# Patient Record
Sex: MALE | Race: BLACK OR AFRICAN AMERICAN | NOT HISPANIC OR LATINO | Employment: UNEMPLOYED | URBAN - NONMETROPOLITAN AREA
[De-identification: names, ages, dates, MRNs, and addresses within clinical notes are randomized per-mention and may not be internally consistent; named-entity substitution may affect disease eponyms.]

---

## 2017-09-25 RX ORDER — CARVEDILOL 12.5 MG/1
12.5 TABLET ORAL 2 TIMES DAILY WITH MEALS
COMMUNITY
End: 2018-08-03 | Stop reason: SDUPTHER

## 2017-09-25 RX ORDER — LOVASTATIN 20 MG/1
20 TABLET ORAL NIGHTLY
Status: ON HOLD | COMMUNITY
End: 2019-03-09 | Stop reason: SDUPTHER

## 2017-09-25 RX ORDER — FUROSEMIDE 40 MG/1
40 TABLET ORAL 2 TIMES DAILY
COMMUNITY
End: 2019-01-11 | Stop reason: SDUPTHER

## 2017-09-25 RX ORDER — POTASSIUM CHLORIDE 750 MG/1
10 TABLET, FILM COATED, EXTENDED RELEASE ORAL DAILY
COMMUNITY
End: 2019-01-11 | Stop reason: SDUPTHER

## 2017-09-25 RX ORDER — DIGOXIN 125 MCG
125 TABLET ORAL
COMMUNITY
End: 2019-02-07 | Stop reason: HOSPADM

## 2017-09-26 ENCOUNTER — OFFICE VISIT (OUTPATIENT)
Dept: CARDIOLOGY | Facility: CLINIC | Age: 47
End: 2017-09-26

## 2017-09-26 VITALS
HEART RATE: 65 BPM | SYSTOLIC BLOOD PRESSURE: 130 MMHG | WEIGHT: 315 LBS | BODY MASS INDEX: 39.17 KG/M2 | OXYGEN SATURATION: 97 % | HEIGHT: 75 IN | DIASTOLIC BLOOD PRESSURE: 78 MMHG

## 2017-09-26 DIAGNOSIS — Z86.79 HISTORY OF ABDOMINAL AORTIC ANEURYSM (AAA): ICD-10-CM

## 2017-09-26 DIAGNOSIS — I50.22 CHRONIC SYSTOLIC CONGESTIVE HEART FAILURE (HCC): Primary | ICD-10-CM

## 2017-09-26 DIAGNOSIS — I10 ESSENTIAL HYPERTENSION: Primary | ICD-10-CM

## 2017-09-26 DIAGNOSIS — G47.33 OSA (OBSTRUCTIVE SLEEP APNEA): ICD-10-CM

## 2017-09-26 DIAGNOSIS — E78.2 MIXED HYPERLIPIDEMIA: ICD-10-CM

## 2017-09-26 DIAGNOSIS — I10 ESSENTIAL HYPERTENSION: ICD-10-CM

## 2017-09-26 PROCEDURE — 94620 PR PULMONARY STRESS TESTING,SIMPLE: CPT | Performed by: NURSE PRACTITIONER

## 2017-09-26 PROCEDURE — 99204 OFFICE O/P NEW MOD 45 MIN: CPT | Performed by: NURSE PRACTITIONER

## 2017-09-26 RX ORDER — LOSARTAN POTASSIUM 25 MG/1
12.5 TABLET ORAL DAILY
Qty: 30 TABLET | Refills: 6 | Status: SHIPPED | OUTPATIENT
Start: 2017-09-26 | End: 2017-11-01 | Stop reason: CLARIF

## 2017-09-26 NOTE — PROGRESS NOTES
Matti Bravo  Procedure: 6 Minute Walk Test   9/26/2017  Indication:HFrEF    Pretest: BP:130/78               HR:65               Sa02:97               Dyspnea:0               Fatigue:3    Post Test: BP:140/80               HR:80               Sa02:97               Dyspnea:5               Fatigue:4    First 6MWT:yes     Supplemental oxygen during test:no    Stopped before 6 minutes:no    Pauses:no    Results in distance walked:408.7 meters    Did individual experience any pain or discomfort:no    I was present for the above test and agree with the data.     NYHA Functional Class II          This document has been electronically signed by SHELL Isidro on September 26, 2017 3:47 PM

## 2017-09-26 NOTE — PATIENT INSTRUCTIONS
Losartan tablets  What is this medicine?  LOSARTAN (krys BETHEL tan) is used to treat high blood pressure and to reduce the risk of stroke in certain patients. This drug also slows the progression of kidney disease in patients with diabetes.  This medicine may be used for other purposes; ask your health care provider or pharmacist if you have questions.  COMMON BRAND NAME(S): Cozaar  What should I tell my health care provider before I take this medicine?  They need to know if you have any of these conditions:  -heart failure  -kidney or liver disease  -an unusual or allergic reaction to losartan, other medicines, foods, dyes, or preservatives  -pregnant or trying to get pregnant  -breast-feeding  How should I use this medicine?  Take this medicine by mouth with a glass of water. Follow the directions on the prescription label. This medicine can be taken with or without food. Take your doses at regular intervals. Do not take your medicine more often than directed.  Talk to your pediatrician regarding the use of this medicine in children. Special care may be needed.  Overdosage: If you think you have taken too much of this medicine contact a poison control center or emergency room at once.  NOTE: This medicine is only for you. Do not share this medicine with others.  What if I miss a dose?  If you miss a dose, take it as soon as you can. If it is almost time for your next dose, take only that dose. Do not take double or extra doses.  What may interact with this medicine?  -blood pressure medicines  -diuretics, especially triamterene, spironolactone, or amiloride  -fluconazole  -NSAIDs, medicines for pain and inflammation, like ibuprofen or naproxen  -potassium salts or potassium supplements  -rifampin  This list may not describe all possible interactions. Give your health care provider a list of all the medicines, herbs, non-prescription drugs, or dietary supplements you use. Also tell them if you smoke, drink alcohol, or  use illegal drugs. Some items may interact with your medicine.  What should I watch for while using this medicine?  Visit your doctor or health care professional for regular checks on your progress. Check your blood pressure as directed. Ask your doctor or health care professional what your blood pressure should be and when you should contact him or her. Call your doctor or health care professional if you notice an irregular or fast heart beat.  Women should inform their doctor if they wish to become pregnant or think they might be pregnant. There is a potential for serious side effects to an unborn child, particularly in the second or third trimester. Talk to your health care professional or pharmacist for more information.  You may get drowsy or dizzy. Do not drive, use machinery, or do anything that needs mental alertness until you know how this drug affects you. Do not stand or sit up quickly, especially if you are an older patient. This reduces the risk of dizzy or fainting spells. Alcohol can make you more drowsy and dizzy. Avoid alcoholic drinks.  Avoid salt substitutes unless you are told otherwise by your doctor or health care professional.  Do not treat yourself for coughs, colds, or pain while you are taking this medicine without asking your doctor or health care professional for advice. Some ingredients may increase your blood pressure.  What side effects may I notice from receiving this medicine?  Side effects that you should report to your doctor or health care professional as soon as possible:  -confusion, dizziness, light headedness or fainting spells  -decreased amount of urine passed  -difficulty breathing or swallowing, hoarseness, or tightening of the throat  -fast or irregular heart beat, palpitations, or chest pain  -skin rash, itching  -swelling of your face, lips, tongue, hands, or feet  Side effects that usually do not require medical attention (report to your doctor or health care  professional if they continue or are bothersome):  -cough  -decreased sexual function or desire  -headache  -nasal congestion or stuffiness  -nausea or stomach pain  -sore or cramping muscles  This list may not describe all possible side effects. Call your doctor for medical advice about side effects. You may report side effects to FDA at 1-892-TXD-2068.  Where should I keep my medicine?  Keep out of the reach of children.  Store at room temperature between 15 and 30 degrees C (59 and 86 degrees F). Protect from light. Keep container tightly closed. Throw away any unused medicine after the expiration date.  NOTE: This sheet is a summary. It may not cover all possible information. If you have questions about this medicine, talk to your doctor, pharmacist, or health care provider.     © 2017, [employer]. (2009-02-27 16:42:18)

## 2017-09-26 NOTE — PROGRESS NOTES
[facility] CHF CLINIC OFFICE VISIT    Subjective:     Congestive Heart Failure (chief complaint )      Congestive Heart Failure   Presents for initial visit. The disease course has been stable. The condition has lasted for 3 years. Pertinent negatives include no abdominal pain, chest pain, claudication, edema, fatigue, orthopnea, palpitations, shortness of breath or unexpected weight change. The symptoms have been stable. Past treatments include ACE inhibitors, beta blockers, digoxin, exercise and salt and fluid restriction. The treatment provided significant relief. Compliance with prior treatments has been good.     CHF referral from SHELL Mcclure. Mr. Bravo has a known cardiomyopathy from 2014 with an EF of 10-15%. He has since been in [facility] and established with [facility] for refills of CHF medications. He was released from [facility] on Aug 1, 2017. Unclear why he is not on an ACE/ARB. Unclear why he does not have ICD implanted. Left heart cath in 2009 was without obstruction. Echo in 2009 was 20%. Echo in 2014 was 10-15%. Had followed with Dr. Nunn.   He has been on Coreg 12.5mg BID, Lasix 40mg BID, Digoxin 125mcg, and Kdur.   He does well with his symptoms, appearing to be NYHA Class II. No complaint of orthopnea- chronic 2 pillows, edema, weight gain, abdominal swelling, or dyspnea.     PCP: SHELL Mcclure  Cardiologist: will est with Dr. Shook  Nephrologist: none  Pulmonologist: none    Hospitalizations:  none    Past Medical History:   Diagnosis Date   • Hyperlipidemia    • Hypertension    • Peripheral vascular disease      Past Surgical History:   Procedure Laterality Date   • CARDIAC CATHETERIZATION  2009     Social History     Social History   • Marital status:      Spouse name: N/A   • Number of children: N/A   • Years of education: N/A     Social History Main Topics   • Smoking status: Never Smoker   • Smokeless tobacco: Never Used   • Alcohol use No   • Drug use: No   • Sexual activity:  "Not Asked     Other Topics Concern   • None     Social History Narrative     No Known Allergies    Review of Systems   Constitution: Negative for chills, decreased appetite, fatigue, fever, weakness and unexpected weight change.   HENT: Negative.    Eyes: Negative.    Cardiovascular: Negative for chest pain, claudication, dyspnea on exertion, irregular heartbeat, leg swelling and palpitations.   Respiratory: Negative for cough, shortness of breath and wheezing.    Endocrine: Negative.    Skin: Negative for dry skin, flushing and rash.   Musculoskeletal: Negative for falls and myalgias.   Gastrointestinal: Negative for abdominal pain, change in bowel habit and melena.   Genitourinary: Negative for frequency and hematuria.   Neurological: Negative for dizziness, light-headedness and loss of balance.   Psychiatric/Behavioral: Negative for altered mental status and memory loss. The patient is not nervous/anxious.        Current Outpatient Prescriptions   Medication Sig Dispense Refill   • carvedilol (COREG) 12.5 MG tablet Take 12.5 mg by mouth 2 (Two) Times a Day With Meals.     • digoxin (LANOXIN) 125 MCG tablet Take 125 mcg by mouth Daily.     • furosemide (LASIX) 40 MG tablet Take 40 mg by mouth 2 (Two) Times a Day.     • lovastatin (MEVACOR) 20 MG tablet Take 20 mg by mouth Every Night.     • potassium chloride (K-DUR) 10 MEQ CR tablet Take 10 mEq by mouth Daily.       No current facility-administered medications for this visit.         Objective:     Vitals:    09/26/17 1455   BP: 130/78   BP Location: Left arm   Patient Position: Sitting   Cuff Size: Adult   Pulse: 65   SpO2: 97%   Weight: (!) 357 lb 6 oz (162 kg)   Height: 75\" (190.5 cm)     Wt Readings from Last 3 Encounters:   09/26/17 (!) 357 lb 6 oz (162 kg)        Physical Exam   Constitutional: He is oriented to person, place, and time. He appears well-developed and well-nourished. No distress.   HENT:   Head: Normocephalic.   Neck: No JVD present. "   Cardiovascular: Normal rate, regular rhythm, S1 normal, S2 normal, normal heart sounds and intact distal pulses.    No murmur heard.  Pulmonary/Chest: Effort normal and breath sounds normal. No respiratory distress. He has no wheezes. He has no rales.   Abdominal: Soft. Bowel sounds are normal.   Musculoskeletal: Normal range of motion. He exhibits no edema.   Neurological: He is alert and oriented to person, place, and time.   Skin: Skin is warm and dry. No erythema.   Psychiatric: He has a normal mood and affect. His behavior is normal. Judgment and thought content normal.       Cardiographics  Echocardiogram:   DESCRIPTION:  Left ventricle is moderately dilated.  There is severe  global hypokinesis with an EF of 15% to 20%.      Right ventricular size is normal.  Right ventricular systolic function  is mildly impaired.      Left atrium is moderately dilated.  Right atrium is mildly dilated.      Aortic valve is trileaflet and opens very well.  Mitral valve is  normal. There is mild MR.  Tricuspid valve is normal.  There is mild  TR.  Pulmonic valve is normal.  There is trace PI. There is no  pericardial effusion.      A:  1. Severe left ventricular dysfunction with an ejection fraction of 15%        to 20%.  2. Biatrial enlargement  3. Left ventricle is moderately dilated.  4. Ejection fraction is 15% to 20%    YURIDIA CARLISLE MD    [id number]  Normal sinus rhythm with sinus arrhythmia  Possible Left atrial enlargement  ST   Abnormal ECG  When compared with ECG of 2014 09:51,  fusion complexes are no longer present  premature ventricular complexes are no longer present  Questionable change in QRS axis  Nonspecific T wave abnormality now evident in Inferior leads  T wave inversion more evident in Lateral leads  Confirmed by NAOMIE AGUILAR     [facility]: 10/27/2009  DESCRIPTION: Patient was brought to the cath lab in the fasting state, explained the risks and benefits. He was prepped and draped in  the usual sterile manner. Right groin was anesthetized using 2% Lidocaine. Subsequently a 6-Chilean short sheath was inserted using modified Seldinger technique.   Left coronary angiogram was obtained using a 6-Chilean left Lonnie catheter, and right coronary angiogram was obtained using a 6-Chilean right Lonnie catheter.   LV-gram was obtained using a pigtail catheter. There were no complications.   HEMODYNAMICS Aortic pressure is 136/96. Left ventricular pressure is 135. Left ventricular end diastolic pressure is 22.   LV-GRAM: Showed severe global hypokinesis with an EF of 25%.   LEFT MAIN: Large caliber vessel, which gave rise to an LAD, which also gave rise to multiple diagonals and septals, which was free of significant disease.   LEFT CIRCUMFLEX: Gave rise to 2 OM's, which is free of significant disease.   RCA: Is a co-dominant system, gives rise to a PDA and PL branch, which is free of significant disease. A: 1. Normal coronaries 2. Severe left ventricular dysfunction.   REC: 1. Control hypertension   2. Medical therapy     YURIDIA CARLISLE M.D.     Imaging  Chest x-ray:    CT:    Lab Review   Faxed From Karin Ng's office:  WBC: 5.5  Hb  Hct: 39  Plt: 152,000    Na: 139   K: 3.9  Creat: 1.05  BUN: 15  Album: 4.3  Gluc: 155 (fasting)  AST: 17  ALT: 22    Total Chol:151  HDL:36  LDL:96  Tri     The following portions of the patient's history were reviewed and updated as appropriate: allergies, current medications, past family history, past medical history, past social history, past surgical history and problem list.     Assessment/Plan:      Diagnosis Plan   1. Chronic systolic congestive heart failure  Most likely NICM: EF:15%. NYHA Class II, Stage C. Patient appears euvolemic and in a well perfused physiologic state. Hemodynamics are acceptable\    BETA-BLOCKER: Coreg 12.5mg BID        ACE/ARB: Start Losartan 12.5mg daily  ENTRESTO: no  DIURETIC: LAsix 40mg BID  ALDOSTERONT ANTAGONIST: Not  currently indicated. NYHA Class II  IMDUR/HYDRALAZINE: N/A  DIGOXIN: 125mcg  Fluid restriction: 2 L  Sodium restriction: 2 grams  6MWT: yes  9/26/2017  Cardiac Rehab: n/a  ICD: indicated. Will repeat echo.   ADHF: no  CardioMEMS:no  LVAD: unsure    Presented an overview of heart failure, expected course, considerations, risk factors and exacerbation prevention.  Recommended daily weight monitoring. Information provided.  Recommended restricted dietary sodium intake of 2g/day  Fluid restrictions of 2L/day.  Discussed patient action plan for heart failure;  Recommended avoiding NSAIDs use.  Discussed warning signs requiring additional medical attention for heart failure.  Do not hesitate to contact this office for further symptoms or concerns. Contact information provided to the patient and understanding verbalized.     EKG  CTA Coronary for ischemic evaluation due to non-specific T wave changes.   Repeat ECHO       2. History of abdominal aortic aneurysm (AAA)  Abdominal Aortic Aneurysm Screening evaluation       3. Essential hypertension  HTN, essential.   BP noted to be well controlled today in office.    LVH: absent.  LVEF:Abnormal:.   Diastolic function:Not Available.  End-organ damage: HF    DASH; medication compliance  Echo       4. Mixed hyperlipidemia  Lovastatin 20mg nightly       5. KHANH (obstructive sleep apnea)  Had sleep study. Failed it. Never got CPAP.   Referral to Dr. Forbes        Follow up in 1 month    45 minutes out of 60 minutes face to face spent counseling patient extensively on dietary Na+ intake, importance of activity, weight monitoring, compliance with medications and follow up appointments.

## 2017-10-02 ENCOUNTER — TELEPHONE (OUTPATIENT)
Dept: CARDIOLOGY | Facility: CLINIC | Age: 47
End: 2017-10-02

## 2017-10-05 ENCOUNTER — HOSPITAL ENCOUNTER (OUTPATIENT)
Dept: CT IMAGING | Facility: HOSPITAL | Age: 47
Discharge: HOME | End: 2017-10-05
Admitting: NURSE PRACTITIONER

## 2017-10-05 DIAGNOSIS — I50.22 CHRONIC SYSTOLIC CONGESTIVE HEART FAILURE (HCC): ICD-10-CM

## 2017-10-05 PROCEDURE — 75574 CT ANGIO HRT W/3D IMAGE: CPT

## 2017-10-05 PROCEDURE — 0 IOPAMIDOL PER 1 ML: Performed by: INTERNAL MEDICINE

## 2017-10-05 RX ADMIN — IOPAMIDOL 91 ML: 755 INJECTION, SOLUTION INTRAVENOUS at 15:00

## 2017-11-01 ENCOUNTER — OFFICE VISIT (OUTPATIENT)
Dept: CARDIOLOGY | Facility: CLINIC | Age: 47
End: 2017-11-01

## 2017-11-01 VITALS
HEART RATE: 80 BPM | SYSTOLIC BLOOD PRESSURE: 152 MMHG | HEIGHT: 75 IN | DIASTOLIC BLOOD PRESSURE: 90 MMHG | WEIGHT: 315 LBS | BODY MASS INDEX: 39.17 KG/M2 | OXYGEN SATURATION: 94 %

## 2017-11-01 DIAGNOSIS — I50.22 CHRONIC SYSTOLIC CONGESTIVE HEART FAILURE (HCC): Primary | ICD-10-CM

## 2017-11-01 DIAGNOSIS — R91.8 MULTIPLE LUNG NODULES ON CT: ICD-10-CM

## 2017-11-01 DIAGNOSIS — I10 ESSENTIAL HYPERTENSION: ICD-10-CM

## 2017-11-01 PROBLEM — Z86.79 HISTORY OF ABDOMINAL AORTIC ANEURYSM (AAA): Status: RESOLVED | Noted: 2017-09-26 | Resolved: 2017-11-01

## 2017-11-01 PROCEDURE — 93005 ELECTROCARDIOGRAM TRACING: CPT | Performed by: NURSE PRACTITIONER

## 2017-11-01 PROCEDURE — 93010 ELECTROCARDIOGRAM REPORT: CPT | Performed by: INTERNAL MEDICINE

## 2017-11-01 PROCEDURE — 99214 OFFICE O/P EST MOD 30 MIN: CPT | Performed by: NURSE PRACTITIONER

## 2017-11-01 RX ORDER — ALBUTEROL SULFATE 90 UG/1
2 AEROSOL, METERED RESPIRATORY (INHALATION) EVERY 4 HOURS PRN
Qty: 1 INHALER | Refills: 3 | Status: ON HOLD | OUTPATIENT
Start: 2017-11-01 | End: 2019-03-09 | Stop reason: SDUPTHER

## 2017-11-01 NOTE — PROGRESS NOTES
[facility] CHF CLINIC OFFICE VISIT    Subjective:     Congestive Heart Failure      Congestive Heart Failure   Presents for initial visit. The disease course has been stable. The condition has lasted for 3 years. Pertinent negatives include no abdominal pain, chest pain, claudication, edema, fatigue, orthopnea, palpitations, shortness of breath or unexpected weight change. The symptoms have been stable. Past treatments include ACE inhibitors, beta blockers, digoxin, exercise and salt and fluid restriction. The treatment provided significant relief. Compliance with prior treatments has been good.     CHF referral from [facility]. Mr. Bravo has a known cardiomyopathy from 2014 with an EF of 10-15%. He has since been in group home and established with [facility] for refills of CHF medications. He was released from group home on Aug 1, 2017. Unclear why he is not on an ACE/ARB. Unclear why he does not have ICD implanted. Left heart cath in 2009 was without obstruction. Echo in 2009 was 20%. Echo in 2014 was 10-15%. Had followed with Dr. Nunn. Repeat of CTA coronary was without obstruction still. SV was 67ml. 3D EF was 30%.    He has been on Coreg 12.5mg BID, Lasix 40mg BID, Digoxin 125mcg, and Kdur.   He does well with his symptoms, appearing to be NYHA Class II. No complaint of orthopnea- chronic 2 pillows, edema, weight gain, abdominal swelling, or dyspnea. He has vague symptoms over the past month including wheezes and fatigue.     PCP: [facility]  Cardiologist: praveen est with Dr. Cabrera post ICD placement  Nephrologist: none  Pulmonologist: Ernie referral    Hospitalizations:  none    Past Medical History:   Diagnosis Date   • Hyperlipidemia    • Hypertension    • Peripheral vascular disease      Past Surgical History:   Procedure Laterality Date   • CARDIAC CATHETERIZATION  2009     Social History     Social History   • Marital status:      Spouse name: N/A   • Number of children: N/A   • Years of  "education: N/A     Social History Main Topics   • Smoking status: Never Smoker   • Smokeless tobacco: Never Used   • Alcohol use No   • Drug use: No   • Sexual activity: Not Asked     Other Topics Concern   • None     Social History Narrative     No Known Allergies    Review of Systems   Constitution: Negative for chills, decreased appetite, fatigue, fever, weakness and unexpected weight change.   HENT: Negative.    Eyes: Negative.    Cardiovascular: Negative for chest pain, claudication, dyspnea on exertion, irregular heartbeat, leg swelling and palpitations.   Respiratory: Negative for cough, shortness of breath and wheezing.    Endocrine: Negative.    Skin: Negative for dry skin, flushing and rash.   Musculoskeletal: Negative for falls and myalgias.   Gastrointestinal: Negative for abdominal pain, change in bowel habit and melena.   Genitourinary: Negative for frequency and hematuria.   Neurological: Negative for dizziness, light-headedness and loss of balance.   Psychiatric/Behavioral: Negative for altered mental status and memory loss. The patient is not nervous/anxious.        Current Outpatient Prescriptions   Medication Sig Dispense Refill   • carvedilol (COREG) 12.5 MG tablet Take 12.5 mg by mouth 2 (Two) Times a Day With Meals.     • digoxin (LANOXIN) 125 MCG tablet Take 125 mcg by mouth Daily.     • furosemide (LASIX) 40 MG tablet Take 40 mg by mouth 2 (Two) Times a Day.     • losartan (COZAAR) 25 MG tablet Take 0.5 tablets by mouth Daily. 30 tablet 6   • lovastatin (MEVACOR) 20 MG tablet Take 20 mg by mouth Every Night.     • potassium chloride (K-DUR) 10 MEQ CR tablet Take 10 mEq by mouth Daily.       No current facility-administered medications for this visit.         Objective:     Vitals:    11/01/17 1445   BP: 152/90   BP Location: Left arm   Patient Position: Sitting   Cuff Size: Large Adult   Pulse: 80   SpO2: 94%   Weight: (!) 359 lb 9.6 oz (163 kg)   Height: 75\" (190.5 cm)     Wt Readings from " Last 3 Encounters:   11/01/17 (!) 359 lb 9.6 oz (163 kg)   09/26/17 (!) 357 lb 6 oz (162 kg)        Physical Exam   Constitutional: He is oriented to person, place, and time. He appears well-developed and well-nourished. No distress.   HENT:   Head: Normocephalic.   Neck: No JVD present.   Cardiovascular: Normal rate, regular rhythm, S1 normal, S2 normal, normal heart sounds and intact distal pulses.    No murmur heard.  Pulmonary/Chest: Effort normal and breath sounds normal. No respiratory distress. He has no wheezes. He has no rales.   Abdominal: Soft. Bowel sounds are normal.   Musculoskeletal: Normal range of motion. He exhibits edema (1+ BLE).   Neurological: He is alert and oriented to person, place, and time.   Skin: Skin is warm and dry. No erythema.   Psychiatric: He has a normal mood and affect. His behavior is normal. Judgment and thought content normal.       Cardiographics  AAA screening 10/9/17  Interpretation Summary   · Technically limited exam due to obesity.  · Appears negative for abdominal aortic aneurysm.     ECHO 10/9/2017  Interpretation Summary   · Left Ventricle: Left ventricular systolic function is severely decreased. 3D LV EF = 30%  · There is left ventricular global hypokinesis noted.  · The left ventricle cavity is severely dilated. Increased cardiac mass. Findings are consistent with dilated cardiomyopathy.  · Left ventricular diastolic dysfunction is noted (grade I a w/high LAP) consistent with impaired relaxation. Elevated left atrial pressure  · Right Ventricle: Normal right ventricular wall thickness, systolic function and septal motion noted. Right ventricular cavity is borderline dilated  · Mild mitral valve regurgitation is present  · No evidence of pulmonary hypertension is present  · There is no evidence of pericardial effusion         Echocardiogram: 2014  DESCRIPTION:  Left ventricle is moderately dilated.  There is severe  global hypokinesis with an EF of 15% to  20%.      Right ventricular size is normal.  Right ventricular systolic function  is mildly impaired.      Left atrium is moderately dilated.  Right atrium is mildly dilated.      Aortic valve is trileaflet and opens very well.  Mitral valve is  normal. There is mild MR.  Tricuspid valve is normal.  There is mild  TR.  Pulmonic valve is normal.  There is trace PI. There is no  pericardial effusion.      A:  1. Severe left ventricular dysfunction with an ejection fraction of 15%        to 20%.  2. Biatrial enlargement  3. Left ventricle is moderately dilated.  4. Ejection fraction is 15% to 20%    YURIDIA CARLILSE MD    [id number]  Normal sinus rhythm with sinus arrhythmia  Possible Left atrial enlargement  ST   Abnormal ECG  When compared with ECG of 2014 09:51,  fusion complexes are no longer present  premature ventricular complexes are no longer present  Questionable change in QRS axis  Nonspecific T wave abnormality now evident in Inferior leads  T wave inversion more evident in Lateral leads  Confirmed by NAOMIE AGUILAR     [facility]: 10/27/2009  DESCRIPTION: Patient was brought to the cath lab in the fasting state, explained the risks and benefits. He was prepped and draped in the usual sterile manner. Right groin was anesthetized using 2% Lidocaine. Subsequently a 6-Grenadian short sheath was inserted using modified Seldinger technique.   Left coronary angiogram was obtained using a 6-Grenadian left Lonnie catheter, and right coronary angiogram was obtained using a 6-Grenadian right Lonnie catheter.   LV-gram was obtained using a pigtail catheter. There were no complications.   HEMODYNAMICS Aortic pressure is 136/96. Left ventricular pressure is 135. Left ventricular end diastolic pressure is 22.   LV-GRAM: Showed severe global hypokinesis with an EF of 25%.   LEFT MAIN: Large caliber vessel, which gave rise to an LAD, which also gave rise to multiple diagonals and septals, which was free of significant  disease.   LEFT CIRCUMFLEX: Gave rise to 2 OM's, which is free of significant disease.   RCA: Is a co-dominant system, gives rise to a PDA and PL branch, which is free of significant disease. A: 1. Normal coronaries 2. Severe left ventricular dysfunction.   REC: 1. Control hypertension   2. Medical therapy     YURIDIA CARLISLE M.D.     Imaging  Chest x-ray:    CTA coronary 10/5/2017  CALCIUM PLAQUE BURDEN:     REGION                                         CALCIUM SCORE  ([place])  Left Main                                                      0   Right Coronary Artery                                 30   Left Anterior Descending                            0   Circumflex                                                    0   Posterior Descending Artery                       0           Your Calcium Score is 30       This places the patent in the mild or minimal coronary narrowings  likely risk for coronary artery disease.     CTA OF THE CORONARY ARTERIES: There is adequate visualization of  the left main, LAD, diagonals, circumflex, and RCA. The patient  is right dominant.     Left Main: No calcified or soft tissue density plaque and no  stenosis.  LAD: No calcified or soft tissue density plaque and no stenosis.  Circumflex: No calcified or soft tissue density plaque and no  stenosis.  RCA: Right coronary artery very small calcified atherosclerotic  plaque foci are seen which are not hemodynamically significant.     The aortic valve is tricuspid. There is no myocardial bridging.  On short axis views, the myocardium is homogeneous in thickness.     EXTRACARDIAC SOFT TISSUES:  Mediastinum: On the imaging, the ascending and descending aorta  are normal in caliber. Right perihilar single enlarged lymph node  measuring 2.1 x 2.1 cm. The pericardium is normal.  Lungs: Right lower lobe superior segment 8.4 mm noncalcified  nodule. Right upper lobe posterior segment 5.5 mm noncalcified  nodule. Right middle lobe small  "linear opacity.  Abdomen: No evidence of hiatal hernia. The imaged liver and  spleen are unremarkable. There is no lymphadenopathy in the upper  abdomen.  Bones: There are no lytic or blastic lesions within the osseous  structures.     CT FUNCTIONAL ANALYSIS:     Ejection Fraction     22 %  Diastolic Volume     309 ml  Systolic Volume      242 ml  Stroke Volume        67 ml  Cardiac Output       3.3 L/minute     IMPRESSION:  CONCLUSION:  1.  Abnormal low cardiac ejection fraction being 22%.  2.  Calcium score 30.  3.  Right lower lobe superior segment 8.4 mm noncalcified nodule.  Right upper lobe posterior segment 5.5 mm noncalcified nodule.  These may represent benign inflammatory nodules versus pulmonary  nodules. Recommend follow-up CT in 3 months to further evaluate.  4.  Right perihilar single enlarged lymph node may represent  inflammatory lymphadenopathy versus neoplastic involvement.  Recommend clinical correlation and consideration of follow-up CT  in 3 months.  5.  Otherwise normal CT angiogram coronary study.     Electronically signed by:  Pj Cummins MD  10/5/2017 4:39 PM CDT  Workstation: [id number]    Lab Review   Faxed From [facility] Ng's office:  WBC: 5.5  Hb  Hct: 39  Plt: 152,000    Na: 139   K: 3.9  Creat: 1.05  BUN: 15  Album: 4.3  Gluc: 155 (fasting)  AST: 17  ALT: 22    Total Chol:151  HDL:36  LDL:96  Tri     The following portions of the patient's history were reviewed and updated as appropriate: allergies, current medications, past family history, past medical history, past social history, past surgical history and problem list.     Assessment/Plan:      Diagnosis Plan   1. Chronic systolic congestive heart failure  Most likely NICM: EF:15%. NYHA Class II (moving towards III), Stage C. Patient appears euvolemic and in a well perfused physiologic state. Hemodynamics are acceptable. He complains of feeling \"poor\" the past month but not overly dyspneic or anything specific. Will change to " Entresto.    BETA-BLOCKER: Coreg 12.5mg BID        ACE/ARB: Stop losartan  ENTRESTO: Begin 49/51mg BID  DIURETIC: Lasix 40mg BID  ALDOSTERONT ANTAGONIST: n/a at this time. Will try entresto first.  IMDUR/HYDRALAZINE: N/A  DIGOXIN: 125mcg  Fluid restriction: 2 L  Sodium restriction: 2 grams  6MWT: yes  9/26/2017. 408.7 meters  Cardiac Rehab: n/a  ICD: indicated. Akram referral   ADHF: no  CardioMEMS:no  LVAD: unsure    Reiterated all educational points this visit.  Continue daily weight monitoring.  Continue restricted dietary sodium intake.  Discussed patient action plan for heart failure. Contact information for this office verbalized.  Recommended avoiding NSAIDs use.  Discussed warning signs requiring additional medical attention for heart failure.   Education points repeated back by patient.     EKG today       2. Dyspnea with audible wheeze Ventolin inhaler PRN  No auscultated rales or wheezes. Audible wheeze.   May have fluid in chest. Will attempt diuresis with Entresto.        3. Essential hypertension  HTN, essential.   BP noted to be well controlled today in office.    LVH: absent.  LVEF:Abnormal:.   Diastolic function:Not Available.  End-organ damage: HF    DASH; medication compliance       4. Mixed hyperlipidemia  Lovastatin 20mg nightly       5. KHANH (obstructive sleep apnea)  Had sleep study. Failed it. Never got CPAP.   Referral to Dr. Forbes        Follow up in 1 month, CHF clinic. Post Entresto. Will check CMP.    Pulmonology referral related to lung nodules.  Findings of the CT scan were discussed with patient.  Reassured.

## 2017-11-14 ENCOUNTER — OFFICE VISIT (OUTPATIENT)
Dept: CARDIOLOGY | Facility: CLINIC | Age: 47
End: 2017-11-14

## 2017-11-14 VITALS — HEART RATE: 60 BPM | HEIGHT: 75 IN | SYSTOLIC BLOOD PRESSURE: 132 MMHG | DIASTOLIC BLOOD PRESSURE: 88 MMHG

## 2017-11-14 DIAGNOSIS — G47.33 OSA (OBSTRUCTIVE SLEEP APNEA): ICD-10-CM

## 2017-11-14 DIAGNOSIS — I50.22 CHRONIC SYSTOLIC CONGESTIVE HEART FAILURE (HCC): Primary | ICD-10-CM

## 2017-11-14 DIAGNOSIS — I10 ESSENTIAL HYPERTENSION: ICD-10-CM

## 2017-11-14 DIAGNOSIS — E78.2 MIXED HYPERLIPIDEMIA: ICD-10-CM

## 2017-11-14 PROCEDURE — 93000 ELECTROCARDIOGRAM COMPLETE: CPT | Performed by: INTERNAL MEDICINE

## 2017-11-14 PROCEDURE — 99204 OFFICE O/P NEW MOD 45 MIN: CPT | Performed by: INTERNAL MEDICINE

## 2017-11-14 NOTE — PROGRESS NOTES
Matti Bravo  47 y.o. male    11/14/2017  1. Chronic systolic congestive heart failure    2. Essential hypertension    3. Mixed hyperlipidemia    4. KHANH (obstructive sleep apnea)        History of Present Illness:  To evaluate for ICD implantation with ejection fraction recent echocardiographic study proximal 30% previously noted and ejection fraction 10-15% with significant improvement    47 years old patient with history of hypertension, hyperlipidemia, history of nonischemic cardiomyopathy with a severe LV dysfunction started on appropriate medical management with a significant improvement in ejection fraction from 10-15% improved to 30% on recent echocardiographic study.  Patient had history of chronic  heart failure well compensated.  Patient is on the right tract.  Patient referred for ICD evaluation.  EKG done sinus rhythm with non specific intraventricular conduction delay and QRS duration 122 ms.     Pertinent negatives include no abdominal pain, chest pain, claudication, edema, fatigue, orthopnea, palpitations, shortness of breath or unexpected weight change. The symptoms have been stable. Past treatments include ACE inhibitors, beta blockers, digoxin, exercise and salt and fluid restriction. The treatment provided significant relief. Compliance with prior treatments has been good.      CHF referral from [facility]. Mr. Bravo has a known cardiomyopathy from 2014 with an EF of 10-15%. He has since been in [facility] and established with [facility] for refills of CHF medications. He was released from [facility] on Aug 1, 2017. Unclear why he is not on an ACE/ARB. Unclear why he does not have ICD implanted. Left heart cath in 2009 was without obstruction. Echo in 2009 was 20%. Echo in 2014 was 10-15%. Had followed with Dr. Nunn. Repeat of CTA coronary was without obstruction still. SV was 67ml. 3D EF was 30%.     He has been on Coreg 12.5mg BID, Lasix 40mg BID, Digoxin 125mcg, and Kdur.   He  does well with his symptoms, appearing to be NYHA Class II    · Left Ventricle: Left ventricular systolic function is severely decreased. 3D LV EF = 30%  · There is left ventricular global hypokinesis noted.  · The left ventricle cavity is severely dilated. Increased cardiac mass. Findings are consistent with dilated cardiomyopathy.  · Left ventricular diastolic dysfunction is noted (grade I a w/high LAP) consistent with impaired relaxation. Elevated left atrial pressure  · Right Ventricle: Normal right ventricular wall thickness, systolic function and septal motion noted. Right ventricular cavity is borderline dilated  · Mild mitral valve regurgitation is present    SUBJECTIVE    No Known Allergies      Past Medical History:   Diagnosis Date   • Hyperlipidemia    • Hypertension    • Peripheral vascular disease          Past Surgical History:   Procedure Laterality Date   • CARDIAC CATHETERIZATION  2009         Family History   Problem Relation Age of Onset   • Heart disease Other    • Cancer Other    • Hypertension Mother    • Diabetes Mother          Social History     Social History   • Marital status:      Spouse name: N/A   • Number of children: N/A   • Years of education: N/A     Occupational History   • Not on file.     Social History Main Topics   • Smoking status: Never Smoker   • Smokeless tobacco: Never Used   • Alcohol use No   • Drug use: No   • Sexual activity: Not on file     Other Topics Concern   • Not on file     Social History Narrative         Current Outpatient Prescriptions   Medication Sig Dispense Refill   • albuterol (PROVENTIL HFA;VENTOLIN HFA) 108 (90 Base) MCG/ACT inhaler Inhale 2 puffs Every 4 (Four) Hours As Needed for Wheezing. 1 inhaler 3   • carvedilol (COREG) 12.5 MG tablet Take 12.5 mg by mouth 2 (Two) Times a Day With Meals.     • digoxin (LANOXIN) 125 MCG tablet Take 125 mcg by mouth Daily.     • furosemide (LASIX) 40 MG tablet Take 40 mg by mouth 2 (Two) Times a Day.    "  • lovastatin (MEVACOR) 20 MG tablet Take 20 mg by mouth Every Night.     • potassium chloride (K-DUR) 10 MEQ CR tablet Take 10 mEq by mouth Daily.     • sacubitril-valsartan (ENTRESTO) 49-51 MG tablet Take 1 tablet by mouth 2 (Two) Times a Day. 60 tablet 6     No current facility-administered medications for this visit.          OBJECTIVE    /88  Pulse 60  Ht 75\" (190.5 cm)        Review of Systems     Constitutional:  Denies recent weight loss, weight gain, fever or chills, no change in exercise tolerance     HENT:  Denies any hearing loss, epistaxis, hoarseness, or difficulty speaking.     Eyes:No blurring    Respiratory:  Denies dyspnea with exertion,no cough, wheezing, or hemoptysis.     Cardiovascular: See H&P Gastrointestinal:  Denies change in bowel habits, dyspepsia, ulcer disease, hematochezia, or melena.     Endocrine: Negative for cold intolerance, heat intolerance, polydipsia, polyphagia and polyuria. Denies any history of weight change, heat/cold intolerance, polydipsia, polyuria     Genitourinary: Negative.      Musculoskeletal: Denies any history of arthritic symptoms or back problems     Skin:  Denies any change in hair or nails, rashes, or skin lesions.     Allergic/Immunologic: Negative.  Negative for environmental allergies, food allergies and immunocompromised state.     Neurological:  Denies any history of recurrent headaches, strokes, TIA, or seizure disorder.     Hematological: Denies any food allergies, seasonal allergies, bleeding disorders, or lymphadenopathy.     Psychiatric/Behavioral: Denies any history of depression, substance abuse, or change in cognitive function.         Physical Exam     Constitutional: Cooperative, alert and oriented, well-developed, well-nourished, in no acute distress.     HENT:   Head: Normocephalic, normal hair patterns, no masses or tenderness.  Ears, Nose, and Throat: No gross abnormalities. No pallor or cyanosis. Dentition good.   Eyes: EOMS " intact, PERRL, conjunctivae and lids unremarkable. Fundoscopic exam and visual fields not performed.   Neck: No palpable masses or adenopathy, no thyromegaly, no JVD, carotid pulses are full and equal bilaterally and without  Bruits.     Cardiovascular: Regular rhythm, S1 and S2 normal, no S3 or S4. Apical impulse not displaced. No murmurs, gallops, or rubs detected.     Pulmonary/Chest: Chest: normal symmetry, no tenderness to palpation, normal respiratory excursion, no intercostal retraction, no use of accessory muscles.            Pulmonary: Normal breath sounds. No rales or ronchi.    Abdominal: Abdomen soft, bowel sounds normoactive, no masses, no hepatosplenomegaly, non-tender, no bruits.     Musculoskeletal: No deformities, clubbing, cyanosis, erythema, or edema observed. There are no spinal abnormalities noted. Normal muscle strength and tone. Pulses full and equal in all extremities, no bruits auscultated.     Neurological: No gross motor or sensory deficits noted, affect appropriate, oriented to time, person, place.     Skin: Warm and dry to the touch, no apparent skin lesions or masses noted.     Psychiatric: He has a normal mood and affect. His behavior is normal. Judgment and thought content normal.           ECG 12 Lead  Date/Time: 11/14/2017 2:13 PM  Performed by: SARAH MORROW  Authorized by: SARAH MORROW   Comparison: not compared with previous ECG   Rhythm: sinus rhythm  Comments: Sinus rhythm with nonspecific ST-T wave changes and non specific interventricular conduction delay              Lab Results   Component Value Date    WBC 6.7 11/10/2014    HGB 12.8 (L) 11/10/2014    HCT 38.6 (L) 11/10/2014    MCV 89.1 11/10/2014     (L) 11/10/2014     Lab Results   Component Value Date    GLUCOSE 90 11/10/2014    BUN 17 11/10/2014    CREATININE 1.2 11/10/2014    CO2 30 11/10/2014    CALCIUM 9.0 11/10/2014    ALBUMIN 3.3 (L) 11/10/2014    AST 22 11/10/2014    ALT 17 (L) 11/10/2014     No  results found for: CHOL  Lab Results   Component Value Date    TRIG 73 11/09/2014     Lab Results   Component Value Date    HDL 25 (L) 11/09/2014     Lab Results   Component Value Date    LDLCALC 93 11/09/2014     No results found for: LDL  No results found for: HDLLDLRATIO  No components found for: CHOLHDL  No results found for: HGBA1C  Lab Results   Component Value Date    TSH 0.61 04/27/2014           ASSESSMENT AND PLAN  1 non-ischemic cardiomyopathy #2 LV dysfunction with ejection fraction recent echocardiographic study 30% #3 history of chronic congestive heart failure onset    Clinically, no evidence of congestive heart failure based on the clinical history physical finding. No Evidence of ongoing ischemia at the time of evaluations.  Patient referred for evaluation of ICD implantation given the ejection fraction 30%.  Pros and cons of ICD implantation discussed with the patient.  He decided not to have ICD at present as he had significant recovery left ventricle systolic function.  He like to reassess his left ventricle systolic function and to decide base on left systolic function after 3-4 month.  We'll see him in 4 month and get limited echocardiographic study at that time.  Risk factor and lifestyle modification discussed with the patient.-Diet explained.  Advised the patient that he need to cut total amount of an oral intake.  At present given the asymptomatic status no change in the medical management and recommended to continue carvedilol, digoxin, and Entresto with history of hypertension and LV dysfunction.  Lasix for congestive heart failure compensated.    Matti was seen today for congestive heart failure.    Diagnoses and all orders for this visit:    Chronic systolic congestive heart failure    Essential hypertension    Mixed hyperlipidemia    KHANH (obstructive sleep apnea)        Mirna Cabrera MD  11/14/2017  2:04 PM

## 2017-12-14 ENCOUNTER — OFFICE VISIT (OUTPATIENT)
Dept: PULMONOLOGY | Facility: CLINIC | Age: 47
End: 2017-12-14

## 2017-12-14 VITALS
DIASTOLIC BLOOD PRESSURE: 80 MMHG | SYSTOLIC BLOOD PRESSURE: 134 MMHG | BODY MASS INDEX: 39.17 KG/M2 | HEART RATE: 60 BPM | WEIGHT: 315 LBS | HEIGHT: 75 IN | OXYGEN SATURATION: 98 %

## 2017-12-14 DIAGNOSIS — E66.9 RESTRICTIVE LUNG DISEASE SECONDARY TO OBESITY: ICD-10-CM

## 2017-12-14 DIAGNOSIS — E66.01 MORBID OBESITY (HCC): ICD-10-CM

## 2017-12-14 DIAGNOSIS — G47.33 OSA (OBSTRUCTIVE SLEEP APNEA): ICD-10-CM

## 2017-12-14 DIAGNOSIS — I50.22 CHRONIC SYSTOLIC CONGESTIVE HEART FAILURE (HCC): ICD-10-CM

## 2017-12-14 DIAGNOSIS — R93.89 ABNORMAL CT SCAN, CHEST: Primary | ICD-10-CM

## 2017-12-14 DIAGNOSIS — J98.4 RESTRICTIVE LUNG DISEASE SECONDARY TO OBESITY: ICD-10-CM

## 2017-12-14 PROCEDURE — 94060 EVALUATION OF WHEEZING: CPT | Performed by: INTERNAL MEDICINE

## 2017-12-14 PROCEDURE — 94729 DIFFUSING CAPACITY: CPT | Performed by: INTERNAL MEDICINE

## 2017-12-14 PROCEDURE — 94727 GAS DIL/WSHOT DETER LNG VOL: CPT | Performed by: INTERNAL MEDICINE

## 2017-12-14 PROCEDURE — 99204 OFFICE O/P NEW MOD 45 MIN: CPT | Performed by: INTERNAL MEDICINE

## 2017-12-14 RX ORDER — LOSARTAN POTASSIUM 25 MG/1
TABLET ORAL
COMMUNITY
Start: 2017-12-06 | End: 2018-01-09 | Stop reason: SDUPTHER

## 2017-12-14 NOTE — PROGRESS NOTES
"Pulmonary Consultation    No ref. provider found,    Thank you for asking me to see Matti Bravo for   Chief Complaint   Patient presents with   • Lung Nodule     ref by Em Gilman   .    Subjective     History of Present Illness  Matti Bravo is a 47 y.o. male with a PMH significant for Morbid obesity, KHANH, and chronic systolic heart failure who presents for evaluation of an abnormal CT. Pt states he was referred by Em Gilman for PFTs. He denies prior lung disease, but he does admit to wheezing at night. Pt denies GIBBS unless he has too much fluid. He does have KHANH but has not gotten a CPAP. Pt had an appt to establish with Dr. Forbes but it was canceled and never rescheduled. He admits to snoring, apneas, EDS and frequent napping. Pt denies orthopnea, PND or edema. He smoked briefly when he is a teenager then later in life smoked intermittent \"left hand cigarettes\", but he has not smoked in almost 10yrs. He did spend time in penitentiary, but he has worked as a , in construction, and in a plastic factory. There is no lung disease or lung cancer in the family.     Review of Systems: History obtained from chart review and the patient.  Review of Systems   Constitutional: Negative for fever and unexpected weight change.   Respiratory: Negative for cough, shortness of breath and wheezing.    Cardiovascular: Positive for leg swelling. Negative for chest pain.   Gastrointestinal: Negative for abdominal pain.   Psychiatric/Behavioral: The patient is nervous/anxious.      As described in the HPI. Otherwise, remainder of ROS (14 systems) were negative.    Patient Active Problem List   Diagnosis   • Chronic systolic congestive heart failure   • Essential hypertension   • Mixed hyperlipidemia   • KHANH (obstructive sleep apnea)   • Morbid obesity   • Restrictive lung disease secondary to obesity         Current Outpatient Prescriptions:   •  albuterol (PROVENTIL HFA;VENTOLIN HFA) 108 (90 Base) MCG/ACT " "inhaler, Inhale 2 puffs Every 4 (Four) Hours As Needed for Wheezing., Disp: 1 inhaler, Rfl: 3  •  carvedilol (COREG) 12.5 MG tablet, Take 12.5 mg by mouth 2 (Two) Times a Day With Meals., Disp: , Rfl:   •  digoxin (LANOXIN) 125 MCG tablet, Take 125 mcg by mouth Daily., Disp: , Rfl:   •  furosemide (LASIX) 40 MG tablet, Take 40 mg by mouth 2 (Two) Times a Day., Disp: , Rfl:   •  losartan (COZAAR) 25 MG tablet, , Disp: , Rfl:   •  lovastatin (MEVACOR) 20 MG tablet, Take 20 mg by mouth Every Night., Disp: , Rfl:   •  potassium chloride (K-DUR) 10 MEQ CR tablet, Take 10 mEq by mouth Daily., Disp: , Rfl:   •  sacubitril-valsartan (ENTRESTO) 49-51 MG tablet, Take 1 tablet by mouth 2 (Two) Times a Day., Disp: 60 tablet, Rfl: 6    Past Medical History:   Diagnosis Date   • Hyperlipidemia    • Hypertension    • Peripheral vascular disease      Past Surgical History:   Procedure Laterality Date   • CARDIAC CATHETERIZATION  2009     Social History     Social History   • Marital status:      Spouse name: N/A   • Number of children: N/A   • Years of education: N/A     Social History Main Topics   • Smoking status: Never Smoker   • Smokeless tobacco: Never Used   • Alcohol use No   • Drug use: No   • Sexual activity: Not Asked     Other Topics Concern   • None     Social History Narrative     Family History   Problem Relation Age of Onset   • Heart disease Other    • Cancer Other    • Hypertension Mother    • Diabetes Mother           Objective     Blood pressure 134/80, pulse 60, height 190.5 cm (75\"), weight (!) 163 kg (359 lb), SpO2 98 %.  Physical Exam   Constitutional: He is oriented to person, place, and time. Vital signs are normal. He appears well-developed and well-nourished.   Morbidly obese   HENT:   Head: Normocephalic and atraumatic.   Nose: Nose normal.   Mouth/Throat: Uvula is midline, oropharynx is clear and moist and mucous membranes are normal.   Mallampati 4   Eyes: Conjunctivae, EOM and lids are normal. " Pupils are equal, round, and reactive to light.   Neck: Trachea normal and normal range of motion. No tracheal tenderness present. No thyroid mass present.   Cardiovascular: Normal rate, regular rhythm and normal heart sounds.  Exam reveals no gallop.    No murmur heard.  Pulmonary/Chest: Effort normal and breath sounds normal. No respiratory distress. He has no decreased breath sounds. He has no wheezes. He has no rhonchi. Chest wall is not dull to percussion. He exhibits no tenderness.   Abdominal: Soft. Normal appearance and bowel sounds are normal. There is no tenderness.       Vascular Status -  His exam exhibits right foot edema. His exam exhibits left foot edema.  Lymphadenopathy:        Head (right side): No submandibular adenopathy present.        Head (left side): No submandibular adenopathy present.     He has no cervical adenopathy.        Right: No supraclavicular adenopathy present.        Left: No supraclavicular adenopathy present.   Neurological: He is alert and oriented to person, place, and time.   Skin: Skin is warm and dry. No cyanosis. Nails show no clubbing.   Psychiatric: He has a normal mood and affect. His speech is normal and behavior is normal. Judgment normal.   Nursing note and vitals reviewed.      PFTs: 12/14/17  Ratio 71  FVC 3.48/69%  FEV1 2.47/60%  TLC 3.81/47%  DLCO 30.42/77%  Poor effort.  Moderate restriction.  Significant bronchodilator response for FVC.  Elevated RV/TLC consistent with air trapping.  Mildly reduced diffusing capacity.  No comparative data available.     Radiology (independently reviewed and interpreted by me): CTA coronary 10/5/17 showed 2.1 x 2.1 cm right hilar lymph node, 8.4 mm noncalcified right lower lobes.  Segment nodule, 5.5 mm noncalcified posterior segment right upper lobe nodule       Assessment/Plan     Matti was seen today for lung nodule.    Diagnoses and all orders for this visit:    Abnormal CT scan, chest  -     Full Pulmonary Function Test  With Bronchodilator  -     CT Chest Without Contrast; Future    Restrictive lung disease secondary to obesity    Morbid obesity    KHANH (obstructive sleep apnea)  -     Ambulatory Referral to Sleep Medicine    Chronic systolic congestive heart failure         Discussion/ Recommendations:   PFTs are consistent with restriction which is likely treatment will to patient's underlying obesity.  He is relatively asymptomatic and has very minimal tobacco exposure.  I did review the findings on the CT with him and my recommendation for a repeat CT to evaluate for change.  I would consider him in the low risk category, but it is still possible for him to have a primary lung malignancy.  If there is significant change on his follow-up CT, we will discuss further options including biopsy versus PET.  Based on his initial CT, the right hilar node is not accessible by bronchoscopy due to its location adjacent to a blood vessel.    -Repeat CT chest without contrast in early January (3 months from prior).  -If there is no interval change on that scan, we will plan on another surveillance scan 6 months from that time.  -If there is a change, we will discuss options for biopsy versus PET imaging.  -Encouraged efforts at weight loss.  -Refer back to sleep center as patient has known history of KHANH and significant symptoms requiring initiation of CPAP.  -Follow-up with heart failure clinic as recommended.         Return in about 4 weeks (around 1/9/2018) for F/u repeat CT chest.      Thank you for allowing me to participate in the care of Matti Bravo. Please do not hesitate to contact me with any questions.         This document has been electronically signed by Rachelle Klein MD on December 14, 2017 2:00 PM      Dictated using Dragon

## 2018-01-04 ENCOUNTER — HOSPITAL ENCOUNTER (OUTPATIENT)
Dept: CT IMAGING | Facility: HOSPITAL | Age: 48
Discharge: HOME | End: 2018-01-04
Attending: INTERNAL MEDICINE | Admitting: INTERNAL MEDICINE

## 2018-01-04 DIAGNOSIS — R93.89 ABNORMAL CT SCAN, CHEST: ICD-10-CM

## 2018-01-04 PROCEDURE — 71250 CT THORAX DX C-: CPT

## 2018-01-09 ENCOUNTER — OFFICE VISIT (OUTPATIENT)
Dept: CARDIOLOGY | Facility: CLINIC | Age: 48
End: 2018-01-09

## 2018-01-09 VITALS
HEIGHT: 75 IN | BODY MASS INDEX: 39.17 KG/M2 | OXYGEN SATURATION: 96 % | DIASTOLIC BLOOD PRESSURE: 74 MMHG | SYSTOLIC BLOOD PRESSURE: 110 MMHG | WEIGHT: 315 LBS | HEART RATE: 66 BPM

## 2018-01-09 DIAGNOSIS — I50.22 CHRONIC SYSTOLIC CONGESTIVE HEART FAILURE (HCC): Primary | ICD-10-CM

## 2018-01-09 DIAGNOSIS — I10 ESSENTIAL HYPERTENSION: ICD-10-CM

## 2018-01-09 PROCEDURE — 94618 PULMONARY STRESS TESTING: CPT | Performed by: NURSE PRACTITIONER

## 2018-01-09 PROCEDURE — 99214 OFFICE O/P EST MOD 30 MIN: CPT | Performed by: NURSE PRACTITIONER

## 2018-01-09 NOTE — PROGRESS NOTES
[facility] CHF CLINIC OFFICE VISIT    Subjective:     Congestive Heart Failure (chief complaint )      Congestive Heart Failure   Presents for initial visit. The disease course has been stable. The condition has lasted for 3 years. Pertinent negatives include no abdominal pain, chest pain, claudication, edema, fatigue, orthopnea, palpitations, shortness of breath or unexpected weight change. The symptoms have been stable. Past treatments include ACE inhibitors, beta blockers, digoxin, exercise and salt and fluid restriction. The treatment provided significant relief. Compliance with prior treatments has been good.     CHF referral from SHELL Mcclure. Mr. Bravo has a known cardiomyopathy from 2014 with an EF of 10-15%. He has since been in long term and established with [facility] for refills of CHF medications. He was released from long term on Aug 1, 2017. Unclear why he is not on an ACE/ARB. Unclear why he did not have ICD implanted. Left heart cath in 2009 was without obstruction. Echo in 2009 was 20%. Echo in 2014 was 10-15%. Had followed with Dr. Nunn. Repeat of CTA coronary was without obstruction still. SV was 67ml. 3D EF was 30%. ICD not indicated at this current time due to symptom improvement.    He has been on Coreg 12.5mg BID, Lasix 40mg BID, Digoxin 125mcg, Entresto 49/51mg BID, and Kdur.   He does well with his symptoms, appearing to be NYHA Class II. No complaint of orthopnea- chronic 2 pillows, edema, weight gain, abdominal swelling, or dyspnea.      PCP: SHELL Mcclure  Cardiologist: Dr. Cabrera  Nephrologist: none  Pulmonologist: Dr. Klein    Hospitalizations:  none    Past Medical History:   Diagnosis Date   • Hyperlipidemia    • Hypertension    • Peripheral vascular disease      Past Surgical History:   Procedure Laterality Date   • CARDIAC CATHETERIZATION  2009     Social History     Social History   • Marital status:      Spouse name: N/A   • Number of children: N/A   • Years of  education: N/A     Social History Main Topics   • Smoking status: Never Smoker   • Smokeless tobacco: Never Used   • Alcohol use No   • Drug use: No   • Sexual activity: Not Asked     Other Topics Concern   • None     Social History Narrative     No Known Allergies    Review of Systems   Constitution: Negative for chills, decreased appetite, fatigue, fever, weakness and unexpected weight change.   HENT: Negative.    Eyes: Negative.    Cardiovascular: Negative for chest pain, claudication, dyspnea on exertion, irregular heartbeat, leg swelling and palpitations.   Respiratory: Negative for cough, shortness of breath and wheezing.    Endocrine: Negative.    Skin: Negative for dry skin, flushing and rash.   Musculoskeletal: Negative for falls and myalgias.   Gastrointestinal: Negative for abdominal pain, change in bowel habit and melena.   Genitourinary: Negative for frequency and hematuria.   Neurological: Negative for dizziness, light-headedness and loss of balance.   Psychiatric/Behavioral: Negative for altered mental status and memory loss. The patient is not nervous/anxious.        Current Outpatient Prescriptions   Medication Sig Dispense Refill   • albuterol (PROVENTIL HFA;VENTOLIN HFA) 108 (90 Base) MCG/ACT inhaler Inhale 2 puffs Every 4 (Four) Hours As Needed for Wheezing. 1 inhaler 3   • carvedilol (COREG) 12.5 MG tablet Take 12.5 mg by mouth 2 (Two) Times a Day With Meals.     • digoxin (LANOXIN) 125 MCG tablet Take 125 mcg by mouth Daily.     • furosemide (LASIX) 40 MG tablet Take 40 mg by mouth 2 (Two) Times a Day.     • lovastatin (MEVACOR) 20 MG tablet Take 20 mg by mouth Every Night.     • metFORMIN (GLUCOPHAGE) 500 MG tablet Take 500 mg by mouth Daily With Breakfast.     • potassium chloride (K-DUR) 10 MEQ CR tablet Take 10 mEq by mouth Daily.     • sacubitril-valsartan (ENTRESTO) 49-51 MG tablet Take 1 tablet by mouth 2 (Two) Times a Day. 60 tablet 6     No current facility-administered medications for  "this visit.         Objective:     Vitals:    01/09/18 1417   BP: 110/74   BP Location: Left arm   Patient Position: Sitting   Cuff Size: Adult   Pulse: 66   SpO2: 96%   Weight: (!) 162 kg (356 lb 4 oz)   Height: 190.5 cm (75\")     Wt Readings from Last 3 Encounters:   01/09/18 (!) 162 kg (356 lb 4 oz)   12/14/17 (!) 163 kg (359 lb)   11/01/17 (!) 163 kg (359 lb 9.6 oz)        Physical Exam   Constitutional: He is oriented to person, place, and time. He appears well-developed and well-nourished. No distress.   HENT:   Head: Normocephalic.   Neck: No JVD present.   Cardiovascular: Normal rate, regular rhythm, S1 normal, S2 normal, normal heart sounds and intact distal pulses.    No murmur heard.  Pulmonary/Chest: Effort normal and breath sounds normal. No respiratory distress. He has no wheezes. He has no rales.   Abdominal: Soft. Bowel sounds are normal.   Musculoskeletal: Normal range of motion. He exhibits edema (1+ BLE).   Neurological: He is alert and oriented to person, place, and time.   Skin: Skin is warm and dry. No erythema.   Psychiatric: He has a normal mood and affect. His behavior is normal. Judgment and thought content normal.       Cardiographics  AAA screening 10/9/17  Interpretation Summary   · Technically limited exam due to obesity.  · Appears negative for abdominal aortic aneurysm.     ECHO 10/9/2017  Interpretation Summary   · Left Ventricle: Left ventricular systolic function is severely decreased. 3D LV EF = 30%  · There is left ventricular global hypokinesis noted.  · The left ventricle cavity is severely dilated. Increased cardiac mass. Findings are consistent with dilated cardiomyopathy.  · Left ventricular diastolic dysfunction is noted (grade I a w/high LAP) consistent with impaired relaxation. Elevated left atrial pressure  · Right Ventricle: Normal right ventricular wall thickness, systolic function and septal motion noted. Right ventricular cavity is borderline dilated  · Mild mitral " valve regurgitation is present  · No evidence of pulmonary hypertension is present  · There is no evidence of pericardial effusion         Echocardiogram:   DESCRIPTION:  Left ventricle is moderately dilated.  There is severe  global hypokinesis with an EF of 15% to 20%.      Right ventricular size is normal.  Right ventricular systolic function  is mildly impaired.      Left atrium is moderately dilated.  Right atrium is mildly dilated.      Aortic valve is trileaflet and opens very well.  Mitral valve is  normal. There is mild MR.  Tricuspid valve is normal.  There is mild  TR.  Pulmonic valve is normal.  There is trace PI. There is no  pericardial effusion.      A:  1. Severe left ventricular dysfunction with an ejection fraction of 15%        to 20%.  2. Biatrial enlargement  3. Left ventricle is moderately dilated.  4. Ejection fraction is 15% to 20%    YURIDIA CARLISLE MD    [id number]  Normal sinus rhythm with sinus arrhythmia  Possible Left atrial enlargement  ST   Abnormal ECG  When compared with ECG of 2014 09:51,  fusion complexes are no longer present  premature ventricular complexes are no longer present  Questionable change in QRS axis  Nonspecific T wave abnormality now evident in Inferior leads  T wave inversion more evident in Lateral leads  Confirmed by NAOMIE AGUILAR     [facility]: 10/27/2009  DESCRIPTION: Patient was brought to the cath lab in the fasting state, explained the risks and benefits. He was prepped and draped in the usual sterile manner. Right groin was anesthetized using 2% Lidocaine. Subsequently a 6-German short sheath was inserted using modified Seldinger technique.   Left coronary angiogram was obtained using a 6-German left Lonine catheter, and right coronary angiogram was obtained using a 6-German right Lonnie catheter.   LV-gram was obtained using a pigtail catheter. There were no complications.   HEMODYNAMICS Aortic pressure is 136/96. Left ventricular  pressure is 135. Left ventricular end diastolic pressure is 22.   LV-GRAM: Showed severe global hypokinesis with an EF of 25%.   LEFT MAIN: Large caliber vessel, which gave rise to an LAD, which also gave rise to multiple diagonals and septals, which was free of significant disease.   LEFT CIRCUMFLEX: Gave rise to 2 OM's, which is free of significant disease.   RCA: Is a co-dominant system, gives rise to a PDA and PL branch, which is free of significant disease. A: 1. Normal coronaries 2. Severe left ventricular dysfunction.   REC: 1. Control hypertension   2. Medical therapy     YURIDIA CARLISLE M.D.     Imaging  Chest x-ray:    CTA coronary 10/5/2017  CALCIUM PLAQUE BURDEN:     REGION                                         CALCIUM SCORE  ([place])  Left Main                                                      0   Right Coronary Artery                                 30   Left Anterior Descending                            0   Circumflex                                                    0   Posterior Descending Artery                       0           Your Calcium Score is 30       This places the patent in the mild or minimal coronary narrowings  likely risk for coronary artery disease.     CTA OF THE CORONARY ARTERIES: There is adequate visualization of  the left main, LAD, diagonals, circumflex, and RCA. The patient  is right dominant.     Left Main: No calcified or soft tissue density plaque and no  stenosis.  LAD: No calcified or soft tissue density plaque and no stenosis.  Circumflex: No calcified or soft tissue density plaque and no  stenosis.  RCA: Right coronary artery very small calcified atherosclerotic  plaque foci are seen which are not hemodynamically significant.     The aortic valve is tricuspid. There is no myocardial bridging.  On short axis views, the myocardium is homogeneous in thickness.     EXTRACARDIAC SOFT TISSUES:  Mediastinum: On the imaging, the ascending and descending  aorta  are normal in caliber. Right perihilar single enlarged lymph node  measuring 2.1 x 2.1 cm. The pericardium is normal.  Lungs: Right lower lobe superior segment 8.4 mm noncalcified  nodule. Right upper lobe posterior segment 5.5 mm noncalcified  nodule. Right middle lobe small linear opacity.  Abdomen: No evidence of hiatal hernia. The imaged liver and  spleen are unremarkable. There is no lymphadenopathy in the upper  abdomen.  Bones: There are no lytic or blastic lesions within the osseous  structures.     CT FUNCTIONAL ANALYSIS:     Ejection Fraction     22 %  Diastolic Volume     309 ml  Systolic Volume      242 ml  Stroke Volume        67 ml  Cardiac Output       3.3 L/minute     IMPRESSION:  CONCLUSION:  1.  Abnormal low cardiac ejection fraction being 22%.  2.  Calcium score 30.  3.  Right lower lobe superior segment 8.4 mm noncalcified nodule.  Right upper lobe posterior segment 5.5 mm noncalcified nodule.  These may represent benign inflammatory nodules versus pulmonary  nodules. Recommend follow-up CT in 3 months to further evaluate.  4.  Right perihilar single enlarged lymph node may represent  inflammatory lymphadenopathy versus neoplastic involvement.  Recommend clinical correlation and consideration of follow-up CT  in 3 months.  5.  Otherwise normal CT angiogram coronary study.     Electronically signed by:  Pj Cummins MD  10/5/2017 4:39 PM CDT  Workstation: [id number]    Lab Review   Faxed From Karin Ng's office:  WBC: 5.5  Hb  Hct: 39  Plt: 152,000    Na: 139   K: 3.9  Creat: 1.05  BUN: 15  Album: 4.3  Gluc: 155 (fasting)  AST: 17  ALT: 22    Total Chol:151  HDL:36  LDL:96  Tri     The following portions of the patient's history were reviewed and updated as appropriate: allergies, current medications, past family history, past medical history, past social history, past surgical history and problem list.     Assessment/Plan:   Began entresto last visit (2017) He has had  "improvement in BP & NYHA class  Unfortunelty he missed his last scheduled appointment with me so he did not get the BMP I wanted post initiation of Entresto. I asked if he would have completed today and he refuses to have lab come.  He is supposed to see Karin Ng this month with lab work. He only wants to get \"stuck once\". I counseled him on the importance of these labs in relation to kidney failure and hyperkalemia.   I urged him to have them done SOON and to please let me know the results.     Diagnosis Plan   1. Chronic systolic congestive heart failure  NICM: EF:30%. NYHA Class I-II, Stage C. Patient appears euvolemic and in a well perfused physiologic state. Hemodynamics are improved.    BETA-BLOCKER: Coreg 12.5mg BID        ENTRESTO:  49/51mg BID  DIURETIC: Lasix 40mg BID  ALDOSTERONT ANTAGONIST: n/a- NYHA Class I  IMDUR/HYDRALAZINE: N/A  DIGOXIN: 125mcg  Fluid restriction: 2 L  Sodium restriction: 2 grams  6MWT: yes  9/26/2017. 408.7 meters  1/9/2018- 456.28 meters  Cardiac Rehab: n/a  ICD: Consulted with Dr. Cabrera. No ICD at this time due to improved EF (even though less than 35%) and Class I-II symptoms  ADHF: no  CardioMEMS:no  LVAD: unsure    Reiterated all educational points this visit.  Continue daily weight monitoring.  Continue restricted dietary sodium intake.  Discussed patient action plan for heart failure. Contact information for this office verbalized.  Recommended avoiding NSAIDs use.  Discussed warning signs requiring additional medical attention for heart failure.   Education points repeated back by patient.     CMP requested. He refused due to having \"labs done for another doc this week\"     2. Essential hypertension  HTN, essential.   BP noted to be well controlled today in office.    LVH: absent.  LVEF:Abnormal:. 30%  Diastolic function: Grade I  End-organ damage: HF    DASH; medication compliance  Entresto 49/51mg  Coreg 12.5mg BID       3. Mixed hyperlipidemia  Lovastatin 20mg " nightly       4. KHANH (obstructive sleep apnea)  Had sleep study. Failed it. Never got CPAP.   Referral to Dr. Forbes. He showed up late for his appointment and they would not see him. He does not wish to go back.         Follow up in 3 months

## 2018-01-09 NOTE — PROGRESS NOTES
Matti Bravo  Procedure: 6 Minute Walk Test   1/9/2018  Indication: HFrEF    Pretest: BP:110/74               HR:66               Sa02:96               Dyspnea:0               Fatigue:5    Post Test: BP:110/80               HR:83               Sa02:96               Dyspnea:0               Fatigue:0    First 6MWT:no    Supplemental oxygen during test:no    Stopped before 6 minutes:no    Pauses:0    Results in distance walked:456.28 meters    Did individual experience any pain or discomfort:no    I was present for the above test and agree with the data.      NYHA Functional Class I          This document has been electronically signed by SHELL Isidro on January 9, 2018 2:57 PM

## 2018-01-11 ENCOUNTER — TELEPHONE (OUTPATIENT)
Dept: PULMONOLOGY | Facility: CLINIC | Age: 48
End: 2018-01-11

## 2018-01-11 ENCOUNTER — OFFICE VISIT (OUTPATIENT)
Dept: PULMONOLOGY | Facility: CLINIC | Age: 48
End: 2018-01-11

## 2018-01-11 VITALS
WEIGHT: 315 LBS | HEART RATE: 67 BPM | BODY MASS INDEX: 39.17 KG/M2 | OXYGEN SATURATION: 98 % | SYSTOLIC BLOOD PRESSURE: 120 MMHG | DIASTOLIC BLOOD PRESSURE: 88 MMHG | HEIGHT: 75 IN

## 2018-01-11 DIAGNOSIS — R91.8 MULTIPLE LUNG NODULES ON CT: Primary | ICD-10-CM

## 2018-01-11 DIAGNOSIS — I50.22 CHRONIC SYSTOLIC CONGESTIVE HEART FAILURE (HCC): ICD-10-CM

## 2018-01-11 DIAGNOSIS — E66.01 MORBID OBESITY (HCC): ICD-10-CM

## 2018-01-11 DIAGNOSIS — G47.33 OSA (OBSTRUCTIVE SLEEP APNEA): ICD-10-CM

## 2018-01-11 PROCEDURE — 99214 OFFICE O/P EST MOD 30 MIN: CPT | Performed by: INTERNAL MEDICINE

## 2018-01-11 NOTE — PROGRESS NOTES
Pulmonary Office Follow-up    Subjective     Matti Bravo is seen today at the office for   Chief Complaint   Patient presents with   • Follow-up     4 week   • Results     CT         HPI  Matti Bravo is a 47 y.o. male with a PMH significant for Morbid obesity, KHANH, and chronic systolic heart failure who presents for follow-up of recent CT chest. He is initially seen by me on 12/14/17, at which time I recommended a repeat CT chest 3 months from prior and referred him back to the sleep Center for initiation of CPAP. He has no complaints and has had no recent illnesses.  Patient has not yet been set up to reestablish with the sleep Center to get CPAP, but states he really does not want to have another sleep study if possible.  He denies dyspnea, chest pain, or cough.  His lower extremity edema is stable.      Patient Active Problem List   Diagnosis   • Chronic systolic congestive heart failure   • Essential hypertension   • Mixed hyperlipidemia   • KHANH (obstructive sleep apnea)   • Morbid obesity   • Restrictive lung disease secondary to obesity       Review of Systems  Review of Systems   Constitutional: Negative for unexpected weight change.   Respiratory: Negative for cough, shortness of breath and wheezing.    Cardiovascular: Positive for leg swelling. Negative for chest pain.   Psychiatric/Behavioral: The patient is nervous/anxious.      As described in the HPI. Otherwise, remainder of ROS (14 systems) were negative.    Medications, Allergies, Social, and Family Histories reviewed as per EMR.    Objective     Vitals:    01/11/18 1451   BP: 120/88   Pulse: 67   SpO2: 98%     Physical Exam   Constitutional: He is oriented to person, place, and time. Vital signs are normal. He appears well-developed and well-nourished.   Morbidly obese   HENT:   Head: Normocephalic and atraumatic.   Nose: Nose normal.   Mouth/Throat: Mucous membranes are normal.   Mallampati 4   Eyes: Conjunctivae, EOM and  lids are normal.   Neck: Trachea normal and normal range of motion. No tracheal tenderness present. No thyroid mass present.   Cardiovascular: Normal rate, regular rhythm and normal heart sounds.  Exam reveals no gallop.    No murmur heard.  Pulmonary/Chest: Effort normal and breath sounds normal. No respiratory distress. He has no decreased breath sounds. He has no wheezes. He has no rhonchi. Chest wall is not dull to percussion. He exhibits no tenderness.   Abdominal: Soft. Normal appearance and bowel sounds are normal. There is no tenderness.       Vascular Status -  His exam exhibits right foot edema. His exam exhibits left foot edema.  Lymphadenopathy:        Head (right side): No submandibular adenopathy present.        Head (left side): No submandibular adenopathy present.     He has no cervical adenopathy.        Right: No supraclavicular adenopathy present.        Left: No supraclavicular adenopathy present.   Neurological: He is alert and oriented to person, place, and time.   Skin: Skin is warm and dry. No cyanosis. Nails show no clubbing.   Psychiatric: He has a normal mood and affect. His speech is normal and behavior is normal. Judgment normal.   Nursing note and vitals reviewed.      IMAGING: CT chest without contrast 1/4/18 (independently reviewed and interpreted by me) showed stable 7 mm right upper lobe nodule, 8 mm right lower lobe superior segment, 2 mm left upper lobe fissural node      Assessment/Plan     Matti was seen today for follow-up and results.    Diagnoses and all orders for this visit:    Multiple lung nodules on CT  -     CT Chest Without Contrast; Future    Morbid obesity    KHANH (obstructive sleep apnea)    Chronic systolic congestive heart failure         Discussion/ Recommendations:   His lung nodules are stable over the past 3 months so we will continue with surveillance.  I still believe that he needs to reestablish with the sleep Center in order to get started on CPAP for his  known KHANH especially in light of his heart failure.    -Repeat CT chest without contrast in 6 months.  -Referral placed at last visit to establish with Dr. Forbes.  Encourage patient to follow-up with the sleep Center to get a new CPAP.  -Continue follow-up with heart failure clinic as scheduled.  -Encouraged efforts at weight loss through diet and nectar size.    Return in about 6 months (around 7/11/2018) for Recheck; f/u CT chest.          This document has been electronically signed by Rachelle Klein MD on January 11, 2018 3:07 PM      Dictated using Dragon

## 2018-01-11 NOTE — TELEPHONE ENCOUNTER
----- Message from Mary Bragg sent at 1/11/2018  3:09 PM CST -----  He is to get a CT last of June PER Dr. Klein. He has a f/u with her first of July. Can you get that set up and call him please and thank you

## 2018-03-12 ENCOUNTER — OFFICE VISIT (OUTPATIENT)
Dept: SLEEP MEDICINE | Facility: HOSPITAL | Age: 48
End: 2018-03-12

## 2018-03-12 VITALS
WEIGHT: 315 LBS | HEART RATE: 92 BPM | BODY MASS INDEX: 39.17 KG/M2 | OXYGEN SATURATION: 96 % | SYSTOLIC BLOOD PRESSURE: 120 MMHG | HEIGHT: 75 IN | DIASTOLIC BLOOD PRESSURE: 80 MMHG

## 2018-03-12 DIAGNOSIS — G25.81 RESTLESS LEGS SYNDROME (RLS): ICD-10-CM

## 2018-03-12 DIAGNOSIS — G47.33 OBSTRUCTIVE SLEEP APNEA, ADULT: Primary | ICD-10-CM

## 2018-03-12 PROCEDURE — 99203 OFFICE O/P NEW LOW 30 MIN: CPT | Performed by: INTERNAL MEDICINE

## 2018-03-12 NOTE — PROGRESS NOTES
New Patient Sleep Medicine Consultation    Encounter Date: 3/12/2018         Patient's PCP: SHELL Carlin  Referring provider: Rachelle Klein MD  Reason for consultation: known KHANH    Matti Bravo is a 47 y.o. male who presents for follow up. He was seen in 2014 and had PSG at that time. Unfortunately, he was never started on rx. He presents today with the same sx (snoring, HTN, gasping and chokin g during sleep, night sweats, RLS at night, decreased libido, sleep talking, daytime fatigue, and non-restorative sleep. His bedtime is ~ 0000. He  falls asleep after 5 minutes, and is up every hour. He wakes up ~ 0930. He drinks 0 cups of coffee, 0 teas, and 2 sodas per day. He drinks none alcoholic beverages per week. He does not smoke. He denies abnormal dreams, cataplexy, sleep paralysis, or hypnagogic hallucinations. He does not take sedatives or hypnotics. He has rare sleepiness with driving. He naps when unstimulated.   [place] - 17    Past Medical History:   Diagnosis Date   • Hyperlipidemia    • Hypertension    • Peripheral vascular disease      Social History     Social History   • Marital status:      Spouse name: N/A   • Number of children: N/A   • Years of education: N/A     Occupational History   • Not on file.     Social History Main Topics   • Smoking status: Never Smoker   • Smokeless tobacco: Never Used   • Alcohol use No   • Drug use: No   • Sexual activity: Not on file     Other Topics Concern   • Not on file     Social History Narrative   • No narrative on file     Family History   Problem Relation Age of Onset   • Heart disease Other    • Cancer Other    • Hypertension Mother    • Diabetes Mother    , 2 kids, not working now (was [employer], [employer] work)  Smoking history: smoked 1 pack per week for ~ 10 years until age 31.  FH of sleep disorders: mom    Review of Systems:  Pertinent items are noted in HPI. Patient advised to discuss any positive ROS with  "PCP.      Vitals:    03/12/18 1114   BP: 120/80   Pulse: 92   SpO2: 96%     Body mass index is 44.5 kg/m². Patient's BMI is above normal parameters. Follow-up plan includes:  referral to primary care.  Neck circumference: 18\"            General: Alert. Cooperative. Well developed. No acute distress.             Head:  Normocephalic. Symmetrical. Atraumatic.              Eyes: Sclera clear. No icterus. PERRLA. Normal EOM.             Ears: No deformities. Normal hearing.             Nose: No septal deviation. No drainage.          Throat: No oral lesions. No thrush. Moist mucous membranes.    Tongue is enlarged    Dentition is poor       Pharynx: Posterior pharyngeal pillars are narrow    Mallampati score of IV (only hard palate visible)    Pharynx is normal with unrermarkable tonsils   Chest Wall:  Normal shape. Symmetric expansion with respiration. No tenderness.             Neck:  Trachea midline.           Lungs:  Clear to auscultation bilaterally. No wheezes. No rhonchi. No rales. Respirations regular, even and unlabored.            Heart:  Regular rhythm and normal rate. Normal S1 and S2. No murmur.     Abdomen:  Soft, non-tender and non-distended. Normal bowel sounds. No masses, obese  Extremities:  Moves all extremities well. No edema.           Pulses: Pulses palpable and equal bilaterally.               Skin: Dry. Intact. No bleeding. No rash.           Neuro: Moves all 4 extremities and cranial nerves grossly intact.  Psychiatric: Normal mood and affect.      Current Outpatient Prescriptions:   •  albuterol (PROVENTIL HFA;VENTOLIN HFA) 108 (90 Base) MCG/ACT inhaler, Inhale 2 puffs Every 4 (Four) Hours As Needed for Wheezing., Disp: 1 inhaler, Rfl: 3  •  carvedilol (COREG) 12.5 MG tablet, Take 12.5 mg by mouth 2 (Two) Times a Day With Meals., Disp: , Rfl:   •  digoxin (LANOXIN) 125 MCG tablet, Take 125 mcg by mouth Daily., Disp: , Rfl:   •  furosemide (LASIX) 40 MG tablet, Take 40 mg by mouth 2 (Two) Times " a Day., Disp: , Rfl:   •  lovastatin (MEVACOR) 20 MG tablet, Take 20 mg by mouth Every Night., Disp: , Rfl:   •  metFORMIN (GLUCOPHAGE) 500 MG tablet, Take 500 mg by mouth Daily With Breakfast., Disp: , Rfl:   •  potassium chloride (K-DUR) 10 MEQ CR tablet, Take 10 mEq by mouth Daily., Disp: , Rfl:   •  sacubitril-valsartan (ENTRESTO) 49-51 MG tablet, Take 1 tablet by mouth 2 (Two) Times a Day., Disp: 60 tablet, Rfl: 6    ASSESSMENT:  1. Obstructive sleep apnea   1. PSG on 12/12/2014 - 49.8, kade of 79%, recommended 10 cm H2O  2. Script for new autocpap 10-20 - about the same weight  3. RTC in 31-90 days with complaince check   2. Nasal allergies  1. Start on flonase and claritin daily - over the counter  3. CHF - systolic dysfunction         This document has been electronically signed by Will Forbes MD on March 12, 2018         CC: SHELL Carlin Stacey M, MD

## 2018-03-16 ENCOUNTER — OFFICE VISIT (OUTPATIENT)
Dept: CARDIOLOGY | Facility: CLINIC | Age: 48
End: 2018-03-16

## 2018-03-16 VITALS
BODY MASS INDEX: 39.17 KG/M2 | HEART RATE: 65 BPM | SYSTOLIC BLOOD PRESSURE: 136 MMHG | DIASTOLIC BLOOD PRESSURE: 80 MMHG | WEIGHT: 315 LBS | HEIGHT: 75 IN

## 2018-03-16 DIAGNOSIS — E66.01 MORBID OBESITY (HCC): ICD-10-CM

## 2018-03-16 DIAGNOSIS — I10 ESSENTIAL HYPERTENSION: ICD-10-CM

## 2018-03-16 DIAGNOSIS — I50.22 CHRONIC SYSTOLIC CONGESTIVE HEART FAILURE (HCC): Primary | ICD-10-CM

## 2018-03-16 DIAGNOSIS — E78.2 MIXED HYPERLIPIDEMIA: ICD-10-CM

## 2018-03-16 DIAGNOSIS — E66.9 RESTRICTIVE LUNG DISEASE SECONDARY TO OBESITY: ICD-10-CM

## 2018-03-16 DIAGNOSIS — G47.33 OSA (OBSTRUCTIVE SLEEP APNEA): ICD-10-CM

## 2018-03-16 DIAGNOSIS — J98.4 RESTRICTIVE LUNG DISEASE SECONDARY TO OBESITY: ICD-10-CM

## 2018-03-16 PROCEDURE — 99213 OFFICE O/P EST LOW 20 MIN: CPT | Performed by: INTERNAL MEDICINE

## 2018-03-16 NOTE — PROGRESS NOTES
Matti Bravo  47 y.o. male    03/16/2018  1. Chronic systolic congestive heart failure    2. Essential hypertension    3. Mixed hyperlipidemia    4. KHANH (obstructive sleep apnea)    5. Morbid obesity    6. Restrictive lung disease secondary to obesity        History of Present Illness    47 years old patient with history of hypertension, hyperlipidemia, history of nonischemic cardiomyopathy with a severe LV dysfunction started on appropriate medical management with a significant improvement in ejection fraction from 10-15% improved to 30% on repeat echocardiographic study.  Patient had history of chronic  heart failure well compensated.  Patient is on the right tract.  Patient referred for ICD evaluation.  EKG done sinus rhythm with non specific intraventricular conduction delay and QRS duration 122 ms.      Pertinent negatives include no abdominal pain, chest pain, claudication, edema, fatigue, orthopnea, palpitations, shortness of breath or unexpected weight change. The symptoms have been stable. Past treatments include ACE inhibitors, beta blockers, digoxin, exercise and salt and fluid restriction. The treatment provided significant relief. Compliance with prior treatments has been good.    Patient denies orthopnea, PND, nauseous, vomiting.  He is a functional class II at the time of evaluations.  He got his a CPAP machine for management of sleep apnea       He has been on Coreg 12.5mg BID, Lasix 40mg BID, Digoxin 125mcg, and Kdur.   He does well with his symptoms, appearing to be NYHA Class II     · Left Ventricle: Left ventricular systolic function is severely decreased. 3D LV EF = 30%  · There is left ventricular global hypokinesis noted.  · The left ventricle cavity is severely dilated. Increased cardiac mass. Findings are consistent with dilated cardiomyopathy.  · Left ventricular diastolic dysfunction is noted (grade I a w/high LAP) consistent with impaired relaxation. Elevated left atrial  pressure  · Right Ventricle: Normal right ventricular wall thickness, systolic function and septal motion noted. Right ventricular cavity is borderline dilated  · Mild mitral valve regurgitation is present        SUBJECTIVE    No Known Allergies      Past Medical History:   Diagnosis Date   • Hyperlipidemia    • Hypertension    • Peripheral vascular disease          Past Surgical History:   Procedure Laterality Date   • CARDIAC CATHETERIZATION  2009         Family History   Problem Relation Age of Onset   • Heart disease Other    • Cancer Other    • Hypertension Mother    • Diabetes Mother          Social History     Social History   • Marital status:      Spouse name: N/A   • Number of children: N/A   • Years of education: N/A     Occupational History   • Not on file.     Social History Main Topics   • Smoking status: Never Smoker   • Smokeless tobacco: Never Used   • Alcohol use No   • Drug use: No   • Sexual activity: Not on file     Other Topics Concern   • Not on file     Social History Narrative   • No narrative on file         Current Outpatient Prescriptions   Medication Sig Dispense Refill   • albuterol (PROVENTIL HFA;VENTOLIN HFA) 108 (90 Base) MCG/ACT inhaler Inhale 2 puffs Every 4 (Four) Hours As Needed for Wheezing. 1 inhaler 3   • carvedilol (COREG) 12.5 MG tablet Take 12.5 mg by mouth 2 (Two) Times a Day With Meals.     • digoxin (LANOXIN) 125 MCG tablet Take 125 mcg by mouth Daily.     • furosemide (LASIX) 40 MG tablet Take 40 mg by mouth 2 (Two) Times a Day.     • lovastatin (MEVACOR) 20 MG tablet Take 20 mg by mouth Every Night.     • metFORMIN (GLUCOPHAGE) 500 MG tablet Take 500 mg by mouth Daily With Breakfast.     • potassium chloride (K-DUR) 10 MEQ CR tablet Take 10 mEq by mouth Daily.     • sacubitril-valsartan (ENTRESTO) 49-51 MG tablet Take 1 tablet by mouth 2 (Two) Times a Day. 60 tablet 6     No current facility-administered medications for this visit.          OBJECTIVE    BP  "136/80   Pulse 65   Ht 190.5 cm (75\")   Wt (!) 163 kg (359 lb)   BMI 44.87 kg/m²         Review of Systems     Constitutional:  Denies recent weight loss, weight gain, fever or chills, no change in exercise tolerance     HENT:  Denies any hearing loss, epistaxis, hoarseness, or difficulty speaking.     Eyes: No blurring     Respiratory: Sleep apnea    Cardiovascular: See H&P    Gastrointestinal:  Denies change in bowel habits, dyspepsia, ulcer disease, hematochezia, or melena.     Endocrine: Negative for cold intolerance, heat intolerance, polydipsia, polyphagia and polyuria. Denies any history of weight change, heat/cold intolerance, polydipsia, polyuria     Genitourinary: Negative.      Musculoskeletal: Denies any history of arthritic symptoms or back problems     Skin:  Denies any change in hair or nails, rashes, or skin lesions.     Allergic/Immunologic: Negative.  Negative for environmental allergies, food allergies and immunocompromised state.     Neurological:  Denies any history of recurrent headaches, strokes, TIA, or seizure disorder.     Hematological: Denies any food allergies, seasonal allergies, bleeding disorders, or lymphadenopathy.     Psychiatric/Behavioral: Denies any history of depression, substance abuse, or change in cognitive function.         Physical Exam     Constitutional: Cooperative, alert and oriented, well-developed, well-nourished, in no acute distress.     HENT:   Head: Normocephalic, normal hair patterns, no masses or tenderness.  Ears, Nose, and Throat: No gross abnormalities. No pallor or cyanosis. Dentition good.   Eyes: EOMS intact, PERRL, conjunctivae and lids unremarkable. Fundoscopic exam and visual fields not performed.   Neck: No palpable masses or adenopathy, no thyromegaly, no JVD, carotid pulses are full and equal bilaterally and without  Bruits.     Cardiovascular: Regular rhythm, S1 and S2 normal, no S3 or S4. Apical impulse not displaced. No murmurs, gallops, or " rubs detected.     Pulmonary/Chest: Chest: normal symmetry, no tenderness to palpation, normal respiratory excursion, no intercostal retraction, no use of accessory muscles.            Pulmonary: Normal breath sounds. No rales or ronchi.    Abdominal: Abdomen soft, bowel sounds normoactive, no masses, no hepatosplenomegaly, non-tender, no bruits.     Musculoskeletal: No deformities, clubbing, cyanosis, erythema, or edema observed. There are no spinal abnormalities noted. Normal muscle strength and tone. Pulses full and equal in all extremities, no bruits auscultated.     Neurological: No gross motor or sensory deficits noted, affect appropriate, oriented to time, person, place.     Skin: Warm and dry to the touch, no apparent skin lesions or masses noted.     Psychiatric: He has a normal mood and affect. His behavior is normal. Judgment and thought content normal.         Procedures      Lab Results   Component Value Date    WBC 6.7 11/10/2014    HGB 12.8 (L) 11/10/2014    HCT 38.6 (L) 11/10/2014    MCV 89.1 11/10/2014     (L) 11/10/2014     Lab Results   Component Value Date    GLUCOSE 90 11/10/2014    BUN 17 11/10/2014    CREATININE 1.2 11/10/2014    CO2 30 11/10/2014    CALCIUM 9.0 11/10/2014    ALBUMIN 3.3 (L) 11/10/2014    AST 22 11/10/2014    ALT 17 (L) 11/10/2014     No results found for: CHOL  Lab Results   Component Value Date    TRIG 73 11/09/2014     Lab Results   Component Value Date    HDL 25 (L) 11/09/2014     No components found for: LDLCALC  Lab Results   Component Value Date    LDL 93 11/09/2014     No results found for: HDLLDLRATIO  No components found for: CHOLHDL  No results found for: HGBA1C  Lab Results   Component Value Date    TSH 0.61 04/27/2014           ASSESSMENT AND PLAN  1 non-ischemic cardiomyopathy #2 LV dysfunction with ejection fraction repeat echocardiographic study 30% #3 history of chronic congestive heart failure o     Clinically, no evidence of congestive heart failure  based on the clinical history physical finding. No Evidence of ongoing ischemia at the time of evaluations.  Patient referred for evaluation of ICD implantation given the ejection fraction 30%.  Pros and cons of ICD implantation discussed with the patient.  He decided not to have ICD at present as he had significant recovery left ventricle systolic function.  Patient is to have echo  prior to the office visit to reassess the left ventricle systolic function to see if he qualifies for ICD implantation or not.  I will arrange an echocardiographic study and based upon that we'll see the patient.  If EF is more than 35% we'll see him in 6 month.  Risk factor and lifestyle modification discussed with the patient.-Diet explained.  Advised the patient that he need to cut total amount of an oral intake.  At present given the asymptomatic status no change in the medical management and recommended to continue carvedilol, digoxin, and Entresto with history of hypertension and LV dysfunction.  Lasix for congestive heart failure compensated    Matti was seen today for follow-up.    Diagnoses and all orders for this visit:    Chronic systolic congestive heart failure    Essential hypertension    Mixed hyperlipidemia    KHANH (obstructive sleep apnea)    Morbid obesity    Restrictive lung disease secondary to obesity        Mirna Cabrera MD  3/16/2018  8:59 AM

## 2018-04-09 ENCOUNTER — OFFICE VISIT (OUTPATIENT)
Dept: CARDIOLOGY | Facility: CLINIC | Age: 48
End: 2018-04-09

## 2018-04-09 VITALS
HEART RATE: 60 BPM | HEIGHT: 75 IN | DIASTOLIC BLOOD PRESSURE: 84 MMHG | SYSTOLIC BLOOD PRESSURE: 150 MMHG | WEIGHT: 315 LBS | OXYGEN SATURATION: 97 % | BODY MASS INDEX: 39.17 KG/M2

## 2018-04-09 DIAGNOSIS — I10 ESSENTIAL HYPERTENSION: ICD-10-CM

## 2018-04-09 DIAGNOSIS — G47.33 OSA (OBSTRUCTIVE SLEEP APNEA): ICD-10-CM

## 2018-04-09 DIAGNOSIS — E78.2 MIXED HYPERLIPIDEMIA: ICD-10-CM

## 2018-04-09 DIAGNOSIS — I50.22 CHRONIC SYSTOLIC CONGESTIVE HEART FAILURE (HCC): Primary | ICD-10-CM

## 2018-04-09 PROCEDURE — 99214 OFFICE O/P EST MOD 30 MIN: CPT | Performed by: NURSE PRACTITIONER

## 2018-04-09 NOTE — PROGRESS NOTES
[facility] CHF CLINIC OFFICE VISIT    Subjective:     Congestive Heart Failure (Follow up)      Congestive Heart Failure   Presents for initial visit. The disease course has been stable. The condition has lasted for 3 years. Pertinent negatives include no abdominal pain, chest pain, claudication, edema, fatigue, orthopnea, palpitations, shortness of breath or unexpected weight change. The symptoms have been stable. Past treatments include ACE inhibitors, beta blockers, digoxin, exercise and salt and fluid restriction. The treatment provided significant relief. Compliance with prior treatments has been good.     CHF referral from [facility]. Mr. Bravo has a known cardiomyopathy from 2014 with an EF of 10-15%. He has since been in [facility] and established with [facility] for refills of CHF medications. He was released from [facility] on Aug 1, 2017. Unclear why he is not on an ACE/ARB. Unclear why he did not have ICD implanted. Left heart cath in 2009 was without obstruction. Echo in 2009 was 20%. Echo in 2014 was 10-15%. Had followed with Dr. Nunn. Repeat of CTA coronary was without obstruction still. SV was 67ml. 3D EF was 30%. Patient of Dr. Cabrera.     He has been on Coreg 12.5mg BID, Lasix 40mg BID, Digoxin 125mcg, Entresto 49/51mg BID, and Kdur.   He does well with his symptoms, appearing to be NYHA Class I. No complaint of orthopnea- now using no pillows, edema, weight gain, abdominal swelling, or dyspnea.    Evaluation by Dr. Cabrera is to defer implantation of ICD. I would agree.       PCP: [facility]  Cardiologist: Dr. Cabrera  Nephrologist: none  Pulmonologist: Dr. Klein    Hospitalizations:  none    Past Medical History:   Diagnosis Date   • CHF (congestive heart failure)    • Hyperlipidemia    • Hypertension    • Peripheral vascular disease      Past Surgical History:   Procedure Laterality Date   • CARDIAC CATHETERIZATION  2009     Social History     Social History   • Marital status:       Social History Main Topics   • Smoking status: Never Smoker   • Smokeless tobacco: Never Used   • Alcohol use No   • Drug use: No   • Sexual activity: Defer     Other Topics Concern   • Not on file     No Known Allergies    Review of Systems   Constitution: Negative for chills, decreased appetite, fatigue, fever, weakness and unexpected weight change.   HENT: Negative.    Eyes: Negative.    Cardiovascular: Negative for chest pain, claudication, dyspnea on exertion, irregular heartbeat, leg swelling and palpitations.   Respiratory: Negative for cough, shortness of breath and wheezing.    Endocrine: Negative.    Skin: Negative for dry skin, flushing and rash.   Musculoskeletal: Negative for falls and myalgias.   Gastrointestinal: Negative for abdominal pain, change in bowel habit and melena.   Genitourinary: Negative for frequency and hematuria.   Neurological: Negative for dizziness, light-headedness and loss of balance.   Psychiatric/Behavioral: Negative for altered mental status and memory loss. The patient is not nervous/anxious.        Current Outpatient Prescriptions   Medication Sig Dispense Refill   • albuterol (PROVENTIL HFA;VENTOLIN HFA) 108 (90 Base) MCG/ACT inhaler Inhale 2 puffs Every 4 (Four) Hours As Needed for Wheezing. 1 inhaler 3   • carvedilol (COREG) 12.5 MG tablet Take 12.5 mg by mouth 2 (Two) Times a Day With Meals.     • digoxin (LANOXIN) 125 MCG tablet Take 125 mcg by mouth Daily.     • furosemide (LASIX) 40 MG tablet Take 40 mg by mouth 2 (Two) Times a Day.     • lovastatin (MEVACOR) 20 MG tablet Take 20 mg by mouth Every Night.     • metFORMIN (GLUCOPHAGE) 500 MG tablet Take 500 mg by mouth Daily With Breakfast.     • potassium chloride (K-DUR) 10 MEQ CR tablet Take 10 mEq by mouth Daily.     • sacubitril-valsartan (ENTRESTO) 49-51 MG tablet Take 1 tablet by mouth 2 (Two) Times a Day. 60 tablet 6     No current facility-administered medications for this visit.         Objective:  "    Vitals:    04/09/18 0957   BP: 150/84   BP Location: Left arm   Patient Position: Sitting   Pulse: 60   SpO2: 97%   Weight: (!) 163 kg (358 lb 6.4 oz)   Height: 190.5 cm (75\")     Wt Readings from Last 3 Encounters:   04/09/18 (!) 163 kg (358 lb 6.4 oz)   03/16/18 (!) 163 kg (359 lb)   03/12/18 (!) 161 kg (356 lb)        Physical Exam   Constitutional: He is oriented to person, place, and time. He appears well-developed and well-nourished. No distress.   HENT:   Head: Normocephalic.   Neck: No JVD present.   Cardiovascular: Normal rate, regular rhythm, S1 normal, S2 normal, normal heart sounds and intact distal pulses.    No murmur heard.  Pulmonary/Chest: Effort normal and breath sounds normal. No respiratory distress. He has no wheezes. He has no rales.   Abdominal: Soft. Bowel sounds are normal.   Musculoskeletal: Normal range of motion. He exhibits no edema.   Neurological: He is alert and oriented to person, place, and time.   Skin: Skin is warm and dry. No erythema.   Psychiatric: He has a normal mood and affect. His behavior is normal. Judgment and thought content normal.       Cardiographics  AAA screening 10/9/17  Interpretation Summary   · Technically limited exam due to obesity.  · Appears negative for abdominal aortic aneurysm.     ECHO 10/9/2017  Interpretation Summary   · Left Ventricle: Left ventricular systolic function is severely decreased. 3D LV EF = 30%  · There is left ventricular global hypokinesis noted.  · The left ventricle cavity is severely dilated. Increased cardiac mass. Findings are consistent with dilated cardiomyopathy.  · Left ventricular diastolic dysfunction is noted (grade I a w/high LAP) consistent with impaired relaxation. Elevated left atrial pressure  · Right Ventricle: Normal right ventricular wall thickness, systolic function and septal motion noted. Right ventricular cavity is borderline dilated  · Mild mitral valve regurgitation is present  · No evidence of pulmonary " hypertension is present  · There is no evidence of pericardial effusion         Echocardiogram:   DESCRIPTION:  Left ventricle is moderately dilated.  There is severe  global hypokinesis with an EF of 15% to 20%.      Right ventricular size is normal.  Right ventricular systolic function  is mildly impaired.      Left atrium is moderately dilated.  Right atrium is mildly dilated.      Aortic valve is trileaflet and opens very well.  Mitral valve is  normal. There is mild MR.  Tricuspid valve is normal.  There is mild  TR.  Pulmonic valve is normal.  There is trace PI. There is no  pericardial effusion.      A:  1. Severe left ventricular dysfunction with an ejection fraction of 15%        to 20%.  2. Biatrial enlargement  3. Left ventricle is moderately dilated.  4. Ejection fraction is 15% to 20%    YURIDIA CARLISLE MD    [id number]  Normal sinus rhythm with sinus arrhythmia  Possible Left atrial enlargement  ST   Abnormal ECG  When compared with ECG of 2014 09:51,  fusion complexes are no longer present  premature ventricular complexes are no longer present  Questionable change in QRS axis  Nonspecific T wave abnormality now evident in Inferior leads  T wave inversion more evident in Lateral leads  Confirmed by NAOMIE AGUILAR     [facility]: 10/27/2009  DESCRIPTION: Patient was brought to the cath lab in the fasting state, explained the risks and benefits. He was prepped and draped in the usual sterile manner. Right groin was anesthetized using 2% Lidocaine. Subsequently a 6-Guamanian short sheath was inserted using modified Seldinger technique.   Left coronary angiogram was obtained using a 6-Guamanian left Lonnie catheter, and right coronary angiogram was obtained using a 6-Guamanian right Lonnie catheter.   LV-gram was obtained using a pigtail catheter. There were no complications.   HEMODYNAMICS Aortic pressure is 136/96. Left ventricular pressure is 135. Left ventricular end diastolic pressure is 22.    LV-GRAM: Showed severe global hypokinesis with an EF of 25%.   LEFT MAIN: Large caliber vessel, which gave rise to an LAD, which also gave rise to multiple diagonals and septals, which was free of significant disease.   LEFT CIRCUMFLEX: Gave rise to 2 OM's, which is free of significant disease.   RCA: Is a co-dominant system, gives rise to a PDA and PL branch, which is free of significant disease. A: 1. Normal coronaries 2. Severe left ventricular dysfunction.   REC: 1. Control hypertension   2. Medical therapy     YURIDIA CARLISLE M.D.     Imaging  Chest x-ray:    CTA coronary 10/5/2017  CALCIUM PLAQUE BURDEN:     REGION                                         CALCIUM SCORE  ([place])  Left Main                                                      0   Right Coronary Artery                                 30   Left Anterior Descending                            0   Circumflex                                                    0   Posterior Descending Artery                       0           Your Calcium Score is 30       This places the patent in the mild or minimal coronary narrowings  likely risk for coronary artery disease.     CTA OF THE CORONARY ARTERIES: There is adequate visualization of  the left main, LAD, diagonals, circumflex, and RCA. The patient  is right dominant.     Left Main: No calcified or soft tissue density plaque and no  stenosis.  LAD: No calcified or soft tissue density plaque and no stenosis.  Circumflex: No calcified or soft tissue density plaque and no  stenosis.  RCA: Right coronary artery very small calcified atherosclerotic  plaque foci are seen which are not hemodynamically significant.     The aortic valve is tricuspid. There is no myocardial bridging.  On short axis views, the myocardium is homogeneous in thickness.     EXTRACARDIAC SOFT TISSUES:  Mediastinum: On the imaging, the ascending and descending aorta  are normal in caliber. Right perihilar single enlarged lymph  node  measuring 2.1 x 2.1 cm. The pericardium is normal.  Lungs: Right lower lobe superior segment 8.4 mm noncalcified  nodule. Right upper lobe posterior segment 5.5 mm noncalcified  nodule. Right middle lobe small linear opacity.  Abdomen: No evidence of hiatal hernia. The imaged liver and  spleen are unremarkable. There is no lymphadenopathy in the upper  abdomen.  Bones: There are no lytic or blastic lesions within the osseous  structures.     CT FUNCTIONAL ANALYSIS:     Ejection Fraction     22 %  Diastolic Volume     309 ml  Systolic Volume      242 ml  Stroke Volume        67 ml  Cardiac Output       3.3 L/minute     IMPRESSION:  CONCLUSION:  1.  Abnormal low cardiac ejection fraction being 22%.  2.  Calcium score 30.  3.  Right lower lobe superior segment 8.4 mm noncalcified nodule.  Right upper lobe posterior segment 5.5 mm noncalcified nodule.  These may represent benign inflammatory nodules versus pulmonary  nodules. Recommend follow-up CT in 3 months to further evaluate.  4.  Right perihilar single enlarged lymph node may represent  inflammatory lymphadenopathy versus neoplastic involvement.  Recommend clinical correlation and consideration of follow-up CT  in 3 months.  5.  Otherwise normal CT angiogram coronary study.     Electronically signed by:  Pj Cummins MD  10/5/2017 4:39 PM CDT  Workstation: [id number]    Lab Review   Faxed From Karin Ng's office:  WBC: 5.5  Hb  Hct: 39  Plt: 152,000    Na: 139   K: 3.9  Creat: 1.05  BUN: 15  Album: 4.3  Gluc: 155 (fasting)  AST: 17  ALT: 22    Total Chol:151  HDL:36  LDL:96  Tri     The following portions of the patient's history were reviewed and updated as appropriate: allergies, current medications, past family history, past medical history, past social history, past surgical history and problem list.     Assessment/Plan:   Began entresto (2017) He has had improvement in BP & NYHA class. He is now wearing CPAP and feeling better. Job hunting.       Diagnosis Plan   1. Chronic systolic congestive heart failure  NICM: EF:30%. NYHA Class I-II, Stage C. Patient appears euvolemic and in a well perfused physiologic state. Hemodynamics are improved.    BETA-BLOCKER: Coreg 12.5mg BID        ENTRESTO:  49/51mg BID  DIURETIC: Lasix 40mg BID  ALDOSTERONT ANTAGONIST: n/a- NYHA Class I  IMDUR/HYDRALAZINE: N/A  DIGOXIN: 125mcg  Fluid restriction: 2 L  Sodium restriction: 2 grams  6MWT: yes  9/26/2017. 408.7 meters  1/9/2018- 456.28 meters  Cardiac Rehab: n/a  ICD: Consulted with Dr. Cabrera. No ICD at this time due and Class I-II symptoms. Echo upcoming.  ADHF: no  CardioMEMS:no  LVAD: n/a at this time.    Reiterated all educational points this visit.  Continue daily weight monitoring.  Continue restricted dietary sodium intake.  Discussed patient action plan for heart failure. Contact information for this office verbalized.  Recommended avoiding NSAIDs use.  Discussed warning signs requiring additional medical attention for heart failure.   Education points repeated back by patient.     Labs reviewed from Karin Ng's office       2. Essential hypertension  HTN, essential.   BP noted to be well controlled today in office.    LVH: absent.  LVEF:Abnormal:. 30%  Diastolic function: Grade I  End-organ damage: HF    DASH; medication compliance  Entresto 49/51mg  Coreg 12.5mg BID       3. Mixed hyperlipidemia  Lovastatin 20mg nightly  LDL controlled at 96.       4. KHANH (obstructive sleep apnea)  Has CPAP now and wearing for the past 2 weeks. Doing well. He has noticed a huge improvement.           Follow up in 3 months

## 2018-04-12 ENCOUNTER — TRANSCRIBE ORDERS (OUTPATIENT)
Dept: PODIATRY | Facility: CLINIC | Age: 48
End: 2018-04-12

## 2018-04-12 DIAGNOSIS — L84 CALLUS: ICD-10-CM

## 2018-04-12 DIAGNOSIS — L60.2 THICKENED NAILS: ICD-10-CM

## 2018-04-12 DIAGNOSIS — E11.9 ENCOUNTER FOR DIABETIC FOOT EXAM (HCC): Primary | ICD-10-CM

## 2018-04-30 ENCOUNTER — OFFICE VISIT (OUTPATIENT)
Dept: PODIATRY | Facility: CLINIC | Age: 48
End: 2018-04-30

## 2018-04-30 VITALS — HEIGHT: 75 IN | WEIGHT: 315 LBS | BODY MASS INDEX: 39.17 KG/M2

## 2018-04-30 DIAGNOSIS — E11.42 DIABETIC POLYNEUROPATHY ASSOCIATED WITH TYPE 2 DIABETES MELLITUS (HCC): Primary | ICD-10-CM

## 2018-04-30 DIAGNOSIS — B35.1 ONYCHOMYCOSIS: ICD-10-CM

## 2018-04-30 DIAGNOSIS — I87.2 VENOUS INSUFFICIENCY: ICD-10-CM

## 2018-04-30 DIAGNOSIS — M79.675 PAIN IN TOES OF BOTH FEET: ICD-10-CM

## 2018-04-30 DIAGNOSIS — M20.30 HALLUX MALLEUS, UNSPECIFIED LATERALITY: ICD-10-CM

## 2018-04-30 DIAGNOSIS — M79.674 PAIN IN TOES OF BOTH FEET: ICD-10-CM

## 2018-04-30 DIAGNOSIS — L98.9 SKIN LESION OF FOOT: ICD-10-CM

## 2018-04-30 PROCEDURE — 11721 DEBRIDE NAIL 6 OR MORE: CPT | Performed by: PODIATRIST

## 2018-04-30 PROCEDURE — 99204 OFFICE O/P NEW MOD 45 MIN: CPT | Performed by: PODIATRIST

## 2018-04-30 NOTE — PROGRESS NOTES
Matti Bravo  1970  47 y.o. male   PCP: Shonna Ng, [facility] 4/6/2018  A1C: 6.7 on 3/30/18 per labs in chart on referral    04/30/2018  Chief Complaint   Patient presents with   • Left Foot - Diabetic foot exam   • Right Foot - Diabetic foot exam           History of Present Illness    Matti Bravo is a 47 y.o. male who presents for diabetic foot exam and nail care.  He states his only been diabetic for a few months.  His most recent hemoglobin A1c was 6.8.  He takes metformin for his diabetes.  He does also have a history of venous insufficiency and typically wears compression stockings for this reason.  He denies any current wounds.  He denies any history of ingrown toenails causing infection.  He states he does get intermittent numbness to his feet but denies significant tingling or burning sensations.  He does note that his nails are thickened and dystrophic which occasionally cause achy pain in close toed shoe gear.  He has difficulty caring for these on his own.  He does also note a lesion to the tip of his right big toenail which she states is been present for a year or 2.  He denies any significant changes to it or pain related with recently.            Past Medical History:   Diagnosis Date   • CHF (congestive heart failure)    • Diabetes mellitus    • Hyperlipidemia    • Hypertension    • Peripheral vascular disease          Past Surgical History:   Procedure Laterality Date   • CARDIAC CATHETERIZATION  2009         Family History   Problem Relation Age of Onset   • Heart disease Other    • Cancer Other    • Hypertension Mother    • Diabetes Mother          Social History     Social History   • Marital status:      Spouse name: N/A   • Number of children: N/A   • Years of education: N/A     Occupational History   • Not on file.     Social History Main Topics   • Smoking status: Never Smoker   • Smokeless tobacco: Never Used   • Alcohol use No   • Drug use: No   • Sexual  "activity: Defer     Other Topics Concern   • Not on file     Social History Narrative   • No narrative on file         Current Outpatient Prescriptions   Medication Sig Dispense Refill   • albuterol (PROVENTIL HFA;VENTOLIN HFA) 108 (90 Base) MCG/ACT inhaler Inhale 2 puffs Every 4 (Four) Hours As Needed for Wheezing. 1 inhaler 3   • carvedilol (COREG) 12.5 MG tablet Take 12.5 mg by mouth 2 (Two) Times a Day With Meals.     • digoxin (LANOXIN) 125 MCG tablet Take 125 mcg by mouth Daily.     • furosemide (LASIX) 40 MG tablet Take 40 mg by mouth 2 (Two) Times a Day.     • lovastatin (MEVACOR) 20 MG tablet Take 20 mg by mouth Every Night.     • metFORMIN (GLUCOPHAGE) 500 MG tablet Take 500 mg by mouth Daily With Breakfast.     • potassium chloride (K-DUR) 10 MEQ CR tablet Take 10 mEq by mouth Daily.     • sacubitril-valsartan (ENTRESTO) 49-51 MG tablet Take 1 tablet by mouth 2 (Two) Times a Day. 60 tablet 6     No current facility-administered medications for this visit.          OBJECTIVE    Ht 190.5 cm (75\")   Wt (!) 163 kg (358 lb 6.4 oz)   BMI 44.80 kg/m²       Review of Systems   Constitutional: Negative.    HENT: Negative.    Eyes: Negative.    Respiratory: Positive for shortness of breath.    Cardiovascular: Negative.    Gastrointestinal: Positive for diarrhea.   Endocrine: Negative.    Genitourinary: Positive for frequency.   Musculoskeletal: Positive for back pain.   Skin: Negative.    Allergic/Immunologic: Negative.    Neurological: Negative.    Hematological: Negative.    Psychiatric/Behavioral: Negative.          Physical Exam   Eyes: EOM are normal. Pupils are equal, round, and reactive to light.   Neck: Neck supple.   Abdominal: Soft. He exhibits no distension.    Matti had a diabetic foot exam performed today.   During the foot exam he had a monofilament test performed.  Lymphadenopathy:     He has no cervical adenopathy.          Constitutional: he appears well-developed and well-nourished.   HEENT: " Normocephalic. Atraumatic  CV: No tenderness. RRR  Resp: Non-labored respiration. No wheezes.   Psychiatric: he has a normal mood and affect. his   behavior is normal.      Lower Extremity Exam:  Vascular: DP/PT pulses palpable 1+.   Negative hair growth.   2+ perimalleolar edema  Neuro: Protective sensation diminished to lesser toes, b/l.  DTRs intact  Integument: No open wounds  Atrophic skin noted b/l with chronic venous stasis dermatitis changes  Approximate 6 x 4 mm oval, non-pigmented lesion to distal medial right hallux. No TTP  No masses  Webspaces c/d/i  Musculoskeletal: LE muscle strength 5/5.   Gait Normal  Rigid hammertoe deformity toes 1 through 4 bilateral.  Nails 1-5 b/l thickened, elongated with subungual debris. +pain on palpation  Ankle ROM decreased without pain or crepitus, b/l              ASSESSMENT AND PLAN    Matti was seen today for diabetic foot exam and diabetic foot exam.    Diagnoses and all orders for this visit:    Diabetic polyneuropathy associated with type 2 diabetes mellitus    Venous insufficiency    Onychomycosis    Pain in toes of both feet    Skin lesion of foot    Hallux malleus, unspecified laterality      -Comprehensive DM foot exam performed. Patient educated on importance of tight glucose control and daily foot checks.   -Patient educated on padding techniques for hammertoes.  Proper extra depth diabetic shoe gear.  Limit barefoot walking.  -Nails 1-5 b/l debrided in length and thickness with nail nipper to decrease pain, fungal load and risk of infection  -Discussed findings of nonpigmented skin lesion to distal right hallux.  Overall it appears verrucous  in nature, but cannot rule out neoplastic etiology.  I did suggest a local excisional biopsy to the patient however he did decline today.  I then did educate him to continue monitoring for local changes of size, shape or pigmentation.  -Follow up 3 months PRN            This document has been electronically signed by  Jamison Cm DPM on April 30, 2018 12:57 PM     EMR Dragon/Transcription disclaimer:   Much of this encounter note is an electronic transcription/translation of spoken language to printed text. The electronic translation of spoken language may permit erroneous, or at times, nonsensical words or phrases to be inadvertently transcribed; Although I have reviewed the note for such errors, some may still exist.    Jamison Cm DPM  4/30/2018  12:57 PM

## 2018-06-28 ENCOUNTER — TELEPHONE (OUTPATIENT)
Dept: GENERAL RADIOLOGY | Facility: HOSPITAL | Age: 48
End: 2018-06-28

## 2018-07-30 PROBLEM — R91.8 MULTIPLE LUNG NODULES ON CT: Status: ACTIVE | Noted: 2018-07-30

## 2018-08-02 NOTE — PROGRESS NOTES
[facility] CHF CLINIC OFFICE VISIT    Subjective:     Congestive Heart Failure (chief complaint)      Congestive Heart Failure   Presents for initial visit. The disease course has been stable. The condition has lasted for 3 years. Pertinent negatives include no abdominal pain, chest pain, claudication, edema, fatigue, orthopnea, palpitations, shortness of breath or unexpected weight change. The symptoms have been stable. Past treatments include ACE inhibitors, beta blockers, digoxin, exercise and salt and fluid restriction. The treatment provided significant relief. Compliance with prior treatments has been good.     CHF referral from SHELL Mcclure. Mr. Bravo has a known cardiomyopathy from 2014 with an EF of 10-15%. He has since been in custodial and established with [facility] for refills of CHF medications. He was released from custodial on Aug 1, 2017. Unclear why he is not on an ACE/ARB. Unclear why he did not have ICD implanted. Left heart cath in 2009 was without obstruction. Echo in 2009 was 20%. Echo in 2014 was 10-15%. Had followed with Dr. Nunn. Repeat of CTA coronary was without obstruction still. SV was 67ml. 3D EF was 30%. Patient of Dr. Cabrera.     He has been on Coreg 12.5mg BID, Lasix 40mg BID, Digoxin 125mcg, Entresto 49/51mg BID, and Kdur.   He does well with his symptoms, appearing to be NYHA Class I. No complaint of orthopnea- now using no pillows, edema, weight gain, abdominal swelling, or dyspnea.    Evaluation by Dr. Cabrera is to defer implantation of ICD. I would agree.    8/3/18: Improved. Has a job now. Lost weight. Exercising. Eating well. Will reduce medication dosages due to low BP.       PCP: SHELL Mcclure  Cardiologist: Dr. Cabrera  Nephrologist: none  Pulmonologist: Dr. Klein    Hospitalizations:  none    Past Medical History:   Diagnosis Date   • CHF (congestive heart failure) (CMS/[facility])    • Diabetes mellitus (CMS/[facility])    • Hyperlipidemia    • Hypertension    • Peripheral vascular  disease (CMS/H[facility])      Past Surgical History:   Procedure Laterality Date   • CARDIAC CATHETERIZATION  2009     Social History     Social History   • Marital status:      Social History Main Topics   • Smoking status: Never Smoker   • Smokeless tobacco: Never Used   • Alcohol use No   • Drug use: No   • Sexual activity: Defer     Other Topics Concern   • Not on file     No Known Allergies    Review of Systems   Constitution: Negative for chills, decreased appetite, fatigue, fever, weakness and unexpected weight change.   HENT: Negative.    Eyes: Negative.    Cardiovascular: Negative for chest pain, claudication, dyspnea on exertion, irregular heartbeat, leg swelling and palpitations.   Respiratory: Negative for cough, shortness of breath and wheezing.    Endocrine: Negative.    Skin: Negative for dry skin, flushing and rash.   Musculoskeletal: Negative for falls and myalgias.   Gastrointestinal: Negative for abdominal pain, change in bowel habit and melena.   Genitourinary: Negative for frequency and hematuria.   Neurological: Negative for dizziness, light-headedness and loss of balance.   Psychiatric/Behavioral: Negative for altered mental status and memory loss. The patient is not nervous/anxious.        Current Outpatient Prescriptions   Medication Sig Dispense Refill   • albuterol (PROVENTIL HFA;VENTOLIN HFA) 108 (90 Base) MCG/ACT inhaler Inhale 2 puffs Every 4 (Four) Hours As Needed for Wheezing. 1 inhaler 3   • carvedilol (COREG) 6.25 MG tablet Take 1 tablet by mouth 2 (Two) Times a Day With Meals. 60 tablet 11   • digoxin (LANOXIN) 125 MCG tablet Take 125 mcg by mouth Daily.     • furosemide (LASIX) 40 MG tablet Take 40 mg by mouth 2 (Two) Times a Day.     • lovastatin (MEVACOR) 20 MG tablet Take 20 mg by mouth Every Night.     • metFORMIN (GLUCOPHAGE) 500 MG tablet Take 500 mg by mouth Daily With Breakfast.     • potassium chloride (K-DUR) 10 MEQ CR tablet Take 10 mEq by mouth Daily.     •  "sacubitril-valsartan (ENTRESTO) 24-26 MG tablet Take 1 tablet by mouth 2 (Two) Times a Day. 60 tablet 6     No current facility-administered medications for this visit.         Objective:     Vitals:    08/03/18 0938   BP: 100/64   BP Location: Left arm   Patient Position: Sitting   Cuff Size: Adult   Pulse: 88   SpO2: 97%   Weight: (!) 155 kg (341 lb 9.6 oz)   Height: 193 cm (76\")     Wt Readings from Last 3 Encounters:   08/03/18 (!) 155 kg (341 lb 9.6 oz)   04/30/18 (!) 163 kg (358 lb 6.4 oz)   04/09/18 (!) 163 kg (358 lb 6.4 oz)        Physical Exam   Constitutional: He is oriented to person, place, and time. He appears well-developed and well-nourished. No distress.   HENT:   Head: Normocephalic.   Neck: No JVD present.   Cardiovascular: Normal rate, regular rhythm, S1 normal, S2 normal, normal heart sounds and intact distal pulses.    No murmur heard.  Pulmonary/Chest: Effort normal and breath sounds normal. No respiratory distress. He has no wheezes. He has no rales.   Abdominal: Soft. Bowel sounds are normal.   Musculoskeletal: Normal range of motion. He exhibits no edema.   Neurological: He is alert and oriented to person, place, and time.   Skin: Skin is warm and dry. No erythema.   Psychiatric: He has a normal mood and affect. His behavior is normal. Judgment and thought content normal.       Cardiographics  AAA screening 10/9/17  Interpretation Summary   · Technically limited exam due to obesity.  · Appears negative for abdominal aortic aneurysm.     ECHO 6/19/18  Interpretation Summary     · The left ventricular cavity is severely dilated.  · Estimated EF = 32%.  · Left ventricular diastolic dysfunction (grade I) consistent with impaired relaxation.  · Left atrial cavity size is moderately dilated.  · Mild mitral valve regurgitation is present       ECHO 10/9/2017  Interpretation Summary   · Left Ventricle: Left ventricular systolic function is severely decreased. 3D LV EF = 30%  · There is left " ventricular global hypokinesis noted.  · The left ventricle cavity is severely dilated. Increased cardiac mass. Findings are consistent with dilated cardiomyopathy.  · Left ventricular diastolic dysfunction is noted (grade I a w/high LAP) consistent with impaired relaxation. Elevated left atrial pressure  · Right Ventricle: Normal right ventricular wall thickness, systolic function and septal motion noted. Right ventricular cavity is borderline dilated  · Mild mitral valve regurgitation is present  · No evidence of pulmonary hypertension is present  · There is no evidence of pericardial effusion         Echocardiogram:   DESCRIPTION:  Left ventricle is moderately dilated.  There is severe  global hypokinesis with an EF of 15% to 20%.      Right ventricular size is normal.  Right ventricular systolic function  is mildly impaired.      Left atrium is moderately dilated.  Right atrium is mildly dilated.      Aortic valve is trileaflet and opens very well.  Mitral valve is  normal. There is mild MR.  Tricuspid valve is normal.  There is mild  TR.  Pulmonic valve is normal.  There is trace PI. There is no  pericardial effusion.      A:  1. Severe left ventricular dysfunction with an ejection fraction of 15%        to 20%.  2. Biatrial enlargement  3. Left ventricle is moderately dilated.  4. Ejection fraction is 15% to 20%    YURIDIA CARLISLE MD    [id number]  Normal sinus rhythm with sinus arrhythmia  Possible Left atrial enlargement  ST   Abnormal ECG  When compared with ECG of 2014 09:51,  fusion complexes are no longer present  premature ventricular complexes are no longer present  Questionable change in QRS axis  Nonspecific T wave abnormality now evident in Inferior leads  T wave inversion more evident in Lateral leads  Confirmed by NAOMIE AGUILAR     [facility]: 10/27/2009  DESCRIPTION: Patient was brought to the cath lab in the fasting state, explained the risks and benefits. He was prepped and  draped in the usual sterile manner. Right groin was anesthetized using 2% Lidocaine. Subsequently a 6-Finnish short sheath was inserted using modified Seldinger technique.   Left coronary angiogram was obtained using a 6-Finnish left Lonnie catheter, and right coronary angiogram was obtained using a 6-Finnish right Lonnie catheter.   LV-gram was obtained using a pigtail catheter. There were no complications.   HEMODYNAMICS Aortic pressure is 136/96. Left ventricular pressure is 135. Left ventricular end diastolic pressure is 22.   LV-GRAM: Showed severe global hypokinesis with an EF of 25%.   LEFT MAIN: Large caliber vessel, which gave rise to an LAD, which also gave rise to multiple diagonals and septals, which was free of significant disease.   LEFT CIRCUMFLEX: Gave rise to 2 OM's, which is free of significant disease.   RCA: Is a co-dominant system, gives rise to a PDA and PL branch, which is free of significant disease. A: 1. Normal coronaries 2. Severe left ventricular dysfunction.   REC: 1. Control hypertension   2. Medical therapy     YURIDIA CARLISLE M.D.     Imaging  Chest x-ray:    CTA coronary 10/5/2017  CALCIUM PLAQUE BURDEN:     REGION                                         CALCIUM SCORE  ([place])  Left Main                                                      0   Right Coronary Artery                                 30   Left Anterior Descending                            0   Circumflex                                                    0   Posterior Descending Artery                       0           Your Calcium Score is 30       This places the patent in the mild or minimal coronary narrowings  likely risk for coronary artery disease.     CTA OF THE CORONARY ARTERIES: There is adequate visualization of  the left main, LAD, diagonals, circumflex, and RCA. The patient  is right dominant.     Left Main: No calcified or soft tissue density plaque and no  stenosis.  LAD: No calcified or soft  tissue density plaque and no stenosis.  Circumflex: No calcified or soft tissue density plaque and no  stenosis.  RCA: Right coronary artery very small calcified atherosclerotic  plaque foci are seen which are not hemodynamically significant.     The aortic valve is tricuspid. There is no myocardial bridging.  On short axis views, the myocardium is homogeneous in thickness.     EXTRACARDIAC SOFT TISSUES:  Mediastinum: On the imaging, the ascending and descending aorta  are normal in caliber. Right perihilar single enlarged lymph node  measuring 2.1 x 2.1 cm. The pericardium is normal.  Lungs: Right lower lobe superior segment 8.4 mm noncalcified  nodule. Right upper lobe posterior segment 5.5 mm noncalcified  nodule. Right middle lobe small linear opacity.  Abdomen: No evidence of hiatal hernia. The imaged liver and  spleen are unremarkable. There is no lymphadenopathy in the upper  abdomen.  Bones: There are no lytic or blastic lesions within the osseous  structures.     CT FUNCTIONAL ANALYSIS:     Ejection Fraction     22 %  Diastolic Volume     309 ml  Systolic Volume      242 ml  Stroke Volume        67 ml  Cardiac Output       3.3 L/minute     IMPRESSION:  CONCLUSION:  1.  Abnormal low cardiac ejection fraction being 22%.  2.  Calcium score 30.  3.  Right lower lobe superior segment 8.4 mm noncalcified nodule.  Right upper lobe posterior segment 5.5 mm noncalcified nodule.  These may represent benign inflammatory nodules versus pulmonary  nodules. Recommend follow-up CT in 3 months to further evaluate.  4.  Right perihilar single enlarged lymph node may represent  inflammatory lymphadenopathy versus neoplastic involvement.  Recommend clinical correlation and consideration of follow-up CT  in 3 months.  5.  Otherwise normal CT angiogram coronary study.     Electronically signed by:  Pj Cummins MD  10/5/2017 4:39 PM CDT  Workstation: [id number]    Lab Review   Faxed From Karin Ng's office:  WBC: 5.5  Hbg:  13  Hct: 39  Plt: 152,000    Na: 139   K: 3.9  Creat: 1.05  BUN: 15  Album: 4.3  Gluc: 155 (fasting)  AST: 17  ALT: 22    Total Chol:151  HDL:36  LDL:96  Tri     The following portions of the patient's history were reviewed and updated as appropriate: allergies, current medications, past family history, past medical history, past social history, past surgical history and problem list.     Assessment/Plan:   Began entresto (2017) He has had improvement in BP & NYHA class. Has a job now, that is why he missed many appointments. Has been out of Entresto for 1 week, [facility] had to order it. Will provide sample today of reduced dose. Was in [facility] ER for varicose vein rupture 2 weeks ago- still has a stitch. Encouraged him to f/u with that for removal.      Diagnosis Plan   1. Chronic systolic congestive heart failure  NICM: EF:30%. NYHA Class I, Stage C. Patient appears euvolemic and in a well perfused physiologic state. Hemodynamics are improved. Will reduce diuretic and BP medications.     BETA-BLOCKER: Coreg 6.25mg BID        ENTRESTO:  24/26mg BID  DIURETIC: Lasix 40mg BID  ALDOSTERONT ANTAGONIST: n/a- NYHA Class I  IMDUR/HYDRALAZINE: N/A  DIGOXIN: 125mcg  Fluid restriction: 2 L  Sodium restriction: 2 grams  6MWT: yes  2017. 408.7 meters  2018- 456.28 meters  18: did not want to walk  Cardiac Rehab: n/a  ICD: Consulted with Dr. Cabrera. No ICD at this time due and Class I symptoms. Ef remains 32%.  ADHF: no  CardioMEMS:no  LVAD: n/a at this time.    Reiterated all educational points this visit.  Continue daily weight monitoring.  Continue restricted dietary sodium intake.  Discussed patient action plan for heart failure. Contact information for this office verbalized.  Recommended avoiding NSAIDs use.  Discussed warning signs requiring additional medical attention for heart failure.   Education points repeated back by patient.          2. Essential hypertension  HTN, essential.   BP noted to be  well controlled today in office, actually low. Will reduce medication dosages.    LVH: absent.  LVEF:Abnormal:. 30%  Diastolic function: Grade I  End-organ damage: HF    DASH; medication compliance  Entresto 24/26mg BID  Coreg 6.25mg BID       3. Mixed hyperlipidemia  Lovastatin 20mg nightly  LDL controlled at 96.       4. KHANH (obstructive sleep apnea)  Got his CPAP taken away.   Needs to reschedule with Dr. Forbes.         Follow up in 3 months

## 2018-08-03 ENCOUNTER — OFFICE VISIT (OUTPATIENT)
Dept: CARDIOLOGY | Facility: CLINIC | Age: 48
End: 2018-08-03

## 2018-08-03 VITALS
HEIGHT: 76 IN | BODY MASS INDEX: 38.36 KG/M2 | OXYGEN SATURATION: 97 % | SYSTOLIC BLOOD PRESSURE: 100 MMHG | DIASTOLIC BLOOD PRESSURE: 64 MMHG | HEART RATE: 88 BPM | WEIGHT: 315 LBS

## 2018-08-03 DIAGNOSIS — I50.22 CHRONIC SYSTOLIC CONGESTIVE HEART FAILURE (HCC): Primary | ICD-10-CM

## 2018-08-03 DIAGNOSIS — E78.2 MIXED HYPERLIPIDEMIA: ICD-10-CM

## 2018-08-03 DIAGNOSIS — I10 ESSENTIAL HYPERTENSION: ICD-10-CM

## 2018-08-03 PROCEDURE — 99214 OFFICE O/P EST MOD 30 MIN: CPT | Performed by: NURSE PRACTITIONER

## 2018-08-03 RX ORDER — CARVEDILOL 6.25 MG/1
6.25 TABLET ORAL 2 TIMES DAILY WITH MEALS
Qty: 60 TABLET | Refills: 11 | Status: SHIPPED | OUTPATIENT
Start: 2018-08-03 | End: 2019-01-11 | Stop reason: DRUGHIGH

## 2018-08-03 NOTE — PATIENT INSTRUCTIONS
Reduce BP medications by HALF to due lower blood pressure.    New prescription of Entresto 24/26mg twice daily. Sample provided- take 1/2 tablet.    Cut Coreg in 1/2 to make 6.25mg. New prescription at [facility] for 6.25mg twice daily.    Try taking Lasix once a day. Cut back on it to reduce the dizziness and the dry mouth etc.

## 2019-01-11 ENCOUNTER — OFFICE VISIT (OUTPATIENT)
Dept: CARDIOLOGY | Facility: CLINIC | Age: 49
End: 2019-01-11

## 2019-01-11 VITALS
HEIGHT: 76 IN | HEART RATE: 75 BPM | SYSTOLIC BLOOD PRESSURE: 102 MMHG | BODY MASS INDEX: 38.36 KG/M2 | WEIGHT: 315 LBS | OXYGEN SATURATION: 98 % | DIASTOLIC BLOOD PRESSURE: 76 MMHG

## 2019-01-11 DIAGNOSIS — I83.12 VARICOSE VEINS OF BOTH LOWER EXTREMITIES WITH INFLAMMATION: ICD-10-CM

## 2019-01-11 DIAGNOSIS — I83.11 VARICOSE VEINS OF BOTH LOWER EXTREMITIES WITH INFLAMMATION: ICD-10-CM

## 2019-01-11 DIAGNOSIS — I50.22 CHRONIC SYSTOLIC CONGESTIVE HEART FAILURE (HCC): Primary | ICD-10-CM

## 2019-01-11 PROCEDURE — 99214 OFFICE O/P EST MOD 30 MIN: CPT | Performed by: NURSE PRACTITIONER

## 2019-01-11 RX ORDER — POTASSIUM CHLORIDE 750 MG/1
10 TABLET, FILM COATED, EXTENDED RELEASE ORAL DAILY
Qty: 90 TABLET | Refills: 3 | Status: ON HOLD | OUTPATIENT
Start: 2019-01-11 | End: 2019-03-09 | Stop reason: SDUPTHER

## 2019-01-11 RX ORDER — CARVEDILOL 12.5 MG/1
12.5 TABLET ORAL 2 TIMES DAILY WITH MEALS
COMMUNITY
End: 2019-02-07 | Stop reason: HOSPADM

## 2019-01-11 RX ORDER — FUROSEMIDE 40 MG/1
40 TABLET ORAL 2 TIMES DAILY
Qty: 60 TABLET | Refills: 6 | Status: ON HOLD | OUTPATIENT
Start: 2019-01-11 | End: 2019-03-09 | Stop reason: SDUPTHER

## 2019-01-11 NOTE — PROGRESS NOTES
[facility] CHF CLINIC OFFICE VISIT    Subjective:     Congestive Heart Failure (chief complaint)      Congestive Heart Failure   Presents for initial visit. The disease course has been stable. The condition has lasted for 3 years. Pertinent negatives include no abdominal pain, chest pain, claudication, edema, fatigue, orthopnea, palpitations, shortness of breath or unexpected weight change. The symptoms have been stable. Past treatments include ACE inhibitors, beta blockers, digoxin, exercise and salt and fluid restriction. The treatment provided significant relief. Compliance with prior treatments has been good.     CHF referral from [facility]. Mr. Bravo has a known cardiomyopathy from 2014 with an EF of 10-15%. He has since been in assisted and established with [facility] for refills of CHF medications. He was released from assisted on Aug 1, 2017. Unclear why he is not on an ACE/ARB. Unclear why he did not have ICD implanted. Left heart cath in 2009 was without obstruction. Echo in 2009 was 20%. Echo in 2014 was 10-15%. Had followed with Dr. Nunn. Repeat of CTA coronary was without obstruction still. SV was 67ml. 3D EF was 30%. Patient of Dr. Cabrera.     He has been on Coreg 12.5mg BID, Lasix 40mg BID, Digoxin 125mcg, Entresto 49/51mg BID, and Kdur.   He does well with his symptoms, appearing to be NYHA Class I. No complaint of orthopnea- now using no pillows, edema, weight gain, abdominal swelling, or dyspnea.    Evaluation by Dr. Cabrera is to defer implantation of ICD. I would agree.    8/3/18: Improved. Has a job now. Lost weight. Exercising. Eating well. Will reduce medication dosages due to low BP.     1/11/19: Post hospital follow up from [facility]. He was admitted with orthopnea, leg edema, and dyspnea. He was diuresed overnight and discharged home with follow up. Complaints of worsening varicose veins with buldging and discomfort. They have bled recently. He has been wearing his compression  stockings daily.       PCP: SHE[facility] Mcclure  Cardiologist: Dr. Cabrera   Nephrologist: none  Pulmonologist: Dr. Klein    Hospitalizations:  1/7/19- 1/8/19:  [facility]- CHF    Past Medical History:   Diagnosis Date   • CHF (congestive heart failure) (CMS/HCC)    • Diabetes mellitus (CMS/HCC)    • Hyperlipidemia    • Hypertension    • Peripheral vascular disease (CMS/[facility])      Past Surgical History:   Procedure Laterality Date   • CARDIAC CATHETERIZATION  2009     Social History     Socioeconomic History   • Marital status:      Spouse name: Not on file   • Number of children: Not on file   • Years of education: Not on file   • Highest education level: Not on file   Tobacco Use   • Smoking status: Never Smoker   • Smokeless tobacco: Never Used   Substance and Sexual Activity   • Alcohol use: No   • Drug use: No   • Sexual activity: Defer     No Known Allergies    Review of Systems   Constitution: Negative for chills, decreased appetite, fatigue, fever, weakness and unexpected weight change.   HENT: Negative.    Eyes: Negative.    Cardiovascular: Negative for chest pain, claudication, dyspnea on exertion, irregular heartbeat, leg swelling and palpitations.   Respiratory: Negative for cough, shortness of breath and wheezing.    Endocrine: Negative.    Skin: Negative for dry skin, flushing and rash.   Musculoskeletal: Negative for falls and myalgias.   Gastrointestinal: Negative for abdominal pain, change in bowel habit and melena.   Genitourinary: Negative for frequency and hematuria.   Neurological: Negative for dizziness, light-headedness and loss of balance.   Psychiatric/Behavioral: Negative for altered mental status and memory loss. The patient is not nervous/anxious.        Current Outpatient Medications   Medication Sig Dispense Refill   • albuterol (PROVENTIL HFA;VENTOLIN HFA) 108 (90 Base) MCG/ACT inhaler Inhale 2 puffs Every 4 (Four) Hours As Needed for Wheezing. 1 inhaler 3   • carvedilol (COREG) 12.5 MG  "tablet Take 12.5 mg by mouth 2 (Two) Times a Day With Meals.     • digoxin (LANOXIN) 125 MCG tablet Take 125 mcg by mouth Daily.     • furosemide (LASIX) 40 MG tablet Take 40 mg by mouth 2 (Two) Times a Day.     • lovastatin (MEVACOR) 20 MG tablet Take 20 mg by mouth Every Night.     • metFORMIN (GLUCOPHAGE) 500 MG tablet Take 500 mg by mouth Daily With Breakfast.     • potassium chloride (K-DUR) 10 MEQ CR tablet Take 10 mEq by mouth Daily.     • sacubitril-valsartan (ENTRESTO) 24-26 MG tablet Take 1 tablet by mouth 2 (Two) Times a Day. 60 tablet 6     No current facility-administered medications for this visit.         Objective:     Vitals:    01/11/19 0935   BP: 102/76   BP Location: Left arm   Patient Position: Sitting   Cuff Size: Adult   Pulse: 75   SpO2: 98%   Weight: (!) 147 kg (325 lb)   Height: 193 cm (76\")     Wt Readings from Last 3 Encounters:   01/11/19 (!) 147 kg (325 lb)   08/03/18 (!) 155 kg (341 lb 9.6 oz)   04/30/18 (!) 163 kg (358 lb 6.4 oz)        Physical Exam   Constitutional: He is oriented to person, place, and time. He appears well-developed and well-nourished. No distress.   HENT:   Head: Normocephalic.   Neck: No JVD present.   Cardiovascular: Normal rate, regular rhythm, S1 normal, S2 normal, normal heart sounds and intact distal pulses.    No murmur heard.  Pulmonary/Chest: Effort normal and breath sounds normal. No respiratory distress. He has no wheezes. He has no rales.   Abdominal: Soft. Bowel sounds are normal.   Musculoskeletal: Normal range of motion. He exhibits no edema.   Neurological: He is alert and oriented to person, place, and time.   Skin: Skin is warm and dry. No erythema.   Psychiatric: He has a normal mood and affect. His behavior is normal. Judgment and thought content normal.       Cardiographics  AAA screening 10/9/17  Interpretation Summary   · Technically limited exam due to obesity.  · Appears negative for abdominal aortic aneurysm.     ECHO " 18  Interpretation Summary     · The left ventricular cavity is severely dilated.  · Estimated EF = 32%.  · Left ventricular diastolic dysfunction (grade I) consistent with impaired relaxation.  · Left atrial cavity size is moderately dilated.  · Mild mitral valve regurgitation is present       ECHO 10/9/2017  Interpretation Summary   · Left Ventricle: Left ventricular systolic function is severely decreased. 3D LV EF = 30%  · There is left ventricular global hypokinesis noted.  · The left ventricle cavity is severely dilated. Increased cardiac mass. Findings are consistent with dilated cardiomyopathy.  · Left ventricular diastolic dysfunction is noted (grade I a w/high LAP) consistent with impaired relaxation. Elevated left atrial pressure  · Right Ventricle: Normal right ventricular wall thickness, systolic function and septal motion noted. Right ventricular cavity is borderline dilated  · Mild mitral valve regurgitation is present  · No evidence of pulmonary hypertension is present  · There is no evidence of pericardial effusion         Echocardiogram:   DESCRIPTION:  Left ventricle is moderately dilated.  There is severe  global hypokinesis with an EF of 15% to 20%.      Right ventricular size is normal.  Right ventricular systolic function  is mildly impaired.      Left atrium is moderately dilated.  Right atrium is mildly dilated.      Aortic valve is trileaflet and opens very well.  Mitral valve is  normal. There is mild MR.  Tricuspid valve is normal.  There is mild  TR.  Pulmonic valve is normal.  There is trace PI. There is no  pericardial effusion.      A:  1. Severe left ventricular dysfunction with an ejection fraction of 15%        to 20%.  2. Biatrial enlargement  3. Left ventricle is moderately dilated.  4. Ejection fraction is 15% to 20%    YURIDIA CARLISLE MD    EC2014  Normal sinus rhythm with sinus arrhythmia  Possible Left atrial enlargement  ST   Abnormal ECG  When compared  with ECG of 08-NOV-2014 09:51,  fusion complexes are no longer present  premature ventricular complexes are no longer present  Questionable change in QRS axis  Nonspecific T wave abnormality now evident in Inferior leads  T wave inversion more evident in Lateral leads  Confirmed by NAOMIE AGUILAR     [facility]: 10/27/2009  DESCRIPTION: Patient was brought to the cath lab in the fasting state, explained the risks and benefits. He was prepped and draped in the usual sterile manner. Right groin was anesthetized using 2% Lidocaine. Subsequently a 6-Pashto short sheath was inserted using modified Seldinger technique.   Left coronary angiogram was obtained using a 6-Pashto left Lonnie catheter, and right coronary angiogram was obtained using a 6-Pashto right Lonnie catheter.   LV-gram was obtained using a pigtail catheter. There were no complications.   HEMODYNAMICS Aortic pressure is 136/96. Left ventricular pressure is 135. Left ventricular end diastolic pressure is 22.   LV-GRAM: Showed severe global hypokinesis with an EF of 25%.   LEFT MAIN: Large caliber vessel, which gave rise to an LAD, which also gave rise to multiple diagonals and septals, which was free of significant disease.   LEFT CIRCUMFLEX: Gave rise to 2 OM's, which is free of significant disease.   RCA: Is a co-dominant system, gives rise to a PDA and PL branch, which is free of significant disease. A: 1. Normal coronaries 2. Severe left ventricular dysfunction.   REC: 1. Control hypertension   2. Medical therapy     YURIDIA CARLISLE M.D.     Imaging  Chest x-ray:    CTA coronary 10/5/2017  CALCIUM PLAQUE BURDEN:     REGION                                         CALCIUM SCORE  ([place])  Left Main                                                      0   Right Coronary Artery                                 30   Left Anterior Descending                            0   Circumflex                                                    0   Posterior  Descending Artery                       0           Your Calcium Score is 30       This places the patent in the mild or minimal coronary narrowings  likely risk for coronary artery disease.     CTA OF THE CORONARY ARTERIES: There is adequate visualization of  the left main, LAD, diagonals, circumflex, and RCA. The patient  is right dominant.     Left Main: No calcified or soft tissue density plaque and no  stenosis.  LAD: No calcified or soft tissue density plaque and no stenosis.  Circumflex: No calcified or soft tissue density plaque and no  stenosis.  RCA: Right coronary artery very small calcified atherosclerotic  plaque foci are seen which are not hemodynamically significant.     The aortic valve is tricuspid. There is no myocardial bridging.  On short axis views, the myocardium is homogeneous in thickness.     EXTRACARDIAC SOFT TISSUES:  Mediastinum: On the imaging, the ascending and descending aorta  are normal in caliber. Right perihilar single enlarged lymph node  measuring 2.1 x 2.1 cm. The pericardium is normal.  Lungs: Right lower lobe superior segment 8.4 mm noncalcified  nodule. Right upper lobe posterior segment 5.5 mm noncalcified  nodule. Right middle lobe small linear opacity.  Abdomen: No evidence of hiatal hernia. The imaged liver and  spleen are unremarkable. There is no lymphadenopathy in the upper  abdomen.  Bones: There are no lytic or blastic lesions within the osseous  structures.     CT FUNCTIONAL ANALYSIS:     Ejection Fraction     22 %  Diastolic Volume     309 ml  Systolic Volume      242 ml  Stroke Volume        67 ml  Cardiac Output       3.3 L/minute     IMPRESSION:  CONCLUSION:  1.  Abnormal low cardiac ejection fraction being 22%.  2.  Calcium score 30.  3.  Right lower lobe superior segment 8.4 mm noncalcified nodule.  Right upper lobe posterior segment 5.5 mm noncalcified nodule.  These may represent benign inflammatory nodules versus pulmonary  nodules. Recommend follow-up CT  in 3 months to further evaluate.  4.  Right perihilar single enlarged lymph node may represent  inflammatory lymphadenopathy versus neoplastic involvement.  Recommend clinical correlation and consideration of follow-up CT  in 3 months.  5.  Otherwise normal CT angiogram coronary study.     Electronically signed by:  Pj Cummins MD  10/5/2017 4:39 PM CDT  Workstation: [id number]    Lab Review   19:  WBC: 5.2  Hb.1  Hct: 42.8  Plt: 134,000    Na: 139   K: 3.3  Creat: 1.2  BUN: 13  Album: 3.2 L  Gluc: 92(fasting)  AST: 16  ALT: 18    Total Chol:151  HDL:36  LDL:96  Tri    BNP 19: 878 --> 545 ()    Trop: 0.26 --> 0.03 (ULN 0.05)     The following portions of the patient's history were reviewed and updated as appropriate: allergies, current medications, past family history, past medical history, past social history, past surgical history and problem list.     Assessment/Plan:   11kg weight loss with BID 40mg lasix at [place].      Diagnosis Plan   1. Chronic systolic congestive heart failure  NICM: EF:30%. NYHA Class I, Stage C. Patient appears euvolemic and in a well perfused physiologic state. Hemodynamics are improved.     BETA-BLOCKER: Coreg 12.5mg BID        ENTRESTO:  24/26mg BID  DIURETIC: Lasix 40mg BID  ALDOSTERONT ANTAGONIST: n/a- NYHA Class I  IMDUR/HYDRALAZINE: N/A  DIGOXIN: 125mcg  Fluid restriction: 2 L  Sodium restriction: 2 grams  6MWT: yes  2017. 408.7 meters  2018- 456.28 meters  18: did not want to walk  Cardiac Rehab: n/a  ICD: Consulted with Dr. Cabrera. No ICD at this time due and Class I symptoms. Ef remains 32%.  ADHF: no  CardioMEMS:no  LVAD: n/a at this time.    Reiterated all educational points this visit.  Continue daily weight monitoring.  Continue restricted dietary sodium intake.  Discussed patient action plan for heart failure. Contact information for this office verbalized.  Recommended avoiding NSAIDs use.  Discussed warning signs requiring additional  medical attention for heart failure.   Education points repeated back by patient.          2. Essential hypertension  HTN, essential.   BP noted to be well controlled today in office    LVH: absent.  LVEF:Abnormal:. 30%  Diastolic function: Grade I  End-organ damage: HF    DASH; medication compliance  Entresto 24/26mg BID  Coreg 6.25mg BID       3. Mixed hyperlipidemia  Lovastatin 20mg nightly  LDL controlled at 96.       4. KHANH (obstructive sleep apnea)  Got his CPAP back. Wearing nightly.        5. Bilateral Varicose veins with pain and bleeding Has wore compression stockings off and on for 5 years. Continuous for past 6-8 months. This prevents some pain, but not bleeding. The veins will break open at his ankles. He was seen ER August 2018 for rupture and had a stitch placed.     Venous duplex bilaterally. Follow up with Dr. Ordonez afterwards for treatment.           Follow up in 3 months

## 2019-01-29 LAB
BH CV LOWER VASCULAR LEFT COMMON FEMORAL AUGMENT: NORMAL
BH CV LOWER VASCULAR LEFT COMMON FEMORAL COMPETENT: NORMAL
BH CV LOWER VASCULAR LEFT COMMON FEMORAL COMPRESS: NORMAL
BH CV LOWER VASCULAR LEFT DISTAL FEMORAL AUGMENT: NORMAL
BH CV LOWER VASCULAR LEFT DISTAL FEMORAL COMPRESS: NORMAL
BH CV LOWER VASCULAR LEFT GREATER SAPH AK AUGMENT: NORMAL
BH CV LOWER VASCULAR LEFT GREATER SAPH AK COMPETENT: NORMAL
BH CV LOWER VASCULAR LEFT GREATER SAPH AK COMPRESS: NORMAL
BH CV LOWER VASCULAR LEFT LESSER SAPH AUGMENT: NORMAL
BH CV LOWER VASCULAR LEFT LESSER SAPH COMPETENT: NORMAL
BH CV LOWER VASCULAR LEFT LESSER SAPH COMPRESS: NORMAL
BH CV LOWER VASCULAR LEFT MID FEMORAL AUGMENT: NORMAL
BH CV LOWER VASCULAR LEFT MID FEMORAL COMPETENT: NORMAL
BH CV LOWER VASCULAR LEFT MID FEMORAL COMPRESS: NORMAL
BH CV LOWER VASCULAR LEFT PERONEAL AUGMENT: NORMAL
BH CV LOWER VASCULAR LEFT POPLITEAL AUGMENT: NORMAL
BH CV LOWER VASCULAR LEFT POPLITEAL COMPETENT: NORMAL
BH CV LOWER VASCULAR LEFT POPLITEAL COMPRESS: NORMAL
BH CV LOWER VASCULAR LEFT POSTERIOR TIBIAL AUGMENT: NORMAL
BH CV LOWER VASCULAR LEFT PROFUNDA FEMORAL AUGMENT: NORMAL
BH CV LOWER VASCULAR LEFT PROFUNDA FEMORAL COMPETENT: NORMAL
BH CV LOWER VASCULAR LEFT PROXIMAL FEMORAL AUGMENT: NORMAL
BH CV LOWER VASCULAR LEFT PROXIMAL FEMORAL COMPETENT: NORMAL
BH CV LOWER VASCULAR LEFT PROXIMAL FEMORAL COMPRESS: NORMAL
BH CV LOWER VASCULAR RIGHT COMMON FEMORAL AUGMENT: NORMAL
BH CV LOWER VASCULAR RIGHT COMMON FEMORAL COMPETENT: NORMAL
BH CV LOWER VASCULAR RIGHT COMMON FEMORAL COMPRESS: NORMAL
BH CV LOWER VASCULAR RIGHT DISTAL FEMORAL AUGMENT: NORMAL
BH CV LOWER VASCULAR RIGHT DISTAL FEMORAL COMPETENT: NORMAL
BH CV LOWER VASCULAR RIGHT DISTAL FEMORAL COMPRESS: NORMAL
BH CV LOWER VASCULAR RIGHT GREATER SAPH AK AUGMENT: NORMAL
BH CV LOWER VASCULAR RIGHT GREATER SAPH AK COMPETENT: NORMAL
BH CV LOWER VASCULAR RIGHT GREATER SAPH AK COMPRESS: NORMAL
BH CV LOWER VASCULAR RIGHT GREATER SAPH BK AUGMENT: NORMAL
BH CV LOWER VASCULAR RIGHT GREATER SAPH BK COMPETENT: NORMAL
BH CV LOWER VASCULAR RIGHT GREATER SAPH BK COMPRESS: NORMAL
BH CV LOWER VASCULAR RIGHT MID FEMORAL AUGMENT: NORMAL
BH CV LOWER VASCULAR RIGHT MID FEMORAL COMPETENT: NORMAL
BH CV LOWER VASCULAR RIGHT MID FEMORAL COMPRESS: NORMAL
BH CV LOWER VASCULAR RIGHT POPLITEAL AUGMENT: NORMAL
BH CV LOWER VASCULAR RIGHT POPLITEAL COMPETENT: NORMAL
BH CV LOWER VASCULAR RIGHT POPLITEAL COMPRESS: NORMAL
BH CV LOWER VASCULAR RIGHT POSTERIOR TIBIAL AUGMENT: NORMAL
BH CV LOWER VASCULAR RIGHT PROFUNDA FEMORAL AUGMENT: NORMAL
BH CV LOWER VASCULAR RIGHT PROXIMAL FEMORAL AUGMENT: NORMAL
BH CV LOWER VASCULAR RIGHT PROXIMAL FEMORAL COMPETENT: NORMAL
BH CV LOWER VASCULAR RIGHT PROXIMAL FEMORAL COMPRESS: NORMAL
BH CV LOWER VASCULAR RIGHT SAPHENOFEMORAL JUNCTION AUGMENT: NORMAL
BH CV LOWER VASCULAR RIGHT SAPHENOFEMORAL JUNCTION COMPETENT: NORMAL
BH CV LOWER VASCULAR RIGHT SAPHENOFEMORAL JUNCTION COMPRESS: NORMAL

## 2019-02-04 ENCOUNTER — HOSPITAL ENCOUNTER (OUTPATIENT)
Facility: HOSPITAL | Age: 49
Setting detail: OBSERVATION
Discharge: HOME | End: 2019-02-07
Attending: EMERGENCY MEDICINE | Admitting: STUDENT IN AN ORGANIZED HEALTH CARE EDUCATION/TRAINING PROGRAM

## 2019-02-04 ENCOUNTER — APPOINTMENT (OUTPATIENT)
Dept: GENERAL RADIOLOGY | Facility: HOSPITAL | Age: 49
End: 2019-02-04

## 2019-02-04 DIAGNOSIS — R06.00 DYSPNEA, UNSPECIFIED TYPE: ICD-10-CM

## 2019-02-04 DIAGNOSIS — R77.8 ELEVATED TROPONIN I LEVEL: Primary | ICD-10-CM

## 2019-02-04 DIAGNOSIS — R60.9 PERIPHERAL EDEMA: ICD-10-CM

## 2019-02-04 DIAGNOSIS — R07.9 CHEST PAIN, UNSPECIFIED TYPE: ICD-10-CM

## 2019-02-04 PROBLEM — I50.23 ACUTE ON CHRONIC SYSTOLIC CONGESTIVE HEART FAILURE: Status: ACTIVE | Noted: 2017-09-26

## 2019-02-04 LAB
ALBUMIN SERPL-MCNC: 3.9 G/DL (ref 3.4–4.8)
ALBUMIN/GLOB SERPL: 1.4 G/DL (ref 1.1–1.8)
ALP SERPL-CCNC: 58 U/L (ref 38–126)
ALT SERPL W P-5'-P-CCNC: 22 U/L (ref 21–72)
ANION GAP SERPL CALCULATED.3IONS-SCNC: 8 MMOL/L (ref 5–15)
AST SERPL-CCNC: 24 U/L (ref 17–59)
BASOPHILS # BLD AUTO: 0.01 10*3/MM3 (ref 0–0.2)
BASOPHILS NFR BLD AUTO: 0.2 % (ref 0–2)
BILIRUB SERPL-MCNC: 1.3 MG/DL (ref 0.2–1.3)
BILIRUB UR QL STRIP: NEGATIVE
BUN BLD-MCNC: 14 MG/DL (ref 7–21)
BUN/CREAT SERPL: 11.3 (ref 7–25)
CALCIUM SPEC-SCNC: 8.9 MG/DL (ref 8.4–10.2)
CHLORIDE SERPL-SCNC: 101 MMOL/L (ref 95–110)
CLARITY UR: CLEAR
CO2 SERPL-SCNC: 28 MMOL/L (ref 22–31)
COLOR UR: YELLOW
CREAT BLD-MCNC: 1.24 MG/DL (ref 0.7–1.3)
DEPRECATED RDW RBC AUTO: 44.7 FL (ref 35.1–43.9)
DIGOXIN SERPL-MCNC: 0.9 NG/ML (ref 0.8–2)
EOSINOPHIL # BLD AUTO: 0.15 10*3/MM3 (ref 0–0.7)
EOSINOPHIL NFR BLD AUTO: 2.8 % (ref 0–7)
ERYTHROCYTE [DISTWIDTH] IN BLOOD BY AUTOMATED COUNT: 13.2 % (ref 11.5–14.5)
GFR SERPL CREATININE-BSD FRML MDRD: 76 ML/MIN/1.73 (ref 63–147)
GLOBULIN UR ELPH-MCNC: 2.8 GM/DL (ref 2.3–3.5)
GLUCOSE BLD-MCNC: 142 MG/DL (ref 60–100)
GLUCOSE UR STRIP-MCNC: NEGATIVE MG/DL
HCT VFR BLD AUTO: 36.6 % (ref 39–49)
HGB BLD-MCNC: 12.4 G/DL (ref 13.7–17.3)
HGB UR QL STRIP.AUTO: NEGATIVE
HOLD SPECIMEN: NORMAL
HOLD SPECIMEN: NORMAL
IMM GRANULOCYTES # BLD AUTO: 0.01 10*3/MM3 (ref 0–0.02)
IMM GRANULOCYTES NFR BLD AUTO: 0.2 % (ref 0–0.5)
KETONES UR QL STRIP: NEGATIVE
LEUKOCYTE ESTERASE UR QL STRIP.AUTO: NEGATIVE
LIPASE SERPL-CCNC: 39 U/L (ref 23–300)
LYMPHOCYTES # BLD AUTO: 1.38 10*3/MM3 (ref 0.6–4.2)
LYMPHOCYTES NFR BLD AUTO: 25.3 % (ref 10–50)
MAGNESIUM SERPL-MCNC: 1.9 MG/DL (ref 1.6–2.3)
MCH RBC QN AUTO: 31.4 PG (ref 26.5–34)
MCHC RBC AUTO-ENTMCNC: 33.9 G/DL (ref 31.5–36.3)
MCV RBC AUTO: 92.7 FL (ref 80–98)
MONOCYTES # BLD AUTO: 0.72 10*3/MM3 (ref 0–0.9)
MONOCYTES NFR BLD AUTO: 13.2 % (ref 0–12)
NEUTROPHILS # BLD AUTO: 3.18 10*3/MM3 (ref 2–8.6)
NEUTROPHILS NFR BLD AUTO: 58.3 % (ref 37–80)
NITRITE UR QL STRIP: NEGATIVE
NT-PROBNP SERPL-MCNC: 2000 PG/ML (ref 0–450)
PH UR STRIP.AUTO: 7 [PH] (ref 5–9)
PLATELET # BLD AUTO: 127 10*3/MM3 (ref 150–450)
PMV BLD AUTO: 12.3 FL (ref 8–12)
POTASSIUM BLD-SCNC: 3.8 MMOL/L (ref 3.5–5.1)
PROT SERPL-MCNC: 6.7 G/DL (ref 6.3–8.6)
PROT UR QL STRIP: NEGATIVE
RBC # BLD AUTO: 3.95 10*6/MM3 (ref 4.37–5.74)
SODIUM BLD-SCNC: 137 MMOL/L (ref 137–145)
SP GR UR STRIP: 1.01 (ref 1–1.03)
TROPONIN I SERPL-MCNC: 0.23 NG/ML
TROPONIN I SERPL-MCNC: 0.23 NG/ML
TROPONIN I SERPL-MCNC: 0.26 NG/ML
UROBILINOGEN UR QL STRIP: ABNORMAL
WBC NRBC COR # BLD: 5.45 10*3/MM3 (ref 3.2–9.8)
WHOLE BLOOD HOLD SPECIMEN: NORMAL
WHOLE BLOOD HOLD SPECIMEN: NORMAL

## 2019-02-04 PROCEDURE — 80053 COMPREHEN METABOLIC PANEL: CPT | Performed by: EMERGENCY MEDICINE

## 2019-02-04 PROCEDURE — 93010 ELECTROCARDIOGRAM REPORT: CPT | Performed by: INTERNAL MEDICINE

## 2019-02-04 PROCEDURE — 93005 ELECTROCARDIOGRAM TRACING: CPT

## 2019-02-04 PROCEDURE — 81003 URINALYSIS AUTO W/O SCOPE: CPT | Performed by: EMERGENCY MEDICINE

## 2019-02-04 PROCEDURE — G0378 HOSPITAL OBSERVATION PER HR: HCPCS

## 2019-02-04 PROCEDURE — 85025 COMPLETE CBC W/AUTO DIFF WBC: CPT | Performed by: EMERGENCY MEDICINE

## 2019-02-04 PROCEDURE — 93005 ELECTROCARDIOGRAM TRACING: CPT | Performed by: EMERGENCY MEDICINE

## 2019-02-04 PROCEDURE — 25010000002 FUROSEMIDE PER 20 MG: Performed by: EMERGENCY MEDICINE

## 2019-02-04 PROCEDURE — 83880 ASSAY OF NATRIURETIC PEPTIDE: CPT | Performed by: EMERGENCY MEDICINE

## 2019-02-04 PROCEDURE — 80162 ASSAY OF DIGOXIN TOTAL: CPT | Performed by: EMERGENCY MEDICINE

## 2019-02-04 PROCEDURE — 96376 TX/PRO/DX INJ SAME DRUG ADON: CPT

## 2019-02-04 PROCEDURE — 96372 THER/PROPH/DIAG INJ SC/IM: CPT

## 2019-02-04 PROCEDURE — 84484 ASSAY OF TROPONIN QUANT: CPT | Performed by: STUDENT IN AN ORGANIZED HEALTH CARE EDUCATION/TRAINING PROGRAM

## 2019-02-04 PROCEDURE — 84484 ASSAY OF TROPONIN QUANT: CPT | Performed by: EMERGENCY MEDICINE

## 2019-02-04 PROCEDURE — 71046 X-RAY EXAM CHEST 2 VIEWS: CPT

## 2019-02-04 PROCEDURE — 83735 ASSAY OF MAGNESIUM: CPT | Performed by: STUDENT IN AN ORGANIZED HEALTH CARE EDUCATION/TRAINING PROGRAM

## 2019-02-04 PROCEDURE — 83690 ASSAY OF LIPASE: CPT | Performed by: EMERGENCY MEDICINE

## 2019-02-04 PROCEDURE — 99284 EMERGENCY DEPT VISIT MOD MDM: CPT

## 2019-02-04 PROCEDURE — 93005 ELECTROCARDIOGRAM TRACING: CPT | Performed by: STUDENT IN AN ORGANIZED HEALTH CARE EDUCATION/TRAINING PROGRAM

## 2019-02-04 PROCEDURE — 25010000002 FUROSEMIDE PER 20 MG: Performed by: STUDENT IN AN ORGANIZED HEALTH CARE EDUCATION/TRAINING PROGRAM

## 2019-02-04 PROCEDURE — 25010000002 HEPARIN (PORCINE) PER 1000 UNITS: Performed by: STUDENT IN AN ORGANIZED HEALTH CARE EDUCATION/TRAINING PROGRAM

## 2019-02-04 RX ORDER — CALCIUM CARBONATE 200(500)MG
2 TABLET,CHEWABLE ORAL 2 TIMES DAILY PRN
Status: DISCONTINUED | OUTPATIENT
Start: 2019-02-04 | End: 2019-02-07 | Stop reason: HOSPADM

## 2019-02-04 RX ORDER — FUROSEMIDE 10 MG/ML
80 INJECTION INTRAMUSCULAR; INTRAVENOUS EVERY 12 HOURS
Status: DISCONTINUED | OUTPATIENT
Start: 2019-02-04 | End: 2019-02-06

## 2019-02-04 RX ORDER — ONDANSETRON 2 MG/ML
4 INJECTION INTRAMUSCULAR; INTRAVENOUS EVERY 6 HOURS PRN
Status: DISCONTINUED | OUTPATIENT
Start: 2019-02-04 | End: 2019-02-07 | Stop reason: HOSPADM

## 2019-02-04 RX ORDER — ACETAMINOPHEN 325 MG/1
650 TABLET ORAL EVERY 4 HOURS PRN
Status: DISCONTINUED | OUTPATIENT
Start: 2019-02-04 | End: 2019-02-07 | Stop reason: HOSPADM

## 2019-02-04 RX ORDER — FUROSEMIDE 10 MG/ML
40 INJECTION INTRAMUSCULAR; INTRAVENOUS ONCE
Status: COMPLETED | OUTPATIENT
Start: 2019-02-04 | End: 2019-02-04

## 2019-02-04 RX ORDER — SODIUM CHLORIDE 0.9 % (FLUSH) 0.9 %
3-10 SYRINGE (ML) INJECTION AS NEEDED
Status: DISCONTINUED | OUTPATIENT
Start: 2019-02-04 | End: 2019-02-07 | Stop reason: HOSPADM

## 2019-02-04 RX ORDER — SODIUM CHLORIDE 0.9 % (FLUSH) 0.9 %
10 SYRINGE (ML) INJECTION AS NEEDED
Status: DISCONTINUED | OUTPATIENT
Start: 2019-02-04 | End: 2019-02-07 | Stop reason: HOSPADM

## 2019-02-04 RX ORDER — ATORVASTATIN CALCIUM 10 MG/1
10 TABLET, FILM COATED ORAL NIGHTLY
Status: DISCONTINUED | OUTPATIENT
Start: 2019-02-04 | End: 2019-02-07 | Stop reason: HOSPADM

## 2019-02-04 RX ORDER — NITROGLYCERIN 0.4 MG/1
0.4 TABLET SUBLINGUAL
Status: DISCONTINUED | OUTPATIENT
Start: 2019-02-04 | End: 2019-02-06

## 2019-02-04 RX ORDER — ASPIRIN 81 MG/1
324 TABLET, CHEWABLE ORAL ONCE
Status: COMPLETED | OUTPATIENT
Start: 2019-02-04 | End: 2019-02-04

## 2019-02-04 RX ORDER — DIGOXIN 125 MCG
125 TABLET ORAL
Status: DISCONTINUED | OUTPATIENT
Start: 2019-02-05 | End: 2019-02-05

## 2019-02-04 RX ORDER — CARVEDILOL 12.5 MG/1
12.5 TABLET ORAL 2 TIMES DAILY WITH MEALS
Status: DISCONTINUED | OUTPATIENT
Start: 2019-02-04 | End: 2019-02-05

## 2019-02-04 RX ORDER — HEPARIN SODIUM 5000 [USP'U]/ML
5000 INJECTION, SOLUTION INTRAVENOUS; SUBCUTANEOUS EVERY 8 HOURS SCHEDULED
Status: DISCONTINUED | OUTPATIENT
Start: 2019-02-04 | End: 2019-02-07 | Stop reason: HOSPADM

## 2019-02-04 RX ORDER — SODIUM CHLORIDE 0.9 % (FLUSH) 0.9 %
3 SYRINGE (ML) INJECTION EVERY 12 HOURS SCHEDULED
Status: DISCONTINUED | OUTPATIENT
Start: 2019-02-04 | End: 2019-02-07 | Stop reason: HOSPADM

## 2019-02-04 RX ADMIN — CARVEDILOL 12.5 MG: 12.5 TABLET, FILM COATED ORAL at 18:23

## 2019-02-04 RX ADMIN — MAGNESIUM OXIDE TAB 400 MG (241.3 MG ELEMENTAL MG) 400 MG: 400 (241.3 MG) TAB at 17:20

## 2019-02-04 RX ADMIN — FUROSEMIDE 40 MG: 10 INJECTION, SOLUTION INTRAMUSCULAR; INTRAVENOUS at 15:47

## 2019-02-04 RX ADMIN — ATORVASTATIN CALCIUM 10 MG: 10 TABLET, FILM COATED ORAL at 20:46

## 2019-02-04 RX ADMIN — ASPIRIN 81 MG CHEWABLE TABLET 324 MG: 81 TABLET CHEWABLE at 14:41

## 2019-02-04 RX ADMIN — SODIUM CHLORIDE, PRESERVATIVE FREE 3 ML: 5 INJECTION INTRAVENOUS at 20:46

## 2019-02-04 RX ADMIN — HEPARIN SODIUM 5000 UNITS: 5000 INJECTION INTRAVENOUS; SUBCUTANEOUS at 20:58

## 2019-02-04 RX ADMIN — SACUBITRIL AND VALSARTAN 1 TABLET: 24; 26 TABLET, FILM COATED ORAL at 20:46

## 2019-02-04 RX ADMIN — FUROSEMIDE 80 MG: 10 INJECTION, SOLUTION INTRAMUSCULAR; INTRAVENOUS at 18:30

## 2019-02-04 NOTE — ED NOTES
Pt presents to Ed with C/O chest pain and SOA that began 2 nights ago.     Kaci Burnette, RN  02/04/19 [id number]

## 2019-02-04 NOTE — ED PROVIDER NOTES
Subjective   Patient presents emergency Department with complaint of chest discomfort over several days worsening shortness of breath dyspnea on exertion and lower extremity peripheral edema.  Patient notes that his legs seem about 3 times is normal.  Patient has had a lot of pain in the bilateral lower extremities.  Patient had an outpatient ultrasound for the same secondary to the swelling.  Patient denies any fevers or chills.  Has has a chest pain as noted, this is mostly with deep breathing and heaviness in the chest.  Patient is had no known atherosclerotic disease, had a catheterization 2009 that he does not remember the results of those were no stents were placed at that time.  Patient does have peripheral vascular disease with a stent placed in the left femoral region.  Patient states he has been taking his Lasix medication though it does not seem to be doing anything for him.  Patient notes that last week he has had at least a 15 pound weight gain from 330 to 345.            Review of Systems   Constitutional: Negative.  Negative for appetite change, chills and fever.   HENT: Negative.  Negative for congestion.    Eyes: Negative.  Negative for photophobia and visual disturbance.   Respiratory: Positive for shortness of breath. Negative for cough and chest tightness.    Cardiovascular: Positive for chest pain and leg swelling. Negative for palpitations.   Gastrointestinal: Negative.  Negative for abdominal pain, constipation, diarrhea, nausea and vomiting.   Endocrine: Negative.    Genitourinary: Negative.  Negative for decreased urine volume, dysuria, flank pain and hematuria.   Musculoskeletal: Negative.  Negative for arthralgias, back pain, myalgias, neck pain and neck stiffness.   Skin: Negative.  Negative for pallor.   Neurological: Negative.  Negative for dizziness, syncope, weakness, light-headedness, numbness and headaches.   Psychiatric/Behavioral: Negative.  Negative for confusion and suicidal  ideas. The patient is not nervous/anxious.    All other systems reviewed and are negative.      Past Medical History:   Diagnosis Date   • CHF (congestive heart failure) (CMS/HCC)    • Diabetes mellitus (CMS/HCC)    • Hyperlipidemia    • Hypertension    • Peripheral vascular disease (CMS/HCC)        No Known Allergies    Past Surgical History:   Procedure Laterality Date   • CARDIAC CATHETERIZATION  2009       Family History   Problem Relation Age of Onset   • Heart disease Other    • Cancer Other    • Hypertension Mother    • Diabetes Mother        Social History     Socioeconomic History   • Marital status:      Spouse name: Not on file   • Number of children: Not on file   • Years of education: Not on file   • Highest education level: Not on file   Tobacco Use   • Smoking status: Never Smoker   • Smokeless tobacco: Never Used   Substance and Sexual Activity   • Alcohol use: No   • Drug use: No   • Sexual activity: Defer           Objective   Physical Exam   Constitutional: He is oriented to person, place, and time. He appears well-developed and well-nourished. He appears distressed.   HENT:   Head: Normocephalic and atraumatic.   Eyes: Conjunctivae and EOM are normal.   Neck: Normal range of motion. Neck supple. No JVD present.   Cardiovascular: Normal rate, regular rhythm, normal heart sounds and intact distal pulses. Exam reveals no gallop and no friction rub.   No murmur heard.  Pulmonary/Chest: Tachypnea noted. He is in respiratory distress. He has no wheezes. He has rhonchi. He has no rales. He exhibits no tenderness.   Abdominal: Soft. Bowel sounds are normal. He exhibits no distension and no mass. There is no tenderness. There is no rebound and no guarding.   Musculoskeletal: Normal range of motion.        Right lower leg: He exhibits edema.        Left lower leg: He exhibits edema.   4+ edema bilateral lower extremities.   Neurological: He is alert and oriented to person, place, and time.   Skin:  Skin is warm and dry. Capillary refill takes less than 2 seconds.   Psychiatric: He has a normal mood and affect. His behavior is normal. Judgment and thought content normal.   Nursing note and vitals reviewed.      ECG 12 Lead    Date/Time: 2/4/2019 1:25 PM  Performed by: Manny Taylor MD  Authorized by: Manny Taylor MD   Interpreted by physician  Comparison: not compared with previous ECG   Rhythm: sinus rhythm  Ectopy: frequent PVCs  Other findings: LAE  Clinical impression: abnormal ECG                 ED Course        Labs Reviewed   TROPONIN (IN-HOUSE) - Abnormal; Notable for the following components:       Result Value    Troponin I 0.256 (*)     All other components within normal limits   COMPREHENSIVE METABOLIC PANEL - Abnormal; Notable for the following components:    Glucose 142 (*)     All other components within normal limits   BNP (IN-HOUSE) - Abnormal; Notable for the following components:    proBNP 2,000.0 (*)     All other components within normal limits   URINALYSIS W/ MICROSCOPIC IF INDICATED (NO CULTURE) - Abnormal; Notable for the following components:    Urobilinogen, UA 2.0 E.U./dL (*)     All other components within normal limits    Narrative:     Urine microscopic not indicated.   CBC WITH AUTO DIFFERENTIAL - Abnormal; Notable for the following components:    RBC 3.95 (*)     Hemoglobin 12.4 (*)     Hematocrit 36.6 (*)     RDW-SD 44.7 (*)     MPV 12.3 (*)     Platelets 127 (*)     Monocyte % 13.2 (*)     All other components within normal limits   LIPASE - Normal   DIGOXIN LEVEL - Normal   RAINBOW DRAW    Narrative:     The following orders were created for panel order [place] Draw.  Procedure                               Abnormality         Status                     ---------                               -----------         ------                     Light Blue Top[[id number]]                                   In process                 Green Top (Gel)[[id number]]                                   In process                 Lavender Top[[id number]]                                     In process                 Gold Top - SST[[id number]]                                   In process                   Please view results for these tests on the individual orders.   TROPONIN (IN-HOUSE)   MAGNESIUM   CBC AND DIFFERENTIAL    Narrative:     The following orders were created for panel order CBC & Differential.  Procedure                               Abnormality         Status                     ---------                               -----------         ------                     CBC Auto Differential[[id number]]        Abnormal            Final result                 Please view results for these tests on the individual orders.   LIGHT BLUE TOP   GREEN TOP   LAVENDER TOP   GOLD TOP - SST       XR Chest 2 View   Final Result   1.  Cardiomegaly without decompensation.   2.  Otherwise unremarkable chest.      Electronically signed by:  Pj Cummnis MD  2/4/2019 3:04 PM CST   Workstation: [id number]        Patient with cardiomegaly without obvious fluid in the lungs this time a chest x-ray.  Patient has had dyspnea here as well as at home.  Significant peripheral edema and elevated troponin I at 0.25.  This is in the setting of chest tightness and chest pain over several days.  Patient be brought in for diuresis and telemetry with serial cardiac markers.          MDM      Final diagnoses:   Elevated troponin I level   Chest pain, unspecified type   Dyspnea, unspecified type   Peripheral edema            Manny Taylor MD  02/04/19 [id number]

## 2019-02-05 PROBLEM — E11.9 CONTROLLED TYPE 2 DIABETES MELLITUS WITHOUT COMPLICATION, WITHOUT LONG-TERM CURRENT USE OF INSULIN: Status: ACTIVE | Noted: 2019-02-05

## 2019-02-05 LAB
ALBUMIN SERPL-MCNC: 3.9 G/DL (ref 3.4–4.8)
ALBUMIN/GLOB SERPL: 1.4 G/DL (ref 1.1–1.8)
ALP SERPL-CCNC: 58 U/L (ref 38–126)
ALT SERPL W P-5'-P-CCNC: 27 U/L (ref 21–72)
ANION GAP SERPL CALCULATED.3IONS-SCNC: 6 MMOL/L (ref 5–15)
AST SERPL-CCNC: 24 U/L (ref 17–59)
BASOPHILS # BLD AUTO: 0.02 10*3/MM3 (ref 0–0.2)
BASOPHILS NFR BLD AUTO: 0.3 % (ref 0–2)
BILIRUB SERPL-MCNC: 1.2 MG/DL (ref 0.2–1.3)
BUN BLD-MCNC: 16 MG/DL (ref 7–21)
BUN/CREAT SERPL: 11.9 (ref 7–25)
CALCIUM SPEC-SCNC: 9.1 MG/DL (ref 8.4–10.2)
CHLORIDE SERPL-SCNC: 100 MMOL/L (ref 95–110)
CO2 SERPL-SCNC: 33 MMOL/L (ref 22–31)
CREAT BLD-MCNC: 1.35 MG/DL (ref 0.7–1.3)
DEPRECATED RDW RBC AUTO: 44.9 FL (ref 35.1–43.9)
EOSINOPHIL # BLD AUTO: 0.23 10*3/MM3 (ref 0–0.7)
EOSINOPHIL NFR BLD AUTO: 3.8 % (ref 0–7)
ERYTHROCYTE [DISTWIDTH] IN BLOOD BY AUTOMATED COUNT: 13.3 % (ref 11.5–14.5)
GFR SERPL CREATININE-BSD FRML MDRD: 69 ML/MIN/1.73 (ref 63–147)
GLOBULIN UR ELPH-MCNC: 2.8 GM/DL (ref 2.3–3.5)
GLUCOSE BLD-MCNC: 119 MG/DL (ref 60–100)
HBA1C MFR BLD: 5.7 % (ref 4–5.6)
HCT VFR BLD AUTO: 37.5 % (ref 39–49)
HGB BLD-MCNC: 12.6 G/DL (ref 13.7–17.3)
IMM GRANULOCYTES # BLD AUTO: 0.01 10*3/MM3 (ref 0–0.02)
IMM GRANULOCYTES NFR BLD AUTO: 0.2 % (ref 0–0.5)
LYMPHOCYTES # BLD AUTO: 2.16 10*3/MM3 (ref 0.6–4.2)
LYMPHOCYTES NFR BLD AUTO: 35.9 % (ref 10–50)
MCH RBC QN AUTO: 31.3 PG (ref 26.5–34)
MCHC RBC AUTO-ENTMCNC: 33.6 G/DL (ref 31.5–36.3)
MCV RBC AUTO: 93.1 FL (ref 80–98)
MONOCYTES # BLD AUTO: 0.81 10*3/MM3 (ref 0–0.9)
MONOCYTES NFR BLD AUTO: 13.5 % (ref 0–12)
NEUTROPHILS # BLD AUTO: 2.79 10*3/MM3 (ref 2–8.6)
NEUTROPHILS NFR BLD AUTO: 46.3 % (ref 37–80)
PLATELET # BLD AUTO: 141 10*3/MM3 (ref 150–450)
PMV BLD AUTO: 12.8 FL (ref 8–12)
POTASSIUM BLD-SCNC: 3.3 MMOL/L (ref 3.5–5.1)
POTASSIUM BLD-SCNC: 3.8 MMOL/L (ref 3.5–5.1)
PROT SERPL-MCNC: 6.7 G/DL (ref 6.3–8.6)
RBC # BLD AUTO: 4.03 10*6/MM3 (ref 4.37–5.74)
SODIUM BLD-SCNC: 139 MMOL/L (ref 137–145)
TROPONIN I SERPL-MCNC: 0.23 NG/ML
WBC NRBC COR # BLD: 6.02 10*3/MM3 (ref 3.2–9.8)

## 2019-02-05 PROCEDURE — 85025 COMPLETE CBC W/AUTO DIFF WBC: CPT | Performed by: STUDENT IN AN ORGANIZED HEALTH CARE EDUCATION/TRAINING PROGRAM

## 2019-02-05 PROCEDURE — 25010000002 HEPARIN (PORCINE) PER 1000 UNITS: Performed by: STUDENT IN AN ORGANIZED HEALTH CARE EDUCATION/TRAINING PROGRAM

## 2019-02-05 PROCEDURE — 83036 HEMOGLOBIN GLYCOSYLATED A1C: CPT | Performed by: STUDENT IN AN ORGANIZED HEALTH CARE EDUCATION/TRAINING PROGRAM

## 2019-02-05 PROCEDURE — 25010000002 FUROSEMIDE PER 20 MG: Performed by: STUDENT IN AN ORGANIZED HEALTH CARE EDUCATION/TRAINING PROGRAM

## 2019-02-05 PROCEDURE — 99213 OFFICE O/P EST LOW 20 MIN: CPT | Performed by: INTERNAL MEDICINE

## 2019-02-05 PROCEDURE — 80053 COMPREHEN METABOLIC PANEL: CPT | Performed by: STUDENT IN AN ORGANIZED HEALTH CARE EDUCATION/TRAINING PROGRAM

## 2019-02-05 PROCEDURE — 96376 TX/PRO/DX INJ SAME DRUG ADON: CPT

## 2019-02-05 PROCEDURE — G0378 HOSPITAL OBSERVATION PER HR: HCPCS

## 2019-02-05 PROCEDURE — 96372 THER/PROPH/DIAG INJ SC/IM: CPT

## 2019-02-05 PROCEDURE — 99219 PR INITIAL OBSERVATION CARE/DAY 50 MINUTES: CPT | Performed by: STUDENT IN AN ORGANIZED HEALTH CARE EDUCATION/TRAINING PROGRAM

## 2019-02-05 PROCEDURE — 84484 ASSAY OF TROPONIN QUANT: CPT | Performed by: STUDENT IN AN ORGANIZED HEALTH CARE EDUCATION/TRAINING PROGRAM

## 2019-02-05 PROCEDURE — 84132 ASSAY OF SERUM POTASSIUM: CPT | Performed by: FAMILY MEDICINE

## 2019-02-05 RX ORDER — CARVEDILOL 25 MG/1
25 TABLET ORAL EVERY 12 HOURS SCHEDULED
Status: DISCONTINUED | OUTPATIENT
Start: 2019-02-05 | End: 2019-02-07 | Stop reason: HOSPADM

## 2019-02-05 RX ORDER — POTASSIUM CHLORIDE 750 MG/1
40 CAPSULE, EXTENDED RELEASE ORAL ONCE
Status: COMPLETED | OUTPATIENT
Start: 2019-02-06 | End: 2019-02-06

## 2019-02-05 RX ORDER — POTASSIUM CHLORIDE 750 MG/1
40 CAPSULE, EXTENDED RELEASE ORAL ONCE
Status: COMPLETED | OUTPATIENT
Start: 2019-02-05 | End: 2019-02-05

## 2019-02-05 RX ORDER — SPIRONOLACTONE 25 MG/1
25 TABLET ORAL DAILY
Status: DISCONTINUED | OUTPATIENT
Start: 2019-02-05 | End: 2019-02-07 | Stop reason: HOSPADM

## 2019-02-05 RX ADMIN — CARVEDILOL 25 MG: 25 TABLET, FILM COATED ORAL at 13:43

## 2019-02-05 RX ADMIN — HEPARIN SODIUM 5000 UNITS: 5000 INJECTION INTRAVENOUS; SUBCUTANEOUS at 21:00

## 2019-02-05 RX ADMIN — ATORVASTATIN CALCIUM 10 MG: 10 TABLET, FILM COATED ORAL at 20:59

## 2019-02-05 RX ADMIN — FUROSEMIDE 80 MG: 10 INJECTION, SOLUTION INTRAMUSCULAR; INTRAVENOUS at 18:35

## 2019-02-05 RX ADMIN — POTASSIUM CHLORIDE 40 MEQ: 750 CAPSULE, EXTENDED RELEASE ORAL at 06:10

## 2019-02-05 RX ADMIN — SODIUM CHLORIDE, PRESERVATIVE FREE 3 ML: 5 INJECTION INTRAVENOUS at 08:25

## 2019-02-05 RX ADMIN — CARVEDILOL 25 MG: 25 TABLET, FILM COATED ORAL at 20:59

## 2019-02-05 RX ADMIN — SACUBITRIL AND VALSARTAN 1 TABLET: 24; 26 TABLET, FILM COATED ORAL at 08:24

## 2019-02-05 RX ADMIN — HEPARIN SODIUM 5000 UNITS: 5000 INJECTION INTRAVENOUS; SUBCUTANEOUS at 13:43

## 2019-02-05 RX ADMIN — SACUBITRIL AND VALSARTAN 1 TABLET: 24; 26 TABLET, FILM COATED ORAL at 20:59

## 2019-02-05 RX ADMIN — SPIRONOLACTONE 25 MG: 25 TABLET ORAL at 13:43

## 2019-02-05 RX ADMIN — CARVEDILOL 12.5 MG: 12.5 TABLET, FILM COATED ORAL at 08:24

## 2019-02-05 RX ADMIN — HEPARIN SODIUM 5000 UNITS: 5000 INJECTION INTRAVENOUS; SUBCUTANEOUS at 06:09

## 2019-02-05 RX ADMIN — SODIUM CHLORIDE, PRESERVATIVE FREE 3 ML: 5 INJECTION INTRAVENOUS at 22:47

## 2019-02-05 RX ADMIN — FUROSEMIDE 80 MG: 10 INJECTION, SOLUTION INTRAMUSCULAR; INTRAVENOUS at 06:10

## 2019-02-05 NOTE — H&P
"HISTORY AND PHYSICAL  NAME: Matti Bravo  : 1970  MRN: [id number]    DATE OF ADMISSION: 19    DATE & TIME SEEN: 19 6:40 PM    PCP: Shonna Ng APRN    CODE STATUS:   Code Status and Medical Interventions:   Ordered at: 19 7969     Level Of Support Discussed With:    Patient     Code Status:    CPR     Medical Interventions (Level of Support Prior to Arrest):    Full       CHIEF COMPLAINT shortness of air, chest pain    HPI:  Matti Bravo is a 48 y.o. male with CMHx of HFrEF, DM2, HTN, HLD, KHANH, and restrictive lung disease 2/2 obesity, presented to the hospital for complaint of shortness of air and chest pain.  This began approximately 2 or 3 days ago.  He has been gaining water weight over some time, somewhere between 15 and 30 pounds.  He normally takes Lasix 40 mg by mouth twice daily however this is dominant enough to get the water off.  He describes his chest pain as a squeezing substernal pain.  He says it is \"like someone standing on his chest. \" He has had large amount of edema bilat lower extremeties.     He had admission in January to [facility] for exacerbation of his heart failure.  Echocardiogram in 2018 showing ejection fraction of 32%.  Report in chart from echocardiogram from [facility] dated 2019 stating ejection fraction of 10%, with severe hypokinesis.  He is being seen by Dr. Cabrera, but patient had not wanted biventricular pacemaker.  He also follows with the heart failure clinic.    CONCURRENT MEDICAL HISTORY:  Past Medical History:   Diagnosis Date   • CHF (congestive heart failure) (CMS/HCC)    • Diabetes mellitus (CMS/HCC)    • Hyperlipidemia    • Hypertension    • Peripheral vascular disease (CMS/HCC)        PAST SURGICAL HISTORY:  Past Surgical History:   Procedure Laterality Date   • CARDIAC CATHETERIZATION         FAMILY HISTORY:  Family History   Problem Relation Age of Onset   • Heart disease Other  "   • Cancer Other    • Hypertension Mother    • Diabetes Mother         SOCIAL HISTORY:  Social History     Socioeconomic History   • Marital status:      Spouse name: Not on file   • Number of children: Not on file   • Years of education: Not on file   • Highest education level: Not on file   Social Needs   • Financial resource strain: Not on file   • Food insecurity - worry: Not on file   • Food insecurity - inability: Not on file   • Transportation needs - medical: Not on file   • Transportation needs - non-medical: Not on file   Occupational History   • Not on file   Tobacco Use   • Smoking status: Never Smoker   • Smokeless tobacco: Never Used   Substance and Sexual Activity   • Alcohol use: No   • Drug use: No   • Sexual activity: Defer   Other Topics Concern   • Not on file   Social History Narrative   • Not on file       HOME MEDICATIONS:  Prior to Admission medications    Medication Sig Start Date End Date Taking? Authorizing Provider   carvedilol (COREG) 12.5 MG tablet Take 12.5 mg by mouth 2 (Two) Times a Day With Meals.   Yes ProviderAroldo MD   digoxin (LANOXIN) 125 MCG tablet Take 125 mcg by mouth Daily.   Yes ProviderAroldo MD   furosemide (LASIX) 40 MG tablet Take 1 tablet by mouth 2 (Two) Times a Day. 1/11/19  Yes Em Gilman APRN   lovastatin (MEVACOR) 20 MG tablet Take 20 mg by mouth Every Night.   Yes ProviderAroldo MD   metFORMIN (GLUCOPHAGE) 500 MG tablet Take 500 mg by mouth Daily With Breakfast.   Yes ProviderAroldo MD   potassium chloride (K-DUR) 10 MEQ CR tablet Take 1 tablet by mouth Daily. 1/11/19  Yes Em Gilman APRN   sacubitril-valsartan (ENTRESTO) 24-26 MG tablet Take 1 tablet by mouth 2 (Two) Times a Day. 8/3/18  Yes Em Gilman APRN   albuterol (PROVENTIL HFA;VENTOLIN HFA) 108 (90 Base) MCG/ACT inhaler Inhale 2 puffs Every 4 (Four) Hours As Needed for Wheezing. 11/1/17   Em Gilman APRN       ALLERGIES:  Patient has no  known allergies.    REVIEW OF SYSTEMS  Review of Systems   Constitutional: Positive for fatigue. Negative for activity change, appetite change, chills and fever.   HENT: Negative for congestion, ear pain and sore throat.    Eyes: Negative for redness and visual disturbance.   Respiratory: Positive for chest tightness and shortness of breath. Negative for cough and wheezing.    Cardiovascular: Positive for chest pain, palpitations and leg swelling.   Gastrointestinal: Negative for abdominal pain, diarrhea, nausea and vomiting.   Genitourinary: Negative for difficulty urinating and dysuria.   Musculoskeletal: Negative for arthralgias and gait problem.   Skin: Negative for color change and rash.   Neurological: Negative for dizziness, weakness and headaches.   Psychiatric/Behavioral: Negative for dysphoric mood and sleep disturbance. The patient is not nervous/anxious.        PHYSICAL EXAM:  Temp:  [97.5 °F (36.4 °C)-98 °F (36.7 °C)] 98 °F (36.7 °C)  Heart Rate:  [70-92] 92  Resp:  [18-24] 18  BP: (123-177)/() 177/98  Body mass index is 41.26 kg/m².  Physical Exam   Constitutional: He is oriented to person, place, and time. He appears well-developed and well-nourished.   obese   HENT:   Head: Normocephalic and atraumatic.   Right Ear: External ear normal.   Left Ear: External ear normal.   Nose: Nose normal.   Eyes: Conjunctivae and EOM are normal. Pupils are equal, round, and reactive to light.   Neck: Normal range of motion. Neck supple.   Cardiovascular: Intact distal pulses.   No murmur heard.  Sinus with frequent PVCs   Pulmonary/Chest: Effort normal. He has no wheezes.   Crackles bilat bases   Abdominal: Soft. Bowel sounds are normal. He exhibits no distension. There is no tenderness.   Musculoskeletal: Normal range of motion. He exhibits edema (3+ pitting to knees bilat). He exhibits no deformity.   Neurological: He is alert and oriented to person, place, and time. No cranial nerve deficit.   Skin: Skin is  warm.   Psychiatric: He has a normal mood and affect. His behavior is normal. Thought content normal.   Nursing note and vitals reviewed.      DIAGNOSTIC DATA:   Lab Results (last 24 hours)     Procedure Component Value Units Date/Time    [place] Draw [[id number]] Collected:  02/04/19 1448    Specimen:  Blood Updated:  02/04/19 1601    Narrative:       The following orders were created for panel order [place] Draw.  Procedure                               Abnormality         Status                     ---------                               -----------         ------                     Light Blue Top[[id number]]                                   Final result               Green Top (Gel)[[id number]]                                  Final result               Lavender Top[[id number]]                                     Final result               Gold Top - SST[[id number]]                                   Final result                 Please view results for these tests on the individual orders.    Green Top (Gel) [[id number]] Collected:  02/04/19 1448    Specimen:  Blood Updated:  02/04/19 1601     Extra Tube Hold for add-ons.     Comment: Auto resulted.       Lavender Top [[id number]] Collected:  02/04/19 1448    Specimen:  Blood Updated:  02/04/19 1601     Extra Tube hold for add-on     Comment: Auto resulted       Gold Top - SST [[id number]] Collected:  02/04/19 1448    Specimen:  Blood Updated:  02/04/19 1601     Extra Tube Hold for add-ons.     Comment: Auto resulted.       Light Blue Top [[id number]] Collected:  02/04/19 1448    Specimen:  Blood Updated:  02/04/19 1601     Extra Tube hold for add-on     Comment: Auto resulted       Magnesium [[id number]]  (Normal) Collected:  02/04/19 1448    Specimen:  Blood Updated:  02/04/19 1558     Magnesium 1.9 mg/dL     Urinalysis With Microscopic If Indicated (No Culture) - Urine, Clean Catch [[id number]]  (Abnormal) Collected:  02/04/19 1526    Specimen:  Urine, Clean Catch  Updated:  02/04/19 1538     Color, UA Yellow     Appearance, UA Clear     pH, UA 7.0     Specific Gravity, UA 1.011     Glucose, UA Negative     Ketones, UA Negative     Bilirubin, UA Negative     Blood, UA Negative     Protein, UA Negative     Leuk Esterase, UA Negative     Nitrite, UA Negative     Urobilinogen, UA 2.0 E.U./dL    Narrative:       Urine microscopic not indicated.    Troponin [[id number]]  (Abnormal) Collected:  02/04/19 1448    Specimen:  Blood Updated:  02/04/19 1519     Troponin I 0.256 ng/mL     BNP [[id number]]  (Abnormal) Collected:  02/04/19 1448    Specimen:  Blood Updated:  02/04/19 1517     proBNP 2,000.0 pg/mL     Digoxin Level [[id number]]  (Normal) Collected:  02/04/19 1448    Specimen:  Blood Updated:  02/04/19 1508     Digoxin 0.90 ng/mL     Comprehensive Metabolic Panel [[id number]]  (Abnormal) Collected:  02/04/19 1448    Specimen:  Blood Updated:  02/04/19 1506     Glucose 142 mg/dL      BUN 14 mg/dL      Creatinine 1.24 mg/dL      Sodium 137 mmol/L      Potassium 3.8 mmol/L      Chloride 101 mmol/L      CO2 28.0 mmol/L      Calcium 8.9 mg/dL      Total Protein 6.7 g/dL      Albumin 3.90 g/dL      ALT (SGPT) 22 U/L      AST (SGOT) 24 U/L      Alkaline Phosphatase 58 U/L      Total Bilirubin 1.3 mg/dL      eGFR  African Amer 76 mL/min/1.73      Globulin 2.8 gm/dL      A/G Ratio 1.4 g/dL      BUN/Creatinine Ratio 11.3     Anion Gap 8.0 mmol/L     Lipase [[id number]]  (Normal) Collected:  02/04/19 1448    Specimen:  Blood Updated:  02/04/19 1506     Lipase 39 U/L     CBC & Differential [[id number]] Collected:  02/04/19 1448    Specimen:  Blood Updated:  02/04/19 1456    Narrative:       The following orders were created for panel order CBC & Differential.  Procedure                               Abnormality         Status                     ---------                               -----------         ------                     CBC Auto Differential[[id number]]        Abnormal            Final  result                 Please view results for these tests on the individual orders.    CBC Auto Differential [[id number]]  (Abnormal) Collected:  02/04/19 1448    Specimen:  Blood Updated:  02/04/19 1456     WBC 5.45 10*3/mm3      RBC 3.95 10*6/mm3      Hemoglobin 12.4 g/dL      Hematocrit 36.6 %      MCV 92.7 fL      MCH 31.4 pg      MCHC 33.9 g/dL      RDW 13.2 %      RDW-SD 44.7 fl      MPV 12.3 fL      Platelets 127 10*3/mm3      Neutrophil % 58.3 %      Lymphocyte % 25.3 %      Monocyte % 13.2 %      Eosinophil % 2.8 %      Basophil % 0.2 %      Immature Grans % 0.2 %      Neutrophils, Absolute 3.18 10*3/mm3      Lymphocytes, Absolute 1.38 10*3/mm3      Monocytes, Absolute 0.72 10*3/mm3      Eosinophils, Absolute 0.15 10*3/mm3      Basophils, Absolute 0.01 10*3/mm3      Immature Grans, Absolute 0.01 10*3/mm3            Imaging Results (last 24 hours)     Procedure Component Value Units Date/Time    XR Chest 2 View [[id number]] Collected:  02/04/19 1442     Updated:  02/04/19 1505    Narrative:           PROCEDURE: Chest PA and lateral    REASON FOR EXAM: Chest pain protocol    FINDINGS: Comparison study dated November 9, 2014. Cardiomegaly.  Pulmonary vasculature appears within normal limits. Lungs are  clear. Pleural spaces are normal . No acute osseous abnormality.      Impression:       1.  Cardiomegaly without decompensation.  2.  Otherwise unremarkable chest.    Electronically signed by:  Pj Cummins MD  2/4/2019 3:04 PM CST  Workstation: [id number]          I reviewed the patient's new clinical results.    ASSESSMENT AND PLAN: This is a 48 y.o. male with:    Active Hospital Problems    Diagnosis Date Noted   • **Acute on chronic HFrEF (heart failure with reduced ejection fraction) (CMS/[facility]) [I50.23] 09/26/2017     Ejection fraction 01/09/2019 10%, severe hypokinesis  Hemodynamically stable  Discussed with Dr. Shook, and he will see the patient in the morning, recommends diuresing with 80 mg Lasix IV  BID  Will continue coreg and entresto  Continue digoxin     • Elevated troponin I level [R74.8] 02/04/2019     Troponin 0.256, no acute ST changes on EKG  Trend toponins and ekg's  CODIE therapy  Statin  Continue entresto and coreg     • Morbid obesity (CMS/HCC) [E66.01] 12/14/2017     Body mass index is 41.26 kg/m².   Nutrition consultation     • Essential hypertension [I10] 09/26/2017     Continue coreg and entresto, lasix, monitor pressures     • Mixed hyperlipidemia [E78.2] 09/26/2017     Continue lovastatin 20mg nightly     • KHANH (obstructive sleep apnea) [G47.33] 09/26/2017     bipap at night         DVT prophylaxis: heparin  Code status is   Code Status and Medical Interventions:   Ordered at: 02/04/19 1654     Level Of Support Discussed With:    Patient     Code Status:    CPR     Medical Interventions (Level of Support Prior to Arrest):    Full      MCKINLEY # [id number], reviewed and consistent with patient reported medications.    I discussed the patients findings and my recommendations with patient.     Dr Javed is the attending on record at time of admission, he is aware of the patient's status and agrees with the above history and physical.          This document has been electronically signed by Jamison Cuevas MD on February 4, 2019 7:21 PM

## 2019-02-05 NOTE — CONSULTS
Adult Nutrition  Assessment    Patient Name:  Matti Bravo  YOB: 1970  MRN: [id number]  Admit Date:  2/4/2019    Assessment Date:  2/5/2019    Comments:  Patient admitted to hospital d/t chest pain and fluid overload. Patient educated on heart-healthy diet with low-sodium and fluid restrictions. The patient reported that he does eat out 4-5x/week, but that he does cook at home. Educated the patient on the importance of eating foods that are lower in sodium, and foods that are not fried. The patient agreed to decreasing the amount times that he eats out to 2-3x/week. The patient was educated on different way to season foods when he cooks. The patient was also educated on 2,000 mL fluid restriction, and what items count as a fluid. The patient verbalized understanding. I will follow up with the patient to see if he has questions regarding education.     Reason for Assessment     Row Name 02/05/19 1627          Reason for Assessment    Reason For Assessment  physician consult  (Pended)      Identified At Risk by Screening Criteria  need for education  (Pended)          Nutrition/Diet History     Row Name 02/05/19 1628          Nutrition/Diet History    Typical Food/Fluid Intake  Patient eats out 4-5x/week.   (Pended)            Labs/Tests/Procedures/Meds     Row Name 02/05/19 1628          Labs/Procedures/Meds    Lab Results Reviewed  reviewed, pertinent  (Pended)      Lab Results Comments  Glucose-119, Cr-1.35  (Pended)         Diagnostic Tests/Procedures    Diagnostic Test/Procedure Reviewed  reviewed  (Pended)         Medications    Pertinent Medications Reviewed  reviewed, pertinent  (Pended)          Physical Findings     Row Name 02/05/19 1628          Physical Findings    Overall Physical Appearance  obese  (Pended)          Estimated/Assessed Needs     Row Name 02/05/19 1630 02/05/19 1628       Calculation Measurements    Weight Used For Calculations  150 kg (330 lb 11 oz)  (Pended)    104 kg (230 lb)  (Pended)        Estimated/Assessed Needs    Additional Documentation  --  Ritchie-St. Jeor Equation (Group);Calorie Requirements (Group);Fluid Requirements (Group);KCAL/KG (Group);Protein Requirements (Group)  (Pended)        Calorie Requirements    Weight Used For Calorie Calculations  --  104 kg (230 lb)  (Pended)     Estimated Calorie Requirement (kcal/day)  --  2200  (Pended)     Estimated Calorie Need Method  --  kcal/kg  (Pended)        KCAL/KG    14 Kcal/Kg (kcal)  2100  (Pended)   1460.58  (Pended)     15 Kcal/Kg (kcal)  2250  (Pended)   1564.91  (Pended)     18 Kcal/Kg (kcal)  2700  (Pended)   1877.89  (Pended)     20 Kcal/Kg (kcal)  3000  (Pended)   2086.54  (Pended)     25 Kcal/Kg (kcal)  3750  (Pended)   2608.18  (Pended)     30 Kcal/Kg (kcal)  4500  (Pended)   3129.81  (Pended)     35 Kcal/Kg (kcal)  5250  (Pended)   3651.45  (Pended)     40 Kcal/Kg (kcal)  6000  (Pended)   4173.08  (Pended)     45 Kcal/Kg (kcal)  6750  (Pended)   4694.72  (Pended)     50 Kcal/Kg (kcal)  7500  (Pended)   5216.35  (Pended)        Ritchie-St. Jeor Equation    RMR (Ritchie-St. Jeor Equation)  2476.5  (Pended)   2019.77  (Pended)        Protein Requirements    Weight Used For Protein Calculations  --  104 kg (230 lb)  (Pended)     Est Protein Requirement Amount (gms/kg)  --  0.8 gm protein  (Pended)     Estimated Protein Requirements (gms/day)  --  83.46  (Pended)        Fluid Requirements    Estimated Fluid Requirements (mL/day)  --  2000  (Pended)     RDA Method (mL)  --  2000  (Pended)     Cory-Jinny Method (over 20 kg)  4500  (Pended)   3586.54  (Pended)         Nutrition Prescription Ordered     Row Name 02/05/19 1629          Nutrition Prescription PO    Current PO Diet  Regular  (Pended)      Fluid Consistency  Thin  (Pended)      Common Modifiers  Cardiac;Fluid Restriction;Low Sodium  (Pended)      Fluid Restriction mL per Tray  250 mL  (Pended)      Fluid Restriction mL per Day  Other (comment)   (Pended)  2000 mL     Low Sodium Details  1,500 mg Sodium  (Pended)          Evaluation of Received Nutrient/Fluid Intake     Row Name 02/05/19 1630 02/05/19 1628       Calculation Measurements    Weight Used For Calculations  150 kg (330 lb 11 oz)  (Pended)   104 kg (230 lb)  (Pended)        PO Evaluation    Number of Days PO Intake Evaluated  1 day  (Pended)   --    Number of Meals  3  (Pended)   --    % PO Intake  100%  (Pended)   --        Evaluation of Prescribed Nutrient/Fluid Intake     Row Name 02/05/19 1630 02/05/19 1628       Calculation Measurements    Weight Used For Calculations  150 kg (330 lb 11 oz)  (Pended)   104 kg (230 lb)  (Pended)             Electronically signed by:  Chelsie Terrell  02/05/19 4:30 PM

## 2019-02-05 NOTE — PLAN OF CARE
Problem: Patient Care Overview  Goal: Plan of Care Review  Outcome: Ongoing (interventions implemented as appropriate)    Goal: Individualization and Mutuality  Outcome: Ongoing (interventions implemented as appropriate)    Goal: Discharge Needs Assessment  Outcome: Ongoing (interventions implemented as appropriate)      Problem: Cardiac: Heart Failure (Adult)  Goal: Signs and Symptoms of Listed Potential Problems Will be Absent, Minimized or Managed (Cardiac: Heart Failure)  Outcome: Ongoing (interventions implemented as appropriate)

## 2019-02-05 NOTE — PROGRESS NOTES
FAMILY MEDICINE DAILY PROGRESS NOTE  NAME: Matti Bravo  : 1970  MRN: [id number]     LOS: 0 days     PROVIDER OF SERVICE: Leeroy Long III, MD    Chief Complaint: Acute on chronic HFrEF (heart failure with reduced ejection fraction) (CMS/[facility])    Subjective:     Interval History:  History taken from: patient chart  No acute overnight events, patient diuresed well with IV loop diuretic.  Patient reports feeling at baseline and is awaiting  next steps in evaluation of options for cardiac function.  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    Review of Systems:   Review of Systems   Constitutional: Negative for activity change, appetite change, chills, diaphoresis and fever.   HENT: Negative for congestion, rhinorrhea, sinus pressure, sinus pain and sore throat.    Eyes: Negative for visual disturbance.   Respiratory: Negative for apnea, cough, choking, chest tightness, shortness of breath and wheezing.    Cardiovascular: Negative for chest pain, palpitations and leg swelling.   Gastrointestinal: Negative for abdominal distention, abdominal pain, blood in stool, constipation, diarrhea, nausea and vomiting.   Genitourinary: Negative for difficulty urinating, dysuria, frequency, hematuria and urgency.   Musculoskeletal: Negative for arthralgias, back pain, joint swelling, myalgias and neck pain.   Skin: Negative for color change, pallor, rash and wound.   Neurological: Negative for dizziness, weakness, numbness and headaches.   Psychiatric/Behavioral: Negative for agitation and behavioral problems.       Objective:     Vital Signs  Temp:  [97.4 °F (36.3 °C)-98.4 °F (36.9 °C)] 98.4 °F (36.9 °C)  Heart Rate:  [70-92] 92  Resp:  [18-24] 18  BP: (103-177)/() 113/82    Physical Exam  Physical Exam   Constitutional: He is oriented to person, place, and time. He appears well-developed and well-nourished. No distress.    HENT:   Head: Normocephalic and atraumatic.   Right Ear: External ear normal.   Left Ear: External ear normal.   Nose: Nose normal.   Eyes: Conjunctivae and EOM are normal. Pupils are equal, round, and reactive to light. Right eye exhibits no discharge. Left eye exhibits no discharge. No scleral icterus.   Neck: Normal range of motion. Neck supple. No thyromegaly present.   Cardiovascular: Normal rate, regular rhythm, normal heart sounds and intact distal pulses. Exam reveals no gallop and no friction rub.   No murmur heard.  Pulmonary/Chest: Effort normal and breath sounds normal. No respiratory distress. He has no wheezes. He has no rales. He exhibits no tenderness.   Abdominal: Soft. Bowel sounds are normal. He exhibits no distension and no mass. There is no tenderness. There is no guarding.   Musculoskeletal: Normal range of motion. He exhibits edema (trace to 1 + to just superior of ankle). He exhibits no tenderness or deformity.   Lymphadenopathy:     He has no cervical adenopathy.   Neurological: He is alert and oriented to person, place, and time. No cranial nerve deficit.   Skin: Skin is warm and dry. Capillary refill takes 2 to 3 seconds. He is not diaphoretic.   Psychiatric: He has a normal mood and affect. His behavior is normal. Judgment and thought content normal.       Medication Review    Current Facility-Administered Medications:   •  acetaminophen (TYLENOL) tablet 650 mg, 650 mg, Oral, Q4H PRN, Jamison Cuevas MD  •  atorvastatin (LIPITOR) tablet 10 mg, 10 mg, Oral, Nightly, Jamison Cuevas MD, 10 mg at 02/04/19 2046  •  calcium carbonate (TUMS) chewable tablet 500 mg (200 mg elemental), 2 tablet, Oral, BID PRN, Jamison Cuevas MD  •  carvedilol (COREG) tablet 12.5 mg, 12.5 mg, Oral, BID With Meals, Jamison Cuevas MD, 12.5 mg at 02/04/19 1823  •  digoxin (LANOXIN) tablet 125 mcg, 125 mcg, Oral, Daily, Jamison Cuevas MD  •  furosemide (LASIX) injection 80  mg, 80 mg, Intravenous, Q12H, Jamison Cuevas MD, 80 mg at 02/05/19 0610  •  heparin (porcine) 5000 UNIT/ML injection 5,000 Units, 5,000 Units, Subcutaneous, Q8H, Jamison Cuevas MD, 5,000 Units at 02/05/19 0609  •  nitroglycerin (NITROSTAT) SL tablet 0.4 mg, 0.4 mg, Sublingual, Q5 Min PRN, Jamison Cuevas MD  •  ondansetron (ZOFRAN) injection 4 mg, 4 mg, Intravenous, Q6H PRN, Jamison Cuevas MD  •  sacubitril-valsartan (ENTRESTO) 24-26 MG tablet 1 tablet, 1 tablet, Oral, BID, Jamison Cuevas MD, 1 tablet at 02/04/19 2046  •  sodium chloride 0.9 % flush 10 mL, 10 mL, Intravenous, PRN, Manny Taylor MD  •  sodium chloride 0.9 % flush 3 mL, 3 mL, Intravenous, Q12H, Jamison Cuevas MD, 3 mL at 02/04/19 2046  •  sodium chloride 0.9 % flush 3-10 mL, 3-10 mL, Intravenous, PRN, Jamison Cuevas MD     Diagnostic Data    Lab Results (last 24 hours)     Procedure Component Value Units Date/Time    Troponin [[id number]]  (Abnormal) Collected:  02/05/19 0158    Specimen:  Blood Updated:  02/05/19 0231     Troponin I 0.230 ng/mL     CBC Auto Differential [[id number]]  (Abnormal) Collected:  02/05/19 0158    Specimen:  Blood Updated:  02/05/19 0228     WBC 6.02 10*3/mm3      RBC 4.03 10*6/mm3      Hemoglobin 12.6 g/dL      Hematocrit 37.5 %      MCV 93.1 fL      MCH 31.3 pg      MCHC 33.6 g/dL      RDW 13.3 %      RDW-SD 44.9 fl      MPV 12.8 fL      Platelets 141 10*3/mm3      Neutrophil % 46.3 %      Lymphocyte % 35.9 %      Monocyte % 13.5 %      Eosinophil % 3.8 %      Basophil % 0.3 %      Immature Grans % 0.2 %      Neutrophils, Absolute 2.79 10*3/mm3      Lymphocytes, Absolute 2.16 10*3/mm3      Monocytes, Absolute 0.81 10*3/mm3      Eosinophils, Absolute 0.23 10*3/mm3      Basophils, Absolute 0.02 10*3/mm3      Immature Grans, Absolute 0.01 10*3/mm3     Comprehensive Metabolic Panel [[id number]]  (Abnormal) Collected:  02/05/19 0158    Specimen:  Blood Updated:   02/05/19 0215     Glucose 119 mg/dL      BUN 16 mg/dL      Creatinine 1.35 mg/dL      Sodium 139 mmol/L      Potassium 3.3 mmol/L      Chloride 100 mmol/L      CO2 33.0 mmol/L      Calcium 9.1 mg/dL      Total Protein 6.7 g/dL      Albumin 3.90 g/dL      ALT (SGPT) 27 U/L      AST (SGOT) 24 U/L      Alkaline Phosphatase 58 U/L      Total Bilirubin 1.2 mg/dL      eGFR  African Amer 69 mL/min/1.73      Globulin 2.8 gm/dL      A/G Ratio 1.4 g/dL      BUN/Creatinine Ratio 11.9     Anion Gap 6.0 mmol/L     Troponin [[id number]]  (Abnormal) Collected:  02/04/19 2303    Specimen:  Blood Updated:  02/04/19 2335     Troponin I 0.232 ng/mL     Troponin [[id number]]  (Abnormal) Collected:  02/04/19 2114    Specimen:  Blood Updated:  02/04/19 2215     Troponin I 0.234 ng/mL     [place] [facility] [[id number]] Collected:  02/04/19 1448    Specimen:  Blood Updated:  02/04/19 1601    Narrative:       The following orders were created for panel order [place] [facility].  Procedure                               Abnormality         Status                     ---------                               -----------         ------                     Light Blue Top[[id number]]                                   Final result               Green Top (Gel)[[id number]]                                  Final result               Lavender Top[[id number]]                                     Final result               Gold Top - SST[[id number]]                                   Final result                 Please view results for these tests on the individual orders.    Green Top (Gel) [[id number]] Collected:  02/04/19 1448    Specimen:  Blood Updated:  02/04/19 1601     Extra Tube Hold for add-ons.     Comment: Auto resulted.       Lavender Top [[id number]] Collected:  02/04/19 1448    Specimen:  Blood Updated:  02/04/19 1601     Extra Tube hold for add-on     Comment: Auto resulted       Gold Top - SST [[id number]] Collected:  02/04/19 1448    Specimen:  Blood Updated:   02/04/19 1601     Extra Tube Hold for add-ons.     Comment: Auto resulted.       Light Blue Top [[id number]] Collected:  02/04/19 1448    Specimen:  Blood Updated:  02/04/19 1601     Extra Tube hold for add-on     Comment: Auto resulted       Magnesium [[id number]]  (Normal) Collected:  02/04/19 1448    Specimen:  Blood Updated:  02/04/19 1558     Magnesium 1.9 mg/dL     Urinalysis With Microscopic If Indicated (No Culture) - Urine, Clean Catch [[id number]]  (Abnormal) Collected:  02/04/19 1526    Specimen:  Urine, Clean Catch Updated:  02/04/19 1538     Color, UA Yellow     Appearance, UA Clear     pH, UA 7.0     Specific Gravity, UA 1.011     Glucose, UA Negative     Ketones, UA Negative     Bilirubin, UA Negative     Blood, UA Negative     Protein, UA Negative     Leuk Esterase, UA Negative     Nitrite, UA Negative     Urobilinogen, UA 2.0 E.U./dL    Narrative:       Urine microscopic not indicated.    Troponin [[id number]]  (Abnormal) Collected:  02/04/19 1448    Specimen:  Blood Updated:  02/04/19 1519     Troponin I 0.256 ng/mL     BNP [[id number]]  (Abnormal) Collected:  02/04/19 1448    Specimen:  Blood Updated:  02/04/19 1517     proBNP 2,000.0 pg/mL     Digoxin Level [[id number]]  (Normal) Collected:  02/04/19 1448    Specimen:  Blood Updated:  02/04/19 1508     Digoxin 0.90 ng/mL     Comprehensive Metabolic Panel [[id number]]  (Abnormal) Collected:  02/04/19 1448    Specimen:  Blood Updated:  02/04/19 1506     Glucose 142 mg/dL      BUN 14 mg/dL      Creatinine 1.24 mg/dL      Sodium 137 mmol/L      Potassium 3.8 mmol/L      Chloride 101 mmol/L      CO2 28.0 mmol/L      Calcium 8.9 mg/dL      Total Protein 6.7 g/dL      Albumin 3.90 g/dL      ALT (SGPT) 22 U/L      AST (SGOT) 24 U/L      Alkaline Phosphatase 58 U/L      Total Bilirubin 1.3 mg/dL      eGFR  African Amer 76 mL/min/1.73      Globulin 2.8 gm/dL      A/G Ratio 1.4 g/dL      BUN/Creatinine Ratio 11.3     Anion Gap 8.0 mmol/L     Lipase [[id number]]   (Normal) Collected:  02/04/19 1448    Specimen:  Blood Updated:  02/04/19 1506     Lipase 39 U/L     CBC & Differential [[id number]] Collected:  02/04/19 1448    Specimen:  Blood Updated:  02/04/19 1456    Narrative:       The following orders were created for panel order CBC & Differential.  Procedure                               Abnormality         Status                     ---------                               -----------         ------                     CBC Auto Differential[[id number]]        Abnormal            Final result                 Please view results for these tests on the individual orders.    CBC Auto Differential [[id number]]  (Abnormal) Collected:  02/04/19 1448    Specimen:  Blood Updated:  02/04/19 1456     WBC 5.45 10*3/mm3      RBC 3.95 10*6/mm3      Hemoglobin 12.4 g/dL      Hematocrit 36.6 %      MCV 92.7 fL      MCH 31.4 pg      MCHC 33.9 g/dL      RDW 13.2 %      RDW-SD 44.7 fl      MPV 12.3 fL      Platelets 127 10*3/mm3      Neutrophil % 58.3 %      Lymphocyte % 25.3 %      Monocyte % 13.2 %      Eosinophil % 2.8 %      Basophil % 0.2 %      Immature Grans % 0.2 %      Neutrophils, Absolute 3.18 10*3/mm3      Lymphocytes, Absolute 1.38 10*3/mm3      Monocytes, Absolute 0.72 10*3/mm3      Eosinophils, Absolute 0.15 10*3/mm3      Basophils, Absolute 0.01 10*3/mm3      Immature Grans, Absolute 0.01 10*3/mm3             I reviewed the patient's new clinical results.    Assessment/Plan:     Active Hospital Problems    Diagnosis Date Noted   • **Acute on chronic HFrEF (heart failure with reduced ejection fraction) (CMS/[facility]) [I50.23] 09/26/2017     Priority: High     Ejection fraction 01/09/2019 10%, severe hypokinesis, followed by heart failure navigator. Will continue home meds of carvedilol and sacutbitril-valsartan.  Hemodynamically stable  Discussed with Dr. Shook, and he will see the patient in the morning, recommends diuresing with 80 mg Lasix IV BID  - Appreciate  recommendations  -Carvedilol 12.5 mg p.o. twice daily  -Sacubitrill-valsartan 24-26 mg p.o. twice daily  - digoxin 125 mcg p.o. daily     • Controlled type 2 diabetes mellitus without complication, without long-term current use of insulin (CMS/[facility]) [E11.9] 02/05/2019     Priority: Medium     Patient with unknown hemoglobin A1c.  Will obtain hemoglobin was seen during admission.  Only been on metformin in the outpatient manner.  We will do NovoLog insulin sliding scale to cover during admission.  -Low to moderate intensity insulin Novolin sliding scale     • Elevated troponin I level [R74.8] 02/04/2019     Troponin 0.256, no acute ST changes on EKG, trending down  Atorvastatin 20 mg p.o. nightly  - sacubitril-valsartan 24-26 mg p.o. twice daily  -Carvedilol 12.5 mg p.o. twice daily       • Morbid obesity (CMS/[facility]) [E66.01] 12/14/2017     Body mass index is 41.26 kg/m².   Nutrition consultation     • Essential hypertension [I10] 09/26/2017     Resume home medications, patient currently hemodynamically stable.  -Carvedilol 12.5 mg p.o. twice daily  -Sacubitrill-valsartan 24-26 mg p.o. twice daily  - digoxin 125 mcg p.o. daily     • Mixed hyperlipidemia [E78.2] 09/26/2017     Continue home medications, will elevate to moderate intensity statin  - Atorvastatin 20mg nightly     • KHANH (obstructive sleep apnea) [G47.33] 09/26/2017     bipap at night           DVT prophylaxis: Heparin 5000 units subcutaneous every 8 hours  Code Status and Medical Interventions:   Ordered at: 02/04/19 3356     Level Of Support Discussed With:    Patient     Code Status:    CPR     Medical Interventions (Level of Support Prior to Arrest):    Full       Plan for disposition:Patient diuresed well, anticipate 24-48-hour turnaround to discharge for evaluation of cardiac function with recommendations from cardiology service.        This document has been electronically signed by Leeroy Long III, MD on February 5, 2019 7:35 AM

## 2019-02-05 NOTE — PLAN OF CARE
Problem: Patient Care Overview  Goal: Plan of Care Review  Outcome: Ongoing (interventions implemented as appropriate)   02/05/19 1730   Coping/Psychosocial   Plan of Care Reviewed With patient;mother   Plan of Care Review   Progress improving   OTHER   Outcome Summary Patient admitted to hospital d/t chest pain and fluid overload. Patient educated on heart-healthy, low-sodium diet with fluid restrictions. Patient stated that he is willing to make changes upon d/c. I will follow up to see if patient has questions regarding education.

## 2019-02-05 NOTE — CONSULTS
"CARDIOVASCULAR CONSULTATION   Rafael Shook M.D., Ph.D., [facility]                Referring Provider: Chase Manrique MD    Reason for Consultation: Acute decompensated heart failure  Chief complaint \"my legs are swelling and chest was hurting.\"    Subjective .     History of present illness:  Matti Bravo is a 48 y.o. male with a long-standing history of a DCM/NICM (last LVEF documented January 9, 2019 at [facility] at 10% with an LVIDd of 8.5cm) who presented to our emergency department with complaints of chest pain, worsening dyspnea and worsening lower extremity edema.  Of note, the patient was admitted in January of this year to an outside facility for apparently acute decompensated heart failure.  His LVEF, as aforementioned, was less than 10%.  He tells me that he was only Inpatient from 1 day because he continued to ask to go home.  Overall, his weight has not been stable.  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  In fact, when he was last assessed for ICD candidacy he was determined to be NYHA class I; therefore, ICD implant was not recommended.  Since that appointment, as noted, he was admitted in January of this year with acute decompensated heart failure.  He does admit to dietary indiscretion.  He also admits to that this admission.  He states that he usually drinks much more than 2 L a day.  He is absolutely not limiting his sodium intake.  He is, however, medication compliant.  In the emergency department he was noted to be hemodynamically stable.  EKG was not acute.  He was noted to be hypervolemic.  He was admitted to the family practice service where his home dose of furosemide was changed over to 80 mg IV twice daily after a phone discussion with myself last night.  He was initiated on fluid restriction and low-sodium diet.  Otherwise, he is continued on his home medications.  He denies any resting, exertional or " nocturnal angina.  He does endorse recent symptoms that have worsened to class II approaching class III.  He does endorse worsening lower extremity edema.  He did tell the [facility] practice service that he gained about 15-30 pounds.  He also endorsed some chest discomfort to the primary team yesterday.  When I asked him about this today he actually denied any chest pain symptoms.  He stated that he was trying to describe sensation of dyspnea.  He does endorse paroxysmal nocturnal dyspnea.    Review of Systems   Cardiovascular: Positive for dyspnea on exertion and leg swelling. Negative for chest pain, claudication, cyanosis, near-syncope, orthopnea, palpitations, paroxysmal nocturnal dyspnea and syncope.   Respiratory: Positive for cough, shortness of breath and snoring. Negative for hemoptysis, sleep disturbances due to breathing, sputum production and wheezing.        History  Past Medical History:   Diagnosis Date   • CHF (congestive heart failure) (CMS/[facility])    • Diabetes mellitus (CMS/[facility])    • Hyperlipidemia    • Hypertension    • Peripheral vascular disease (CMS/[facility])    ,   Past Surgical History:   Procedure Laterality Date   • CARDIAC CATHETERIZATION  2009   ,   Family History   Problem Relation Age of Onset   • Heart disease Other    • Cancer Other    • Hypertension Mother    • Diabetes Mother    ,   Social History     Tobacco Use   • Smoking status: Never Smoker   • Smokeless tobacco: Never Used   Substance Use Topics   • Alcohol use: No   • Drug use: No   ,   Medications Prior to Admission   Medication Sig Dispense Refill Last Dose   • carvedilol (COREG) 12.5 MG tablet Take 12.5 mg by mouth 2 (Two) Times a Day With Meals.   2/4/2019 at 0900   • digoxin (LANOXIN) 125 MCG tablet Take 125 mcg by mouth Daily.   2/4/2019 at 0900   • furosemide (LASIX) 40 MG tablet Take 1 tablet by mouth 2 (Two) Times a Day. 60 tablet 6 2/4/2019 at 0900   • lovastatin (MEVACOR) 20 MG tablet Take 20 mg by mouth Every Night.    2/3/2019 at Unknown time   • metFORMIN (GLUCOPHAGE) 500 MG tablet Take 500 mg by mouth Daily With Breakfast.   2/4/2019 at 0900   • potassium chloride (K-DUR) 10 MEQ CR tablet Take 1 tablet by mouth Daily. 90 tablet 3 2/4/2019 at Unknown time   • sacubitril-valsartan (ENTRESTO) 24-26 MG tablet Take 1 tablet by mouth 2 (Two) Times a Day. 60 tablet 6 2/4/2019 at 0900   • albuterol (PROVENTIL HFA;VENTOLIN HFA) 108 (90 Base) MCG/ACT inhaler Inhale 2 puffs Every 4 (Four) Hours As Needed for Wheezing. 1 inhaler 3 Unknown at Unknown time    and Allergies:  Patient has no known allergies.    Objective   Body mass index is 40.22 kg/m².         Anticoagulants (From admission, onward)    Start     Dose/Rate Route Frequency Ordered Stop    02/04/19 2200  heparin (porcine) 5000 UNIT/ML injection 5,000 Units      5,000 Units Subcutaneous Every 8 Hours Scheduled 02/04/19 1732                Vital Sign Min/Max for last 24 hours  Temp  Min: 97.4 °F (36.3 °C)  Max: 98.4 °F (36.9 °C)   BP  Min: 103/59  Max: 177/98   Pulse  Min: 70  Max: 92   Resp  Min: 18  Max: 24   SpO2  Min: 92 %  Max: 97 %   No Data Recorded   Weight  Min: 150 kg (330 lb)  Max: 154 kg (339 lb)       Physical Exam:    Vitals:    02/05/19 0824   BP: 122/90   Pulse: 82   Resp: 18   Temp: 98 °F (36.7 °C)   SpO2: 92%     Body mass index is 40.22 kg/m².   Pulse Pressure: within normal limits  Pulse Ox: Normal  on room air  General: alert, appears stated age and cooperative     Body Habitus: obese    HEENT: Head: Normocephalic, no lesions, without obvious abnormality. No arcus senilis, xanthelasma or xanthomas.    Neuro: alert, oriented x3  speech normal in context and clarity  memory intact grossly  JVP: Volume/Pulsation: elevated jugular venous pressure to 11 cm vertical height above mid-right atrium.  Normal waveforms.   Appropriate inspiratory decrease.  No Kussmaul's. No Simona's.   Carotid Exam: no bruit normal pulsation bilaterally   Carotid Volume: normal.      Subclavian Bruit: absent  Vertebral Bruit: absent  Respirations: no increased work of breathing   Chest:  Normal    Pulmonary:Normal     Precordium: Normal impulses. P2 is not palpable.  RV Heave: absent  LV Heave: present  [place]:  displaced laterally and displaced inferiorly  Palpable S4: present.  Heart rate: normal    Heart Rhythm: regular     Heart Sounds: S1: normal intensity  S2: normal intensity, increased P2  S3: present   S4: present  Opening Snap: absent    A2-OS:  no  Pericardial Rub:  Absent: No      Ejection click: None      Murmurs:  present    Murmur 1 Type: systolic  Grade: 1/6    Timing: holosystolic  Location:   apex   Description:ejection  Radiation:  nonradiating  Abdominal Aorta: no bruits  Renal Arteries: no bruits  Extremity: moves all extremities equally.   LE Skin: Chronic changes of venous insufficiency.  2+ lower extremity pitting edema..   Pulses:   L brachial 2+ R brachial 2+   L radial 2+ R radial 2+   L inguinal 2+ R inguinal 2+   L popliteal 2+ R popliteal 2+   L posterior tibial 2+ R posterior tibial 2+   L dorsalis pedis 2+ R dorsalis pedis 2+         DATA REVIEWED:     EKG. I personally reviewed and interpreted the EKG.      CXR/Imaging:     Imaging Results (most recent)     Procedure Component Value Units Date/Time    XR Chest 2 View [[id number]] Collected:  02/04/19 1442     Updated:  02/04/19 1505    Narrative:           PROCEDURE: Chest PA and lateral    REASON FOR EXAM: Chest pain protocol    FINDINGS: Comparison study dated November 9, 2014. Cardiomegaly.  Pulmonary vasculature appears within normal limits. Lungs are  clear. Pleural spaces are normal . No acute osseous abnormality.      Impression:       1.  Cardiomegaly without decompensation.  2.  Otherwise unremarkable chest.    Electronically signed by:  Pj Cummins MD  2/4/2019 3:04 PM CST  Workstation: [id number]           --------------------------------------------------------------------------------------------------  LABS:   Lab Results   Component Value Date    GLUCOSE 119 (H) 02/05/2019    BUN 16 02/05/2019    CREATININE 1.35 (H) 02/05/2019    EGFRIFAFRI 69 02/05/2019    BCR 11.9 02/05/2019    K 3.3 (L) 02/05/2019    CO2 33.0 (H) 02/05/2019    CALCIUM 9.1 02/05/2019    ALBUMIN 3.90 02/05/2019    AST 24 02/05/2019    ALT 27 02/05/2019       Lab Results   Component Value Date    GLUCOSE 119 (H) 02/05/2019    CALCIUM 9.1 02/05/2019     02/05/2019    K 3.3 (L) 02/05/2019    CO2 33.0 (H) 02/05/2019     02/05/2019    BUN 16 02/05/2019    CREATININE 1.35 (H) 02/05/2019    EGFRIFAFRI 69 02/05/2019    BCR 11.9 02/05/2019    ANIONGAP 6.0 02/05/2019       Lab Results   Component Value Date    WBC 6.02 02/05/2019    HGB 12.6 (L) 02/05/2019    HCT 37.5 (L) 02/05/2019    MCV 93.1 02/05/2019     (L) 02/05/2019       Lab Results   Component Value Date    CHLPL 133 11/09/2014    TRIG 73 11/09/2014    HDL 25 (L) 11/09/2014    LDL 93 11/09/2014       Lab Results   Component Value Date    TSH 0.61 04/27/2014       Lab Results   Component Value Date    CKTOTAL 316 (H) 11/08/2014    CKMB 3.5 11/08/2014    TROPONINI 0.230 (C) 02/05/2019       Lab Results   Component Value Date    HGBA1C 5.7 (H) 02/05/2019     Lab Results   Component Value Date    DDIMER 1,331 (H) 04/26/2014     Lab Results   Component Value Date    ALT 27 02/05/2019     Lab Results   Component Value Date    HGBA1C 5.7 (H) 02/05/2019     Lab Results   Component Value Date    CREATININE 1.35 (H) 02/05/2019     No results found for: IRON, TIBC, FERRITIN  No results found for: INR, PROTIME      CHF Orders Current by primary team:  BB:carvedilol   ACE/ARB: Entresto  Diuretic: furosemide 80 mg IV BID  Fluid restriction:1500 mL Fluid   Sodium: 1,500 mg Na    Diet: Heart healthy       Assessment/Plan      1. ADHF. This is Acute on chronic. NYHA stage: C; FC-II/III.  LVEF is <10%. RVEF is unknown. Last TTE: 1/2019. This is secondary to dietary indiscretion.  Patient is currently volume overloaded.. Perfusion status: well. Hemodynamics are not acceptable.  Heart rate not at goal.  INTERMACS NYHA class is above assigning classification today. MCS is not currently indicated.   -Low sodium diet (<2 grams); 2,000 mL fluid restriction with strict I/Os;  Start Core measures CHF.  -TTE limited with the addition of PAH-protocol images.  -Diuresis:furosemide (LASIX) 80 mg IV twice daily  -BB: Carvedilol, increase to 25 mg p.o. twice daily.  If his hemodynamics do not tolerate this can consider high-dose Toprol-XL.  -ACE/ARB/ENTRESTO: Entresto  -Aldosterone antagonist: Start Aldactone 25mg  -CardioMEMS: Indicated, will discuss as outpatient..  -ICD/CRT: Will address as out-pt pending functional class    Thank you so much for allowing me to participate and consult on Matti Bravo.      Disposition: Will follow

## 2019-02-06 ENCOUNTER — APPOINTMENT (OUTPATIENT)
Dept: CARDIOLOGY | Facility: HOSPITAL | Age: 49
End: 2019-02-06

## 2019-02-06 LAB
ALBUMIN SERPL-MCNC: 4 G/DL (ref 3.4–4.8)
ALBUMIN/GLOB SERPL: 1.2 G/DL (ref 1.1–1.8)
ALP SERPL-CCNC: 58 U/L (ref 38–126)
ALT SERPL W P-5'-P-CCNC: 30 U/L (ref 21–72)
ANION GAP SERPL CALCULATED.3IONS-SCNC: 10 MMOL/L (ref 5–15)
ANION GAP SERPL CALCULATED.3IONS-SCNC: 12 MMOL/L (ref 5–15)
AST SERPL-CCNC: 21 U/L (ref 17–59)
BASOPHILS # BLD AUTO: 0.02 10*3/MM3 (ref 0–0.2)
BASOPHILS NFR BLD AUTO: 0.3 % (ref 0–2)
BH CV ECHO MEAS - BSA(HAYCOCK): 2.9 M^2
BH CV ECHO MEAS - BSA: 2.7 M^2
BH CV ECHO MEAS - BZI_BMI: 39.6 KILOGRAMS/M^2
BH CV ECHO MEAS - BZI_METRIC_HEIGHT: 193 CM
BH CV ECHO MEAS - BZI_METRIC_WEIGHT: 147.4 KG
BH CV ECHO MEAS - EDV(CUBED): 669.9 ML
BH CV ECHO MEAS - EDV(TEICH): 420.6 ML
BH CV ECHO MEAS - EF(CUBED): 34.7 %
BH CV ECHO MEAS - EF(TEICH): 27.2 %
BH CV ECHO MEAS - ESV(CUBED): 437.2 ML
BH CV ECHO MEAS - ESV(TEICH): 306.4 ML
BH CV ECHO MEAS - FS: 13.3 %
BH CV ECHO MEAS - IVS/LVPW: 1
BH CV ECHO MEAS - IVSD: 1.2 CM
BH CV ECHO MEAS - LA DIMENSION: 5.6 CM
BH CV ECHO MEAS - LV MASS(C)D: 559.7 GRAMS
BH CV ECHO MEAS - LV MASS(C)DI: 205.5 GRAMS/M^2
BH CV ECHO MEAS - LVIDD: 8.8 CM
BH CV ECHO MEAS - LVIDS: 7.6 CM
BH CV ECHO MEAS - LVPWD: 1.1 CM
BH CV ECHO MEAS - MR MAX PG: 60.2 MMHG
BH CV ECHO MEAS - MR MAX VEL: 388 CM/SEC
BH CV ECHO MEAS - MV A MAX VEL: 34.9 CM/SEC
BH CV ECHO MEAS - MV E MAX VEL: 102 CM/SEC
BH CV ECHO MEAS - MV E/A: 2.9
BH CV ECHO MEAS - PA MAX PG: 12 MMHG
BH CV ECHO MEAS - PA V2 MAX: 173 CM/SEC
BH CV ECHO MEAS - RAP SYSTOLE: 10 MMHG
BH CV ECHO MEAS - RVDD: 3.2 CM
BH CV ECHO MEAS - RVSP: 70 MMHG
BH CV ECHO MEAS - SI(CUBED): 85.4 ML/M^2
BH CV ECHO MEAS - SI(TEICH): 41.9 ML/M^2
BH CV ECHO MEAS - SV(CUBED): 232.7 ML
BH CV ECHO MEAS - SV(TEICH): 114.2 ML
BH CV ECHO MEAS - TR MAX VEL: 289 CM/SEC
BILIRUB SERPL-MCNC: 1.3 MG/DL (ref 0.2–1.3)
BUN BLD-MCNC: 18 MG/DL (ref 7–21)
BUN BLD-MCNC: 19 MG/DL (ref 7–21)
BUN/CREAT SERPL: 11.5 (ref 7–25)
BUN/CREAT SERPL: 13.5 (ref 7–25)
CALCIUM SPEC-SCNC: 9.2 MG/DL (ref 8.4–10.2)
CALCIUM SPEC-SCNC: 9.4 MG/DL (ref 8.4–10.2)
CHLORIDE SERPL-SCNC: 98 MMOL/L (ref 95–110)
CHLORIDE SERPL-SCNC: 99 MMOL/L (ref 95–110)
CO2 SERPL-SCNC: 31 MMOL/L (ref 22–31)
CO2 SERPL-SCNC: 32 MMOL/L (ref 22–31)
CREAT BLD-MCNC: 1.41 MG/DL (ref 0.7–1.3)
CREAT BLD-MCNC: 1.56 MG/DL (ref 0.7–1.3)
DEPRECATED RDW RBC AUTO: 45.4 FL (ref 35.1–43.9)
EOSINOPHIL # BLD AUTO: 0.23 10*3/MM3 (ref 0–0.7)
EOSINOPHIL NFR BLD AUTO: 4 % (ref 0–7)
ERYTHROCYTE [DISTWIDTH] IN BLOOD BY AUTOMATED COUNT: 13.3 % (ref 11.5–14.5)
GFR SERPL CREATININE-BSD FRML MDRD: 58 ML/MIN/1.73 (ref 63–147)
GFR SERPL CREATININE-BSD FRML MDRD: 65 ML/MIN/1.73 (ref 63–147)
GLOBULIN UR ELPH-MCNC: 3.4 GM/DL (ref 2.3–3.5)
GLUCOSE BLD-MCNC: 113 MG/DL (ref 60–100)
GLUCOSE BLD-MCNC: 178 MG/DL (ref 60–100)
HCT VFR BLD AUTO: 40.2 % (ref 39–49)
HCT VFR BLD AUTO: 41.3 % (ref 39–49)
HGB BLD-MCNC: 13.7 G/DL (ref 13.7–17.3)
HGB BLD-MCNC: 13.7 G/DL (ref 13.7–17.3)
IMM GRANULOCYTES # BLD AUTO: 0.02 10*3/MM3 (ref 0–0.02)
IMM GRANULOCYTES NFR BLD AUTO: 0.3 % (ref 0–0.5)
LV EF 2D ECHO EST: 10 %
LYMPHOCYTES # BLD AUTO: 2.39 10*3/MM3 (ref 0.6–4.2)
LYMPHOCYTES NFR BLD AUTO: 41.1 % (ref 10–50)
MAXIMAL PREDICTED HEART RATE: 173 BPM
MCH RBC QN AUTO: 30.8 PG (ref 26.5–34)
MCHC RBC AUTO-ENTMCNC: 33.2 G/DL (ref 31.5–36.3)
MCV RBC AUTO: 92.8 FL (ref 80–98)
MONOCYTES # BLD AUTO: 0.79 10*3/MM3 (ref 0–0.9)
MONOCYTES NFR BLD AUTO: 13.6 % (ref 0–12)
NEUTROPHILS # BLD AUTO: 2.37 10*3/MM3 (ref 2–8.6)
NEUTROPHILS NFR BLD AUTO: 40.7 % (ref 37–80)
PLATELET # BLD AUTO: 154 10*3/MM3 (ref 150–450)
PMV BLD AUTO: 12.6 FL (ref 8–12)
POTASSIUM BLD-SCNC: 3.6 MMOL/L (ref 3.5–5.1)
POTASSIUM BLD-SCNC: 3.6 MMOL/L (ref 3.5–5.1)
PROT SERPL-MCNC: 7.4 G/DL (ref 6.3–8.6)
RBC # BLD AUTO: 4.45 10*6/MM3 (ref 4.37–5.74)
SODIUM BLD-SCNC: 140 MMOL/L (ref 137–145)
SODIUM BLD-SCNC: 142 MMOL/L (ref 137–145)
STRESS TARGET HR: 147 BPM
WBC NRBC COR # BLD: 5.82 10*3/MM3 (ref 3.2–9.8)

## 2019-02-06 PROCEDURE — 93005 ELECTROCARDIOGRAM TRACING: CPT | Performed by: INTERNAL MEDICINE

## 2019-02-06 PROCEDURE — 93308 TTE F-UP OR LMTD: CPT | Performed by: INTERNAL MEDICINE

## 2019-02-06 PROCEDURE — 80053 COMPREHEN METABOLIC PANEL: CPT | Performed by: STUDENT IN AN ORGANIZED HEALTH CARE EDUCATION/TRAINING PROGRAM

## 2019-02-06 PROCEDURE — 93325 DOPPLER ECHO COLOR FLOW MAPG: CPT | Performed by: INTERNAL MEDICINE

## 2019-02-06 PROCEDURE — 25010000002 ACETAZOLAMIDE PER 500 MG: Performed by: INTERNAL MEDICINE

## 2019-02-06 PROCEDURE — 85018 HEMOGLOBIN: CPT | Performed by: INTERNAL MEDICINE

## 2019-02-06 PROCEDURE — 25010000002 HEPARIN (PORCINE) PER 1000 UNITS: Performed by: STUDENT IN AN ORGANIZED HEALTH CARE EDUCATION/TRAINING PROGRAM

## 2019-02-06 PROCEDURE — 25010000002 FUROSEMIDE PER 20 MG: Performed by: STUDENT IN AN ORGANIZED HEALTH CARE EDUCATION/TRAINING PROGRAM

## 2019-02-06 PROCEDURE — 93325 DOPPLER ECHO COLOR FLOW MAPG: CPT

## 2019-02-06 PROCEDURE — 85014 HEMATOCRIT: CPT | Performed by: INTERNAL MEDICINE

## 2019-02-06 PROCEDURE — 93010 ELECTROCARDIOGRAM REPORT: CPT | Performed by: INTERNAL MEDICINE

## 2019-02-06 PROCEDURE — G0378 HOSPITAL OBSERVATION PER HR: HCPCS

## 2019-02-06 PROCEDURE — 99213 OFFICE O/P EST LOW 20 MIN: CPT | Performed by: INTERNAL MEDICINE

## 2019-02-06 PROCEDURE — 93308 TTE F-UP OR LMTD: CPT

## 2019-02-06 PROCEDURE — 93321 DOPPLER ECHO F-UP/LMTD STD: CPT

## 2019-02-06 PROCEDURE — 96376 TX/PRO/DX INJ SAME DRUG ADON: CPT

## 2019-02-06 PROCEDURE — 93321 DOPPLER ECHO F-UP/LMTD STD: CPT | Performed by: INTERNAL MEDICINE

## 2019-02-06 PROCEDURE — 85025 COMPLETE CBC W/AUTO DIFF WBC: CPT | Performed by: STUDENT IN AN ORGANIZED HEALTH CARE EDUCATION/TRAINING PROGRAM

## 2019-02-06 PROCEDURE — 76376 3D RENDER W/INTRP POSTPROCES: CPT | Performed by: INTERNAL MEDICINE

## 2019-02-06 PROCEDURE — 96372 THER/PROPH/DIAG INJ SC/IM: CPT

## 2019-02-06 PROCEDURE — 99225 PR SBSQ OBSERVATION CARE/DAY 25 MINUTES: CPT | Performed by: STUDENT IN AN ORGANIZED HEALTH CARE EDUCATION/TRAINING PROGRAM

## 2019-02-06 RX ORDER — FUROSEMIDE 10 MG/ML
60 INJECTION INTRAMUSCULAR; INTRAVENOUS DAILY
Status: DISCONTINUED | OUTPATIENT
Start: 2019-02-07 | End: 2019-02-06 | Stop reason: SDUPTHER

## 2019-02-06 RX ORDER — FUROSEMIDE 40 MG/1
40 TABLET ORAL
Status: DISCONTINUED | OUTPATIENT
Start: 2019-02-07 | End: 2019-02-06

## 2019-02-06 RX ADMIN — SODIUM CHLORIDE, PRESERVATIVE FREE 3 ML: 5 INJECTION INTRAVENOUS at 08:11

## 2019-02-06 RX ADMIN — HEPARIN SODIUM 5000 UNITS: 5000 INJECTION INTRAVENOUS; SUBCUTANEOUS at 06:30

## 2019-02-06 RX ADMIN — CARVEDILOL 25 MG: 25 TABLET, FILM COATED ORAL at 08:11

## 2019-02-06 RX ADMIN — ATORVASTATIN CALCIUM 10 MG: 10 TABLET, FILM COATED ORAL at 20:28

## 2019-02-06 RX ADMIN — SACUBITRIL AND VALSARTAN 1 TABLET: 24; 26 TABLET, FILM COATED ORAL at 08:11

## 2019-02-06 RX ADMIN — FUROSEMIDE 80 MG: 10 INJECTION, SOLUTION INTRAMUSCULAR; INTRAVENOUS at 06:30

## 2019-02-06 RX ADMIN — HEPARIN SODIUM 5000 UNITS: 5000 INJECTION INTRAVENOUS; SUBCUTANEOUS at 20:53

## 2019-02-06 RX ADMIN — SPIRONOLACTONE 25 MG: 25 TABLET ORAL at 08:11

## 2019-02-06 RX ADMIN — ACETAZOLAMIDE 375 MG: 500 INJECTION, POWDER, LYOPHILIZED, FOR SOLUTION INTRAVENOUS at 10:25

## 2019-02-06 RX ADMIN — SODIUM CHLORIDE, PRESERVATIVE FREE 3 ML: 5 INJECTION INTRAVENOUS at 20:28

## 2019-02-06 RX ADMIN — SACUBITRIL AND VALSARTAN 1 TABLET: 24; 26 TABLET, FILM COATED ORAL at 20:28

## 2019-02-06 RX ADMIN — CARVEDILOL 25 MG: 25 TABLET, FILM COATED ORAL at 20:28

## 2019-02-06 RX ADMIN — HEPARIN SODIUM 5000 UNITS: 5000 INJECTION INTRAVENOUS; SUBCUTANEOUS at 13:09

## 2019-02-06 RX ADMIN — POTASSIUM CHLORIDE 40 MEQ: 750 CAPSULE, EXTENDED RELEASE ORAL at 08:11

## 2019-02-06 NOTE — PROGRESS NOTES
"  Cardiovascular Daily Inpatient Progress Note  Rafael Shook M.D., Ph.D., [facility]      Subjective     Interval History:   He tells me that his breathing has improved.  Improve lower extremity edema.  He tells me that he is having adequate diuresis.  He stated that he ambulated yesterday when his girlfriend visited.  No further episodes of PND or orthopnea.  He is asking about potential discharge date.  Nursing did tell me that he is \"in a bad mood this morning because he wants to go home. \"    Review of Systems   Cardiovascular: Positive for dyspnea on exertion and leg swelling. Negative for chest pain, claudication, cyanosis, irregular heartbeat, near-syncope, orthopnea, palpitations, paroxysmal nocturnal dyspnea and syncope.   Respiratory: Positive for shortness of breath and snoring. Negative for cough, hemoptysis, sleep disturbances due to breathing, sputum production and wheezing.        Objective     Vital Sign Min/Max for last 24 hours  Temp  Min: 96.2 °F (35.7 °C)  Max: 98 °F (36.7 °C)   BP  Min: 91/54  Max: 130/73   Pulse  Min: 59  Max: 120   Resp  Min: 16  Max: 18   SpO2  Min: 92 %  Max: 95 %   No Data Recorded   Weight  Min: 148 kg (325 lb 12.8 oz)  Max: 148 kg (325 lb 12.8 oz)     Flowsheet Rows      First Filed Value   Admission Height  193 cm (76\") Documented at 02/04/2019 1319   Admission Weight  150 kg (330 lb)  (Abnormal)  Documented at 02/04/2019 1319            02/04/19  1739 02/05/19  0557 02/06/19  0457   Weight: (!) 154 kg (339 lb) (!) 150 kg (330 lb 6.4 oz) (!) 148 kg (325 lb 12.8 oz)     Body mass index is 39.66 kg/m².  Pulse Pressure: within normal limits  Pulse Ox: Normal        on room air  General: alert, appears stated age and cooperative     Body Habitus: obese    JVP: Volume/Pulsation:   elevated jugular venous pressure to 8 cm vertical height above mid-right atrium.  Normal waveforms.   Appropriate inspiratory decrease.  No Kussmaul's. No Simona's.   Respirations: no increased " work of breathing            Chest:  Normal              Pulmonary:Normal                               Precordium: Normal impulses. P2 is not palpable.  RV Heave: absent  LV Heave: present  [place]:  displaced laterally and displaced inferiorly  Palpable S4: present.  Heart rate: normal    Heart Rhythm: regular                                     Heart Sounds: S1: normal intensity  S2: normal intensity, increased P2  S3: present                              S4: present  Opening Snap: absent            A2-OS:  no  Pericardial Rub:  Absent: No             Ejection click: None                                        Murmurs:  present    Murmur 1 Type: systolic  Grade: 1/6        Timing: holosystolic  Location:   apex   Description:ejection  Radiation:  nonradiating  Abdominal Aorta: no bruits  Renal Arteries: no bruits  Extremity: moves all extremities equally.   LE Skin: Chronic changes of venous insufficiency.  1+ lower extremity pitting edema..            DATA REVIEWED:         --------------------------------------------------------------------------------------------------  LABS:   Lab Results   Component Value Date    GLUCOSE 113 (H) 02/06/2019    BUN 19 02/06/2019    CREATININE 1.41 (H) 02/06/2019    EGFRIFAFRI 65 02/06/2019    BCR 13.5 02/06/2019    K 3.6 02/06/2019    CO2 32.0 (H) 02/06/2019    CALCIUM 9.2 02/06/2019    ALBUMIN 4.00 02/06/2019    AST 21 02/06/2019    ALT 30 02/06/2019       Lab Results   Component Value Date    WBC 5.82 02/06/2019    HGB 13.7 02/06/2019    HCT 41.3 02/06/2019    MCV 92.8 02/06/2019     02/06/2019       Lab Results   Component Value Date    CHLPL 133 11/09/2014    TRIG 73 11/09/2014    HDL 25 (L) 11/09/2014    LDL 93 11/09/2014       Lab Results   Component Value Date    TSH 0.61 04/27/2014       Lab Results   Component Value Date    CKTOTAL 316 (H) 11/08/2014    CKMB 3.5 11/08/2014    TROPONINI 0.230 (C) 02/05/2019       Lab Results   Component Value Date    HGBA1C 5.7 (H)  02/05/2019     Lab Results   Component Value Date    DDIMER 1,331 (H) 04/26/2014     Lab Results   Component Value Date    ALT 30 02/06/2019     Lab Results   Component Value Date    HGBA1C 5.7 (H) 02/05/2019     Lab Results   Component Value Date    CREATININE 1.41 (H) 02/06/2019     No results found for: IRON, TIBC, FERRITIN  No results found for: INR, PROTIME  Lab Results (last 24 hours)     Procedure Component Value Units Date/Time    Comprehensive Metabolic Panel [[id number]]  (Abnormal) Collected:  02/06/19 0654    Specimen:  Blood Updated:  02/06/19 0722     Glucose 113 mg/dL      BUN 19 mg/dL      Creatinine 1.41 mg/dL      Sodium 142 mmol/L      Potassium 3.6 mmol/L      Chloride 98 mmol/L      CO2 32.0 mmol/L      Calcium 9.2 mg/dL      Total Protein 7.4 g/dL      Albumin 4.00 g/dL      ALT (SGPT) 30 U/L      AST (SGOT) 21 U/L      Alkaline Phosphatase 58 U/L      Total Bilirubin 1.3 mg/dL      eGFR  [place] Amer 65 mL/min/1.73      Globulin 3.4 gm/dL      A/G Ratio 1.2 g/dL      BUN/Creatinine Ratio 13.5     Anion Gap 12.0 mmol/L     CBC Auto Differential [[id number]]  (Abnormal) Collected:  02/06/19 0654    Specimen:  Blood Updated:  02/06/19 0718     WBC 5.82 10*3/mm3      RBC 4.45 10*6/mm3      Hemoglobin 13.7 g/dL      Hematocrit 41.3 %      MCV 92.8 fL      MCH 30.8 pg      MCHC 33.2 g/dL      RDW 13.3 %      RDW-SD 45.4 fl      MPV 12.6 fL      Platelets 154 10*3/mm3      Neutrophil % 40.7 %      Lymphocyte % 41.1 %      Monocyte % 13.6 %      Eosinophil % 4.0 %      Basophil % 0.3 %      Immature Grans % 0.3 %      Neutrophils, Absolute 2.37 10*3/mm3      Lymphocytes, Absolute 2.39 10*3/mm3      Monocytes, Absolute 0.79 10*3/mm3      Eosinophils, Absolute 0.23 10*3/mm3      Basophils, Absolute 0.02 10*3/mm3      Immature Grans, Absolute 0.02 10*3/mm3     Potassium [[id number]]  (Normal) Collected:  02/05/19 1858    Specimen:  Blood Updated:  02/05/19 1932     Potassium 3.8 mmol/L         Imaging  Results (last 24 hours)     ** No results found for the last 24 hours. **            Assessment/Plan      1. ADHF, improving This is Acute on chronic, markedly improving.. NYHA stage: C; FC-II/III. LVEF is <10%. RVEF is unknown. Last TTE: 1/2019. This is secondary to dietary indiscretion.    Patient is currently mildly hypervolemic, vastly improved from yesterday with adequate hemodynamics.  Favorable evidence of hemoconcentration present today.  GFR is adequate.  No evidence of azotemia.  I discontinued his digoxin until we actually optimize his CHF-specific medications.  If, at that point, he is any class other than NYHA class I  we can consider the addition of digoxin for CO augmentation and reduction of PCWP.  -Low sodium diet (<2 grams); 2,000 mL fluid restriction with strict I/Os  -TTE limited with the addition of PAH-protocol images to be performed this morning.  -Diuresis: He is status post 80 mg IV furosemide this morning.  Will discontinue IV furosemide.  Give 1 dose IV Diamox.  Check hemoglobin/hematocrit and Chem-7 this afternoon.   -AM BNP.   -BB: Carvedilol, 25 mg p.o. twice daily   -ACE/ARB/ENTRESTO: Entresto  -Aldosterone antagonist: ldactone 25mg  -CardioMEMS: Indicated, will discuss as outpatient..  -ICD/CRT: Will address as out-pt pending functional class    2. CODE: FULL    3. DVT Prop: Heparin SQ    4. Dispo: Given his physical exam findings today, ongoing diuresis and evidence of hemoconcentration,  I suspect he will be able to discharge home tomorrow February 7, 2019.            Thank you for allowing me to participate in the care of your patient.  If I can be of any further assistance, please do not hesitate to contact me.

## 2019-02-06 NOTE — PROGRESS NOTES
FAMILY MEDICINE DAILY PROGRESS NOTE  NAME: Matti Bravo  : 1970  MRN: [id number]     LOS: 0 days     PROVIDER OF SERVICE: Jamison Cuevas MD    Chief Complaint: Acute on chronic HFrEF (heart failure with reduced ejection fraction) (CMS/[facility])    Subjective:     Interval History:  History taken from: patient chart  No acute overnight events.  Patient seen by cardiology, Dr. Shook yesterday.  He is going for echocardiogram today.  He continues to diurese very well.  He states he feels a whole lot better.  No more pain/pressure in his chest.  Legs are still swollen.    Review of Systems:   Review of Systems   Constitutional: Negative for activity change, appetite change, chills, diaphoresis and fever.   HENT: Negative for congestion, rhinorrhea, sinus pressure, sinus pain and sore throat.    Eyes: Negative for visual disturbance.   Respiratory: Negative for apnea, cough, choking, chest tightness, shortness of breath and wheezing.    Cardiovascular: Positive for leg swelling. Negative for chest pain and palpitations.   Gastrointestinal: Negative for abdominal distention, abdominal pain, blood in stool, constipation, diarrhea, nausea and vomiting.   Genitourinary: Negative for difficulty urinating, dysuria, frequency, hematuria and urgency.   Musculoskeletal: Negative for arthralgias, back pain, joint swelling, myalgias and neck pain.   Skin: Negative for color change, pallor, rash and wound.   Neurological: Negative for dizziness, weakness, numbness and headaches.   Psychiatric/Behavioral: Negative for agitation and behavioral problems.       Objective:     Vital Signs  Temp:  [96.7 °F (35.9 °C)-98 °F (36.7 °C)] 97 °F (36.1 °C)  Heart Rate:  [] 70  Resp:  [16-18] 16  BP: ()/(54-90) 111/66    Physical Exam  Physical Exam   Constitutional: He is oriented to person, place, and time. He appears well-developed and well-nourished. No distress.   HENT:   Head: Normocephalic and  atraumatic.   Right Ear: External ear normal.   Left Ear: External ear normal.   Nose: Nose normal.   Eyes: Conjunctivae and EOM are normal. Pupils are equal, round, and reactive to light. Right eye exhibits no discharge. Left eye exhibits no discharge. No scleral icterus.   Neck: Normal range of motion. Neck supple. No thyromegaly present.   Cardiovascular: Normal rate, regular rhythm, normal heart sounds and intact distal pulses. Exam reveals no gallop and no friction rub.   No murmur heard.  Pulmonary/Chest: Effort normal and breath sounds normal. No respiratory distress. He has no wheezes. He has no rales. He exhibits no tenderness.   Abdominal: Soft. Bowel sounds are normal. He exhibits no distension and no mass. There is no tenderness. There is no guarding.   Musculoskeletal: Normal range of motion. He exhibits edema (1+ pitting to mid shin). He exhibits no tenderness or deformity.   Lymphadenopathy:     He has no cervical adenopathy.   Neurological: He is alert and oriented to person, place, and time. No cranial nerve deficit.   Skin: Skin is warm and dry. He is not diaphoretic.   Psychiatric: He has a normal mood and affect. His behavior is normal. Judgment and thought content normal.       Medication Review    Current Facility-Administered Medications:   •  acetaminophen (TYLENOL) tablet 650 mg, 650 mg, Oral, Q4H PRN, Jamison Cuevas MD  •  acetaZOLAMIDE (DIAMOX) 375 mg in sodium chloride 0.9 % 50 mL IVPB, 375 mg, Intravenous, Once, Rafael Shook MD PhD  •  atorvastatin (LIPITOR) tablet 10 mg, 10 mg, Oral, Nightly, Jamison Cuevas MD, 10 mg at 02/05/19 2059  •  calcium carbonate (TUMS) chewable tablet 500 mg (200 mg elemental), 2 tablet, Oral, BID PRN, Jamison Cuevas MD  •  carvedilol (COREG) tablet 25 mg, 25 mg, Oral, Q12H, Rafael Shook MD PhD, 25 mg at 02/05/19 2059  •  [START ON 2/7/2019] furosemide (LASIX) injection 60 mg, 60 mg, Intravenous, Daily,  Rafael Shook MD PhD  •  heparin (porcine) 5000 UNIT/ML injection 5,000 Units, 5,000 Units, Subcutaneous, Q8H, Jamison Cuevas MD, 5,000 Units at 02/06/19 0630  •  ondansetron (ZOFRAN) injection 4 mg, 4 mg, Intravenous, Q6H PRN, Jamison Cuevas MD  •  potassium chloride (MICRO-K) CR capsule 40 mEq, 40 mEq, Oral, Once, Em Gilman APRN  •  sacubitril-valsartan (ENTRESTO) 24-26 MG tablet 1 tablet, 1 tablet, Oral, BID, Jamison Cuevas MD, 1 tablet at 02/05/19 2059  •  sodium chloride 0.9 % flush 10 mL, 10 mL, Intravenous, PRN, Manny Taylor MD  •  sodium chloride 0.9 % flush 3 mL, 3 mL, Intravenous, Q12H, Jamison Cuevas MD, 3 mL at 02/05/19 2247  •  sodium chloride 0.9 % flush 3-10 mL, 3-10 mL, Intravenous, PRN, Jamison Cuevas MD  •  spironolactone (ALDACTONE) tablet 25 mg, 25 mg, Oral, Daily, Rafael Shoko MD PhD, 25 mg at 02/05/19 1343     Diagnostic Data    Lab Results (last 24 hours)     Procedure Component Value Units Date/Time    Comprehensive Metabolic Panel [[id number]]  (Abnormal) Collected:  02/06/19 0654    Specimen:  Blood Updated:  02/06/19 0722     Glucose 113 mg/dL      BUN 19 mg/dL      Creatinine 1.41 mg/dL      Sodium 142 mmol/L      Potassium 3.6 mmol/L      Chloride 98 mmol/L      CO2 32.0 mmol/L      Calcium 9.2 mg/dL      Total Protein 7.4 g/dL      Albumin 4.00 g/dL      ALT (SGPT) 30 U/L      AST (SGOT) 21 U/L      Alkaline Phosphatase 58 U/L      Total Bilirubin 1.3 mg/dL      eGFR  African Amer 65 mL/min/1.73      Globulin 3.4 gm/dL      A/G Ratio 1.2 g/dL      BUN/Creatinine Ratio 13.5     Anion Gap 12.0 mmol/L     CBC Auto Differential [[id number]]  (Abnormal) Collected:  02/06/19 0654    Specimen:  Blood Updated:  02/06/19 0718     WBC 5.82 10*3/mm3      RBC 4.45 10*6/mm3      Hemoglobin 13.7 g/dL      Hematocrit 41.3 %      MCV 92.8 fL      MCH 30.8 pg      MCHC 33.2 g/dL      RDW 13.3 %      RDW-SD 45.4 fl      MPV  12.6 fL      Platelets 154 10*3/mm3      Neutrophil % 40.7 %      Lymphocyte % 41.1 %      Monocyte % 13.6 %      Eosinophil % 4.0 %      Basophil % 0.3 %      Immature Grans % 0.3 %      Neutrophils, Absolute 2.37 10*3/mm3      Lymphocytes, Absolute 2.39 10*3/mm3      Monocytes, Absolute 0.79 10*3/mm3      Eosinophils, Absolute 0.23 10*3/mm3      Basophils, Absolute 0.02 10*3/mm3      Immature Grans, Absolute 0.02 10*3/mm3     Potassium [[id number]]  (Normal) Collected:  02/05/19 1858    Specimen:  Blood Updated:  02/05/19 1932     Potassium 3.8 mmol/L             I reviewed the patient's new clinical results.    Assessment/Plan:     Active Hospital Problems    Diagnosis Date Noted   • **Acute on chronic HFrEF (heart failure with reduced ejection fraction) (CMS/[facility]) [I50.23] 09/26/2017     Ejection fraction 01/09/2019 10%, severe hypokinesis, followed by heart failure navigator. Will continue home meds of carvedilol and sacutbitril-valsartan.  Hemodynamically stable  Dr. Shook following  -diuresing with 80 mg Lasix IV BID  -Carvedilol increased to 25 mg bid  -Sacubitrill-valsartan 24-26 mg p.o. twice daily  - digoxin discontinued, started on spironolactone  -getting echo     • Controlled type 2 diabetes mellitus without complication, without long-term current use of insulin (CMS/HCC) [E11.9] 02/05/2019     -Low to moderate intensity insulin Novolin sliding scale  A1c 5.7     • Elevated troponin I level [R74.8] 02/04/2019     Troponin 0.256, no acute ST changes on EKG, trending down  Atorvastatin 20 mg p.o. nightly  - sacubitril-valsartan 24-26 mg p.o. twice daily  -Carvedilol 25 mg p.o. twice daily       • Morbid obesity (CMS/[facility]) [E66.01] 12/14/2017     Body mass index is 41.26 kg/m².   Nutrition consultation     • Essential hypertension [I10] 09/26/2017     patient currently hemodynamically stable.  -Carvedilol 25 mg p.o. twice daily  -Sacubitrill-valsartan 24-26 mg p.o. twice daily  -spironolactone     •  Mixed hyperlipidemia [E78.2] 09/26/2017     Continue home medications, will elevate to moderate intensity statin  - Atorvastatin 20mg nightly     • KHANH (obstructive sleep apnea) [G47.33] 09/26/2017     bipap at night           DVT prophylaxis: Heparin 5000 units subcutaneous every 8 hours  Code Status and Medical Interventions:   Ordered at: 02/04/19 2799     Level Of Support Discussed With:    Patient     Code Status:    CPR     Medical Interventions (Level of Support Prior to Arrest):    Full       Plan for disposition:home likely 1-2 days          This document has been electronically signed by Jamison Cuevas MD on February 6, 2019 7:59 AM

## 2019-02-07 VITALS
HEART RATE: 89 BPM | TEMPERATURE: 97 F | OXYGEN SATURATION: 95 % | DIASTOLIC BLOOD PRESSURE: 71 MMHG | RESPIRATION RATE: 18 BRPM | WEIGHT: 315 LBS | HEIGHT: 76 IN | SYSTOLIC BLOOD PRESSURE: 108 MMHG | BODY MASS INDEX: 38.36 KG/M2

## 2019-02-07 DIAGNOSIS — Z79.899 DRUG THERAPY: Primary | ICD-10-CM

## 2019-02-07 PROBLEM — R77.8 ELEVATED TROPONIN I LEVEL: Status: RESOLVED | Noted: 2019-02-04 | Resolved: 2019-02-07

## 2019-02-07 LAB
ALBUMIN SERPL-MCNC: 3.6 G/DL (ref 3.4–4.8)
ALBUMIN/GLOB SERPL: 1.1 G/DL (ref 1.1–1.8)
ALP SERPL-CCNC: 56 U/L (ref 38–126)
ALT SERPL W P-5'-P-CCNC: 23 U/L (ref 21–72)
ANION GAP SERPL CALCULATED.3IONS-SCNC: 11 MMOL/L (ref 5–15)
AST SERPL-CCNC: 22 U/L (ref 17–59)
BASOPHILS # BLD AUTO: 0.01 10*3/MM3 (ref 0–0.2)
BASOPHILS NFR BLD AUTO: 0.2 % (ref 0–2)
BILIRUB SERPL-MCNC: 1 MG/DL (ref 0.2–1.3)
BUN BLD-MCNC: 20 MG/DL (ref 7–21)
BUN/CREAT SERPL: 13.2 (ref 7–25)
CALCIUM SPEC-SCNC: 9.3 MG/DL (ref 8.4–10.2)
CHLORIDE SERPL-SCNC: 100 MMOL/L (ref 95–110)
CO2 SERPL-SCNC: 31 MMOL/L (ref 22–31)
CREAT BLD-MCNC: 1.52 MG/DL (ref 0.7–1.3)
DEPRECATED RDW RBC AUTO: 43.3 FL (ref 35.1–43.9)
EOSINOPHIL # BLD AUTO: 0.26 10*3/MM3 (ref 0–0.7)
EOSINOPHIL NFR BLD AUTO: 4.2 % (ref 0–7)
ERYTHROCYTE [DISTWIDTH] IN BLOOD BY AUTOMATED COUNT: 13.1 % (ref 11.5–14.5)
GFR SERPL CREATININE-BSD FRML MDRD: 60 ML/MIN/1.73 (ref 63–147)
GLOBULIN UR ELPH-MCNC: 3.2 GM/DL (ref 2.3–3.5)
GLUCOSE BLD-MCNC: 105 MG/DL (ref 60–100)
HCT VFR BLD AUTO: 39.7 % (ref 39–49)
HGB BLD-MCNC: 13.5 G/DL (ref 13.7–17.3)
IMM GRANULOCYTES # BLD AUTO: 0.01 10*3/MM3 (ref 0–0.02)
IMM GRANULOCYTES NFR BLD AUTO: 0.2 % (ref 0–0.5)
LYMPHOCYTES # BLD AUTO: 2.72 10*3/MM3 (ref 0.6–4.2)
LYMPHOCYTES NFR BLD AUTO: 44.3 % (ref 10–50)
MCH RBC QN AUTO: 30.9 PG (ref 26.5–34)
MCHC RBC AUTO-ENTMCNC: 34 G/DL (ref 31.5–36.3)
MCV RBC AUTO: 90.8 FL (ref 80–98)
MONOCYTES # BLD AUTO: 0.84 10*3/MM3 (ref 0–0.9)
MONOCYTES NFR BLD AUTO: 13.7 % (ref 0–12)
NEUTROPHILS # BLD AUTO: 2.3 10*3/MM3 (ref 2–8.6)
NEUTROPHILS NFR BLD AUTO: 37.4 % (ref 37–80)
NT-PROBNP SERPL-MCNC: 687 PG/ML (ref 0–450)
PLATELET # BLD AUTO: 155 10*3/MM3 (ref 150–450)
PMV BLD AUTO: 12.7 FL (ref 8–12)
POTASSIUM BLD-SCNC: 3.7 MMOL/L (ref 3.5–5.1)
PROT SERPL-MCNC: 6.8 G/DL (ref 6.3–8.6)
RBC # BLD AUTO: 4.37 10*6/MM3 (ref 4.37–5.74)
SODIUM BLD-SCNC: 142 MMOL/L (ref 137–145)
WBC NRBC COR # BLD: 6.14 10*3/MM3 (ref 3.2–9.8)

## 2019-02-07 PROCEDURE — 25010000002 HEPARIN (PORCINE) PER 1000 UNITS: Performed by: STUDENT IN AN ORGANIZED HEALTH CARE EDUCATION/TRAINING PROGRAM

## 2019-02-07 PROCEDURE — 80053 COMPREHEN METABOLIC PANEL: CPT | Performed by: STUDENT IN AN ORGANIZED HEALTH CARE EDUCATION/TRAINING PROGRAM

## 2019-02-07 PROCEDURE — G0378 HOSPITAL OBSERVATION PER HR: HCPCS

## 2019-02-07 PROCEDURE — 99225 PR SBSQ OBSERVATION CARE/DAY 25 MINUTES: CPT | Performed by: STUDENT IN AN ORGANIZED HEALTH CARE EDUCATION/TRAINING PROGRAM

## 2019-02-07 PROCEDURE — 99213 OFFICE O/P EST LOW 20 MIN: CPT | Performed by: INTERNAL MEDICINE

## 2019-02-07 PROCEDURE — 96372 THER/PROPH/DIAG INJ SC/IM: CPT

## 2019-02-07 PROCEDURE — 85025 COMPLETE CBC W/AUTO DIFF WBC: CPT | Performed by: STUDENT IN AN ORGANIZED HEALTH CARE EDUCATION/TRAINING PROGRAM

## 2019-02-07 PROCEDURE — 83880 ASSAY OF NATRIURETIC PEPTIDE: CPT | Performed by: INTERNAL MEDICINE

## 2019-02-07 RX ORDER — SPIRONOLACTONE 25 MG/1
25 TABLET ORAL DAILY
Qty: 30 TABLET | Refills: 0 | Status: ON HOLD | OUTPATIENT
Start: 2019-02-08 | End: 2019-03-09 | Stop reason: SDUPTHER

## 2019-02-07 RX ORDER — CARVEDILOL 25 MG/1
25 TABLET ORAL 2 TIMES DAILY WITH MEALS
Qty: 60 TABLET | Refills: 0 | Status: ON HOLD | OUTPATIENT
Start: 2019-02-07 | End: 2019-03-09 | Stop reason: SDUPTHER

## 2019-02-07 RX ADMIN — CARVEDILOL 25 MG: 25 TABLET, FILM COATED ORAL at 08:39

## 2019-02-07 RX ADMIN — HEPARIN SODIUM 5000 UNITS: 5000 INJECTION INTRAVENOUS; SUBCUTANEOUS at 06:12

## 2019-02-07 RX ADMIN — SODIUM CHLORIDE, PRESERVATIVE FREE 3 ML: 5 INJECTION INTRAVENOUS at 08:39

## 2019-02-07 RX ADMIN — SACUBITRIL AND VALSARTAN 1 TABLET: 24; 26 TABLET, FILM COATED ORAL at 08:39

## 2019-02-07 NOTE — DISCHARGE SUMMARY
DISCHARGE SUMMARY    PATIENT NAME: Matti Bravo       PHYSICIAN: Jamison Cuevas MD  : 1970  MRN: [id number]    ADMITTED: 2019     DISCHARGED: 2019    ADMISSION DIAGNOSES:  Active Hospital Problems    Diagnosis Date Noted   • **Chronic systolic heart failure (CMS/[facility]) [I50.22] 2017   • Controlled type 2 diabetes mellitus without complication, without long-term current use of insulin (CMS/[facility]) [E11.9] 2019   • Morbid obesity (CMS/[facility]) [E66.01] 2017   • Essential hypertension [I10] 2017   • Mixed hyperlipidemia [E78.2] 2017   • KHANH (obstructive sleep apnea) [G47.33] 2017      Resolved Hospital Problems    Diagnosis Date Noted Date Resolved   • Elevated troponin I level [R74.8] 2019     DISCHARGE DIAGNOSES:   Active Hospital Problems    Diagnosis Date Noted   • **Acute on Chronic systolic heart failure (CMS/[facility]) [I50.22] 2017   • Controlled type 2 diabetes mellitus without complication, without long-term current use of insulin (CMS/[facility]) [E11.9] 2019   • Morbid obesity (CMS/[facility]) [E66.01] 2017   • Essential hypertension [I10] 2017   • Mixed hyperlipidemia [E78.2] 2017   • KHANH (obstructive sleep apnea) [G47.33] 2017      Resolved Hospital Problems    Diagnosis Date Noted Date Resolved   • Elevated troponin I level [R74.8] 2019       SERVICE: Family Medicine.  Attending: Dr. Baltazar  Resident: Jamison Cuevas MD    CONSULTS:   Consult Orders (all) (From admission, onward)    Start     Ordered    19  Inpatient Nutrition Consult  Once     Provider:  (Not yet assigned)    19 181  Inpatient Cardiology Consult  Once     Specialty:  Cardiology  Provider:  Rafael Shook MD PhD    19 1813    02/04/19 1800  Inpatient Heart Failure Navigator Consult  Once     Provider:  (Not yet assigned)    19 1759    19 1733  Inpatient  "Cardiology Consult  Once,   Status:  Canceled     Specialty:  Cardiology  Provider:  Karlo Gaffney MD    02/04/19 5459          PROCEDURES:   Echocardiogram 2/6/2019  · 3D acquisition for left ventricular ejection fraction. Live 3D and full volume to assess left ventricular ejection fraction was utilized.  · Left ventricle systolic function is severely decreased. Estimated LVEF is 10%. Left ventricle cavity severely dilated with restrictive diastolic dysfunction. Findings are consistent with dilated cardiomyopathy.  · Right ventricle is mildly dilated with mildly reduced right ventricular systolic function.  · Moderate mitral valve regurgitation is present.  · Moderate tricuspid valve regurgitation is present. Pulmonary hypertension is present with a PA systolic pressure of 70 mmHg.  · There is mild pulmonic valve regurgitation present.  · There is a trivial pericardial effusion adjacent to the left ventricle    HISTORY OF PRESENT ILLNESS:   Taken from Dr Cuevas's H&P    Matti David Bravo is a 48 y.o. male with CMHx of HFrEF, DM2, HTN, HLD, KHANH, and restrictive lung disease 2/2 obesity, presented to the hospital for complaint of shortness of air and chest pain.  This began approximately 2 or 3 days ago.  He has been gaining water weight over some time, somewhere between 15 and 30 pounds.  He normally takes Lasix 40 mg by mouth twice daily however this is dominant enough to get the water off.  He describes his chest pain as a squeezing substernal pain.  He says it is \"like someone standing on his chest. \" He has had large amount of edema bilat lower extremeties.      He had admission in January to [facility] for exacerbation of his heart failure.  Echocardiogram in June 2018 showing ejection fraction of 32%.  Report in chart from echocardiogram from [facility] dated 01/09/2019 stating ejection fraction of 10%, with severe hypokinesis.  He is being seen by Dr. Cabrera, but patient had not " wanted biventricular pacemaker.  He also follows with the heart failure clinic.      DIAGNOSTIC DATA:   Lab Results (last 24 hours)     Procedure Component Value Units Date/Time    BNP [[id number]]  (Abnormal) Collected:  02/07/19 0639    Specimen:  Blood Updated:  02/07/19 0728     proBNP 687.0 pg/mL     Comprehensive Metabolic Panel [[id number]]  (Abnormal) Collected:  02/07/19 0639    Specimen:  Blood Updated:  02/07/19 0726     Glucose 105 mg/dL      BUN 20 mg/dL      Creatinine 1.52 mg/dL      Sodium 142 mmol/L      Potassium 3.7 mmol/L      Chloride 100 mmol/L      CO2 31.0 mmol/L      Calcium 9.3 mg/dL      Total Protein 6.8 g/dL      Albumin 3.60 g/dL      ALT (SGPT) 23 U/L      AST (SGOT) 22 U/L      Alkaline Phosphatase 56 U/L      Total Bilirubin 1.0 mg/dL      eGFR  African Amer 60 mL/min/1.73      Globulin 3.2 gm/dL      A/G Ratio 1.1 g/dL      BUN/Creatinine Ratio 13.2     Anion Gap 11.0 mmol/L     CBC Auto Differential [[id number]]  (Abnormal) Collected:  02/07/19 0639    Specimen:  Blood Updated:  02/07/19 0700     WBC 6.14 10*3/mm3      RBC 4.37 10*6/mm3      Hemoglobin 13.5 g/dL      Hematocrit 39.7 %      MCV 90.8 fL      MCH 30.9 pg      MCHC 34.0 g/dL      RDW 13.1 %      RDW-SD 43.3 fl      MPV 12.7 fL      Platelets 155 10*3/mm3      Neutrophil % 37.4 %      Lymphocyte % 44.3 %      Monocyte % 13.7 %      Eosinophil % 4.2 %      Basophil % 0.2 %      Immature Grans % 0.2 %      Neutrophils, Absolute 2.30 10*3/mm3      Lymphocytes, Absolute 2.72 10*3/mm3      Monocytes, Absolute 0.84 10*3/mm3      Eosinophils, Absolute 0.26 10*3/mm3      Basophils, Absolute 0.01 10*3/mm3      Immature Grans, Absolute 0.01 10*3/mm3     Basic Metabolic Panel [[id number]]  (Abnormal) Collected:  02/06/19 1300    Specimen:  Blood Updated:  02/06/19 1330     Glucose 178 mg/dL      BUN 18 mg/dL      Creatinine 1.56 mg/dL      Sodium 140 mmol/L      Potassium 3.6 mmol/L      Chloride 99 mmol/L      CO2 31.0 mmol/L       Calcium 9.4 mg/dL      eGFR  African Amer 58 mL/min/1.73      BUN/Creatinine Ratio 11.5     Anion Gap 10.0 mmol/L          Imaging Results (last 72 hours)     Procedure Component Value Units Date/Time    XR Chest 2 View [[id number]] Collected:  02/04/19 1442     Updated:  02/04/19 1505    Narrative:           PROCEDURE: Chest PA and lateral    REASON FOR EXAM: Chest pain protocol    FINDINGS: Comparison study dated November 9, 2014. Cardiomegaly.  Pulmonary vasculature appears within normal limits. Lungs are  clear. Pleural spaces are normal . No acute osseous abnormality.      Impression:       1.  Cardiomegaly without decompensation.  2.  Otherwise unremarkable chest.    Electronically signed by:  Pj Cummins MD  2/4/2019 3:04 PM CST  Workstation: [id number]            HOSPITAL COURSE:  Patient was admitted to the stepdown unit for his acute exacerbation of heart failure with reduced ejection fraction.  He was diuresed with 80 mg of IV Lasix twice daily.  He diuresed well.  Consult was placed to Dr. Shook.  He saw and evaluated patient.  His Coreg was increased from 12.5 to 25 mg twice daily, he was also started on spironolactone 25 mg.  Entresto was continued.  He had echocardiogram with results as above. Patient diuresed well, his breathing greatly improved, chest pressure resolved, lower extremity edema resolved.  Patient was counseled on importance of adhering to low-salt diet and restricting fluids. He will follow-up with Em Gilman at heart failure clinic, Dr. Shook, Dr. Forbes for sleep apnea, and his PCP.    DISCHARGE CONDITION:   stable    DISPOSITION:  Home or Self Care    DISCHARGE MEDICATIONS     Discharge Medications      New Medications      Instructions Start Date   spironolactone 25 MG tablet  Commonly known as:  ALDACTONE   25 mg, Oral, Daily         Changes to Medications      Instructions Start Date   carvedilol 25 MG tablet  Commonly known as:  COREG  What changed:    · medication  strength  · how much to take   25 mg, Oral, 2 Times Daily With Meals         Continue These Medications      Instructions Start Date   albuterol sulfate  (90 Base) MCG/ACT inhaler  Commonly known as:  PROVENTIL HFA;VENTOLIN HFA;PROAIR HFA   2 puffs, Inhalation, Every 4 Hours PRN      furosemide 40 MG tablet  Commonly known as:  LASIX   40 mg, Oral, 2 Times Daily      lovastatin 20 MG tablet  Commonly known as:  MEVACOR   20 mg, Oral, Nightly      metFORMIN 500 MG tablet  Commonly known as:  GLUCOPHAGE   500 mg, Oral, Daily With Breakfast      potassium chloride 10 MEQ CR tablet  Commonly known as:  K-DUR   10 mEq, Oral, Daily      sacubitril-valsartan 24-26 MG tablet  Commonly known as:  ENTRESTO   1 tablet, Oral, 2 Times Daily         Stop These Medications    digoxin 125 MCG tablet  Commonly known as:  LANOXIN            INSTRUCTIONS:  Activity:   Activity Instructions     Activity as Tolerated          Diet:   Diet Instructions     Diet: Consistent Carbohydrate, Cardiac      Discharge Diet:   Consistent Carbohydrate  Cardiac       Fluid Restriction per day:  Other (See comments)    2000 mL fluid restriction, 2000 mg sodium restriction        Special instructions: Patient instructed to call MD or return to ED with worsening shortness of breath, chest pain, fever greater than 100.4 degrees F or any other medical concerns..    FOLLOW UP:   Additional Instructions for the Follow-ups that You Need to Schedule     Call MD With Problems / Concerns   As directed      Instructions: call your doctor or go to the ER if you have chest pain, shortness of breath, leg swelling, or fever of 100.4.    Order Comments:  Instructions: call your doctor or go to the ER if you have chest pain, shortness of breath, leg swelling, or fever of 100.4.            Follow-up Information     Em Gilman, [facility]. Go on 2/14/2019.    Specialties:  Cardiology, Gerontology  Why:  2/14/18 at 1015. Double booked with Dr. Lucia  appointment.   Contact information:  [address] [place] [address]   [address]  [place] [id number] [phone]             Rafael Shook MD PhD. Schedule an appointment as soon as possible for a visit in 4 week(s).    Specialties:  Cardiology, Interventional Radiology  Why:  Hospital follow up, CHF.   Contact information:  [address] [place] [address]   [place] [address] [place], [place] [id number] [phone]             Will Forbes MD. Schedule an appointment as soon as possible for a visit in 1 month(s).    Specialties:  Sleep Medicine, Neurology  Why:  Follow-up, KHANH  Contact information:  [address] [place], [place] [id number] [phone]             Shonna Ng APRN Follow up in 3 day(s).    Specialty:  Family Medicine  Contact information:  [address] [place], [place] [id number] [phone]                   PENDING TEST RESULTS AT DISCHARGE      Dr. Baltazar is the attending at time of discharge, He is aware of the patient's status and agrees with the above discharge summary.          This document has been electronically signed by Jamison Cuevas MD on February 7, 2019 1:35 PM

## 2019-02-07 NOTE — DISCHARGE INSTRUCTIONS
Fluid Restriction  Some health conditions may require you to restrict your fluid intake. This means that you need to limit the amount of fluid you drink each day. When you have a fluid restriction, you must carefully measure and keep track of the amount of fluid you drink. Your health care provider will identify the specific amount of fluid you are allowed each day. This amount may depend on several things, such as:  · The amount of urine you produce in a day.  · How much fluid you are keeping (retaining) in your body.  · Your blood pressure.    What is my plan?  Your health care provider recommends that you limit your fluid intake to 2 liters a day.      What counts toward my fluid intake?  Your fluid intake includes all liquids that you drink, as well as any foods that become liquid at room temperature.  The following are examples of some fluids that you will have to restrict:  · Tea, coffee, soda, lemonade, milk, water, juice, sport drinks, and nutritional supplement beverages.  · Alcoholic beverages.  · Cream.  · Gravy.  · Ice cubes.  · Soup and broth.    The following are examples of foods that become liquid at room temperature. These foods will also count toward your fluid intake.  · Ice cream and ice milk.  · Frozen yogurt and sherbet.  · Frozen ice pops.  · Flavored gelatin.    How do I keep track of my fluid intake?  Each morning, fill a jug with the amount of water that equals the amount of fluid you are allowed for the day. You can use this water as a guideline for fluid allowance. Each time you take in any form of fluid, including ice cubes and foods that become liquid at room temperature, pour an equal amount of water out of the container. This helps you to see how much fluid you are taking in. It also helps you to see how much of your fluid intake is left for the rest of the day.  The following conversions may also be helpful in measuring your fluid intake:  · 1 cup equals 8 oz (240 mL).  · ¾ cup  equals 6 oz (180 mL).  · ? cup equals 5? oz (160 mL).  · ½ cup equals 4 oz (120 mL).  · ? cup equals 2? oz (80 mL).  · ¼ cup equals 2 oz (60 mL).  · 2 Tbsp equals 1 oz (30 mL).    What home care instructions should I follow while restricting fluids?  · Make sure that you stay within the recommended limit each day. Always measure and keep track of your fluids, as well as any foods that turn liquid at room temperature.  · Use small cups and glasses and learn to sip fluids slowly.  · Add a slice of fresh lemon or lemon juice to water or ice. This helps to satisfy your thirst.  · Freeze fruit juice or water in an ice cube tray. Use this as part of your fluid allowance. These cubes are useful for quenching your thirst. Measure the amount of liquid in each ice cube prior to freezing so you can subtract this amount from your day’s allowance when you consume each frozen cube.  · Try frozen fruits between meals, such as grapes or strawberries.  · Swallow your pills along with meals or soft foods, such as applesauce or mashed potatoes. This helps you to save your fluid allowance for something that you enjoy.  · Weigh yourself every day. Keeping track of your daily weight can help you and your health care provider to notice as soon as possible if you are retaining too much fluid in your body.  ? Weigh yourself every morning after you urinate but before you eat breakfast.  ? Wear the same amount of clothing each time you weigh yourself.  ? Write down your daily weight. Give this weight record to your health care provider. If your weight is going up, you may be retaining too much fluid. Every 2 cups (480 mL) of fluid retained in the body becomes an extra 1 lb (0.45 kg) of body weight.  · Avoid salty foods. These foods make you thirsty and make fluid control more difficult.  · Brush your teeth often or rinse your mouth with mouthwash to help your dry mouth. Lemon wedges, hard sour candies, chewing gum, or breath spray may also  help to moisten your mouth.  · Keep the temperature in your home at a cooler level. Dry air increases thirst, so keep the air in your home as humid as possible.  · Avoid being out in the hot sun, which can cause you to sweat and become thirsty.  What are some signs that I may be taking in too much fluid?  You may be taking in too much fluid if:  · Your weight increases. Contact your health care provider if your weight increases 3 lb or more in a day or if it increases 5 lb or more in a week.  · Your face, hands, legs, feet, and belly (abdomen) start to swell.  · You have trouble breathing.    This information is not intended to replace advice given to you by your health care provider. Make sure you discuss any questions you have with your health care provider.  Document Released: 10/14/2008 Document Revised: 05/25/2017 Document Reviewed: 05/19/2015  [employer] Interactive Patient Education © 2018 [employer]  Low-Sodium Eating Plan  Sodium, which is an element that makes up salt, helps you maintain a healthy balance of fluids in your body. Too much sodium can increase your blood pressure and cause fluid and waste to be held in your body.  Your health care provider or dietitian may recommend following this plan if you have high blood pressure (hypertension), kidney disease, liver disease, or heart failure. Eating less sodium can help lower your blood pressure, reduce swelling, and protect your heart, liver, and kidneys.  What are tips for following this plan?  General guidelines  · Most people on this plan should limit their sodium intake to 1,500-2,000 mg (milligrams) of sodium each day.  Reading food labels  · The Nutrition Facts label lists the amount of sodium in one serving of the food. If you eat more than one serving, you must multiply the listed amount of sodium by the number of servings.  · Choose foods with less than 140 mg of sodium per serving.  · Avoid foods with 300 mg of sodium or more per  "serving.  Shopping  · Look for lower-sodium products, often labeled as \"low-sodium\" or \"no salt added.\"  · Always check the sodium content even if foods are labeled as \"unsalted\" or \"no salt added\".  · Buy fresh foods.  ? Avoid canned foods and premade or frozen meals.  ? Avoid canned, cured, or processed meats  · Buy breads that have less than 80 mg of sodium per slice.  Cooking  · Eat more home-cooked food and less restaurant, buffet, and fast food.  · Avoid adding salt when cooking. Use salt-free seasonings or herbs instead of table salt or sea salt. Check with your health care provider or pharmacist before using salt substitutes.  · Cook with plant-based oils, such as canola, sunflower, or olive oil.  Meal planning  · When eating at a restaurant, ask that your food be prepared with less salt or no salt, if possible.  · Avoid foods that contain MSG (monosodium glutamate). MSG is sometimes added to Chinese food, bouillon, and some canned foods.  What foods are recommended?  The items listed may not be a complete list. Talk with your dietitian about what dietary choices are best for you.  Grains  Low-sodium cereals, including oats, puffed wheat and rice, and shredded wheat. Low-sodium crackers. Unsalted rice. Unsalted pasta. Low-sodium bread. Whole-grain breads and whole-grain pasta.  Vegetables  Fresh or frozen vegetables. \"No salt added\" canned vegetables. \"No salt added\" tomato sauce and paste. Low-sodium or reduced-sodium tomato and vegetable juice.  Fruits  Fresh, frozen, or canned fruit. Fruit juice.  Meats and other protein foods  Fresh or frozen (no salt added) meat, poultry, seafood, and fish. Low-sodium canned tuna and salmon. Unsalted nuts. Dried peas, beans, and lentils without added salt. Unsalted canned beans. Eggs. Unsalted nut butters.  Dairy  Milk. Soy milk. Cheese that is naturally low in sodium, such as ricotta cheese, fresh mozzarella, or Swiss cheese Low-sodium or reduced-sodium cheese. Cream " cheese. Yogurt.  Fats and oils  Unsalted butter. Unsalted margarine with no trans fat. Vegetable oils such as canola or olive oils.  Seasonings and other foods  Fresh and dried herbs and spices. Salt-free seasonings. Low-sodium mustard and ketchup. Sodium-free salad dressing. Sodium-free light mayonnaise. Fresh or refrigerated horseradish. Lemon juice. Vinegar. Homemade, reduced-sodium, or low-sodium soups. Unsalted popcorn and pretzels. Low-salt or salt-free chips.  What foods are not recommended?  The items listed may not be a complete list. Talk with your dietitian about what dietary choices are best for you.  Grains  Instant hot cereals. Bread stuffing, pancake, and biscuit mixes. Croutons. Seasoned rice or pasta mixes. Noodle soup cups. Boxed or frozen macaroni and cheese. Regular salted crackers. Self-rising flour.  Vegetables  Sauerkraut, pickled vegetables, and relishes. Olives. French fries. Onion rings. Regular canned vegetables (not low-sodium or reduced-sodium). Regular canned tomato sauce and paste (not low-sodium or reduced-sodium). Regular tomato and vegetable juice (not low-sodium or reduced-sodium). Frozen vegetables in sauces.  Meats and other protein foods  Meat or fish that is salted, canned, smoked, spiced, or pickled. Lema, ham, sausage, hotdogs, corned beef, chipped beef, packaged lunch meats, salt pork, jerky, pickled herring, anchovies, regular canned tuna, sardines, salted nuts.  Dairy  Processed cheese and cheese spreads. Cheese curds. Blue cheese. Feta cheese. String cheese. Regular cottage cheese. Buttermilk. Canned milk.  Fats and oils  Salted butter. Regular margarine. Ghee. Lema fat.  Seasonings and other foods  Onion salt, garlic salt, seasoned salt, table salt, and sea salt. Canned and packaged gravies. Worcestershire sauce. Tartar sauce. Barbecue sauce. Teriyaki sauce. Soy sauce, including reduced-sodium. Steak sauce. Fish sauce. Oyster sauce. Cocktail sauce. Horseradish that you  find on the shelf. Regular ketchup and mustard. Meat flavorings and tenderizers. Bouillon cubes. Hot sauce and Tabasco sauce. Premade or packaged marinades. Premade or packaged taco seasonings. Relishes. Regular salad dressings. Salsa. Potato and tortilla chips. Corn chips and puffs. Salted popcorn and pretzels. Canned or dried soups. Pizza. Frozen entrees and pot pies.  Summary  · Eating less sodium can help lower your blood pressure, reduce swelling, and protect your heart, liver, and kidneys.  · Most people on this plan should limit their sodium intake to 1,500-2,000 mg (milligrams) of sodium each day.  · Canned, boxed, and frozen foods are high in sodium. Restaurant foods, fast foods, and pizza are also very high in sodium. You also get sodium by adding salt to food.  · Try to cook at home, eat more fresh fruits and vegetables, and eat less fast food, canned, processed, or prepared foods.  This information is not intended to replace advice given to you by your health care provider. Make sure you discuss any questions you have with your health care provider.  Document Released: 06/09/2003 Document Revised: 12/11/2017 Document Reviewed: 12/11/2017  [employer] Interactive Patient Education © 2018 [employer]

## 2019-02-07 NOTE — PROGRESS NOTES
FAMILY MEDICINE DAILY PROGRESS NOTE  NAME: Matti Bravo  : 1970  MRN: [id number]     LOS: 0 days     PROVIDER OF SERVICE: Jamison Cuevas MD    Chief Complaint: Chronic systolic heart failure (CMS/HCC)    Subjective:     Interval History:  History taken from: patient chart  No acute overnight events.  Medications adjusted.  Coreg was increased from 12.5-25 mg twice a day.  He is also added on spironolactone 25 mg.  He was given a dose of acetazolamide yesterday.  Patient breathing very well.  He has diuresed very well.  No new complaints today.    Review of Systems:   Review of Systems   Constitutional: Negative for activity change, appetite change, chills, diaphoresis and fever.   HENT: Negative for congestion, rhinorrhea, sinus pressure, sinus pain and sore throat.    Eyes: Negative for visual disturbance.   Respiratory: Negative for apnea, cough, choking, chest tightness, shortness of breath and wheezing.    Cardiovascular: Negative for chest pain, palpitations and leg swelling.   Gastrointestinal: Negative for abdominal distention, abdominal pain, blood in stool, constipation, diarrhea, nausea and vomiting.   Genitourinary: Negative for difficulty urinating, dysuria, frequency, hematuria and urgency.   Musculoskeletal: Negative for arthralgias, back pain, joint swelling, myalgias and neck pain.   Skin: Negative for color change, pallor, rash and wound.   Neurological: Negative for dizziness, weakness, numbness and headaches.   Psychiatric/Behavioral: Negative for agitation and behavioral problems.       Objective:     Vital Signs  Temp:  [96.5 °F (35.8 °C)-97.5 °F (36.4 °C)] 96.5 °F (35.8 °C)  Heart Rate:  [41-87] 87  Resp:  [16-18] 18  BP: ()/(57-83) 109/68    Physical Exam  Physical Exam   Constitutional: He is oriented to person, place, and time. He appears well-developed and well-nourished. No distress.   HENT:   Head: Normocephalic and atraumatic.   Right Ear: External ear  normal.   Left Ear: External ear normal.   Nose: Nose normal.   Eyes: Conjunctivae and EOM are normal. Pupils are equal, round, and reactive to light. Right eye exhibits no discharge. Left eye exhibits no discharge. No scleral icterus.   Neck: Normal range of motion. Neck supple. No thyromegaly present.   Cardiovascular: Normal rate, regular rhythm, normal heart sounds and intact distal pulses. Exam reveals no gallop and no friction rub.   No murmur heard.  Pulmonary/Chest: Effort normal and breath sounds normal. No respiratory distress. He has no wheezes. He has no rales. He exhibits no tenderness.   Abdominal: Soft. Bowel sounds are normal. He exhibits no distension and no mass. There is no tenderness. There is no guarding.   Musculoskeletal: Normal range of motion. He exhibits edema (trace to midshin). He exhibits no tenderness or deformity.   Lymphadenopathy:     He has no cervical adenopathy.   Neurological: He is alert and oriented to person, place, and time. No cranial nerve deficit.   Skin: Skin is warm and dry. He is not diaphoretic.   Psychiatric: He has a normal mood and affect. His behavior is normal. Judgment and thought content normal.       Medication Review    Current Facility-Administered Medications:   •  acetaminophen (TYLENOL) tablet 650 mg, 650 mg, Oral, Q4H PRN, Jamison Cuevas MD  •  atorvastatin (LIPITOR) tablet 10 mg, 10 mg, Oral, Nightly, Jamison Cuevas MD, 10 mg at 02/06/19 2028  •  calcium carbonate (TUMS) chewable tablet 500 mg (200 mg elemental), 2 tablet, Oral, BID PRN, Jamison Cuevas MD  •  carvedilol (COREG) tablet 25 mg, 25 mg, Oral, Q12H, Rafael Shook MD PhD, 25 mg at 02/07/19 0839  •  heparin (porcine) 5000 UNIT/ML injection 5,000 Units, 5,000 Units, Subcutaneous, Q8H, Jamison Cuevas MD, 5,000 Units at 02/07/19 0612  •  ondansetron (ZOFRAN) injection 4 mg, 4 mg, Intravenous, Q6H PRN, Jamison Cuevas MD  •  sacubitril-valsartan  (ENTRESTO) 24-26 MG tablet 1 tablet, 1 tablet, Oral, BID, Jamison Cuevas MD, 1 tablet at 02/07/19 0839  •  sodium chloride 0.9 % flush 10 mL, 10 mL, Intravenous, PRN, Manny Taylor MD  •  sodium chloride 0.9 % flush 3 mL, 3 mL, Intravenous, Q12H, Jamison Cuevas MD, 3 mL at 02/07/19 0839  •  sodium chloride 0.9 % flush 3-10 mL, 3-10 mL, Intravenous, PRN, Jamison Cuevas MD  •  spironolactone (ALDACTONE) tablet 25 mg, 25 mg, Oral, Daily, Rafael Shook MD PhD, 25 mg at 02/06/19 0811     Diagnostic Data    Lab Results (last 24 hours)     Procedure Component Value Units Date/Time    BNP [[id number]]  (Abnormal) Collected:  02/07/19 0639    Specimen:  Blood Updated:  02/07/19 0728     proBNP 687.0 pg/mL     Comprehensive Metabolic Panel [[id number]]  (Abnormal) Collected:  02/07/19 0639    Specimen:  Blood Updated:  02/07/19 0726     Glucose 105 mg/dL      BUN 20 mg/dL      Creatinine 1.52 mg/dL      Sodium 142 mmol/L      Potassium 3.7 mmol/L      Chloride 100 mmol/L      CO2 31.0 mmol/L      Calcium 9.3 mg/dL      Total Protein 6.8 g/dL      Albumin 3.60 g/dL      ALT (SGPT) 23 U/L      AST (SGOT) 22 U/L      Alkaline Phosphatase 56 U/L      Total Bilirubin 1.0 mg/dL      eGFR  African Amer 60 mL/min/1.73      Globulin 3.2 gm/dL      A/G Ratio 1.1 g/dL      BUN/Creatinine Ratio 13.2     Anion Gap 11.0 mmol/L     CBC Auto Differential [[id number]]  (Abnormal) Collected:  02/07/19 0639    Specimen:  Blood Updated:  02/07/19 0700     WBC 6.14 10*3/mm3      RBC 4.37 10*6/mm3      Hemoglobin 13.5 g/dL      Hematocrit 39.7 %      MCV 90.8 fL      MCH 30.9 pg      MCHC 34.0 g/dL      RDW 13.1 %      RDW-SD 43.3 fl      MPV 12.7 fL      Platelets 155 10*3/mm3      Neutrophil % 37.4 %      Lymphocyte % 44.3 %      Monocyte % 13.7 %      Eosinophil % 4.2 %      Basophil % 0.2 %      Immature Grans % 0.2 %      Neutrophils, Absolute 2.30 10*3/mm3      Lymphocytes, Absolute 2.72 10*3/mm3       Monocytes, Absolute 0.84 10*3/mm3      Eosinophils, Absolute 0.26 10*3/mm3      Basophils, Absolute 0.01 10*3/mm3      Immature Grans, Absolute 0.01 10*3/mm3     Basic Metabolic Panel [[id number]]  (Abnormal) Collected:  02/06/19 1300    Specimen:  Blood Updated:  02/06/19 1330     Glucose 178 mg/dL      BUN 18 mg/dL      Creatinine 1.56 mg/dL      Sodium 140 mmol/L      Potassium 3.6 mmol/L      Chloride 99 mmol/L      CO2 31.0 mmol/L      Calcium 9.4 mg/dL      eGFR  African Amer 58 mL/min/1.73      BUN/Creatinine Ratio 11.5     Anion Gap 10.0 mmol/L     Hemoglobin & Hematocrit, Blood [[id number]]  (Normal) Collected:  02/06/19 1300    Specimen:  Blood Updated:  02/06/19 1308     Hemoglobin 13.7 g/dL      Hematocrit 40.2 %             I reviewed the patient's new clinical results.    Assessment/Plan:     Active Hospital Problems    Diagnosis Date Noted   • **Chronic systolic heart failure (CMS/[facility]) [I50.22] 09/26/2017     Ejection fraction 01/09/2019 10%, severe hypokinesis, followed by heart failure navigator. Will continue home meds of carvedilol and sacutbitril-valsartan.  Hemodynamically stable  Dr. Shook following  -diuresing with 80 mg Lasix IV BID  -Carvedilol increased to 25 mg bid  -Sacubitrill-valsartan 24-26 mg p.o. twice daily  - digoxin discontinued, started on spironolactone 25mg  -echo yesterday with EF 10% and severe hypokinesis, pulmonary hypertension     • Controlled type 2 diabetes mellitus without complication, without long-term current use of insulin (CMS/[facility]) [E11.9] 02/05/2019     -Low to moderate intensity insulin Novolin sliding scale  A1c 5.7     • Morbid obesity (CMS/[facility]) [E66.01] 12/14/2017     Body mass index is 41.26 kg/m².   Nutrition consultation     • Essential hypertension [I10] 09/26/2017     patient currently hemodynamically stable.  -Carvedilol 25 mg p.o. twice daily  -Sacubitrill-valsartan 24-26 mg p.o. twice daily  -spironolactone     • Mixed hyperlipidemia [E78.2]  09/26/2017     Continue home medications, will elevate to moderate intensity statin  - Atorvastatin 20mg nightly     • KHANH (obstructive sleep apnea) [G47.33] 09/26/2017     bipap at night       DVT prophylaxis: Heparin 5000 units subcutaneous every 8 hours  Code Status and Medical Interventions:   Ordered at: 02/04/19 4332     Level Of Support Discussed With:    Patient     Code Status:    CPR     Medical Interventions (Level of Support Prior to Arrest):    Full       Plan for disposition:home today          This document has been electronically signed by Jamison Cuevas MD on February 7, 2019 12:08 PM

## 2019-02-07 NOTE — PROGRESS NOTES
"  Cardiovascular Daily Inpatient Progress Note  Rafael Shook M.D., Ph.D., [facility]      Subjective     Interval History:   Desires to go home today.  He tells me that his breathing has markedly improved.  He continues to ambulate in the fu without symptoms.  He tells me that he continues to have adequate urine output.  He denies resting, exertional or nocturnal angina.  No dizziness, lightheadedness or syncope.  He does state that he did not follow-up with Dr. Forbes, but he is continuing to use a CPAP at home.  His CPAP pressures have not been checked in quite some time.  Asymptomatic bradycardia overnight.    Review of Systems   Cardiovascular: Positive for dyspnea on exertion and leg swelling. Negative for chest pain, claudication, cyanosis, irregular heartbeat, near-syncope, orthopnea, palpitations, paroxysmal nocturnal dyspnea and syncope.   Respiratory: Positive for shortness of breath and snoring. Negative for cough, hemoptysis, sleep disturbances due to breathing, sputum production and wheezing.        Objective     Vital Sign Min/Max for last 24 hours  Temp  Min: 96.2 °F (35.7 °C)  Max: 97.5 °F (36.4 °C)   BP  Min: 98/60  Max: 128/83   Pulse  Min: 41  Max: 75   Resp  Min: 16  Max: 16   SpO2  Min: 92 %  Max: 94 %   No Data Recorded   Weight  Min: 146 kg (321 lb 3.2 oz)  Max: 146 kg (321 lb 3.2 oz)     Flowsheet Rows      First Filed Value   Admission Height  193 cm (76\") Documented at 02/04/2019 1319   Admission Weight  150 kg (330 lb)  (Abnormal)  Documented at 02/04/2019 1319            02/05/19  0557 02/06/19  0457 02/07/19  0410   Weight: (!) 150 kg (330 lb 6.4 oz) (!) 148 kg (325 lb 12.8 oz) (!) 146 kg (321 lb 3.2 oz)     Body mass index is 39.1 kg/m².  Pulse Pressure: within normal limits  Pulse Ox: Normal        on room air  General: alert, appears stated age and cooperative     Body Habitus: obese    JVP: Volume/Pulsation:   jugular venous pressure to 6 cm vertical height above mid-right atrium. "  Normal waveforms, overall, with slightly accentuated V waves  Appropriate inspiratory decrease.  No Kussmaul's. No Simona's.   Respirations: no increased work of breathing            Chest:  Normal              Pulmonary:Normal                               Precordium: Normal impulses. P2 is not palpable.  RV Heave: absent  LV Heave: present  [place]:  displaced laterally and displaced inferiorly  Palpable S4: present.  Heart rate: normal    Heart Rhythm: regular                                     Heart Sounds: S1: normal intensity  S2: normal intensity, increased P2  S3: present                              S4: present  Opening Snap: absent            A2-OS:  no  Pericardial Rub:  Absent: No             Ejection click: None                                        Murmurs:  present    Murmur 1 Type: systolic  Grade: 1/6        Timing: holosystolic  Location:   apex   Description:ejection  Radiation:  nonradiating  LE Skin: Chronic changes of venous insufficiency.  Trace lower extremity edema to the level of the ankles only.  Neurovascularly intact.  Capillary refill adequate.           DATA REVIEWED:     Results for orders placed during the hospital encounter of 02/04/19   Adult Transthoracic Echo Limited W/ Cont if Necessary Per Protocol    Narrative · 3D acquisition for left ventricular ejection fraction. Live 3D and full   volume to assess left ventricular ejection fraction was utilized.  · Left ventricle systolic function is severely decreased. Estimated LVEF   is 10%. Left ventricle cavity severely dilated with restrictive diastolic   dysfunction. Findings are consistent with dilated cardiomyopathy.  · Right ventricle is mildly dilated with mildly reduced right ventricular   systolic function.  · Moderate mitral valve regurgitation is present.  · Moderate tricuspid valve regurgitation is present. Pulmonary   hypertension is present with a PA systolic pressure of 70 mmHg.  · There is mild pulmonic valve  regurgitation present.  · There is a trivial pericardial effusion adjacent to the left ventricle            --------------------------------------------------------------------------------------------------  LABS:   Lab Results   Component Value Date    GLUCOSE 105 (H) 02/07/2019    BUN 20 02/07/2019    CREATININE 1.52 (H) 02/07/2019    EGFRIFAFRI 60 (L) 02/07/2019    BCR 13.2 02/07/2019    K 3.7 02/07/2019    CO2 31.0 02/07/2019    CALCIUM 9.3 02/07/2019    ALBUMIN 3.60 02/07/2019    AST 22 02/07/2019    ALT 23 02/07/2019       Lab Results   Component Value Date    WBC 6.14 02/07/2019    HGB 13.5 (L) 02/07/2019    HCT 39.7 02/07/2019    MCV 90.8 02/07/2019     02/07/2019       Lab Results   Component Value Date    CHLPL 133 11/09/2014    TRIG 73 11/09/2014    HDL 25 (L) 11/09/2014    LDL 93 11/09/2014       Lab Results   Component Value Date    TSH 0.61 04/27/2014       Lab Results   Component Value Date    CKTOTAL 316 (H) 11/08/2014    CKMB 3.5 11/08/2014    TROPONINI 0.230 (C) 02/05/2019       Lab Results   Component Value Date    HGBA1C 5.7 (H) 02/05/2019     Lab Results   Component Value Date    DDIMER 1,331 (H) 04/26/2014     Lab Results   Component Value Date    ALT 23 02/07/2019     Lab Results   Component Value Date    HGBA1C 5.7 (H) 02/05/2019     Lab Results   Component Value Date    CREATININE 1.52 (H) 02/07/2019     No results found for: IRON, TIBC, FERRITIN  No results found for: INR, PROTIME  Lab Results (last 24 hours)     Procedure Component Value Units Date/Time    Comprehensive Metabolic Panel [[id number]]  (Abnormal) Collected:  02/07/19 0639    Specimen:  Blood Updated:  02/07/19 0726     Glucose 105 mg/dL      BUN 20 mg/dL      Creatinine 1.52 mg/dL      Sodium 142 mmol/L      Potassium 3.7 mmol/L      Chloride 100 mmol/L      CO2 31.0 mmol/L      Calcium 9.3 mg/dL      Total Protein 6.8 g/dL      Albumin 3.60 g/dL      ALT (SGPT) 23 U/L      AST (SGOT) 22 U/L      Alkaline Phosphatase 56  U/L      Total Bilirubin 1.0 mg/dL      eGFR  African Amer 60 mL/min/1.73      Globulin 3.2 gm/dL      A/G Ratio 1.1 g/dL      BUN/Creatinine Ratio 13.2     Anion Gap 11.0 mmol/L     CBC Auto Differential [[id number]]  (Abnormal) Collected:  02/07/19 0639    Specimen:  Blood Updated:  02/07/19 0700     WBC 6.14 10*3/mm3      RBC 4.37 10*6/mm3      Hemoglobin 13.5 g/dL      Hematocrit 39.7 %      MCV 90.8 fL      MCH 30.9 pg      MCHC 34.0 g/dL      RDW 13.1 %      RDW-SD 43.3 fl      MPV 12.7 fL      Platelets 155 10*3/mm3      Neutrophil % 37.4 %      Lymphocyte % 44.3 %      Monocyte % 13.7 %      Eosinophil % 4.2 %      Basophil % 0.2 %      Immature Grans % 0.2 %      Neutrophils, Absolute 2.30 10*3/mm3      Lymphocytes, Absolute 2.72 10*3/mm3      Monocytes, Absolute 0.84 10*3/mm3      Eosinophils, Absolute 0.26 10*3/mm3      Basophils, Absolute 0.01 10*3/mm3      Immature Grans, Absolute 0.01 10*3/mm3     BNP [[id number]] Collected:  02/07/19 0639    Specimen:  Blood Updated:  02/07/19 0655    Basic Metabolic Panel [[id number]]  (Abnormal) Collected:  02/06/19 1300    Specimen:  Blood Updated:  02/06/19 1330     Glucose 178 mg/dL      BUN 18 mg/dL      Creatinine 1.56 mg/dL      Sodium 140 mmol/L      Potassium 3.6 mmol/L      Chloride 99 mmol/L      CO2 31.0 mmol/L      Calcium 9.4 mg/dL      eGFR  African Amer 58 mL/min/1.73      BUN/Creatinine Ratio 11.5     Anion Gap 10.0 mmol/L     Hemoglobin & Hematocrit, Blood [[id number]]  (Normal) Collected:  02/06/19 1300    Specimen:  Blood Updated:  02/06/19 1308     Hemoglobin 13.7 g/dL      Hematocrit 40.2 %         Imaging Results (last 24 hours)     ** No results found for the last 24 hours. **            Assessment/Plan             1. ADHF, euvolemic with evidence of pre-renal azotemia. NYHA stage: C; FC-currently improved to II.   LVEF is <10%.  Last TTE: 1/2019. This is secondary to dietary indiscretion.    Patient is currently euvolemic with adequate perfusion.   Favorable evidence of hemoconcentration present today.  GFR is adequate.  I spoke with him today about holding his diuretic therapy this morning given his current prerenal azotemia.  I do think he can discharge home today.   -Low sodium diet (<2 grams); 2,000 mL fluid restriction with strict I/Os.  I did urge him to continue a low-sodium diet and be extremely particular with his fluid restriction as this likely is the etiology of his most recent 2 admissions.  -Diuresis: With his current prerenal azotemia I have held his morning furosemide.    I would like for him to resume furosemide 40 MG PO BID this afternoon.    -BB: Carvedilol, 25 mg p.o. twice daily   -ACE/ARB/ENTRESTO: Entresto  -Aldosterone antagonist: Aldactone 25mg ON HOLD. He may RESUME on February 8, 2019.  -CardioMEMS: Indicated, will discuss as outpatient..  -ICD/CRT: Will address as out-pt pending functional class  -Dispo:  He is suitable for discharge today from a cardiovascular standpoint with medication changes, as noted above.  -Follow-up with CHF clinic and in my office have been ordered.  He was provided with my card and asked to contact me if he has any questions or concerns.    2. KHANH, CPAP compliance, but appointment noncompliance with Dr. Forbes.  I informed him that some of his nocturnal bradycardia may be related to inadequate pressures from his CPAP that has not been checked in some time.  Obviously, he is also on a beta-blocker.  With that said, it was asymptomatic.  He has no evidence of chronotropic incompetence.  His heart rate is adequate during the day.  So, will I will make no changes to his beta-blocker and arrange close outpatient follow-up with Dr. Forbes.  -Outpatient appointment with Dr. Forbes    3. CODE: FULL    4. DVT Prop: Heparin SQ    5. Dispo: He is suitable for discharge today from a cardiovascular standpoint.  I will sign off.      Thank you for allowing me to participate in the care of your patient.  If I can be of any  further assistance, please do not hesitate to contact me.            This document has been electronically signed by Rafael Shook MD PhD on February 7, 2019 7:44 AM

## 2019-02-07 NOTE — CONSULTS
"Adult Nutrition  Assessment    Patient Name:  Matti Bravo  YOB: 1970  MRN: [id number]  Admit Date:  2/4/2019    Assessment Date:  2/7/2019    Comments:  The patient was previously educated on \"Low-Sodium Diet\", \"Heart-Healthy Diet\", and fluid restrictions. Followed up to see if the patient had any questions regarding education. The patient did not have any questions regarding education and did say that he was planning on many changes when he arrived home after d/c. Encouraged him to actively make changes and reach out to outpatient dietitian if he had questions regarding education. Gave him outpatient dietitian contact info.     Reason for Assessment     Row Name 02/07/19 1443          Reason for Assessment    Reason For Assessment  follow-up protocol  (Pended)          Nutrition/Diet History     Row Name 02/07/19 1443          Nutrition/Diet History    Typical Food/Fluid Intake  Patient is feeling much better today and does not have any questions regarding education.   (Pended)            Labs/Tests/Procedures/Meds     Row Name 02/07/19 1444          Labs/Procedures/Meds    Lab Results Reviewed  reviewed, pertinent  (Pended)      Lab Results Comments  Glucose-105, Cr-1.52  (Pended)         Diagnostic Tests/Procedures    Diagnostic Test/Procedure Reviewed  reviewed  (Pended)         Medications    Pertinent Medications Reviewed  reviewed  (Pended)          Physical Findings     Row Name 02/07/19 1444          Physical Findings    Overall Physical Appearance  obese  (Pended)          Estimated/Assessed Needs     Row Name 02/07/19 1444          Calculation Measurements    Weight Used For Calculations  146 kg (321 lb 14 oz)  (Pended)         KCAL/KG    14 Kcal/Kg (kcal)  2044  (Pended)      15 Kcal/Kg (kcal)  2190  (Pended)      18 Kcal/Kg (kcal)  2628  (Pended)      20 Kcal/Kg (kcal)  2920  (Pended)      25 Kcal/Kg (kcal)  3650  (Pended)      30 Kcal/Kg (kcal)  4380  (Pended)      35 Kcal/Kg " (kcal)  5110  (Pended)      40 Kcal/Kg (kcal)  5840  (Pended)      45 Kcal/Kg (kcal)  6570  (Pended)      50 Kcal/Kg (kcal)  7300  (Pended)         [place]-St. Jeor Equation    RMR ([place]-St. Jeor Equation)  2436.5  (Pended)         Fluid Requirements    [place]-Segar Method (over 20 kg)  4420  (Pended)          Nutrition Prescription Ordered     Row Name 02/07/19 1444          Nutrition Prescription PO    Current PO Diet  Regular  (Pended)      Fluid Consistency  Thin  (Pended)      Common Modifiers  Cardiac;Fluid Restriction;Low Sodium  (Pended)      Fluid Restriction mL per Tray  250 mL  (Pended)      Fluid Restriction mL per Day  Other (comment)  (Pended)  2000 mL     Low Sodium Details  1,500 mg Sodium  (Pended)          Evaluation of Received Nutrient/Fluid Intake     Row Name 02/07/19 1444          Calculation Measurements    Weight Used For Calculations  146 kg (321 lb 14 oz)  (Pended)         PO Evaluation    Number of Days PO Intake Evaluated  1 day  (Pended)      Number of Meals  3  (Pended)      % PO Intake  100%  (Pended)          Evaluation of Prescribed Nutrient/Fluid Intake     Row Name 02/07/19 1444          Calculation Measurements    Weight Used For Calculations  146 kg (321 lb 14 oz)  (Pended)              Electronically signed by:  Chelsie Terrell  02/07/19 2:45 PM

## 2019-02-08 ENCOUNTER — READMISSION MANAGEMENT (OUTPATIENT)
Dept: CALL CENTER | Facility: HOSPITAL | Age: 49
End: 2019-02-08

## 2019-02-08 NOTE — OUTREACH NOTE
Prep Survey      Responses   Facility patient discharged from?  [facility]   Is patient eligible?  Yes   Discharge diagnosis  A/C CHF,  EF 10%   Does the patient have one of the following disease processes/diagnoses(primary or secondary)?  CHF   Does the patient have Home health ordered?  No   Is there a DME ordered?  No   Comments regarding appointments  2000 mL fluid restriction, 2000mg sodium restriction   Prep survey completed?  Yes          Bertha Perez RN

## 2019-02-11 ENCOUNTER — READMISSION MANAGEMENT (OUTPATIENT)
Dept: CALL CENTER | Facility: HOSPITAL | Age: 49
End: 2019-02-11

## 2019-02-11 NOTE — OUTREACH NOTE
CHF Week 1 Survey      Responses   Facility patient discharged from?  [facility]   Does the patient have one of the following disease processes/diagnoses(primary or secondary)?  CHF   Is there a successful TCM telephone encounter documented?  No   CHF Week 1 attempt successful?  Yes   Call start time  1407   Call end time  1410   Discharge diagnosis  A/C CHF,  EF 10%   Is patient permission given to speak with other caregiver?  Yes   List who call center can speak with  mother   Meds reviewed with patient/caregiver?  Yes   Is the patient having any side effects they believe may be caused by any medication additions or changes?  No   Does the patient have all medications ordered at discharge?  Yes   Is the patient taking all medications as directed (includes completed medication regime)?  Yes   Comments regarding appointments  2000 mL fluid restriction, 2000mg sodium restriction   Does the patient have a primary care provider?   Yes   Does the patient have an appointment with their PCP within 7 days of discharge?  Yes   Has the patient kept scheduled appointments due by today?  N/A   Psychosocial issues?  No   Comments  BP 96/70 monitoring at home, normally runs 101/80.    Did the patient receive a copy of their discharge instructions?  Yes   Nursing interventions  Reviewed instructions with patient   What is the patient's perception of their health status since discharge?  Improving   Nursing interventions  Nurse provided patient education   Is the patient weighing daily?  Yes   Does the patient have scales?  Yes   Daily weight interventions  Education provided on importance of daily weight   Is the patient able to teach back Heart Failure diet management?  Yes   Is the patient able to teach back signs and symptoms of worsening condition? (i.e. weight gain, shortness of air, etc.)  Yes   Is the patient/caregiver able to teach back the hierarchy of who to call/visit for symptoms/problems? PCP, Specialist, Home  health nurse, Urgent Care, ED, 911  Yes    CHF Week 1 call completed?  Yes          Jennifer Nunez RN

## 2019-02-19 ENCOUNTER — READMISSION MANAGEMENT (OUTPATIENT)
Dept: CALL CENTER | Facility: HOSPITAL | Age: 49
End: 2019-02-19

## 2019-02-19 NOTE — OUTREACH NOTE
CHF Week 2 Survey      Responses   Facility patient discharged from?  [facility]   Does the patient have one of the following disease processes/diagnoses(primary or secondary)?  CHF   Week 2 attempt successful?  No   Unsuccessful attempts  Attempt 1          Keisha Ngo RN

## 2019-02-20 ENCOUNTER — OFFICE VISIT (OUTPATIENT)
Dept: CARDIOLOGY | Facility: CLINIC | Age: 49
End: 2019-02-20

## 2019-02-20 ENCOUNTER — READMISSION MANAGEMENT (OUTPATIENT)
Dept: CALL CENTER | Facility: HOSPITAL | Age: 49
End: 2019-02-20

## 2019-02-20 VITALS
BODY MASS INDEX: 38.36 KG/M2 | DIASTOLIC BLOOD PRESSURE: 74 MMHG | SYSTOLIC BLOOD PRESSURE: 100 MMHG | WEIGHT: 315 LBS | OXYGEN SATURATION: 98 % | HEART RATE: 75 BPM | HEIGHT: 76 IN

## 2019-02-20 DIAGNOSIS — E66.9 RESTRICTIVE LUNG DISEASE SECONDARY TO OBESITY: ICD-10-CM

## 2019-02-20 DIAGNOSIS — I10 ESSENTIAL HYPERTENSION: ICD-10-CM

## 2019-02-20 DIAGNOSIS — I50.22 CHRONIC SYSTOLIC HEART FAILURE (HCC): Primary | ICD-10-CM

## 2019-02-20 DIAGNOSIS — G47.33 OSA (OBSTRUCTIVE SLEEP APNEA): ICD-10-CM

## 2019-02-20 DIAGNOSIS — E66.01 MORBID OBESITY (HCC): ICD-10-CM

## 2019-02-20 DIAGNOSIS — J98.4 RESTRICTIVE LUNG DISEASE SECONDARY TO OBESITY: ICD-10-CM

## 2019-02-20 PROCEDURE — 99214 OFFICE O/P EST MOD 30 MIN: CPT | Performed by: NURSE PRACTITIONER

## 2019-02-20 PROCEDURE — 94618 PULMONARY STRESS TESTING: CPT | Performed by: NURSE PRACTITIONER

## 2019-02-20 NOTE — PROGRESS NOTES
Subjective:     Congestive Heart Failure (chief complaint)    Patient Care Team:  Shonna Ng APRN as PCP - General (Family Medicine)    Congestive Heart Failure   Presents for follow-up visit. Pertinent negatives include no abdominal pain, chest pain, chest pressure, claudication, fatigue, muscle weakness, near-syncope, nocturia, orthopnea, palpitations, paroxysmal nocturnal dyspnea or shortness of breath. The symptoms have been stable. Compliance with total regimen is 51-75%. Compliance problems include adherence to diet and adherence to exercise.  Compliance with diet is 51-75%. Compliance with exercise is 51-75%. Compliance with medications is %.     The patient is a 48-year-old male with a long-standing history of dilated cardiomyopathy/nonischemic cardiomyopathy who presented to [facility] emergency department with indication of chest pain, worsening dyspnea as well as increasing edema of bilateral lower extremity.    The patient was originally referred to the Heart Failure Clinic per SHELL Mcclure with known history of nonischemic cardiomyopathy since 2014 at which time EF was 10-15%  The patient has undergone repeat of coronary CTA for which there was negative evidence of obstructive coronary artery disease.    The patient was admitted in January 2019 to an outside facility for an apparently acute decompensated heart failure.    With LVEF currently assessed at 10%, it is documented that ICD implant was not recommended secondary to the patient being assessed at NYHA class I.    The patient received hospital evaluation with Dr. Heladio Shook on 2/7/2019 at which time he indicated stability of patient for discharge home with recommendations of resuming Lasix 40 mg twice daily from the afternoon of 2/7/2019, carvedilol 25 mg twice daily, Entresto, spironolactone to be held with resumption on 2/8/2019.  It is documented that cardiac device therap would be addressed in the outpatient  setting pending assessment of functional class.    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 Dunbar on 3/11/2019.  Appointment with Dr. Heladio Shook on 3/28/2019  Appointment with SHELL Chand 04/11/2019    Hospitalizations:  1/7/2019-1/8/2019: [facility] regarding decompensated heart failure  2/4/2019-2/7/2019: [facility] regarding decompensated heart failure    02/20/2019  The patient reports that he had labs done under the direction of PCP on 02/19/2019.    Review of Systems   Constitution: Negative for chills, decreased appetite, fatigue, fever and weakness.   HENT: Negative.    Eyes: Negative.    Cardiovascular: Negative for chest pain, claudication, dyspnea on exertion, irregular heartbeat, leg swelling, near-syncope and palpitations.   Respiratory: Negative for cough, shortness of breath and wheezing.    Endocrine: Negative.    Skin: Negative for dry skin, flushing and rash.   Musculoskeletal: Negative for falls, muscle weakness and myalgias.   Gastrointestinal: Negative for abdominal pain, change in bowel habit and melena.   Genitourinary: Negative for frequency, hematuria and nocturia.   Neurological: Negative for dizziness, light-headedness and loss of balance.   Psychiatric/Behavioral: Negative for altered mental status and memory loss. The patient is not nervous/anxious.      Past Medical History:   Diagnosis Date   • CHF (congestive heart failure) (CMS/HCC)    • Diabetes mellitus (CMS/HCC)    • Hyperlipidemia    • Hypertension    • Peripheral vascular disease (CMS/HCC)    ,   Past Surgical History:   Procedure Laterality Date   • CARDIAC CATHETERIZATION  2009   ,   Family History   Problem Relation Age of Onset   • Heart disease Other    • Cancer Other    • Hypertension Mother    • Diabetes Mother    ,   Social History     Tobacco Use   • Smoking status: Never Smoker   • Smokeless  "tobacco: Never Used   Substance Use Topics   • Alcohol use: No   • Drug use: No     Patient has no known allergies.    Current Outpatient Medications   Medication Sig Dispense Refill   • albuterol (PROVENTIL HFA;VENTOLIN HFA) 108 (90 Base) MCG/ACT inhaler Inhale 2 puffs Every 4 (Four) Hours As Needed for Wheezing. 1 inhaler 3   • carvedilol (COREG) 25 MG tablet Take 1 tablet by mouth 2 (Two) Times a Day With Meals. 60 tablet 0   • furosemide (LASIX) 40 MG tablet Take 1 tablet by mouth 2 (Two) Times a Day. 60 tablet 6   • lovastatin (MEVACOR) 20 MG tablet Take 20 mg by mouth Every Night.     • metFORMIN (GLUCOPHAGE) 500 MG tablet Take 500 mg by mouth Daily With Breakfast.     • potassium chloride (K-DUR) 10 MEQ CR tablet Take 1 tablet by mouth Daily. 90 tablet 3   • sacubitril-valsartan (ENTRESTO) 24-26 MG tablet Take 1 tablet by mouth 2 (Two) Times a Day. 60 tablet 6   • spironolactone (ALDACTONE) 25 MG tablet Take 1 tablet by mouth Daily. 30 tablet 0     No current facility-administered medications for this visit.      Objective:     Vitals:    02/20/19 1421   BP: 100/74   BP Location: Left arm   Patient Position: Sitting   Cuff Size: Adult   Pulse: 75   SpO2: 98%   Weight: (!) 146 kg (322 lb)   Height: 193 cm (76\")       Wt Readings from Last 3 Encounters:   02/20/19 (!) 146 kg (322 lb)   02/07/19 (!) 146 kg (321 lb 3.2 oz)   01/11/19 (!) 147 kg (325 lb)       Physical Exam   Constitutional: He is oriented to person, place, and time. He appears well-developed and well-nourished. No distress.   HENT:   Head: Normocephalic and atraumatic.   Neck: No JVD present. No tracheal deviation present.   Cardiovascular: Normal rate, regular rhythm, S1 normal, S2 normal, normal heart sounds and intact distal pulses.   No murmur heard.  Pulses:       Radial pulses are 2+ on the right side, and 2+ on the left side.   Pulmonary/Chest: Effort normal and breath sounds normal. No respiratory distress. He has no wheezes. He has no " rales.   Abdominal: Soft. Bowel sounds are normal. He exhibits no distension.   Musculoskeletal: Normal range of motion. He exhibits no edema (Negative for pitting edema; exogenous obesity).   Neurological: He is alert and oriented to person, place, and time.   Skin: Skin is warm and dry. He is not diaphoretic. No erythema.   Psychiatric: He has a normal mood and affect. His behavior is normal. Judgment and thought content normal.   Vitals reviewed.    Data Reviewed:  Electrocardiogram: 02/06/2019    @  Results for orders placed during the hospital encounter of 02/04/19   Adult Transthoracic Echo Limited W/ Cont if Necessary Per Protocol    Narrative · 3D acquisition for left ventricular ejection fraction. Live 3D and full   volume to assess left ventricular ejection fraction was utilized.  · Left ventricle systolic function is severely decreased. Estimated LVEF   is 10%. Left ventricle cavity severely dilated with restrictive diastolic   dysfunction. Findings are consistent with dilated cardiomyopathy.  · Right ventricle is mildly dilated with mildly reduced right ventricular   systolic function.  · Moderate mitral valve regurgitation is present.  · Moderate tricuspid valve regurgitation is present. Pulmonary   hypertension is present with a PA systolic pressure of 70 mmHg.  · There is mild pulmonic valve regurgitation present.  · There is a trivial pericardial effusion adjacent to the left ventricle        Coronary CTA: 10/5/2017  CT FUNCTIONAL ANALYSIS:  Ejection Fraction     22 %  Diastolic Volume     309 ml  Systolic Volume      242 ml  Stroke Volume        67 ml  Cardiac Output       3.3 L/minute  CONCLUSION:  1.  Abnormal low cardiac ejection fraction being 22%  2.  Calcium score 30    Abdominal aortic aneurysm screening: 10/9/2017  Interpretation Summary   · Technically limited exam due to obesity.  · Appears negative for abdominal aortic aneurysm.        Xr Chest 2 View    Result Date: 2/4/2019  1.   Cardiomegaly without decompensation. 2.  Otherwise unremarkable chest. Electronically signed by:  Pj Cummins MD  2/4/2019 3:04 PM CST Workstation: [id number]    Labs:   Glucose   Date Value Ref Range Status   02/07/2019 105 (H) 60 - 100 mg/dL Final     BUN   Date Value Ref Range Status   02/07/2019 20 7 - 21 mg/dL Final     Creatinine   Date Value Ref Range Status   02/07/2019 1.52 (H) 0.70 - 1.30 mg/dL Final     Sodium   Date Value Ref Range Status   02/07/2019 142 137 - 145 mmol/L Final     Potassium   Date Value Ref Range Status   02/07/2019 3.7 3.5 - 5.1 mmol/L Final     Chloride   Date Value Ref Range Status   02/07/2019 100 95 - 110 mmol/L Final     CO2   Date Value Ref Range Status   02/07/2019 31.0 22.0 - 31.0 mmol/L Final     Calcium   Date Value Ref Range Status   02/07/2019 9.3 8.4 - 10.2 mg/dL Final     Total Protein   Date Value Ref Range Status   02/07/2019 6.8 6.3 - 8.6 g/dL Final     Albumin   Date Value Ref Range Status   02/07/2019 3.60 3.40 - 4.80 g/dL Final     ALT (SGPT)   Date Value Ref Range Status   02/07/2019 23 21 - 72 U/L Final     AST (SGOT)   Date Value Ref Range Status   02/07/2019 22 17 - 59 U/L Final     Alkaline Phosphatase   Date Value Ref Range Status   02/07/2019 56 38 - 126 U/L Final     Total Bilirubin   Date Value Ref Range Status   02/07/2019 1.0 0.2 - 1.3 mg/dL Final     BUN/Creatinine Ratio   Date Value Ref Range Status   02/07/2019 13.2 7.0 - 25.0 Final     Anion Gap   Date Value Ref Range Status   02/07/2019 11.0 5.0 - 15.0 mmol/L Final     WBC   Date Value Ref Range Status   02/07/2019 6.14 3.20 - 9.80 10*3/mm3 Final     RBC   Date Value Ref Range Status   02/07/2019 4.37 4.37 - 5.74 10*6/mm3 Final     Hemoglobin   Date Value Ref Range Status   02/07/2019 13.5 (L) 13.7 - 17.3 g/dL Final     Hematocrit   Date Value Ref Range Status   02/07/2019 39.7 39.0 - 49.0 % Final     MCV   Date Value Ref Range Status   02/07/2019 90.8 80.0 - 98.0 fL Final     MCH   Date Value Ref Range  Status   02/07/2019 30.9 26.5 - 34.0 pg Final     MCHC   Date Value Ref Range Status   02/07/2019 34.0 31.5 - 36.3 g/dL Final     RDW   Date Value Ref Range Status   02/07/2019 13.1 11.5 - 14.5 % Final     RDW-SD   Date Value Ref Range Status   02/07/2019 43.3 35.1 - 43.9 fl Final     MPV   Date Value Ref Range Status   02/07/2019 12.7 (H) 8.0 - 12.0 fL Final     Platelets   Date Value Ref Range Status   02/07/2019 155 150 - 450 10*3/mm3 Final     Neutrophil %   Date Value Ref Range Status   02/07/2019 37.4 37.0 - 80.0 % Final     Lymphocyte %   Date Value Ref Range Status   02/07/2019 44.3 10.0 - 50.0 % Final     Monocyte %   Date Value Ref Range Status   02/07/2019 13.7 (H) 0.0 - 12.0 % Final     Eosinophil %   Date Value Ref Range Status   02/07/2019 4.2 0.0 - 7.0 % Final     Basophil %   Date Value Ref Range Status   02/07/2019 0.2 0.0 - 2.0 % Final     Immature Grans %   Date Value Ref Range Status   02/07/2019 0.2 0.0 - 0.5 % Final     Neutrophils, Absolute   Date Value Ref Range Status   02/07/2019 2.30 2.00 - 8.60 10*3/mm3 Final     Lymphocytes, Absolute   Date Value Ref Range Status   02/07/2019 2.72 0.60 - 4.20 10*3/mm3 Final     Monocytes, Absolute   Date Value Ref Range Status   02/07/2019 0.84 0.00 - 0.90 10*3/mm3 Final     Eosinophils, Absolute   Date Value Ref Range Status   02/07/2019 0.26 0.00 - 0.70 10*3/mm3 Final     Basophils, Absolute   Date Value Ref Range Status   02/07/2019 0.01 0.00 - 0.20 10*3/mm3 Final     Immature Grans, Absolute   Date Value Ref Range Status   02/07/2019 0.01 0.00 - 0.02 10*3/mm3 Final     Estimated Creatinine Clearance: 93.5 mL/min (A) (by C-G formula based on SCr of 1.52 mg/dL (H)).    Lab Results   Component Value Date    TRIG 73 11/09/2014     Lab Results   Component Value Date    HDL 25 (L) 11/09/2014     Lab Results   Component Value Date    TSH 0.61 04/27/2014     Lab Results   Component Value Date    PROBNP 687.0 (H) 02/07/2019     The following portions of the  patient's history were reviewed and updated as appropriate: allergies, current medications, problem list,  past medical history, surgical history, family history and social history.    Recent images independently reviewed.    Available laboratory values reviewed.    Assessment:      Diagnosis Plan   1. Chronic systolic heart failure (CMS/HCC)     2. Essential hypertension     3. Restrictive lung disease secondary to obesity     4. KHANH (obstructive sleep apnea)     5. Morbid obesity (CMS/HCC)          Plan:   #1-#2  AHA Stage C ; NYHA Class II  BETA-BLOCKER: Carvedilol 25 mg twice daily  ACE/ARB: Not applicable  ENTRESTO: 24/26 mg twice daily  DIURETIC: Lasix 40 mg twice daily  ALDOSTERONTE ANTAGONIST: Spironolactone 25 mg daily  IMDUR/HYDRALAZINE: Not applicable  DIGOXIN: Not applicable  Fluid restriction: 2 L daily  Sodium restriction: 2000 mg daily   6MWT: 2/20/2019    Recommended daily weight monitoring.  Discussed patient action plan for heart failure.  Recommended avoiding NSAIDs use.  Discussed warning signs requiring additional medical attention for heart failure.    #3  Dr Klein: has been repeated lack of compliance with follow up re: apptointments and recommended CT chest    #4  Dr Forbes: has been noncompliant with equipment and follow up     #5  Activity: Approximately 150 minutes of moderate activity (such as walking program)  per week (20-30 minutes most days of the week)  Diet: Heart healthy foods - more fresh fruits and vegetables and whole grains; less red meat; more fish and poultry that is baked or grilled - not fried; less salt not to exceed 2000 mg daily - less processed food; No trans or saturated fat  Non Smoker  Blood pressure management  Weight management: Patient's Body mass index is 39.2 kg/m². BMI is above normal parameters. Recommendations include: exercise counseling and nutrition counseling.  Stress management    The patient is strongly encouraged to keep his follow up appointments as  outlined in the history of present illness.     30 minutes out of 40 minutes face to face spent in counseling/coordination of care as relates to presentation of HFrEF with dietary and lifestyle modifications/procedures as outlined above.          This document has been electronically signed by SHELL Irwin on February 20, 2019 2:28 PM

## 2019-02-20 NOTE — PROGRESS NOTES
Matti Bravo  Procedure: 6 Minute Walk Test   02/20/2019  Indication:HFrEF    Pretest: BP:100/74               HR:75               Sa02:76               Dyspnea:2               Fatigue:3    Post Test: BP:98/68               HR:52               Sa02:83               Dyspnea:4               Fatigue:4    First 6MWT:no    Supplemental oxygen during test:no    Stopped before 6 minutes:no    Pauses:0    Results in distance walked:409.92 meters    Did individual experience any pain or discomfort:no    I was present for the above test and agree with the data.     NYHA Functional Class II          This document has been electronically signed by Donna Mccloud MA on February 20, 2019 2:35 PM

## 2019-02-20 NOTE — OUTREACH NOTE
CHF Week 2 Survey      Responses   Facility patient discharged from?  [facility]   Does the patient have one of the following disease processes/diagnoses(primary or secondary)?  CHF   Week 2 attempt successful?  No   Unsuccessful attempts  Attempt 2          Jennifer Nunez RN

## 2019-03-07 ENCOUNTER — HOSPITAL ENCOUNTER (INPATIENT)
Facility: HOSPITAL | Age: 49
LOS: 2 days | Discharge: HOME | End: 2019-03-09
Attending: INTERNAL MEDICINE | Admitting: INTERNAL MEDICINE

## 2019-03-07 PROCEDURE — 82962 GLUCOSE BLOOD TEST: CPT

## 2019-03-08 ENCOUNTER — APPOINTMENT (OUTPATIENT)
Dept: GENERAL RADIOLOGY | Facility: HOSPITAL | Age: 49
End: 2019-03-08

## 2019-03-08 PROBLEM — I50.9 ACUTE EXACERBATION OF CHF (CONGESTIVE HEART FAILURE) (HCC): Status: ACTIVE | Noted: 2019-03-08

## 2019-03-08 LAB
ABO GROUP BLD: NORMAL
ABO GROUP BLD: NORMAL
ALBUMIN SERPL-MCNC: 4.1 G/DL (ref 3.4–4.8)
ALBUMIN/GLOB SERPL: 1.2 G/DL (ref 1.1–1.8)
ALP SERPL-CCNC: 53 U/L (ref 38–126)
ALT SERPL W P-5'-P-CCNC: 22 U/L (ref 21–72)
ANION GAP SERPL CALCULATED.3IONS-SCNC: 9 MMOL/L (ref 5–15)
APTT PPP: 33.7 SECONDS (ref 20–40.3)
ARTICHOKE IGE QN: 81 MG/DL (ref 1–129)
AST SERPL-CCNC: 20 U/L (ref 17–59)
BASOPHILS # BLD AUTO: 0.04 10*3/MM3 (ref 0–0.2)
BASOPHILS NFR BLD AUTO: 0.6 % (ref 0–1.5)
BILIRUB SERPL-MCNC: 1.2 MG/DL (ref 0.2–1.3)
BLD GP AB SCN SERPL QL: NEGATIVE
BUN BLD-MCNC: 19 MG/DL (ref 7–21)
BUN/CREAT SERPL: 12.4 (ref 7–25)
CALCIUM SPEC-SCNC: 9 MG/DL (ref 8.4–10.2)
CHLORIDE SERPL-SCNC: 98 MMOL/L (ref 95–110)
CHOLEST SERPL-MCNC: 127 MG/DL (ref 0–199)
CO2 SERPL-SCNC: 35 MMOL/L (ref 22–31)
CREAT BLD-MCNC: 1.53 MG/DL (ref 0.7–1.3)
D-LACTATE SERPL-SCNC: 1.7 MMOL/L (ref 0.5–2)
DEPRECATED RDW RBC AUTO: 44.1 FL (ref 37–54)
EOSINOPHIL # BLD AUTO: 0.24 10*3/MM3 (ref 0–0.4)
EOSINOPHIL NFR BLD AUTO: 3.8 % (ref 0.3–6.2)
ERYTHROCYTE [DISTWIDTH] IN BLOOD BY AUTOMATED COUNT: 12.9 % (ref 12.3–15.4)
GFR SERPL CREATININE-BSD FRML MDRD: 59 ML/MIN/1.73 (ref 63–147)
GLOBULIN UR ELPH-MCNC: 3.5 GM/DL (ref 2.3–3.5)
GLUCOSE BLD-MCNC: 126 MG/DL (ref 60–100)
GLUCOSE BLDC GLUCOMTR-MCNC: 100 MG/DL (ref 70–130)
GLUCOSE BLDC GLUCOMTR-MCNC: 114 MG/DL (ref 70–130)
GLUCOSE BLDC GLUCOMTR-MCNC: 127 MG/DL (ref 70–130)
GLUCOSE BLDC GLUCOMTR-MCNC: 133 MG/DL (ref 70–130)
GLUCOSE BLDC GLUCOMTR-MCNC: 163 MG/DL (ref 70–130)
HBA1C MFR BLD: 5.8 % (ref 4–5.6)
HCT VFR BLD AUTO: 43.6 % (ref 37.5–51)
HDLC SERPL-MCNC: 33 MG/DL (ref 60–200)
HGB BLD-MCNC: 14 G/DL (ref 13–17.7)
IMM GRANULOCYTES # BLD AUTO: 0.02 10*3/MM3 (ref 0–0.05)
IMM GRANULOCYTES NFR BLD AUTO: 0.3 % (ref 0–0.5)
INR PPP: 1.3 (ref 0.8–1.2)
LDLC/HDLC SERPL: 2.25 {RATIO} (ref 0–3.55)
LYMPHOCYTES # BLD AUTO: 2.08 10*3/MM3 (ref 0.7–3.1)
LYMPHOCYTES NFR BLD AUTO: 32.9 % (ref 19.6–45.3)
Lab: NORMAL
MAGNESIUM SERPL-MCNC: 2 MG/DL (ref 1.6–2.3)
MCH RBC QN AUTO: 30.1 PG (ref 26.6–33)
MCHC RBC AUTO-ENTMCNC: 32.1 G/DL (ref 31.5–35.7)
MCV RBC AUTO: 93.8 FL (ref 79–97)
MONOCYTES # BLD AUTO: 0.67 10*3/MM3 (ref 0.1–0.9)
MONOCYTES NFR BLD AUTO: 10.6 % (ref 5–12)
NEUTROPHILS # BLD AUTO: 3.28 10*3/MM3 (ref 1.4–7)
NEUTROPHILS NFR BLD AUTO: 51.8 % (ref 42.7–76)
NRBC BLD AUTO-RTO: 0 /100 WBC (ref 0–0)
NT-PROBNP SERPL-MCNC: 2200 PG/ML (ref 0–450)
PLATELET # BLD AUTO: 136 10*3/MM3 (ref 140–450)
PMV BLD AUTO: 12.8 FL (ref 6–12)
POTASSIUM BLD-SCNC: 3.9 MMOL/L (ref 3.5–5.1)
PROT SERPL-MCNC: 7.6 G/DL (ref 6.3–8.6)
PROTHROMBIN TIME: 15.8 SECONDS (ref 11.1–15.3)
RBC # BLD AUTO: 4.65 10*6/MM3 (ref 4.14–5.8)
RH BLD: POSITIVE
RH BLD: POSITIVE
SODIUM BLD-SCNC: 142 MMOL/L (ref 137–145)
T&S EXPIRATION DATE: NORMAL
TRIGL SERPL-MCNC: 98 MG/DL (ref 20–199)
TROPONIN I SERPL-MCNC: 0.07 NG/ML
TSH SERPL DL<=0.05 MIU/L-ACNC: 0.86 MIU/ML (ref 0.46–4.68)
WBC NRBC COR # BLD: 6.33 10*3/MM3 (ref 3.4–10.8)
WHOLE BLOOD HOLD SPECIMEN: NORMAL

## 2019-03-08 PROCEDURE — 25010000002 FUROSEMIDE PER 20 MG: Performed by: INTERNAL MEDICINE

## 2019-03-08 PROCEDURE — 86900 BLOOD TYPING SEROLOGIC ABO: CPT

## 2019-03-08 PROCEDURE — 83036 HEMOGLOBIN GLYCOSYLATED A1C: CPT | Performed by: INTERNAL MEDICINE

## 2019-03-08 PROCEDURE — 83880 ASSAY OF NATRIURETIC PEPTIDE: CPT | Performed by: INTERNAL MEDICINE

## 2019-03-08 PROCEDURE — 82962 GLUCOSE BLOOD TEST: CPT

## 2019-03-08 PROCEDURE — 25010000002 HEPARIN (PORCINE) PER 1000 UNITS: Performed by: INTERNAL MEDICINE

## 2019-03-08 PROCEDURE — 86901 BLOOD TYPING SEROLOGIC RH(D): CPT

## 2019-03-08 PROCEDURE — 93005 ELECTROCARDIOGRAM TRACING: CPT | Performed by: INTERNAL MEDICINE

## 2019-03-08 PROCEDURE — 80053 COMPREHEN METABOLIC PANEL: CPT | Performed by: INTERNAL MEDICINE

## 2019-03-08 PROCEDURE — 80061 LIPID PANEL: CPT | Performed by: INTERNAL MEDICINE

## 2019-03-08 PROCEDURE — 86850 RBC ANTIBODY SCREEN: CPT | Performed by: INTERNAL MEDICINE

## 2019-03-08 PROCEDURE — 71045 X-RAY EXAM CHEST 1 VIEW: CPT

## 2019-03-08 PROCEDURE — 63710000001 INSULIN ASPART PER 5 UNITS: Performed by: INTERNAL MEDICINE

## 2019-03-08 PROCEDURE — 93010 ELECTROCARDIOGRAM REPORT: CPT | Performed by: INTERNAL MEDICINE

## 2019-03-08 PROCEDURE — 86900 BLOOD TYPING SEROLOGIC ABO: CPT | Performed by: INTERNAL MEDICINE

## 2019-03-08 PROCEDURE — 83735 ASSAY OF MAGNESIUM: CPT | Performed by: INTERNAL MEDICINE

## 2019-03-08 PROCEDURE — 83605 ASSAY OF LACTIC ACID: CPT | Performed by: INTERNAL MEDICINE

## 2019-03-08 PROCEDURE — 85610 PROTHROMBIN TIME: CPT | Performed by: INTERNAL MEDICINE

## 2019-03-08 PROCEDURE — 85025 COMPLETE CBC W/AUTO DIFF WBC: CPT | Performed by: INTERNAL MEDICINE

## 2019-03-08 PROCEDURE — 86901 BLOOD TYPING SEROLOGIC RH(D): CPT | Performed by: INTERNAL MEDICINE

## 2019-03-08 PROCEDURE — 84443 ASSAY THYROID STIM HORMONE: CPT | Performed by: INTERNAL MEDICINE

## 2019-03-08 PROCEDURE — 84484 ASSAY OF TROPONIN QUANT: CPT | Performed by: INTERNAL MEDICINE

## 2019-03-08 PROCEDURE — 85730 THROMBOPLASTIN TIME PARTIAL: CPT | Performed by: INTERNAL MEDICINE

## 2019-03-08 RX ORDER — MAGNESIUM SULFATE HEPTAHYDRATE 40 MG/ML
2 INJECTION, SOLUTION INTRAVENOUS AS NEEDED
Status: DISCONTINUED | OUTPATIENT
Start: 2019-03-08 | End: 2019-03-09 | Stop reason: HOSPADM

## 2019-03-08 RX ORDER — POTASSIUM CHLORIDE 750 MG/1
40 CAPSULE, EXTENDED RELEASE ORAL AS NEEDED
Status: DISCONTINUED | OUTPATIENT
Start: 2019-03-08 | End: 2019-03-09 | Stop reason: HOSPADM

## 2019-03-08 RX ORDER — POTASSIUM CHLORIDE 7.45 MG/ML
10 INJECTION INTRAVENOUS
Status: DISCONTINUED | OUTPATIENT
Start: 2019-03-08 | End: 2019-03-09 | Stop reason: HOSPADM

## 2019-03-08 RX ORDER — DEXTROSE MONOHYDRATE 25 G/50ML
25 INJECTION, SOLUTION INTRAVENOUS
Status: DISCONTINUED | OUTPATIENT
Start: 2019-03-08 | End: 2019-03-09 | Stop reason: HOSPADM

## 2019-03-08 RX ORDER — MORPHINE SULFATE 2 MG/ML
1 INJECTION, SOLUTION INTRAMUSCULAR; INTRAVENOUS EVERY 4 HOURS PRN
Status: DISCONTINUED | OUTPATIENT
Start: 2019-03-08 | End: 2019-03-09 | Stop reason: HOSPADM

## 2019-03-08 RX ORDER — CARVEDILOL 25 MG/1
TABLET ORAL
Qty: 60 TABLET | Refills: 0 | Status: CANCELLED | OUTPATIENT
Start: 2019-03-08

## 2019-03-08 RX ORDER — ATORVASTATIN CALCIUM 20 MG/1
20 TABLET, FILM COATED ORAL DAILY
Status: DISCONTINUED | OUTPATIENT
Start: 2019-03-08 | End: 2019-03-09 | Stop reason: HOSPADM

## 2019-03-08 RX ORDER — FUROSEMIDE 40 MG/1
40 TABLET ORAL 2 TIMES DAILY
Status: DISCONTINUED | OUTPATIENT
Start: 2019-03-09 | End: 2019-03-09 | Stop reason: HOSPADM

## 2019-03-08 RX ORDER — HEPARIN SODIUM 5000 [USP'U]/ML
5000 INJECTION, SOLUTION INTRAVENOUS; SUBCUTANEOUS EVERY 8 HOURS SCHEDULED
Status: DISCONTINUED | OUTPATIENT
Start: 2019-03-08 | End: 2019-03-09 | Stop reason: HOSPADM

## 2019-03-08 RX ORDER — SODIUM CHLORIDE 0.9 % (FLUSH) 0.9 %
3 SYRINGE (ML) INJECTION EVERY 12 HOURS SCHEDULED
Status: DISCONTINUED | OUTPATIENT
Start: 2019-03-08 | End: 2019-03-09 | Stop reason: HOSPADM

## 2019-03-08 RX ORDER — POTASSIUM CHLORIDE 1.5 G/1.77G
40 POWDER, FOR SOLUTION ORAL AS NEEDED
Status: DISCONTINUED | OUTPATIENT
Start: 2019-03-08 | End: 2019-03-09 | Stop reason: HOSPADM

## 2019-03-08 RX ORDER — SODIUM CHLORIDE 0.9 % (FLUSH) 0.9 %
3-10 SYRINGE (ML) INJECTION AS NEEDED
Status: DISCONTINUED | OUTPATIENT
Start: 2019-03-08 | End: 2019-03-09 | Stop reason: HOSPADM

## 2019-03-08 RX ORDER — CARVEDILOL 25 MG/1
25 TABLET ORAL 2 TIMES DAILY WITH MEALS
Status: DISCONTINUED | OUTPATIENT
Start: 2019-03-08 | End: 2019-03-09 | Stop reason: HOSPADM

## 2019-03-08 RX ORDER — DOCUSATE SODIUM 100 MG/1
100 CAPSULE, LIQUID FILLED ORAL DAILY
Status: DISCONTINUED | OUTPATIENT
Start: 2019-03-08 | End: 2019-03-09 | Stop reason: HOSPADM

## 2019-03-08 RX ORDER — NICOTINE POLACRILEX 4 MG
15 LOZENGE BUCCAL
Status: DISCONTINUED | OUTPATIENT
Start: 2019-03-08 | End: 2019-03-09 | Stop reason: HOSPADM

## 2019-03-08 RX ORDER — ONDANSETRON 2 MG/ML
4 INJECTION INTRAMUSCULAR; INTRAVENOUS EVERY 6 HOURS PRN
Status: DISCONTINUED | OUTPATIENT
Start: 2019-03-08 | End: 2019-03-09 | Stop reason: HOSPADM

## 2019-03-08 RX ORDER — ALBUTEROL SULFATE 2.5 MG/3ML
2.5 SOLUTION RESPIRATORY (INHALATION) EVERY 6 HOURS PRN
Status: DISCONTINUED | OUTPATIENT
Start: 2019-03-08 | End: 2019-03-09 | Stop reason: HOSPADM

## 2019-03-08 RX ORDER — NALOXONE HCL 0.4 MG/ML
0.4 VIAL (ML) INJECTION
Status: DISCONTINUED | OUTPATIENT
Start: 2019-03-08 | End: 2019-03-09 | Stop reason: HOSPADM

## 2019-03-08 RX ORDER — MAGNESIUM SULFATE HEPTAHYDRATE 40 MG/ML
4 INJECTION, SOLUTION INTRAVENOUS AS NEEDED
Status: DISCONTINUED | OUTPATIENT
Start: 2019-03-08 | End: 2019-03-09 | Stop reason: HOSPADM

## 2019-03-08 RX ORDER — FUROSEMIDE 10 MG/ML
80 INJECTION INTRAMUSCULAR; INTRAVENOUS EVERY 12 HOURS
Status: COMPLETED | OUTPATIENT
Start: 2019-03-08 | End: 2019-03-08

## 2019-03-08 RX ORDER — SPIRONOLACTONE 25 MG/1
25 TABLET ORAL DAILY
Status: DISCONTINUED | OUTPATIENT
Start: 2019-03-08 | End: 2019-03-09 | Stop reason: HOSPADM

## 2019-03-08 RX ORDER — FAMOTIDINE 40 MG/1
40 TABLET, FILM COATED ORAL DAILY
Status: DISCONTINUED | OUTPATIENT
Start: 2019-03-08 | End: 2019-03-09 | Stop reason: HOSPADM

## 2019-03-08 RX ORDER — ASPIRIN 81 MG/1
81 TABLET, CHEWABLE ORAL DAILY
Status: DISCONTINUED | OUTPATIENT
Start: 2019-03-08 | End: 2019-03-09 | Stop reason: HOSPADM

## 2019-03-08 RX ADMIN — DOCUSATE SODIUM 100 MG: 100 CAPSULE, LIQUID FILLED ORAL at 10:04

## 2019-03-08 RX ADMIN — FAMOTIDINE 40 MG: 40 TABLET ORAL at 10:05

## 2019-03-08 RX ADMIN — INSULIN ASPART 2 UNITS: 100 INJECTION, SOLUTION INTRAVENOUS; SUBCUTANEOUS at 17:28

## 2019-03-08 RX ADMIN — SODIUM CHLORIDE, PRESERVATIVE FREE 3 ML: 5 INJECTION INTRAVENOUS at 00:53

## 2019-03-08 RX ADMIN — SODIUM CHLORIDE, PRESERVATIVE FREE 3 ML: 5 INJECTION INTRAVENOUS at 21:35

## 2019-03-08 RX ADMIN — ATORVASTATIN CALCIUM 20 MG: 20 TABLET, FILM COATED ORAL at 10:05

## 2019-03-08 RX ADMIN — SODIUM CHLORIDE, PRESERVATIVE FREE 3 ML: 5 INJECTION INTRAVENOUS at 10:06

## 2019-03-08 RX ADMIN — FUROSEMIDE 80 MG: 10 INJECTION, SOLUTION INTRAMUSCULAR; INTRAVENOUS at 00:53

## 2019-03-08 RX ADMIN — FUROSEMIDE 80 MG: 10 INJECTION, SOLUTION INTRAMUSCULAR; INTRAVENOUS at 15:21

## 2019-03-08 RX ADMIN — NITROGLYCERIN 1 INCH: 20 OINTMENT TOPICAL at 07:15

## 2019-03-08 RX ADMIN — HEPARIN SODIUM 5000 UNITS: 5000 INJECTION INTRAVENOUS; SUBCUTANEOUS at 21:35

## 2019-03-08 RX ADMIN — CARVEDILOL 25 MG: 25 TABLET, FILM COATED ORAL at 17:29

## 2019-03-08 RX ADMIN — ASPIRIN 81 MG 81 MG: 81 TABLET ORAL at 10:05

## 2019-03-08 RX ADMIN — HEPARIN SODIUM 5000 UNITS: 5000 INJECTION INTRAVENOUS; SUBCUTANEOUS at 07:13

## 2019-03-08 RX ADMIN — CARVEDILOL 25 MG: 25 TABLET, FILM COATED ORAL at 10:05

## 2019-03-08 RX ADMIN — SPIRONOLACTONE 25 MG: 25 TABLET ORAL at 10:05

## 2019-03-08 RX ADMIN — HEPARIN SODIUM 5000 UNITS: 5000 INJECTION INTRAVENOUS; SUBCUTANEOUS at 15:20

## 2019-03-08 NOTE — PLAN OF CARE
Problem: Patient Care Overview  Goal: Plan of Care Review  Outcome: Ongoing (interventions implemented as appropriate)   03/08/19 0127   Coping/Psychosocial   Plan of Care Reviewed With patient   Plan of Care Review   Progress no change   OTHER   Outcome Summary new admit      Goal: Individualization and Mutuality  Outcome: Ongoing (interventions implemented as appropriate)    Goal: Discharge Needs Assessment  Outcome: Ongoing (interventions implemented as appropriate)      Problem: Cardiac: Heart Failure (Adult)  Goal: Signs and Symptoms of Listed Potential Problems Will be Absent, Minimized or Managed (Cardiac: Heart Failure)  Outcome: Ongoing (interventions implemented as appropriate)

## 2019-03-08 NOTE — PAYOR COMM NOTE
"Ethel Davis (48 y.o. Male)     Date of Birth Social Security Number Address Home Phone MRN    1970  [address] [id number] [phone] [id number]    Jainism Marital Status          Zoroastrianism Legally        Admission Date Admission Type Admitting Provider Attending Provider Department, Room/Bed    3/7/19 Urgent Moiz Lopez MD Zahir, Syed S, MD [facility] STEPDOWN UNIT, 320/1    Discharge Date Discharge Disposition Discharge Destination                       Attending Provider:  Moiz Lopez MD    Allergies:  No Known Allergies    Isolation:  None   Infection:  None   Code Status:  CPR    Ht:  193 cm (76\")   Wt:  144 kg (317 lb 6.4 oz)    Admission Cmt:  None   Principal Problem:  Acute exacerbation of CHF (congestive heart failure) (CMS/[facility]) [I50.9]                 Active Insurance as of 3/7/2019     Primary Coverage     Payor Plan Insurance Group Employer/Plan Group    [place] [place] [employer] [place]     Payor Plan Address Payor Plan Phone Number Payor Plan Fax Number Effective Dates    PO  [phone]  9/8/2017 - None Entered    [facility] [id number]       Subscriber Name Subscriber Birth Date Member ID       ETHEL DAVIS 1970 [id number]                 Emergency Contacts      (Rel.) Home Phone Work Phone Mobile Phone    Courtney Davis (Mother) [phone] -- --        [facility]  P: [phone]  F: [phone]       History & Physical      Moiz Lopez MD at 3/8/2019 12:51 AM                [facility] Hospital Medicine Admission      Date of Admission: 3/7/2019      Primary Care Physician: Shonna Ng APRN      Chief Complaint: dyspnea     HPI:  This a 47 y/o male who presents as a transfer from an outside facility w/ complaints who progressively worsening dyspnea. Pt also has orthopnea, PND. Pt states he was recently released from " retirement & while there he was only given Lasix once per day instead of twice daily. Per transferring physician, pt also has a mild trop bump which is likely 2/2 supply demand mismatch. Denies chest pain, palpitations, nausea, vomiting, diaphoresis, fatigue, jaw pain, lightheadedness, heartburn, arm discomfort. Denies fever, chills, night sweats, cough, abdominal pain, urgency, frequency, dysuria, numbness, weakness, new focal deficits.     Concurrent Medical History:  has a past medical history of CHF (congestive heart failure) (CMS/[facility]), Diabetes mellitus (CMS/[facility]), Hyperlipidemia, Hypertension, and Peripheral vascular disease (CMS/[facility]).    Past Surgical History:   Past Surgical History:   Procedure Laterality Date   • CARDIAC CATHETERIZATION  2009       Family History:   Family History   Problem Relation Age of Onset   • Heart disease Other    • Cancer Other    • Hypertension Mother    • Diabetes Mother        Social History:   Social History     Socioeconomic History   • Marital status: Legally      Spouse name: Not on file   • Number of children: Not on file   • Years of education: Not on file   • Highest education level: Not on file   Social Needs   • Financial resource strain: Not on file   • Food insecurity - worry: Not on file   • Food insecurity - inability: Not on file   • Transportation needs - medical: Not on file   • Transportation needs - non-medical: Not on file   Occupational History   • Not on file   Tobacco Use   • Smoking status: Never Smoker   • Smokeless tobacco: Never Used   Substance and Sexual Activity   • Alcohol use: No   • Drug use: No   • Sexual activity: Defer   Other Topics Concern   • Not on file   Social History Narrative   • Not on file       Allergies: No Known Allergies    Medications:   Prior to Admission medications    Medication Sig Start Date End Date Taking? Authorizing Provider   albuterol (PROVENTIL HFA;VENTOLIN HFA) 108 (90 Base) MCG/ACT inhaler Inhale 2 puffs Every 4  (Four) Hours As Needed for Wheezing. 11/1/17  Yes Em Gilman APRN   carvedilol (COREG) 25 MG tablet Take 1 tablet by mouth 2 (Two) Times a Day With Meals. 2/7/19  Yes Jamison Cuevas MD   furosemide (LASIX) 40 MG tablet Take 1 tablet by mouth 2 (Two) Times a Day. 1/11/19  Yes Em Gilman APRN   lovastatin (MEVACOR) 20 MG tablet Take 20 mg by mouth Every Night.   Yes ProviderAroldo MD   metFORMIN (GLUCOPHAGE) 500 MG tablet Take 500 mg by mouth Daily With Breakfast.   Yes ProviderAroldo MD   potassium chloride (K-DUR) 10 MEQ CR tablet Take 1 tablet by mouth Daily. 1/11/19  Yes Em Gilman APRN   sacubitril-valsartan (ENTRESTO) 24-26 MG tablet Take 1 tablet by mouth 2 (Two) Times a Day. 8/3/18  Yes Em Gilman APRN   spironolactone (ALDACTONE) 25 MG tablet Take 1 tablet by mouth Daily. 2/8/19  Yes Jamison Cuevas MD       Review of Systems:  Review of Systems   Constitutional: Negative for chills, diaphoresis and fever.   HENT: Negative for congestion and sore throat.    Eyes: Negative for photophobia and visual disturbance.   Respiratory: Positive for shortness of breath. Negative for cough and wheezing.    Cardiovascular: Positive for leg swelling. Negative for chest pain and palpitations.   Gastrointestinal: Negative for abdominal distention and abdominal pain.   Endocrine: Negative for polyphagia and polyuria.   Genitourinary: Negative for dysuria and hematuria.   Musculoskeletal: Negative for neck pain and neck stiffness.   Skin: Negative for color change.   Neurological: Negative for facial asymmetry and speech difficulty.   Hematological: Negative for adenopathy.   Psychiatric/Behavioral: Negative for agitation and confusion.      Otherwise ROS is negative except as mentioned above.    Physical Exam:   Temp:  [96.4 °F (35.8 °C)] 96.4 °F (35.8 °C)  Heart Rate:  [84] 84  Resp:  [20] 20  BP: (126)/(82) 126/82  Physical Exam   Constitutional: He is oriented  to person, place, and time. No distress.   HENT:   Head: Normocephalic and atraumatic.   Mouth/Throat: Oropharynx is clear and moist.   Eyes: Conjunctivae are normal. Pupils are equal, round, and reactive to light.   Neck: Neck supple.   Cardiovascular: Exam reveals no friction rub.   +S3 gallop, + JVD   Pulmonary/Chest: No respiratory distress. He has no wheezes.   Diffusely diminished air entry b/l  +crackles b/l     Abdominal: Soft. Bowel sounds are normal.   Musculoskeletal: Normal range of motion. He exhibits edema.   Neurological: He is alert and oriented to person, place, and time.   Skin: Skin is warm. He is not diaphoretic.   Psychiatric: He has a normal mood and affect. His behavior is normal.   Vitals reviewed.        Results Reviewed:  I have personally reviewed current lab, radiology, and data.  Lab Results (last 24 hours)     Procedure Component Value Units Date/Time    POC Glucose Once [[id number]]  (Normal) Collected:  03/07/19 2352    Specimen:  Blood Updated:  03/08/19 0010     Glucose 114 mg/dL      Comment: RN NotifiedOperator: [id number] [facility] APRILMeter ID: [id number]           Imaging Results (last 24 hours)     ** No results found for the last 24 hours. **            Assessment:      Acute exacerbation of CHF (congestive heart failure) (CMS/[facility])    Essential hypertension      #Acute on chronic exacerbation of combined systolic & diastolic heart failure w/ severely reduced ejection fraction; EF 10%---at extremely high risk for sudden cardiac death. No AICD in place. Will consult cardiology. Echo reviewed   #NSTEMI likely type II 2/2 supply demand mismatch 2/2 acute heart failure exacerbation    #FRANCK (per transferring doctor, labs pending at our facility)  #Dilated Cardiomyopathy  #DM  #HTN      Plan:  -iv lasix  -niv prn  -asa, statin, bb,  aldactone  -hold acei/arb 2/2 franck   -serial trops w/ ekg, bnp  -strict I/o, daily wts  -monitor lytes; recheck/replace prn   -supplemental o2 to maintain  o2 sat>92%  -tele  -optimize pressures  -cardiology consult  -nebs  -monitor cbg, iss  -serial h/h; transfuse to maintain hgb>7  -gi prophylaxis: Pepcid  -dvt prophylaxis: heparin sc     PTA Medications reconciled. Labs reviewed. Records reviewed. Case & plan of care was discussed w/ the patient. All concerns were addressed & questions were answered.     Discharge Planning: depends on hospital course    Prognosis: guarded    Moiz Lopez MD  03/08/19  12:51 AM                    Electronically signed by Moiz Lopez MD at 3/8/2019  1:01 AM       Emergency Department Notes     No notes of this type exist for this encounter.        Hospital Medications (all)       Dose Frequency Start End    albuterol (PROVENTIL) nebulizer solution 0.083% 2.5 mg/3mL 2.5 mg Every 6 Hours PRN 3/8/2019     Sig - Route: Take 2.5 mg by nebulization Every 6 (Six) Hours As Needed for Wheezing or Shortness of Air. - Nebulization    aspirin chewable tablet 81 mg 81 mg Daily 3/8/2019     Sig - Route: Chew 1 tablet Daily. - Oral    atorvastatin (LIPITOR) tablet 20 mg 20 mg Daily 3/8/2019     Sig - Route: Take 1 tablet by mouth Daily. - Oral    carvedilol (COREG) tablet 25 mg 25 mg 2 Times Daily With Meals 3/8/2019     Sig - Route: Take 1 tablet by mouth 2 (Two) Times a Day With Meals. - Oral    dextrose (D50W) 25 g/ 50mL Intravenous Solution 25 g 25 g Every 15 Minutes PRN 3/8/2019     Sig - Route: Infuse 50 mL into a venous catheter Every 15 (Fifteen) Minutes As Needed for Low Blood Sugar (Blood Sugar Less Than 70). - Intravenous    dextrose (GLUTOSE) oral gel 15 g 15 g Every 15 Minutes PRN 3/8/2019     Sig - Route: Take 15 g by mouth Every 15 (Fifteen) Minutes As Needed for Low Blood Sugar (Blood sugar less than 70). - Oral    docusate sodium (COLACE) capsule 100 mg 100 mg Daily 3/8/2019     Sig - Route: Take 1 capsule by mouth Daily. - Oral    famotidine (PEPCID) tablet 40 mg 40 mg Daily 3/8/2019     Sig - Route: Take 1 tablet by mouth  "Daily. - Oral    furosemide (LASIX) injection 80 mg 80 mg Every 12 Hours 3/8/2019 3/9/2019    Sig - Route: Infuse 8 mL into a venous catheter Every 12 (Twelve) Hours. - Intravenous    glucagon (human recombinant) (GLUCAGEN DIAGNOSTIC) injection 1 mg 1 mg As Needed 3/8/2019     Sig - Route: Inject 1 mg under the skin into the appropriate area as directed As Needed (Blood Glucose Less Than 70). - Subcutaneous    heparin (porcine) 5000 UNIT/ML injection 5,000 Units 5,000 Units Every 8 Hours Scheduled 3/8/2019     Sig - Route: Inject 1 mL under the skin into the appropriate area as directed Every 8 (Eight) Hours. - Subcutaneous    insulin aspart (novoLOG) injection 0-9 Units 0-9 Units 4 Times Daily Before Meals & Nightly 3/8/2019     Sig - Route: Inject 0-9 Units under the skin into the appropriate area as directed 4 (Four) Times a Day Before Meals & at Bedtime. - Subcutaneous    Magnesium Sulfate 2 gram / 50mL Infusion (GIVE X 3 BAGS TO EQUAL 6GM TOTAL DOSE) - Mg 1.1 - 1.5 mg/dl 2 g As Needed 3/8/2019     Sig - Route: Infuse 50 mL into a venous catheter As Needed (See Administration Instructions). - Intravenous    Linked Group 1:  \"Or\" Linked Group Details        Magnesium Sulfate 2 gram Bolus, followed by 8 gram infusion (total Mg dose 10 grams)- Mg less than or equal to 1mg/dL 2 g As Needed 3/8/2019     Sig - Route: Infuse 50 mL into a venous catheter As Needed (See Administration Instructions). - Intravenous    Linked Group 1:  \"Or\" Linked Group Details        Magnesium Sulfate 4 gram infusion- Mg 1.6-1.9 mg/dL 4 g As Needed 3/8/2019     Sig - Route: Infuse 100 mL into a venous catheter As Needed (See Administration Instructions). - Intravenous    Linked Group 1:  \"Or\" Linked Group Details        morphine injection 1 mg 1 mg Every 4 Hours PRN 3/8/2019     Sig - Route: Infuse 0.5 mL into a venous catheter Every 4 (Four) Hours As Needed for Moderate Pain . - Intravenous    Linked Group 2:  \"And\" Linked Group Details   " "     naloxone (NARCAN) injection 0.4 mg 0.4 mg Every 5 Minutes PRN 3/8/2019     Sig - Route: Infuse 1 mL into a venous catheter Every 5 (Five) Minutes As Needed for Respiratory Depression. - Intravenous    Linked Group 2:  \"And\" Linked Group Details        nitroglycerin (NITROSTAT) ointment 1 inch 1 inch Every 6 Hours Scheduled 3/8/2019     Sig - Route: Apply 1 inch topically to the appropriate area as directed Every 6 (Six) Hours. - Topical    ondansetron (ZOFRAN) injection 4 mg 4 mg Every 6 Hours PRN 3/8/2019     Sig - Route: Infuse 2 mL into a venous catheter Every 6 (Six) Hours As Needed for Nausea or Vomiting. - Intravenous    potassium chloride (KLOR-CON) packet 40 mEq 40 mEq As Needed 3/8/2019     Sig - Route: Take 40 mEq by mouth As Needed (potassium replacement, see admin instructions). - Oral    Linked Group 3:  \"Or\" Linked Group Details        potassium chloride (MICRO-K) CR capsule 40 mEq 40 mEq As Needed 3/8/2019     Sig - Route: Take 4 capsules by mouth As Needed (potassium replacement.  see admin instructions). - Oral    Linked Group 3:  \"Or\" Linked Group Details        potassium chloride 10 mEq in 100 mL IVPB 10 mEq Every 1 Hour PRN 3/8/2019     Sig - Route: Infuse 100 mL into a venous catheter Every 1 (One) Hour As Needed (potassium protocol PERIPHERAL - see admin instructions). - Intravenous    Linked Group 3:  \"Or\" Linked Group Details        sodium chloride 0.9 % flush 3 mL 3 mL Every 12 Hours Scheduled 3/8/2019     Sig - Route: Infuse 3 mL into a venous catheter Every 12 (Twelve) Hours. - Intravenous    sodium chloride 0.9 % flush 3-10 mL 3-10 mL As Needed 3/8/2019     Sig - Route: Infuse 3-10 mL into a venous catheter As Needed for Line Care. - Intravenous    spironolactone (ALDACTONE) tablet 25 mg 25 mg Daily 3/8/2019     Sig - Route: Take 1 tablet by mouth Daily. - Oral            Lab Results (last 24 hours)     Procedure Component Value Units Date/Time    Extra Tubes [[id number]] Collected:  " 03/08/19 0846    Specimen:  Blood, Venous Line Updated:  03/08/19 1000    Narrative:       The following orders were created for panel order Extra Tubes.  Procedure                               Abnormality         Status                     ---------                               -----------         ------                     Cheyanne Top[[id number]]                                     Final result                 Please view results for these tests on the individual orders.    Lavender Top [[id number]] Collected:  03/08/19 0846    Specimen:  Blood Updated:  03/08/19 1000     Extra Tube hold for add-on     Comment: Auto resulted       Troponin [[id number]]  (Abnormal) Collected:  03/08/19 0845    Specimen:  Blood Updated:  03/08/19 0926     Troponin I 0.071 ng/mL     POC Glucose Once [[id number]]  (Abnormal) Collected:  03/08/19 0622    Specimen:  Blood Updated:  03/08/19 0645     Glucose 133 mg/dL      Comment: RN NotifiedOperator: [id number] [facility]: [id number]       Hemoglobin A1c [[id number]]  (Abnormal) Collected:  03/08/19 0245    Specimen:  Blood Updated:  03/08/19 0642     Hemoglobin A1C 5.8 %     TSH [[id number]]  (Normal) Collected:  03/08/19 0245    Specimen:  Blood Updated:  03/08/19 0418     TSH 0.860 mIU/mL     Troponin [[id number]]  (Abnormal) Collected:  03/08/19 0245    Specimen:  Blood Updated:  03/08/19 0413     Troponin I 0.065 ng/mL     aPTT [[id number]]  (Normal) Collected:  03/08/19 0245    Specimen:  Blood Updated:  03/08/19 0413     PTT 33.7 seconds     Narrative:       The recommended Heparin therapeutic range is 68-97 seconds.    Protime-INR [[id number]]  (Abnormal) Collected:  03/08/19 0245    Specimen:  Blood Updated:  03/08/19 0413     Protime 15.8 Seconds      INR 1.30    Narrative:       Therapeutic range for most indications is 2.0-3.0 INR,  or 2.5-3.5 for mechanical heart valves.    BNP [[id number]]  (Abnormal) Collected:  03/08/19 0245    Specimen:  Blood Updated:  03/08/19  0410     proBNP 2,200.0 pg/mL     Lipid Panel [[id number]]  (Abnormal) Collected:  03/08/19 0245    Specimen:  Blood Updated:  03/08/19 0358     Total Cholesterol 127 mg/dL      Triglycerides 98 mg/dL      HDL Cholesterol 33 mg/dL      LDL Cholesterol  81 mg/dL      LDL/HDL Ratio 2.25    Lactic Acid, Plasma [[id number]]  (Normal) Collected:  03/08/19 0245    Specimen:  Blood Updated:  03/08/19 0348     Lactate 1.7 mmol/L     Magnesium [[id number]]  (Normal) Collected:  03/08/19 0245    Specimen:  Blood Updated:  03/08/19 0347     Magnesium 2.0 mg/dL     Comprehensive Metabolic Panel [[id number]]  (Abnormal) Collected:  03/08/19 0245    Specimen:  Blood Updated:  03/08/19 0347     Glucose 126 mg/dL      BUN 19 mg/dL      Creatinine 1.53 mg/dL      Sodium 142 mmol/L      Potassium 3.9 mmol/L      Chloride 98 mmol/L      CO2 35.0 mmol/L      Calcium 9.0 mg/dL      Total Protein 7.6 g/dL      Albumin 4.10 g/dL      ALT (SGPT) 22 U/L      AST (SGOT) 20 U/L      Alkaline Phosphatase 53 U/L      Total Bilirubin 1.2 mg/dL      eGFR  African Amer 59 mL/min/1.73      Globulin 3.5 gm/dL      A/G Ratio 1.2 g/dL      BUN/Creatinine Ratio 12.4     Anion Gap 9.0 mmol/L     CBC Auto Differential [[id number]]  (Abnormal) Collected:  03/08/19 0245    Specimen:  Blood Updated:  03/08/19 0308     WBC 6.33 10*3/mm3      RBC 4.65 10*6/mm3      Hemoglobin 14.0 g/dL      Hematocrit 43.6 %      MCV 93.8 fL      MCH 30.1 pg      MCHC 32.1 g/dL      RDW 12.9 %      RDW-SD 44.1 fl      MPV 12.8 fL      Platelets 136 10*3/mm3      Neutrophil % 51.8 %      Lymphocyte % 32.9 %      Monocyte % 10.6 %      Eosinophil % 3.8 %      Basophil % 0.6 %      Immature Grans % 0.3 %      Neutrophils, Absolute 3.28 10*3/mm3      Lymphocytes, Absolute 2.08 10*3/mm3      Monocytes, Absolute 0.67 10*3/mm3      Eosinophils, Absolute 0.24 10*3/mm3      Basophils, Absolute 0.04 10*3/mm3      Immature Grans, Absolute 0.02 10*3/mm3      nRBC 0.0 /100 WBC     POC Glucose  Once [[id number]]  (Normal) Collected:  03/07/19 2352    Specimen:  Blood Updated:  03/08/19 0010     Glucose 114 mg/dL      Comment: RN NotifiedOperator: [id number] TAYLOR Millerer ID: [id number]           Imaging Results (last 24 hours)     Procedure Component Value Units Date/Time    XR Chest 1 View [[id number]] Collected:  03/08/19 0234     Updated:  03/08/19 0253    Narrative:       Exam: AP portable chest    INDICATION: Chest pain    COMPARISON: 2/4/2019 hours: AP portable chest. Bony structures  are intact. The heart is enlarged. There is a hiatal hernia.  Pulmonary vascular is prominent. No large pneumothorax of pleural  effusions      Impression:       Cardiomegaly with venous congestion    Electronically signed by:  Delbert Cm MD  3/8/2019 2:52 AM CST  Workstation: [id number]        ECG/EMG Results (last 24 hours)     Procedure Component Value Units Date/Time    ECG 12 Lead [[id number]] Collected:  03/08/19 0611     Updated:  03/08/19 1032    Narrative:       Test Reason : hfref  Blood Pressure : **/** mmHG  Vent. Rate : 095 BPM     Atrial Rate : 095 BPM     P-R Int : 198 ms          QRS Dur : 126 ms      QT Int : 382 ms       P-R-T Axes : 051 -18 080 degrees     QTc Int : 480 ms    Sinus rhythm with occasional premature ventricular complexes and fusion  complexes  Left atrial enlargement  Nonspecific intraventricular block  Cannot rule out Septal infarct , age undetermined  Abnormal ECG    Confirmed by YURIDIA CARLISLE MD ([id number]) on 3/8/2019 10:32:14 AM    Referred By:             Confirmed By:YURIDIA CARLISLE MD    ECG 12 Lead [[id number]] Collected:  03/08/19 0638     Updated:  03/08/19 1032    Narrative:       Test Reason : chest pain  Blood Pressure : **/** mmHG  Vent. Rate : 102 BPM     Atrial Rate : 102 BPM     P-R Int : 192 ms          QRS Dur : 128 ms      QT Int : 370 ms       P-R-T Axes : 046 -21 073 degrees     QTc Int : 482 ms    Sinus tachycardia with occasional premature  ventricular complexes  Left atrial enlargement  Nonspecific intraventricular block  Cannot rule out Septal infarct , age undetermined  Abnormal ECG    Confirmed by YURIDIA CARLISLE MD ([id number]) on 3/8/2019 10:32:42 AM    Referred By:             Confirmed By:YURIDIA CARLISLE MD          Physician Progress Notes (last 24 hours) (Notes from 3/7/2019 11:18 AM through 3/8/2019 11:18 AM)     No notes of this type exist for this encounter.           Consult Notes (last 24 hours) (Notes from 3/7/2019 11:18 AM through 3/8/2019 11:18 AM)      Em Gilman APRN at 3/8/2019  9:44 AM      Consult Orders    1. Inpatient Heart Failure Navigator Consult [[id number]] ordered by Moiz Lopez MD at 03/08/19 0025                                                     CHF NAVIGATOR TREATMENT PLAN    Indication: HFrEF    Attached Cardiologist: Dr. Shook     HPI:  Mr. Bravo is a 48 year old patient known to myself in the CHF clinic. He was recently arrested and unable to keep outpatient appointments. He is currently awaiting evaluation for ICD placement and varicose vein treatments. He says that [facility] not [facility] his LAsix twice daily, but only once. Weight does not appear to be up much from 2/7/19 321lbs and admission weight of 323lbs. This AM his weight is down 6lbs at 317lbs with IV lasix. Elevated troponin is not indicative of ACS, secondary to CHF. Creatinine of 1.5 is chronic.     He received 80mg IV lasix at midnight.     Coreg 25mg   Entresto 24/26mg BID  Aldactone 25mg  Lasix TBD as outpatient. He was on 40mg BID    Will follow Mr. Bravo in CHF clinic 3-5 days post DC. He appears better today and can likely be discharged.      I would like to thank Dr. Lopez for this HF consultation.           This document has been electronically signed by SHELL Isidro on March 8, 2019 9:44 AM          Electronically signed by Em Gilman APRN at 3/8/2019  9:52 AM

## 2019-03-08 NOTE — CONSULTS
"Adult Nutrition  Assessment    Patient Name:  Matti Bravo  YOB: 1970  MRN: [id number]  Admit Date:  3/7/2019    Assessment Date:  3/8/2019    Comments:  RD visit to offer Low Na diet ed r/t CHF dx. Pt says \"i've got all that\" re: handout materials and declines ed. Denies any ?s or nutritional needs at this time. RD will monitor prn.     Reason for Assessment     Row Name 03/08/19 5037          Reason for Assessment    Reason For Assessment  per organizational policy     Diagnosis  cardiac disease     Identified At Risk by Screening Criteria  need for education         Nutrition/Diet History     Row Name 03/08/19 1337          Nutrition/Diet History    Typical Food/Fluid Intake  Pt tells RD \"i've got all that\" re: low Na diet information. Denies any ?s at this time.                  Nutrition Prescription Ordered     Row Name 03/08/19 1337          Nutrition Prescription PO    Current PO Diet  Regular     Fluid Consistency  Thin     Common Modifiers  Cardiac;Fluid Restriction     Fluid Restriction mL per Day  1500 mL                 Electronically signed by:  Kalpana Guido RD  03/08/19 1:38 PM  "

## 2019-03-08 NOTE — PROGRESS NOTES
"Discharge Planning Assessment  [facility]     Patient Name: Matti Bravo  MRN: [id number]  Today's Date: 3/8/2019    Admit Date: 3/7/2019    Discharge Needs Assessment     Row Name 03/08/19 1256       Living Environment    Lives With  parent(s) Patient resides with his mother.     Current Living Arrangements  home/apartment/condo Information on face sheet confirmed with patient.     Primary Care Provided by  self    Provides Primary Care For  no one    Family Caregiver if Needed  parent(s)    Quality of Family Relationships  involved;helpful    Able to Return to Prior Arrangements  yes       Resource/Environmental Concerns    Transportation Concerns  car, none       Transition Planning    Patient/Family Anticipates Transition to  home with family    Patient/Family Anticipated Services at Transition  other (see comments) Note left for MD requesting f/u with the HF clinic 3-5 days post hospital d/c as recommended by APRN.     Transportation Anticipated  family or friend will provide       Discharge Needs Assessment    Readmission Within the Last 30 Days  other (see comments) OBV status from 2-4-19 to 2-7-2019    Concerns to be Addressed  adjustment to diagnosis/illness;compliance issue    Concerns Comments  Noted in chart that patient reports noncompliance with outpatient follow up was secondary to recent incarceration. Patient informed [facility] that he was advised by a [employer] representative that secondary to his noncompliance \"not wearing it enough\" he would have to return his CPAP machine. Patient voiced that he had spoke with the company and has discussed with vendor the option of purchasing the CPAP machine. Patient informed SWRK that he has the money available and intends on purchasing the CPAP machine from DME vendor.  Patient has a dx of DM listed. Patient reports that he does not have a glucometer at home.     Equipment Currently Used at Home  bipap/cpap;other (see comments) Scale    " Equipment Needed After Discharge  glucometer;other (see comments) ASHA left a note for MD requesting an order for a glucometer, test strips, needles and lancets be sent to his pharmacy at d/c.     Outpatient/Agency/Support Group Needs  other (see comments) Anticipate outpatient f/u with the HF clinic.     Current Discharge Risk  chronically ill;non-[facility] HF and DM        Discharge Plan     Row Name 03/08/19 1305       Plan    Plan  home with outpatient f/u with the HF clinic    Plan Comments  LACE complete. Note left for MD requesting DME order for a : glucometer/diabetic supplies (test strips, needles and lancets) and an outpatient f/u with the HF clinic in 3-5 days post hospital d/c. No additional needs presented and/or voiced. Continue with medical management....Vania Pet[facility]         Destination      No service coordination in this encounter.      Durable Medical Equipment      No service coordination in this encounter.      Dialysis/Infusion      No service coordination in this encounter.      Home Medical Care      No service coordination in this encounter.      Community Resources      No service coordination in this encounter.          Demographic Summary     Row Name 03/08/19 1252       General Information    Admission Type  inpatient    Arrived From  other (see comments) Transfer    Referral Source  high risk screening    Preferred Language  English     Used During This Interaction  no        Functional Status     Row Name 03/08/19 1255       Functional Status    Functional Status Comments  Patient voiced independence with ADL's.        Functional Status, IADL    Medications  independent Pharmacy, [facility], and coverage confirmed with patient.     Meal Preparation  independent    Housekeeping  independent    Laundry  independent    Shopping  independent       Mental Status Summary    Recent Changes in Mental Status/Cognitive Functioning  no changes       Employment/    Employment  Status  unemployed        Psychosocial    No documentation.       Abuse/Neglect    No documentation.       Legal    No documentation.       Substance Abuse    No documentation.       Patient Forms    No documentation.           AMOL Rashid     8

## 2019-03-08 NOTE — H&P
[facility] Medicine Admission      Date of Admission: 3/7/2019      Primary Care Physician: Shonna Ng APRN      Chief Complaint: dyspnea     HPI:  This a 47 y/o male who presents as a transfer from an outside facility w/ complaints who progressively worsening dyspnea. Pt also has orthopnea, PND. Pt states he was recently released from nursing home & while there he was only given Lasix once per day instead of twice daily. Per transferring physician, pt also has a mild trop bump which is likely 2/2 supply demand mismatch. Denies chest pain, palpitations, nausea, vomiting, diaphoresis, fatigue, jaw pain, lightheadedness, heartburn, arm discomfort. Denies fever, chills, night sweats, cough, abdominal pain, urgency, frequency, dysuria, numbness, weakness, new focal deficits.     Concurrent Medical History:  has a past medical history of CHF (congestive heart failure) (CMS/[facility]), Diabetes mellitus (CMS/[facility]), Hyperlipidemia, Hypertension, and Peripheral vascular disease (CMS/[facility]).    Past Surgical History:   Past Surgical History:   Procedure Laterality Date   • CARDIAC CATHETERIZATION  2009       Family History:   Family History   Problem Relation Age of Onset   • Heart disease Other    • Cancer Other    • Hypertension Mother    • Diabetes Mother        Social History:   Social History     Socioeconomic History   • Marital status: Legally      Spouse name: Not on file   • Number of children: Not on file   • Years of education: Not on file   • Highest education level: Not on file   Social Needs   • Financial resource strain: Not on file   • Food insecurity - worry: Not on file   • Food insecurity - inability: Not on file   • Transportation needs - medical: Not on file   • Transportation needs - non-medical: Not on file   Occupational History   • Not on file   Tobacco Use   • Smoking status: Never Smoker   • Smokeless tobacco: Never Used   Substance and Sexual Activity   • Alcohol  use: No   • Drug use: No   • Sexual activity: Defer   Other Topics Concern   • Not on file   Social History Narrative   • Not on file       Allergies: No Known Allergies    Medications:   Prior to Admission medications    Medication Sig Start Date End Date Taking? Authorizing Provider   albuterol (PROVENTIL HFA;VENTOLIN HFA) 108 (90 Base) MCG/ACT inhaler Inhale 2 puffs Every 4 (Four) Hours As Needed for Wheezing. 11/1/17  Yes Em Gilman APRN   carvedilol (COREG) 25 MG tablet Take 1 tablet by mouth 2 (Two) Times a Day With Meals. 2/7/19  Yes Jamison Cuevas MD   furosemide (LASIX) 40 MG tablet Take 1 tablet by mouth 2 (Two) Times a Day. 1/11/19  Yes Em Gilman APRN   lovastatin (MEVACOR) 20 MG tablet Take 20 mg by mouth Every Night.   Yes ProviderAroldo MD   metFORMIN (GLUCOPHAGE) 500 MG tablet Take 500 mg by mouth Daily With Breakfast.   Yes ProviderAroldo MD   potassium chloride (K-DUR) 10 MEQ CR tablet Take 1 tablet by mouth Daily. 1/11/19  Yes Em Gilman APRN   sacubitril-valsartan (ENTRESTO) 24-26 MG tablet Take 1 tablet by mouth 2 (Two) Times a Day. 8/3/18  Yes Em Gilman APRN   spironolactone (ALDACTONE) 25 MG tablet Take 1 tablet by mouth Daily. 2/8/19  Yes Jamison Cuevas MD       Review of Systems:  Review of Systems   Constitutional: Negative for chills, diaphoresis and fever.   HENT: Negative for congestion and sore throat.    Eyes: Negative for photophobia and visual disturbance.   Respiratory: Positive for shortness of breath. Negative for cough and wheezing.    Cardiovascular: Positive for leg swelling. Negative for chest pain and palpitations.   Gastrointestinal: Negative for abdominal distention and abdominal pain.   Endocrine: Negative for polyphagia and polyuria.   Genitourinary: Negative for dysuria and hematuria.   Musculoskeletal: Negative for neck pain and neck stiffness.   Skin: Negative for color change.   Neurological: Negative  for facial asymmetry and speech difficulty.   Hematological: Negative for adenopathy.   Psychiatric/Behavioral: Negative for agitation and confusion.      Otherwise ROS is negative except as mentioned above.    Physical Exam:   Temp:  [96.4 °F (35.8 °C)] 96.4 °F (35.8 °C)  Heart Rate:  [84] 84  Resp:  [20] 20  BP: (126)/(82) 126/82  Physical Exam   Constitutional: He is oriented to person, place, and time. No distress.   HENT:   Head: Normocephalic and atraumatic.   Mouth/Throat: Oropharynx is clear and moist.   Eyes: Conjunctivae are normal. Pupils are equal, round, and reactive to light.   Neck: Neck supple.   Cardiovascular: Exam reveals no friction rub.   +S3 gallop, + JVD   Pulmonary/Chest: No respiratory distress. He has no wheezes.   Diffusely diminished air entry b/l  +crackles b/l     Abdominal: Soft. Bowel sounds are normal.   Musculoskeletal: Normal range of motion. He exhibits edema.   Neurological: He is alert and oriented to person, place, and time.   Skin: Skin is warm. He is not diaphoretic.   Psychiatric: He has a normal mood and affect. His behavior is normal.   Vitals reviewed.        Results Reviewed:  I have personally reviewed current lab, radiology, and data.  Lab Results (last 24 hours)     Procedure Component Value Units Date/Time    POC Glucose Once [[id number]]  (Normal) Collected:  03/07/19 2352    Specimen:  Blood Updated:  03/08/19 0010     Glucose 114 mg/dL      Comment: RN NotifiedOperator: [id number] [facility] ID: [id number]           Imaging Results (last 24 hours)     ** No results found for the last 24 hours. **            Assessment:      Acute exacerbation of CHF (congestive heart failure) (CMS/[facility])    Essential hypertension      #Acute on chronic exacerbation of combined systolic & diastolic heart failure w/ severely reduced ejection fraction; EF 10%---at extremely high risk for sudden cardiac death. No AICD in place. Will consult cardiology. Echo reviewed   #NSTEMI  likely type II 2/2 supply demand mismatch 2/2 acute heart failure exacerbation    #FRANCK (per transferring doctor, labs pending at our facility)  #Dilated Cardiomyopathy  #DM  #HTN      Plan:  -iv lasix  -niv prn  -asa, statin, bb,  aldactone  -hold acei/arb 2/2 franck   -serial trops w/ ekg, bnp  -strict I/o, daily wts  -monitor lytes; recheck/replace prn   -supplemental o2 to maintain o2 sat>92%  -tele  -optimize pressures  -cardiology consult  -nebs  -monitor cbg, iss  -serial h/h; transfuse to maintain hgb>7  -gi prophylaxis: Pepcid  -dvt prophylaxis: heparin sc     PTA Medications reconciled. Labs reviewed. Records reviewed. Case & plan of care was discussed w/ the patient. All concerns were addressed & questions were answered.     Discharge Planning: depends on hospital course    Prognosis: guarded    Moiz Lopez MD  03/08/19  12:51 AM

## 2019-03-08 NOTE — CONSULTS
CHF NAVIGATOR TREATMENT PLAN    Indication: HFrEF    Attached Cardiologist: Dr. Shook     HPI:  Mr. Bravo is a 48 year old patient known to myself in the CHF clinic. He was recently arrested and unable to keep outpatient appointments. He is currently awaiting evaluation for ICD placement and varicose vein treatments. He says that the skilled nursing did not give him his LAsix twice daily, but only once. Weight does not appear to be up much from 2/7/19 321lbs and admission weight of 323lbs. This AM his weight is down 6lbs at 317lbs with IV lasix. Elevated troponin is not indicative of ACS, secondary to CHF. Creatinine of 1.5 is chronic.     He received 80mg IV lasix at midnight.     Coreg 25mg   Entresto 24/26mg BID  Aldactone 25mg  Lasix TBD as outpatient. He was on 40mg BID    Will follow Mr. Bravo in CHF clinic 3-5 days post DC. He appears better today and can likely be discharged.      I would like to thank Dr. Lopez for this HF consultation.           This document has been electronically signed by SHELL Isidro on March 8, 2019 9:44 AM

## 2019-03-08 NOTE — PROGRESS NOTES
MEDICINE DAILY PROGRESS NOTE  NAME: Matti Bravo  : 1970  MRN: [id number]     LOS: 1 day     PROVIDER OF SERVICE: Everardo Canada MD    Chief Complaint: Acute exacerbation of CHF (congestive heart failure) (CMS/[facility])    Subjective:   HPI: Dave Bravo is a 47yo male with a PMHx of CHF with an ejection fraction of 10%, DM, Hyperlipidemia, HTN, and PVD who presents with shortness of breath. Symptoms started 2 nights ago. Patient reports having substernal chest pressure as well during this time. Patient reports that symptoms are intermittent. Symptoms aggravated by lying down and relieved when sitting up. Patient went to his local hospital yesterday for evaluation and was then transferred here due to elevated troponin levels. Patient reports that he sees SHELL Sumner for his cardiology care. He reports being in snf 2 weeks ago and was released 1 week ago. Patient says that while in snf he was only allowed to have his Lasix one time per day instead of his usual two times per day.     Interval History:  History taken from: Patient and chart  3/8/19 Patient reports that shortness of breath and chest pressure symptoms have improved since yesterday and patient is feeling better. He has received and tolerated IV lasix, aspirin, atorvastatin, carvedilol, nitroglycerin, and spironolactone. Denies any adverse effects to therapy.     Review of Systems:   Review of Systems   Constitutional: Positive for fatigue. Negative for diaphoresis and fever.   HENT: Negative for congestion and sore throat.    Eyes: Negative for pain and visual disturbance.   Respiratory: Positive for shortness of breath. Negative for cough and wheezing.    Cardiovascular: Positive for leg swelling. Negative for chest pain and palpitations.   Gastrointestinal: Negative for abdominal pain, nausea and vomiting.   Endocrine: Negative for polydipsia and polyuria.   Genitourinary: Negative for flank pain and urgency.   Musculoskeletal:  Negative for arthralgias and myalgias.   Skin: Negative for color change and rash.   Neurological: Negative for dizziness and light-headedness.   Psychiatric/Behavioral: Negative for agitation and behavioral problems.       Objective:     Vital Signs  Vitals:    03/08/19 0500 03/08/19 0626 03/08/19 0815 03/08/19 1145   BP: 130/89 (!) 137/101 134/89 103/67   BP Location: Right arm Right arm Right arm Right arm   Patient Position: Sitting Lying Lying Lying   Pulse: 98 85 76 77   Resp: 20 20 18 18   Temp: 97.7 °F (36.5 °C)  97.5 °F (36.4 °C) 97.4 °F (36.3 °C)   TempSrc: Oral  Oral Oral   SpO2: 94% 96% 96% 97%   Weight: (!) 144 kg (317 lb 6.4 oz)      Height:           Physical Exam  Physical Exam   Constitutional: He is oriented to person, place, and time. He appears well-developed and well-nourished.   HENT:   Head: Normocephalic and atraumatic.   Eyes: EOM are normal. Pupils are equal, round, and reactive to light.   Neck: Normal range of motion. Neck supple. JVD present.   Cardiovascular: Normal rate, regular rhythm, normal heart sounds and intact distal pulses.   Pulmonary/Chest: Effort normal. He has no wheezes. He has no rales.   Abdominal: Soft. Bowel sounds are normal.   Musculoskeletal: Normal range of motion. He exhibits edema.   Neurological: He is alert and oriented to person, place, and time.   Skin: Skin is warm and dry.   Venous stasis dermatitis observed above medial malleolus bilaterally.    Psychiatric: He has a normal mood and affect. His behavior is normal. Thought content normal.       Medication Review    Current Facility-Administered Medications:   •  albuterol (PROVENTIL) nebulizer solution 0.083% 2.5 mg/3mL, 2.5 mg, Nebulization, Q6H PRN, Moiz Lopez MD  •  aspirin chewable tablet 81 mg, 81 mg, Oral, Daily, Moiz Lopez MD, 81 mg at 03/08/19 1005  •  atorvastatin (LIPITOR) tablet 20 mg, 20 mg, Oral, Daily, Moiz Lopez MD, 20 mg at 03/08/19 1005  •  carvedilol (COREG) tablet 25 mg, 25  mg, Oral, BID With Meals, Moiz Lopez MD, 25 mg at 03/08/19 1005  •  dextrose (D50W) 25 g/ 50mL Intravenous Solution 25 g, 25 g, Intravenous, Q15 Min PRN, Moiz Lopez MD  •  dextrose (GLUTOSE) oral gel 15 g, 15 g, Oral, Q15 Min PRN, Moiz Lopez MD  •  docusate sodium (COLACE) capsule 100 mg, 100 mg, Oral, Daily, Moiz Lopez MD, 100 mg at 03/08/19 1004  •  famotidine (PEPCID) tablet 40 mg, 40 mg, Oral, Daily, Moiz Lopez MD, 40 mg at 03/08/19 1005  •  furosemide (LASIX) injection 80 mg, 80 mg, Intravenous, Q12H, Moiz Lopez MD, 80 mg at 03/08/19 0053  •  glucagon (human recombinant) (GLUCAGEN DIAGNOSTIC) injection 1 mg, 1 mg, Subcutaneous, PRN, Moiz Lopez MD  •  heparin (porcine) 5000 UNIT/ML injection 5,000 Units, 5,000 Units, Subcutaneous, Q8H, Moiz Lopez MD, 5,000 Units at 03/08/19 0713  •  insulin aspart (novoLOG) injection 0-9 Units, 0-9 Units, Subcutaneous, 4x Daily AC & at Bedtime, Moiz Lopez MD  •  Magnesium Sulfate 2 gram Bolus, followed by 8 gram infusion (total Mg dose 10 grams)- Mg less than or equal to 1mg/dL, 2 g, Intravenous, PRN **OR** Magnesium Sulfate 2 gram / 50mL Infusion (GIVE X 3 BAGS TO EQUAL 6GM TOTAL DOSE) - Mg 1.1 - 1.5 mg/dl, 2 g, Intravenous, PRN **OR** Magnesium Sulfate 4 gram infusion- Mg 1.6-1.9 mg/dL, 4 g, Intravenous, PRN, Moiz Lopez MD  •  morphine injection 1 mg, 1 mg, Intravenous, Q4H PRN **AND** naloxone (NARCAN) injection 0.4 mg, 0.4 mg, Intravenous, Q5 Min PRN, Moiz Lopez MD  •  nitroglycerin (NITROSTAT) ointment 1 inch, 1 inch, Topical, Q6H, Moiz Lopez MD, 1 inch at 03/08/19 0715  •  ondansetron (ZOFRAN) injection 4 mg, 4 mg, Intravenous, Q6H PRN, Moiz Lopez MD  •  potassium chloride (MICRO-K) CR capsule 40 mEq, 40 mEq, Oral, PRN **OR** potassium chloride (KLOR-CON) packet 40 mEq, 40 mEq, Oral, PRN **OR** potassium chloride 10 mEq in 100 mL IVPB, 10 mEq, Intravenous, Q1H PRN, Moiz Lopez MD  •  sodium chloride 0.9 % flush 3 mL, 3  mL, Intravenous, Q12H, Moiz Lopez MD, 3 mL at 03/08/19 1006  •  sodium chloride 0.9 % flush 3-10 mL, 3-10 mL, Intravenous, PRN, Moiz Lopez MD  •  spironolactone (ALDACTONE) tablet 25 mg, 25 mg, Oral, Daily, Moiz Lopez MD, 25 mg at 03/08/19 1005     Diagnostic Data    Lab Results (last 24 hours)     Procedure Component Value Units Date/Time    POC Glucose Once [[id number]]  (Normal) Collected:  03/08/19 1147    Specimen:  Blood Updated:  03/08/19 1201     Glucose 100 mg/dL      Comment: : [id number] SHERLY CHRISMeter ID: [id number]       Extra Tubes [[id number]] Collected:  03/08/19 0846    Specimen:  Blood, Venous Line Updated:  03/08/19 1000    Narrative:       The following orders were created for panel order Extra Tubes.  Procedure                               Abnormality         Status                     ---------                               -----------         ------                     Lavender Top[[id number]]                                     Final result                 Please view results for these tests on the individual orders.    Lavender Top [[id number]] Collected:  03/08/19 0846    Specimen:  Blood Updated:  03/08/19 1000     Extra Tube hold for add-on     Comment: Auto resulted       Troponin [[id number]]  (Abnormal) Collected:  03/08/19 0845    Specimen:  Blood Updated:  03/08/19 0926     Troponin I 0.071 ng/mL     POC Glucose Once [[id number]]  (Abnormal) Collected:  03/08/19 0622    Specimen:  Blood Updated:  03/08/19 0645     Glucose 133 mg/dL      Comment: RN NotifiedOperator: [id number] [facility] APRILMeter ID: [id number]       Hemoglobin A1c [[id number]]  (Abnormal) Collected:  03/08/19 0245    Specimen:  Blood Updated:  03/08/19 0642     Hemoglobin A1C 5.8 %     TSH [[id number]]  (Normal) Collected:  03/08/19 0245    Specimen:  Blood Updated:  03/08/19 0418     TSH 0.860 mIU/mL     Troponin [[id number]]  (Abnormal) Collected:  03/08/19 0245    Specimen:  Blood Updated:   03/08/19 0413     Troponin I 0.065 ng/mL     aPTT [[id number]]  (Normal) Collected:  03/08/19 0245    Specimen:  Blood Updated:  03/08/19 0413     PTT 33.7 seconds     Narrative:       The recommended Heparin therapeutic range is 68-97 seconds.    Protime-INR [[id number]]  (Abnormal) Collected:  03/08/19 0245    Specimen:  Blood Updated:  03/08/19 0413     Protime 15.8 Seconds      INR 1.30    Narrative:       Therapeutic range for most indications is 2.0-3.0 INR,  or 2.5-3.5 for mechanical heart valves.    BNP [[id number]]  (Abnormal) Collected:  03/08/19 0245    Specimen:  Blood Updated:  03/08/19 0410     proBNP 2,200.0 pg/mL     Lipid Panel [[id number]]  (Abnormal) Collected:  03/08/19 0245    Specimen:  Blood Updated:  03/08/19 0358     Total Cholesterol 127 mg/dL      Triglycerides 98 mg/dL      HDL Cholesterol 33 mg/dL      LDL Cholesterol  81 mg/dL      LDL/HDL Ratio 2.25    Lactic Acid, Plasma [[id number]]  (Normal) Collected:  03/08/19 0245    Specimen:  Blood Updated:  03/08/19 0348     Lactate 1.7 mmol/L     Magnesium [[id number]]  (Normal) Collected:  03/08/19 0245    Specimen:  Blood Updated:  03/08/19 0347     Magnesium 2.0 mg/dL     Comprehensive Metabolic Panel [[id number]]  (Abnormal) Collected:  03/08/19 0245    Specimen:  Blood Updated:  03/08/19 0347     Glucose 126 mg/dL      BUN 19 mg/dL      Creatinine 1.53 mg/dL      Sodium 142 mmol/L      Potassium 3.9 mmol/L      Chloride 98 mmol/L      CO2 35.0 mmol/L      Calcium 9.0 mg/dL      Total Protein 7.6 g/dL      Albumin 4.10 g/dL      ALT (SGPT) 22 U/L      AST (SGOT) 20 U/L      Alkaline Phosphatase 53 U/L      Total Bilirubin 1.2 mg/dL      eGFR  African Amer 59 mL/min/1.73      Globulin 3.5 gm/dL      A/G Ratio 1.2 g/dL      BUN/Creatinine Ratio 12.4     Anion Gap 9.0 mmol/L     CBC Auto Differential [[id number]]  (Abnormal) Collected:  03/08/19 0245    Specimen:  Blood Updated:  03/08/19 0308     WBC 6.33 10*3/mm3      RBC 4.65 10*6/mm3       Hemoglobin 14.0 g/dL      Hematocrit 43.6 %      MCV 93.8 fL      MCH 30.1 pg      MCHC 32.1 g/dL      RDW 12.9 %      RDW-SD 44.1 fl      MPV 12.8 fL      Platelets 136 10*3/mm3      Neutrophil % 51.8 %      Lymphocyte % 32.9 %      Monocyte % 10.6 %      Eosinophil % 3.8 %      Basophil % 0.6 %      Immature Grans % 0.3 %      Neutrophils, Absolute 3.28 10*3/mm3      Lymphocytes, Absolute 2.08 10*3/mm3      Monocytes, Absolute 0.67 10*3/mm3      Eosinophils, Absolute 0.24 10*3/mm3      Basophils, Absolute 0.04 10*3/mm3      Immature Grans, Absolute 0.02 10*3/mm3      nRBC 0.0 /100 WBC     POC Glucose Once [[id number]]  (Normal) Collected:  03/07/19 2352    Specimen:  Blood Updated:  03/08/19 0010     Glucose 114 mg/dL      Comment: RN NotifiedOperator: [id number] [facility] [id number]             I reviewed the patient's new clinical results.    Assessment/Plan:     Active Hospital Problems    Diagnosis Date Noted   • **Acute exacerbation of CHF (congestive heart failure) (CMS/[facility]) [I50.9] 03/08/2019   • Essential hypertension [I10] 09/26/2017     patient currently hemodynamically stable.  -Carvedilol 25 mg p.o. twice daily  -Sacubitrill-valsartan 24-26 mg p.o. twice daily  -spironolactone       Acute exacerbation of CHF  - symptoms are well controlled on current therapies. Will continue to provide medications and monitor for adverse effects.   - order serial CBC, CMP, and Troponins   - Cardiology consulted for evaluation for ICD placement and varicose vein treatments. Will follow up with cardiology once evaluation is complete.     DM  - well controlled on current medications. Will continue to monitor with periodic glucose checks.     HTN  - controlled with current medications.     Hyperlipidemia  - controlled with current medications.      Will monitor patient's hospital course and adjust treatment as hospital course dictates.    DVT prophylaxis: Heparin  Code status is   Code Status and Medical  Interventions:   Ordered at: 03/08/19 0039     Code Status:    CPR     Medical Interventions (Level of Support Prior to Arrest):    Full       Plan for disposition:Where: home and When:  tomorrow      Time:           This document has been electronically signed by Everardo Canada MD on March 8, 2019 7:14 PM

## 2019-03-09 VITALS
OXYGEN SATURATION: 95 % | RESPIRATION RATE: 18 BRPM | TEMPERATURE: 98.5 F | SYSTOLIC BLOOD PRESSURE: 128 MMHG | DIASTOLIC BLOOD PRESSURE: 66 MMHG | BODY MASS INDEX: 38.36 KG/M2 | HEIGHT: 76 IN | WEIGHT: 315 LBS | HEART RATE: 86 BPM

## 2019-03-09 LAB
ANION GAP SERPL CALCULATED.3IONS-SCNC: 12 MMOL/L (ref 5–15)
BUN BLD-MCNC: 23 MG/DL (ref 7–21)
BUN/CREAT SERPL: 15.9 (ref 7–25)
CALCIUM SPEC-SCNC: 9.2 MG/DL (ref 8.4–10.2)
CHLORIDE SERPL-SCNC: 97 MMOL/L (ref 95–110)
CO2 SERPL-SCNC: 28 MMOL/L (ref 22–31)
CREAT BLD-MCNC: 1.45 MG/DL (ref 0.7–1.3)
DEPRECATED RDW RBC AUTO: 43 FL (ref 37–54)
ERYTHROCYTE [DISTWIDTH] IN BLOOD BY AUTOMATED COUNT: 12.9 % (ref 12.3–15.4)
GFR SERPL CREATININE-BSD FRML MDRD: 63 ML/MIN/1.73 (ref 63–147)
GLUCOSE BLD-MCNC: 91 MG/DL (ref 60–100)
GLUCOSE BLDC GLUCOMTR-MCNC: 323 MG/DL (ref 70–130)
GLUCOSE BLDC GLUCOMTR-MCNC: 91 MG/DL (ref 70–130)
HCT VFR BLD AUTO: 41.5 % (ref 37.5–51)
HGB BLD-MCNC: 13.6 G/DL (ref 13–17.7)
MCH RBC QN AUTO: 30.1 PG (ref 26.6–33)
MCHC RBC AUTO-ENTMCNC: 32.8 G/DL (ref 31.5–35.7)
MCV RBC AUTO: 91.8 FL (ref 79–97)
PLATELET # BLD AUTO: 133 10*3/MM3 (ref 140–450)
PMV BLD AUTO: 14 FL (ref 6–12)
POTASSIUM BLD-SCNC: 3.8 MMOL/L (ref 3.5–5.1)
RBC # BLD AUTO: 4.52 10*6/MM3 (ref 4.14–5.8)
SODIUM BLD-SCNC: 137 MMOL/L (ref 137–145)
WBC NRBC COR # BLD: 7.32 10*3/MM3 (ref 3.4–10.8)

## 2019-03-09 PROCEDURE — 85027 COMPLETE CBC AUTOMATED: CPT | Performed by: FAMILY MEDICINE

## 2019-03-09 PROCEDURE — 63710000001 INSULIN ASPART PER 5 UNITS: Performed by: INTERNAL MEDICINE

## 2019-03-09 PROCEDURE — 25010000002 HEPARIN (PORCINE) PER 1000 UNITS: Performed by: INTERNAL MEDICINE

## 2019-03-09 PROCEDURE — 80048 BASIC METABOLIC PNL TOTAL CA: CPT | Performed by: FAMILY MEDICINE

## 2019-03-09 PROCEDURE — 82962 GLUCOSE BLOOD TEST: CPT

## 2019-03-09 RX ORDER — CARVEDILOL 25 MG/1
25 TABLET ORAL 2 TIMES DAILY WITH MEALS
Qty: 60 TABLET | Refills: 1 | Status: SHIPPED | OUTPATIENT
Start: 2019-03-09 | End: 2022-09-11 | Stop reason: HOSPADM

## 2019-03-09 RX ORDER — FUROSEMIDE 40 MG/1
40 TABLET ORAL 2 TIMES DAILY
Qty: 60 TABLET | Refills: 6 | Status: SHIPPED | OUTPATIENT
Start: 2019-03-09 | End: 2022-09-11 | Stop reason: HOSPADM

## 2019-03-09 RX ORDER — POTASSIUM CHLORIDE 750 MG/1
10 TABLET, FILM COATED, EXTENDED RELEASE ORAL DAILY
Qty: 30 TABLET | Refills: 1 | Status: ON HOLD | OUTPATIENT
Start: 2019-03-09 | End: 2022-09-27 | Stop reason: SDUPTHER

## 2019-03-09 RX ORDER — ASPIRIN 81 MG/1
81 TABLET, CHEWABLE ORAL DAILY
Qty: 30 TABLET | Refills: 1 | Status: SHIPPED | OUTPATIENT
Start: 2019-03-10 | End: 2021-12-08 | Stop reason: HOSPADM

## 2019-03-09 RX ORDER — SPIRONOLACTONE 25 MG/1
25 TABLET ORAL DAILY
Qty: 30 TABLET | Refills: 1 | Status: SHIPPED | OUTPATIENT
Start: 2019-03-09 | End: 2021-11-15

## 2019-03-09 RX ORDER — LOVASTATIN 20 MG/1
20 TABLET ORAL NIGHTLY
Qty: 30 TABLET | Refills: 1 | Status: SHIPPED | OUTPATIENT
Start: 2019-03-09 | End: 2022-09-11 | Stop reason: HOSPADM

## 2019-03-09 RX ORDER — ALBUTEROL SULFATE 90 UG/1
2 AEROSOL, METERED RESPIRATORY (INHALATION) EVERY 4 HOURS PRN
Qty: 1 INHALER | Refills: 3 | Status: SHIPPED | OUTPATIENT
Start: 2019-03-09

## 2019-03-09 RX ADMIN — HEPARIN SODIUM 5000 UNITS: 5000 INJECTION INTRAVENOUS; SUBCUTANEOUS at 05:17

## 2019-03-09 RX ADMIN — SPIRONOLACTONE 25 MG: 25 TABLET ORAL at 08:42

## 2019-03-09 RX ADMIN — INSULIN ASPART 7 UNITS: 100 INJECTION, SOLUTION INTRAVENOUS; SUBCUTANEOUS at 12:04

## 2019-03-09 RX ADMIN — CARVEDILOL 25 MG: 25 TABLET, FILM COATED ORAL at 08:42

## 2019-03-09 RX ADMIN — FAMOTIDINE 40 MG: 40 TABLET ORAL at 08:42

## 2019-03-09 RX ADMIN — FUROSEMIDE 40 MG: 40 TABLET ORAL at 08:43

## 2019-03-09 RX ADMIN — ATORVASTATIN CALCIUM 20 MG: 20 TABLET, FILM COATED ORAL at 08:42

## 2019-03-09 RX ADMIN — ASPIRIN 81 MG 81 MG: 81 TABLET ORAL at 08:42

## 2019-03-09 NOTE — PLAN OF CARE
Problem: Patient Care Overview  Goal: Plan of Care Review  Outcome: Ongoing (interventions implemented as appropriate)   03/09/19 0151   Coping/Psychosocial   Plan of Care Reviewed With patient   Plan of Care Review   Progress improving     Goal: Individualization and Mutuality  Outcome: Ongoing (interventions implemented as appropriate)    Goal: Discharge Needs Assessment  Outcome: Ongoing (interventions implemented as appropriate)    Goal: Interprofessional Rounds/Family Conf  Outcome: Ongoing (interventions implemented as appropriate)      Problem: Cardiac: Heart Failure (Adult)  Goal: Signs and Symptoms of Listed Potential Problems Will be Absent, Minimized or Managed (Cardiac: Heart Failure)  Outcome: Ongoing (interventions implemented as appropriate)

## 2019-03-09 NOTE — DISCHARGE SUMMARY
[facility] Medicine Services  DISCHARGE SUMMARY       Date of Admission: 3/7/2019  Date of Discharge:  3/9/2019  Primary Care Physician: Shonna Ng APRN    Presenting Problem/History of Present Illness:  Acute exacerbation of CHF (congestive heart failure) (CMS/[facility]) [I50.9]       Final Discharge Diagnoses:  Active Hospital Problems    Diagnosis   • **Acute exacerbation of CHF (congestive heart failure) (CMS/[facility])   • Essential hypertension     patient currently hemodynamically stable.  -Carvedilol 25 mg p.o. twice daily  -Sacubitrill-valsartan 24-26 mg p.o. twice daily  -spironolactone         Consults:   Consults     Date and Time Order Name Status Description    3/8/2019 0027 Inpatient Cardiology Consult                Chief Complaint on Day of Discharge: None    Hospital Course:  The patient is a 48 y.o. male who presented to [facility] with as a transfer from an outside facility w/ complaints who progressively worsening dyspnea. Pt also has orthopnea, PND. Pt states he was recently released from alf & while there he was only given Lasix once per day instead of twice daily. Per transferring physician, pt also has a mild trop bump which is likely 2/2 supply demand mismatch. Denies chest pain, palpitations, nausea, vomiting, diaphoresis, fatigue, jaw pain, lightheadedness, heartburn, arm discomfort.  Patient was placed on IV Lasix and he has lost about 6 or 7 pounds while he was in the hospital.  He reports that he was urinating fine.  He denies any shortness of where any chest pains and he was feeling fine.  He was also evaluated by heart failure clinic nurse practitioner and was set up an appointment with them.  Patient will also need ICD placement evaluation by cardiology and he has been set up with Dr. Cabrera as outpatient  Patient was asked to follow-up with heart failure clinic, cardiology, PCP.        Condition on Discharge: Stable    Physical  "Exam on Discharge:  /100 (BP Location: Right arm, Patient Position: Lying)   Pulse 87   Temp 98 °F (36.7 °C)   Resp 18   Ht 193 cm (76\")   Wt (!) 144 kg (317 lb 7.4 oz)   SpO2 94%   BMI 38.64 kg/m²   Physical Exam   Constitutional: He appears well-developed and well-nourished. No distress.   HENT:   Head: Normocephalic and atraumatic.   Cardiovascular: Normal rate.   Pulmonary/Chest: Effort normal. No respiratory distress. He has no wheezes.   Abdominal: Soft. He exhibits no distension.   Musculoskeletal: Normal range of motion.   Trace edema   Neurological: He is alert. No cranial nerve deficit.   Skin: Skin is warm and dry. He is not diaphoretic.   Psychiatric: He has a normal mood and affect. His behavior is normal. Judgment and thought content normal.   Vitals reviewed.        Discharge Disposition:  Home or Self Care    Discharge Medications:     Discharge Medications      New Medications      Instructions Start Date   aspirin 81 MG chewable tablet   81 mg, Oral, Daily         Continue These Medications      Instructions Start Date   albuterol sulfate  (90 Base) MCG/ACT inhaler  Commonly known as:  PROVENTIL HFA;VENTOLIN HFA;PROAIR HFA   2 puffs, Inhalation, Every 4 Hours PRN      carvedilol 25 MG tablet  Commonly known as:  COREG   25 mg, Oral, 2 Times Daily With Meals      furosemide 40 MG tablet  Commonly known as:  LASIX   40 mg, Oral, 2 Times Daily      lovastatin 20 MG tablet  Commonly known as:  MEVACOR   20 mg, Oral, Nightly      potassium chloride 10 MEQ CR tablet  Commonly known as:  K-DUR   10 mEq, Oral, Daily      sacubitril-valsartan 24-26 MG tablet  Commonly known as:  ENTRESTO   1 tablet, Oral, 2 Times Daily      spironolactone 25 MG tablet  Commonly known as:  ALDACTONE   25 mg, Oral, Daily         Stop These Medications    metFORMIN 500 MG tablet  Commonly known as:  GLUCOPHAGE            Discharge Diet:   Diet Instructions     Diet: Cardiac, Consistent Carbohydrate, Renal   "    Discharge Diet:   Cardiac  Consistent Carbohydrate  Renal             Activity at Discharge:   Activity Instructions     Activity as Tolerated            Discharge Care Plan/Instructions: Follow-up with cardiology for ICD placement evaluation, follow-up with heart failure clinic, follow-up with PCP, continue with medications as prescribed    Follow-up Appointments:   Future Appointments   Date Time Provider Department Center   3/11/2019  2:00 PM Mateo Dunbar MD [facility] None   3/28/2019  2:15 PM Rafael Shook MD PhD [facility] None   4/11/2019  2:00 PM Em Gilman APRN [facility] None         Keagan Choi MD  03/09/19  11:06 AM

## 2019-03-10 ENCOUNTER — READMISSION MANAGEMENT (OUTPATIENT)
Dept: CALL CENTER | Facility: HOSPITAL | Age: 49
End: 2019-03-10

## 2019-03-10 NOTE — OUTREACH NOTE
Prep Survey      Responses   Facility patient discharged from?  [place]   Is patient eligible?  Yes   Discharge diagnosis  Acute exac. of CHF, essential HTN   Does the patient have one of the following disease processes/diagnoses(primary or secondary)?  CHF   Does the patient have Home health ordered?  No   Is there a DME ordered?  Yes   What DME was ordered?  Glucometer and supplies   Comments regarding appointments  Office to call.   Prep survey completed?  Yes          Melinda Anderson RN

## 2019-03-11 ENCOUNTER — READMISSION MANAGEMENT (OUTPATIENT)
Dept: CALL CENTER | Facility: HOSPITAL | Age: 49
End: 2019-03-11

## 2019-03-11 ENCOUNTER — OFFICE VISIT (OUTPATIENT)
Dept: CARDIAC SURGERY | Facility: CLINIC | Age: 49
End: 2019-03-11

## 2019-03-11 VITALS
WEIGHT: 315 LBS | BODY MASS INDEX: 38.36 KG/M2 | DIASTOLIC BLOOD PRESSURE: 70 MMHG | SYSTOLIC BLOOD PRESSURE: 115 MMHG | HEART RATE: 68 BPM | HEIGHT: 76 IN | TEMPERATURE: 98.3 F | OXYGEN SATURATION: 98 %

## 2019-03-11 DIAGNOSIS — I87.2 VENOUS INSUFFICIENCY (CHRONIC) (PERIPHERAL): Primary | ICD-10-CM

## 2019-03-11 DIAGNOSIS — I83.813 VARICOSE VEINS OF BOTH LOWER EXTREMITIES WITH PAIN: ICD-10-CM

## 2019-03-11 DIAGNOSIS — J98.4 RESTRICTIVE LUNG DISEASE SECONDARY TO OBESITY: ICD-10-CM

## 2019-03-11 DIAGNOSIS — E78.2 MIXED HYPERLIPIDEMIA: ICD-10-CM

## 2019-03-11 DIAGNOSIS — E66.01 CLASS 3 SEVERE OBESITY DUE TO EXCESS CALORIES WITH SERIOUS COMORBIDITY AND BODY MASS INDEX (BMI) OF 40.0 TO 44.9 IN ADULT (HCC): ICD-10-CM

## 2019-03-11 DIAGNOSIS — G47.33 OSA (OBSTRUCTIVE SLEEP APNEA): ICD-10-CM

## 2019-03-11 DIAGNOSIS — E66.9 RESTRICTIVE LUNG DISEASE SECONDARY TO OBESITY: ICD-10-CM

## 2019-03-11 DIAGNOSIS — I10 ESSENTIAL HYPERTENSION: ICD-10-CM

## 2019-03-11 PROCEDURE — 99204 OFFICE O/P NEW MOD 45 MIN: CPT | Performed by: THORACIC SURGERY (CARDIOTHORACIC VASCULAR SURGERY)

## 2019-03-11 RX ORDER — SODIUM CHLORIDE 9 MG/ML
100 INJECTION, SOLUTION INTRAVENOUS CONTINUOUS
Status: CANCELLED | OUTPATIENT
Start: 2019-04-05

## 2019-03-11 RX ORDER — BUPIVACAINE HCL/0.9 % NACL/PF 0.1 %
2 PLASTIC BAG, INJECTION (ML) EPIDURAL ONCE
Status: CANCELLED | OUTPATIENT
Start: 2019-04-05 | End: 2019-03-11

## 2019-03-11 NOTE — PAYOR COMM NOTE
"Ethel Davis (48 y.o. Male)     Date of Birth Social Security Number Address Home Phone MRN    1970  [address] [id number] [phone] [id number]    Confucianist Marital Status          Mosque Legally        Admission Date Admission Type Admitting Provider Attending Provider Department, Room/Bed    3/7/19 Urgent Moiz Lopez MD  [address], 428/1    Discharge Date Discharge Disposition Discharge Destination        3/9/2019 Home or Self Care              Attending Provider:  (none)   Allergies:  No Known Allergies    Isolation:  None   Infection:  None   Code Status:  Prior    Ht:  193 cm (76\")   Wt:  144 kg (317 lb 7.4 oz)    Admission Cmt:  None   Principal Problem:  Acute exacerbation of CHF (congestive heart failure) (CMS/HCC) [I50.9]                 Active Insurance as of 3/7/2019     Primary Coverage     Payor Plan Insurance Group Employer/Plan Group    [employer] MEDICAID [id number]     Payor Plan Address Payor Plan Phone Number Payor Plan Fax Number Effective Dates    PO  [phone]  9/8/2017 - None Entered    [facility] [id number]       Subscriber Name Subscriber Birth Date Member ID       ETHEL DAVIS 1970 [id number]                 Emergency Contacts      (Rel.) Home Phone Work Phone Mobile Phone    Courtney Davis (Mother) [phone] -- --               Discharge Summary      Keagan Choi MD at 3/9/2019 11:06 AM              [facility] Medicine Services  DISCHARGE SUMMARY       Date of Admission: 3/7/2019  Date of Discharge:  3/9/2019  Primary Care Physician: Shonna Ng APRN    Presenting Problem/History of Present Illness:  Acute exacerbation of CHF (congestive heart failure) (CMS/HCC) [I50.9]       Final Discharge Diagnoses:  Active Hospital Problems    Diagnosis   • **Acute exacerbation of CHF (congestive heart failure) (CMS/HCC) " "  • Essential hypertension     patient currently hemodynamically stable.  -Carvedilol 25 mg p.o. twice daily  -Sacubitrill-valsartan 24-26 mg p.o. twice daily  -spironolactone         Consults:   Consults     Date and Time Order Name Status Description    3/8/2019 0027 Inpatient Cardiology Consult                Chief Complaint on Day of Discharge: None    Hospital Course:  The patient is a 48 y.o. male who presented to [facility] with as a transfer from an outside facility w/ complaints who progressively worsening dyspnea. Pt also has orthopnea, PND. Pt states he was recently released from longterm & while there he was only given Lasix once per day instead of twice daily. Per transferring physician, pt also has a mild trop bump which is likely 2/2 supply demand mismatch. Denies chest pain, palpitations, nausea, vomiting, diaphoresis, fatigue, jaw pain, lightheadedness, heartburn, arm discomfort.  Patient was placed on IV Lasix and he has lost about 6 or 7 pounds while he was in the hospital.  He reports that he was urinating fine.  He denies any shortness of where any chest pains and he was feeling fine.  He was also evaluated by heart failure clinic nurse practitioner and was set up an appointment with them.  Patient will also need ICD placement evaluation by cardiology and he has been set up with Dr. Cabrera as outpatient  Patient was asked to follow-up with heart failure clinic, cardiology, PCP.        Condition on Discharge: Stable    Physical Exam on Discharge:  /100 (BP Location: Right arm, Patient Position: Lying)   Pulse 87   Temp 98 °F (36.7 °C)   Resp 18   Ht 193 cm (76\")   Wt (!) 144 kg (317 lb 7.4 oz)   SpO2 94%   BMI 38.64 kg/m²    Physical Exam   Constitutional: He appears well-developed and well-nourished. No distress.   HENT:   Head: Normocephalic and atraumatic.   Cardiovascular: Normal rate.   Pulmonary/Chest: Effort normal. No respiratory distress. He has no wheezes. "   Abdominal: Soft. He exhibits no distension.   Musculoskeletal: Normal range of motion.   Trace edema   Neurological: He is alert. No cranial nerve deficit.   Skin: Skin is warm and dry. He is not diaphoretic.   Psychiatric: He has a normal mood and affect. His behavior is normal. Judgment and thought content normal.   Vitals reviewed.        Discharge Disposition:  Home or Self Care    Discharge Medications:     Discharge Medications      New Medications      Instructions Start Date   aspirin 81 MG chewable tablet   81 mg, Oral, Daily         Continue These Medications      Instructions Start Date   albuterol sulfate  (90 Base) MCG/ACT inhaler  Commonly known as:  PROVENTIL HFA;VENTOLIN HFA;PROAIR HFA   2 puffs, Inhalation, Every 4 Hours PRN      carvedilol 25 MG tablet  Commonly known as:  COREG   25 mg, Oral, 2 Times Daily With Meals      furosemide 40 MG tablet  Commonly known as:  LASIX   40 mg, Oral, 2 Times Daily      lovastatin 20 MG tablet  Commonly known as:  MEVACOR   20 mg, Oral, Nightly      potassium chloride 10 MEQ CR tablet  Commonly known as:  K-DUR   10 mEq, Oral, Daily      sacubitril-valsartan 24-26 MG tablet  Commonly known as:  ENTRESTO   1 tablet, Oral, 2 Times Daily      spironolactone 25 MG tablet  Commonly known as:  ALDACTONE   25 mg, Oral, Daily         Stop These Medications    metFORMIN 500 MG tablet  Commonly known as:  GLUCOPHAGE            Discharge Diet:   Diet Instructions     Diet: Cardiac, Consistent Carbohydrate, Renal      Discharge Diet:   Cardiac  Consistent Carbohydrate  Renal             Activity at Discharge:   Activity Instructions     Activity as Tolerated            Discharge Care Plan/Instructions: Follow-up with cardiology for ICD placement evaluation, follow-up with heart failure clinic, follow-up with PCP, continue with medications as prescribed    Follow-up Appointments:   Future Appointments   Date Time Provider Department Center   3/11/2019  2:00 PM  Mateo Dunbar MD [facility] None   3/28/2019  2:15 PM Rafael Shook MD PhD [facility] None   4/11/2019  2:00 PM Em Gilman APRN [facility] None         Keagan Choi MD  03/09/19  11:06 AM              Electronically signed by Keagan Choi MD at 3/9/2019 11:09 AM

## 2019-03-11 NOTE — OUTREACH NOTE
CHF Week 1 Survey      Responses   Facility patient discharged from?  [facility]   Does the patient have one of the following disease processes/diagnoses(primary or secondary)?  CHF   Is there a successful TCM telephone encounter documented?  No   CHF Week 1 attempt successful?  No   Unsuccessful attempts  Attempt 1          Jennifer Nunez, RN

## 2019-03-12 ENCOUNTER — READMISSION MANAGEMENT (OUTPATIENT)
Dept: CALL CENTER | Facility: HOSPITAL | Age: 49
End: 2019-03-12

## 2019-03-12 NOTE — OUTREACH NOTE
CHF Week 1 Survey      Responses   Facility patient discharged from?  [facility]   Does the patient have one of the following disease processes/diagnoses(primary or secondary)?  CHF   Is there a successful TCM telephone encounter documented?  No   CHF Week 1 attempt successful?  No   Unsuccessful attempts  Attempt 2          Keisha Ngo RN

## 2019-03-18 ENCOUNTER — READMISSION MANAGEMENT (OUTPATIENT)
Dept: CALL CENTER | Facility: HOSPITAL | Age: 49
End: 2019-03-18

## 2019-03-25 ENCOUNTER — APPOINTMENT (OUTPATIENT)
Dept: PREADMISSION TESTING | Facility: HOSPITAL | Age: 49
End: 2019-03-25

## 2019-03-25 ENCOUNTER — READMISSION MANAGEMENT (OUTPATIENT)
Dept: CALL CENTER | Facility: HOSPITAL | Age: 49
End: 2019-03-25

## 2019-03-25 VITALS
DIASTOLIC BLOOD PRESSURE: 61 MMHG | SYSTOLIC BLOOD PRESSURE: 101 MMHG | BODY MASS INDEX: 38.36 KG/M2 | RESPIRATION RATE: 20 BRPM | OXYGEN SATURATION: 97 % | HEART RATE: 68 BPM | WEIGHT: 315 LBS | HEIGHT: 76 IN

## 2019-03-25 NOTE — OUTREACH NOTE
CHF Week 3 Survey      Responses   Facility patient discharged from?  [facility]   Does the patient have one of the following disease processes/diagnoses(primary or secondary)?  CHF   Week 3 attempt successful?  Yes   Call start time  1508   Call end time  1510   Meds reviewed with patient/caregiver?  Yes   Is the patient taking all medications as directed (includes completed medication regime)?  Yes   Comments regarding appointments  Has been to Appt. as made   Has the patient kept scheduled appointments due by today?  Yes   What is the patient's perception of their health status since discharge?  Improving   Is the patient weighing daily?  Yes   Is the patient able to teach back Heart Failure Zones?  Yes   CHF Week 3 call completed?  Yes   Revoked  No further contact(revokes)-requires comment   Wrap up additional comments  He is doing fine, no need for anymore calls.           Pushpa Hay RN

## 2019-03-28 ENCOUNTER — OFFICE VISIT (OUTPATIENT)
Dept: CARDIOLOGY | Facility: CLINIC | Age: 49
End: 2019-03-28

## 2019-03-28 VITALS
DIASTOLIC BLOOD PRESSURE: 70 MMHG | WEIGHT: 315 LBS | BODY MASS INDEX: 38.36 KG/M2 | HEART RATE: 84 BPM | OXYGEN SATURATION: 98 % | HEIGHT: 76 IN | SYSTOLIC BLOOD PRESSURE: 118 MMHG

## 2019-03-28 DIAGNOSIS — I34.0 NON-RHEUMATIC MITRAL REGURGITATION: ICD-10-CM

## 2019-03-28 DIAGNOSIS — I50.22 CHRONIC SYSTOLIC HEART FAILURE (HCC): Primary | ICD-10-CM

## 2019-03-28 DIAGNOSIS — G47.33 OSA (OBSTRUCTIVE SLEEP APNEA): ICD-10-CM

## 2019-03-28 DIAGNOSIS — I36.1 NON-RHEUMATIC TRICUSPID VALVE INSUFFICIENCY: ICD-10-CM

## 2019-03-28 DIAGNOSIS — J98.4 PULMONARY INSUFFICIENCY: ICD-10-CM

## 2019-03-28 DIAGNOSIS — E66.01 CLASS 3 SEVERE OBESITY DUE TO EXCESS CALORIES WITH SERIOUS COMORBIDITY AND BODY MASS INDEX (BMI) OF 40.0 TO 44.9 IN ADULT (HCC): ICD-10-CM

## 2019-03-28 PROBLEM — I50.9 ACUTE EXACERBATION OF CHF (CONGESTIVE HEART FAILURE) (HCC): Status: RESOLVED | Noted: 2019-03-08 | Resolved: 2019-03-28

## 2019-03-28 PROCEDURE — 99214 OFFICE O/P EST MOD 30 MIN: CPT | Performed by: INTERNAL MEDICINE

## 2019-03-28 NOTE — PATIENT INSTRUCTIONS
Cardioverter Defibrillator Implantation  An implantable cardioverter defibrillator (ICD) is a small device that is placed under the skin in the chest or abdomen. An ICD consists of a battery, a small computer (pulse generator), and wires (leads) that go into the heart.  An ICD is used to detect and correct two types of dangerous irregular heartbeats (arrhythmias):  · A rapid heart rhythm (tachycardia).  · An arrhythmia in which the lower chambers of the heart (ventricles) contract in an uncoordinated way (fibrillation).    When an ICD detects tachycardia, it sends a low-energy shock to the heart to restore the heartbeat to normal (cardioversion). This signal is usually painless. If cardioversion does not work or if the ICD detects fibrillation, it delivers a high-energy shock to the heart (defibrillation) to restart the heart. This shock may feel like a strong jolt in the chest.  Your health care provider may prescribe an ICD if:  · You have had an arrhythmia that originated in the ventricles.  · Your heart has been damaged by a disease or heart condition.    Sometimes, ICDs are programmed to act as a device called a pacemaker. Pacemakers can be used to treat a slow heartbeat (bradycardia) or tachycardia by taking over the heart rate with electrical impulses.  Tell a health care provider about:  · Any allergies you have.  · All medicines you are taking, including vitamins, herbs, eye drops, creams, and over-the-counter medicines.  · Any problems you or family members have had with anesthetic medicines.  · Any blood disorders you have.  · Any surgeries you have had.  · Any medical conditions you have.  · Whether you are pregnant or may be pregnant.  What are the risks?  Generally, this is a safe procedure. However, problems may occur, including:  · Swelling, bleeding, or bruising.  · Infection.  · Blood clots.  · Damage to other structures or organs, such as nerves, blood vessels, or the heart.  · Allergic reactions  to medicines used during the procedure.    What happens before the procedure?  Staying hydrated  Follow instructions from your health care provider about hydration, which may include:  · Up to 2 hours before the procedure - you may continue to drink clear liquids, such as water, clear fruit juice, black coffee, and plain tea.    Eating and drinking restrictions  Follow instructions from your health care provider about eating and drinking, which may include:  · 8 hours before the procedure - stop eating heavy meals or foods such as meat, fried foods, or fatty foods.  · 6 hours before the procedure - stop eating light meals or foods, such as toast or cereal.  · 6 hours before the procedure - stop drinking milk or drinks that contain milk.  · 2 hours before the procedure - stop drinking clear liquids.    Medicine  Ask your health care provider about:  · Changing or stopping your normal medicines. This is important if you take diabetes medicines or blood thinners.  · Taking medicines such as aspirin and ibuprofen. These medicines can thin your blood. Do not take these medicines before your procedure if your doctor tells you not to.    Tests  · You may have blood tests.  · You may have a test to check the electrical signals in your heart (electrocardiogram, ECG).  · You may have imaging tests, such as a chest X-ray.  General instructions  · For 24 hours before the procedure, stop using products that contain nicotine or tobacco, such as cigarettes and e-cigarettes. If you need help quitting, ask your health care provider.  · Plan to have someone take you home from the hospital or clinic.  · You may be asked to shower with a germ-killing soap.  What happens during the procedure?  · To reduce your risk of infection:  ? Your health care team will wash or sanitize their hands.  ? Your skin will be washed with soap.  ? Hair may be removed from the surgical area.  · Small monitors will be put on your body. They will be used to  check your heart, blood pressure, and oxygen level.  · An IV tube will be inserted into one of your veins.  · You will be given one or more of the following:  ? A medicine to help you relax (sedative).  ? A medicine to numb the area (local anesthetic).  ? A medicine to make you fall asleep (general anesthetic).  · Leads will be guided through a blood vessel into your heart and attached to your heart muscles. Depending on the ICD, the leads may go into one ventricle or they may go into both ventricles and into an upper chamber of the heart. An X-ray machine (fluoroscope) will be usedto help guide the leads.  · A small incision will be made to create a deep pocket under your skin.  · The pulse generator will be placed into the pocket.  · The ICD will be tested.  · The incision will be closed with stitches (sutures), skin glue, or staples.  · A bandage (dressing) will be placed over the incision.  This procedure may vary among health care providers and hospitals.  What happens after the procedure?  · Your blood pressure, heart rate, breathing rate, and blood oxygen level will be monitored often until the medicines you were given have worn off.  · A chest X-ray will be taken to check that the ICD is in the right place.  · You will need to stay in the hospital for 1-2 days so your health care provider can make sure your ICD is working.  · Do not drive for 24 hours if you received a sedative. Ask your health care provider when it is safe for you to drive.  · You may be given an identification card explaining that you have an ICD.  Summary  · An implantable cardioverter defibrillator (ICD) is a small device that is placed under the skin in the chest or abdomen. It is used to detect and correct dangerous irregular heartbeats (arrhythmias).  · An ICD consists of a battery, a small computer (pulse generator), and wires (leads) that go into the heart.  · When an ICD detects rapid heart rhythm (tachycardia), it sends a  low-energy shock to the heart to restore the heartbeat to normal (cardioversion). If cardioversion does not work or if the ICD detects uncoordinated heart contractions (fibrillation), it delivers a high-energy shock to the heart (defibrillation) to restart the heart.  · You will need to stay in the hospital for 1-2 days to make sure your ICD is working.  This information is not intended to replace advice given to you by your health care provider. Make sure you discuss any questions you have with your health care provider.  Document Released: 09/09/2003 Document Revised: 12/27/2017 Document Reviewed: 12/27/2017  [employer] Interactive Patient Education © 2019 [employer]

## 2019-03-28 NOTE — PROGRESS NOTES
Cardiovascular Medicine   Rafael Shook M.D., Ph.D., [facility]      The patient returns to cardiovascular clinic for follow-up of the following:    PROBLEM LIST:  1. NICM  2. MR  3. TR  4. PI  5. KHANH      Matti Bravo is a 48 y.o. male who returns to clinic today in follow-up. He has a longstanding history of a NICM/DCM. His most recent LV EF 10%. He is now on Coreg, Lasix, Entresto and Aldactone. He tells me he is medication compliant. He has NYHA class II symptoms. No LE edema. He has not been to the ED or had recent admissions for ADHF since I last saw him when he was in-patient. He has considered ICD placement and is interested. He does have MR, TR and PI which remain under clinical surveillance. He states he has improved somewhat since his discharge. He has lost about 6 pounds. He has been on low sodium diet and fluid restriction. He has also been increasing his physical activity with his girlfriend. He has KHANH but has not seen Dr. Forbes. He also was seeing Dr. Klein for lung nodules. He isn't certain is he has follow-up with her.     Review of Systems   Cardiovascular: Positive for dyspnea on exertion. Negative for chest pain, claudication, cyanosis, irregular heartbeat, leg swelling, near-syncope, orthopnea, palpitations, paroxysmal nocturnal dyspnea and syncope.   Respiratory: Positive for shortness of breath and snoring. Negative for cough, hemoptysis, sleep disturbances due to breathing, sputum production and wheezing.      family history includes Cancer in his other; Diabetes in his mother; Heart disease in his other; Hypertension in his mother.   reports that he has quit smoking. He has never used smokeless tobacco. He reports that he does not drink alcohol or use drugs.  No Known Allergies    Current Outpatient Medications:   •  albuterol sulfate  (90 Base) MCG/ACT inhaler, Inhale 2 puffs Every 4 (Four) Hours As Needed for Wheezing., Disp: 1 inhaler, Rfl: 3  •  aspirin 81 MG  "chewable tablet, Chew 1 tablet Daily., Disp: 30 tablet, Rfl: 1  •  carvedilol (COREG) 25 MG tablet, Take 1 tablet by mouth 2 (Two) Times a Day With Meals., Disp: 60 tablet, Rfl: 1  •  furosemide (LASIX) 40 MG tablet, Take 1 tablet by mouth 2 (Two) Times a Day., Disp: 60 tablet, Rfl: 6  •  lovastatin (MEVACOR) 20 MG tablet, Take 1 tablet by mouth Every Night., Disp: 30 tablet, Rfl: 1  •  potassium chloride (K-DUR) 10 MEQ CR tablet, Take 1 tablet by mouth Daily., Disp: 30 tablet, Rfl: 1  •  sacubitril-valsartan (ENTRESTO) 24-26 MG tablet, Take 1 tablet by mouth 2 (Two) Times a Day., Disp: 60 tablet, Rfl: 6  •  spironolactone (ALDACTONE) 25 MG tablet, Take 1 tablet by mouth Daily., Disp: 30 tablet, Rfl: 1    Physical Exam:  Vitals:    03/28/19 1410   BP: 118/70   BP Location: Left arm   Patient Position: Sitting   Cuff Size: Large Adult   Pulse: 84   SpO2: 98%   Weight: (!) 145 kg (319 lb 14.4 oz)   Height: 193 cm (76\")   PainSc: 0-No pain     Current Pain Level: none  Pulse Ox: Normal  on room air  General: alert, appears stated age and cooperative     Body Habitus: morbidly obese    HEENT: Head: Normocephalic, no lesions, without obvious abnormality. No arcus senilis, xanthelasma or xanthomas.    Pulses: 2+ and symmetric  JVP: Volume/Pulsation: Normal.  Significant V waves.   Appropriate inspiratory decrease.  No Kussmaul's. No Simona's.   Chest:  Normal    Pulmonary:Normal     Precordium: Normal impulses. P2 is not palpable.  RV Heave: absent  LV Heave: absent  [place]:  displaced laterally and displaced inferiorly  Palpable S4: absent.  Heart rate: normal    Heart Rhythm: regular     Heart Sounds: S1: normal intensity  S2: normal intensity  S3: present   S4: absent  Opening Snap: absent    A2-OS:  no  Pericardial Rub:  Absent   Ejection click: None      Murmurs:  present    Murmur 1 Type: systolic  Grade: 2/6    Timing: holosystolic  Location:   apex   Description:ejection  Radiation:  nonradiating  Extremity: " moves all extremities equally.       DATA REVIEWED:     ------------------  TTE/JESSENIA:  Results for orders placed during the hospital encounter of 02/04/19   Adult Transthoracic Echo Limited W/ Cont if Necessary Per Protocol    Narrative · 3D acquisition for left ventricular ejection fraction. Live 3D and full   volume to assess left ventricular ejection fraction was utilized.  · Left ventricle systolic function is severely decreased. Estimated LVEF   is 10%. Left ventricle cavity severely dilated with restrictive diastolic   dysfunction. Findings are consistent with dilated cardiomyopathy.  · Right ventricle is mildly dilated with mildly reduced right ventricular   systolic function.  · Moderate mitral valve regurgitation is present.  · Moderate tricuspid valve regurgitation is present. Pulmonary   hypertension is present with a PA systolic pressure of 70 mmHg.  · There is mild pulmonic valve regurgitation present.  · There is a trivial pericardial effusion adjacent to the left ventricle            --------------------------------------------------------------------------------------------------  LABS:     The CVD Risk score (MALIA'Agoino, et al., 2008) failed to calculate for the following reasons:    The patient has a prior MI, stroke, CHF, or peripheral vascular disease diagnosis  Lab Results   Component Value Date    GLUCOSE 91 03/09/2019    BUN 23 (H) 03/09/2019    CREATININE 1.45 (H) 03/09/2019    EGFRIFAFRI 63 03/09/2019    BCR 15.9 03/09/2019    K 3.8 03/09/2019    CO2 28.0 03/09/2019    CALCIUM 9.2 03/09/2019    ALBUMIN 4.10 03/08/2019    AST 20 03/08/2019    ALT 22 03/08/2019     Lab Results   Component Value Date    WBC 7.32 03/09/2019    HGB 13.6 03/09/2019    HCT 41.5 03/09/2019    MCV 91.8 03/09/2019     (L) 03/09/2019     Lab Results   Component Value Date    CHOL 127 03/08/2019    CHLPL 133 11/09/2014    TRIG 98 03/08/2019    HDL 33 (L) 03/08/2019    LDL 81 03/08/2019     Lab Results   Component  Value Date    TSH 0.860 03/08/2019     Lab Results   Component Value Date    CKTOTAL 316 (H) 11/08/2014    CKMB 3.5 11/08/2014    TROPONINI 0.075 (H) 03/08/2019     Lab Results   Component Value Date    HGBA1C 5.8 (H) 03/08/2019     Lab Results   Component Value Date    DDIMER 1,331 (H) 04/26/2014     Lab Results   Component Value Date    ALT 22 03/08/2019     Lab Results   Component Value Date    HGBA1C 5.8 (H) 03/08/2019    HGBA1C 5.7 (H) 02/05/2019     Lab Results   Component Value Date    CREATININE 1.45 (H) 03/09/2019     No results found for: IRON, TIBC, FERRITIN  Lab Results   Component Value Date    INR 1.30 (H) 03/08/2019    PROTIME 15.8 (H) 03/08/2019       Assessment/Plan     1. Chronic systolic heart failure (CMS/HCC). not acutely decompensated chronic severe systolic heart failure. Etiology: NICM/ICM. LVEF <15%.  NYHA stage C. FC-II. Patient is currently euvolemic and with  Excellent perfusion. The patient's hemodynamics are currently acceptable.   -BB:  Carvedilol  -ACE/ARB/ENTRESTO: ENTRESTO  -DIURETIC: furosemide (LASIX) 40 mg two times daily  -MRA: The patient is FC-NYHA Class II and MRA is indicated.   -Spironolactone 25mg  -Fluid restriction: Other 2000 ml  -Sodium restriction: 1,500 mg Na  -ICD: Indicated    2. Non-rheumatic mitral regurgitation. ACC stage B. There are no surgical indications at this time. The patient has not had valve surgery..   · The patient has been advised to remain in clinical surveillance every 12 months.  · Signs and symptoms of worsening valve disease discussed.  I've asked the patient contact me for an earlier appointment if these develop.  · I've recommended a repeat 2D TTE every 1 year.   · TTE due: 2020.  No indication based on 2017 ACC/AHA guidelines for IE prophylaxis for dental procedures: Optimal oral health is recommended through regular professional dental care and the use of appropriate dental products, such as manual, powered, and ultrasonic toothbrushes;  dental floss; and other plaque-removal devices   3. Non-rheumatic tricuspid valve insufficiency.   -As above    4. Pulmonary insufficiency  -As above   5. KHANH (obstructive sleep apnea).   -Encouraged to f/u sleep medicine    6. Cardiac Risk Assessment/Dyslipidemia/BP Goals/Glucose Status/Diet/Current Weight/Tobacco status/Screening:     Blood Pressure:  The 2017 guideline is an update of the “Seventh Report of the Joint National Committee on Prevention, Detection, Evaluation and Treatment of High Blood Pressure” (JNC 7), published in 2003. . Normal BP is defined as <120/<80 mm Hg; elevated -129/<80 mm Hg; hypertension stage 1 is 130-139 or 80-89 mm Hg, and hypertension stage 2 is ?140 or ?90 mm Hg.  I have informed the patient that I do not treat essential hypertension as the overwhelming majority of patients can be managed successfully by primary care provider.  I do not prescribed antihypertensive agents.  With that said, hypertension is a significant risk factor for stroke, heart disease and vascular disease.  - I've recommended the patient continue current medications as prescribed by the primary care provider for essential hypertension. I recommended they have close follow-up for ongoing mgmt of this and the medical comorbidities associated with HTN.     General patient education regarding weight:   The patient's BMI is Body mass index is 38.94 kg/m²..  This places the patient in weight class:  Obese Class II: 35-39.9kg/m2.   -Weight loss hand-out  -Exercise intervention:   Hand out, increase aerobic activity  -Close PCP follow-up for Body mass index is 38.94 kg/m²..     Former tobacco exposure:   -Congratulated on cessation  -Avoid tobacco  Screening:  -AAA screening recommended at 65.         Return in about 3 months (around 6/28/2019).

## 2019-04-05 ENCOUNTER — ANESTHESIA EVENT (OUTPATIENT)
Dept: PERIOP | Facility: HOSPITAL | Age: 49
End: 2019-04-05

## 2019-04-05 ENCOUNTER — ANESTHESIA (OUTPATIENT)
Dept: PERIOP | Facility: HOSPITAL | Age: 49
End: 2019-04-05

## 2019-04-05 ENCOUNTER — HOSPITAL ENCOUNTER (OUTPATIENT)
Facility: HOSPITAL | Age: 49
Setting detail: HOSPITAL OUTPATIENT SURGERY
Discharge: HOME | End: 2019-04-05
Attending: THORACIC SURGERY (CARDIOTHORACIC VASCULAR SURGERY) | Admitting: THORACIC SURGERY (CARDIOTHORACIC VASCULAR SURGERY)

## 2019-04-05 VITALS
OXYGEN SATURATION: 95 % | TEMPERATURE: 97 F | WEIGHT: 315 LBS | BODY MASS INDEX: 38.36 KG/M2 | HEART RATE: 58 BPM | SYSTOLIC BLOOD PRESSURE: 107 MMHG | HEIGHT: 76 IN | DIASTOLIC BLOOD PRESSURE: 68 MMHG | RESPIRATION RATE: 18 BRPM

## 2019-04-05 DIAGNOSIS — I83.813 VARICOSE VEINS OF BOTH LOWER EXTREMITIES WITH PAIN: ICD-10-CM

## 2019-04-05 DIAGNOSIS — I87.2 VENOUS INSUFFICIENCY (CHRONIC) (PERIPHERAL): ICD-10-CM

## 2019-04-05 LAB
GLUCOSE BLDC GLUCOMTR-MCNC: 137 MG/DL (ref 70–130)
GLUCOSE BLDC GLUCOMTR-MCNC: 97 MG/DL (ref 70–130)

## 2019-04-05 PROCEDURE — 37766 PHLEB VEINS - EXTREM 20+: CPT | Performed by: THORACIC SURGERY (CARDIOTHORACIC VASCULAR SURGERY)

## 2019-04-05 PROCEDURE — 25010000002 FENTANYL CITRATE (PF) 100 MCG/2ML SOLUTION: Performed by: NURSE ANESTHETIST, CERTIFIED REGISTERED

## 2019-04-05 PROCEDURE — 25010000002 ONDANSETRON PER 1 MG: Performed by: NURSE ANESTHETIST, CERTIFIED REGISTERED

## 2019-04-05 PROCEDURE — 25010000002 MIDAZOLAM PER 1 MG: Performed by: NURSE ANESTHETIST, CERTIFIED REGISTERED

## 2019-04-05 PROCEDURE — 88304 TISSUE EXAM BY PATHOLOGIST: CPT | Performed by: THORACIC SURGERY (CARDIOTHORACIC VASCULAR SURGERY)

## 2019-04-05 PROCEDURE — 25010000002 PROPOFOL 10 MG/ML EMULSION: Performed by: NURSE ANESTHETIST, CERTIFIED REGISTERED

## 2019-04-05 PROCEDURE — 37722 LIG DIV&STRPG LONG SAPH VEIN: CPT | Performed by: THORACIC SURGERY (CARDIOTHORACIC VASCULAR SURGERY)

## 2019-04-05 PROCEDURE — 25010000002 PHENYLEPHRINE PER 1 ML: Performed by: NURSE ANESTHETIST, CERTIFIED REGISTERED

## 2019-04-05 PROCEDURE — 88304 TISSUE EXAM BY PATHOLOGIST: CPT | Performed by: PATHOLOGY

## 2019-04-05 PROCEDURE — 82962 GLUCOSE BLOOD TEST: CPT

## 2019-04-05 PROCEDURE — 25010000002 SUCCINYLCHOLINE PER 20 MG: Performed by: NURSE ANESTHETIST, CERTIFIED REGISTERED

## 2019-04-05 DEVICE — CLIP LIGAT VASC HORIZON TI MD BLU 6CT: Type: IMPLANTABLE DEVICE | Status: FUNCTIONAL

## 2019-04-05 DEVICE — CLIP LIGAT VASC HORIZON TI SM/WD RED 24CT: Type: IMPLANTABLE DEVICE | Status: FUNCTIONAL

## 2019-04-05 DEVICE — CLIP LIGAT VASC HORIZON TI SM/WD RD 6CT: Type: IMPLANTABLE DEVICE | Status: FUNCTIONAL

## 2019-04-05 RX ORDER — DIPHENHYDRAMINE HYDROCHLORIDE 50 MG/ML
12.5 INJECTION INTRAMUSCULAR; INTRAVENOUS
Status: DISCONTINUED | OUTPATIENT
Start: 2019-04-05 | End: 2019-04-05 | Stop reason: HOSPADM

## 2019-04-05 RX ORDER — ONDANSETRON 4 MG/1
4 TABLET, FILM COATED ORAL ONCE AS NEEDED
Status: DISCONTINUED | OUTPATIENT
Start: 2019-04-05 | End: 2019-04-05 | Stop reason: HOSPADM

## 2019-04-05 RX ORDER — ACETAMINOPHEN 325 MG/1
650 TABLET ORAL ONCE AS NEEDED
Status: DISCONTINUED | OUTPATIENT
Start: 2019-04-05 | End: 2019-04-05 | Stop reason: HOSPADM

## 2019-04-05 RX ORDER — SODIUM CHLORIDE 9 MG/ML
100 INJECTION, SOLUTION INTRAVENOUS CONTINUOUS
Status: DISCONTINUED | OUTPATIENT
Start: 2019-04-05 | End: 2019-04-05 | Stop reason: HOSPADM

## 2019-04-05 RX ORDER — FLUMAZENIL 0.1 MG/ML
0.2 INJECTION INTRAVENOUS AS NEEDED
Status: DISCONTINUED | OUTPATIENT
Start: 2019-04-05 | End: 2019-04-05 | Stop reason: HOSPADM

## 2019-04-05 RX ORDER — ACETAMINOPHEN 650 MG/1
650 SUPPOSITORY RECTAL ONCE AS NEEDED
Status: DISCONTINUED | OUTPATIENT
Start: 2019-04-05 | End: 2019-04-05 | Stop reason: HOSPADM

## 2019-04-05 RX ORDER — PROPOFOL 10 MG/ML
VIAL (ML) INTRAVENOUS AS NEEDED
Status: DISCONTINUED | OUTPATIENT
Start: 2019-04-05 | End: 2019-04-05 | Stop reason: SURG

## 2019-04-05 RX ORDER — ONDANSETRON 2 MG/ML
INJECTION INTRAMUSCULAR; INTRAVENOUS AS NEEDED
Status: DISCONTINUED | OUTPATIENT
Start: 2019-04-05 | End: 2019-04-05 | Stop reason: SURG

## 2019-04-05 RX ORDER — EPHEDRINE SULFATE 50 MG/ML
5 INJECTION, SOLUTION INTRAVENOUS ONCE AS NEEDED
Status: DISCONTINUED | OUTPATIENT
Start: 2019-04-05 | End: 2019-04-05 | Stop reason: HOSPADM

## 2019-04-05 RX ORDER — PROMETHAZINE HYDROCHLORIDE 25 MG/ML
12.5 INJECTION, SOLUTION INTRAMUSCULAR; INTRAVENOUS ONCE AS NEEDED
Status: DISCONTINUED | OUTPATIENT
Start: 2019-04-05 | End: 2019-04-05 | Stop reason: HOSPADM

## 2019-04-05 RX ORDER — MIDAZOLAM HYDROCHLORIDE 1 MG/ML
INJECTION INTRAMUSCULAR; INTRAVENOUS AS NEEDED
Status: DISCONTINUED | OUTPATIENT
Start: 2019-04-05 | End: 2019-04-05 | Stop reason: SURG

## 2019-04-05 RX ORDER — SUCCINYLCHOLINE CHLORIDE 20 MG/ML
INJECTION INTRAMUSCULAR; INTRAVENOUS AS NEEDED
Status: DISCONTINUED | OUTPATIENT
Start: 2019-04-05 | End: 2019-04-05 | Stop reason: SURG

## 2019-04-05 RX ORDER — HYDROCODONE BITARTRATE AND ACETAMINOPHEN 5; 325 MG/1; MG/1
1-2 TABLET ORAL EVERY 4 HOURS PRN
Qty: 40 TABLET | Refills: 0 | Status: SHIPPED | OUTPATIENT
Start: 2019-04-05 | End: 2019-04-16

## 2019-04-05 RX ORDER — FENTANYL CITRATE 50 UG/ML
INJECTION, SOLUTION INTRAMUSCULAR; INTRAVENOUS AS NEEDED
Status: DISCONTINUED | OUTPATIENT
Start: 2019-04-05 | End: 2019-04-05 | Stop reason: SURG

## 2019-04-05 RX ORDER — ROCURONIUM BROMIDE 10 MG/ML
INJECTION, SOLUTION INTRAVENOUS AS NEEDED
Status: DISCONTINUED | OUTPATIENT
Start: 2019-04-05 | End: 2019-04-05 | Stop reason: SURG

## 2019-04-05 RX ORDER — PROMETHAZINE HYDROCHLORIDE 25 MG/1
25 SUPPOSITORY RECTAL ONCE AS NEEDED
Status: DISCONTINUED | OUTPATIENT
Start: 2019-04-05 | End: 2019-04-05 | Stop reason: HOSPADM

## 2019-04-05 RX ORDER — NALOXONE HCL 0.4 MG/ML
0.4 VIAL (ML) INJECTION AS NEEDED
Status: DISCONTINUED | OUTPATIENT
Start: 2019-04-05 | End: 2019-04-05 | Stop reason: HOSPADM

## 2019-04-05 RX ORDER — DEXAMETHASONE SODIUM PHOSPHATE 4 MG/ML
8 INJECTION, SOLUTION INTRA-ARTICULAR; INTRALESIONAL; INTRAMUSCULAR; INTRAVENOUS; SOFT TISSUE ONCE AS NEEDED
Status: DISCONTINUED | OUTPATIENT
Start: 2019-04-05 | End: 2019-04-05 | Stop reason: HOSPADM

## 2019-04-05 RX ORDER — ONDANSETRON 2 MG/ML
4 INJECTION INTRAMUSCULAR; INTRAVENOUS ONCE AS NEEDED
Status: DISCONTINUED | OUTPATIENT
Start: 2019-04-05 | End: 2019-04-05 | Stop reason: HOSPADM

## 2019-04-05 RX ORDER — HYDROCODONE BITARTRATE AND ACETAMINOPHEN 5; 325 MG/1; MG/1
1 TABLET ORAL ONCE AS NEEDED
Status: DISCONTINUED | OUTPATIENT
Start: 2019-04-05 | End: 2019-04-05 | Stop reason: HOSPADM

## 2019-04-05 RX ORDER — PROMETHAZINE HYDROCHLORIDE 25 MG/1
25 TABLET ORAL ONCE AS NEEDED
Status: DISCONTINUED | OUTPATIENT
Start: 2019-04-05 | End: 2019-04-05 | Stop reason: HOSPADM

## 2019-04-05 RX ORDER — BUPIVACAINE HCL/0.9 % NACL/PF 0.1 %
2 PLASTIC BAG, INJECTION (ML) EPIDURAL ONCE
Status: COMPLETED | OUTPATIENT
Start: 2019-04-05 | End: 2019-04-05

## 2019-04-05 RX ORDER — ACETAMINOPHEN 325 MG/1
650 TABLET ORAL ONCE
Status: DISCONTINUED | OUTPATIENT
Start: 2019-04-05 | End: 2019-04-05 | Stop reason: HOSPADM

## 2019-04-05 RX ORDER — LIDOCAINE HYDROCHLORIDE 20 MG/ML
INJECTION, SOLUTION INFILTRATION; PERINEURAL AS NEEDED
Status: DISCONTINUED | OUTPATIENT
Start: 2019-04-05 | End: 2019-04-05 | Stop reason: SURG

## 2019-04-05 RX ORDER — LABETALOL HYDROCHLORIDE 5 MG/ML
5 INJECTION, SOLUTION INTRAVENOUS
Status: DISCONTINUED | OUTPATIENT
Start: 2019-04-05 | End: 2019-04-05 | Stop reason: HOSPADM

## 2019-04-05 RX ADMIN — ROCURONIUM BROMIDE 5 MG: 10 INJECTION INTRAVENOUS at 07:54

## 2019-04-05 RX ADMIN — PHENYLEPHRINE HYDROCHLORIDE 100 MCG: 10 INJECTION INTRAVENOUS at 08:05

## 2019-04-05 RX ADMIN — PROPOFOL 30 MG: 10 INJECTION, EMULSION INTRAVENOUS at 07:56

## 2019-04-05 RX ADMIN — SUCCINYLCHOLINE CHLORIDE 140 MG: 20 INJECTION, SOLUTION INTRAMUSCULAR; INTRAVENOUS at 07:55

## 2019-04-05 RX ADMIN — PHENYLEPHRINE HYDROCHLORIDE 100 MCG: 10 INJECTION INTRAVENOUS at 08:12

## 2019-04-05 RX ADMIN — FENTANYL CITRATE 25 MCG: 50 INJECTION, SOLUTION INTRAMUSCULAR; INTRAVENOUS at 11:19

## 2019-04-05 RX ADMIN — PHENYLEPHRINE HYDROCHLORIDE 1 MCG/KG/MIN: 10 INJECTION INTRAVENOUS at 08:11

## 2019-04-05 RX ADMIN — SODIUM CHLORIDE 100 ML/HR: 9 INJECTION, SOLUTION INTRAVENOUS at 06:29

## 2019-04-05 RX ADMIN — MIDAZOLAM HYDROCHLORIDE 2 MG: 2 INJECTION, SOLUTION INTRAMUSCULAR; INTRAVENOUS at 07:45

## 2019-04-05 RX ADMIN — PHENYLEPHRINE HYDROCHLORIDE 100 MCG: 10 INJECTION INTRAVENOUS at 08:01

## 2019-04-05 RX ADMIN — FENTANYL CITRATE 50 MCG: 50 INJECTION, SOLUTION INTRAMUSCULAR; INTRAVENOUS at 07:54

## 2019-04-05 RX ADMIN — FENTANYL CITRATE 25 MCG: 50 INJECTION, SOLUTION INTRAMUSCULAR; INTRAVENOUS at 11:12

## 2019-04-05 RX ADMIN — LIDOCAINE HYDROCHLORIDE 80 MG: 20 INJECTION, SOLUTION INFILTRATION; PERINEURAL at 07:54

## 2019-04-05 RX ADMIN — ONDANSETRON 4 MG: 2 INJECTION INTRAMUSCULAR; INTRAVENOUS at 10:40

## 2019-04-05 RX ADMIN — PROPOFOL 120 MG: 10 INJECTION, EMULSION INTRAVENOUS at 07:54

## 2019-04-05 RX ADMIN — Medication 2 G: at 08:00

## 2019-04-05 NOTE — ANESTHESIA PROCEDURE NOTES
Arterial Line      Patient reassessed immediately prior to procedure    Patient location during procedure: OR  Start time: 4/5/2019 7:59 AM  Stop Time:4/5/2019 8:11 AM       Line placed for hemodynamic monitoring and ABGs/Labs/ISTAT.  Performed By   Anesthesiologist: Salvatore Wade MD  Preanesthetic Checklist  Completed: patient identified, site marked, surgical consent, pre-op evaluation, timeout performed, IV checked, risks and benefits discussed and monitors and equipment checked  Arterial Line Prep   Sterile Tech: mask, gloves and cap  Prep: ChloraPrep  Patient monitoring: blood pressure monitoring, continuous pulse oximetry and EKG  Arterial Line Procedure   Laterality:left  Location:  radial artery  Catheter size: 20 G   Guidance: ultrasound guided and palpation technique  Number of attempts: 2  Successful placement: yes          Post Assessment   Dressing Type: occlusive dressing applied and secured with tape.   Complications no  Circ/Move/Sens Assessment: normal and unchanged.   Patient Tolerance: patient tolerated the procedure well with no apparent complications

## 2019-04-05 NOTE — DISCHARGE INSTR - APPOINTMENTS
[facility] TO CALL YOU WITH FOLLOW UP APPOINTMENT WITH NURSE PRACTITIONER FOR 1 WEEK

## 2019-04-05 NOTE — OP NOTE
OPERATIVE NOTE  Matti Bravo  1970 4/5/2019    PREOP DIAGNOSES:  Varicose veins RIGHT lower extremity  Venous insufficiency (chronic) (peripheral) [I87.2]  Varicose veins of both lower extremities with pain [I83.813]    POSTOP DIAGNOSES:    Varicose veins RIGHT lower extremity    PROCEDURE:   Stab phlebectomies RIGHT lower extremity (43 incisions)  High ligation and stripping RIGHT greater saphenous vein    SURGEON: VICK Dunbar MD FACS RPVI    ASSISTANT: Suzy Perez CFA    ANESTHESIA: General ET     ESTIMATED BLOOD LOSS: minimal    COMPLICATIONS: none    DESCRIPTION OF OPERATION:  Patient taken to the operating room placed in supine position, general anesthesia induced. Prepped and draped in sterile fashion. RIGHT greater saphenous vein was identified with vascular ultrasound .  5cm incision made RIGHT groin, 15mm proximal GSV identified, suture ligation proximal GSV with 2-0 prolene suture.  3cm incision proximal leg above cluster varicosities.  Distal GSV 8mm identified, distal sture ligated with 2-0 prolene suture.  Stripper cord passed up GSV from below, proximal GSV divided, stripper end attached distally and GSV pulled out through proximal incision intact. Manual pressure held.  Proximal incision closed with 3-0 pds, 4-0 monocril, dermabond tape.  Leg wrapped with 2 folded green towels and eshmarck wrap.  Multiple stab phlebectomies were performed in the RIGHT lower extremity for excision of painful varicose veins (43 incisions). hemostasis was obtained. incisions closed with Mastisol and Steri-Strips.  leg was wrapped with snug Ace bandage from foot to groin. patient tolerated procedure well transferred to PACU in stable condition.           This document has been electronically signed by Mateo Dunbar MD on April 5, 2019 10:51 AM

## 2019-04-05 NOTE — ANESTHESIA PREPROCEDURE EVALUATION
Anesthesia Evaluation     no history of anesthetic complications:  NPO Solid Status: > 8 hours  NPO Liquid Status: > 8 hours           Airway   Mallampati: II  TM distance: >3 FB  Neck ROM: full  Possible difficult intubation  Dental - normal exam     Pulmonary     breath sounds clear to auscultation  (+) asthma, sleep apnea on CPAP, decreased breath sounds,   (-) not a smoker    ROS comment: Cardiomegaly with venous congestion  Cardiovascular - normal exam  Exercise tolerance: poor (<4 METS)    ECG reviewed  Patient on routine beta blocker and Beta blocker given within 24 hours of surgery  Rhythm: regular  Rate: normal    (+) hypertension well controlled, CHF, PVD, hyperlipidemia,   (-) valvular problems/murmurs, past MI, angina, cardiac stents    ROS comment: Sinus tachycardia with occasional premature ventricular complexes  Left atrial enlargement  Nonspecific intraventricular block  Cannot rule out Septal infarct , age undetermined  Abnormal ECG    · 3D acquisition for left ventricular ejection fraction. Live 3D and full volume to assess left ventricular ejection fraction was utilized.  · Left ventricle systolic function is severely decreased. Estimated LVEF is 10%. Left ventricle cavity severely dilated with restrictive diastolic dysfunction. Findings are consistent with dilated cardiomyopathy.  · Right ventricle is mildly dilated with mildly reduced right ventricular systolic function.  · Moderate mitral valve regurgitation is present.  · Moderate tricuspid valve regurgitation is present. Pulmonary hypertension is present with a PA systolic pressure of 70 mmHg.  · There is mild pulmonic valve regurgitation present.  · There is a trivial pericardial effusion adjacent to the left ventricle    Neuro/Psych  (-) seizures, TIA, CVA, headaches, psychiatric history  GI/Hepatic/Renal/Endo    (+) obesity,   diabetes mellitus (glu 97) type 2 well controlled,   (-) GERD, hepatitis, liver disease, no renal disease,  hypothyroidism    Musculoskeletal     (+) back pain,   Abdominal   (+) obese,    Substance History   (+) drug use (marijuana)  (-) alcohol use     OB/GYN          Other        (-) history of cancer                  Anesthesia Plan    ASA 4     general   (Han, possible arterial line if needed)  intravenous induction   Anesthetic plan, all risks, benefits, and alternatives have been provided, discussed and informed consent has been obtained with: patient.

## 2019-04-05 NOTE — ANESTHESIA PROCEDURE NOTES
Airway  Urgency: elective    Airway not difficult    General Information and Staff    Patient location during procedure: OR  CRNA: Yue Bowman CRNA    Indications and Patient Condition  Indications for airway management: airway protection    Preoxygenated: yes  MILS not maintained throughout  Mask difficulty assessment: 1 - vent by mask (2 hand mask easy to vent)    Final Airway Details  Final airway type: endotracheal airway      Successful airway: ETT  Cuffed: yes   Successful intubation technique: video laryngoscopy  Facilitating devices/methods: intubating stylet  Endotracheal tube insertion site: oral  Blade: Avila  Blade size: 3  ETT size (mm): 8.0  Cormack-Lehane Classification: grade IIa - partial view of glottis  Placement verified by: chest auscultation and capnometry   Cuff volume (mL): 7  Measured from: lips  ETT to lips (cm): 23  Number of attempts at approach: 1

## 2019-04-05 NOTE — ANESTHESIA PROCEDURE NOTES
Peripheral IV    Patient location during procedure: OR  Line placed for Fluids/Medication Admin.  Performed By   Anesthesiologist: Salvatore Wade MD  Preanesthetic Checklist  Completed: patient identified, site marked, surgical consent, pre-op evaluation, timeout performed, IV checked, risks and benefits discussed and monitors and equipment checked  Peripheral IV Prep   Patient position: supine   Prep: ChloraPrep  Patient monitoring: continuous pulse ox and cardiac monitor  Peripheral IV Procedure   Laterality:right  Location:  Wrist  Catheter size: 20 G         Post Assessment   Dressing Type: transparent and tape.    IV Dressing/Site: clean, dry and intact

## 2019-04-05 NOTE — ANESTHESIA POSTPROCEDURE EVALUATION
Patient: Matti Hernandez Bravo    Procedure Summary     Date:  04/05/19 Room / Location:  [facility] OR  / [facility] OR    Anesthesia Start:  0746 Anesthesia Stop:  1125    Procedure:  RIGHT GREATER SAPHENOUS VEIN VEIN STRIPPING stab phlebectomies (Right ) Diagnosis:       Venous insufficiency (chronic) (peripheral)      Varicose veins of both lower extremities with pain      (Venous insufficiency (chronic) (peripheral) [I87.2])      (Varicose veins of both lower extremities with pain [I83.813])    Surgeon:  Mateo Dunbar MD Provider:  Salvatore Wade MD    Anesthesia Type:  general ASA Status:  4          Anesthesia Type: general  Last vitals  BP   118/82 (04/05/19 1120)   Temp   98 °F (36.7 °C) (04/05/19 1120)   Pulse   77 (04/05/19 1120)   Resp   20 (04/05/19 1120)     SpO2   100 % (04/05/19 1120)     Post Anesthesia Care and Evaluation    Patient location during evaluation: PACU  Patient participation: complete - patient participated  Level of consciousness: awake and awake and alert  Pain score: 2  Pain management: satisfactory to patient  Airway patency: patent  Anesthetic complications: No anesthetic complications  PONV Status: none  Cardiovascular status: acceptable and stable  Respiratory status: acceptable, room air and spontaneous ventilation  Hydration status: acceptable

## 2019-04-08 LAB
LAB AP CASE REPORT: NORMAL
PATH REPORT.FINAL DX SPEC: NORMAL
PATH REPORT.GROSS SPEC: NORMAL

## 2019-04-16 ENCOUNTER — OFFICE VISIT (OUTPATIENT)
Dept: CARDIAC SURGERY | Facility: CLINIC | Age: 49
End: 2019-04-16

## 2019-04-16 VITALS
HEIGHT: 76 IN | HEART RATE: 77 BPM | OXYGEN SATURATION: 94 % | TEMPERATURE: 98.1 F | WEIGHT: 315 LBS | BODY MASS INDEX: 38.36 KG/M2 | SYSTOLIC BLOOD PRESSURE: 110 MMHG | DIASTOLIC BLOOD PRESSURE: 75 MMHG

## 2019-04-16 DIAGNOSIS — E66.01 CLASS 2 SEVERE OBESITY DUE TO EXCESS CALORIES WITH SERIOUS COMORBIDITY AND BODY MASS INDEX (BMI) OF 38.0 TO 38.9 IN ADULT (HCC): ICD-10-CM

## 2019-04-16 DIAGNOSIS — I83.813 VARICOSE VEINS OF BOTH LOWER EXTREMITIES WITH PAIN: Primary | ICD-10-CM

## 2019-04-16 DIAGNOSIS — Z48.812 SURGICAL AFTERCARE, CIRCULATORY SYSTEM: ICD-10-CM

## 2019-04-16 PROBLEM — Z48.813 SURGICAL AFTERCARE, RESPIRATORY SYSTEM: Status: RESOLVED | Noted: 2019-04-16 | Resolved: 2019-04-16

## 2019-04-16 PROBLEM — Z48.813 SURGICAL AFTERCARE, RESPIRATORY SYSTEM: Status: ACTIVE | Noted: 2019-04-16

## 2019-04-16 PROCEDURE — 99024 POSTOP FOLLOW-UP VISIT: CPT | Performed by: NURSE PRACTITIONER

## 2019-04-16 RX ORDER — CEPHALEXIN 500 MG/1
500 CAPSULE ORAL 3 TIMES DAILY
Qty: 30 CAPSULE | Refills: 0 | Status: SHIPPED | OUTPATIENT
Start: 2019-04-16 | End: 2019-04-26

## 2019-04-16 RX ORDER — HYDROCODONE BITARTRATE AND ACETAMINOPHEN 5; 325 MG/1; MG/1
1 TABLET ORAL EVERY 4 HOURS PRN
Qty: 18 TABLET | Refills: 0 | Status: SHIPPED | OUTPATIENT
Start: 2019-04-16 | End: 2021-11-15

## 2019-04-16 NOTE — PROGRESS NOTES
CVTS Office Progress Note     Subjective   Patient ID: Matti Bravo is a 48 y.o. male is here today for follow-up Post Op Stab Phlebectomy-Vein Stripping    Chief Complaint:    Chief Complaint   Patient presents with   • Varicose Veins     follow up (4/5/19) RLE stab phlebectomies ligation/ stripping RGSV       PCP:  Shonna Ng APRN  Cardiology:  Dr. Cabrera/ Lacey GOFF, Em GOFF     History of Present Illness  48 y.o. male with HTN(stable, increased risk stroke, rupture), Hyperlipidemia(stable, increased risk cardiovascular events), Obesity(uncontrolled, increased risk cardiovascular events) and Chronic Kidney Disease(stable, increased risk renal failure) , venous insufficiency(new, increase risk venous stasis).  never smoked.  Moderate leg swelling x years R>L.  Severe varicose veins bilateral.  Wears compression stockings daily.  No TIA stroke amaurosis.  No MI claudication. No other associated signs, symptoms or modifying factors.  Underwent stab phlebectomy with ligation and vein stripping of the right greater saphenous vein on April 5.  Reports today in postsurgical follow-up complains of moderate pain right lower extremity edema, warmth, swelling.       1/2019 Lower extremity venous duplex:   No dvt.  RIGHT GSV reflux, GSV 8.1mm thigh, 8.2mm knee, 15mm calf.  Proximal branch 8.9mm, multiple branches calf Deep system reflux.  LEFT GSV reflux, GSV 4.2mm knee.  Posterior course.   4/5/2019 Stab phlebectomies RIGHT lower extremity (43 incisions) High ligation and stripping RIGHT greater saphenous vein    2/2019 ECG:   QTc 482  2/2019 Echocardiogram:  EF 10%, LA 56mm, LV 88mm, RVSP 70mmHg         The following portions of the patient's history were reviewed and updated as appropriate: allergies, current medications, past family history, past medical history, past social history, past surgical history and problem list.  Recent images independently reviewed.  Available  laboratory values reviewed.      Past Medical History:   Diagnosis Date   • CHF (congestive heart failure) (CMS/HCC)    • Diabetes mellitus (CMS/HCC)    • Hyperlipidemia    • Hypertension    • Peripheral vascular disease (CMS/HCC)    • Sleep apnea     using c-pap   • Surgical aftercare, respiratory system 4/16/2019     Past Surgical History:   Procedure Laterality Date   • CARDIAC CATHETERIZATION  2009   • VARICOSE VEIN SURGERY Right 4/5/2019    Procedure: RIGHT GREATER SAPHENOUS VEIN VEIN STRIPPING stab phlebectomies;  Surgeon: Mateo Dunbar MD;  Location: [facility];  Service: Vascular     Family History   Problem Relation Age of Onset   • Heart disease Other    • Cancer Other    • Hypertension Mother    • Diabetes Mother      Social History     Tobacco Use   • Smoking status: Former Smoker   • Smokeless tobacco: Never Used   • Tobacco comment: IN HIGH SCHOOL   Substance Use Topics   • Alcohol use: No   • Drug use: No       ALLERGIES:   Patient has no known allergies.    MEDICATIONS:      Current Outpatient Medications:   •  albuterol sulfate  (90 Base) MCG/ACT inhaler, Inhale 2 puffs Every 4 (Four) Hours As Needed for Wheezing., Disp: 1 inhaler, Rfl: 3  •  aspirin 81 MG chewable tablet, Chew 1 tablet Daily., Disp: 30 tablet, Rfl: 1  •  carvedilol (COREG) 25 MG tablet, Take 1 tablet by mouth 2 (Two) Times a Day With Meals., Disp: 60 tablet, Rfl: 1  •  furosemide (LASIX) 40 MG tablet, Take 1 tablet by mouth 2 (Two) Times a Day., Disp: 60 tablet, Rfl: 6  •  lovastatin (MEVACOR) 20 MG tablet, Take 1 tablet by mouth Every Night., Disp: 30 tablet, Rfl: 1  •  potassium chloride (K-DUR) 10 MEQ CR tablet, Take 1 tablet by mouth Daily., Disp: 30 tablet, Rfl: 1  •  sacubitril-valsartan (ENTRESTO) 24-26 MG tablet, Take 1 tablet by mouth 2 (Two) Times a Day., Disp: 60 tablet, Rfl: 6  •  spironolactone (ALDACTONE) 25 MG tablet, Take 1 tablet by mouth Daily., Disp: 30 tablet, Rfl: 1  •  cephalexin (KEFLEX) 500  MG capsule, Take 1 capsule by mouth 3 (Three) Times a Day for 10 days., Disp: 30 capsule, Rfl: 0  •  HYDROcodone-acetaminophen (NORCO) 5-325 MG per tablet, Take 1 tablet by mouth Every 4 (Four) Hours As Needed for Moderate Pain  (Surgical Pain)., Disp: 18 tablet, Rfl: 0    Review of Systems   Constitution: Positive for malaise/fatigue. Negative for chills, decreased appetite, fever, weakness and weight loss.   HENT: Negative for congestion, nosebleeds and sore throat.    Eyes: Negative for blurred vision, visual disturbance and visual halos.   Cardiovascular: Positive for dyspnea on exertion and leg swelling. Negative for chest pain.   Respiratory: Positive for shortness of breath. Negative for cough, sputum production and wheezing.    Endocrine: Negative for cold intolerance and polyuria.   Hematologic/Lymphatic: Negative for bleeding problem. Bruises/bleeds easily.        Does not report S/S of adverse bleeding event including nose bleeds, hematuria, melena, gingival bleeding, or hematemesis.   Skin: Positive for unusual hair distribution. Negative for flushing and nail changes.   Musculoskeletal: Positive for arthritis, back pain and joint pain.   Gastrointestinal: Negative for bloating, abdominal pain, hematemesis, melena, nausea and vomiting.   Genitourinary: Negative for flank pain and hematuria.   Neurological: Negative for brief paralysis, difficulty with concentration, focal weakness, light-headedness, loss of balance, numbness and paresthesias.        No amaurosis fugax, TIA, or CVA.       Psychiatric/Behavioral: Negative for altered mental status, depression, substance abuse and suicidal ideas.   Allergic/Immunologic: Negative for hives and persistent infections.     Vitals:    04/16/19 0938   BP: 110/75   Pulse: 77   Temp: 98.1 °F (36.7 °C)   SpO2: 94%      Objective   Temp:  [98.1 °F (36.7 °C)] 98.1 °F (36.7 °C)  Heart Rate:  [77] 77  BP: (110)/(75) 110/75  Body mass index is 39.44 kg/m².  Physical Exam    Constitutional: He is oriented to person, place, and time. He appears well-developed and well-nourished.   HENT:   Head: Normocephalic and atraumatic.   Mouth/Throat: Oropharynx is clear and moist.   Eyes: Conjunctivae and EOM are normal. Pupils are equal, round, and reactive to light.   Neck: Normal range of motion. Neck supple. No JVD present.   Cardiovascular: Normal rate, regular rhythm and intact distal pulses.   Murmur heard.  Pulmonary/Chest: Effort normal and breath sounds normal. No stridor. No respiratory distress.   Abdominal: Soft. Bowel sounds are normal. He exhibits no distension. There is no tenderness.   Genitourinary:   Genitourinary Comments: deferred   Musculoskeletal: Normal range of motion. He exhibits edema (RLE +3) and tenderness (RLE).   Lymphadenopathy:     He has no cervical adenopathy.   Neurological: He is alert and oriented to person, place, and time. No cranial nerve deficit. Coordination normal.   Skin: Skin is warm and dry. Capillary refill takes 2 to 3 seconds. There is erythema.   Right lower extremity swelling, warmth, mild redness, multiple stab sites, no drainage   Psychiatric: He has a normal mood and affect. His behavior is normal. Judgment normal.   Nursing note and vitals reviewed.       Right Proximal Calf Measurement  55.8CM    Left Proximal Calf Measurement  45.72CM      Assessment & Plan     Surgical aftercare circulatory system  Encourage compression stocking use 30-40 mmHg for minimum of 2 weeks.  Care after stab phlebectomy procedure includes increasing activity with walking and exercises that work your calf and leg muscles as these help foster venous return.  When inactive, it is important that you elevate the procedure leg, inactivity with your leg in a dependent position will increase swelling and discomfort.  Continue an aspirin regimen unless otherwise directed, one week post procedure you may discontinue the use of narcotic pain medication and substitute with  tylenol or ibuprofen as needed.  Do not submerge in tub baths or swim for two weeks.  Continue to use compression stockings to improve venous return 30-40 mmHg.  Monitor stab sites: reasons to notify heart and vascular center include pus like drainage from sites, red streaked lines on procedure leg, or increased pain.  Start 10 day course keflex.  Follow up in one week.    RLE Swelling  Obtain Venous Duplex RLE, rule out DVT, continue ASA    Class 2 Obesity  We have discussed the benefits of weight loss as it relates to long term morbidity and disease prevention.  Interventions discussed included self-monitoring of caloric intake, increasing physical activity/exercise, goal setting, stimulus control, consultation with dietitian, and non-food rewards.      Detailed discussion regarding risks, benefits, and treatment plan. Images independently reviewed. Patient understands, agrees, and wishes to proceed with plan.

## 2019-04-16 NOTE — PATIENT INSTRUCTIONS
Care after stab phlebectomy procedure includes increasing activity with walking and exercises that work your calf and leg muscles as these help foster venous return.  When inactive, it is important that you elevate the procedure leg, inactivity with your leg in a dependent position will increase swelling and discomfort.  Continue an aspirin regimen unless otherwise directed, one week post procedure you may discontinue the use of narcotic pain medication and substitute with tylenol or ibuprofen as needed.  Do not submerge in tub baths or swim for two weeks.  Continue to use compression stockings to improve venous return.  Monitor stab sites: reasons to notify heart and vascular center include pus like drainage from sites, red streaked lines on procedure leg, or increased pain.

## 2019-04-24 PROBLEM — E66.01 CLASS 2 SEVERE OBESITY DUE TO EXCESS CALORIES WITH SERIOUS COMORBIDITY AND BODY MASS INDEX (BMI) OF 38.0 TO 38.9 IN ADULT (HCC): Status: RESOLVED | Noted: 2017-12-14 | Resolved: 2019-04-24

## 2019-04-30 ENCOUNTER — HOSPITAL ENCOUNTER (EMERGENCY)
Facility: HOSPITAL | Age: 49
Discharge: HOME | End: 2019-05-01
Attending: EMERGENCY MEDICINE | Admitting: EMERGENCY MEDICINE

## 2019-04-30 ENCOUNTER — APPOINTMENT (OUTPATIENT)
Dept: GENERAL RADIOLOGY | Facility: HOSPITAL | Age: 49
End: 2019-04-30

## 2019-04-30 DIAGNOSIS — L03.115 CELLULITIS OF RIGHT LOWER LEG: ICD-10-CM

## 2019-04-30 DIAGNOSIS — I50.9 ACUTE ON CHRONIC HEART FAILURE, UNSPECIFIED HEART FAILURE TYPE (HCC): Primary | ICD-10-CM

## 2019-04-30 PROCEDURE — 93010 ELECTROCARDIOGRAM REPORT: CPT | Performed by: INTERNAL MEDICINE

## 2019-04-30 PROCEDURE — 71046 X-RAY EXAM CHEST 2 VIEWS: CPT

## 2019-04-30 PROCEDURE — 93005 ELECTROCARDIOGRAM TRACING: CPT | Performed by: EMERGENCY MEDICINE

## 2019-04-30 PROCEDURE — 99284 EMERGENCY DEPT VISIT MOD MDM: CPT

## 2019-04-30 RX ORDER — SODIUM CHLORIDE 0.9 % (FLUSH) 0.9 %
10 SYRINGE (ML) INJECTION AS NEEDED
Status: DISCONTINUED | OUTPATIENT
Start: 2019-04-30 | End: 2019-05-01 | Stop reason: HOSPADM

## 2019-05-01 ENCOUNTER — APPOINTMENT (OUTPATIENT)
Dept: ULTRASOUND IMAGING | Facility: HOSPITAL | Age: 49
End: 2019-05-01

## 2019-05-01 VITALS
SYSTOLIC BLOOD PRESSURE: 103 MMHG | HEIGHT: 76 IN | WEIGHT: 315 LBS | RESPIRATION RATE: 18 BRPM | BODY MASS INDEX: 38.36 KG/M2 | OXYGEN SATURATION: 95 % | DIASTOLIC BLOOD PRESSURE: 76 MMHG | TEMPERATURE: 98.4 F | HEART RATE: 104 BPM

## 2019-05-01 LAB
ALBUMIN SERPL-MCNC: 3.4 G/DL (ref 3.5–5.2)
ALBUMIN/GLOB SERPL: 1 G/DL
ALP SERPL-CCNC: 64 U/L (ref 39–117)
ALT SERPL W P-5'-P-CCNC: 13 U/L (ref 1–41)
ANION GAP SERPL CALCULATED.3IONS-SCNC: 13 MMOL/L
AST SERPL-CCNC: 16 U/L (ref 1–40)
BASOPHILS # BLD AUTO: 0.03 10*3/MM3 (ref 0–0.2)
BASOPHILS NFR BLD AUTO: 0.6 % (ref 0–1.5)
BILIRUB SERPL-MCNC: 0.8 MG/DL (ref 0.2–1.2)
BUN BLD-MCNC: 20 MG/DL (ref 6–20)
BUN/CREAT SERPL: 12.7 (ref 7–25)
CALCIUM SPEC-SCNC: 9.1 MG/DL (ref 8.6–10.5)
CHLORIDE SERPL-SCNC: 103 MMOL/L (ref 98–107)
CO2 SERPL-SCNC: 27 MMOL/L (ref 22–29)
CREAT BLD-MCNC: 1.58 MG/DL (ref 0.76–1.27)
D-LACTATE SERPL-SCNC: 1.4 MMOL/L (ref 0.5–2)
DEPRECATED RDW RBC AUTO: 44.5 FL (ref 37–54)
EOSINOPHIL # BLD AUTO: 0.21 10*3/MM3 (ref 0–0.4)
EOSINOPHIL NFR BLD AUTO: 4.5 % (ref 0.3–6.2)
ERYTHROCYTE [DISTWIDTH] IN BLOOD BY AUTOMATED COUNT: 13.1 % (ref 12.3–15.4)
GFR SERPL CREATININE-BSD FRML MDRD: 57 ML/MIN/1.73
GIANT PLATELETS: NORMAL
GLOBULIN UR ELPH-MCNC: 3.5 GM/DL
GLUCOSE BLD-MCNC: 129 MG/DL (ref 65–99)
HCT VFR BLD AUTO: 38.9 % (ref 37.5–51)
HGB BLD-MCNC: 12.5 G/DL (ref 13–17.7)
IMM GRANULOCYTES # BLD AUTO: 0.01 10*3/MM3 (ref 0–0.05)
IMM GRANULOCYTES NFR BLD AUTO: 0.2 % (ref 0–0.5)
LYMPHOCYTES # BLD AUTO: 1.63 10*3/MM3 (ref 0.7–3.1)
LYMPHOCYTES NFR BLD AUTO: 35 % (ref 19.6–45.3)
MCH RBC QN AUTO: 29.8 PG (ref 26.6–33)
MCHC RBC AUTO-ENTMCNC: 32.1 G/DL (ref 31.5–35.7)
MCV RBC AUTO: 92.6 FL (ref 79–97)
MONOCYTES # BLD AUTO: 0.66 10*3/MM3 (ref 0.1–0.9)
MONOCYTES NFR BLD AUTO: 14.2 % (ref 5–12)
NEUTROPHILS # BLD AUTO: 2.12 10*3/MM3 (ref 1.7–7)
NEUTROPHILS NFR BLD AUTO: 45.5 % (ref 42.7–76)
NRBC BLD AUTO-RTO: 0 /100 WBC (ref 0–0.2)
NT-PROBNP SERPL-MCNC: 2597 PG/ML (ref 5–450)
PLATELET # BLD AUTO: 133 10*3/MM3 (ref 140–450)
PMV BLD AUTO: 13.2 FL (ref 6–12)
POTASSIUM BLD-SCNC: 3.9 MMOL/L (ref 3.5–5.2)
PROT SERPL-MCNC: 6.9 G/DL (ref 6–8.5)
RBC # BLD AUTO: 4.2 10*6/MM3 (ref 4.14–5.8)
RBC MORPH BLD: NORMAL
SMALL PLATELETS BLD QL SMEAR: NORMAL
SODIUM BLD-SCNC: 143 MMOL/L (ref 136–145)
WBC MORPH BLD: NORMAL
WBC NRBC COR # BLD: 4.66 10*3/MM3 (ref 3.4–10.8)

## 2019-05-01 PROCEDURE — 96375 TX/PRO/DX INJ NEW DRUG ADDON: CPT

## 2019-05-01 PROCEDURE — 83605 ASSAY OF LACTIC ACID: CPT | Performed by: EMERGENCY MEDICINE

## 2019-05-01 PROCEDURE — 93971 EXTREMITY STUDY: CPT

## 2019-05-01 PROCEDURE — 87040 BLOOD CULTURE FOR BACTERIA: CPT | Performed by: EMERGENCY MEDICINE

## 2019-05-01 PROCEDURE — 83880 ASSAY OF NATRIURETIC PEPTIDE: CPT | Performed by: EMERGENCY MEDICINE

## 2019-05-01 PROCEDURE — 96365 THER/PROPH/DIAG IV INF INIT: CPT

## 2019-05-01 PROCEDURE — 80053 COMPREHEN METABOLIC PANEL: CPT | Performed by: EMERGENCY MEDICINE

## 2019-05-01 PROCEDURE — 25010000003 CEFAZOLIN PER 500 MG: Performed by: EMERGENCY MEDICINE

## 2019-05-01 PROCEDURE — 85007 BL SMEAR W/DIFF WBC COUNT: CPT | Performed by: EMERGENCY MEDICINE

## 2019-05-01 PROCEDURE — 25010000002 FUROSEMIDE PER 20 MG: Performed by: EMERGENCY MEDICINE

## 2019-05-01 PROCEDURE — 85025 COMPLETE CBC W/AUTO DIFF WBC: CPT | Performed by: EMERGENCY MEDICINE

## 2019-05-01 RX ORDER — POTASSIUM CHLORIDE 750 MG/1
20 CAPSULE, EXTENDED RELEASE ORAL ONCE
Status: COMPLETED | OUTPATIENT
Start: 2019-05-01 | End: 2019-05-01

## 2019-05-01 RX ORDER — FUROSEMIDE 10 MG/ML
40 INJECTION INTRAMUSCULAR; INTRAVENOUS ONCE
Status: COMPLETED | OUTPATIENT
Start: 2019-05-01 | End: 2019-05-01

## 2019-05-01 RX ORDER — HYDROCODONE BITARTRATE AND ACETAMINOPHEN 10; 325 MG/1; MG/1
1 TABLET ORAL ONCE
Status: COMPLETED | OUTPATIENT
Start: 2019-05-01 | End: 2019-05-01

## 2019-05-01 RX ORDER — CEPHALEXIN 500 MG/1
500 CAPSULE ORAL 3 TIMES DAILY
Qty: 30 CAPSULE | Refills: 0 | Status: SHIPPED | OUTPATIENT
Start: 2019-05-01 | End: 2019-05-11

## 2019-05-01 RX ADMIN — SODIUM CHLORIDE 1 G: 900 INJECTION INTRAVENOUS at 02:00

## 2019-05-01 RX ADMIN — POTASSIUM CHLORIDE 20 MEQ: 750 CAPSULE, EXTENDED RELEASE ORAL at 02:00

## 2019-05-01 RX ADMIN — FUROSEMIDE 40 MG: 10 INJECTION, SOLUTION INTRAMUSCULAR; INTRAVENOUS at 02:00

## 2019-05-01 RX ADMIN — HYDROCODONE BITARTRATE AND ACETAMINOPHEN 1 TABLET: 10; 325 TABLET ORAL at 01:31

## 2019-05-01 NOTE — ED PROVIDER NOTES
Subjective   48-year-old male with history of congestive heart failure on Lasix as well as peripheral vascular disease who follows with vascular surgery presents the emergency department with painful erythematous and swollen right lower extremity.  This is the extremity that within the last few weeks he has had varicose vein surgery on.  He reports that he also just completed a course of antibiotics.  Symptoms worsened in the last 2 days.  He is also feeling short of breath.  He states he has missed no doses of his Lasix.  He states the right leg is significantly swollen more.  Denies chest pain.  Denies fever or chills.    Family history, surgical history, social history, current medications and allergies are reviewed with the patient and triage documentation and vitals are reviewed.          History provided by:  Patient, parent and medical records   used: No        Review of Systems   Constitutional: Negative for chills and fever.   HENT: Negative.    Eyes: Negative.    Respiratory: Positive for shortness of breath. Negative for cough, chest tightness and wheezing.    Cardiovascular: Positive for leg swelling. Negative for chest pain and palpitations.   Gastrointestinal: Negative for abdominal pain, constipation, diarrhea, nausea and vomiting.   Endocrine: Negative.    Genitourinary: Negative for dysuria, frequency, hematuria and urgency.   Musculoskeletal: Negative for arthralgias, back pain, myalgias and neck pain.   Skin: Positive for color change (erythema RLE). Negative for pallor, rash and wound.   Allergic/Immunologic: Negative.    Neurological: Negative for weakness, light-headedness, numbness and headaches.   Hematological: Negative.    Psychiatric/Behavioral: Negative.        Past Medical History:   Diagnosis Date   • CHF (congestive heart failure) (CMS/[facility])    • Class 2 severe obesity due to excess calories with serious comorbidity and body mass index (BMI) of 38.0 to 38.9 in adult  (CMS/[facility]) 12/14/2017    Body mass index is 41.26 kg/m².  Nutrition consultation   • Diabetes mellitus (CMS/[facility])    • Hyperlipidemia    • Hypertension    • Peripheral vascular disease (CMS/[facility])    • Sleep apnea     using c-pap   • Surgical aftercare, respiratory system 4/16/2019       No Known Allergies    Past Surgical History:   Procedure Laterality Date   • CARDIAC CATHETERIZATION  2009   • VARICOSE VEIN SURGERY Right 4/5/2019    Procedure: RIGHT GREATER SAPHENOUS VEIN VEIN STRIPPING stab phlebectomies;  Surgeon: Mateo Dunbar MD;  Location: [facility];  Service: Vascular       Family History   Problem Relation Age of Onset   • Heart disease Other    • Cancer Other    • Hypertension Mother    • Diabetes Mother        Social History     Socioeconomic History   • Marital status:      Spouse name: Not on file   • Number of children: Not on file   • Years of education: Not on file   • Highest education level: Not on file   Tobacco Use   • Smoking status: Former Smoker   • Smokeless tobacco: Never Used   • Tobacco comment: IN HIGH SCHOOL   Substance and Sexual Activity   • Alcohol use: No   • Drug use: No   • Sexual activity: Defer           Objective   Physical Exam   Constitutional: He is oriented to person, place, and time. He appears well-developed and well-nourished.  Non-toxic appearance. He does not appear ill. No distress.   HENT:   Head: Normocephalic.   Mouth/Throat: Oropharynx is clear and moist.   Eyes: Pupils are equal, round, and reactive to light.   Neck: Normal range of motion. No hepatojugular reflux and no JVD present.   Cardiovascular: Normal rate, regular rhythm and normal heart sounds.   No murmur heard.  Pulmonary/Chest: Effort normal. He has decreased breath sounds in the right lower field and the left lower field. He has no wheezes. He has no rhonchi. He has rales in the right lower field and the left lower field.   Abdominal: Soft. Bowel sounds are normal. He exhibits no  distension. There is no tenderness.   Musculoskeletal:        Right lower leg: He exhibits tenderness and edema.        Left lower leg: Normal.   Neurological: He is alert and oriented to person, place, and time.   Skin: Skin is warm and dry. Capillary refill takes less than 2 seconds. He is not diaphoretic. There is erythema (RLE).   Psychiatric: He has a normal mood and affect.   Nursing note and vitals reviewed.      Procedures  none         ED Course  ED Course as of May 01 0153   Wed May 01, 2019   0150 Patient signed out to me at shift change by Dr. French. Patient is alert and resting comfortably. I reviewed the results of the emergency department evaluation with the patient.  I recommended primary care follow-up.  I advised the patient to return to the emergency department if their symptoms change or worsen.   [DR]      ED Course User Index  [DR] Gideon Stokes MD      Labs Reviewed   COMPREHENSIVE METABOLIC PANEL - Abnormal; Notable for the following components:       Result Value    Glucose 129 (*)     Creatinine 1.58 (*)     Albumin 3.40 (*)     eGFR   Amer 57 (*)     All other components within normal limits    Narrative:     GFR Normal >60  Chronic Kidney Disease <60  Kidney Failure <15   BNP (IN-HOUSE) - Abnormal; Notable for the following components:    proBNP 2,597.0 (*)     All other components within normal limits    Narrative:     Among patients with dyspnea, NT-proBNP is highly sensitive for the detection of acute congestive heart failure. In addition NT-proBNP of <300 pg/ml effectively rules out acute congestive heart failure with 99% negative predictive value.   CBC WITH AUTO DIFFERENTIAL - Abnormal; Notable for the following components:    Hemoglobin 12.5 (*)     MPV 13.2 (*)     Platelets 133 (*)     Monocyte % 14.2 (*)     All other components within normal limits   LACTIC ACID, PLASMA - Normal   BLOOD CULTURE   BLOOD CULTURE   SCAN SLIDE   CBC AND DIFFERENTIAL    Narrative:     The  following orders were created for panel order CBC & Differential.  Procedure                               Abnormality         Status                     ---------                               -----------         ------                     Scan Slide[[id number]]                                       In process                 CBC Auto Differential[[id number]]        Abnormal            Final result                 Please view results for these tests on the individual orders.     Xr Chest 2 View    Result Date: 5/1/2019  Narrative: Exam: PA lateral chest INDICATION: Shortness of breath COMPARISON: 3/8/2019 FINDINGS: PA lateral chest. Bony structures are intact. Heart is enlarged. And living congestion is present. No pneumothorax or pleural effusion     Impression: Cardiomegaly with pulmonary venous congestion. Electronically signed by:  Delbert Cm MD  5/1/2019 12:09 AM [employer] Workstation: [employer] Venous Doppler Lower Extremity Right (duplex)    Result Date: 5/1/2019  Narrative: Exam: Right lower lower extremity venous ultrasound INDICATION: Swelling, pain FINDINGS: The right common femoral component femoral, femoral popliteal posterior tibial veins demonstrate normal flow and compressibility augmentation. Peroneal veins are not imaged. Varicosities are seen containing thrombus. History of the greater saphenous stripping 4/15/2019.     Impression: No evidence of deep venous thrombosis in the common femoral through posterior tibial veins. Electronically signed by:  Delbert Cm MD  5/1/2019 1:08 AM CDT Workstation: [id number]          [facility]    0100 patient with negative ultrasound of the right lower extremity.  X-ray imaging reveals cardiomegaly with pulmonary vascular congestion.  Patient has had issues with laboratory draw and blood hemolyzing and is awaiting results of these laboratory studies.  Patient will be signed out to Dr. Stokes for final disposition and treatment.    Final diagnoses:   Acute  on chronic heart failure, unspecified heart failure type (CMS/HCC)   Cellulitis of right lower leg            Gideon Stokes MD  05/01/19 0153

## 2019-05-06 LAB
BACTERIA SPEC AEROBE CULT: NORMAL
BACTERIA SPEC AEROBE CULT: NORMAL

## 2021-11-29 ENCOUNTER — HOSPITAL ENCOUNTER (OUTPATIENT)
Facility: HOSPITAL | Age: 51
Setting detail: OBSERVATION
Discharge: HOME | End: 2021-12-02
Attending: EMERGENCY MEDICINE | Admitting: INTERNAL MEDICINE

## 2021-11-29 ENCOUNTER — APPOINTMENT (OUTPATIENT)
Dept: GENERAL RADIOLOGY | Facility: HOSPITAL | Age: 51
End: 2021-11-29

## 2021-11-29 DIAGNOSIS — R94.31 ABNORMAL EKG: ICD-10-CM

## 2021-11-29 DIAGNOSIS — I50.9 ACUTE ON CHRONIC CONGESTIVE HEART FAILURE, UNSPECIFIED HEART FAILURE TYPE (HCC): ICD-10-CM

## 2021-11-29 DIAGNOSIS — R07.9 CHEST PAIN, UNSPECIFIED TYPE: Primary | ICD-10-CM

## 2021-11-29 LAB
ALBUMIN SERPL-MCNC: 3.9 G/DL (ref 3.5–5.2)
ALBUMIN/GLOB SERPL: 1.2 G/DL
ALP SERPL-CCNC: 74 U/L (ref 39–117)
ALT SERPL W P-5'-P-CCNC: 17 U/L (ref 1–41)
ANION GAP SERPL CALCULATED.3IONS-SCNC: 7 MMOL/L (ref 5–15)
AST SERPL-CCNC: 15 U/L (ref 1–40)
BASOPHILS # BLD AUTO: 0.03 10*3/MM3 (ref 0–0.2)
BASOPHILS NFR BLD AUTO: 0.5 % (ref 0–1.5)
BILIRUB SERPL-MCNC: 0.6 MG/DL (ref 0–1.2)
BUN SERPL-MCNC: 14 MG/DL (ref 6–20)
BUN/CREAT SERPL: 11.9 (ref 7–25)
CALCIUM SPEC-SCNC: 9 MG/DL (ref 8.6–10.5)
CHLORIDE SERPL-SCNC: 102 MMOL/L (ref 98–107)
CO2 SERPL-SCNC: 32 MMOL/L (ref 22–29)
CREAT SERPL-MCNC: 1.18 MG/DL (ref 0.76–1.27)
D-DIMER, QUANTITATIVE (MAD,POW, STR): 426 NG/ML (FEU) (ref 0–470)
DEPRECATED RDW RBC AUTO: 40.4 FL (ref 37–54)
EOSINOPHIL # BLD AUTO: 0.35 10*3/MM3 (ref 0–0.4)
EOSINOPHIL NFR BLD AUTO: 6.1 % (ref 0.3–6.2)
ERYTHROCYTE [DISTWIDTH] IN BLOOD BY AUTOMATED COUNT: 12.3 % (ref 12.3–15.4)
GFR SERPL CREATININE-BSD FRML MDRD: 79 ML/MIN/1.73
GLOBULIN UR ELPH-MCNC: 3.2 GM/DL
GLUCOSE SERPL-MCNC: 137 MG/DL (ref 65–99)
HCT VFR BLD AUTO: 42.3 % (ref 37.5–51)
HGB BLD-MCNC: 14 G/DL (ref 13–17.7)
HOLD SPECIMEN: NORMAL
IMM GRANULOCYTES # BLD AUTO: 0.01 10*3/MM3 (ref 0–0.05)
IMM GRANULOCYTES NFR BLD AUTO: 0.2 % (ref 0–0.5)
LYMPHOCYTES # BLD AUTO: 1.65 10*3/MM3 (ref 0.7–3.1)
LYMPHOCYTES NFR BLD AUTO: 28.8 % (ref 19.6–45.3)
MAGNESIUM SERPL-MCNC: 1.9 MG/DL (ref 1.6–2.6)
MCH RBC QN AUTO: 30.2 PG (ref 26.6–33)
MCHC RBC AUTO-ENTMCNC: 33.1 G/DL (ref 31.5–35.7)
MCV RBC AUTO: 91.4 FL (ref 79–97)
MONOCYTES # BLD AUTO: 0.8 10*3/MM3 (ref 0.1–0.9)
MONOCYTES NFR BLD AUTO: 14 % (ref 5–12)
NEUTROPHILS NFR BLD AUTO: 2.89 10*3/MM3 (ref 1.7–7)
NEUTROPHILS NFR BLD AUTO: 50.4 % (ref 42.7–76)
NRBC BLD AUTO-RTO: 0 /100 WBC (ref 0–0.2)
NT-PROBNP SERPL-MCNC: 690.3 PG/ML (ref 0–900)
PLATELET # BLD AUTO: 145 10*3/MM3 (ref 140–450)
PMV BLD AUTO: 12.5 FL (ref 6–12)
POTASSIUM SERPL-SCNC: 3.8 MMOL/L (ref 3.5–5.2)
PROT SERPL-MCNC: 7.1 G/DL (ref 6–8.5)
RBC # BLD AUTO: 4.63 10*6/MM3 (ref 4.14–5.8)
SODIUM SERPL-SCNC: 141 MMOL/L (ref 136–145)
TROPONIN T SERPL-MCNC: 0.01 NG/ML (ref 0–0.03)
WBC NRBC COR # BLD: 5.73 10*3/MM3 (ref 3.4–10.8)

## 2021-11-29 PROCEDURE — 99285 EMERGENCY DEPT VISIT HI MDM: CPT

## 2021-11-29 PROCEDURE — 93005 ELECTROCARDIOGRAM TRACING: CPT | Performed by: EMERGENCY MEDICINE

## 2021-11-29 PROCEDURE — 80053 COMPREHEN METABOLIC PANEL: CPT | Performed by: EMERGENCY MEDICINE

## 2021-11-29 PROCEDURE — G0378 HOSPITAL OBSERVATION PER HR: HCPCS

## 2021-11-29 PROCEDURE — 84484 ASSAY OF TROPONIN QUANT: CPT | Performed by: EMERGENCY MEDICINE

## 2021-11-29 PROCEDURE — 87636 SARSCOV2 & INF A&B AMP PRB: CPT | Performed by: EMERGENCY MEDICINE

## 2021-11-29 PROCEDURE — 85379 FIBRIN DEGRADATION QUANT: CPT | Performed by: EMERGENCY MEDICINE

## 2021-11-29 PROCEDURE — 83880 ASSAY OF NATRIURETIC PEPTIDE: CPT | Performed by: EMERGENCY MEDICINE

## 2021-11-29 PROCEDURE — 71045 X-RAY EXAM CHEST 1 VIEW: CPT

## 2021-11-29 PROCEDURE — 93005 ELECTROCARDIOGRAM TRACING: CPT

## 2021-11-29 PROCEDURE — 93010 ELECTROCARDIOGRAM REPORT: CPT | Performed by: INTERNAL MEDICINE

## 2021-11-29 PROCEDURE — C9803 HOPD COVID-19 SPEC COLLECT: HCPCS

## 2021-11-29 PROCEDURE — 83735 ASSAY OF MAGNESIUM: CPT | Performed by: EMERGENCY MEDICINE

## 2021-11-29 PROCEDURE — 85025 COMPLETE CBC W/AUTO DIFF WBC: CPT | Performed by: EMERGENCY MEDICINE

## 2021-11-29 RX ORDER — ASPIRIN 325 MG
325 TABLET ORAL ONCE
Status: COMPLETED | OUTPATIENT
Start: 2021-11-29 | End: 2021-11-29

## 2021-11-29 RX ORDER — SODIUM CHLORIDE 0.9 % (FLUSH) 0.9 %
10 SYRINGE (ML) INJECTION AS NEEDED
Status: DISCONTINUED | OUTPATIENT
Start: 2021-11-29 | End: 2021-12-02 | Stop reason: HOSPADM

## 2021-11-29 RX ADMIN — ASPIRIN 325 MG: 325 TABLET ORAL at 21:32

## 2021-11-30 ENCOUNTER — APPOINTMENT (OUTPATIENT)
Dept: CT IMAGING | Facility: HOSPITAL | Age: 51
End: 2021-11-30

## 2021-11-30 LAB
ALBUMIN SERPL-MCNC: 4.2 G/DL (ref 3.5–5.2)
ALBUMIN/GLOB SERPL: 1.2 G/DL
ALP SERPL-CCNC: 80 U/L (ref 39–117)
ALT SERPL W P-5'-P-CCNC: 17 U/L (ref 1–41)
ANION GAP SERPL CALCULATED.3IONS-SCNC: 7 MMOL/L (ref 5–15)
AST SERPL-CCNC: 14 U/L (ref 1–40)
BASOPHILS # BLD AUTO: 0.03 10*3/MM3 (ref 0–0.2)
BASOPHILS NFR BLD AUTO: 0.5 % (ref 0–1.5)
BILIRUB SERPL-MCNC: 0.9 MG/DL (ref 0–1.2)
BUN SERPL-MCNC: 13 MG/DL (ref 6–20)
BUN/CREAT SERPL: 10.2 (ref 7–25)
CALCIUM SPEC-SCNC: 9.3 MG/DL (ref 8.6–10.5)
CHLORIDE SERPL-SCNC: 101 MMOL/L (ref 98–107)
CHOLEST SERPL-MCNC: 147 MG/DL (ref 0–200)
CO2 SERPL-SCNC: 33 MMOL/L (ref 22–29)
CREAT SERPL-MCNC: 1.27 MG/DL (ref 0.76–1.27)
DEPRECATED RDW RBC AUTO: 40.8 FL (ref 37–54)
EOSINOPHIL # BLD AUTO: 0.34 10*3/MM3 (ref 0–0.4)
EOSINOPHIL NFR BLD AUTO: 5.7 % (ref 0.3–6.2)
ERYTHROCYTE [DISTWIDTH] IN BLOOD BY AUTOMATED COUNT: 12.3 % (ref 12.3–15.4)
FLUAV SUBTYP SPEC NAA+PROBE: NOT DETECTED
FLUBV RNA ISLT QL NAA+PROBE: NOT DETECTED
GFR SERPL CREATININE-BSD FRML MDRD: 73 ML/MIN/1.73
GLOBULIN UR ELPH-MCNC: 3.6 GM/DL
GLUCOSE SERPL-MCNC: 111 MG/DL (ref 65–99)
HBA1C MFR BLD: 5.9 % (ref 4.8–5.6)
HCT VFR BLD AUTO: 43.8 % (ref 37.5–51)
HDLC SERPL-MCNC: 31 MG/DL (ref 40–60)
HGB BLD-MCNC: 14.5 G/DL (ref 13–17.7)
IMM GRANULOCYTES # BLD AUTO: 0.01 10*3/MM3 (ref 0–0.05)
IMM GRANULOCYTES NFR BLD AUTO: 0.2 % (ref 0–0.5)
LDLC SERPL CALC-MCNC: 100 MG/DL (ref 0–100)
LDLC/HDLC SERPL: 3.19 {RATIO}
LYMPHOCYTES # BLD AUTO: 2.16 10*3/MM3 (ref 0.7–3.1)
LYMPHOCYTES NFR BLD AUTO: 36.2 % (ref 19.6–45.3)
MAGNESIUM SERPL-MCNC: 1.9 MG/DL (ref 1.6–2.6)
MCH RBC QN AUTO: 30.2 PG (ref 26.6–33)
MCHC RBC AUTO-ENTMCNC: 33.1 G/DL (ref 31.5–35.7)
MCV RBC AUTO: 91.3 FL (ref 79–97)
MONOCYTES # BLD AUTO: 0.73 10*3/MM3 (ref 0.1–0.9)
MONOCYTES NFR BLD AUTO: 12.2 % (ref 5–12)
NEUTROPHILS NFR BLD AUTO: 2.7 10*3/MM3 (ref 1.7–7)
NEUTROPHILS NFR BLD AUTO: 45.2 % (ref 42.7–76)
NRBC BLD AUTO-RTO: 0 /100 WBC (ref 0–0.2)
PHOSPHATE SERPL-MCNC: 3.5 MG/DL (ref 2.5–4.5)
PLATELET # BLD AUTO: 152 10*3/MM3 (ref 140–450)
PMV BLD AUTO: 12.6 FL (ref 6–12)
POTASSIUM SERPL-SCNC: 3.4 MMOL/L (ref 3.5–5.2)
PROCALCITONIN SERPL-MCNC: 0.04 NG/ML (ref 0–0.25)
PROT SERPL-MCNC: 7.8 G/DL (ref 6–8.5)
QT INTERVAL: 462 MS
QT INTERVAL: 466 MS
QTC INTERVAL: 495 MS
QTC INTERVAL: 502 MS
RBC # BLD AUTO: 4.8 10*6/MM3 (ref 4.14–5.8)
SARS-COV-2 RNA PNL SPEC NAA+PROBE: NOT DETECTED
SODIUM SERPL-SCNC: 141 MMOL/L (ref 136–145)
TRIGL SERPL-MCNC: 85 MG/DL (ref 0–150)
TROPONIN T SERPL-MCNC: 0.02 NG/ML (ref 0–0.03)
TROPONIN T SERPL-MCNC: 0.02 NG/ML (ref 0–0.03)
TSH SERPL DL<=0.05 MIU/L-ACNC: 0.98 UIU/ML (ref 0.27–4.2)
VLDLC SERPL-MCNC: 16 MG/DL (ref 5–40)
WBC NRBC COR # BLD: 5.97 10*3/MM3 (ref 3.4–10.8)

## 2021-11-30 PROCEDURE — 84443 ASSAY THYROID STIM HORMONE: CPT | Performed by: HOSPITALIST

## 2021-11-30 PROCEDURE — 83735 ASSAY OF MAGNESIUM: CPT | Performed by: HOSPITALIST

## 2021-11-30 PROCEDURE — G0378 HOSPITAL OBSERVATION PER HR: HCPCS

## 2021-11-30 PROCEDURE — 36415 COLL VENOUS BLD VENIPUNCTURE: CPT | Performed by: NURSE PRACTITIONER

## 2021-11-30 PROCEDURE — 80053 COMPREHEN METABOLIC PANEL: CPT | Performed by: HOSPITALIST

## 2021-11-30 PROCEDURE — 84100 ASSAY OF PHOSPHORUS: CPT | Performed by: HOSPITALIST

## 2021-11-30 PROCEDURE — 25010000002 FUROSEMIDE PER 20 MG: Performed by: HOSPITALIST

## 2021-11-30 PROCEDURE — 96376 TX/PRO/DX INJ SAME DRUG ADON: CPT

## 2021-11-30 PROCEDURE — 84484 ASSAY OF TROPONIN QUANT: CPT | Performed by: NURSE PRACTITIONER

## 2021-11-30 PROCEDURE — 96374 THER/PROPH/DIAG INJ IV PUSH: CPT

## 2021-11-30 PROCEDURE — 93010 ELECTROCARDIOGRAM REPORT: CPT | Performed by: INTERNAL MEDICINE

## 2021-11-30 PROCEDURE — 93005 ELECTROCARDIOGRAM TRACING: CPT | Performed by: HOSPITALIST

## 2021-11-30 PROCEDURE — 84484 ASSAY OF TROPONIN QUANT: CPT | Performed by: HOSPITALIST

## 2021-11-30 PROCEDURE — 85025 COMPLETE CBC W/AUTO DIFF WBC: CPT | Performed by: HOSPITALIST

## 2021-11-30 PROCEDURE — 83036 HEMOGLOBIN GLYCOSYLATED A1C: CPT | Performed by: HOSPITALIST

## 2021-11-30 PROCEDURE — 99214 OFFICE O/P EST MOD 30 MIN: CPT | Performed by: INTERNAL MEDICINE

## 2021-11-30 PROCEDURE — 96372 THER/PROPH/DIAG INJ SC/IM: CPT

## 2021-11-30 PROCEDURE — 84145 PROCALCITONIN (PCT): CPT | Performed by: HOSPITALIST

## 2021-11-30 PROCEDURE — 25010000002 ENOXAPARIN PER 10 MG: Performed by: HOSPITALIST

## 2021-11-30 PROCEDURE — 80061 LIPID PANEL: CPT | Performed by: HOSPITALIST

## 2021-11-30 RX ORDER — FUROSEMIDE 40 MG/1
40 TABLET ORAL
Status: DISCONTINUED | OUTPATIENT
Start: 2021-12-01 | End: 2021-12-02 | Stop reason: HOSPADM

## 2021-11-30 RX ORDER — SODIUM CHLORIDE 0.9 % (FLUSH) 0.9 %
10 SYRINGE (ML) INJECTION EVERY 12 HOURS SCHEDULED
Status: DISCONTINUED | OUTPATIENT
Start: 2021-11-30 | End: 2021-12-02 | Stop reason: HOSPADM

## 2021-11-30 RX ORDER — ALBUTEROL SULFATE 2.5 MG/3ML
2.5 SOLUTION RESPIRATORY (INHALATION) EVERY 4 HOURS PRN
Status: DISCONTINUED | OUTPATIENT
Start: 2021-11-30 | End: 2021-12-02 | Stop reason: HOSPADM

## 2021-11-30 RX ORDER — CARVEDILOL 25 MG/1
25 TABLET ORAL 2 TIMES DAILY WITH MEALS
Status: DISCONTINUED | OUTPATIENT
Start: 2021-11-30 | End: 2021-12-02 | Stop reason: HOSPADM

## 2021-11-30 RX ORDER — ATORVASTATIN CALCIUM 10 MG/1
10 TABLET, FILM COATED ORAL DAILY
Status: DISCONTINUED | OUTPATIENT
Start: 2021-11-30 | End: 2021-12-02 | Stop reason: HOSPADM

## 2021-11-30 RX ORDER — ONDANSETRON 2 MG/ML
4 INJECTION INTRAMUSCULAR; INTRAVENOUS EVERY 6 HOURS PRN
Status: DISCONTINUED | OUTPATIENT
Start: 2021-11-30 | End: 2021-12-02 | Stop reason: HOSPADM

## 2021-11-30 RX ORDER — FUROSEMIDE 10 MG/ML
40 INJECTION INTRAMUSCULAR; INTRAVENOUS EVERY 12 HOURS
Status: DISCONTINUED | OUTPATIENT
Start: 2021-11-30 | End: 2021-11-30

## 2021-11-30 RX ORDER — ASPIRIN 81 MG/1
81 TABLET, CHEWABLE ORAL DAILY
Status: DISCONTINUED | OUTPATIENT
Start: 2021-11-30 | End: 2021-12-02 | Stop reason: HOSPADM

## 2021-11-30 RX ORDER — SODIUM CHLORIDE 0.9 % (FLUSH) 0.9 %
10 SYRINGE (ML) INJECTION AS NEEDED
Status: DISCONTINUED | OUTPATIENT
Start: 2021-11-30 | End: 2021-12-02 | Stop reason: HOSPADM

## 2021-11-30 RX ORDER — SODIUM CHLORIDE 0.9 % (FLUSH) 0.9 %
3 SYRINGE (ML) INJECTION EVERY 12 HOURS SCHEDULED
Status: DISCONTINUED | OUTPATIENT
Start: 2021-11-30 | End: 2021-12-02 | Stop reason: HOSPADM

## 2021-11-30 RX ORDER — POTASSIUM CHLORIDE 750 MG/1
40 CAPSULE, EXTENDED RELEASE ORAL ONCE
Status: COMPLETED | OUTPATIENT
Start: 2021-11-30 | End: 2021-11-30

## 2021-11-30 RX ORDER — NITROGLYCERIN 0.4 MG/1
0.4 TABLET SUBLINGUAL
Status: COMPLETED | OUTPATIENT
Start: 2021-11-30 | End: 2021-11-30

## 2021-11-30 RX ORDER — LIDOCAINE HYDROCHLORIDE 10 MG/ML
5 INJECTION, SOLUTION EPIDURAL; INFILTRATION; INTRACAUDAL; PERINEURAL AS NEEDED
Status: DISCONTINUED | OUTPATIENT
Start: 2021-11-30 | End: 2021-12-02 | Stop reason: HOSPADM

## 2021-11-30 RX ORDER — METOPROLOL TARTRATE 50 MG/1
100 TABLET, FILM COATED ORAL ONCE AS NEEDED
Status: COMPLETED | OUTPATIENT
Start: 2021-11-30 | End: 2021-12-01

## 2021-11-30 RX ORDER — METOPROLOL TARTRATE 50 MG/1
50 TABLET, FILM COATED ORAL ONCE AS NEEDED
Status: COMPLETED | OUTPATIENT
Start: 2021-11-30 | End: 2021-12-01

## 2021-11-30 RX ADMIN — SODIUM CHLORIDE, PRESERVATIVE FREE 10 ML: 5 INJECTION INTRAVENOUS at 21:47

## 2021-11-30 RX ADMIN — FUROSEMIDE 40 MG: 10 INJECTION, SOLUTION INTRAVENOUS at 15:24

## 2021-11-30 RX ADMIN — SACUBITRIL AND VALSARTAN 1 TABLET: 24; 26 TABLET, FILM COATED ORAL at 08:08

## 2021-11-30 RX ADMIN — POTASSIUM CHLORIDE 40 MEQ: 750 CAPSULE, EXTENDED RELEASE ORAL at 10:15

## 2021-11-30 RX ADMIN — ATORVASTATIN CALCIUM 10 MG: 10 TABLET, FILM COATED ORAL at 08:08

## 2021-11-30 RX ADMIN — SACUBITRIL AND VALSARTAN 1 TABLET: 24; 26 TABLET, FILM COATED ORAL at 21:47

## 2021-11-30 RX ADMIN — SODIUM CHLORIDE 250 ML: 9 INJECTION, SOLUTION INTRAVENOUS at 17:20

## 2021-11-30 RX ADMIN — SODIUM CHLORIDE, PRESERVATIVE FREE 10 ML: 5 INJECTION INTRAVENOUS at 08:09

## 2021-11-30 RX ADMIN — SODIUM CHLORIDE, PRESERVATIVE FREE 10 ML: 5 INJECTION INTRAVENOUS at 04:14

## 2021-11-30 RX ADMIN — ENOXAPARIN SODIUM 40 MG: 40 INJECTION SUBCUTANEOUS at 02:37

## 2021-11-30 RX ADMIN — NITROGLYCERIN 0.4 MG: 0.4 TABLET, ORALLY DISINTEGRATING SUBLINGUAL at 11:40

## 2021-11-30 RX ADMIN — CARVEDILOL 25 MG: 25 TABLET, FILM COATED ORAL at 08:08

## 2021-11-30 RX ADMIN — FUROSEMIDE 40 MG: 10 INJECTION, SOLUTION INTRAVENOUS at 02:37

## 2021-11-30 RX ADMIN — ASPIRIN 81 MG: 81 TABLET, CHEWABLE ORAL at 08:08

## 2021-12-01 ENCOUNTER — APPOINTMENT (OUTPATIENT)
Dept: NUCLEAR MEDICINE | Facility: HOSPITAL | Age: 51
End: 2021-12-01

## 2021-12-01 PROCEDURE — 93018 CV STRESS TEST I&R ONLY: CPT | Performed by: INTERNAL MEDICINE

## 2021-12-01 PROCEDURE — 93016 CV STRESS TEST SUPVJ ONLY: CPT | Performed by: INTERNAL MEDICINE

## 2021-12-01 PROCEDURE — 96372 THER/PROPH/DIAG INJ SC/IM: CPT

## 2021-12-01 PROCEDURE — G0378 HOSPITAL OBSERVATION PER HR: HCPCS

## 2021-12-01 PROCEDURE — 25010000002 ENOXAPARIN PER 10 MG: Performed by: HOSPITALIST

## 2021-12-01 PROCEDURE — 0 TECHNETIUM SESTAMIBI: Performed by: HOSPITALIST

## 2021-12-01 PROCEDURE — 99214 OFFICE O/P EST MOD 30 MIN: CPT | Performed by: INTERNAL MEDICINE

## 2021-12-01 PROCEDURE — 93017 CV STRESS TEST TRACING ONLY: CPT

## 2021-12-01 PROCEDURE — 78452 HT MUSCLE IMAGE SPECT MULT: CPT

## 2021-12-01 PROCEDURE — 78452 HT MUSCLE IMAGE SPECT MULT: CPT | Performed by: INTERNAL MEDICINE

## 2021-12-01 PROCEDURE — A9500 TC99M SESTAMIBI: HCPCS | Performed by: HOSPITALIST

## 2021-12-01 RX ADMIN — SACUBITRIL AND VALSARTAN 1 TABLET: 24; 26 TABLET, FILM COATED ORAL at 21:13

## 2021-12-01 RX ADMIN — CARVEDILOL 25 MG: 25 TABLET, FILM COATED ORAL at 16:56

## 2021-12-01 RX ADMIN — FUROSEMIDE 40 MG: 40 TABLET ORAL at 16:56

## 2021-12-01 RX ADMIN — FUROSEMIDE 40 MG: 40 TABLET ORAL at 10:03

## 2021-12-01 RX ADMIN — ATORVASTATIN CALCIUM 10 MG: 10 TABLET, FILM COATED ORAL at 07:29

## 2021-12-01 RX ADMIN — ASPIRIN 81 MG: 81 TABLET, CHEWABLE ORAL at 07:29

## 2021-12-01 RX ADMIN — SACUBITRIL AND VALSARTAN 1 TABLET: 24; 26 TABLET, FILM COATED ORAL at 07:32

## 2021-12-01 RX ADMIN — METOPROLOL TARTRATE 50 MG: 50 TABLET, FILM COATED ORAL at 07:30

## 2021-12-01 RX ADMIN — ENOXAPARIN SODIUM 40 MG: 40 INJECTION SUBCUTANEOUS at 03:23

## 2021-12-01 RX ADMIN — TECHNETIUM TC 99M SESTAMIBI 1 DOSE: 1 INJECTION INTRAVENOUS at 12:08

## 2021-12-01 RX ADMIN — CARVEDILOL 25 MG: 25 TABLET, FILM COATED ORAL at 07:29

## 2021-12-01 RX ADMIN — SODIUM CHLORIDE, PRESERVATIVE FREE 10 ML: 5 INJECTION INTRAVENOUS at 21:13

## 2021-12-01 NOTE — CONSULTS
Adult Nutrition  Assessment    Patient Name:  Matti Bravo  YOB: 1970  MRN: [id number]  Admit Date:  11/29/2021    Assessment Date:  12/1/2021    Comments:  Pt with dx Heart Failure.  Pt reports wt loss since admit but was unsure of amount.  Wt loss probably fluid.  Lasix prescribed.  Mild Sodium Restricted Diet info used to provide Low Sodium Diet ed and diet copy given.     Reason for Assessment     Row Name 12/01/21 1211          Reason for Assessment    Reason For Assessment per organizational policy  Low Sodium Diet ed     Diagnosis cardiac disease  dx Heart Failure     Identified At Risk by Screening Criteria need for education                                   Electronically signed by:  Millie Wakefield RD  12/01/21 12:12 CST

## 2021-12-01 NOTE — PLAN OF CARE
Goal Outcome Evaluation:  Plan of Care Reviewed With: patient  Progress: no change   Vss. Pt NPO as of MN. Pt educated. Pt expecting to leave after procedure.

## 2021-12-01 NOTE — PROGRESS NOTES
[facility] Medicine Services  INPATIENT PROGRESS NOTE    Length of Stay: 0  Date of Admission: 11/29/2021  Primary Care Physician: Shonna Ng APRN    Subjective   Chief Complaint: Chest pain, dyspnea  HPI:  51 year old male with a history of HFrEF, nonischemic cardiomyopathy, DM, and HTN who presented with chest pain and shortness of breath. Cardiac enzyems are negative.  EKG demonstrated T wave inversion laterally. Cardiology is consutled and recommended CTA coronaries to compare to CTA in 2017. CTA was unable to be performed due to heart rate.  He is recommended 2 day stress test. He refuses AICD or lifevest. He is currently chest pain free.     Review of Systems   Constitutional: Negative for chills and fever.   Respiratory: Negative for shortness of breath.    Cardiovascular: Negative for chest pain.   Gastrointestinal: Negative for abdominal pain, nausea and vomiting.   All other systems reviewed and are negative.       All pertinent negatives and positives are as above. All other systems have been reviewed and are negative unless otherwise stated.     Objective    Temp:  [96.3 °F (35.7 °C)-97.9 °F (36.6 °C)] 97 °F (36.1 °C)  Heart Rate:  [61-82] 80  Resp:  [18] 18  BP: ()/(58-88) 144/88    Physical Exam  Vitals reviewed.   Constitutional:       Appearance: Normal appearance.   HENT:      Head: Normocephalic and atraumatic.   Cardiovascular:      Rate and Rhythm: Normal rate and regular rhythm.   Pulmonary:      Effort: Pulmonary effort is normal. No respiratory distress.      Breath sounds: Normal breath sounds.   Abdominal:      General: There is no distension.      Palpations: Abdomen is soft.      Tenderness: There is no abdominal tenderness.   Musculoskeletal:         General: No swelling or deformity.   Skin:     General: Skin is warm and dry.   Neurological:      General: No focal deficit present.      Mental Status: He is alert and oriented to  person, place, and time.         Results Review:  I have reviewed the labs, radiology results, and diagnostic studies.    Laboratory Data:   Results from last 7 days   Lab Units 11/30/21 0348 11/29/21 2138   SODIUM mmol/L 141 141   POTASSIUM mmol/L 3.4* 3.8   CHLORIDE mmol/L 101 102   CO2 mmol/L 33.0* 32.0*   BUN mg/dL 13 14   CREATININE mg/dL 1.27 1.18   GLUCOSE mg/dL 111* 137*   CALCIUM mg/dL 9.3 9.0   BILIRUBIN mg/dL 0.9 0.6   ALK PHOS U/L 80 74   ALT (SGPT) U/L 17 17   AST (SGOT) U/L 14 15   ANION GAP mmol/L 7.0 7.0     Estimated Creatinine Clearance: 106.3 mL/min (by C-G formula based on SCr of 1.27 mg/dL).  Results from last 7 days   Lab Units 11/30/21 0348 11/29/21 2138   MAGNESIUM mg/dL 1.9 1.9   PHOSPHORUS mg/dL 3.5  --          Results from last 7 days   Lab Units 11/30/21 0348 11/29/21 2138   WBC 10*3/mm3 5.97 5.73   HEMOGLOBIN g/dL 14.5 14.0   HEMATOCRIT % 43.8 42.3   PLATELETS 10*3/mm3 152 145           Culture Data:   No results found for: BLOODCX  No results found for: URINECX  No results found for: RESPCX  No results found for: WOUNDCX  No results found for: STOOLCX  No components found for: BODYFLD    Radiology Data:   Imaging Results (Last 24 Hours)     ** No results found for the last 24 hours. **          I have reviewed the patient's current medications.     Assessment/Plan     Active Hospital Problems    Diagnosis    • **Chest pain    • Controlled type 2 diabetes mellitus without complication, without long-term current use of insulin (HCC)    • Chronic systolic heart failure (HCC)    • Essential hypertension    • Mixed hyperlipidemia    • KHANH (obstructive sleep apnea)        Plan:    Ruled out for MI  Stress test, 2 day protocol  Cardiology consultation appreciated  Aspirin, Statin  Beta-blocker: Coreg  ACEI/ARB: Entresto  Lasix  VTE PPx: Lovenox      I confirmed that the patient's Advance Care Plan is present, code status is documented, or surrogate decision maker is listed in the  patient's medical record.     The patient was evaluated during the global COVID-19 pandemic, and the diagnosis was suspected/considered upon their initial presentation.  Evaluation, treatment, and testing were consistent with current guidelines for patients who present with complaints or symptoms that may be related to COVID-19.        This document has been electronically signed by SHELL Leung on December 1, 2021 11:56 CST

## 2021-12-01 NOTE — PROGRESS NOTES
LOS: 0 days   Patient Care Team:  Shonna Ng APRN as PCP - General (Family Medicine)    Chief Complaint: Admitted for evaluations of chest pain lasting less than 5-minute with baseline EKG sinus rhythm and premature ventricular complexes and documented longstanding history of nonischemic cardiomyopathy with a severe LV dysfunction previously evaluated for ICD but never materialized.  Patient is scheduled to have CT coronary angiogram but canceled due to premature ventricular complex.  He is resting comfortably in the bed no further chest pain reported.  We will arrange Lexiscan Cardiolite 2 days protocol.  Procedure risk pros and cons explained.  Understand willing to proceed forward.  Discussed with the patient again today given the persistent severe left and systolic dysfunction ICD for primary prevention.  Not interested and will let us know if he changes his mind       Review of Systems:     Constitution:  Denies any fatigue, fever or chills positive for activity change    HENT:  Denies any headache, hearing impairment.    Eyes:  Denies any blurring of vision     Cardiovascular:  As per history of present illness.     Respiratory: Baseline shortness of breath       Gastrointestinal:  No nausea, vomiting, or melena.    Extremity: Straight peripheral pulses no cyanosis    Objective     Current Facility-Administered Medications   Medication Dose Route Frequency Provider Last Rate Last Admin   • albuterol (PROVENTIL) nebulizer solution 0.083% 2.5 mg/3mL  2.5 mg Nebulization Q4H PRN Adam-MandaabSaulo zamora I, DO       • aspirin chewable tablet 81 mg  81 mg Oral Daily Mohsenabher Saulo I, DO   81 mg at 12/01/21 0729   • atorvastatin (LIPITOR) tablet 10 mg  10 mg Oral Daily Maxwell Saulo I, DO   10 mg at 12/01/21 0729   • carvedilol (COREG) tablet 25 mg  25 mg Oral BID With Meals AdamTimabher Saulo I, DO   25 mg at 12/01/21 0729   • enoxaparin (LOVENOX) syringe 40 mg  40 mg Subcutaneous Q24H  "Adam-Mandaabher Saulo I, DO   40 mg at 12/01/21 0323   • furosemide (LASIX) tablet 40 mg  40 mg Oral BID Maksim Ardon APRN       • lidocaine PF 1% (XYLOCAINE) injection 5 mL  5 mL Intradermal PRN Maksim Ardon APRN       • metoprolol tartrate (LOPRESSOR) injection 5 mg  5 mg Intravenous Q5 Min PRN Maksim Ardon APRN       • ondansetron (ZOFRAN) injection 4 mg  4 mg Intravenous Q6H PRN Maxwell Saulo I DO       • sacubitril-valsartan (ENTRESTO) 24-26 MG tablet 1 tablet  1 tablet Oral BID Maxwell Saulo I DO   1 tablet at 12/01/21 0732   • sodium chloride 0.9 % flush 10 mL  10 mL Intravenous PRN Gideon Stokes MD       • sodium chloride 0.9 % flush 10 mL  10 mL Intravenous Q12H Adam-Mandaabher Saulo I, DO   10 mL at 11/30/21 2147   • sodium chloride 0.9 % flush 10 mL  10 mL Intravenous PRN Maxwell Saulomariia MCCARTY DO       • sodium chloride 0.9 % flush 10 mL  10 mL Intravenous PRN Maksim Ardon APRN       • sodium chloride 0.9 % flush 3 mL  3 mL Intravenous Q12H Maksim Ardon APRN           Vital Sign Min/Max for last 24 hours  Temp  Min: 96.3 °F (35.7 °C)  Max: 97.9 °F (36.6 °C)   BP  Min: 82/62  Max: 144/88   Pulse  Min: 61  Max: 82   Resp  Min: 18  Max: 18   SpO2  Min: 93 %  Max: 96 %   No data recorded   Weight  Min: 139 kg (306 lb 6.4 oz)  Max: 139 kg (306 lb 12.8 oz)     Flowsheet Rows      First Filed Value   Admission Height 193 cm (76\") Documented at 11/29/2021 1939   Admission Weight 141 kg (310 lb) Documented at 11/29/2021 1939            Intake/Output Summary (Last 24 hours) at 12/1/2021 0842  Last data filed at 12/1/2021 0000  Gross per 24 hour   Intake 840 ml   Output 1200 ml   Net -360 ml       Physical Exam:  Neck: Supple.  No JVD, no thyroid enlargement.  Chest: Air entry equal, normal respiration.  No rhonchi or creps.  Cardiovascular system:  Regular rate and rhythm, no murmurs.  Abdomen: Soft, no tenderness, bowel sounds " present, no hepatosplenomegaly.  CNS: Alert, oriented to place and time.  No motor or sensory deficit.  Cranial nerves intact.    Extremities:  No edema.  Pulses equal on both sides.     Results Review:   I reviewed the patient's new clinical results.  Lab Results   Component Value Date    WBC 5.97 11/30/2021    HGB 14.5 11/30/2021    HCT 43.8 11/30/2021    MCV 91.3 11/30/2021     11/30/2021     Lab Results   Component Value Date    GLUCOSE 111 (H) 11/30/2021    BUN 13 11/30/2021    CREATININE 1.27 11/30/2021    EGFRIFAFRI 73 11/30/2021    BCR 10.2 11/30/2021    CO2 33.0 (H) 11/30/2021    CALCIUM 9.3 11/30/2021    ALBUMIN 4.20 11/30/2021    AST 14 11/30/2021    ALT 17 11/30/2021     Lab Results   Component Value Date    TSH 0.979 11/30/2021     Lab Results   Component Value Date    INR 1.30 (H) 03/08/2019    PROTIME 15.8 (H) 03/08/2019     Lab Results   Component Value Date    PTT 33.7 03/08/2019       EKG:     Telemetry:    Ejection Fraction  No results found for: EF    Echo EF Estimated  Lab Results   Component Value Date    ECHOEFEST 10 02/06/2019         Present on Admission:  • Chest pain  • Chronic systolic heart failure (HCC)  • Essential hypertension  • Mixed hyperlipidemia  • KHANH (obstructive sleep apnea)  • Controlled type 2 diabetes mellitus without complication, without long-term current use of insulin (HCC)    Assessment/Plan        #1 chest pain described as pressure-like feeling  Lasting less than 5-minute     Unable to materialize CT coronary angiogram due to PVCs will arrange Lexiscan Cardiolite given the body habitus 2 days protocol.  The procedure risk pros and cons discussed understand willing to proceed forward.     #2 chronic congestive heart failure with the persistent severe LV dysfunction     Previously discussed the single-chamber ICD again today ICD was discussed as a part of primary prevention given the persistent severe LV dysfunction.  Patient not interested and will let us know  if he changes his mind and he is not interested in LifeVest     Patient was counseled educated regarding risk factor lifestyle modification.     Volume intake up to 1000 cc  Sodium 2 g  Avoid nonsteroidal anti-inflammatory drug recommend CHF clinic consulted        #3 hypertension     Continue Entresto continue Coreg continue diuretics        Mirna Cabrera MD  12/01/21  08:42 CST      Part of this note may be an electronic transcription/translation of spoken language to printed text using the Dragon Dictation system.

## 2021-12-01 NOTE — PLAN OF CARE
Goal Outcome Evaluation:  Plan of Care Reviewed With: patient        Progress: no change  Outcome Summary: Initial visit.  Mild Sodium Restricted Diet info used to provide Low Sodium Diet ed and diet copy given.

## 2021-12-02 ENCOUNTER — PREP FOR SURGERY (OUTPATIENT)
Dept: OTHER | Facility: HOSPITAL | Age: 51
End: 2021-12-02

## 2021-12-02 ENCOUNTER — APPOINTMENT (OUTPATIENT)
Dept: CARDIOLOGY | Facility: HOSPITAL | Age: 51
End: 2021-12-02

## 2021-12-02 ENCOUNTER — APPOINTMENT (OUTPATIENT)
Dept: NUCLEAR MEDICINE | Facility: HOSPITAL | Age: 51
End: 2021-12-02

## 2021-12-02 VITALS
HEART RATE: 74 BPM | RESPIRATION RATE: 18 BRPM | BODY MASS INDEX: 37.41 KG/M2 | HEIGHT: 76 IN | SYSTOLIC BLOOD PRESSURE: 141 MMHG | WEIGHT: 307.2 LBS | DIASTOLIC BLOOD PRESSURE: 70 MMHG | TEMPERATURE: 97.7 F | OXYGEN SATURATION: 94 %

## 2021-12-02 DIAGNOSIS — I50.9 ACUTE ON CHRONIC CONGESTIVE HEART FAILURE, UNSPECIFIED HEART FAILURE TYPE (HCC): Primary | ICD-10-CM

## 2021-12-02 DIAGNOSIS — R94.39 ABNORMAL NUCLEAR STRESS TEST: ICD-10-CM

## 2021-12-02 LAB
ANION GAP SERPL CALCULATED.3IONS-SCNC: 7 MMOL/L (ref 5–15)
BH CV REST NUCLEAR ISOTOPE DOSE: 30.6 MCI
BH CV STRESS COMMENTS STAGE 1: NORMAL
BH CV STRESS DOSE REGADENOSON STAGE 1: 0.4
BH CV STRESS DURATION MIN STAGE 1: 0
BH CV STRESS DURATION SEC STAGE 1: 10
BH CV STRESS NUCLEAR ISOTOPE DOSE: 37.2 MCI
BH CV STRESS PROTOCOL 1: NORMAL
BH CV STRESS RECOVERY BP: NORMAL MMHG
BH CV STRESS RECOVERY HR: 81 BPM
BH CV STRESS RECOVERY O2: 95 %
BH CV STRESS STAGE 1: 1
BUN SERPL-MCNC: 17 MG/DL (ref 6–20)
BUN/CREAT SERPL: 14.3 (ref 7–25)
CALCIUM SPEC-SCNC: 9.1 MG/DL (ref 8.6–10.5)
CHLORIDE SERPL-SCNC: 101 MMOL/L (ref 98–107)
CO2 SERPL-SCNC: 31 MMOL/L (ref 22–29)
CREAT SERPL-MCNC: 1.19 MG/DL (ref 0.76–1.27)
GFR SERPL CREATININE-BSD FRML MDRD: 78 ML/MIN/1.73
GLUCOSE BLDC GLUCOMTR-MCNC: 140 MG/DL (ref 70–130)
GLUCOSE SERPL-MCNC: 129 MG/DL (ref 65–99)
LV EF NUC BP: 19 %
MAXIMAL PREDICTED HEART RATE: 170 BPM
PERCENT MAX PREDICTED HR: 50.59 %
POTASSIUM SERPL-SCNC: 3.8 MMOL/L (ref 3.5–5.2)
SODIUM SERPL-SCNC: 139 MMOL/L (ref 136–145)
STRESS BASELINE BP: NORMAL MMHG
STRESS BASELINE HR: 68 BPM
STRESS O2 SAT REST: 96 %
STRESS PERCENT HR: 60 %
STRESS POST O2 SAT PEAK: 96 %
STRESS POST PEAK BP: NORMAL MMHG
STRESS POST PEAK HR: 86 BPM
STRESS TARGET HR: 145 BPM

## 2021-12-02 PROCEDURE — 25010000002 REGADENOSON 0.4 MG/5ML SOLUTION: Performed by: NURSE PRACTITIONER

## 2021-12-02 PROCEDURE — 25010000002 ENOXAPARIN PER 10 MG: Performed by: HOSPITALIST

## 2021-12-02 PROCEDURE — 99214 OFFICE O/P EST MOD 30 MIN: CPT | Performed by: INTERNAL MEDICINE

## 2021-12-02 PROCEDURE — 82962 GLUCOSE BLOOD TEST: CPT

## 2021-12-02 PROCEDURE — 0 TECHNETIUM SESTAMIBI: Performed by: HOSPITALIST

## 2021-12-02 PROCEDURE — 80048 BASIC METABOLIC PNL TOTAL CA: CPT | Performed by: NURSE PRACTITIONER

## 2021-12-02 PROCEDURE — 96372 THER/PROPH/DIAG INJ SC/IM: CPT

## 2021-12-02 PROCEDURE — A9500 TC99M SESTAMIBI: HCPCS | Performed by: HOSPITALIST

## 2021-12-02 PROCEDURE — G0378 HOSPITAL OBSERVATION PER HR: HCPCS

## 2021-12-02 RX ORDER — SODIUM CHLORIDE 9 MG/ML
10 INJECTION INTRAVENOUS EVERY 12 HOURS SCHEDULED
Status: DISCONTINUED | OUTPATIENT
Start: 2021-12-02 | End: 2021-12-02 | Stop reason: HOSPADM

## 2021-12-02 RX ORDER — ASPIRIN 325 MG
325 TABLET ORAL ONCE
Status: CANCELLED | OUTPATIENT
Start: 2021-12-02 | End: 2021-12-02

## 2021-12-02 RX ORDER — SODIUM CHLORIDE 0.9 % (FLUSH) 0.9 %
10 SYRINGE (ML) INJECTION AS NEEDED
Status: CANCELLED | OUTPATIENT
Start: 2021-12-08

## 2021-12-02 RX ORDER — PANTOPRAZOLE SODIUM 40 MG/1
40 TABLET, DELAYED RELEASE ORAL
Status: DISCONTINUED | OUTPATIENT
Start: 2021-12-03 | End: 2021-12-02 | Stop reason: HOSPADM

## 2021-12-02 RX ORDER — DEXTROSE MONOHYDRATE 25 G/50ML
25 INJECTION, SOLUTION INTRAVENOUS
Status: DISCONTINUED | OUTPATIENT
Start: 2021-12-02 | End: 2021-12-02 | Stop reason: HOSPADM

## 2021-12-02 RX ORDER — SODIUM CHLORIDE 0.9 % (FLUSH) 0.9 %
3 SYRINGE (ML) INJECTION EVERY 12 HOURS SCHEDULED
Status: CANCELLED | OUTPATIENT
Start: 2021-12-08

## 2021-12-02 RX ORDER — NICOTINE POLACRILEX 4 MG
15 LOZENGE BUCCAL
Status: DISCONTINUED | OUTPATIENT
Start: 2021-12-02 | End: 2021-12-02 | Stop reason: HOSPADM

## 2021-12-02 RX ORDER — ASPIRIN 325 MG
325 TABLET, DELAYED RELEASE (ENTERIC COATED) ORAL DAILY
Status: CANCELLED | OUTPATIENT
Start: 2021-12-03

## 2021-12-02 RX ADMIN — TECHNETIUM TC 99M SESTAMIBI 1 DOSE: 1 INJECTION INTRAVENOUS at 08:42

## 2021-12-02 RX ADMIN — FUROSEMIDE 40 MG: 40 TABLET ORAL at 17:25

## 2021-12-02 RX ADMIN — CARVEDILOL 25 MG: 25 TABLET, FILM COATED ORAL at 10:09

## 2021-12-02 RX ADMIN — CARVEDILOL 25 MG: 25 TABLET, FILM COATED ORAL at 17:25

## 2021-12-02 RX ADMIN — SACUBITRIL AND VALSARTAN 1 TABLET: 24; 26 TABLET, FILM COATED ORAL at 10:09

## 2021-12-02 RX ADMIN — REGADENOSON 0.4 MG: 0.08 INJECTION, SOLUTION INTRAVENOUS at 08:42

## 2021-12-02 RX ADMIN — FUROSEMIDE 40 MG: 40 TABLET ORAL at 10:09

## 2021-12-02 RX ADMIN — ENOXAPARIN SODIUM 40 MG: 40 INJECTION SUBCUTANEOUS at 10:51

## 2021-12-02 RX ADMIN — SODIUM CHLORIDE, PRESERVATIVE FREE 10 ML: 5 INJECTION INTRAVENOUS at 10:10

## 2021-12-02 RX ADMIN — SODIUM CHLORIDE 10 ML: 9 INJECTION, SOLUTION INTRAMUSCULAR; INTRAVENOUS; SUBCUTANEOUS at 08:44

## 2021-12-02 RX ADMIN — ASPIRIN 81 MG: 81 TABLET, CHEWABLE ORAL at 10:09

## 2021-12-02 RX ADMIN — SODIUM CHLORIDE, PRESERVATIVE FREE 10 ML: 5 INJECTION INTRAVENOUS at 17:25

## 2021-12-02 RX ADMIN — ATORVASTATIN CALCIUM 10 MG: 10 TABLET, FILM COATED ORAL at 10:09

## 2021-12-02 NOTE — DISCHARGE SUMMARY
[facility] Medicine Services  DISCHARGE SUMMARY       Date of Admission: 11/29/2021  Date of Discharge:  12/2/2021  Primary Care Physician: Shonna Ng APRN    Presenting Problem/History of Present Illness:  Abnormal EKG [R94.31]  Chest pain, unspecified type [R07.9]  Acute on chronic congestive heart failure, unspecified heart failure type (HCC) [I50.9]     Final Discharge Diagnoses:  Active Hospital Problems    Diagnosis    • **Chest pain    • Controlled type 2 diabetes mellitus without complication, without long-term current use of insulin (HCC)    • Chronic systolic heart failure (HCC)    • Essential hypertension    • Mixed hyperlipidemia    • KHAHN (obstructive sleep apnea)        Consults:   Consults     Date and Time Order Name Status Description    11/30/2021  8:38 AM Inpatient Cardiology Consult Completed         Pertinent Test Results:   Lab Results (last 24 hours)     Procedure Component Value Units Date/Time    POC Glucose Once [[id number]]  (Abnormal) Collected: 12/02/21 1613    Specimen: Blood Updated: 12/02/21 1634     Glucose 140 mg/dL      Comment: RN NotifiedOperator: [id number] MILDRED EZRAYMeter ID: [id number]       Basic Metabolic Panel [[id number]]  (Abnormal) Collected: 12/02/21 0450    Specimen: Blood Updated: 12/02/21 0527     Glucose 129 mg/dL      BUN 17 mg/dL      Creatinine 1.19 mg/dL      Sodium 139 mmol/L      Potassium 3.8 mmol/L      Chloride 101 mmol/L      CO2 31.0 mmol/L      Calcium 9.1 mg/dL      eGFR  African Amer 78 mL/min/1.73      BUN/Creatinine Ratio 14.3     Anion Gap 7.0 mmol/L     Narrative:      GFR Normal >60  Chronic Kidney Disease <60  Kidney Failure <15          Imaging Results (Last 24 Hours)     ** No results found for the last 24 hours. **        Chief Complaint on Day of Discharge: No complaints    Hospital Course:  51 year old male with PMH of HFrEF, nonischemic cardiomyopathy, DM, and HTN who presented to [facility] on [facility]/29/2021with chest pain and  "shortness of breath. Cardiac enzyems were negative.  EKG demonstrated T wave inversion laterally. Cardiology was consulted and recommended CTA coronaries to compare to CTA in 2017. CTA was unable to be performed due to heart rate.  He refuses AICD or lifevest.   The patient underwent a stress test with the below results:    No ECG evidence of myocardial ischemia on lexiscan stress.    Left ventricular ejection fraction is severely reduced. (Calculated EF = 19%). Enlarged LV cavity size.     Abnormal LV wall motion consistent with severe hypokinesis.    A large sized myocardial perfusion defect of moderate severity is noted in basal-mid inferior, anterior and lateral walls is noted at rest which improves significantly on stress images with attenuation correction. In the setting of severely reduced LV function, underlying ischemia cannot be ruled out.    Impressions are consistent with an intermediate risk study.    The patient has been scheduled for heart catheterization next week as an outpatient.  Patient is to return for any further chest pain.    Condition on Discharge: Stable    Physical Exam on Discharge:  /80 (BP Location: Left arm, Patient Position: Lying)   Pulse 70   Temp 97.7 °F (36.5 °C) (Temporal)   Resp 18   Ht 193 cm (76\")   Wt (!) 139 kg (307 lb 3.2 oz)   SpO2 93%   BMI 37.39 kg/m²   Physical Exam  Constitutional:       Appearance: He is well-developed.   HENT:      Head: Normocephalic and atraumatic.   Eyes:      Pupils: Pupils are equal, round, and reactive to light.   Cardiovascular:      Rate and Rhythm: Normal rate and regular rhythm.   Pulmonary:      Effort: Pulmonary effort is normal.      Breath sounds: Normal breath sounds.   Abdominal:      General: Bowel sounds are normal.      Palpations: Abdomen is soft.   Musculoskeletal:         General: Normal range of motion.      Cervical back: Normal range of motion and neck supple.   Skin:     General: Skin is warm and dry. "   Neurological:      Mental Status: He is alert and oriented to person, place, and time.   Psychiatric:         Behavior: Behavior normal.       Discharge Disposition:  Home or Self Care    Discharge Medications:     Discharge Medications      Continue These Medications      Instructions Start Date   albuterol sulfate  (90 Base) MCG/ACT inhaler  Commonly known as: PROVENTIL HFA;VENTOLIN HFA;PROAIR HFA   2 puffs, Inhalation, Every 4 Hours PRN      aspirin 81 MG chewable tablet   81 mg, Oral, Daily      carvedilol 25 MG tablet  Commonly known as: COREG   25 mg, Oral, 2 Times Daily With Meals      furosemide 40 MG tablet  Commonly known as: LASIX   40 mg, Oral, 2 Times Daily      lovastatin 20 MG tablet  Commonly known as: MEVACOR   20 mg, Oral, Nightly      potassium chloride 10 MEQ CR tablet   10 mEq, Oral, Daily      sacubitril-valsartan 24-26 MG tablet  Commonly known as: ENTRESTO   1 tablet, Oral, 2 Times Daily             Discharge Diet:   Diet Instructions     Diet: Consistent Carbohydrate, Cardiac, Specialty Diet; Thin Liquids, No Restrictions; Low Sodium; 2,000 mg Na      Discharge Diet:  Consistent Carbohydrate  Cardiac  Specialty Diet       Fluid Consistency: Thin Liquids, No Restrictions    Specialty Diets: Low Sodium    Low Sodium Restriction: 2,000 mg Na    Fluid Restriction per day: Other (See comments)    2000 cc fluid restrictions daily          Activity at Discharge:   Activity Instructions     Activity as Tolerated            Discharge Care Plan/Instructions: As above    Follow-up Appointment:   Follow-up Information     Em Gilman APRN. Schedule an appointment as soon as possible for a visit in 1 week(s).    Specialties: Cardiology, Gerontology  Why: CHF hospital f/u 3-5 days  Contact information:  [address] [place] [address] [address]  [facility] [address] [phone]             Mirna Cabrera MD. Schedule an appointment as soon as possible for a visit in 6 week(s).    Specialty:  Cardiology  Why: Hospital follow up CHF  Contact information:  [address] [place] [address] [address]  [facility] [address]  [facility] [address] [phone]             Shonna Ng APRN .    Specialty: Family Medicine  Contact information:  [address] [place], [place] [id number] [phone]                            This document has been electronically signed by SHELL Benson on December 2, 2021 17:34 CST        Time: Greater than 30 minutes.

## 2021-12-02 NOTE — PROGRESS NOTES
LOS: 0 days   Patient Care Team:  Shonna Ng APRN as PCP - General (Family Medicine)    Chief Complaint:   No new symptoms reported with the patient.  Monitor sinus rhythm with isolated premature ventricular complex.  Patient admitted for evaluation chest pain with a longstanding history of severe LV dysfunction chronic congestive heart failure.  Patient had a stress pending result       Review of Systems:     Constitution:  Denies any fatigue, fever or chills positive for activity change    HENT:  Denies any headache, hearing impairment.    Eyes:  Denies any blurring of vision     Cardiovascular:  As per history of present illness.     Respiratory: Baseline shortness of breath       Gastrointestinal:  No nausea, vomiting, or melena.    Extremity: Straight peripheral pulses no cyanosis    Objective     Current Facility-Administered Medications   Medication Dose Route Frequency Provider Last Rate Last Admin   • albuterol (PROVENTIL) nebulizer solution 0.083% 2.5 mg/3mL  2.5 mg Nebulization Q4H PRN Adam-Egsaranabher Saulo I, DO       • aspirin chewable tablet 81 mg  81 mg Oral Daily Adam-Egziabher Saulo I, DO   81 mg at 12/02/21 1009   • atorvastatin (LIPITOR) tablet 10 mg  10 mg Oral Daily Adam-Egziabher Saulo I, DO   10 mg at 12/02/21 1009   • carvedilol (COREG) tablet 25 mg  25 mg Oral BID With Meals Adam-Egsaranabher Saulo I, DO   25 mg at 12/02/21 1009   • enoxaparin (LOVENOX) syringe 40 mg  40 mg Subcutaneous Q24H Adam-Egziabher Saulo I, DO   40 mg at 12/02/21 1051   • furosemide (LASIX) tablet 40 mg  40 mg Oral BID Maksim Ardon APRN   40 mg at 12/02/21 1009   • lidocaine PF 1% (XYLOCAINE) injection 5 mL  5 mL Intradermal PRN Maksim Ardon APRN       • metoprolol tartrate (LOPRESSOR) injection 5 mg  5 mg Intravenous Q5 Min PRN Maksim Ardon APRN       • ondansetron (ZOFRAN) injection 4 mg  4 mg Intravenous Q6H PRN Mohsenabher Saulo I, DO       •  "sacubitril-valsartan (ENTRESTO) 24-26 MG tablet 1 tablet  1 tablet Oral BID Adam-Egziabher, Saulo I, DO   1 tablet at 12/02/21 1009   • Sodium Chloride (PF) 0.9 % 10 mL  10 mL Intravenous Q12H Vania Andujar, APRN   10 mL at 12/02/21 0844   • sodium chloride 0.9 % flush 10 mL  10 mL Intravenous PRN Gideon Stokes MD       • sodium chloride 0.9 % flush 10 mL  10 mL Intravenous Q12H Adam-Egziabher, Saulo I, DO   10 mL at 12/02/21 1010   • sodium chloride 0.9 % flush 10 mL  10 mL Intravenous PRN Adam-Egziab, Saulo I, DO       • sodium chloride 0.9 % flush 10 mL  10 mL Intravenous PRN Maksim Ardon APRN       • sodium chloride 0.9 % flush 3 mL  3 mL Intravenous Q12H Maksim Ardon APRN           Vital Sign Min/Max for last 24 hours  Temp  Min: 96.3 °F (35.7 °C)  Max: 97.6 °F (36.4 °C)   BP  Min: 110/62  Max: 140/80   Pulse  Min: 58  Max: 76   Resp  Min: 18  Max: 18   SpO2  Min: 95 %  Max: 98 %   No data recorded   Weight  Min: 139 kg (307 lb 3.2 oz)  Max: 139 kg (307 lb 3.2 oz)     Flowsheet Rows      First Filed Value   Admission Height 193 cm (76\") Documented at 11/29/2021 1939   Admission Weight 141 kg (310 lb) Documented at 11/29/2021 1939            Intake/Output Summary (Last 24 hours) at 12/2/2021 1424  Last data filed at 12/2/2021 1015  Gross per 24 hour   Intake 720 ml   Output 975 ml   Net -255 ml       Physical Exam:  Neck: Supple.  No JVD, no thyroid enlargement.  Chest: Air entry equal, normal respiration.  No rhonchi or creps.  Cardiovascular system:  Regular rate and rhythm, no murmurs.  Abdomen: Soft, no tenderness, bowel sounds present, no hepatosplenomegaly.  CNS: Alert, oriented to place and time.  No motor or sensory deficit.  Cranial nerves intact.    Extremities:  No edema.  Pulses equal on both sides.     Results Review:   I reviewed the patient's new clinical results.  Lab Results   Component Value Date    WBC 5.97 11/30/2021    HGB 14.5 11/30/2021    HCT 43.8 " 11/30/2021    MCV 91.3 11/30/2021     11/30/2021     Lab Results   Component Value Date    GLUCOSE 129 (H) 12/02/2021    BUN 17 12/02/2021    CREATININE 1.19 12/02/2021    EGFRIFAFRI 78 12/02/2021    BCR 14.3 12/02/2021    CO2 31.0 (H) 12/02/2021    CALCIUM 9.1 12/02/2021    ALBUMIN 4.20 11/30/2021    AST 14 11/30/2021    ALT 17 11/30/2021     Lab Results   Component Value Date    TSH 0.979 11/30/2021     Lab Results   Component Value Date    INR 1.30 (H) 03/08/2019    PROTIME 15.8 (H) 03/08/2019     Lab Results   Component Value Date    PTT 33.7 03/08/2019       EKG:     Telemetry:    Ejection Fraction  No results found for: EF    Echo EF Estimated  Lab Results   Component Value Date    ECHOEFEST 10 02/06/2019         Present on Admission:  • Chest pain  • Chronic systolic heart failure (HCC)  • Essential hypertension  • Mixed hyperlipidemia  • KHANH (obstructive sleep apnea)  • Controlled type 2 diabetes mellitus without complication, without long-term current use of insulin (HCC)    Assessment/Plan        #1 chest pain no further recurrence  Waiting for the stress test result     #2 chronic congestive heart failure with the persistent severe LV dysfunction     Previously discussed the single-chamber ICD again today ICD was discussed as a part of primary prevention given the persistent severe LV dysfunction.  Patient not interested and will let us know if he changes his mind and he is not interested in LifeVest     Patient was counseled educated regarding risk factor lifestyle modification.     Volume intake up to 1000 cc  Sodium 2 g  Avoid nonsteroidal anti-inflammatory drug recommend CHF clinic consulted        #3 hypertension     Continue Entresto continue Coreg continue diuretics        Mirna Carbera MD  12/02/21  14:24 CST      Part of this note may be an electronic transcription/translation of spoken language to printed text using the Dragon Dictation system.

## 2021-12-02 NOTE — PROGRESS NOTES
[facility] Medicine Services  INPATIENT PROGRESS NOTE    Length of Stay: 0  Date of Admission: 11/29/2021  Primary Care Physician: Shonna Ng APRN    Subjective   Chief Complaint: No complaints    HPI:  51 year old male with PMH of HFrEF, nonischemic cardiomyopathy, DM, and HTN who presented to [facility] on 11/29/2021with chest pain and shortness of breath. Cardiac enzyems were negative.  EKG demonstrated T wave inversion laterally. Cardiology was consulted and recommended CTA coronaries to compare to CTA in 2017. CTA was unable to be performed due to heart rate.  He refuses AICD or lifevest.     12/2/2021:   He reports no chest pain today.      Stress test:  · No ECG evidence of myocardial ischemia on lexiscan stress.  · Left ventricular ejection fraction is severely reduced. (Calculated EF = 19%). Enlarged LV cavity  size. Abnormal LV wall motion consistent with severe hypokinesis.  · A large sized myocardial perfusion defect of moderate severity is noted in basal-mid inferior,  anterior and lateral walls is noted at rest which improves significantly on  stress images with  attenuation correction. In the setting of severely reduced LV function, underlying ischemia  cannot be ruled out.  · Impressions are consistent with an intermediate risk study.      Review of Systems   Constitutional: Negative for activity change and fatigue.   HENT: Negative for ear pain and sore throat.    Eyes: Negative for pain and discharge.   Respiratory: Negative for cough and shortness of breath.    Cardiovascular: Negative for chest pain and palpitations.   Gastrointestinal: Negative for abdominal pain and nausea.   Endocrine: Negative for cold intolerance and heat intolerance.   Genitourinary: Negative for difficulty urinating and dysuria.   Musculoskeletal: Negative for arthralgias and gait problem.   Skin: Negative for color change and rash.   Neurological: Negative for dizziness and weakness.    Psychiatric/Behavioral: Negative for agitation and confusion.        Objective    Temp:  [96.3 °F (35.7 °C)-97.7 °F (36.5 °C)] 97.7 °F (36.5 °C)  Heart Rate:  [58-76] 68  Resp:  [18] 18  BP: (100-140)/(62-88) 100/80    Physical Exam  Constitutional:       Appearance: He is well-developed.   HENT:      Head: Normocephalic and atraumatic.   Eyes:      Pupils: Pupils are equal, round, and reactive to light.   Cardiovascular:      Rate and Rhythm: Normal rate and regular rhythm.   Pulmonary:      Effort: Pulmonary effort is normal.      Breath sounds: Normal breath sounds.   Abdominal:      General: Bowel sounds are normal.      Palpations: Abdomen is soft.   Musculoskeletal:         General: Normal range of motion.      Cervical back: Normal range of motion and neck supple.   Skin:     General: Skin is warm and dry.   Neurological:      Mental Status: He is alert and oriented to person, place, and time.   Psychiatric:         Behavior: Behavior normal.         Results Review:  I have reviewed the labs, radiology results, and diagnostic studies.    Laboratory Data:   Results from last 7 days   Lab Units 12/02/21  0450 11/30/21  0348 11/29/21  2138   SODIUM mmol/L 139 141 141   POTASSIUM mmol/L 3.8 3.4* 3.8   CHLORIDE mmol/L 101 101 102   CO2 mmol/L 31.0* 33.0* 32.0*   BUN mg/dL 17 13 14   CREATININE mg/dL 1.19 1.27 1.18   GLUCOSE mg/dL 129* 111* 137*   CALCIUM mg/dL 9.1 9.3 9.0   BILIRUBIN mg/dL  --  0.9 0.6   ALK PHOS U/L  --  80 74   ALT (SGPT) U/L  --  17 17   AST (SGOT) U/L  --  14 15   ANION GAP mmol/L 7.0 7.0 7.0     Estimated Creatinine Clearance: 113.4 mL/min (by C-G formula based on SCr of 1.19 mg/dL).  Results from last 7 days   Lab Units 11/30/21  0348 11/29/21  2138   MAGNESIUM mg/dL 1.9 1.9   PHOSPHORUS mg/dL 3.5  --          Results from last 7 days   Lab Units 11/30/21  0348 11/29/21  2138   WBC 10*3/mm3 5.97 5.73   HEMOGLOBIN g/dL 14.5 14.0   HEMATOCRIT % 43.8 42.3   PLATELETS 10*3/mm3 152 145            Culture Data:   No results found for: BLOODCX  No results found for: URINECX  No results found for: RESPCX  No results found for: WOUNDCX  No results found for: STOOLCX  No components found for: BODYFLD    Radiology Data:   Imaging Results (Last 24 Hours)     ** No results found for the last 24 hours. **          I have reviewed the patient's current medications.     Assessment/Plan     Active Hospital Problems    Diagnosis  POA   • **Chest pain [R07.9]  Yes   • Controlled type 2 diabetes mellitus without complication, without long-term current use of insulin (HCC) [E11.9]  Yes   • Chronic systolic heart failure (HCC) [I50.22]  Yes   • Essential hypertension [I10]  Yes   • Mixed hyperlipidemia [E78.2]  Yes   • KHANH (obstructive sleep apnea) [G47.33]  Yes       Plan:    1.  Chest pain:  MI ruled out.  Stress test results above.  Cardiology consult appreciated.   2.  Chronic systolic heart failure: Continue aspirin, Coreg, Entresto and Lasix.   Sodium/fluid restrictions, strict I&O and daily weight.  3.  Diabetes mellitus, type II:  Continue SSI.   4.  Hyperlipidemia:  Continue home statin.        DVT ppx:  Lovenox.   GI ppx:  Protonix.       Discharge Planning: I expect patient to be discharged to home in 1-2 days.    I confirmed that the patient's Advance Care Plan is present, code status is documented, or surrogate decision maker is listed in the patient's medical record.      I have utilized all available immediate resources to obtain, update, or review the patient's current medications.         This document has been electronically signed by SHELL Benson on December 2, 2021 15:42 CST

## 2021-12-02 NOTE — H&P (VIEW-ONLY)
"  [facility] [place] Cardiology Progress Note   LOS: 0 days   Patient Care Team:  Shonna Ng APRN as PCP - General (Family Medicine)    Chief Complaint:  Chest pain    Subjective     Interval History:   Patient Denies: shortness of air, chest pain, PND, orthopnea, palpitations, dizziness, syncope and edema  Patient Complaints: no complaints today  History taken from: patient    No further chest pain.     Objective     Vital Sign Min/Max for last 24 hours  Temp  Min: 96.3 °F (35.7 °C)  Max: 97.6 °F (36.4 °C)   BP  Min: 110/62  Max: 140/80   Pulse  Min: 58  Max: 76   Resp  Min: 18  Max: 18   SpO2  Min: 95 %  Max: 98 %   No data recorded   Weight  Min: 139 kg (307 lb 3.2 oz)  Max: 139 kg (307 lb 3.2 oz)     Flowsheet Rows      First Filed Value   Admission Height 193 cm (76\") Documented at 11/29/2021 1939   Admission Weight 141 kg (310 lb) Documented at 11/29/2021 1939            11/30/21  1608 12/01/21  0400 12/02/21  0300   Weight: (!) 139 kg (306 lb 6.4 oz) (!) 139 kg (306 lb 12.8 oz) (!) 139 kg (307 lb 3.2 oz)       Physical Exam:  Physical Exam  Vitals and nursing note reviewed.   Constitutional:       General: He is not in acute distress.     Appearance: He is well-developed. He is not diaphoretic.   HENT:      Head: Normocephalic.   Eyes:      Conjunctiva/sclera: Conjunctivae normal.   Neck:      Vascular: No JVD.   Cardiovascular:      Rate and Rhythm: Normal rate and regular rhythm.      Heart sounds: Normal heart sounds, S1 normal and S2 normal. No murmur heard.  No friction rub. No gallop.    Pulmonary:      Effort: Pulmonary effort is normal. No respiratory distress.      Breath sounds: Normal breath sounds. No wheezing or rales.   Abdominal:      General: Bowel sounds are normal. There is no distension.      Palpations: Abdomen is soft.   Musculoskeletal:         General: Normal range of motion.      Right lower leg: Edema present.      Left lower leg: Edema present.   Skin:     General: Skin is warm and dry.     "  Findings: No erythema.   Neurological:      Mental Status: He is alert and oriented to person, place, and time.   Psychiatric:         Behavior: Behavior normal.         Thought Content: Thought content normal.         Judgment: Judgment normal.          Results Reviewed by myself:     Results from last 7 days   Lab Units 12/02/21  0450 11/30/21 0348 11/29/21 2138   SODIUM mmol/L 139 141 141   POTASSIUM mmol/L 3.8 3.4* 3.8   CHLORIDE mmol/L 101 101 102   CO2 mmol/L 31.0* 33.0* 32.0*   BUN mg/dL 17 13 14   CREATININE mg/dL 1.19 1.27 1.18   CALCIUM mg/dL 9.1 9.3 9.0   BILIRUBIN mg/dL  --  0.9 0.6   ALK PHOS U/L  --  80 74   ALT (SGPT) U/L  --  17 17   AST (SGOT) U/L  --  14 15   GLUCOSE mg/dL 129* 111* 137*       Estimated Creatinine Clearance: 113.4 mL/min (by C-G formula based on SCr of 1.19 mg/dL).    Results from last 7 days   Lab Units 11/30/21  0348 11/29/21  2138   MAGNESIUM mg/dL 1.9 1.9   PHOSPHORUS mg/dL 3.5  --              Results from last 7 days   Lab Units 11/30/21  0348 11/29/21  2138   WBC 10*3/mm3 5.97 5.73   HEMOGLOBIN g/dL 14.5 14.0   PLATELETS 10*3/mm3 152 145           Lab Results   Component Value Date    PROBNP 690.3 11/29/2021       I/O last 3 completed shifts:  In: 720 [P.O.:720]  Out: 2225 [Urine:2225]    Cardiographics:  ECG/EMG Results (last 24 hours)     ** No results found for the last 24 hours. **          Results for orders placed during the hospital encounter of 02/04/19    Adult Transthoracic Echo Limited W/ Cont if Necessary Per Protocol    Interpretation Summary  · 3D acquisition for left ventricular ejection fraction. Live 3D and full volume to assess left ventricular ejection fraction was utilized.  · Left ventricle systolic function is severely decreased. Estimated LVEF is 10%. Left ventricle cavity severely dilated with restrictive diastolic dysfunction. Findings are consistent with dilated cardiomyopathy.  · Right ventricle is mildly dilated with mildly reduced right  ventricular systolic function.  · Moderate mitral valve regurgitation is present.  · Moderate tricuspid valve regurgitation is present. Pulmonary hypertension is present with a PA systolic pressure of 70 mmHg.  · There is mild pulmonic valve regurgitation present.  · There is a trivial pericardial effusion adjacent to the left ventricle      XR Chest 1 View    Result Date: 11/29/2021  Mild bilateral interstitial opacities suggest edema or atelectasis Electronically signed by:  Bud Murrell MD  11/29/2021 10:31 PM CST Workstation: [id number]    Interpretation Summary    · No ECG evidence of myocardial ischemia on lexiscan stress.  · Left ventricular ejection fraction is severely reduced. (Calculated EF = 19%). Enlarged LV cavity size. Abnormal LV wall motion consistent with severe hypokinesis.  · A large sized myocardial perfusion defect of moderate severity is noted in basal-mid inferior, anterior and lateral walls is noted at rest which improves significantly on stress images with attenuation correction. In the setting of severely reduced LV function, underlying ischemia cannot be ruled out.  · Impressions are consistent with an intermediate risk study.      Medication Review:     Current Facility-Administered Medications:   •  albuterol (PROVENTIL) nebulizer solution 0.083% 2.5 mg/3mL, 2.5 mg, Nebulization, Q4H PRN, Adam-Egziabher, Saulo I, DO  •  aspirin chewable tablet 81 mg, 81 mg, Oral, Daily, Adam-Egziabher, Saulo I, DO, 81 mg at 12/02/21 1009  •  atorvastatin (LIPITOR) tablet 10 mg, 10 mg, Oral, Daily, Adam-Egziabher, Saulo I, DO, 10 mg at 12/02/21 1009  •  carvedilol (COREG) tablet 25 mg, 25 mg, Oral, BID With Meals, Adam-Egziabher, Saulo I, DO, 25 mg at 12/02/21 1009  •  enoxaparin (LOVENOX) syringe 40 mg, 40 mg, Subcutaneous, Q24H, Adam-Egziabher, Saulo I, DO, 40 mg at 12/02/21 1051  •  furosemide (LASIX) tablet 40 mg, 40 mg, Oral, BID, Maksim Ardon, APRN, 40 mg at 12/02/21 1009  •  lidocaine  PF 1% (XYLOCAINE) injection 5 mL, 5 mL, Intradermal, PRN, Maksim Ardon APRN  •  metoprolol tartrate (LOPRESSOR) injection 5 mg, 5 mg, Intravenous, Q5 Min PRN, Maksim Ardon APRN  •  ondansetron (ZOFRAN) injection 4 mg, 4 mg, Intravenous, Q6H PRN, Adam-Egziabher, Saulo I, DO  •  sacubitril-valsartan (ENTRESTO) 24-26 MG tablet 1 tablet, 1 tablet, Oral, BID, Adam-Egziabher, Saulo I, DO, 1 tablet at 12/02/21 1009  •  Sodium Chloride (PF) 0.9 % 10 mL, 10 mL, Intravenous, Q12H, Vania Andujar APRN, 10 mL at 12/02/21 0844  •  [COMPLETED] Insert peripheral IV, , , Once **AND** sodium chloride 0.9 % flush 10 mL, 10 mL, Intravenous, PRN, Gideon Stokes MD  •  sodium chloride 0.9 % flush 10 mL, 10 mL, Intravenous, Q12H, Adam-Egziabher, Saulo I, DO, 10 mL at 12/02/21 1010  •  sodium chloride 0.9 % flush 10 mL, 10 mL, Intravenous, PRN, Adam-Egziabher, Saulo I, DO  •  sodium chloride 0.9 % flush 10 mL, 10 mL, Intravenous, PRN, Maksim Ardon APRN  •  sodium chloride 0.9 % flush 3 mL, 3 mL, Intravenous, Q12H, Maksim Ardon APRN    Patient's recent labs and results as included in the subjective data were reviewed by me. Any pertinent outside data will be included.        Assessment/Plan       Chest pain  Historically, NICM from [facility] in 2009. He has had a CCTA in 2017 that was WNL.  He completed a 2 day nuclear this hospital stay.   - this resulted as intermediate with the presence of rest defects. There are no definitive areas of reversible ischemia.    - Mr. Bravo would like to go home. His LHC can be completed as an outpatient. Will get him scheduled at a date he and Dr. Aranda decide upon.         Chronic systolic heart failure (HCC)  NICM  EF:10%. NYHA Class II, Stage C. Patient appears euvolemic. and in a well perfused physiologic state. Hemodynamics are acceptable  BETA-BLOCKER:   Carvedilol 25mg BID  CORLANOR:  n/a  ACE/ARB/ENTRESTO:  Entresto 24/26mg BID  DIURETIC: Lasix  40mg BID  ALDOSTERONE ANTAGONIST: Has been historically NYHA Class I-II  IMDUR/HYDRALAZINE: indicated, will follow BP. Borderline BP.  DIGOXIN: n/a  Fluid restriction: 2 L  Sodium restriction:2 grams  Daily Weights  6MWT: as outpatient  Cardiac Rehab: no  ICD: no CRT: was not indicated  ADHF: Last admission date: 3/7/2019  LVAD/AHF: no        Essential hypertension      Mixed hyperlipidemia      KHANH (obstructive sleep apnea)      Controlled type 2 diabetes mellitus without complication, without long-term current use of insulin (HCC)  - ASA/Statin  Lab Results   Component Value Date    HGBA1C 5.90 (H) 11/30/2021         Plan for disposition: [facility] as outpatient with Dr. Aranda. Will schedule.     Mr. Barvo has been lost to follow up due to incarceration and no shows. He will need to see Dr. Cabrera in office in 6 weeks and CHF clinic in 1 week.             This document has been electronically signed by SHELL Isidro on December 2, 2021 13:52 CST      Electronically signed by SHELL Isidro, 12/02/21, 1:52 PM CST.    I personally saw and examined Matti Bravo after the APRN.  I personally performed a history and physical examination of the patient.  I personally reviewed independent findings and plan of care.  I discussed management with the APRN.  I agree with the APRN's documentation.    Mr. Bravo has a known history of cardiomyopathy and LV systolic dysfunction.  He underwent a nuclear stress test for evaluation of chest pain, it appears intermediate risk and underlying ischemia could not be ruled out.  These findings were discussed with Dr. Cabrera - coronary angiography and left heart catheterization recommended for further evaluation.  I discussed the risks, benefits and alternatives of this procedure with the patient today and he agreed to proceed after understanding these.  This will be scheduled on an outpatient basis next week based on patient preference.      Electronically signed by  Dylon Aranda MD, 12/02/21, 4:46 PM CST.

## 2021-12-02 NOTE — PROGRESS NOTES
"  [facility] [place]   LOS: 0 days   Patient Care Team:  Shonna Ng APRN as PCP - General (Family Medicine)    Chief Complaint:  Chest pain    Subjective     Interval History:   Patient Denies: shortness of air, chest pain, PND, orthopnea, palpitations, dizziness, syncope and edema  Patient Complaints: no complaints today  History taken from: patient    No further chest pain.     Objective     Vital Sign Min/Max for last 24 hours  Temp  Min: 96.3 °F (35.7 °C)  Max: 97.6 °F (36.4 °C)   BP  Min: 110/62  Max: 140/80   Pulse  Min: 58  Max: 76   Resp  Min: 18  Max: 18   SpO2  Min: 95 %  Max: 98 %   No data recorded   Weight  Min: 139 kg (307 lb 3.2 oz)  Max: 139 kg (307 lb 3.2 oz)     Flowsheet Rows      First Filed Value   Admission Height 193 cm (76\") Documented at 11/29/2021 1939   Admission Weight 141 kg (310 lb) Documented at 11/29/2021 1939            11/30/21  1608 12/01/21  0400 12/02/21  0300   Weight: (!) 139 kg (306 lb 6.4 oz) (!) 139 kg (306 lb 12.8 oz) (!) 139 kg (307 lb 3.2 oz)       Physical Exam:  Physical Exam  Vitals and nursing note reviewed.   Constitutional:       General: He is not in acute distress.     Appearance: He is well-developed. He is not diaphoretic.   HENT:      Head: Normocephalic.   Eyes:      Conjunctiva/sclera: Conjunctivae normal.   Neck:      Vascular: No JVD.   Cardiovascular:      Rate and Rhythm: Normal rate and regular rhythm.      Heart sounds: Normal heart sounds, S1 normal and S2 normal. No murmur heard.  No friction rub. No gallop.    Pulmonary:      Effort: Pulmonary effort is normal. No respiratory distress.      Breath sounds: Normal breath sounds. No wheezing or rales.   Abdominal:      General: Bowel sounds are normal. There is no distension.      Palpations: Abdomen is soft.   Musculoskeletal:         General: Normal range of motion.      Right lower leg: Edema present.      Left lower leg: Edema present.   Skin:     General: Skin is warm and dry.     "  Findings: No erythema.   Neurological:      Mental Status: He is alert and oriented to person, place, and time.   Psychiatric:         Behavior: Behavior normal.         Thought Content: Thought content normal.         Judgment: Judgment normal.          Results Reviewed by myself:     Results from last 7 days   Lab Units 12/02/21  0450 11/30/21 0348 11/29/21 2138   SODIUM mmol/L 139 141 141   POTASSIUM mmol/L 3.8 3.4* 3.8   CHLORIDE mmol/L 101 101 102   CO2 mmol/L 31.0* 33.0* 32.0*   BUN mg/dL 17 13 14   CREATININE mg/dL 1.19 1.27 1.18   CALCIUM mg/dL 9.1 9.3 9.0   BILIRUBIN mg/dL  --  0.9 0.6   ALK PHOS U/L  --  80 74   ALT (SGPT) U/L  --  17 17   AST (SGOT) U/L  --  14 15   GLUCOSE mg/dL 129* 111* 137*       Estimated Creatinine Clearance: 113.4 mL/min (by C-G formula based on SCr of 1.19 mg/dL).    Results from last 7 days   Lab Units 11/30/21  0348 11/29/21  2138   MAGNESIUM mg/dL 1.9 1.9   PHOSPHORUS mg/dL 3.5  --              Results from last 7 days   Lab Units 11/30/21  0348 11/29/21  2138   WBC 10*3/mm3 5.97 5.73   HEMOGLOBIN g/dL 14.5 14.0   PLATELETS 10*3/mm3 152 145           Lab Results   Component Value Date    PROBNP 690.3 11/29/2021       I/O last 3 completed shifts:  In: 720 [P.O.:720]  Out: 2225 [Urine:2225]    Cardiographics:  ECG/EMG Results (last 24 hours)     ** No results found for the last 24 hours. **          Results for orders placed during the hospital encounter of 02/04/19    Adult Transthoracic Echo Limited W/ Cont if Necessary Per Protocol    Interpretation Summary  · 3D acquisition for left ventricular ejection fraction. Live 3D and full volume to assess left ventricular ejection fraction was utilized.  · Left ventricle systolic function is severely decreased. Estimated LVEF is 10%. Left ventricle cavity severely dilated with restrictive diastolic dysfunction. Findings are consistent with dilated cardiomyopathy.  · Right ventricle is mildly dilated with mildly reduced right  ventricular systolic function.  · Moderate mitral valve regurgitation is present.  · Moderate tricuspid valve regurgitation is present. Pulmonary hypertension is present with a PA systolic pressure of 70 mmHg.  · There is mild pulmonic valve regurgitation present.  · There is a trivial pericardial effusion adjacent to the left ventricle      XR Chest 1 View    Result Date: 11/29/2021  Mild bilateral interstitial opacities suggest edema or atelectasis Electronically signed by:  Bud Murrell MD  11/29/2021 10:31 PM CST Workstation: [id number]    Interpretation Summary    · No ECG evidence of myocardial ischemia on lexiscan stress.  · Left ventricular ejection fraction is severely reduced. (Calculated EF = 19%). Enlarged LV cavity size. Abnormal LV wall motion consistent with severe hypokinesis.  · A large sized myocardial perfusion defect of moderate severity is noted in basal-mid inferior, anterior and lateral walls is noted at rest which improves significantly on stress images with attenuation correction. In the setting of severely reduced LV function, underlying ischemia cannot be ruled out.  · Impressions are consistent with an intermediate risk study.      Medication Review:     Current Facility-Administered Medications:   •  albuterol (PROVENTIL) nebulizer solution 0.083% 2.5 mg/3mL, 2.5 mg, Nebulization, Q4H PRN, Adam-Egziabher, Saulo I, DO  •  aspirin chewable tablet 81 mg, 81 mg, Oral, Daily, Adam-Egziabher, Saulo I, DO, 81 mg at 12/02/21 1009  •  atorvastatin (LIPITOR) tablet 10 mg, 10 mg, Oral, Daily, Adam-Egziabher, Saulo I, DO, 10 mg at 12/02/21 1009  •  carvedilol (COREG) tablet 25 mg, 25 mg, Oral, BID With Meals, Adam-Egziabher, Saulo I, DO, 25 mg at 12/02/21 1009  •  enoxaparin (LOVENOX) syringe 40 mg, 40 mg, Subcutaneous, Q24H, Adam-Egziabher, Saulo I, DO, 40 mg at 12/02/21 1051  •  furosemide (LASIX) tablet 40 mg, 40 mg, Oral, BID, Maksim Ardon, APRN, 40 mg at 12/02/21 1009  •  lidocaine  PF 1% (XYLOCAINE) injection 5 mL, 5 mL, Intradermal, PRN, Maksim Ardon APRN  •  metoprolol tartrate (LOPRESSOR) injection 5 mg, 5 mg, Intravenous, Q5 Min PRN, Maksim Ardon APRN  •  ondansetron (ZOFRAN) injection 4 mg, 4 mg, Intravenous, Q6H PRN, Adam-Egziabher, Saulo I, DO  •  sacubitril-valsartan (ENTRESTO) 24-26 MG tablet 1 tablet, 1 tablet, Oral, BID, Adam-Egziabher, Saulo I, DO, 1 tablet at 12/02/21 1009  •  Sodium Chloride (PF) 0.9 % 10 mL, 10 mL, Intravenous, Q12H, Vania Andujar APRN, 10 mL at 12/02/21 0844  •  [COMPLETED] Insert peripheral IV, , , Once **AND** sodium chloride 0.9 % flush 10 mL, 10 mL, Intravenous, PRN, Gideon Stokes MD  •  sodium chloride 0.9 % flush 10 mL, 10 mL, Intravenous, Q12H, Adam-Egziabher, Saulo I, DO, 10 mL at 12/02/21 1010  •  sodium chloride 0.9 % flush 10 mL, 10 mL, Intravenous, PRN, Adam-Egziabher, Saulo I, DO  •  sodium chloride 0.9 % flush 10 mL, 10 mL, Intravenous, PRN, Maksim Ardon APRN  •  sodium chloride 0.9 % flush 3 mL, 3 mL, Intravenous, Q12H, Maksim Ardon APRN    Patient's recent labs and results as included in the subjective data were reviewed by me. Any pertinent outside data will be included.        Assessment/Plan       Chest pain  Historically, NICM from [facility] in [facility]. He has had a CCTA in 2017 that was WNL.  He completed a 2 day nuclear this hospital stay.   - this resulted as intermediate with the presence of rest defects. There are no definitive areas of reversible ischemia.    - Mr. Bravo would like to go home. His LHC can be completed as an outpatient. Will get him scheduled at a date he and Dr. Aranda decide upon.         Chronic systolic heart failure (HCC)  NICM  EF:10%. NYHA Class II, Stage C. Patient appears euvolemic. and in a well perfused physiologic state. Hemodynamics are acceptable  BETA-BLOCKER:   Carvedilol 25mg BID  CORLANOR:  n/a  ACE/ARB/ENTRESTO:  Entresto 24/26mg BID  DIURETIC: Lasix  40mg BID  ALDOSTERONE ANTAGONIST: Has been historically NYHA Class I-II  IMDUR/HYDRALAZINE: indicated, will follow BP. Borderline BP.  DIGOXIN: n/a  Fluid restriction: 2 L  Sodium restriction:2 grams  Daily Weights  6MWT: as outpatient  Cardiac Rehab: no  ICD: no CRT: was not indicated  ADHF: Last admission date: 3/7/2019  LVAD/AHF: no        Essential hypertension      Mixed hyperlipidemia      KHANH (obstructive sleep apnea)      Controlled type 2 diabetes mellitus without complication, without long-term current use of insulin (HCC)  - ASA/Statin  Lab Results   Component Value Date    HGBA1C 5.90 (H) 11/30/2021         Plan for disposition: [facility] as outpatient with Dr. Aranda. Will schedule.     Mr. Bravo has been lost to follow up due to incarceration and no shows. He will need to see Dr. Cabrera in office in 6 weeks and CHF clinic in 1 week.             This document has been electronically signed by SHELL Isidro on December 2, 2021 13:52 CST      Electronically signed by SHELL Isidro, 12/02/21, 1:52 PM CST.    I personally saw and examined Matti Bravo after the APRN.  I personally performed a history and physical examination of the patient.  I personally reviewed independent findings and plan of care.  I discussed management with the APRN.  I agree with the APRN's documentation.    Mr. Bravo has a known history of cardiomyopathy and LV systolic dysfunction.  He underwent a nuclear stress test for evaluation of chest pain, it appears intermediate risk and underlying ischemia could not be ruled out.  These findings were discussed with Dr. Cabrera - coronary angiography and left heart catheterization recommended for further evaluation.  I discussed the risks, benefits and alternatives of this procedure with the patient today and he agreed to proceed after understanding these.  This will be scheduled on an outpatient basis next week based on patient preference.      Electronically signed by  Dylon Aranda MD, 12/02/21, 4:46 PM CST.

## 2021-12-03 ENCOUNTER — TELEPHONE (OUTPATIENT)
Dept: CARDIOLOGY | Facility: CLINIC | Age: 51
End: 2021-12-03

## 2021-12-03 ENCOUNTER — READMISSION MANAGEMENT (OUTPATIENT)
Dept: CALL CENTER | Facility: HOSPITAL | Age: 51
End: 2021-12-03

## 2021-12-03 PROBLEM — I50.9 ACUTE ON CHRONIC CONGESTIVE HEART FAILURE: Status: ACTIVE | Noted: 2021-12-03

## 2021-12-03 PROBLEM — R94.39 ABNORMAL NUCLEAR STRESS TEST: Status: ACTIVE | Noted: 2021-12-03

## 2021-12-03 NOTE — OUTREACH NOTE
Prep Survey      Responses   [facility] patient discharged from? [facility]   Is LACE score < 7 ? Yes   Emergency Room discharge w/ pulse ox? No   Eligibility Readm Mgmt   Discharge diagnosis chronic congestive heart failure with the persistent severe LV dysfunction   Does the patient have one of the following disease processes/diagnoses(primary or secondary)? CHF   Does the patient have Home health ordered? No   Is there a DME ordered? No   Prep survey completed? Yes          Lisa Joshi RN

## 2021-12-03 NOTE — TELEPHONE ENCOUNTER
Spoke with Matti about [facility] on Wednesday. He is agreeable. I emailed him the following directions per his request.   Wednesday 12/8/21 at 0900    You have been recommended to undergo a left heart cath or a coronary angiogram.    This procedure is scheduled for : Wednesday Dec 8th at 0900. You will need to be here 2 hours early.     You will have a COVID test on Monday Dec 6th between 9-10am. This is a drive through test, you do not have to get out. This is under the [facility] awning. There are signs and generally a line of cars.     You will register at [facility], entering through our ER entrance on Wednesday.    [facility] will call you the day before to tell you the time to arrive for procedure. This is routinely 2 hours prior to the scheduled time.     Be sure to shower the night before procedure     Do not eat or drink anything midnight before procedure.     You may take your medications the morning of the procedure with small sips of water.     If you are not feeling well or have a fever the day before your appointment please call the office as we will need to post pone.     You will need to prepare for an overnight stay, but bring a  with you for if you are discharged.  This will depend on the outcome of the procedure.       Please call our office at [phone] for any questions.

## 2021-12-06 ENCOUNTER — LAB (OUTPATIENT)
Dept: LAB | Facility: HOSPITAL | Age: 51
End: 2021-12-06

## 2021-12-06 DIAGNOSIS — I50.9 ACUTE ON CHRONIC CONGESTIVE HEART FAILURE, UNSPECIFIED HEART FAILURE TYPE (HCC): ICD-10-CM

## 2021-12-06 DIAGNOSIS — R94.39 ABNORMAL NUCLEAR STRESS TEST: ICD-10-CM

## 2021-12-06 LAB — SARS-COV-2 N GENE RESP QL NAA+PROBE: NOT DETECTED

## 2021-12-06 PROCEDURE — C9803 HOPD COVID-19 SPEC COLLECT: HCPCS

## 2021-12-06 PROCEDURE — 87635 SARS-COV-2 COVID-19 AMP PRB: CPT

## 2021-12-07 ENCOUNTER — READMISSION MANAGEMENT (OUTPATIENT)
Dept: CALL CENTER | Facility: HOSPITAL | Age: 51
End: 2021-12-07

## 2021-12-07 NOTE — OUTREACH NOTE
CHF Week 1 Survey      Responses   [facility] patient discharged from? [facility]   Does the patient have one of the following disease processes/diagnoses(primary or secondary)? CHF   CHF Week 1 attempt successful? No   Unsuccessful attempts Attempt 1          Alejandrina Veronica LPN

## 2021-12-08 ENCOUNTER — HOSPITAL ENCOUNTER (OUTPATIENT)
Facility: HOSPITAL | Age: 51
Setting detail: HOSPITAL OUTPATIENT SURGERY
Discharge: HOME | End: 2021-12-08
Attending: INTERNAL MEDICINE | Admitting: NURSE PRACTITIONER

## 2021-12-08 ENCOUNTER — HOSPITAL ENCOUNTER (OUTPATIENT)
Facility: HOSPITAL | Age: 51
Setting detail: HOSPITAL OUTPATIENT SURGERY
End: 2021-12-08
Attending: INTERNAL MEDICINE | Admitting: INTERNAL MEDICINE

## 2021-12-08 ENCOUNTER — PREP FOR SURGERY (OUTPATIENT)
Dept: OTHER | Facility: HOSPITAL | Age: 51
End: 2021-12-08

## 2021-12-08 VITALS
BODY MASS INDEX: 37.77 KG/M2 | HEIGHT: 76 IN | OXYGEN SATURATION: 94 % | WEIGHT: 310.19 LBS | SYSTOLIC BLOOD PRESSURE: 120 MMHG | DIASTOLIC BLOOD PRESSURE: 75 MMHG | RESPIRATION RATE: 16 BRPM | HEART RATE: 62 BPM | TEMPERATURE: 96.9 F

## 2021-12-08 DIAGNOSIS — I50.22 CHRONIC SYSTOLIC HEART FAILURE (HCC): Primary | ICD-10-CM

## 2021-12-08 DIAGNOSIS — I50.22 CHRONIC SYSTOLIC HEART FAILURE: ICD-10-CM

## 2021-12-08 DIAGNOSIS — I50.9 ACUTE ON CHRONIC CONGESTIVE HEART FAILURE, UNSPECIFIED HEART FAILURE TYPE (HCC): ICD-10-CM

## 2021-12-08 DIAGNOSIS — R94.39 ABNORMAL NUCLEAR STRESS TEST: ICD-10-CM

## 2021-12-08 LAB
ANION GAP SERPL CALCULATED.3IONS-SCNC: 8 MMOL/L (ref 5–15)
BUN SERPL-MCNC: 14 MG/DL (ref 6–20)
BUN/CREAT SERPL: 13.3 (ref 7–25)
CALCIUM SPEC-SCNC: 9.1 MG/DL (ref 8.6–10.5)
CHLORIDE SERPL-SCNC: 103 MMOL/L (ref 98–107)
CO2 SERPL-SCNC: 28 MMOL/L (ref 22–29)
CREAT SERPL-MCNC: 1.05 MG/DL (ref 0.76–1.27)
DEPRECATED RDW RBC AUTO: 39.8 FL (ref 37–54)
ERYTHROCYTE [DISTWIDTH] IN BLOOD BY AUTOMATED COUNT: 12 % (ref 12.3–15.4)
GFR SERPL CREATININE-BSD FRML MDRD: 91 ML/MIN/1.73
GLUCOSE SERPL-MCNC: 123 MG/DL (ref 65–99)
HCT VFR BLD AUTO: 42.7 % (ref 37.5–51)
HGB BLD-MCNC: 14.4 G/DL (ref 13–17.7)
INR PPP: 1.18 (ref 0.8–1.2)
MCH RBC QN AUTO: 30.7 PG (ref 26.6–33)
MCHC RBC AUTO-ENTMCNC: 33.7 G/DL (ref 31.5–35.7)
MCV RBC AUTO: 91 FL (ref 79–97)
PLATELET # BLD AUTO: 160 10*3/MM3 (ref 140–450)
PMV BLD AUTO: 12.6 FL (ref 6–12)
POTASSIUM SERPL-SCNC: 3.6 MMOL/L (ref 3.5–5.2)
PROTHROMBIN TIME: 14.9 SECONDS (ref 11.1–15.3)
RBC # BLD AUTO: 4.69 10*6/MM3 (ref 4.14–5.8)
SODIUM SERPL-SCNC: 139 MMOL/L (ref 136–145)
WBC NRBC COR # BLD: 4.56 10*3/MM3 (ref 3.4–10.8)

## 2021-12-08 PROCEDURE — 85610 PROTHROMBIN TIME: CPT | Performed by: NURSE PRACTITIONER

## 2021-12-08 PROCEDURE — 25010000002 HEPARIN (PORCINE) PER 1000 UNITS: Performed by: INTERNAL MEDICINE

## 2021-12-08 PROCEDURE — 25010000002 MIDAZOLAM PER 1 MG: Performed by: INTERNAL MEDICINE

## 2021-12-08 PROCEDURE — 93454 CORONARY ARTERY ANGIO S&I: CPT | Performed by: INTERNAL MEDICINE

## 2021-12-08 PROCEDURE — 93005 ELECTROCARDIOGRAM TRACING: CPT | Performed by: NURSE PRACTITIONER

## 2021-12-08 PROCEDURE — C1769 GUIDE WIRE: HCPCS | Performed by: INTERNAL MEDICINE

## 2021-12-08 PROCEDURE — 25010000002 FENTANYL CITRATE (PF) 50 MCG/ML SOLUTION: Performed by: INTERNAL MEDICINE

## 2021-12-08 PROCEDURE — 85027 COMPLETE CBC AUTOMATED: CPT | Performed by: NURSE PRACTITIONER

## 2021-12-08 PROCEDURE — C1894 INTRO/SHEATH, NON-LASER: HCPCS | Performed by: INTERNAL MEDICINE

## 2021-12-08 PROCEDURE — 80048 BASIC METABOLIC PNL TOTAL CA: CPT | Performed by: NURSE PRACTITIONER

## 2021-12-08 PROCEDURE — 93010 ELECTROCARDIOGRAM REPORT: CPT | Performed by: INTERNAL MEDICINE

## 2021-12-08 PROCEDURE — 25010000002 HEPARIN (PORCINE) PER 1000 UNITS

## 2021-12-08 PROCEDURE — 0 IOPAMIDOL PER 1 ML: Performed by: INTERNAL MEDICINE

## 2021-12-08 RX ORDER — ASPIRIN 325 MG
325 TABLET, DELAYED RELEASE (ENTERIC COATED) ORAL DAILY
Status: DISCONTINUED | OUTPATIENT
Start: 2021-12-09 | End: 2021-12-08 | Stop reason: HOSPADM

## 2021-12-08 RX ORDER — NITROGLYCERIN 5 MG/ML
INJECTION, SOLUTION INTRAVENOUS AS NEEDED
Status: DISCONTINUED | OUTPATIENT
Start: 2021-12-08 | End: 2021-12-08 | Stop reason: HOSPADM

## 2021-12-08 RX ORDER — SODIUM CHLORIDE 0.9 % (FLUSH) 0.9 %
10 SYRINGE (ML) INJECTION AS NEEDED
Status: DISCONTINUED | OUTPATIENT
Start: 2021-12-08 | End: 2021-12-08 | Stop reason: HOSPADM

## 2021-12-08 RX ORDER — FENTANYL CITRATE 50 UG/ML
INJECTION, SOLUTION INTRAMUSCULAR; INTRAVENOUS AS NEEDED
Status: DISCONTINUED | OUTPATIENT
Start: 2021-12-08 | End: 2021-12-08 | Stop reason: HOSPADM

## 2021-12-08 RX ORDER — SODIUM CHLORIDE 9 MG/ML
75 INJECTION, SOLUTION INTRAVENOUS CONTINUOUS
Status: DISCONTINUED | OUTPATIENT
Start: 2021-12-08 | End: 2021-12-08 | Stop reason: HOSPADM

## 2021-12-08 RX ORDER — HEPARIN SODIUM 1000 [USP'U]/ML
INJECTION, SOLUTION INTRAVENOUS; SUBCUTANEOUS AS NEEDED
Status: DISCONTINUED | OUTPATIENT
Start: 2021-12-08 | End: 2021-12-08 | Stop reason: HOSPADM

## 2021-12-08 RX ORDER — SODIUM CHLORIDE 0.9 % (FLUSH) 0.9 %
3 SYRINGE (ML) INJECTION EVERY 12 HOURS SCHEDULED
Status: CANCELLED | OUTPATIENT
Start: 2021-12-08

## 2021-12-08 RX ORDER — MIDAZOLAM HYDROCHLORIDE 1 MG/ML
INJECTION INTRAMUSCULAR; INTRAVENOUS AS NEEDED
Status: DISCONTINUED | OUTPATIENT
Start: 2021-12-08 | End: 2021-12-08 | Stop reason: HOSPADM

## 2021-12-08 RX ORDER — LIDOCAINE HYDROCHLORIDE 20 MG/ML
INJECTION, SOLUTION INFILTRATION; PERINEURAL AS NEEDED
Status: DISCONTINUED | OUTPATIENT
Start: 2021-12-08 | End: 2021-12-08 | Stop reason: HOSPADM

## 2021-12-08 RX ORDER — ASPIRIN 325 MG
325 TABLET ORAL ONCE
Status: DISCONTINUED | OUTPATIENT
Start: 2021-12-08 | End: 2021-12-08 | Stop reason: HOSPADM

## 2021-12-08 RX ORDER — ASPIRIN 81 MG/1
81 TABLET, CHEWABLE ORAL ONCE
Status: COMPLETED | OUTPATIENT
Start: 2021-12-08 | End: 2021-12-08

## 2021-12-08 RX ORDER — SODIUM CHLORIDE 0.9 % (FLUSH) 0.9 %
10 SYRINGE (ML) INJECTION AS NEEDED
Status: CANCELLED | OUTPATIENT
Start: 2021-12-08

## 2021-12-08 RX ORDER — SODIUM CHLORIDE 0.9 % (FLUSH) 0.9 %
3 SYRINGE (ML) INJECTION EVERY 12 HOURS SCHEDULED
Status: DISCONTINUED | OUTPATIENT
Start: 2021-12-08 | End: 2021-12-08 | Stop reason: HOSPADM

## 2021-12-08 RX ADMIN — ASPIRIN 81 MG: 81 TABLET, CHEWABLE ORAL at 07:33

## 2021-12-08 RX ADMIN — SODIUM CHLORIDE 75 ML/HR: 9 INJECTION, SOLUTION INTRAVENOUS at 08:07

## 2021-12-08 NOTE — DISCHARGE INSTRUCTIONS
Radial Site Care    This sheet gives you information about how to care for yourself after your procedure. Your health care provider may also give you more specific instructions. If you have problems or questions, contact your health care provider.  What can I expect after the procedure?  After the procedure, it is common to have:  · Bruising and tenderness at the catheter insertion area.  Follow these instructions at home:  Medicines  · Take over-the-counter and prescription medicines only as told by your health care provider.  Insertion site care  · Follow instructions from your health care provider about how to take care of your insertion site. Make sure you:  ? Wash your hands with soap and water before you change your bandage (dressing). If soap and water are not available, use hand .  ? Change your dressing as told by your health care provider.  ? Leave stitches (sutures), skin glue, or adhesive strips in place. These skin closures may need to stay in place for 2 weeks or longer. If adhesive strip edges start to loosen and curl up, you may trim the loose edges. Do not remove adhesive strips completely unless your health care provider tells you to do that.  · Check your insertion site every day for signs of infection. Check for:  ? Redness, swelling, or pain.  ? Fluid or blood.  ? Pus or a bad smell.  ? Warmth.  · Do not take baths, swim, or use a hot tub until your health care provider approves.  · You may shower 24-48 hours after the procedure, or as directed by your health care provider.  ? Remove the dressing and gently wash the site with plain soap and water.  ? Pat the area dry with a clean towel.  ? Do not rub the site. That could cause bleeding.  · Do not apply powder or lotion to the site.  Activity    · For 24 hours after the procedure, or as directed by your health care provider:  ? Do not flex or bend the affected arm.  ? Do not push or pull heavy objects with the affected arm.  ? Do not  drive yourself home from the hospital or clinic. You may drive 24 hours after the procedure unless your health care provider tells you not to.  ? Do not operate machinery or power tools.  · Do not lift anything that is heavier than 10 lb (4.5 kg), or the limit that you are told, until your health care provider says that it is safe.  · Ask your health care provider when it is okay to:  ? Return to work or school.  ? Resume usual physical activities or sports.  ? Resume sexual activity.    General instructions  · If the catheter site starts to bleed, raise your arm and put firm pressure on the site. If the bleeding does not stop, get help right away. This is a medical emergency.  · If you went home on the same day as your procedure, a responsible adult should be with you for the first 24 hours after you arrive home.  · Keep all follow-up visits as told by your health care provider. This is important.  Contact a health care provider if:  · You have a fever.  · You have redness, swelling, or yellow drainage around your insertion site.  Get help right away if:  · You have unusual pain at the radial site.  · The catheter insertion area swells very fast.  · The insertion area is bleeding, and the bleeding does not stop when you hold steady pressure on the area.  · Your arm or hand becomes pale, cool, tingly, or numb.  These symptoms may represent a serious problem that is an emergency. Do not wait to see if the symptoms will go away. Get medical help right away. Call your local emergency services (911 in the [place] Do not drive yourself to the hospital.  Summary  · After the procedure, it is common to have bruising and tenderness at the site.  · Follow instructions from your health care provider about how to take care of your radial site wound. Check the wound every day for signs of infection.  · Do not lift anything that is heavier than 10 lb (4.5 kg), or the limit that you are told, until your health care provider says  that it is safe.  This information is not intended to replace advice given to you by your health care provider. Make sure you discuss any questions you have with your health care provider.  Document Revised: 01/23/2019 Document Reviewed: 01/23/2019  Elsevier Patient Education © 2021 [employer]

## 2021-12-08 NOTE — INTERVAL H&P NOTE
H&P reviewed. The patient was examined and there are no changes to the H&P.      [facility] Pre-Op:    Invasive coronary angiography was recommended to the patient.  The patientdenies  bleeding issues. The patient reports use of antiplatelet agents.  The patient denies  CKD. The patient denies  contrast allergy. The patient denies  use of diabetes medications.    Pre-Cath Surgical History: No prior history of CABG or PCI    The indications, risks/benefits and alternatives of diagnostic left heart cardiac catheterization, angiography, conscious sedation, and possible blood transfusion were discussed in detail with the patient. The potential complications of 1/2000 chance of death, 1/1000 chance of heart attack or stroke, 1/500 chance of bleeding or clotting of the femoral artery, and 1/500 chance of allergic reaction to contrast were discussed. We also reviewed possible complications of infection and kidney dysfunction. If PCI were performed and intra-coronary stents indicated, we discussed the details about ZACK. This included a review of the risks of the infrequent, but relatively higher incidence of late thrombosis with ZACK. The importance of maintaining a consistent daily regimen of aspirin and an additional anti-platelet agent for as long as directed after implantation was emphasized. No contraindications were found. The patient  appeared to understand and agreed to the above.  -Left heart catheterization, Coronary angiography, Left ventriculography, Intravascular ultrasound, Optical coherence tomography, Flow wire, Balloon Angioplasty, Coronary stent, Graft stent, Iliofemoral angiography, Device closure, femoral artery, Intra-aortic balloon pump, Impella LV assist device implantation, Transvenous pacemaker, and Pericardiocentesis    ASA Class: 2  Mallampati Score: 2    Contraindications to DAPT: None    Past Medical History:   Diagnosis Date   • Asthma    • CHF (congestive heart failure) (HCC)    • Class 2 severe  obesity due to excess calories with serious comorbidity and body mass index (BMI) of 38.0 to 38.9 in adult ([facility]) 12/14/2017    Body mass index is 41.26 kg/m².  Nutrition consultation   • Diabetes mellitus ([facility])    • Hyperlipidemia    • Hypertension    • Peripheral vascular disease ([facility])    • Sleep apnea     using c-pap   • Surgical aftercare, respiratory system 4/16/2019     Social History     Socioeconomic History   • Marital status:    Tobacco Use   • Smoking status: Former Smoker   • Smokeless tobacco: Never Used   • Tobacco comment: IN HIGH SCHOOL   Vaping Use   • Vaping Use: Never used   Substance and Sexual Activity   • Alcohol use: No   • Drug use: No   • Sexual activity: Defer     Family History   Problem Relation Age of Onset   • Heart disease Other    • Cancer Other    • Hypertension Mother    • Diabetes Mother      No Known Allergies     Review of Systems:      Constitution:  Denies any fatigue, fever or chills      HENT:  Denies any headache, hearing impairment.     Eyes:  Denies any blurring of vision     Cardiovascular:  As per history of present illness.      Respiratory: Baseline shortness of breath       Gastrointestinal:  No nausea, vomiting, or melena.     Extremity: no cyanosis

## 2021-12-13 ENCOUNTER — READMISSION MANAGEMENT (OUTPATIENT)
Dept: CALL CENTER | Facility: HOSPITAL | Age: 51
End: 2021-12-13

## 2021-12-13 ENCOUNTER — TELEPHONE (OUTPATIENT)
Dept: CARDIOLOGY | Facility: CLINIC | Age: 51
End: 2021-12-13

## 2021-12-13 LAB
QT INTERVAL: 478 MS
QTC INTERVAL: 485 MS

## 2021-12-13 NOTE — TELEPHONE ENCOUNTER
Contacted patient to inform him that we can reschedule him, patient requested 2nd week of January. Went over below with patient and he voiced his understanding.     This procedure is scheduled for : Monday January 10th      You will have a COVID test on Friday January 7th  between 9-10am. This is a drive through test, you do not have to get out. This is under the [facility] aw[facility]. There are signs and generally a line of cars.      You will register at [facility], entering through our ER entrance on Wednesday.     Same Day Surgery will call you the day before to tell you the time to arrive for procedure.  Be sure to shower the night before procedure      Do not eat or drink anything midnight before procedure.      You may take your medications the morning of the procedure with small sips of water.      If you are not feeling well or have a fever the day before your appointment please call the office as we will need to post pone.      You will need to prepare for an overnight stay, but bring a  with you for if you are discharged.  This will depend on the outcome of the procedure.         Please call our office at [phone] for any questions.

## 2021-12-13 NOTE — OUTREACH NOTE
CHF Week 1 Survey      Responses   [facility] patient discharged from? [facility]   Does the patient have one of the following disease processes/diagnoses(primary or secondary)? CHF   CHF Week 1 attempt successful? Yes   Call start time 1442   Call end time 1448   Discharge diagnosis chronic congestive heart failure with the persistent severe LV dysfunction   Meds reviewed with patient/caregiver? Yes   Is the patient having any side effects they believe may be caused by any medication additions or changes? No   Does the patient have all medications ordered at discharge? N/A   Medication comments he was told to stop 81mg ASA but he is unsure if that is permanent, advised to call Cardiology today to clarify. he reports that he will verify with Dr. Cabrera.   Does the patient have a primary care provider?  Yes   Does the patient have an appointment with their PCP within 7 days of discharge? Yes   Has the patient kept scheduled appointments due by today? N/A   Pulse Ox monitoring None   Psychosocial issues? No   Comments Pt denies edema, SOA or dizziness. 120/60 for BP- Limiting fluids to 2L/day. [facility] site- right radial no issues.    Did the patient receive a copy of their discharge instructions? Yes   Nursing interventions Reviewed instructions with patient   What is the patient's perception of their health status since discharge? Improving   Is the patient weighing daily? Yes   Daily weight interventions Education provided on importance of daily weight   Is the patient able to teach back signs and symptoms of worsening condition? (i.e. weight gain, shortness of air, etc.) Yes   If the patient is a current smoker, are they able to teach back resources for cessation? Not a smoker   Is the patient/caregiver able to teach back the hierarchy of who to call/visit for symptoms/problems? PCP, Specialist, Home health nurse, Urgent Care, ED, 911 Yes    CHF Week 1 call completed? Yes          Jennifer Nunez RN

## 2021-12-13 NOTE — TELEPHONE ENCOUNTER
----- Message from Deisi Lopez sent at 2021  2:24 PM CST -----  Regarding: call back  Matti Lawrence  72 stated he wanted to rescheduled his procedure until after [place], he wanted a call back @ [phone] to talk about this. Thxs

## 2022-01-20 ENCOUNTER — TELEPHONE (OUTPATIENT)
Dept: CARDIOLOGY | Facility: CLINIC | Age: 52
End: 2022-01-20

## 2022-01-20 NOTE — TELEPHONE ENCOUNTER
"Contacted patient to reschedule insertion of new ICD. Patient stated\" I do not want an ICD at this time\" informed patient would let Dr. Cabrera know. He voiced his understanding.   "

## 2022-09-05 ENCOUNTER — APPOINTMENT (OUTPATIENT)
Dept: GENERAL RADIOLOGY | Facility: HOSPITAL | Age: 52
End: 2022-09-05

## 2022-09-05 ENCOUNTER — HOSPITAL ENCOUNTER (INPATIENT)
Facility: HOSPITAL | Age: 52
LOS: 6 days | Discharge: HOME | End: 2022-09-11
Attending: EMERGENCY MEDICINE | Admitting: HOSPITALIST

## 2022-09-05 DIAGNOSIS — Z74.09 IMPAIRED MOBILITY AND ADLS: ICD-10-CM

## 2022-09-05 DIAGNOSIS — I50.9 ACUTE CONGESTIVE HEART FAILURE, UNSPECIFIED HEART FAILURE TYPE: Primary | ICD-10-CM

## 2022-09-05 DIAGNOSIS — R26.89 IMPAIRED GAIT AND MOBILITY: ICD-10-CM

## 2022-09-05 DIAGNOSIS — Z78.9 IMPAIRED MOBILITY AND ADLS: ICD-10-CM

## 2022-09-05 DIAGNOSIS — R06.00 DYSPNEA, UNSPECIFIED TYPE: ICD-10-CM

## 2022-09-05 DIAGNOSIS — R07.9 CHEST PAIN, UNSPECIFIED TYPE: ICD-10-CM

## 2022-09-05 DIAGNOSIS — I95.9 HYPOTENSION, UNSPECIFIED HYPOTENSION TYPE: ICD-10-CM

## 2022-09-05 PROBLEM — E66.01 MORBID OBESITY: Chronic | Status: ACTIVE | Noted: 2017-12-14

## 2022-09-05 PROBLEM — I50.23 ACUTE ON CHRONIC SYSTOLIC CHF (CONGESTIVE HEART FAILURE): Chronic | Status: ACTIVE | Noted: 2017-09-26

## 2022-09-05 PROBLEM — E66.9 OBESITY (BMI 30-39.9): Status: ACTIVE | Noted: 2022-09-05

## 2022-09-05 PROBLEM — E11.9 DIABETES MELLITUS (HCC): Chronic | Status: ACTIVE | Noted: 2019-02-05

## 2022-09-05 LAB
ALBUMIN SERPL-MCNC: 3.3 G/DL (ref 3.5–5.2)
ALBUMIN/GLOB SERPL: 1 G/DL
ALP SERPL-CCNC: 65 U/L (ref 39–117)
ALT SERPL W P-5'-P-CCNC: 15 U/L (ref 1–41)
ANION GAP SERPL CALCULATED.3IONS-SCNC: 8 MMOL/L (ref 5–15)
AST SERPL-CCNC: 14 U/L (ref 1–40)
BACTERIA UR QL AUTO: ABNORMAL /HPF
BASOPHILS # BLD AUTO: 0.02 10*3/MM3 (ref 0–0.2)
BASOPHILS NFR BLD AUTO: 0.5 % (ref 0–1.5)
BILIRUB SERPL-MCNC: 1.4 MG/DL (ref 0–1.2)
BILIRUB UR QL STRIP: NEGATIVE
BUN SERPL-MCNC: 20 MG/DL (ref 6–20)
BUN/CREAT SERPL: 13.7 (ref 7–25)
CALCIUM SPEC-SCNC: 8.8 MG/DL (ref 8.6–10.5)
CHLORIDE SERPL-SCNC: 105 MMOL/L (ref 98–107)
CLARITY UR: CLEAR
CO2 SERPL-SCNC: 28 MMOL/L (ref 22–29)
COLOR UR: YELLOW
CREAT SERPL-MCNC: 1.46 MG/DL (ref 0.76–1.27)
DEPRECATED RDW RBC AUTO: 46.9 FL (ref 37–54)
EGFRCR SERPLBLD CKD-EPI 2021: 57.9 ML/MIN/1.73
EOSINOPHIL # BLD AUTO: 0.23 10*3/MM3 (ref 0–0.4)
EOSINOPHIL NFR BLD AUTO: 5.5 % (ref 0.3–6.2)
ERYTHROCYTE [DISTWIDTH] IN BLOOD BY AUTOMATED COUNT: 14 % (ref 12.3–15.4)
GLOBULIN UR ELPH-MCNC: 3.2 GM/DL
GLUCOSE SERPL-MCNC: 144 MG/DL (ref 65–99)
GLUCOSE UR STRIP-MCNC: NEGATIVE MG/DL
HCT VFR BLD AUTO: 40.3 % (ref 37.5–51)
HGB BLD-MCNC: 13.1 G/DL (ref 13–17.7)
HGB UR QL STRIP.AUTO: NEGATIVE
HOLD SPECIMEN: NORMAL
HOLD SPECIMEN: NORMAL
HYALINE CASTS UR QL AUTO: ABNORMAL /LPF
IMM GRANULOCYTES # BLD AUTO: 0.01 10*3/MM3 (ref 0–0.05)
IMM GRANULOCYTES NFR BLD AUTO: 0.2 % (ref 0–0.5)
KETONES UR QL STRIP: NEGATIVE
LEUKOCYTE ESTERASE UR QL STRIP.AUTO: NEGATIVE
LYMPHOCYTES # BLD AUTO: 1.16 10*3/MM3 (ref 0.7–3.1)
LYMPHOCYTES NFR BLD AUTO: 27.6 % (ref 19.6–45.3)
MCH RBC QN AUTO: 29.8 PG (ref 26.6–33)
MCHC RBC AUTO-ENTMCNC: 32.5 G/DL (ref 31.5–35.7)
MCV RBC AUTO: 91.6 FL (ref 79–97)
MONOCYTES # BLD AUTO: 0.6 10*3/MM3 (ref 0.1–0.9)
MONOCYTES NFR BLD AUTO: 14.3 % (ref 5–12)
NEUTROPHILS NFR BLD AUTO: 2.18 10*3/MM3 (ref 1.7–7)
NEUTROPHILS NFR BLD AUTO: 51.9 % (ref 42.7–76)
NITRITE UR QL STRIP: NEGATIVE
NRBC BLD AUTO-RTO: 0 /100 WBC (ref 0–0.2)
NT-PROBNP SERPL-MCNC: 1707 PG/ML (ref 0–900)
PH UR STRIP.AUTO: 6 [PH] (ref 5–9)
PLATELET # BLD AUTO: 124 10*3/MM3 (ref 140–450)
PMV BLD AUTO: 13.3 FL (ref 6–12)
POTASSIUM SERPL-SCNC: 3.3 MMOL/L (ref 3.5–5.2)
PROT SERPL-MCNC: 6.5 G/DL (ref 6–8.5)
PROT UR QL STRIP: ABNORMAL
RBC # BLD AUTO: 4.4 10*6/MM3 (ref 4.14–5.8)
RBC # UR STRIP: ABNORMAL /HPF
REF LAB TEST METHOD: ABNORMAL
SODIUM SERPL-SCNC: 141 MMOL/L (ref 136–145)
SP GR UR STRIP: 1.02 (ref 1–1.03)
SQUAMOUS #/AREA URNS HPF: ABNORMAL /HPF
TROPONIN T SERPL-MCNC: 0.02 NG/ML (ref 0–0.03)
UROBILINOGEN UR QL STRIP: ABNORMAL
WBC # UR STRIP: ABNORMAL /HPF
WBC NRBC COR # BLD: 4.2 10*3/MM3 (ref 3.4–10.8)
WHOLE BLOOD HOLD COAG: NORMAL
WHOLE BLOOD HOLD SPECIMEN: NORMAL

## 2022-09-05 PROCEDURE — 93010 ELECTROCARDIOGRAM REPORT: CPT | Performed by: INTERNAL MEDICINE

## 2022-09-05 PROCEDURE — 71045 X-RAY EXAM CHEST 1 VIEW: CPT

## 2022-09-05 PROCEDURE — 80053 COMPREHEN METABOLIC PANEL: CPT | Performed by: EMERGENCY MEDICINE

## 2022-09-05 PROCEDURE — 93005 ELECTROCARDIOGRAM TRACING: CPT

## 2022-09-05 PROCEDURE — G0378 HOSPITAL OBSERVATION PER HR: HCPCS

## 2022-09-05 PROCEDURE — 81001 URINALYSIS AUTO W/SCOPE: CPT | Performed by: EMERGENCY MEDICINE

## 2022-09-05 PROCEDURE — 83880 ASSAY OF NATRIURETIC PEPTIDE: CPT | Performed by: EMERGENCY MEDICINE

## 2022-09-05 PROCEDURE — 85025 COMPLETE CBC W/AUTO DIFF WBC: CPT | Performed by: EMERGENCY MEDICINE

## 2022-09-05 PROCEDURE — 84484 ASSAY OF TROPONIN QUANT: CPT | Performed by: EMERGENCY MEDICINE

## 2022-09-05 PROCEDURE — 25010000002 FUROSEMIDE PER 20 MG: Performed by: INTERNAL MEDICINE

## 2022-09-05 PROCEDURE — 25010000002 HEPARIN (PORCINE) PER 1000 UNITS: Performed by: INTERNAL MEDICINE

## 2022-09-05 PROCEDURE — 99284 EMERGENCY DEPT VISIT MOD MDM: CPT

## 2022-09-05 PROCEDURE — 93005 ELECTROCARDIOGRAM TRACING: CPT | Performed by: EMERGENCY MEDICINE

## 2022-09-05 RX ORDER — MAGNESIUM SULFATE HEPTAHYDRATE 40 MG/ML
2 INJECTION, SOLUTION INTRAVENOUS AS NEEDED
Status: DISCONTINUED | OUTPATIENT
Start: 2022-09-05 | End: 2022-09-11 | Stop reason: HOSPADM

## 2022-09-05 RX ORDER — POTASSIUM CHLORIDE 7.45 MG/ML
10 INJECTION INTRAVENOUS
Status: DISCONTINUED | OUTPATIENT
Start: 2022-09-05 | End: 2022-09-11 | Stop reason: HOSPADM

## 2022-09-05 RX ORDER — HEPARIN SODIUM 5000 [USP'U]/ML
5000 INJECTION, SOLUTION INTRAVENOUS; SUBCUTANEOUS EVERY 12 HOURS SCHEDULED
Status: DISCONTINUED | OUTPATIENT
Start: 2022-09-05 | End: 2022-09-06

## 2022-09-05 RX ORDER — ATORVASTATIN CALCIUM 10 MG/1
10 TABLET, FILM COATED ORAL DAILY
Status: DISCONTINUED | OUTPATIENT
Start: 2022-09-06 | End: 2022-09-11 | Stop reason: HOSPADM

## 2022-09-05 RX ORDER — POTASSIUM CHLORIDE 750 MG/1
10 CAPSULE, EXTENDED RELEASE ORAL 2 TIMES DAILY WITH MEALS
Status: DISCONTINUED | OUTPATIENT
Start: 2022-09-05 | End: 2022-09-11 | Stop reason: HOSPADM

## 2022-09-05 RX ORDER — ALBUTEROL SULFATE 2.5 MG/3ML
2.5 SOLUTION RESPIRATORY (INHALATION) EVERY 4 HOURS PRN
Status: DISCONTINUED | OUTPATIENT
Start: 2022-09-05 | End: 2022-09-11 | Stop reason: HOSPADM

## 2022-09-05 RX ORDER — FUROSEMIDE 10 MG/ML
20 INJECTION INTRAMUSCULAR; INTRAVENOUS EVERY 12 HOURS
Status: DISCONTINUED | OUTPATIENT
Start: 2022-09-05 | End: 2022-09-07

## 2022-09-05 RX ORDER — MAGNESIUM SULFATE HEPTAHYDRATE 40 MG/ML
4 INJECTION, SOLUTION INTRAVENOUS AS NEEDED
Status: DISCONTINUED | OUTPATIENT
Start: 2022-09-05 | End: 2022-09-11 | Stop reason: HOSPADM

## 2022-09-05 RX ORDER — SODIUM CHLORIDE 0.9 % (FLUSH) 0.9 %
10 SYRINGE (ML) INJECTION AS NEEDED
Status: DISCONTINUED | OUTPATIENT
Start: 2022-09-05 | End: 2022-09-11 | Stop reason: HOSPADM

## 2022-09-05 RX ORDER — SODIUM CHLORIDE 0.9 % (FLUSH) 0.9 %
10 SYRINGE (ML) INJECTION EVERY 12 HOURS SCHEDULED
Status: DISCONTINUED | OUTPATIENT
Start: 2022-09-05 | End: 2022-09-11 | Stop reason: HOSPADM

## 2022-09-05 RX ORDER — LOSARTAN POTASSIUM 25 MG/1
12.5 TABLET ORAL
Status: DISCONTINUED | OUTPATIENT
Start: 2022-09-06 | End: 2022-09-11 | Stop reason: HOSPADM

## 2022-09-05 RX ORDER — NICOTINE POLACRILEX 4 MG
15 LOZENGE BUCCAL
Status: DISCONTINUED | OUTPATIENT
Start: 2022-09-05 | End: 2022-09-11 | Stop reason: HOSPADM

## 2022-09-05 RX ORDER — FUROSEMIDE 10 MG/ML
20 INJECTION INTRAMUSCULAR; INTRAVENOUS ONCE
Status: COMPLETED | OUTPATIENT
Start: 2022-09-06 | End: 2022-09-05

## 2022-09-05 RX ORDER — FUROSEMIDE 10 MG/ML
80 INJECTION INTRAMUSCULAR; INTRAVENOUS ONCE
Status: DISCONTINUED | OUTPATIENT
Start: 2022-09-05 | End: 2022-09-05

## 2022-09-05 RX ORDER — CARVEDILOL 3.12 MG/1
3.12 TABLET ORAL 2 TIMES DAILY WITH MEALS
Status: DISCONTINUED | OUTPATIENT
Start: 2022-09-05 | End: 2022-09-07

## 2022-09-05 RX ORDER — DEXTROSE MONOHYDRATE 25 G/50ML
25 INJECTION, SOLUTION INTRAVENOUS
Status: DISCONTINUED | OUTPATIENT
Start: 2022-09-05 | End: 2022-09-11 | Stop reason: HOSPADM

## 2022-09-05 RX ORDER — INSULIN ASPART 100 [IU]/ML
0-7 INJECTION, SOLUTION INTRAVENOUS; SUBCUTANEOUS
Status: DISCONTINUED | OUTPATIENT
Start: 2022-09-06 | End: 2022-09-11 | Stop reason: HOSPADM

## 2022-09-05 RX ADMIN — POTASSIUM CHLORIDE 10 MEQ: 750 CAPSULE, EXTENDED RELEASE ORAL at 21:35

## 2022-09-05 RX ADMIN — FUROSEMIDE 20 MG: 10 INJECTION, SOLUTION INTRAMUSCULAR; INTRAVENOUS at 23:45

## 2022-09-05 RX ADMIN — Medication 10 ML: at 21:40

## 2022-09-05 RX ADMIN — FUROSEMIDE 20 MG: 10 INJECTION, SOLUTION INTRAMUSCULAR; INTRAVENOUS at 21:35

## 2022-09-05 RX ADMIN — HEPARIN SODIUM 5000 UNITS: 5000 INJECTION INTRAVENOUS; SUBCUTANEOUS at 21:35

## 2022-09-05 NOTE — ED PROVIDER NOTES
Subjective   Patient presents emergency department with complaint of shortness of breath, chest pain, and weakness.  Patient with known congestive heart failure, diabetes, EF of approximately 15 to 20%.  Patient was seen at [facility] in [place] in the last week.  Patient was actually admitted and scheduled to be transferred to [facility], though evidently there was a bed shortage and the patient improved somewhat and went home.  Patient has gone home and been home approximately 12 to 24 hours that with return of his symptoms of shortness of breath and weakness.  Patient arrives with blood pressures in the 60-90 systolic range.  Patient denies dizziness at this time.  Patient is short of breath and is unable to lie flat or add any type of more acute angle.  Patient is most comfortable sitting on the side of the bed with his legs down.  Chronic with acute edema.  Patient notes a weight gain of approximately 10 to 15 pounds in the last week.          Review of Systems   Constitutional: Positive for activity change and fatigue. Negative for chills and fever.   Respiratory: Positive for shortness of breath.    Cardiovascular: Positive for chest pain and leg swelling.   Gastrointestinal: Negative for abdominal pain, diarrhea, nausea and vomiting.   All other systems reviewed and are negative.      Past Medical History:   Diagnosis Date   • Asthma    • Chronic systolic heart failure (HCC)    • Diabetes mellitus (HCC)    • Hyperlipidemia    • Hypertension    • Obesity    • Peripheral vascular disease (HCC)    • Sleep apnea        No Known Allergies    Past Surgical History:   Procedure Laterality Date   • CARDIAC CATHETERIZATION     • CARDIAC CATHETERIZATION N/A 12/08/2021   • VARICOSE VEIN SURGERY Right 04/05/2019       Family History   Problem Relation Age of Onset   • Diabetes Mother    • Hypertension Father        Social History     Socioeconomic History   • Marital status:    Tobacco Use   •  Smoking status: Former Smoker   • Smokeless tobacco: Never Used   Vaping Use   • Vaping Use: Never used   Substance and Sexual Activity   • Alcohol use: No   • Drug use: No   • Sexual activity: Not Currently           Objective   Physical Exam  Vitals and nursing note reviewed.   Constitutional:       General: He is in acute distress.      Appearance: He is well-developed. He is ill-appearing.   HENT:      Head: Normocephalic and atraumatic.   Eyes:      Conjunctiva/sclera: Conjunctivae normal.      Pupils: Pupils are equal, round, and reactive to light.   Neck:      Vascular: No JVD.   Cardiovascular:      Rate and Rhythm: Normal rate and regular rhythm.      Heart sounds: Heart sounds are distant. No murmur heard.    No friction rub. No gallop.   Pulmonary:      Effort: Tachypnea and respiratory distress present.      Breath sounds: Rhonchi present. No wheezing or rales.   Chest:      Chest wall: No tenderness.   Abdominal:      General: Bowel sounds are normal. There is no distension.      Palpations: Abdomen is soft. There is no mass.      Tenderness: There is no abdominal tenderness. There is no guarding or rebound.   Musculoskeletal:         General: Normal range of motion.      Cervical back: Normal range of motion and neck supple.      Right lower leg: Edema present.      Left lower leg: Edema present.   Skin:     General: Skin is warm and dry.      Capillary Refill: Capillary refill takes less than 2 seconds.   Neurological:      General: No focal deficit present.      Mental Status: He is alert and oriented to person, place, and time.   Psychiatric:         Mood and Affect: Mood normal.         Behavior: Behavior normal.         Thought Content: Thought content normal.         Judgment: Judgment normal.         Procedures           ED Course  ED Course as of 09/05/22 1818   Mon Sep 05, 2022   1818 Patient with CHF, hypotension.  Signs and symptoms consistent with heart failure.  Diuresis initiated though  need to be somewhat concerned with patient's baseline blood pressure with his EF of 15 to 20%.  Patient will be admitted for diuresis. [PC]      ED Course User Index  [PC] Manny Taylor MD                                         Labs Reviewed   COMPREHENSIVE METABOLIC PANEL - Abnormal; Notable for the following components:       Result Value    Glucose 144 (*)     Creatinine 1.46 (*)     Potassium 3.3 (*)     Albumin 3.30 (*)     Total Bilirubin 1.4 (*)     eGFR 57.9 (*)     All other components within normal limits    Narrative:     GFR Normal >60  Chronic Kidney Disease <60  Kidney Failure <15     BNP (IN-HOUSE) - Abnormal; Notable for the following components:    proBNP 1,707.0 (*)     All other components within normal limits    Narrative:     Among patients with dyspnea, NT-proBNP is highly sensitive for the detection of acute congestive heart failure. In addition NT-proBNP of <300 pg/ml effectively rules out acute congestive heart failure with 99% negative predictive value.    Results may be falsely decreased if patient taking Biotin.     URINALYSIS W/ MICROSCOPIC IF INDICATED (NO CULTURE) - Abnormal; Notable for the following components:    Protein, UA 30 mg/dL (1+) (*)     Urobilinogen, UA 4.0 E.U./dL (*)     All other components within normal limits   CBC WITH AUTO DIFFERENTIAL - Abnormal; Notable for the following components:    MPV 13.3 (*)     Platelets 124 (*)     Monocyte % 14.3 (*)     All other components within normal limits   URINALYSIS, MICROSCOPIC ONLY - Abnormal; Notable for the following components:    RBC, UA 0-2 (*)     All other components within normal limits   TROPONIN (IN-HOUSE) - Normal    Narrative:     Troponin T Reference Range:  <= 0.03 ng/mL-   Negative for AMI  >0.03 ng/mL-     Abnormal for myocardial necrosis.  Clinicians would have to utilize clinical acumen, EKG, Troponin and serial changes to determine if it is an Acute Myocardial Infarction or myocardial injury due to an  underlying chronic condition.       Results may be falsely decreased if patient taking Biotin.     RAINBOW DRAW    Narrative:     The following orders were created for panel order [place] Draw.  Procedure                               Abnormality         Status                     ---------                               -----------         ------                     Green Top (Gel)[[id number]]                                  Final result               Lavender Top[[id number]]                                     Final result               Gold Top - SST[[id number]]                                   Final result               Light Blue Top[[id number]]                                   Final result                 Please view results for these tests on the individual orders.   CBC AND DIFFERENTIAL    Narrative:     The following orders were created for panel order CBC & Differential.  Procedure                               Abnormality         Status                     ---------                               -----------         ------                     CBC Auto Differential[[id number]]        Abnormal            Final result               Scan Slide[[id number]]                                                                    Please view results for these tests on the individual orders.   GREEN TOP   LAVENDER TOP   GOLD TOP - SST   LIGHT BLUE TOP       XR Chest 1 View   Final Result   Mild-to-moderate left lung base atelectasis versus infiltrate new   since prior.   Mild to moderate pulmonary vascular congestion.                  Electronically signed by:  Pedro Day MD  9/5/2022 5:16 PM CDT   Workstation: [id number]              [facility]    Final diagnoses:   Acute congestive heart failure, unspecified heart failure type (HCC)   Dyspnea, unspecified type   Chest pain, unspecified type   Hypotension, unspecified hypotension type       ED Disposition  ED Disposition     ED Disposition   Decision to Admit    Condition   --     Comment   Level of Care: Telemetry [5]   Diagnosis: Acute congestive heart failure, unspecified heart failure type ([facility]) [[id number]]   Admitting Physician: JAGDEEP SILVER [[id number]]   Attending Physician: JAGDEEP SILVER [[id number]]               No follow-up provider specified.       Medication List      No changes were made to your prescriptions during this visit.          Manny Taylor MD  09/05/22 0623

## 2022-09-05 NOTE — H&P
History and Physical  Dinesh Lange MD  Hospitalist    Date of admission: 9/5/2022    Patient Care Team:  Shonna Ng APRN as PCP - General (Family Medicine)    Chief complaint   Chief Complaint   Patient presents with   • Dyspnea, leg edema, weight gain     Subjective     Patient is a 52 y.o. male admitted for worsening shortness of breath, orthopnea accompanied by weight gain and significant lower extremity edema. He has longstanding cardiac issues / has had an AICD placed recently in [place], [place].    History  Past Medical History:   Diagnosis Date   • Asthma    • Chronic systolic heart failure (HCC)    • Diabetes mellitus (HCC)    • Hyperlipidemia    • Hypertension    • Obesity    • Peripheral vascular disease (HCC)    • Sleep apnea      Past Surgical History:   Procedure Laterality Date   • CARDIAC CATHETERIZATION N/A 12/08/2021   • CARDIAC DEFIBRILLATOR PLACEMENT     • VARICOSE VEIN SURGERY Right 04/05/2019     Family History   Problem Relation Age of Onset   • Diabetes Mother    • Hypertension Father      Social History     Tobacco Use   • Smoking status: Former Smoker   • Smokeless tobacco: Never Used   Vaping Use   • Vaping Use: Never used   Substance Use Topics   • Alcohol use: No   • Drug use: No     Medications Prior to Admission   Medication Sig Dispense Refill Last Dose   • apixaban (ELIQUIS) 5 MG tablet tablet Take 5 mg by mouth 2 (Two) Times a Day.   Past Month at Unknown time   • carvedilol (COREG) 25 MG tablet Take 1 tablet by mouth 2 (Two) Times a Day With Meals. 60 tablet 1 9/5/2022 at Unknown time   • furosemide (LASIX) 40 MG tablet Take 1 tablet by mouth 2 (Two) Times a Day. (Patient taking differently: Take 60 mg by mouth 2 (Two) Times a Day.) 60 tablet 6 9/5/2022 at 0900   • lovastatin (MEVACOR) 20 MG tablet Take 1 tablet by mouth Every Night. 30 tablet 1 9/4/2022 at Unknown time   • potassium chloride (K-DUR) 10 MEQ CR tablet Take 1 tablet by mouth Daily. 30 tablet 1 9/5/2022 at 0900    • albuterol sulfate  (90 Base) MCG/ACT inhaler Inhale 2 puffs Every 4 (Four) Hours As Needed for Wheezing. 1 inhaler 3    • sacubitril-valsartan (ENTRESTO) 24-26 MG tablet Take 1 tablet by mouth 2 (Two) Times a Day. (Patient taking differently: Take 0.5 tablets by mouth 2 (Two) Times a Day.) 60 tablet 0      Allergies:  Patient has no known allergies.    Review of Systems  Review of Systems   Constitutional: Positive for fatigue. Negative for fever.   Respiratory: Positive for cough and shortness of breath.    Cardiovascular: Positive for leg swelling. Negative for chest pain and palpitations.   Gastrointestinal: Negative for abdominal distention, abdominal pain, constipation, nausea and vomiting.   Genitourinary: Negative for dysuria, enuresis, frequency, hematuria and urgency.   Musculoskeletal: Positive for arthralgias. Negative for back pain.   Skin: Negative for color change and pallor.   Neurological: Positive for weakness. Negative for seizures.   Psychiatric/Behavioral: Negative for agitation, behavioral problems and confusion.   All other systems reviewed and are negative.      Objective     Vital Signs  Temp:  [97.9 °F (36.6 °C)] 97.9 °F (36.6 °C)  Heart Rate:  [57-89] 57  Resp:  [16-30] 18  BP: ()/(48-80) 88/69    Physical Exam:  Physical Exam  Vitals reviewed.   Constitutional:       General: He is not in acute distress.     Appearance: He is obese.   HENT:      Head: Normocephalic and atraumatic.   Eyes:      General: No scleral icterus.     Extraocular Movements: Extraocular movements intact.      Pupils: Pupils are equal, round, and reactive to light.   Cardiovascular:      Rate and Rhythm: Normal rate and regular rhythm.   Pulmonary:      Effort: No respiratory distress.      Breath sounds: No stridor. Rales present. No wheezing.   Abdominal:      General: Abdomen is flat. Bowel sounds are normal. There is no distension.      Palpations: Abdomen is soft. There is no mass.       Tenderness: There is no abdominal tenderness. There is no right CVA tenderness, left CVA tenderness or guarding.      Hernia: No hernia is present.   Musculoskeletal:         General: Swelling present. No tenderness or deformity.      Cervical back: Normal range of motion and neck supple. No rigidity or tenderness.      Right lower leg: Edema present.      Left lower leg: Edema present.   Skin:     General: Skin is warm and dry.      Coloration: Skin is not jaundiced or pale.      Findings: No bruising.   Neurological:      General: No focal deficit present.      Mental Status: He is alert and oriented to person, place, and time. Mental status is at baseline.      Cranial Nerves: No cranial nerve deficit.      Sensory: No sensory deficit.      Motor: No weakness.   Psychiatric:         Mood and Affect: Mood normal.         Behavior: Behavior normal.         Results Review:   Lab Results (last 24 hours)     Procedure Component Value Units Date/Time    [place] Draw [[id number]] Collected: 09/05/22 1648    Specimen: Blood Updated: 09/05/22 1817    Narrative:      The following orders were created for panel order [place] Draw.  Procedure                               Abnormality         Status                     ---------                               -----------         ------                     Green Top (Gel)[[id number]]                                  Final result               Lavender Top[[id number]]                                     Final result               Gold Top - SST[[id number]]                                   Final result               Light Blue Top[[id number]]                                   Final result                 Please view results for these tests on the individual orders.    Gold Top - SST [[id number]] Collected: 09/05/22 1710    Specimen: Blood Updated: 09/05/22 1817     Extra Tube Hold for add-ons.     Comment: Auto resulted.       Light Blue Top [[id number]] Collected: 09/05/22 1710     Specimen: Blood Updated: 09/05/22 1817     Extra Tube Hold for add-ons.     Comment: Auto resulted       Green Top (Gel) [[id number]] Collected: 09/05/22 1648    Specimen: Blood Updated: 09/05/22 1803     Extra Tube Hold for add-ons.     Comment: Auto resulted.       Lavender Top [[id number]] Collected: 09/05/22 1648    Specimen: Blood Updated: 09/05/22 1803     Extra Tube hold for add-on     Comment: Auto resulted       Comprehensive Metabolic Panel [[id number]]  (Abnormal) Collected: 09/05/22 1710    Specimen: Blood Updated: 09/05/22 1733     Glucose 144 mg/dL      BUN 20 mg/dL      Creatinine 1.46 mg/dL      Sodium 141 mmol/L      Potassium 3.3 mmol/L      Chloride 105 mmol/L      CO2 28.0 mmol/L      Calcium 8.8 mg/dL      Total Protein 6.5 g/dL      Albumin 3.30 g/dL      ALT (SGPT) 15 U/L      AST (SGOT) 14 U/L      Alkaline Phosphatase 65 U/L      Total Bilirubin 1.4 mg/dL      Globulin 3.2 gm/dL      A/G Ratio 1.0 g/dL      BUN/Creatinine Ratio 13.7     Anion Gap 8.0 mmol/L      eGFR 57.9 mL/min/1.73      Comment: National Kidney Foundation and American Society of Nephrology (ASN) Task Force recommended calculation based on the Chronic Kidney Disease Epidemiology Collaboration (CKD-EPI) equation refit without adjustment for race.       Narrative:      GFR Normal >60  Chronic Kidney Disease <60  Kidney Failure <15      Troponin [[id number]]  (Normal) Collected: 09/05/22 1710    Specimen: Blood Updated: 09/05/22 1733     Troponin T 0.021 ng/mL     Narrative:      Troponin T Reference Range:  <= 0.03 ng/mL-   Negative for AMI  >0.03 ng/mL-     Abnormal for myocardial necrosis.  Clinicians would have to utilize clinical acumen, EKG, Troponin and serial changes to determine if it is an Acute Myocardial Infarction or myocardial injury due to an underlying chronic condition.       Results may be falsely decreased if patient taking Biotin.      BNP [[id number]]  (Abnormal) Collected: 09/05/22 1710    Specimen: Blood  Updated: 09/05/22 1731     proBNP 1,707.0 pg/mL     Narrative:      Among patients with dyspnea, NT-proBNP is highly sensitive for the detection of acute congestive heart failure. In addition NT-proBNP of <300 pg/ml effectively rules out acute congestive heart failure with 99% negative predictive value.    Results may be falsely decreased if patient taking Biotin.      Urinalysis, Microscopic Only - Urine, Clean Catch [[id number]]  (Abnormal) Collected: 09/05/22 1648    Specimen: Urine, Clean Catch Updated: 09/05/22 1720     RBC, UA 0-2 /HPF      WBC, UA 0-2 /HPF      Bacteria, UA None Seen /HPF      Squamous Epithelial Cells, UA None Seen /HPF      Hyaline Casts, UA None Seen /LPF      Methodology Automated Microscopy    CBC & Differential [[id number]]  (Abnormal) Collected: 09/05/22 1710    Specimen: Blood Updated: 09/05/22 1718    Narrative:      The following orders were created for panel order CBC & Differential.  Procedure                               Abnormality         Status                     ---------                               -----------         ------                     CBC Auto Differential[[id number]]        Abnormal            Final result               Scan Slide[[id number]]                                                                    Please view results for these tests on the individual orders.    CBC Auto Differential [[id number]]  (Abnormal) Collected: 09/05/22 1710    Specimen: Blood Updated: 09/05/22 1718     WBC 4.20 10*3/mm3      RBC 4.40 10*6/mm3      Hemoglobin 13.1 g/dL      Hematocrit 40.3 %      MCV 91.6 fL      MCH 29.8 pg      MCHC 32.5 g/dL      RDW 14.0 %      RDW-SD 46.9 fl      MPV 13.3 fL      Platelets 124 10*3/mm3      Neutrophil % 51.9 %      Lymphocyte % 27.6 %      Monocyte % 14.3 %      Eosinophil % 5.5 %      Basophil % 0.5 %      Immature Grans % 0.2 %      Neutrophils, Absolute 2.18 10*3/mm3      Lymphocytes, Absolute 1.16 10*3/mm3      Monocytes, Absolute 0.60  10*3/mm3      Eosinophils, Absolute 0.23 10*3/mm3      Basophils, Absolute 0.02 10*3/mm3      Immature Grans, Absolute 0.01 10*3/mm3      nRBC 0.0 /100 WBC     Urinalysis With Microscopic If Indicated (No Culture) - Urine, Clean Catch [[id number]]  (Abnormal) Collected: 09/05/22 1648    Specimen: Urine, Clean Catch Updated: 09/05/22 1657     Color, UA Yellow     Appearance, UA Clear     pH, UA 6.0     Specific Gravity, UA 1.016     Glucose, UA Negative     Ketones, UA Negative     Bilirubin, UA Negative     Blood, UA Negative     Protein, UA 30 mg/dL (1+)     Leuk Esterase, UA Negative     Nitrite, UA Negative     Urobilinogen, UA 4.0 E.U./dL          Imaging Results (Last 24 Hours)     Procedure Component Value Units Date/Time    XR Chest 1 View [[id number]] Collected: 09/05/22 1623     Updated: 09/05/22 1719    Narrative:      EXAM: XR CHEST 1 VIEW    HISTORY: sob    COMPARISON: 11/29/2021    TECHNIQUE:   Portable view of the chest.    FINDINGS:   Stable left pacemaker. Stable cardiomegaly.  Mild-to-moderate left lung base atelectasis versus infiltrate new  since prior.  Mild to moderate pulmonary vascular congestion.  The mediastinal contours are within normal limits. .  No evidence of mediastinal shift or pneumothorax.  Unremarkable visualized osseous structures.      Impression:      Mild-to-moderate left lung base atelectasis versus infiltrate new  since prior.  Mild to moderate pulmonary vascular congestion.            Electronically signed by:  Pedro Day MD  9/5/2022 5:16 PM CDT  Workstation: [id number]          Assessment & Plan       Acute on chronic systolic CHF (congestive heart failure) (HCC)    Hypotension    Diabetes mellitus (HCC)    Obesity (BMI 30-39.9)    Restrict fluid intake, start IV Lasix and some of his antihypertensive / cardiac medications at a smaller dose as his admission blood pressure values are much lower than usual.    He has a very poor LV EF of 15 to 20% on the most recent Echo and  his last cardiac cath done in December of last year showed no significant coronary atherosclerosis (non ischemic cardiomyopathy).    I have utilized all available immediate resources to obtain, update, or review the patient's current medications.    I confirmed that the patient's Advance Care Plan is present, code status is documented, or surrogate decision maker is listed in the patient's medical record.      09/05/22  20:45 CDT

## 2022-09-06 LAB
ANION GAP SERPL CALCULATED.3IONS-SCNC: 9 MMOL/L (ref 5–15)
BUN SERPL-MCNC: 22 MG/DL (ref 6–20)
BUN/CREAT SERPL: 15.3 (ref 7–25)
CALCIUM SPEC-SCNC: 8.9 MG/DL (ref 8.6–10.5)
CHLORIDE SERPL-SCNC: 103 MMOL/L (ref 98–107)
CO2 SERPL-SCNC: 31 MMOL/L (ref 22–29)
CREAT SERPL-MCNC: 1.44 MG/DL (ref 0.76–1.27)
EGFRCR SERPLBLD CKD-EPI 2021: 58.8 ML/MIN/1.73
GLUCOSE BLDC GLUCOMTR-MCNC: 138 MG/DL (ref 70–130)
GLUCOSE BLDC GLUCOMTR-MCNC: 92 MG/DL (ref 70–130)
GLUCOSE SERPL-MCNC: 131 MG/DL (ref 65–99)
MAGNESIUM SERPL-MCNC: 1.9 MG/DL (ref 1.6–2.6)
POTASSIUM SERPL-SCNC: 3.5 MMOL/L (ref 3.5–5.2)
SODIUM SERPL-SCNC: 143 MMOL/L (ref 136–145)
TROPONIN T SERPL-MCNC: 0.03 NG/ML (ref 0–0.03)
TSH SERPL DL<=0.05 MIU/L-ACNC: 1.35 UIU/ML (ref 0.27–4.2)

## 2022-09-06 PROCEDURE — G0378 HOSPITAL OBSERVATION PER HR: HCPCS

## 2022-09-06 PROCEDURE — 25010000002 FUROSEMIDE PER 20 MG: Performed by: INTERNAL MEDICINE

## 2022-09-06 PROCEDURE — 99214 OFFICE O/P EST MOD 30 MIN: CPT | Performed by: NURSE PRACTITIONER

## 2022-09-06 PROCEDURE — 93005 ELECTROCARDIOGRAM TRACING: CPT | Performed by: INTERNAL MEDICINE

## 2022-09-06 PROCEDURE — 25010000002 HEPARIN (PORCINE) PER 1000 UNITS: Performed by: INTERNAL MEDICINE

## 2022-09-06 PROCEDURE — 80048 BASIC METABOLIC PNL TOTAL CA: CPT | Performed by: INTERNAL MEDICINE

## 2022-09-06 PROCEDURE — 82962 GLUCOSE BLOOD TEST: CPT

## 2022-09-06 PROCEDURE — 93010 ELECTROCARDIOGRAM REPORT: CPT | Performed by: INTERNAL MEDICINE

## 2022-09-06 PROCEDURE — 84443 ASSAY THYROID STIM HORMONE: CPT | Performed by: INTERNAL MEDICINE

## 2022-09-06 PROCEDURE — 83735 ASSAY OF MAGNESIUM: CPT | Performed by: INTERNAL MEDICINE

## 2022-09-06 PROCEDURE — 84484 ASSAY OF TROPONIN QUANT: CPT | Performed by: INTERNAL MEDICINE

## 2022-09-06 RX ORDER — SPIRONOLACTONE 25 MG/1
25 TABLET ORAL DAILY
Status: DISCONTINUED | OUTPATIENT
Start: 2022-09-06 | End: 2022-09-08

## 2022-09-06 RX ADMIN — Medication 12.5 MG: at 09:01

## 2022-09-06 RX ADMIN — POTASSIUM CHLORIDE 10 MEQ: 750 CAPSULE, EXTENDED RELEASE ORAL at 17:29

## 2022-09-06 RX ADMIN — Medication 10 ML: at 20:32

## 2022-09-06 RX ADMIN — APIXABAN 5 MG: 5 TABLET, FILM COATED ORAL at 16:20

## 2022-09-06 RX ADMIN — CARVEDILOL 3.12 MG: 3.12 TABLET, FILM COATED ORAL at 17:29

## 2022-09-06 RX ADMIN — ATORVASTATIN CALCIUM 10 MG: 10 TABLET, FILM COATED ORAL at 09:01

## 2022-09-06 RX ADMIN — SPIRONOLACTONE 25 MG: 25 TABLET ORAL at 11:31

## 2022-09-06 RX ADMIN — FUROSEMIDE 20 MG: 10 INJECTION, SOLUTION INTRAMUSCULAR; INTRAVENOUS at 09:01

## 2022-09-06 RX ADMIN — FUROSEMIDE 20 MG: 10 INJECTION, SOLUTION INTRAMUSCULAR; INTRAVENOUS at 20:32

## 2022-09-06 RX ADMIN — Medication 10 ML: at 09:02

## 2022-09-06 RX ADMIN — HEPARIN SODIUM 5000 UNITS: 5000 INJECTION INTRAVENOUS; SUBCUTANEOUS at 08:59

## 2022-09-06 RX ADMIN — POTASSIUM CHLORIDE 10 MEQ: 750 CAPSULE, EXTENDED RELEASE ORAL at 09:01

## 2022-09-06 RX ADMIN — CARVEDILOL 3.12 MG: 3.12 TABLET, FILM COATED ORAL at 09:01

## 2022-09-06 NOTE — CONSULTS
[facility] Cardiology Consult    Referring Provider: Manny Taylor MD      Reason for Consultation: CHF    Patient Care Team:  Shonna Ng APRN as PCP - General (Family Medicine)    Chief complaint   Chief Complaint   Patient presents with   • Chest Pain       Subjective .     History of present illness:     Mr. Matti Bravo is a 52-year-old male with medical history of nonischemic cardiomyopathy.  LVEF last noted to be 15 to 20%.  Also has history of diabetes, hypertension, obesity, hyperlipidemia, CKD, and venous insufficiency.    Recently had AICD placed at [facility].  He was also recently evaluated at [facility] ER on 9/2/2022 for systolic heart failure, worsening shortness of breath, and worsening lower leg edema.  He signed out AMA.    He presented to [facility] ER yesterday with complaints of shortness of breath, chest pain, and weakness.  He was found to be hypotensive with blood pressure 61/48.  EKG shows sinus rhythm with first-degree AV block with premature ventricular complexes.  Chest x-ray showed mild to moderate pulmonary vascular congestion, and left lung mild to moderate atelectasis.  Creatinine was 1.46, potassium 3.3, glucose 144, proBNP 1,707, and troponin x2 within normal range.  He was admitted for hypotension, chest pain, dyspnea, acute congestive heart failure.    He reports to me that shortness of breath worsened over a period of 3 days.  He is not able to lie flat and has not been able to lie flat since the diagnosis of congestive heart failure.  He reports lower leg edema increasing, fatigue, and scrotal edema.  He reports at least 3 admissions for heart failure at other outlying hospitals.  He reports to me that it takes IV Lasix to get fluid off.  He is denying chest pain, diaphoresis or nausea.     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  Denied alcohol use.  Has nonischemic cardiomyopathy.  12/8/2021 left  heart cath showed minimal plaquing, nonobstructive CAD.  Echocardiogram at [facility] this on 8/4/2022 shows LVEF 15 to 20%,  Moderate MVR, mild TVR, and PI      The CVD Risk score (D'Agostino, et al., 2008) failed to calculate for the following reasons:    The patient has a prior MI, stroke, CHF, or peripheral vascular disease diagnosis      Review of Systems  Review of Systems   Constitutional: Positive for fatigue. Negative for chills, diaphoresis and fever.   HENT: Negative for congestion and sore throat.    Respiratory: Positive for shortness of breath. Negative for chest tightness.    Cardiovascular: Positive for leg swelling. Negative for chest pain and palpitations.   Gastrointestinal: Positive for abdominal distention. Negative for diarrhea, nausea and vomiting.   Genitourinary: Positive for scrotal swelling. Negative for dysuria.   Musculoskeletal: Negative for gait problem, myalgias and neck pain.   Skin: Negative for pallor and rash.   Neurological: Negative for dizziness, speech difficulty, light-headedness and headaches.   Psychiatric/Behavioral: Negative for confusion. The patient is not nervous/anxious.        History  Past Medical History:   Diagnosis Date   • Asthma    • Chronic systolic heart failure (HCC)    • Diabetes mellitus (HCC)    • Hyperlipidemia    • Hypertension    • Obesity    • Peripheral vascular disease (HCC)    • Sleep apnea    ,   Past Surgical History:   Procedure Laterality Date   • CARDIAC CATHETERIZATION N/A 12/08/2021   • CARDIAC DEFIBRILLATOR PLACEMENT     • VARICOSE VEIN SURGERY Right 04/05/2019   ,   Family History   Problem Relation Age of Onset   • Diabetes Mother    • Hypertension Father    ,   Social History     Tobacco Use   • Smoking status: Former Smoker   • Smokeless tobacco: Never Used   Vaping Use   • Vaping Use: Never used   Substance Use Topics   • Alcohol use: No   • Drug use: No   ,   Medications Prior to Admission   Medication Sig Dispense Refill Last Dose   •  "apixaban (ELIQUIS) 5 MG tablet tablet Take 5 mg by mouth 2 (Two) Times a Day.   Past Month at Unknown time   • carvedilol (COREG) 25 MG tablet Take 1 tablet by mouth 2 (Two) Times a Day With Meals. 60 tablet 1 9/5/2022 at Unknown time   • furosemide (LASIX) 40 MG tablet Take 1 tablet by mouth 2 (Two) Times a Day. (Patient taking differently: Take 60 mg by mouth 2 (Two) Times a Day.) 60 tablet 6 9/5/2022 at 0900   • lovastatin (MEVACOR) 20 MG tablet Take 1 tablet by mouth Every Night. 30 tablet 1 9/4/2022 at Unknown time   • potassium chloride (K-DUR) 10 MEQ CR tablet Take 1 tablet by mouth Daily. 30 tablet 1 9/5/2022 at 0900   • albuterol sulfate  (90 Base) MCG/ACT inhaler Inhale 2 puffs Every 4 (Four) Hours As Needed for Wheezing. 1 inhaler 3    • sacubitril-valsartan (ENTRESTO) 24-26 MG tablet Take 1 tablet by mouth 2 (Two) Times a Day. (Patient taking differently: Take 0.5 tablets by mouth 2 (Two) Times a Day.) 60 tablet 0       ALLERGIES  Patient has no known allergies.    The following portions of the patient's history were reviewed and updated as appropriate: allergies, current medications, past family history, past medical history, past social history, past surgical history and problem list.     Old and outside records reviewed (if available) and pertinent information is included in the objective data.     Objective     Vital Sign Min/Max for last 24 hours  Temp  Min: 97.9 °F (36.6 °C)  Max: 98 °F (36.7 °C)   BP  Min: 61/48  Max: 123/80   Pulse  Min: 57  Max: 136   Resp  Min: 16  Max: 30   SpO2  Min: 94 %  Max: 100 %   Flow (L/min)  Min: 2  Max: 2   Weight  Min: 141 kg (309 lb 12.8 oz)  Max: 142 kg (313 lb)     Flowsheet Rows    Flowsheet Row First Filed Value   Admission Height 193 cm (76\") Documented at 09/05/2022 1537   Admission Weight 142 kg (313 lb) Documented at 09/05/2022 1537           Physical Exam:  Physical Exam  Vitals and nursing note reviewed.   Constitutional:       Appearance: He is " obese. He is not ill-appearing or diaphoretic.   HENT:      Head: Normocephalic and atraumatic.      Nose: Nose normal. No congestion or rhinorrhea.      Mouth/Throat:      Mouth: Mucous membranes are moist.      Pharynx: No oropharyngeal exudate.   Eyes:      General: Lids are normal.         Right eye: No discharge.         Left eye: No discharge.      Conjunctiva/sclera: Conjunctivae normal.      Right eye: Right conjunctiva is not injected.      Left eye: Left conjunctiva is not injected.      Pupils: Pupils are equal, round, and reactive to light.   Neck:      Thyroid: No thyromegaly.      Vascular: No JVD.      Trachea: Trachea normal.   Cardiovascular:      Rate and Rhythm: Normal rate and regular rhythm.      Pulses: Normal pulses.      Heart sounds: Normal heart sounds, S1 normal and S2 normal. No murmur heard.  Pulmonary:      Effort: Pulmonary effort is normal.      Breath sounds: Normal breath sounds and air entry. No wheezing or rhonchi.   Abdominal:      General: Abdomen is protuberant. Bowel sounds are normal. There is no distension.      Palpations: Abdomen is soft.      Tenderness: There is no abdominal tenderness.   Musculoskeletal:      Right lower leg: Edema present.      Left lower leg: Edema present.   Skin:     General: Skin is warm and dry.      Capillary Refill: Capillary refill takes less than 2 seconds.      Coloration: Skin is not ashen.          Neurological:      Mental Status: He is alert and oriented to person, place, and time.   Psychiatric:         Attention and Perception: Attention normal.         Mood and Affect: Mood normal.         Speech: Speech normal.        Results Reviewed by myself:     Results from last 7 days   Lab Units 09/06/22  0549 09/05/22  1710 09/02/22  1000   SODIUM mmol/L 143 141 140   POTASSIUM mmol/L 3.5 3.3* 3.2*   CHLORIDE mmol/L 103 105 103   CO2 mmol/L 31.0* 28.0  --    BUN mg/dL 22* 20 24*   CREATININE mg/dL 1.44* 1.46* 1.6*   CALCIUM mg/dL 8.9 8.8 8.7    BILIRUBIN mg/dL  --  1.4* 1.50*   ALK PHOS U/L  --  65 66   ALT (SGPT) U/L  --  15 21   AST (SGOT) U/L  --  14 14*   GLUCOSE mg/dL 131* 144*  --        Estimated Creatinine Clearance: 92.7 mL/min (A) (by C-G formula based on SCr of 1.44 mg/dL (H)).    Results from last 7 days   Lab Units 09/06/22  0549 09/02/22  1000   MAGNESIUM mg/dL 1.9 1.9       Results from last 7 days   Lab Units 09/05/22  1710   WBC 10*3/mm3 4.20   HEMOGLOBIN g/dL 13.1   PLATELETS 10*3/mm3 124*       Results from last 7 days   Lab Units 09/02/22  1000   INR  1.57*       Lab Results   Component Value Date    CKTOTAL 76 09/02/2022    CKMB 174 05/24/2022    TROPONINI 0.05 (H) 08/18/2022    TROPONINT 0.025 09/06/2022     Lab Results   Component Value Date    PROBNP 1,707.0 (H) 09/05/2022       I/O last 3 completed shifts:  In: 240 [P.O.:240]  Out: 700 [Urine:700]    Cardiographics  ECG/EMG Results (last 24 hours)     Procedure Component Value Units Date/Time    ECG 12 Lead [[id number]] Collected: 09/05/22 1534     Updated: 09/06/22 0639     QT Interval 416 ms      QTC Interval 477 ms     Narrative:      Test Reason : CP  Blood Pressure :   */*   mmHG  Vent. Rate :  79 BPM     Atrial Rate :  79 BPM     P-R Int : 222 ms          QRS Dur : 130 ms      QT Int : 416 ms       P-R-T Axes :  66 -41  90 degrees     QTc Int : 477 ms    Sinus rhythm with 1st degree AV block with frequent Premature ventricular complexes  Left atrial enlargement  Left axis deviation  Nonspecific intraventricular block  Cannot rule out Septal infarct (cited on or before 04-FEB-2019)  Abnormal ECG  When compared with ECG of 08-DEC-2021 08:02,  VA interval has increased  Sinus rhythm is no longer with ventricular escape complexes  QRS axis Shifted left  T wave inversion no longer evident in Lateral leads    Referred By:            Confirmed By:     ECG 12 Lead [[id number]] Collected: 09/06/22 0609     Updated: 09/06/22 0931     QT Interval 422 ms      QTC Interval 486 ms      Narrative:      Test Reason : CHF  Blood Pressure :   */*   mmHG  Vent. Rate :  80 BPM     Atrial Rate :  80 BPM     P-R Int : 216 ms          QRS Dur : 128 ms      QT Int : 422 ms       P-R-T Axes :  49 -26 102 degrees     QTc Int : 486 ms    Sinus rhythm with 1st degree AV block with Premature supraventricular complexes  Left atrial enlargement  Nonspecific intraventricular block  Cannot rule out Septal infarct (cited on or before 04-FEB-2019)  Abnormal ECG  When compared with ECG of 05-SEP-2022 15:34,  Premature ventricular complexes are no longer Present  Premature supraventricular complexes are now Present    Referred By:            Confirmed By:           Results for orders placed during the hospital encounter of 02/04/19    Adult Transthoracic Echo Limited W/ Cont if Necessary Per Protocol    Interpretation Summary  · 3D acquisition for left ventricular ejection fraction. Live 3D and full volume to assess left ventricular ejection fraction was utilized.  · Left ventricle systolic function is severely decreased. Estimated LVEF is 10%. Left ventricle cavity severely dilated with restrictive diastolic dysfunction. Findings are consistent with dilated cardiomyopathy.  · Right ventricle is mildly dilated with mildly reduced right ventricular systolic function.  · Moderate mitral valve regurgitation is present.  · Moderate tricuspid valve regurgitation is present. Pulmonary hypertension is present with a PA systolic pressure of 70 mmHg.  · There is mild pulmonic valve regurgitation present.  · There is a trivial pericardial effusion adjacent to the left ventricle      XR Chest 2 View    Result Date: 8/19/2022     Stable chest radiograph from the comparison exam with findings as discussed above. Workstation: [id number]    XR Chest 1 View    Result Date: 9/5/2022  Mild-to-moderate left lung base atelectasis versus infiltrate new since prior. Mild to moderate pulmonary vascular congestion. Electronically signed by:   Pedro Day MD  9/5/2022 5:16 PM CDT Workstation: [id number]    XR Chest 1 View    Result Date: 9/2/2022  1. Cardiomegaly. Central vascular congestion. 2. Left base opacity consistent with some combination of airspace consolidation and effusion. 3. Similar right basilar opacities consistent with airspace consolidation and possible effusion. Workstation: [id number]    XR Chest 1 View    Result Date: 8/18/2022     1. New left chest wall AICD; no pneumothorax. 2. Cardiomegaly with central pulmonary vascular congestion and trace pleural effusions. Workstation: [id number]    XR Chest 1 View    Result Date: 8/17/2022     Cardiomegaly with central pulmonary vascular congestion and trace pleural effusions. Workstation: [id number]      Intake/Output       09/05/22 0700 - 09/06/22 0659 09/06/22 0700 - 09/07/22 0659    Intake (ml) 240 240    Output (ml) 700 300    Net (ml) -460 -60    Last Weight 141 kg (309 lb 12.8 oz) --          Assessment & Plan       Acute on chronic systolic CHF (congestive heart failure) (HCC)    Diabetes mellitus (HCC)    Obesity (BMI 30-39.9)    Hypotension    1. Acute on chronic systolic CHF.  Was seen at [facility] for worsening heart failure and elevated troponin on 9/2/2022 and signed out AMA.   Discharge heart failure medications at that time were Entresto 24-26 mg 1/2 tablet twice daily, potassium 10 mg daily, lovastatin, furosemide 40 mg, 1.5 tablets twice daily, carvedilol 12.5 mg twice daily and Eliquis 5 mg twice daily.  Creatinine at discharge was 1.6    Denies alcohol use.  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  Has obstructive sleep apnea and has CPAP machine but admits to not wearing it.  He reports to me that he has not been using it.    Presented at [facility] 9/5  with worsening shortness of breath, PND, orthopnea, increasing lower leg edema, and scrotum swelling.  Creatinine 1.4, EGFR 58.8, troponin x2 negative, proBNP went from 1,707 to 2,034.     NICM EF:15-20%.  NYHA Class IV, Stage C. Patient is currently volume overloaded.  and in a well perfused physiologic state. Hemodynamics are acceptable    BETA-BLOCKER: Carvedilol 3.125 mg twice daily  ACE/ARB: Losartan 12.5 mg daily  ENTRESTO: Indicated, can consider  DIURETIC: Lasix 20 mg IV twice daily.  SGL2I: Can consider  ALDOSTERONE ANTAGONIST: Start spironolactone 25 mg daily   IMDUR/HYDRALAZINE: N/A  DIGOXIN: N/A    Fluid restriction: 1500 L  Sodium restriction:2 grams    Cardiac Rehab: Can consider, does meet criteria  ICD: yes [facility]  ADHF: yes 9/2 [facility] for Acute CHF    -Recommend daily cardiac electrolytes and renal function.    2. S/P AICD placement in [place] 8/2022.  Had AICD placed at [facility] due to LVEF 15 to 20%.  Left subclavian site almost healed, no infection, drainage, or redness at incision.    Disposition: Continue current location.  We will continue to follow.    3.  Hypotension, resolved.  He was hypotensive upon arrival to ER yesterday.  Blood pressure has improved and now normotensive.  -Continue guideline directed heart failure medications.    I discussed the patient's findings and my recommendations with patient, family and nursing staff and Dr. Aranda      This document has been electronically signed by SHELL Garcia on September 6, 2022 10:37 CDT   Electronically signed by SHELL Garcia, 09/06/22, 10:37 AM CDT.    I personally saw and examined Matti Bravo after the APRN.  I personally performed a history and physical examination of the patient.  I personally reviewed independent findings and plan of care.  I discussed management with the APRN.  I agree with the APRN's documentation.    The patient is a 52-year-old gentleman who follows with Dr. Shreyas Manzano and Dr. Kali Hummel at [facility].  He has a known history of nonischemic cardiomyopathy.  In December 2021, coronary angiography showed minor luminal irregularities in proximal and mid RCA.   Left main/LAD/LCx appeared angiographically normal.  On multiple transthoracic echocardiograms performed at [facility] between May and August 2022, his LVEF was found to be severely reduced in the range of 15 to 20% (by report).  LV cavity was found to be dilated.  Biatrial dilatation, RV dilation/reduced systolic function, moderate MR, mild to moderate NH have also been noted on prior echocardiograms.  He appears to be maintained on Eliquis therapy because of severe LV systolic dysfunction and inability to rule out LV thrombus on prior echocardiographic studies.  He is s/p single-chamber ICD placement on 8/18/2022 by Dr. Manzano.    He was admitted to [facility] with exertional dyspnea and orthopnea for the past few days.  CXR suggested pulmonary vascular congestion.    HFrEF (acute exacerbation): Continue carvedilol, losartan and spironolactone for now.  He appeared to be on carvedilol and Entresto as outpatient based on review of home medication list.   Continue current doses of IV Lasix.  Consider switching losartan to Entresto during this hospitalization.  Monitor intake and output accurately to assess response.  Dietary sodium and fluid restriction was discussed with him.   Resume Eliquis from today (it appears that he was started on this therapy for LV systolic dysfunction and inability to rule out LV thrombus on prior echocardiographic studies).  He is s/p ICD placement in August 2022 at [facility].  ICD implantation site in left upper chest does not show any signs of infection.    Thanks for the consultation.  Please feel free to call us with any questions.    Electronically signed by Dylon Aranda MD, 09/06/22, 4:04 PM CDT.

## 2022-09-06 NOTE — CONSULTS
"Adult Nutrition  Assessment    Patient Name:  Matti Bravo  YOB: 1970  MRN: [id number]  Admit Date:  9/5/2022    Assessment Date:  9/6/2022    Comments:  Pt admitted with CHF and RD seeing for diet edu r/t heart healthy eating.  Attempted to see pt x 2 will reattempt to see pt to review heart healthy diet edu.  Cooking with less salt and heart healthy edu handouts attached to discharge papers.     Reason for Assessment     Row Name 09/06/22 1428          Reason for Assessment    Reason For Assessment identified at risk by screening criteria     Identified At Risk by Screening Criteria need for education                  Labs/Tests/Procedures/Meds     Row Name 09/06/22 1429          Labs/Procedures/Meds    Lab Results Reviewed reviewed, pertinent     Lab Results Comments Glu 131, BUN 22, Cr 1.44, Platelets 124, Alb 3.3, Bilirubin 1.4            Diagnostic Tests/Procedures    Diagnostic Test/Procedure Reviewed reviewed, pertinent            Medications    Pertinent Medications Reviewed reviewed, pertinent     Pertinent Medications Comments lasix, ssi, micro-k                  Estimated/Assessed Needs - Anthropometrics     Row Name 09/06/22 1430          Anthropometrics    Height 193 cm (75.98\")     Weight 141 kg (310 lb 13.6 oz)     Weight for Calculation 91.8 kg (202 lb 6.1 oz)  IBW            Estimated/Assessed Needs    Additional Documentation KCAL/KG (Group);Protein Requirements (Group)            KCAL/KG    KCAL/KG 20 Kcal/Kg (kcal)     20 Kcal/Kg (kcal) 1836            Protein Requirements    Weight Used For Protein Calculations 91.8 kg (202 lb 6.1 oz)     Est Protein Requirement Amount (gms/kg) 0.8 gm protein     Estimated Protein Requirements (gms/day) 73.44                Nutrition Prescription Ordered     Row Name 09/06/22 1430          Nutrition Prescription PO    Current PO Diet Regular     Common Modifiers Cardiac;Fluid Restriction     Fluid Restriction mL per Day 1000 mL        "              Electronically signed by:  Madeline Lock RD  09/06/22 14:34 CDT

## 2022-09-06 NOTE — ED NOTES
Nursing report ED to floor  Matti Bravo  52 y.o.  male    HPI:   Chief Complaint   Patient presents with   • Chest Pain       Admitting doctor:   Will French MD    Consulting provider(s):  Consults     No orders found from 8/7/2022 to 9/6/2022.           Admitting diagnosis:   The primary encounter diagnosis was Acute congestive heart failure, unspecified heart failure type (HCC). Diagnoses of Dyspnea, unspecified type, Chest pain, unspecified type, and Hypotension, unspecified hypotension type were also pertinent to this visit.    Code status:   Current Code Status     Date Active Code Status Order ID Comments User Context       9/5/2022 1830 CPR (Attempt to Resuscitate) [id number]  Dinesh Lange MD ED     Advance Care Planning Activity      Questions for Current Code Status     Question Answer    Code Status (Patient has no pulse and is not breathing) CPR (Attempt to Resuscitate)    Medical Interventions (Patient has pulse or is breathing) Full Support          Allergies:   Patient has no known allergies.    Intake and Output  No intake or output data in the 24 hours ending 09/05/22 1925    Weight:       09/05/22  1537   Weight: (!) 142 kg (313 lb)       Most recent vitals:   Vitals:    09/05/22 1700 09/05/22 1801 09/05/22 1856 09/05/22 1919   BP: 108/71 91/63  123/80   BP Location:  Left arm  Left arm   Patient Position:  Sitting  Sitting   Pulse: 72 84 89    Resp:  16 20    Temp:       TempSrc:       SpO2: 100% 95% 94%    Weight:       Height:           Active LDAs/IV Access:   Lines, Drains & Airways     Active LDAs     Name Placement date Placement time Site Days    Peripheral IV 09/05/22 1711 Right Antecubital 09/05/22  1711  Antecubital  less than 1                Labs (abnormal labs have a star):   Labs Reviewed   COMPREHENSIVE METABOLIC PANEL - Abnormal; Notable for the following components:       Result Value    Glucose 144 (*)     Creatinine 1.46 (*)     Potassium 3.3 (*)     Albumin  3.30 (*)     Total Bilirubin 1.4 (*)     eGFR 57.9 (*)     All other components within normal limits    Narrative:     GFR Normal >60  Chronic Kidney Disease <60  Kidney Failure <15     BNP (IN-HOUSE) - Abnormal; Notable for the following components:    proBNP 1,707.0 (*)     All other components within normal limits    Narrative:     Among patients with dyspnea, NT-proBNP is highly sensitive for the detection of acute congestive heart failure. In addition NT-proBNP of <300 pg/ml effectively rules out acute congestive heart failure with 99% negative predictive value.    Results may be falsely decreased if patient taking Biotin.     URINALYSIS W/ MICROSCOPIC IF INDICATED (NO CULTURE) - Abnormal; Notable for the following components:    Protein, UA 30 mg/dL (1+) (*)     Urobilinogen, UA 4.0 E.U./dL (*)     All other components within normal limits   CBC WITH AUTO DIFFERENTIAL - Abnormal; Notable for the following components:    MPV 13.3 (*)     Platelets 124 (*)     Monocyte % 14.3 (*)     All other components within normal limits   URINALYSIS, MICROSCOPIC ONLY - Abnormal; Notable for the following components:    RBC, UA 0-2 (*)     All other components within normal limits   TROPONIN (IN-HOUSE) - Normal    Narrative:     Troponin T Reference Range:  <= 0.03 ng/mL-   Negative for AMI  >0.03 ng/mL-     Abnormal for myocardial necrosis.  Clinicians would have to utilize clinical acumen, EKG, Troponin and serial changes to determine if it is an Acute Myocardial Infarction or myocardial injury due to an underlying chronic condition.       Results may be falsely decreased if patient taking Biotin.     RAINBOW DRAW    Narrative:     The following orders were created for panel order [place] Draw.  Procedure                               Abnormality         Status                     ---------                               -----------         ------                     Green Top (Gel)[[id number]]                                   Final result               Lavender Top[[id number]]                                     Final result               Gold Top - SST[[id number]]                                   Final result               Light Blue Top[[id number]]                                   Final result                 Please view results for these tests on the individual orders.   CBC AND DIFFERENTIAL    Narrative:     The following orders were created for panel order CBC & Differential.  Procedure                               Abnormality         Status                     ---------                               -----------         ------                     CBC Auto Differential[[id number]]        Abnormal            Final result               Scan Slide[[id number]]                                                                    Please view results for these tests on the individual orders.   GREEN TOP   LAVENDER TOP   GOLD TOP - SST   LIGHT BLUE TOP       Meds given in ED:   Medications   sodium chloride 0.9 % flush 10 mL (has no administration in time range)   furosemide (LASIX) injection 80 mg (80 mg Intravenous Not Given 9/5/22 1849)           NIH Stroke Scale:       Isolation/Infection(s):  No active isolations   COVID Screen (preop/placement)     COVID Testing  Collected no  Resulted NA    Nursing report ED to floor:  Mental status: A&O  Ambulatory status: Ad david  Precautions: NA    ED nurse phone extentsins- [phone]

## 2022-09-07 LAB
ANION GAP SERPL CALCULATED.3IONS-SCNC: 7 MMOL/L (ref 5–15)
BASOPHILS # BLD MANUAL: 0.08 10*3/MM3 (ref 0–0.2)
BASOPHILS NFR BLD MANUAL: 2 % (ref 0–1.5)
BUN SERPL-MCNC: 23 MG/DL (ref 6–20)
BUN/CREAT SERPL: 16.9 (ref 7–25)
CALCIUM SPEC-SCNC: 8.9 MG/DL (ref 8.6–10.5)
CHLORIDE SERPL-SCNC: 104 MMOL/L (ref 98–107)
CO2 SERPL-SCNC: 31 MMOL/L (ref 22–29)
CREAT SERPL-MCNC: 1.36 MG/DL (ref 0.76–1.27)
DEPRECATED RDW RBC AUTO: 49.1 FL (ref 37–54)
EGFRCR SERPLBLD CKD-EPI 2021: 63 ML/MIN/1.73
EOSINOPHIL # BLD MANUAL: 0.16 10*3/MM3 (ref 0–0.4)
EOSINOPHIL NFR BLD MANUAL: 4 % (ref 0.3–6.2)
ERYTHROCYTE [DISTWIDTH] IN BLOOD BY AUTOMATED COUNT: 14.1 % (ref 12.3–15.4)
GLUCOSE BLDC GLUCOMTR-MCNC: 114 MG/DL (ref 70–130)
GLUCOSE BLDC GLUCOMTR-MCNC: 123 MG/DL (ref 70–130)
GLUCOSE BLDC GLUCOMTR-MCNC: 136 MG/DL (ref 70–130)
GLUCOSE SERPL-MCNC: 101 MG/DL (ref 65–99)
HCT VFR BLD AUTO: 41 % (ref 37.5–51)
HGB BLD-MCNC: 13.1 G/DL (ref 13–17.7)
LYMPHOCYTES # BLD MANUAL: 1.22 10*3/MM3 (ref 0.7–3.1)
LYMPHOCYTES NFR BLD MANUAL: 12 % (ref 5–12)
MCH RBC QN AUTO: 30.4 PG (ref 26.6–33)
MCHC RBC AUTO-ENTMCNC: 32 G/DL (ref 31.5–35.7)
MCV RBC AUTO: 95.1 FL (ref 79–97)
MONOCYTES # BLD: 0.49 10*3/MM3 (ref 0.1–0.9)
NEUTROPHILS # BLD AUTO: 2.12 10*3/MM3 (ref 1.7–7)
NEUTROPHILS NFR BLD MANUAL: 52 % (ref 42.7–76)
PLAT MORPH BLD: NORMAL
PLATELET # BLD AUTO: 118 10*3/MM3 (ref 140–450)
PMV BLD AUTO: 12.8 FL (ref 6–12)
POTASSIUM SERPL-SCNC: 3.7 MMOL/L (ref 3.5–5.2)
RBC # BLD AUTO: 4.31 10*6/MM3 (ref 4.14–5.8)
RBC MORPH BLD: NORMAL
SODIUM SERPL-SCNC: 142 MMOL/L (ref 136–145)
VARIANT LYMPHS NFR BLD MANUAL: 30 % (ref 19.6–45.3)
WBC MORPH BLD: NORMAL
WBC NRBC COR # BLD: 4.08 10*3/MM3 (ref 3.4–10.8)

## 2022-09-07 PROCEDURE — 85007 BL SMEAR W/DIFF WBC COUNT: CPT | Performed by: HOSPITALIST

## 2022-09-07 PROCEDURE — 80048 BASIC METABOLIC PNL TOTAL CA: CPT | Performed by: HOSPITALIST

## 2022-09-07 PROCEDURE — 99214 OFFICE O/P EST MOD 30 MIN: CPT | Performed by: NURSE PRACTITIONER

## 2022-09-07 PROCEDURE — 85025 COMPLETE CBC W/AUTO DIFF WBC: CPT | Performed by: HOSPITALIST

## 2022-09-07 PROCEDURE — G0378 HOSPITAL OBSERVATION PER HR: HCPCS

## 2022-09-07 PROCEDURE — 82962 GLUCOSE BLOOD TEST: CPT

## 2022-09-07 PROCEDURE — 25010000002 FUROSEMIDE PER 20 MG: Performed by: INTERNAL MEDICINE

## 2022-09-07 RX ORDER — CARVEDILOL 6.25 MG/1
6.25 TABLET ORAL 2 TIMES DAILY WITH MEALS
Status: DISCONTINUED | OUTPATIENT
Start: 2022-09-07 | End: 2022-09-10

## 2022-09-07 RX ORDER — BUMETANIDE 0.25 MG/ML
1 INJECTION INTRAMUSCULAR; INTRAVENOUS ONCE
Status: COMPLETED | OUTPATIENT
Start: 2022-09-07 | End: 2022-09-07

## 2022-09-07 RX ORDER — CARVEDILOL 3.12 MG/1
3.12 TABLET ORAL ONCE
Status: COMPLETED | OUTPATIENT
Start: 2022-09-07 | End: 2022-09-07

## 2022-09-07 RX ORDER — BUMETANIDE 0.25 MG/ML
1 INJECTION INTRAMUSCULAR; INTRAVENOUS EVERY 12 HOURS
Status: DISCONTINUED | OUTPATIENT
Start: 2022-09-07 | End: 2022-09-08

## 2022-09-07 RX ADMIN — Medication 12.5 MG: at 08:05

## 2022-09-07 RX ADMIN — SPIRONOLACTONE 25 MG: 25 TABLET ORAL at 08:02

## 2022-09-07 RX ADMIN — ATORVASTATIN CALCIUM 10 MG: 10 TABLET, FILM COATED ORAL at 08:02

## 2022-09-07 RX ADMIN — Medication 10 ML: at 07:59

## 2022-09-07 RX ADMIN — CARVEDILOL 3.12 MG: 3.12 TABLET, FILM COATED ORAL at 08:02

## 2022-09-07 RX ADMIN — APIXABAN 5 MG: 5 TABLET, FILM COATED ORAL at 20:00

## 2022-09-07 RX ADMIN — CARVEDILOL 6.25 MG: 6.25 TABLET, FILM COATED ORAL at 18:13

## 2022-09-07 RX ADMIN — APIXABAN 5 MG: 5 TABLET, FILM COATED ORAL at 08:02

## 2022-09-07 RX ADMIN — POTASSIUM CHLORIDE 10 MEQ: 750 CAPSULE, EXTENDED RELEASE ORAL at 08:02

## 2022-09-07 RX ADMIN — FUROSEMIDE 20 MG: 10 INJECTION, SOLUTION INTRAMUSCULAR; INTRAVENOUS at 07:59

## 2022-09-07 RX ADMIN — POTASSIUM CHLORIDE 10 MEQ: 750 CAPSULE, EXTENDED RELEASE ORAL at 18:13

## 2022-09-07 RX ADMIN — BUMETANIDE 1 MG: 0.25 INJECTION INTRAMUSCULAR; INTRAVENOUS at 18:13

## 2022-09-07 RX ADMIN — BUMETANIDE 1 MG: 0.25 INJECTION INTRAMUSCULAR; INTRAVENOUS at 12:14

## 2022-09-07 RX ADMIN — CARVEDILOL 3.12 MG: 3.12 TABLET, FILM COATED ORAL at 11:25

## 2022-09-07 NOTE — PROGRESS NOTES
"  [facility] [place] Cardiology Progress Note   LOS: 0 days   Patient Care Team:  Shonna Ng APRN as PCP - General (Family Medicine)    Chief Complaint   Patient presents with   • Chest Pain       Subjective     Interval History:   Patient Denies: chest pain, palpitations, dizziness and syncope  Patient Complaints: shortness of air, PND, orthopnea and edema  History taken from: patient chart RN    Vital signs stable overnight, no cardiac ectopy.  He remains in normal sinus rhythm.  He has had 2 pound weight gain overnight.  He is concerned that he is not diuresing.  Creatinine 1.36, cardiac electrolytes within range.    Objective     Vital Sign Min/Max for last 24 hours  Temp  Min: 97.4 °F (36.3 °C)  Max: 97.7 °F (36.5 °C)   BP  Min: 105/74  Max: 122/70   Pulse  Min: 56  Max: 104   Resp  Min: 18  Max: 18   SpO2  Min: 96 %  Max: 99 %   Flow (L/min)  Min: 2  Max: 2   Weight  Min: 141 kg (310 lb 13.6 oz)  Max: 142 kg (312 lb)     Flowsheet Rows    Flowsheet Row First Filed Value   Admission Height 193 cm (76\") Documented at 09/05/2022 1537   Admission Weight 142 kg (313 lb) Documented at 09/05/2022 1537            09/06/22  0500 09/06/22  1430 09/07/22  0416   Weight: (!) 141 kg (309 lb 12.8 oz) (!) 141 kg (310 lb 13.6 oz) (!) 142 kg (312 lb)       Physical Exam:  Physical Exam  Vitals and nursing note reviewed.   Constitutional:       Appearance: Normal appearance. He is obese. He is not ill-appearing or diaphoretic.   HENT:      Head: Normocephalic and atraumatic.      Mouth/Throat:      Mouth: Mucous membranes are moist.      Pharynx: Oropharynx is clear. No oropharyngeal exudate.   Eyes:      General: Lids are normal.      Conjunctiva/sclera:      Right eye: Right conjunctiva is not injected.      Left eye: Left conjunctiva is not injected.   Neck:      Vascular: No JVD.      Trachea: Trachea normal.   Cardiovascular:      Rate and Rhythm: Normal rate and regular rhythm.      Pulses: Normal pulses.      Comments: Faint " heart sounds  Pulmonary:      Effort: Pulmonary effort is normal.      Breath sounds: Normal breath sounds and air entry. No wheezing or rhonchi.   Abdominal:      General: Abdomen is flat.      Comments: He may be holding fluid in his abdominal area, slightly firm   Musculoskeletal:      Right lower leg: No edema.      Left lower leg: No edema.   Skin:     General: Skin is warm and dry.      Capillary Refill: Capillary refill takes less than 2 seconds.   Neurological:      Mental Status: He is alert and oriented to person, place, and time.   Psychiatric:         Attention and Perception: Attention normal.         Mood and Affect: Mood normal.         Thought Content: Thought content normal.          Results Reviewed by myself:     Results from last 7 days   Lab Units 09/07/22  0623 09/06/22  0549 09/05/22  1710 09/02/22  1000   SODIUM mmol/L 142 143 141 140   POTASSIUM mmol/L 3.7 3.5 3.3* 3.2*   CHLORIDE mmol/L 104 103 105 103   CO2 mmol/L 31.0* 31.0* 28.0  --    BUN mg/dL 23* 22* 20 24*   CREATININE mg/dL 1.36* 1.44* 1.46* 1.6*   CALCIUM mg/dL 8.9 8.9 8.8 8.7   BILIRUBIN mg/dL  --   --  1.4* 1.50*   ALK PHOS U/L  --   --  65 66   ALT (SGPT) U/L  --   --  15 21   AST (SGOT) U/L  --   --  14 14*   GLUCOSE mg/dL 101* 131* 144*  --        Estimated Creatinine Clearance: 99.1 mL/min (A) (by C-G formula based on SCr of 1.36 mg/dL (H)).    Results from last 7 days   Lab Units 09/06/22  0549 09/02/22  1000   MAGNESIUM mg/dL 1.9 1.9             Results from last 7 days   Lab Units 09/07/22  0623 09/05/22  1710   WBC 10*3/mm3 4.08 4.20   HEMOGLOBIN g/dL 13.1 13.1   PLATELETS 10*3/mm3 118* 124*       Results from last 7 days   Lab Units 09/02/22  1000   INR  1.57*     Lab Results   Component Value Date    PROBNP 1,707.0 (H) 09/05/2022       I/O last 3 completed shifts:  In: 840 [P.O.:840]  Out: 2125 [Urine:2125]    Cardiographics:  ECG/EMG Results (last 24 hours)     Procedure Component Value Units Date/Time    SCANNED EKG  [[id number]] Resulted: 09/05/22     Updated: 09/06/22 1310          Results for orders placed during the hospital encounter of 02/04/19    Adult Transthoracic Echo Limited W/ Cont if Necessary Per Protocol    Interpretation Summary  · 3D acquisition for left ventricular ejection fraction. Live 3D and full volume to assess left ventricular ejection fraction was utilized.  · Left ventricle systolic function is severely decreased. Estimated LVEF is 10%. Left ventricle cavity severely dilated with restrictive diastolic dysfunction. Findings are consistent with dilated cardiomyopathy.  · Right ventricle is mildly dilated with mildly reduced right ventricular systolic function.  · Moderate mitral valve regurgitation is present.  · Moderate tricuspid valve regurgitation is present. Pulmonary hypertension is present with a PA systolic pressure of 70 mmHg.  · There is mild pulmonic valve regurgitation present.  · There is a trivial pericardial effusion adjacent to the left ventricle      XR Chest 2 View    Result Date: 8/19/2022     Stable chest radiograph from the comparison exam with findings as discussed above. Workstation: [id number]    XR Chest 1 View    Result Date: 9/5/2022  Mild-to-moderate left lung base atelectasis versus infiltrate new since prior. Mild to moderate pulmonary vascular congestion. Electronically signed by:  Pedro Day MD  9/5/2022 5:16 PM CDT Workstation: [id number]    XR Chest 1 View    Result Date: 9/2/2022  1. Cardiomegaly. Central vascular congestion. 2. Left base opacity consistent with some combination of airspace consolidation and effusion. 3. Similar right basilar opacities consistent with airspace consolidation and possible effusion. Workstation: [id number]    XR Chest 1 View    Result Date: 8/18/2022     1. New left chest wall AICD; no pneumothorax. 2. Cardiomegaly with central pulmonary vascular congestion and trace pleural effusions. Workstation: [id number]    XR Chest 1 View    Result  Date: 8/17/2022     Cardiomegaly with central pulmonary vascular congestion and trace pleural effusions. Workstation: [id number]      Medication Review:     Current Facility-Administered Medications:   •  albuterol (PROVENTIL) nebulizer solution 0.083% 2.5 mg/3mL, 2.5 mg, Nebulization, Q4H PRN, Dinesh Lange MD  •  apixaban (ELIQUIS) tablet 5 mg, 5 mg, Oral, Q12H, Susana Berkowitz, APRN, 5 mg at 09/07/22 0802  •  atorvastatin (LIPITOR) tablet 10 mg, 10 mg, Oral, Daily, Dinesh Lange MD, 10 mg at 09/07/22 0802  •  bumetanide (BUMEX) injection 1 mg, 1 mg, Intravenous, Q12H, Susana Berkowitz, APRN  •  carvedilol (COREG) tablet 6.25 mg, 6.25 mg, Oral, BID With Meals, Susana Berkowitz, APRN  •  dextrose (D50W) (25 g/50 mL) IV injection 25 g, 25 g, Intravenous, Q15 Min PRN, Dinesh Lange MD  •  dextrose (GLUTOSE) oral gel 15 g, 15 g, Oral, Q15 Min PRN, Dinesh Lange MD  •  glucagon (human recombinant) (GLUCAGEN DIAGNOSTIC) injection 1 mg, 1 mg, Intramuscular, Q15 Min PRN, Dinesh Lange MD  •  Insulin Aspart (novoLOG) injection 0-7 Units, 0-7 Units, Subcutaneous, TID AC, Dinesh Lange MD  •  losartan (COZAAR) half tablet 12.5 mg, 12.5 mg, Oral, Q24H, Dinesh Lange MD, 12.5 mg at 09/07/22 0805  •  Magnesium Sulfate 2 gram Bolus, followed by 8 gram infusion (total Mg dose 10 grams)- Mg less than or equal to 1mg/dL, 2 g, Intravenous, PRN **OR** Magnesium Sulfate 2 gram / 50mL Infusion (GIVE X 3 BAGS TO EQUAL 6GM TOTAL DOSE) - Mg 1.1 - 1.5 mg/dl, 2 g, Intravenous, PRN **OR** Magnesium Sulfate 4 gram infusion- Mg 1.6-1.9 mg/dL, 4 g, Intravenous, PRN, Dinesh Lange MD  •  potassium chloride (MICRO-K) CR capsule 10 mEq, 10 mEq, Oral, BID With Meals, Dinesh Lange MD, 10 mEq at 09/07/22 0802  •  potassium chloride 10 mEq in 100 mL IVPB, 10 mEq, Intravenous, Q1H PRN, Dinesh Lange MD  •  sodium chloride 0.9 % flush 10 mL, 10 mL, Intravenous, PRN, Dinesh Lange MD  •  sodium chloride 0.9 % flush 10 mL, 10  mL, Intravenous, Q12H, Dinesh Lange MD, 10 mL at 09/07/22 0759  •  sodium chloride 0.9 % flush 10 mL, 10 mL, Intravenous, PRN, Dinesh Lange MD  •  spironolactone (ALDACTONE) tablet 25 mg, 25 mg, Oral, Daily, Susana Berkowitz, APRN, 25 mg at 09/07/22 0802    Patient's recent labs and results as included in the subjective data were reviewed by me. Any pertinent outside data will be included.      Assessment & Plan       Acute on chronic systolic CHF (congestive heart failure) (HCC)    Diabetes mellitus (HCC)    Obesity (BMI 30-39.9)    Hypotension    1. Acute on chronic systolic CHF.    Has not responded well to IV Lasix.  He has increased weight by 2 pounds overnight.  Decreased urinary output.       Denies alcohol use.  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  Has obstructive sleep apnea and has CPAP machine but admits to not wearing it.  He reports to me that he has not been using it.      NICM EF:15-20%. NYHA Class IV, Stage C. Patient is currently volume overloaded.   He had a 2 pound weight gain overnight.  Vital signs are stable and he is well perfused.      BETA-BLOCKER:  Increased to carvedilol 6.25 mg twice daily  ACE/ARB: Losartan 12.5 mg daily  ENTRESTO: Indicated, can consider  DIURETIC:  Changed to Bumex 1 mg IV twice daily .  SGL2I: Can consider  ALDOSTERONE ANTAGONIST: spironolactone 25 mg daily   IMDUR/HYDRALAZINE: N/A  DIGOXIN: N/A     Fluid restriction: 1500 L  Sodium restriction:2 grams     Cardiac Rehab: Can consider, does meet criteria  ICD: yes [facility]  ADHF: yes 9/2 [facility] for Acute CHF     -Recommend daily cardiac electrolytes and renal function.     2. S/P AICD placement in [place] 8/2022.  Had AICD placed at [facility] due to LVEF 15 to 20%.  Left subclavian site almost healed, no infection, drainage, or redness at incision.     Disposition: Continue current location.  We will continue to follow.     3.  Hypotension, resolved.    Blood pressure normotensive  yesterday and today.      -Continue guideline directed heart failure medications.    4.  Pulmonary hypertension.  Last echocardiogram showed pulmonary pressures 70 mmHg.    Continue diuresing, we also discussed CPAP machine should be used because KHANH causes elevated pressures.    Plan for disposition:Where: Continue current location We we will continue to follow.        This document has been electronically signed by SHELL Garcia on September 7, 2022 12:49 CDT   Electronically signed by SHELL Garcia, 09/07/22, 12:50 PM CDT.    I personally saw and examined Matti Bravo after the APRN.  I personally performed a history and physical examination of the patient.  I personally reviewed independent findings and plan of care.  I discussed management with the APRN.  I agree with the APRN's documentation.    No acute overnight events.  He reports not being able to lie down flat yesterday night because of dyspnea.  He remains hemodynamically stable and is on room air.  Net fluid balance was -825 cc in the past 24 hours.  Will switch diuretic therapy to Bumex to further optimize volume status.  Increase carvedilol to optimize goal-directed medical therapy for cardiomyopathy.  Continue losartan and Aldactone at current doses.  Eliquis therapy has been resumed (he was initiated on this at [facility] for LV systolic dysfunction and inability to rule out LV thrombus on echocardiographic studies).  Consider further work-up for thrombocytopenia noted on lab work.    We will continue to follow.  Please feel free to call us with any questions.      Electronically signed by Dylon Aranda MD, 09/07/22, 4:54 PM CDT.

## 2022-09-07 NOTE — PLAN OF CARE
Goal Outcome Evaluation: Continue to diuresis. Lasix changed to bumex today. Increase in coreg.

## 2022-09-07 NOTE — PROGRESS NOTES
[facility] Medicine Services  INPATIENT PROGRESS NOTE    Length of Stay: 0  Date of Admission: 9/5/2022  Primary Care Physician: Shonna Ng APRN    Subjective   Chief Complaint: SOB    HPI:    Patient presents emergency department with complaint of shortness of breath, chest pain, and weakness.  Patient with known congestive heart failure, diabetes, EF of approximately 15 to 20%.  Patient was seen at [facility] in [place] in the last week.  Patient was actually admitted and scheduled to be transferred to [facility], though evidently there was a bed shortage and the patient improved somewhat and went home.  Patient has gone home and been home approximately 12 to 24 hours that with return of his symptoms of shortness of breath and weakness.  Patient arrives with blood pressures in the 60-90 systolic range.  Patient denies dizziness at this time.  Patient is short of breath and is unable to lie flat or add any type of more acute angle.  Patient is most comfortable sitting on the side of the bed with his legs down.  Chronic with acute edema.  Patient notes a weight gain of approximately 10 to 15 pounds in the last week..   Past history of methamphetamine use, he reports quitting 4 years ago     On exam today 9-7-2022  On evaluation the patient is currently resting comfortably in bed.  Opens eyes however is not engaging conversation.  Nursing reports the patient to be stable.  There are no reports of chest pain headache or dizziness.  Remains short of breath on exertion.  He is not having any fevers or chills.  No nausea or vomiting.  No headache.  No cough.  Bilateral lower extremity edema is improving.  Vital signs are stable patient is afebrile we will anticipate discharged home in the a.m.      Review of Systems   Constitutional: Positive for fatigue. Negative for chills and fever.   HENT: Negative.    Eyes: Negative.    Respiratory: Positive for  shortness of breath.    Cardiovascular: Negative.    Gastrointestinal: Negative.    Endocrine: Negative.    Genitourinary: Negative.    Musculoskeletal: Negative.    Neurological: Positive for weakness. Negative for dizziness.   Hematological: Negative.    Psychiatric/Behavioral: Negative.         All pertinent negatives and positives are as above. All other systems have been reviewed and are negative unless otherwise stated.     Objective    Temp:  [97.4 °F (36.3 °C)-97.7 °F (36.5 °C)] 97.6 °F (36.4 °C)  Heart Rate:  [] 56  Resp:  [18] 18  BP: (105-122)/(70-87) 122/70    Physical Exam  Vitals and nursing note reviewed.   Constitutional:       Appearance: He is obese.   HENT:      Head: Normocephalic and atraumatic.      Right Ear: External ear normal.      Left Ear: External ear normal.      Nose: Nose normal.      Mouth/Throat:      Mouth: Mucous membranes are moist.      Pharynx: Oropharynx is clear.   Eyes:      Extraocular Movements: Extraocular movements intact.      Conjunctiva/sclera: Conjunctivae normal.      Pupils: Pupils are equal, round, and reactive to light.   Cardiovascular:      Rate and Rhythm: Normal rate and regular rhythm.      Pulses: Normal pulses.      Heart sounds: Normal heart sounds.   Pulmonary:      Effort: Pulmonary effort is normal.      Comments: Decreased breath sounds at bases bilateral  Abdominal:      General: Bowel sounds are normal.      Palpations: Abdomen is soft.   Musculoskeletal:         General: Normal range of motion.      Cervical back: Normal range of motion and neck supple.      Right lower leg: Edema present.      Left lower leg: Edema present.   Skin:     General: Skin is warm and dry.   Neurological:      General: No focal deficit present.      Mental Status: He is alert.      Motor: Weakness present.   Psychiatric:         Mood and Affect: Mood normal.         Behavior: Behavior normal.           Results Review:  I have reviewed the labs, radiology results,  and diagnostic studies.    Laboratory Data:   Results from last 7 days   Lab Units 09/07/22  0623 09/06/22  0549 09/05/22  1710 09/02/22  1000   SODIUM mmol/L 142 143 141 140   POTASSIUM mmol/L 3.7 3.5 3.3* 3.2*   CHLORIDE mmol/L 104 103 105 103   CO2 mmol/L 31.0* 31.0* 28.0  --    BUN mg/dL 23* 22* 20 24*   CREATININE mg/dL 1.36* 1.44* 1.46* 1.6*   GLUCOSE mg/dL 101* 131* 144*  --    CALCIUM mg/dL 8.9 8.9 8.8 8.7   BILIRUBIN mg/dL  --   --  1.4* 1.50*   ALK PHOS U/L  --   --  65 66   ALT (SGPT) U/L  --   --  15 21   AST (SGOT) U/L  --   --  14 14*   ANION GAP mmol/L 7.0 9.0 8.0  --      Estimated Creatinine Clearance: 99.1 mL/min (A) (by C-G formula based on SCr of 1.36 mg/dL (H)).  Results from last 7 days   Lab Units 09/06/22  0549 09/02/22  1000   MAGNESIUM mg/dL 1.9 1.9         Results from last 7 days   Lab Units 09/07/22  0623 09/05/22  1710   WBC 10*3/mm3 4.08 4.20   HEMOGLOBIN g/dL 13.1 13.1   HEMATOCRIT % 41.0 40.3   PLATELETS 10*3/mm3 118* 124*     Results from last 7 days   Lab Units 09/02/22  1000   INR  1.57*       Culture Data:   No results found for: BLOODCX  No results found for: URINECX  No results found for: RESPCX  No results found for: WOUNDCX  No results found for: STOOLCX  No components found for: BODYFLD    Radiology Data:   Imaging Results (Last 24 Hours)     ** No results found for the last 24 hours. **          I have reviewed the patient's current medications.     Assessment/Plan     Principal Problem:    Acute on chronic systolic CHF (congestive heart failure) (HCC)  Active Problems:    Diabetes mellitus (HCC)    Obesity (BMI 30-39.9)    Hypotension    Acute on chronic systolic heart failure  BETA-BLOCKER: Carvedilol 3.125 mg twice daily  ACE/ARB: Losartan 12.5 mg daily  ENTRESTO: Indicated, can consider  DIURETIC: Lasix 20 mg IV twice daily.  SGL2I: Can consider  ALDOSTERONE ANTAGONIST: Start spironolactone 25 mg daily   IMDUR/HYDRALAZINE: N/A  DIGOXIN: N/A  Fluid restriction: 1500  L  Sodium restriction:2 grams  Remained stable.  Tolerating medication as noted.  We will continue and monitor.    S/P AICD placement in [place] 8/2022.  Had AICD placed at [facility] due to LVEF 15 to 20%.  Left subclavian site almost healed, no infection, drainage, or redness at incision.     NICM EF:15-20%. NYHA Class IV, Stage C.  continue meds as noted above     HLD continue lipitor    HTN continue Losartan.  Current blood pressure 122/70.    CODE STATUS full code    DVT prophylaxis Eliquis.    I confirmed that the patient's Advance Care Plan is present, code status is documented, or surrogate decision maker is listed in the patient's medical record.     Discharge Planning: In progress.  Possible cardiac rehab on discharge.  Anticipate discharge in 24 hours.    Moshe Aleman, DO

## 2022-09-07 NOTE — PROGRESS NOTES
[facility]  INPATIENT PROGRESS NOTE    Length of Stay: 0  Date of Admission: 9/5/2022  Primary Care Physician: Shonna Ng APRN    Subjective   Chief Complaint: SOB    HPI:    Patient presents emergency department with complaint of shortness of breath, chest pain, and weakness.  Patient with known congestive heart failure, diabetes, EF of approximately 15 to 20%.  Patient was seen at [facility] in [place] in the last week.  Patient was actually admitted and scheduled to be transferred to [facility], though evidently there was a bed shortage and the patient improved somewhat and went home.  Patient has gone home and been home approximately 12 to 24 hours that with return of his symptoms of shortness of breath and weakness.  Patient arrives with blood pressures in the 60-90 systolic range.  Patient denies dizziness at this time.  Patient is short of breath and is unable to lie flat or add any type of more acute angle.  Patient is most comfortable sitting on the side of the bed with his legs down.  Chronic with acute edema.  Patient notes a weight gain of approximately 10 to 15 pounds in the last week..   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  He is not able to lie flat and has not been able to lie flat since the diagnosis of congestive heart failure.  He reports lower leg edema increasing, fatigue, and scrotal edema.  [facility]  Denies any CP. Continues to have edema BL LE He feels that he is unable to lay in bed comfortably. Will increase Lasix at this time. Denies any fevers or chills.       Review of Systems   Constitutional: Positive for fatigue.   HENT: Negative.    Eyes: Negative.    Respiratory: Positive for shortness of breath.     Cardiovascular: Negative.    Gastrointestinal: Negative.    Endocrine: Negative.    Genitourinary: Negative.    Musculoskeletal: Negative.    Neurological: Positive for weakness.   Hematological: Negative.    Psychiatric/Behavioral: Negative.         All pertinent negatives and positives are as above. All other systems have been reviewed and are negative unless otherwise stated.     Objective    Temp:  [97.4 °F (36.3 °C)-98 °F (36.7 °C)] 97.4 °F (36.3 °C)  Heart Rate:  [] 73  Resp:  [18] 18  BP: (105-136)/(70-87) 121/87    Physical Exam  Vitals and nursing note reviewed.   Constitutional:       Appearance: He is obese.   HENT:      Head: Normocephalic and atraumatic.      Right Ear: External ear normal.      Left Ear: External ear normal.      Nose: Nose normal.      Mouth/Throat:      Mouth: Mucous membranes are moist.      Pharynx: Oropharynx is clear.   Eyes:      Extraocular Movements: Extraocular movements intact.      Conjunctiva/sclera: Conjunctivae normal.      Pupils: Pupils are equal, round, and reactive to light.   Cardiovascular:      Rate and Rhythm: Normal rate and regular rhythm.      Pulses: Normal pulses.      Heart sounds: Normal heart sounds.   Pulmonary:      Effort: Pulmonary effort is normal.      Breath sounds: Rales present.   Abdominal:      General: Bowel sounds are normal.      Palpations: Abdomen is soft.   Musculoskeletal:         General: Normal range of motion.      Cervical back: Normal range of motion and neck supple.      Right lower leg: Edema present.      Left lower leg: Edema present.   Skin:     General: Skin is warm and dry.   Neurological:      General: No focal deficit present.      Mental Status: He is alert.      Motor: Weakness present.   Psychiatric:         Mood and Affect: Mood normal.         Behavior: Behavior normal.           Results Review:  I have reviewed the labs, radiology results, and diagnostic studies.    Laboratory Data:   Results from last 7 days    Lab Units 09/06/22  0549 09/05/22  1710 09/02/22  1000   SODIUM mmol/L 143 141 140   POTASSIUM mmol/L 3.5 3.3* 3.2*   CHLORIDE mmol/L 103 105 103   CO2 mmol/L 31.0* 28.0  --    BUN mg/dL 22* 20 24*   CREATININE mg/dL 1.44* 1.46* 1.6*   GLUCOSE mg/dL 131* 144*  --    CALCIUM mg/dL 8.9 8.8 8.7   BILIRUBIN mg/dL  --  1.4* 1.50*   ALK PHOS U/L  --  65 66   ALT (SGPT) U/L  --  15 21   AST (SGOT) U/L  --  14 14*   ANION GAP mmol/L 9.0 8.0  --      Estimated Creatinine Clearance: 92.7 mL/min (A) (by C-G formula based on SCr of 1.44 mg/dL (H)).  Results from last 7 days   Lab Units 09/06/22  0549 09/02/22  1000   MAGNESIUM mg/dL 1.9 1.9         Results from last 7 days   Lab Units 09/05/22  1710   WBC 10*3/mm3 4.20   HEMOGLOBIN g/dL 13.1   HEMATOCRIT % 40.3   PLATELETS 10*3/mm3 124*     Results from last 7 days   Lab Units 09/02/22  1000   INR  1.57*       Culture Data:   No results found for: BLOODCX  No results found for: URINECX  No results found for: RESPCX  No results found for: WOUNDCX  No results found for: STOOLCX  No components found for: BODYFLD    Radiology Data:   Imaging Results (Last 24 Hours)     ** No results found for the last 24 hours. **          I have reviewed the patient's current medications.     Assessment/Plan     Principal Problem:    Acute on chronic systolic CHF (congestive heart failure) ([facility])  Active Problems:    Diabetes mellitus (HCC)    Obesity (BMI 30-39.9)    Hypotension    Acute on chronic systolic heart failure  BETA-BLOCKER: Carvedilol 3.125 mg twice daily  ACE/ARB: Losartan 12.5 mg daily  ENTRESTO: Indicated, can consider  DIURETIC: Lasix 20 mg IV twice daily.  SGL2I: Can consider  ALDOSTERONE ANTAGONIST: Start spironolactone 25 mg daily   IMDUR/HYDRALAZINE: N/A  DIGOXIN: N/A  Fluid restriction: 1500 L  Sodium restriction:2 grams    S/P AICD placement in [place] 8/2022.  Had AICD placed at [facility] due to LVEF 15 to 20%.  Left subclavian site almost healed, no infection,  drainage, or redness at incision.     NICM EF:15-20%. NYHA Class IV, Stage C.  continue meds as noted above     HLD continue lipitor    HTN continue Losartan     I confirmed that the patient's Advance Care Plan is present, code status is documented, or surrogate decision maker is listed in the patient's medical record.     Discharge Planning: In progress  Moshe Aleman, DO

## 2022-09-07 NOTE — PROGRESS NOTES
Adult Nutrition  Assessment    Patient Name:  Matti Bravo  YOB: 1970  MRN: [id number]  Admit Date:  9/5/2022    Assessment Date: 9/7/2022:    Comments:    Pt insisted to provide Low sodium diet information.  He reports that he does limit high sodium foods and does not use extra salt.  Rd provided diet education on Low sodium, fluid restrictions; and DM.  Diet copies and contact number provided for home use.  Pt eating well with no GI distress; NKFA or eating difficulties.  Will monitor POC.             Reason for Assessment     Row Name 09/07/22 1228          Reason for Assessment    Reason For Assessment per organizational policy     Identified At Risk by Screening Criteria need for education                Nutrition/Diet History     Row Name 09/07/22 1228          Nutrition/Diet History    Typical Intake (Food/Fluid/EN/PN) Pt claims that he follows a Low sodim diet at home--avoids the salt shaker                Labs/Tests/Procedures/Meds     Row Name 09/07/22 1228          Labs/Procedures/Meds    Lab Results Reviewed reviewed, pertinent     Lab Results Comments Gluc 136; Bun 23; Creat 1.36; Plateltes 118; Alb 3.30; TBili 1.4            Diagnostic Tests/Procedures    Diagnostic Test/Procedure Reviewed reviewed, pertinent     Diagnostic Test/Procedures Comments Diuresis            Medications    Pertinent Medications Reviewed reviewed, pertinent     Pertinent Medications Comments Bumex; SSI; Kcl; Aldactone                    Nutrition Prescription Ordered     Row Name 09/07/22 1230          Nutrition Prescription PO    Current PO Diet Regular     Common Modifiers Cardiac;Fluid Restriction     Fluid Restriction mL per Tray 250 mL     Fluid Restriction mL per Day Other (comment)  1000cc                Evaluation of Received Nutrient/Fluid Intake     Row Name 09/07/22 1231          PO Evaluation    Number of Meals 3     % PO Intake 100%                   Electronically signed by:  Maria Guadalupe SIEGEL  Edgar, RD  09/07/22 12:36 CDT

## 2022-09-08 LAB
ANION GAP SERPL CALCULATED.3IONS-SCNC: 9 MMOL/L (ref 5–15)
BUN SERPL-MCNC: 21 MG/DL (ref 6–20)
BUN/CREAT SERPL: 21.9 (ref 7–25)
CALCIUM SPEC-SCNC: 8.4 MG/DL (ref 8.6–10.5)
CHLORIDE SERPL-SCNC: 103 MMOL/L (ref 98–107)
CO2 SERPL-SCNC: 29 MMOL/L (ref 22–29)
CREAT SERPL-MCNC: 0.96 MG/DL (ref 0.76–1.27)
EGFRCR SERPLBLD CKD-EPI 2021: 95.7 ML/MIN/1.73
GLUCOSE BLDC GLUCOMTR-MCNC: 109 MG/DL (ref 70–130)
GLUCOSE BLDC GLUCOMTR-MCNC: 109 MG/DL (ref 70–130)
GLUCOSE BLDC GLUCOMTR-MCNC: 119 MG/DL (ref 70–130)
GLUCOSE BLDC GLUCOMTR-MCNC: 128 MG/DL (ref 70–130)
GLUCOSE SERPL-MCNC: 148 MG/DL (ref 65–99)
POTASSIUM SERPL-SCNC: 3.5 MMOL/L (ref 3.5–5.2)
SODIUM SERPL-SCNC: 141 MMOL/L (ref 136–145)

## 2022-09-08 PROCEDURE — 80048 BASIC METABOLIC PNL TOTAL CA: CPT | Performed by: NURSE PRACTITIONER

## 2022-09-08 PROCEDURE — 97165 OT EVAL LOW COMPLEX 30 MIN: CPT

## 2022-09-08 PROCEDURE — 82962 GLUCOSE BLOOD TEST: CPT

## 2022-09-08 PROCEDURE — G0378 HOSPITAL OBSERVATION PER HR: HCPCS

## 2022-09-08 PROCEDURE — 99214 OFFICE O/P EST MOD 30 MIN: CPT | Performed by: NURSE PRACTITIONER

## 2022-09-08 RX ORDER — SPIRONOLACTONE 50 MG/1
50 TABLET, FILM COATED ORAL DAILY
Status: DISCONTINUED | OUTPATIENT
Start: 2022-09-09 | End: 2022-09-09

## 2022-09-08 RX ORDER — BUMETANIDE 0.25 MG/ML
2 INJECTION INTRAMUSCULAR; INTRAVENOUS EVERY 12 HOURS
Status: DISCONTINUED | OUTPATIENT
Start: 2022-09-08 | End: 2022-09-10

## 2022-09-08 RX ORDER — BUMETANIDE 0.25 MG/ML
1 INJECTION INTRAMUSCULAR; INTRAVENOUS ONCE
Status: COMPLETED | OUTPATIENT
Start: 2022-09-08 | End: 2022-09-08

## 2022-09-08 RX ADMIN — Medication 10 ML: at 07:45

## 2022-09-08 RX ADMIN — CARVEDILOL 6.25 MG: 6.25 TABLET, FILM COATED ORAL at 17:19

## 2022-09-08 RX ADMIN — POTASSIUM CHLORIDE 10 MEQ: 750 CAPSULE, EXTENDED RELEASE ORAL at 07:44

## 2022-09-08 RX ADMIN — BUMETANIDE 1 MG: 0.25 INJECTION INTRAMUSCULAR; INTRAVENOUS at 06:02

## 2022-09-08 RX ADMIN — SPIRONOLACTONE 25 MG: 25 TABLET ORAL at 07:43

## 2022-09-08 RX ADMIN — BUMETANIDE 1 MG: 0.25 INJECTION, SOLUTION INTRAMUSCULAR; INTRAVENOUS at 12:02

## 2022-09-08 RX ADMIN — Medication 12.5 MG: at 07:43

## 2022-09-08 RX ADMIN — BUMETANIDE 2 MG: 0.25 INJECTION INTRAMUSCULAR; INTRAVENOUS at 17:19

## 2022-09-08 RX ADMIN — APIXABAN 5 MG: 5 TABLET, FILM COATED ORAL at 20:26

## 2022-09-08 RX ADMIN — ATORVASTATIN CALCIUM 10 MG: 10 TABLET, FILM COATED ORAL at 07:43

## 2022-09-08 RX ADMIN — CARVEDILOL 6.25 MG: 6.25 TABLET, FILM COATED ORAL at 07:43

## 2022-09-08 RX ADMIN — APIXABAN 5 MG: 5 TABLET, FILM COATED ORAL at 07:43

## 2022-09-08 RX ADMIN — POTASSIUM CHLORIDE 10 MEQ: 750 CAPSULE, EXTENDED RELEASE ORAL at 17:19

## 2022-09-08 NOTE — PROGRESS NOTES
"  [facility] Cardiology Progress Note   LOS: 0 days   Patient Care Team:  Shonna Ng APRN as PCP - General (Family Medicine)    Chief Complaint   Patient presents with   • Chest Pain       Subjective     Interval History:   Patient Denies: chest pain, palpitations, dizziness and syncope  Patient Complaints: shortness of air, PND, orthopnea and edema  History taken from: patient chart RN    Vital signs stable overnight, PVC's noted on telemetry. NSR. He has not diuresed with Bumex. We will increase dosage to 2 mg bid.  He reports no improvement in breathing or LLE. Creatinine is improving.    Objective     Vital Sign Min/Max for last 24 hours  Temp  Min: 97.3 °F (36.3 °C)  Max: 98 °F (36.7 °C)   BP  Min: 110/70  Max: 134/68   Pulse  Min: 52  Max: 94   Resp  Min: 18  Max: 18   SpO2  Min: 96 %  Max: 99 %   No data recorded   Weight  Min: 143 kg (315 lb)  Max: 143 kg (315 lb)     Flowsheet Rows    Flowsheet Row First Filed Value   Admission Height 193 cm (76\") Documented at 09/05/2022 1537   Admission Weight 142 kg (313 lb) Documented at 09/05/2022 1537            09/06/22  1430 09/07/22  0416 09/08/22  0500   Weight: (!) 141 kg (310 lb 13.6 oz) (!) 142 kg (312 lb) (!) 143 kg (315 lb)       Physical Exam:  Physical Exam  Vitals and nursing note reviewed.   Constitutional:       Appearance: Normal appearance. He is obese. He is not ill-appearing or diaphoretic.   HENT:      Head: Normocephalic and atraumatic.      Mouth/Throat:      Mouth: Mucous membranes are moist.      Pharynx: Oropharynx is clear. No oropharyngeal exudate.   Eyes:      General: Lids are normal.      Conjunctiva/sclera:      Right eye: Right conjunctiva is not injected.      Left eye: Left conjunctiva is not injected.   Neck:      Vascular: No JVD.      Trachea: Trachea normal.   Cardiovascular:      Rate and Rhythm: Normal rate and regular rhythm.      Pulses: Normal pulses.      Comments: Faint heart sounds  Pulmonary:      Effort: Pulmonary effort " is normal.      Breath sounds: Normal breath sounds and air entry. No wheezing or rhonchi.   Abdominal:      General: Abdomen is protuberant.      Palpations: Abdomen is soft.   Musculoskeletal:      Right lower le+ Edema present.      Left lower le+ Edema present.   Skin:     General: Skin is warm and dry.      Capillary Refill: Capillary refill takes less than 2 seconds.   Neurological:      Mental Status: He is alert and oriented to person, place, and time.   Psychiatric:         Attention and Perception: Attention normal.         Mood and Affect: Mood normal.         Thought Content: Thought content normal.          Results Reviewed by myself:     Results from last 7 days   Lab Units 22  0903 22  0623 22  0549 22  1710 22  17122  1000   SODIUM mmol/L 141 142 143   < > 141 140   POTASSIUM mmol/L 3.5 3.7 3.5   < > 3.3* 3.2*   CHLORIDE mmol/L 103 104 103   < > 105 103   CO2 mmol/L 29.0 31.0* 31.0*   < > 28.0  --    BUN mg/dL 21* 23* 22*   < > 20 24*   CREATININE mg/dL 0.96 1.36* 1.44*   < > 1.46* 1.6*   CALCIUM mg/dL 8.4* 8.9 8.9   < > 8.8 8.7   BILIRUBIN mg/dL  --   --   --   --  1.4* 1.50*   ALK PHOS U/L  --   --   --   --  65 66   ALT (SGPT) U/L  --   --   --   --  15 21   AST (SGOT) U/L  --   --   --   --  14 14*   GLUCOSE mg/dL 148* 101* 131*   < > 144*  --     < > = values in this interval not displayed.       Estimated Creatinine Clearance: 140.4 mL/min (by C-G formula based on SCr of 0.96 mg/dL).    Results from last 7 days   Lab Units 22  0549 22  1000   MAGNESIUM mg/dL 1.9 1.9             Results from last 7 days   Lab Units 22  0623 22  1710   WBC 10*3/mm3 4.08 4.20   HEMOGLOBIN g/dL 13.1 13.1   PLATELETS 10*3/mm3 118* 124*       Results from last 7 days   Lab Units 22  1000   INR  1.57*     Lab Results   Component Value Date    PROBNP 1,707.0 (H) 2022       I/O last 3 completed shifts:  In: 1060 [P.O.:1060]  Out: 2800  [Urine:2800]    Cardiographics:  ECG/EMG Results (last 24 hours)     Procedure Component Value Units Date/Time    SCANNED EKG [[id number]] Resulted: 09/05/22     Updated: 09/06/22 1310          Results for orders placed during the hospital encounter of 02/04/19    Adult Transthoracic Echo Limited W/ Cont if Necessary Per Protocol    Interpretation Summary  · 3D acquisition for left ventricular ejection fraction. Live 3D and full volume to assess left ventricular ejection fraction was utilized.  · Left ventricle systolic function is severely decreased. Estimated LVEF is 10%. Left ventricle cavity severely dilated with restrictive diastolic dysfunction. Findings are consistent with dilated cardiomyopathy.  · Right ventricle is mildly dilated with mildly reduced right ventricular systolic function.  · Moderate mitral valve regurgitation is present.  · Moderate tricuspid valve regurgitation is present. Pulmonary hypertension is present with a PA systolic pressure of 70 mmHg.  · There is mild pulmonic valve regurgitation present.  · There is a trivial pericardial effusion adjacent to the left ventricle      XR Chest 2 View    Result Date: 8/19/2022     Stable chest radiograph from the comparison exam with findings as discussed above. Workstation: [id number]    XR Chest 1 View    Result Date: 9/5/2022  Mild-to-moderate left lung base atelectasis versus infiltrate new since prior. Mild to moderate pulmonary vascular congestion. Electronically signed by:  Pedro Day MD  9/5/2022 5:16 PM CDT Workstation: [id number]    XR Chest 1 View    Result Date: 9/2/2022  1. Cardiomegaly. Central vascular congestion. 2. Left base opacity consistent with some combination of airspace consolidation and effusion. 3. Similar right basilar opacities consistent with airspace consolidation and possible effusion. Workstation: [id number]    XR Chest 1 View    Result Date: 8/18/2022     1. New left chest wall AICD; no pneumothorax. 2. Cardiomegaly  with central pulmonary vascular congestion and trace pleural effusions. Workstation: [id number]    XR Chest 1 View    Result Date: 8/17/2022     Cardiomegaly with central pulmonary vascular congestion and trace pleural effusions. Workstation: [id number]      Medication Review:     Current Facility-Administered Medications:   •  albuterol (PROVENTIL) nebulizer solution 0.083% 2.5 mg/3mL, 2.5 mg, Nebulization, Q4H PRN, Dinesh Lange MD  •  apixaban (ELIQUIS) tablet 5 mg, 5 mg, Oral, Q12H, Susana Berkowitz APRN, 5 mg at 09/08/22 0743  •  atorvastatin (LIPITOR) tablet 10 mg, 10 mg, Oral, Daily, Dinesh Lange MD, 10 mg at 09/08/22 0743  •  bumetanide (BUMEX) injection 1 mg, 1 mg, Intravenous, Q12H, Susana Berkowitz APRN, 1 mg at 09/08/22 0602  •  carvedilol (COREG) tablet 6.25 mg, 6.25 mg, Oral, BID With Meals, Susana Berkowitz APRN, 6.25 mg at 09/08/22 0743  •  dextrose (D50W) (25 g/50 mL) IV injection 25 g, 25 g, Intravenous, Q15 Min PRN, Dinesh Lange MD  •  dextrose (GLUTOSE) oral gel 15 g, 15 g, Oral, Q15 Min PRN, Dinesh Lange MD  •  glucagon (human recombinant) (GLUCAGEN DIAGNOSTIC) injection 1 mg, 1 mg, Intramuscular, Q15 Min PRN, Dinesh Lange MD  •  Insulin Aspart (novoLOG) injection 0-7 Units, 0-7 Units, Subcutaneous, TID AC, Dinesh Lange MD  •  losartan (COZAAR) half tablet 12.5 mg, 12.5 mg, Oral, Q24H, Dinesh Lange MD, 12.5 mg at 09/08/22 0743  •  Magnesium Sulfate 2 gram Bolus, followed by 8 gram infusion (total Mg dose 10 grams)- Mg less than or equal to 1mg/dL, 2 g, Intravenous, PRN **OR** Magnesium Sulfate 2 gram / 50mL Infusion (GIVE X 3 BAGS TO EQUAL 6GM TOTAL DOSE) - Mg 1.1 - 1.5 mg/dl, 2 g, Intravenous, PRN **OR** Magnesium Sulfate 4 gram infusion- Mg 1.6-1.9 mg/dL, 4 g, Intravenous, PRN, Dinesh Lange MD  •  potassium chloride (MICRO-K) CR capsule 10 mEq, 10 mEq, Oral, BID With Meals, Dinesh Lange MD, 10 mEq at 09/08/22 0744  •  potassium chloride 10 mEq in 100 mL IVPB,  10 mEq, Intravenous, Q1H PRN, Dinesh Lange MD  •  sodium chloride 0.9 % flush 10 mL, 10 mL, Intravenous, PRN, Dinesh Lange MD  •  sodium chloride 0.9 % flush 10 mL, 10 mL, Intravenous, Q12H, Dinesh Lange MD, 10 mL at 09/08/22 0745  •  sodium chloride 0.9 % flush 10 mL, 10 mL, Intravenous, PRN, Dinesh Lange MD  •  spironolactone (ALDACTONE) tablet 25 mg, 25 mg, Oral, Daily, Susana Berkowitz, APRN, 25 mg at 09/08/22 0743    Patient's recent labs and results as included in the subjective data were reviewed by me. Any pertinent outside data will be included.      Assessment & Plan       Acute on chronic systolic CHF (congestive heart failure) (HCC)    Diabetes mellitus (HCC)    Obesity (BMI 30-39.9)    Hypotension    1. Acute on chronic systolic CHF.   Changed to IV Bumex yesterday. Still not diuresing well. We will increase dosage of diuretics.        NICM EF:15-20%. NYHA Class IV, Stage C. Patient is currently volume overloaded.   He has gained more weight.  Vital signs are stable and he is well perfused.      BETA-BLOCKER:  carvedilol 6.25 mg twice daily  ACE/ARB: Losartan 12.5 mg daily  ENTRESTO: Indicated, can consider-however he reports that it drops his blood pressure  DIURETIC:  Increased to Bumex 2 mg bid  SGL2I: can start as outpt  ALDOSTERONE ANTAGONIST: increase spironolactone to 50 mg daily   IMDUR/HYDRALAZINE: N/A  DIGOXIN: N/A     Fluid restriction: 1500 L  Sodium restriction:2 grams     Cardiac Rehab: Can consider, does meet criteria  ICD: yes [facility]  ADHF: yes 9/2 [facility] for Acute CHF     -Recommend daily cardiac electrolytes and renal function.     2. S/P AICD placement in [place] 8/2022.  Had AICD placed at [facility] due to LVEF 15 to 20%.  Left subclavian site almost healed, no infection, drainage, or redness at incision.    3. Poor LV function.  Continue Eliquis 5 mg BID     4.  Pulmonary hypertension.  Last echocardiogram showed pulmonary pressures 70 mmHg.    Continue  diuresing, we also discussed CPAP machine should be used because KHANH causes elevated pressures.    Plan for disposition:Where: Continue current location We we will continue to follow.      This document has been electronically signed by SHELL Garcia on September 8, 2022 11:39 CDT   Electronically signed by SHELL Garcia, 09/08/22, 11:39 PM CDT.    I personally saw and examined Matti Bravo after the APRN.  I personally performed a history and physical examination of the patient.  I personally reviewed independent findings and plan of care.  I discussed management with the APRN.  I agree with the APRN's documentation.    No acute events overnight.  Net fluid balance was -1.065 L in the last 24 hours.  Will continue to optimize goal-directed medical therapy for cardiomyopathy as detailed above.  Increase Bumex.  Continue Eliquis.    Electronically signed by Dylon Aranda MD, 09/08/22, 5:43 PM CDT.

## 2022-09-08 NOTE — THERAPY EVALUATION
Patient Name: Matti Bravo  : 1970    MRN: [id number]                              Today's Date: 2022       Admit Date: 2022    Visit Dx:     ICD-10-CM ICD-9-CM   1. Acute congestive heart failure, unspecified heart failure type (HCC)  I50.9 428.0   2. Dyspnea, unspecified type  R06.00 786.09   3. Chest pain, unspecified type  R07.9 786.50   4. Hypotension, unspecified hypotension type  I95.9 458.9   5. Impaired mobility and ADLs  Z74.09 V49.89    Z78.9      Patient Active Problem List   Diagnosis   • Acute on chronic systolic CHF (congestive heart failure) (HCC)   • Essential hypertension   • Mixed hyperlipidemia   • KHANH (obstructive sleep apnea)   • Morbid obesity (HCC)   • Restrictive lung disease secondary to obesity   • Multiple lung nodules on CT   • Diabetes mellitus (HCC)   • Varicose veins of both lower extremities with pain   • Venous insufficiency (chronic) (peripheral)   • Surgical aftercare, circulatory system   • Chest pain   • Acute on chronic congestive heart failure (HCC)   • Abnormal nuclear stress test   • Acute congestive heart failure (HCC)   • Obesity (BMI 30-39.9)   • Hypotension     Past Medical History:   Diagnosis Date   • Asthma    • Chronic systolic heart failure (HCC)    • Diabetes mellitus (HCC)    • Hyperlipidemia    • Hypertension    • Obesity    • Peripheral vascular disease (HCC)    • Sleep apnea      Past Surgical History:   Procedure Laterality Date   • CARDIAC CATHETERIZATION N/A 2021   • CARDIAC DEFIBRILLATOR PLACEMENT     • VARICOSE VEIN SURGERY Right 2019      General Information     Row Name 22 1442          OT Time and Intention    Document Type evaluation  -SA     Mode of Treatment individual therapy;occupational therapy  -SA     Row Name 22 1442          General Information    Patient Profile Reviewed yes  -SA     Prior Level of Function independent:;feeding;grooming;dressing;bathing;home  management;cooking;cleaning;driving;shopping;using stairs;work;community mobility;yard work  -     Existing Precautions/Restrictions pacemaker  -     Barriers to Rehab none identified  -     Row Name 09/08/22 1442          Living Environment    People in Home spouse  -     Row Name 09/08/22 1442          Home Main Entrance    Number of Stairs, Main Entrance six  -SA     Stair Railings, Main Entrance railing on right side (ascending)  -     Row Name 09/08/22 1442          Stairs Within Home, Primary    Number of Stairs, Within Home, Primary none  -SA     Stair Railings, Within Home, Primary none  -     Row Name 09/08/22 1442          Cognition    Orientation Status (Cognition) oriented x 4  -SA           User Key  (r) = Recorded By, (t) = Taken By, (c) = Cosigned By    Initials Name Provider Type    Cheryl Quevedo OT Occupational Therapist                 Mobility/ADL's     Row Name 09/08/22 1442          Bed Mobility    Bed Mobility bed mobility (all) activities  -     All Activities, [place] (Bed Mobility) independent  -     Row Name 09/08/22 1442          Transfers    Transfers sit-stand transfer;stand-sit transfer  -     Sit-Stand [place] (Transfers) independent  -     Stand-Sit [place] (Transfers) independent  -     Row Name 09/08/22 1442          Activities of Daily Living    BADL Assessment/Intervention lower body dressing;toileting  -[facility] Name 09/08/22 1442          Lower Body Dressing Assessment/Training    [place] Level (Lower Body Dressing) doff;don;socks;independent  -     Position (Lower Body Dressing) edge of bed sitting  -[facility] Name 09/08/22 1442          Toileting Assessment/Training    [place] Level (Toileting) toileting skills;independent  -     Position (Toileting) unsupported standing  -           User Key  (r) = Recorded By, (t) = Taken By, (c) = Cosigned By    Initials Name Provider Type    Cheryl Quevedo OT Occupational  Therapist               Obj/Interventions     [facility] Name 09/08/22 1442          Sensory Assessment (Somatosensory)    Sensory Assessment (Somatosensory) UE sensation intact  -[facility] Name 09/08/22 1442          Range of Motion Comprehensive    General Range of Motion bilateral upper extremity ROM WFL  -     Comment, General Range of Motion L shoulder not tested due to pacemaker  -[facility] Name 09/08/22 1442          Strength Comprehensive (MMT)    General Manual Muscle Testing (MMT) Assessment other (see comments)  -     Comment, General Manual Muscle Testing (MMT) Assessment BUE MMT 5/5 : Except L shoulder not tested due to pacemaker  -           User Key  (r) = Recorded By, (t) = Taken By, (c) = Cosigned By    Initials Name Provider Type    Cheryl Quevedo OT Occupational Therapist               Goals/Plan    No documentation.                Clinical Impression     Row Name 09/08/22 1442          Pain Assessment    Pretreatment Pain Rating 0/10 - no pain  -     Posttreatment Pain Rating 0/10 - no pain  -SA     Row Name 09/08/22 1442          Plan of Care Review    Plan of Care Reviewed With patient  -     Outcome Evaluation OT Eval completed. Pt agreeable to therapy. Pt Ind bed mobility, transfers, and LB dressing. Pt reports he is functioning at baseline when it comes to ADLs, IADLs, and transfers. No skilled IP/OT needed at this time. Please re-order if anything changes. Will sign off at this time  -[facility] Name 09/08/22 1442          Therapy Assessment/Plan (OT)    Patient/Family Therapy Goal Statement (OT) return home  -     Criteria for Skilled Therapeutic Interventions Met (OT) no problems identified which require skilled intervention  -     Therapy Frequency (OT) evaluation only  -SA     Row Name 09/08/22 1442          Therapy Plan Review/Discharge Plan (OT)    Anticipated Discharge Disposition (OT) home  -SA     Row Name 09/08/22 1442          Vital Signs    Pre Systolic BP Rehab 103   -SA     Pre Treatment Diastolic BP 73  -SA     Pretreatment Heart Rate (beats/min) 87  -SA     Pre SpO2 (%) 92  -SA     Pre Patient Position Supine  -SA     Row Name 09/08/22 1442          Positioning and Restraints    Pre-Treatment Position in bed  -SA     Post Treatment Position bed  -SA     In Bed notified nsg;call light within reach;sitting EOB;encouraged to call for assist  -SA           User Key  (r) = Recorded By, (t) = Taken By, (c) = Cosigned By    Initials Name Provider Type    Cheryl Quevedo OT Occupational Therapist               Outcome Measures     Row Name 09/08/22 1442          How much help from another is currently needed...    Putting on and taking off regular lower body clothing? 4  -SA     Bathing (including washing, rinsing, and drying) 4  -SA     Toileting (which includes using toilet bed pan or urinal) 4  -SA     Putting on and taking off regular upper body clothing 4  -SA     Taking care of personal grooming (such as brushing teeth) 4  -SA     Eating meals 4  -SA     AM-PAC 6 Clicks Score (OT) 24  -SA     Row Name 09/08/22 0730          How much help from another person do you currently need...    Turning from your back to your side while in flat bed without using bedrails? 4  -AW     Moving from lying on back to sitting on the side of a flat bed without bedrails? 4  -AW     Moving to and from a bed to a chair (including a wheelchair)? 4  -AW     Standing up from a chair using your arms (e.g., wheelchair, bedside chair)? 4  -AW     Climbing 3-5 steps with a railing? 4  -AW     To walk in hospital room? 4  -AW     AM-PAC 6 Clicks Score (PT) 24  -AW     Row Name 09/08/22 1442          Functional Assessment    Outcome Measure Options AM-PAC 6 Clicks Daily Activity (OT)  -SA           User Key  (r) = Recorded By, (t) = Taken By, (c) = Cosigned By    Initials Name Provider Type    Val Clayton, RN Registered Nurse    Cheryl Quevedo OT Occupational Therapist                 Occupational Therapy Education                 Title: PT OT SLP Therapies (In Progress)     Topic: Occupational Therapy (In Progress)     Point: ADL training (Done)     Description:   Instruct learner(s) on proper safety adaptation and remediation techniques during self care or transfers.   Instruct in proper use of assistive devices.              Learning Progress Summary           Patient Acceptance, E,TB, VU by  at 9/8/2022 1442    Comment: OT POC, Role of OT, transfer training                   Point: Home exercise program (Not Started)     Description:   Instruct learner(s) on appropriate technique for monitoring, assisting and/or progressing therapeutic exercises/activities.              Learner Progress:  Not documented in this visit.          Point: Precautions (Done)     Description:   Instruct learner(s) on prescribed precautions during self-care and functional transfers.              Learning Progress Summary           Patient Acceptance, E,TB, VU by  at 9/8/2022 1442    Comment: OT POC, Role of OT, transfer training                   Point: Body mechanics (Done)     Description:   Instruct learner(s) on proper positioning and spine alignment during self-care, functional mobility activities and/or exercises.              Learning Progress Summary           Patient Acceptance, E,TB, VU by  at 9/8/2022 1442    Comment: OT POC, Role of OT, transfer training                               User Key     Initials Effective Dates Name Provider Type Discipline     08/11/22 -  Cheryl Ahumada OT Occupational Therapist OT              OT Recommendation and Plan  Therapy Frequency (OT): evaluation only  Plan of Care Review  Plan of Care Reviewed With: patient  Outcome Evaluation: OT Eval completed. Pt agreeable to therapy. Pt Ind bed mobility, transfers, and LB dressing. Pt reports he is functioning at baseline when it comes to ADLs, IADLs, and transfers. No skilled IP/OT needed at this time. Please  re-order if anything changes. Will sign off at this time     Time Calculation:    Time Calculation- OT     Row Name 09/08/22 1442             Time Calculation- OT    OT Start Time 1436  -SA      OT Stop Time 1500  -SA      OT Time Calculation (min) 24 min  -SA      OT Received On 09/08/22  -SA              Untimed Charges    OT Eval/Re-eval Minutes 24  -SA              Total Minutes    Untimed Charges Total Minutes 24  -SA       Total Minutes 24  -SA            User Key  (r) = Recorded By, (t) = Taken By, (c) = Cosigned By    Initials Name Provider Type    SA Cheryl Ahumada OT Occupational Therapist              Therapy Charges for Today     Code Description Service Date Service Provider Modifiers Qty    54361169762 HC OT EVAL LOW COMPLEXITY 2 9/8/2022 Cheryl Ahumada OT GO 1               Cheryl Ahumada OT  9/8/2022

## 2022-09-08 NOTE — PLAN OF CARE
Goal Outcome Evaluation:  Plan of Care Reviewed With: patient           Outcome Evaluation: OT Eval completed. Pt agreeable to therapy. Pt Ind bed mobility, transfers, and LB dressing. Pt reports he is functioning at baseline when it comes to ADLs, IADLs, and transfers. No skilled IP/OT needed at this time. Please re-order if anything changes. Will sign off at this time

## 2022-09-08 NOTE — PROGRESS NOTES
[facility] Medicine Services  INPATIENT PROGRESS NOTE    Length of Stay: 0  Date of Admission: 9/5/2022  Primary Care Physician: Shonna Ng APRN    Subjective   Chief Complaint: SOB    HPI:    Patient presents emergency department with complaint of shortness of breath, chest pain, and weakness.  Patient with known congestive heart failure, diabetes, EF of approximately 15 to 20%.  Patient was seen at [facility] in [place] in the last week.  Patient was actually admitted and scheduled to be transferred to [facility], though evidently there was a bed shortage and the patient improved somewhat and went home.  Patient has gone home and been home approximately 12 to 24 hours that with return of his symptoms of shortness of breath and weakness.  Patient arrives with blood pressures in the 60-90 systolic range.  Patient denies dizziness at this time.  Patient is short of breath and is unable to lie flat or add any type of more acute angle.  Patient is most comfortable sitting on the side of the bed with his legs down.  Chronic with acute edema.  Patient notes a weight gain of approximately 10 to 15 pounds in the last week..   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  He states he is feeling poorly.  Not having any strength.  Reviewed medications with him the patient is currently on Bumex twice a day along with Aldactone.  He states he normally takes Lasix 60 mg at home twice a day.  He has no chest pain.  He continues to have shortness of breath at rest and on exertion.  He has no cough.  No fevers or chills.  No nausea vomiting no urinary complaints no GI complaints.  Vital signs are stable patient is afebrile.      Review of Systems   Constitutional: Positive for fatigue. Negative for chills and fever.   HENT:  Negative.    Eyes: Negative.    Respiratory: Positive for shortness of breath.    Cardiovascular: Negative.  Negative for chest pain.   Gastrointestinal: Negative.    Endocrine: Negative.    Genitourinary: Negative.    Musculoskeletal: Negative.    Neurological: Positive for weakness. Negative for dizziness.   Hematological: Negative.    Psychiatric/Behavioral: Negative.         All pertinent negatives and positives are as above. All other systems have been reviewed and are negative unless otherwise stated.     Objective    Temp:  [97.3 °F (36.3 °C)-98 °F (36.7 °C)] 98 °F (36.7 °C)  Heart Rate:  [52-94] 52  Resp:  [18] 18  BP: (110-134)/(68-78) 130/78    Physical Exam  Vitals and nursing note reviewed.   Constitutional:       Appearance: He is obese.   HENT:      Head: Normocephalic and atraumatic.      Right Ear: External ear normal.      Left Ear: External ear normal.      Nose: Nose normal.      Mouth/Throat:      Mouth: Mucous membranes are moist.      Pharynx: Oropharynx is clear.   Eyes:      Extraocular Movements: Extraocular movements intact.      Conjunctiva/sclera: Conjunctivae normal.      Pupils: Pupils are equal, round, and reactive to light.   Cardiovascular:      Rate and Rhythm: Normal rate and regular rhythm.      Pulses: Normal pulses.      Heart sounds: Normal heart sounds.   Pulmonary:      Effort: Pulmonary effort is normal.      Comments: Decreased breath sounds at bases bilateral  Abdominal:      General: Bowel sounds are normal.      Palpations: Abdomen is soft.   Musculoskeletal:         General: Normal range of motion.      Cervical back: Normal range of motion and neck supple.      Right lower leg: Edema present.      Left lower leg: Edema present.      Comments: Lower extremity edema essentially unchanged   Skin:     General: Skin is warm and dry.   Neurological:      General: No focal deficit present.      Mental Status: He is alert.      Motor: Weakness present.   Psychiatric:          Mood and Affect: Mood normal.         Behavior: Behavior normal.           Results Review:  I have reviewed the labs, radiology results, and diagnostic studies.    Laboratory Data:   Results from last 7 days   Lab Units 09/08/22  0903 09/07/22  0623 09/06/22  0549 09/05/22  1710 09/05/22  1710 09/02/22  1000   SODIUM mmol/L 141 142 143   < > 141 140   POTASSIUM mmol/L 3.5 3.7 3.5   < > 3.3* 3.2*   CHLORIDE mmol/L 103 104 103   < > 105 103   CO2 mmol/L 29.0 31.0* 31.0*   < > 28.0  --    BUN mg/dL 21* 23* 22*   < > 20 24*   CREATININE mg/dL 0.96 1.36* 1.44*   < > 1.46* 1.6*   GLUCOSE mg/dL 148* 101* 131*   < > 144*  --    CALCIUM mg/dL 8.4* 8.9 8.9   < > 8.8 8.7   BILIRUBIN mg/dL  --   --   --   --  1.4* 1.50*   ALK PHOS U/L  --   --   --   --  65 66   ALT (SGPT) U/L  --   --   --   --  15 21   AST (SGOT) U/L  --   --   --   --  14 14*   ANION GAP mmol/L 9.0 7.0 9.0   < > 8.0  --     < > = values in this interval not displayed.     Estimated Creatinine Clearance: 140.4 mL/min (by C-G formula based on SCr of 0.96 mg/dL).  Results from last 7 days   Lab Units 09/06/22  0549 09/02/22  1000   MAGNESIUM mg/dL 1.9 1.9         Results from last 7 days   Lab Units 09/07/22  0623 09/05/22  1710   WBC 10*3/mm3 4.08 4.20   HEMOGLOBIN g/dL 13.1 13.1   HEMATOCRIT % 41.0 40.3   PLATELETS 10*3/mm3 118* 124*     Results from last 7 days   Lab Units 09/02/22  1000   INR  1.57*       Culture Data:   No results found for: BLOODCX  No results found for: URINECX  No results found for: RESPCX  No results found for: WOUNDCX  No results found for: STOOLCX  No components found for: BODYFLD    Radiology Data:   Imaging Results (Last 24 Hours)     ** No results found for the last 24 hours. **          I have reviewed the patient's current medications.     Assessment/Plan     Principal Problem:    Acute on chronic systolic CHF (congestive heart failure) (HCC)  Active Problems:    Diabetes mellitus (HCC)    Obesity (BMI 30-39.9)     Hypotension    Acute on chronic systolic heart failure  BETA-BLOCKER: Carvedilol 3.125 mg twice daily  ACE/ARB: Losartan 12.5 mg daily  ENTRESTO: Indicated, can consider  DIURETIC:  Bumex twice daily.  Spironolactone 25 mg daily  SGL2I: Can consider  ALDOSTERONE ANTAGONIST: Start spironolactone 25 mg daily   IMDUR/HYDRALAZINE: N/A  DIGOXIN: N/A  Fluid restriction: 1500 L  Sodium restriction:2 grams  After review of the chart and evaluation of the patient review of labs it appears as though the patient would probably benefit from an adjustment in diuretic therapy      S/P AICD placement in [place] 8/2022.  Had AICD placed at [facility] due to LVEF 15 to 20%.  Left subclavian site almost healed, no infection, drainage, or redness at incision.     NICM EF:15-20%. NYHA Class IV, Stage C.  continue meds as noted above     HLD continue lipitor    HTN continue Losartan.  Current blood pressure 130/70 O2 saturation 96% room air.    Deconditioning.  We will ask physical therapy and Occupational Therapy to eval and treat.    CODE STATUS full code    DVT prophylaxis Eliquis.    I confirmed that the patient's Advance Care Plan is present, code status is documented, or surrogate decision maker is listed in the patient's medical record.     Discharge Planning: In progress.  Possible cardiac rehab on discharge.  Anticipate discharge in 24 hours.    Moshe Aleman, DO

## 2022-09-09 PROBLEM — I50.9 ACUTE CONGESTIVE HEART FAILURE, UNSPECIFIED HEART FAILURE TYPE: Status: ACTIVE | Noted: 2022-09-09

## 2022-09-09 LAB
ANION GAP SERPL CALCULATED.3IONS-SCNC: 8 MMOL/L (ref 5–15)
BUN SERPL-MCNC: 23 MG/DL (ref 6–20)
BUN/CREAT SERPL: 17 (ref 7–25)
CALCIUM SPEC-SCNC: 9.1 MG/DL (ref 8.6–10.5)
CHLORIDE SERPL-SCNC: 103 MMOL/L (ref 98–107)
CO2 SERPL-SCNC: 31 MMOL/L (ref 22–29)
CREAT SERPL-MCNC: 1.35 MG/DL (ref 0.76–1.27)
EGFRCR SERPLBLD CKD-EPI 2021: 63.6 ML/MIN/1.73
GLUCOSE BLDC GLUCOMTR-MCNC: 117 MG/DL (ref 70–130)
GLUCOSE BLDC GLUCOMTR-MCNC: 127 MG/DL (ref 70–130)
GLUCOSE BLDC GLUCOMTR-MCNC: 138 MG/DL (ref 70–130)
GLUCOSE BLDC GLUCOMTR-MCNC: 188 MG/DL (ref 70–130)
GLUCOSE SERPL-MCNC: 133 MG/DL (ref 65–99)
POTASSIUM SERPL-SCNC: 3.8 MMOL/L (ref 3.5–5.2)
SODIUM SERPL-SCNC: 142 MMOL/L (ref 136–145)
WHOLE BLOOD HOLD SPECIMEN: NORMAL

## 2022-09-09 PROCEDURE — 94799 UNLISTED PULMONARY SVC/PX: CPT

## 2022-09-09 PROCEDURE — 99233 SBSQ HOSP IP/OBS HIGH 50: CPT | Performed by: NURSE PRACTITIONER

## 2022-09-09 PROCEDURE — 97162 PT EVAL MOD COMPLEX 30 MIN: CPT

## 2022-09-09 PROCEDURE — 93005 ELECTROCARDIOGRAM TRACING: CPT | Performed by: INTERNAL MEDICINE

## 2022-09-09 PROCEDURE — 93010 ELECTROCARDIOGRAM REPORT: CPT | Performed by: INTERNAL MEDICINE

## 2022-09-09 PROCEDURE — 82962 GLUCOSE BLOOD TEST: CPT

## 2022-09-09 PROCEDURE — 80048 BASIC METABOLIC PNL TOTAL CA: CPT | Performed by: NURSE PRACTITIONER

## 2022-09-09 PROCEDURE — 94760 N-INVAS EAR/PLS OXIMETRY 1: CPT

## 2022-09-09 RX ORDER — ONDANSETRON 2 MG/ML
4 INJECTION INTRAMUSCULAR; INTRAVENOUS EVERY 6 HOURS PRN
Status: DISCONTINUED | OUTPATIENT
Start: 2022-09-09 | End: 2022-09-11 | Stop reason: HOSPADM

## 2022-09-09 RX ORDER — TRAMADOL HYDROCHLORIDE 50 MG/1
50 TABLET ORAL ONCE AS NEEDED
Status: COMPLETED | OUTPATIENT
Start: 2022-09-09 | End: 2022-09-09

## 2022-09-09 RX ORDER — SPIRONOLACTONE 25 MG/1
25 TABLET ORAL DAILY
Status: DISCONTINUED | OUTPATIENT
Start: 2022-09-10 | End: 2022-09-11 | Stop reason: HOSPADM

## 2022-09-09 RX ORDER — ACETAMINOPHEN 325 MG/1
650 TABLET ORAL EVERY 6 HOURS PRN
Status: DISCONTINUED | OUTPATIENT
Start: 2022-09-09 | End: 2022-09-11 | Stop reason: HOSPADM

## 2022-09-09 RX ADMIN — Medication 10 ML: at 08:12

## 2022-09-09 RX ADMIN — BUMETANIDE 2 MG: 0.25 INJECTION INTRAMUSCULAR; INTRAVENOUS at 05:47

## 2022-09-09 RX ADMIN — CARVEDILOL 6.25 MG: 6.25 TABLET, FILM COATED ORAL at 16:57

## 2022-09-09 RX ADMIN — APIXABAN 5 MG: 5 TABLET, FILM COATED ORAL at 20:45

## 2022-09-09 RX ADMIN — SPIRONOLACTONE 50 MG: 50 TABLET, FILM COATED ORAL at 08:11

## 2022-09-09 RX ADMIN — Medication 12.5 MG: at 08:12

## 2022-09-09 RX ADMIN — BUMETANIDE 2 MG: 0.25 INJECTION INTRAMUSCULAR; INTRAVENOUS at 18:14

## 2022-09-09 RX ADMIN — TRAMADOL HYDROCHLORIDE 50 MG: 50 TABLET, COATED ORAL at 21:39

## 2022-09-09 RX ADMIN — Medication 10 ML: at 20:45

## 2022-09-09 RX ADMIN — POTASSIUM CHLORIDE 10 MEQ: 750 CAPSULE, EXTENDED RELEASE ORAL at 16:58

## 2022-09-09 RX ADMIN — APIXABAN 5 MG: 5 TABLET, FILM COATED ORAL at 08:11

## 2022-09-09 RX ADMIN — CARVEDILOL 6.25 MG: 6.25 TABLET, FILM COATED ORAL at 09:07

## 2022-09-09 RX ADMIN — ATORVASTATIN CALCIUM 10 MG: 10 TABLET, FILM COATED ORAL at 08:11

## 2022-09-09 RX ADMIN — POTASSIUM CHLORIDE 10 MEQ: 750 CAPSULE, EXTENDED RELEASE ORAL at 08:11

## 2022-09-09 NOTE — PLAN OF CARE
Problem: Adult Inpatient Plan of Care  Goal: Plan of Care Review  Recent Flowsheet Documentation  Taken 9/9/2022 0940 by Marnie Daniel PT  Plan of Care Reviewed With: patient  Outcome Evaluation:   PT evaluation completed. Pt frustrated that fluid is not coming off any faster. Pt also reports he cannot lay flat to sleep/rest because it is too difficult to breathe. Pt did agree to therapy. Pt modified independent with supine to sit to supine. Pt able to tolerate sitting EOB 15 minutes with supervision. Pt unable to progress to standing/ambulation as pt nauseous and did vomit a little. Pt did not complain of chest pain and he was not diaphoretic. BP initially 121/88 and after vomiting /80. O2 sats remained in mid to upper 90's on 2lpm. HR 95-99bpm(patient of the opinion, nausea 2/2 had to sit in reclining position in bed to eat breakfast)   reported to RN. Function limited by decreased strength and tolerance for functional mobility and activities. Pt will benefit from PT to regain lost function as pt improves medically. Anticipate home with assistance at discharge.   Goal Outcome Evaluation:  Plan of Care Reviewed With: patient           Outcome Evaluation: PT evaluation completed. Pt frustrated that fluid is not coming off any faster. Pt also reports he cannot lay flat to sleep/rest because it is too difficult to breathe. Pt did agree to therapy. Pt modified independent with supine to sit to supine. Pt able to tolerate sitting EOB 15 minutes with supervision. Pt unable to progress to standing/ambulation as pt nauseous and did vomit a little. Pt did not complain of chest pain and he was not diaphoretic. BP initially 121/88 and after vomiting /80. O2 sats remained in mid to upper 90's on 2lpm. HR 95-99bpm(patient of the opinion, nausea 2/2 had to sit in reclining position in bed to eat breakfast);reported to RN. Function limited by decreased strength and tolerance for functional mobility and activities.  Pt will benefit from PT to regain lost function as pt improves medically. Anticipate home with assistance at discharge.

## 2022-09-09 NOTE — THERAPY EVALUATION
Patient Name: Matti Bravo  : 1970    MRN: [id number]                              Today's Date: 2022       Admit Date: 2022    Visit Dx:     ICD-10-CM ICD-9-CM   1. Acute congestive heart failure, unspecified heart failure type (HCC)  I50.9 428.0   2. Dyspnea, unspecified type  R06.00 786.09   3. Chest pain, unspecified type  R07.9 786.50   4. Hypotension, unspecified hypotension type  I95.9 458.9   5. Impaired mobility and ADLs  Z74.09 V49.89    Z78.9    6. Impaired gait and mobility  R26.89 781.2     Patient Active Problem List   Diagnosis   • Acute on chronic systolic CHF (congestive heart failure) (HCC)   • Essential hypertension   • Mixed hyperlipidemia   • KHANH (obstructive sleep apnea)   • Morbid obesity (HCC)   • Restrictive lung disease secondary to obesity   • Multiple lung nodules on CT   • Diabetes mellitus (HCC)   • Varicose veins of both lower extremities with pain   • Venous insufficiency (chronic) (peripheral)   • Surgical aftercare, circulatory system   • Chest pain   • Acute on chronic congestive heart failure (HCC)   • Abnormal nuclear stress test   • Acute congestive heart failure (HCC)   • Obesity (BMI 30-39.9)   • Hypotension   • Acute congestive heart failure, unspecified heart failure type (HCC)     Past Medical History:   Diagnosis Date   • Asthma    • Chronic systolic heart failure (HCC)    • Diabetes mellitus (HCC)    • Hyperlipidemia    • Hypertension    • Obesity    • Peripheral vascular disease (HCC)    • Sleep apnea      Past Surgical History:   Procedure Laterality Date   • CARDIAC CATHETERIZATION N/A 2021   • CARDIAC DEFIBRILLATOR PLACEMENT     • VARICOSE VEIN SURGERY Right 2019      General Information     Row Name 22 0940          Physical Therapy Time and Intention    Document Type evaluation  -BRIANDA     Mode of Treatment individual therapy;physical therapy  -BRINADA     Row Name 22 0940          General Information    Patient Profile  Reviewed yes  -     Existing Precautions/Restrictions pacemaker  -     Row Name 09/09/22 0940          Living Environment    People in Home significant other  -     Row Name 09/09/22 0940          Home Main Entrance    Number of Stairs, Main Entrance six  -     Stair Railings, Main Entrance railing on right side (ascending)  -     Row Name 09/09/22 0940          Stairs Within Home, Primary    Number of Stairs, Within Home, Primary none  -     Row Name 09/09/22 0940          Cognition    Orientation Status (Cognition) oriented x 4  -     Row Name 09/09/22 0940          Safety Issues, Functional Mobility    Impairments Affecting Function (Mobility) endurance/activity tolerance;shortness of breath  -           User Key  (r) = Recorded By, (t) = Taken By, (c) = Cosigned By    Initials Name Provider Type    Marnie Hanna, PT Physical Therapist               Mobility     Row Name 09/09/22 0940          Bed Mobility    Bed Mobility supine-sit;sit-supine  -     Supine-Sit [place] (Bed Mobility) modified independence  -     Sit-Supine [place] (Bed Mobility) modified independence  -     Assistive Device (Bed Mobility) head of bed elevated;bed rails  -     Row Name 09/09/22 0940          Bed-Chair Transfer    Comment, (Bed-Chair Transfer) deferred transfer;pt became nauseous and vomited a little  -     Row Name 09/09/22 0940          Gait/Stairs (Locomotion)    [place] Level (Gait) unable to assess  -           User Key  (r) = Recorded By, (t) = Taken By, (c) = Cosigned By    Initials Name Provider Type    Marnie Hanna PT Physical Therapist               Obj/Interventions     Row Name 09/09/22 0940          Range of Motion Comprehensive    Comment, General Range of Motion BLE: AROM WFL  -     Row Name 09/09/22 0940          Strength Comprehensive (MMT)    Comment, General Manual Muscle Testing (MMT) Assessment BLE: grossly 4+/5  -     Row Name 09/09/22 0940           Sensory Assessment (Somatosensory)    Sensory Assessment (Somatosensory) LE sensation intact  -BRIANDA           User Key  (r) = Recorded By, (t) = Taken By, (c) = Cosigned By    Initials Name Provider Type    Marnie Hanna PT Physical Therapist               Goals/Plan     Row Name 09/09/22 0940          Transfer Goal 1 (PT)    Activity/Assistive Device (Transfer Goal 1, PT) sit-to-stand/stand-to-sit;bed-to-chair/chair-to-bed  -BRIANDA     [place] Level/Cues Needed (Transfer Goal 1, PT) independent  -BRIANDA     Time Frame (Transfer Goal 1, PT) by discharge  -BRIANDA     Progress/Outcome (Transfer Goal 1, PT) goal not met  -BRIANDA     Row Name 09/09/22 0940          Gait Training Goal 1 (PT)    Activity/Assistive Device (Gait Training Goal 1, PT) gait (walking locomotion);decrease fall risk;increase endurance/gait distance  -BRIANDA     [place] Level (Gait Training Goal 1, PT) independent  -BRIANDA     Distance (Gait Training Goal 1, PT) 150ft or more  -BRIANDA     Time Frame (Gait Training Goal 1, PT) 3 days  -BRIANDA     Progress/Outcome (Gait Training Goal 1, PT) goal not met  -BRIANDA     Row Name 09/09/22 0940          Stairs Goal 1 (PT)    Activity/Assistive Device (Stairs Goal 1, PT) ascending stairs;descending stairs;using handrail, right;decrease fall risk  -BRIANDA     [place] Level/Cues Needed (Stairs Goal 1, PT) modified independence  -BRIANDA     Number of Stairs (Stairs Goal 1, PT) 6  -BRIANDA     Time Frame (Stairs Goal 1, PT) by discharge  -BRIANDA     Progress/Outcome (Stairs Goal 1, PT) goal not met  -BRIANDA     Row Name 09/09/22 0940          Therapy Assessment/Plan (PT)    Planned Therapy Interventions (PT) balance training;bed mobility training;gait training;home exercise program;patient/family education;stair training;strengthening;transfer training  -BRIANDA           User Key  (r) = Recorded By, (t) = Taken By, (c) = Cosigned By    Initials Name Provider Type    Marnie Hanna PT Physical Therapist               Clinical Impression     Row Name  09/09/22 0940          Pain    Pretreatment Pain Rating 0/10 - no pain  -     Posttreatment Pain Rating 0/10 - no pain  -     Pre/Posttreatment Pain Comment denies pain; says uncomfortable from edema  -     Additional Documentation Pain Scale: Numbers Pre/Post-Treatment (Group)  -     Row Name 09/09/22 0940          Plan of Care Review    Plan of Care Reviewed With patient  -     Outcome Evaluation PT evaluation completed. Pt frustrated that fluid is not coming off any faster. Pt also reports he cannot lay flat to sleep/rest because it is too difficult to breathe. Pt did agree to therapy. Pt modified independent with supine to sit to supine. Pt able to tolerate sitting EOB 15 minutes with supervision. Pt unable to progress to standing/ambulation as pt nauseous and did vomit a little. Pt did not complain of chest pain and he was not diaphoretic. BP initially 121/88 and after vomiting /80. O2 sats remained in mid to upper 90's on 2lpm. HR 95-99bpm(patient of the opinion, nausea 2/2 had to sit in reclining position in bed to eat breakfast);reported to RN. Function limited by decreased strength and tolerance for functional mobility and activities. Pt will benefit from PT to regain lost function as pt improves medically. Anticipate home with assistance at discharge.  -     Row Name 09/09/22 0940          Therapy Assessment/Plan (PT)    Patient/Family Therapy Goals Statement (PT) return to PLOF  -     Rehab Potential (PT) good, to achieve stated therapy goals  -     Criteria for Skilled Interventions Met (PT) yes;meets criteria;skilled treatment is necessary  -     Therapy Frequency (PT) other (see comments)  5-7 days/wk  -     Predicted Duration of Therapy Intervention (PT) until  discharge or goals met  -     Row Name 09/09/22 0940          Vital Signs    Pre Systolic BP Rehab 121  -BRIANDA     Pre Treatment Diastolic BP 88  -BRIANDA     Post Systolic BP Rehab 114  -BRIANDA     Post Treatment Diastolic BP  80  -BRIANDA     Pretreatment Heart Rate (beats/min) 99  -BRIANDA     Posttreatment Heart Rate (beats/min) 95  -BRIANDA     Pre SpO2 (%) 98  -BRIANDA     O2 Delivery Pre Treatment nasal cannula  2lpm  -BRIANDA     Post SpO2 (%) 95  -BRIANDA     O2 Delivery Post Treatment nasal cannula  -BRIANDA     Pre Patient Position Supine  -BRIANDA     Post Patient Position Sitting  -BRIANDA     Row Name 09/09/22 0940          Positioning and Restraints    Pre-Treatment Position in bed  -BRIANDA     Post Treatment Position bed  -BRIANDA     In Bed notified nsg;sitting EOB;call light within reach;encouraged to call for assist  -BRIANDA           User Key  (r) = Recorded By, (t) = Taken By, (c) = Cosigned By    Initials Name Provider Type    Marnie Hanna PT Physical Therapist               Outcome Measures     Row Name 09/09/22 0940 09/09/22 0810       How much help from another person do you currently need...    Turning from your back to your side while in flat bed without using bedrails? 4  -BRIANDA 4  -EB    Moving from lying on back to sitting on the side of a flat bed without bedrails? 4  -BRIANDA 4  -EB    Moving to and from a bed to a chair (including a wheelchair)? 4  -BRIANDA 4  -EB    Standing up from a chair using your arms (e.g., wheelchair, bedside chair)? 4  -BRIANDA 4  -EB    Climbing 3-5 steps with a railing? 3  -BRIANDA 4  -EB    To walk in hospital room? 3  -BRIANDA 4  -EB    AM-PAC 6 Clicks Score (PT) 22  -BRIANDA 24  -EB    Highest level of mobility 7 --> Walked 25 feet or more  -BRIANDA 8 --> Walked 250 feet or more  -EB    Row Name 09/09/22 0940          Functional Assessment    Outcome Measure Options AM-PAC 6 Clicks Basic Mobility (PT)  -BRIANDA           User Key  (r) = Recorded By, (t) = Taken By, (c) = Cosigned By    Initials Name Provider Type    Marnie Hanna PT Physical Therapist    Rosario Gray RN Registered Nurse                             Physical Therapy Education                 Title: PT OT SLP Therapies (In Progress)     Topic: Physical Therapy (Not Started)     Point: Mobility  training (Not Started)     Learner Progress:  Not documented in this visit.          Point: Home exercise program (Not Started)     Learner Progress:  Not documented in this visit.          Point: Body mechanics (Not Started)     Learner Progress:  Not documented in this visit.          Point: Precautions (Not Started)     Learner Progress:  Not documented in this visit.                          PT Recommendation and Plan  Planned Therapy Interventions (PT): balance training, bed mobility training, gait training, home exercise program, patient/family education, stair training, strengthening, transfer training  Plan of Care Reviewed With: patient  Outcome Evaluation: PT evaluation completed. Pt frustrated that fluid is not coming off any faster. Pt also reports he cannot lay flat to sleep/rest because it is too difficult to breathe. Pt did agree to therapy. Pt modified independent with supine to sit to supine. Pt able to tolerate sitting EOB 15 minutes with supervision. Pt unable to progress to standing/ambulation as pt nauseous and did vomit a little. Pt did not complain of chest pain and he was not diaphoretic. BP initially 121/88 and after vomiting /80. O2 sats remained in mid to upper 90's on 2lpm. HR 95-99bpm(patient of the opinion, nausea 2/2 had to sit in reclining position in bed to eat breakfast);reported to RN. Function limited by decreased strength and tolerance for functional mobility and activities. Pt will benefit from PT to regain lost function as pt improves medically. Anticipate home with assistance at discharge.     Time Calculation:    PT Charges     Row Name 09/09/22 1325             Time Calculation    Start Time 0940  -      Stop Time 1021  -      Time Calculation (min) 41 min  -BRIANDA      PT Received On 09/09/22  -      PT Goal Re-Cert Due Date 09/22/22  -              Time Calculation- PT    Total Timed Code Minutes- PT 0 minute(s)  -              Untimed Charges    PT  Eval/Re-eval Minutes 41  -BRIANDA              Total Minutes    Untimed Charges Total Minutes 41  -BRIANDA       Total Minutes 41  -BRIANDA            User Key  (r) = Recorded By, (t) = Taken By, (c) = Cosigned By    Initials Name Provider Type    Marnie Hanna, PT Physical Therapist              Therapy Charges for Today     Code Description Service Date Service Provider Modifiers Qty    03999414451 HC PT EVAL MOD COMPLEXITY 3 9/9/2022 Marnie Daniel, PT GP 1          PT G-Codes  Outcome Measure Options: AM-PAC 6 Clicks Basic Mobility (PT)  AM-PAC 6 Clicks Score (PT): 22  AM-PAC 6 Clicks Score (OT): 24    Marnie Daniel PT  9/9/2022

## 2022-09-09 NOTE — PROGRESS NOTES
[facility] Medicine Services  INPATIENT PROGRESS NOTE    Length of Stay: 0  Date of Admission: 9/5/2022  Primary Care Physician: Shonna Ng APRN    Subjective   Chief Complaint: SOB    HPI:    Patient presents emergency department with complaint of shortness of breath, chest pain, and weakness.  Patient with known congestive heart failure, diabetes, EF of approximately 15 to 20%.  Patient was seen at [facility] in [place] in the last week.  Patient was actually admitted and scheduled to be transferred to [facility], though evidently there was a bed shortage and the patient improved somewhat and went home.  Patient has gone home and been home approximately 12 to 24 hours that with return of his symptoms of shortness of breath and weakness.  Patient arrives with blood pressures in the 60-90 systolic range.  Patient denies dizziness at this time.  Patient is short of breath and is unable to lie flat or add any type of more acute angle.  Patient is most comfortable sitting on the side of the bed with his legs down.  Chronic with acute edema.  Patient notes a weight gain of approximately 10 to 15 pounds in the last week..   Past history of methamphetamine use, he reports quitting 4 years ago     On exam today 9/9/2022.  On evaluation the patient is still very weak and short of breath.  He states he is having a hard time breathing on exertion.  Bilateral lower extremity edema appears to have improved from yesterday.  Meds have been adjusted by cardiology and patient clinically appears to be better.  He states he is feeling poorly cannot catch his breath however he has a very low ejection fraction due to his cardiac disease.  On exam the patient's rib cage appears to be tender on the left side.  No signs of any vesicular lesions we will continue to monitor for possible shingles however I doubt that is the problem.  It appears to be just some rib soreness we  will continue to monitor.  Vital signs are currently stable he is afebrile.    Review of Systems   Constitutional: Positive for fatigue. Negative for chills and fever.        Remains weak and very short of breath.  Complaining of some left anterior wall chest pain.  Bottom of the rib cage.   HENT: Negative.    Eyes: Negative.    Respiratory: Positive for shortness of breath.    Cardiovascular: Negative.  Negative for chest pain.   Gastrointestinal: Negative.    Endocrine: Negative.    Genitourinary: Negative.    Musculoskeletal: Negative.    Neurological: Positive for weakness. Negative for dizziness.   Hematological: Negative.    Psychiatric/Behavioral: Negative.         All pertinent negatives and positives are as above. All other systems have been reviewed and are negative unless otherwise stated.     Objective    Temp:  [97.1 °F (36.2 °C)-98.4 °F (36.9 °C)] 97.3 °F (36.3 °C)  Heart Rate:  [53-92] 86  Resp:  [18] 18  BP: (100-128)/(66-88) 115/66    Physical Exam  Vitals and nursing note reviewed.   Constitutional:       Appearance: He is obese.   HENT:      Head: Normocephalic and atraumatic.      Right Ear: External ear normal.      Left Ear: External ear normal.      Nose: Nose normal.      Mouth/Throat:      Mouth: Mucous membranes are moist.      Pharynx: Oropharynx is clear.   Eyes:      Extraocular Movements: Extraocular movements intact.      Conjunctiva/sclera: Conjunctivae normal.      Pupils: Pupils are equal, round, and reactive to light.   Cardiovascular:      Rate and Rhythm: Normal rate and regular rhythm.      Pulses: Normal pulses.      Heart sounds: Normal heart sounds.   Pulmonary:      Effort: Pulmonary effort is normal.      Comments: Decreased breath sounds at bases bilateral  Abdominal:      General: Bowel sounds are normal.      Palpations: Abdomen is soft.   Musculoskeletal:         General: Normal range of motion.      Cervical back: Normal range of motion and neck supple.      Right  lower leg: Edema present.      Left lower leg: Edema present.      Comments: Lower extremity edema improving slowly.  Left anterior chest wall bottom of the rib  to palpation.  No signs of vesicular lesions.   Skin:     General: Skin is warm and dry.   Neurological:      General: No focal deficit present.      Mental Status: He is alert.      Motor: Weakness present.   Psychiatric:         Mood and Affect: Mood normal.         Behavior: Behavior normal.           Results Review:  I have reviewed the labs, radiology results, and diagnostic studies.    Laboratory Data:   Results from last 7 days   Lab Units 09/09/22  0521 09/08/22  0903 09/07/22  0623 09/06/22  0549 09/05/22  1710   SODIUM mmol/L 142 141 142   < > 141   POTASSIUM mmol/L 3.8 3.5 3.7   < > 3.3*   CHLORIDE mmol/L 103 103 104   < > 105   CO2 mmol/L 31.0* 29.0 31.0*   < > 28.0   BUN mg/dL 23* 21* 23*   < > 20   CREATININE mg/dL 1.35* 0.96 1.36*   < > 1.46*   GLUCOSE mg/dL 133* 148* 101*   < > 144*   CALCIUM mg/dL 9.1 8.4* 8.9   < > 8.8   BILIRUBIN mg/dL  --   --   --   --  1.4*   ALK PHOS U/L  --   --   --   --  65   ALT (SGPT) U/L  --   --   --   --  15   AST (SGOT) U/L  --   --   --   --  14   ANION GAP mmol/L 8.0 9.0 7.0   < > 8.0    < > = values in this interval not displayed.     Estimated Creatinine Clearance: 99.8 mL/min (A) (by C-G formula based on SCr of 1.35 mg/dL (H)).  Results from last 7 days   Lab Units 09/06/22  0549   MAGNESIUM mg/dL 1.9         Results from last 7 days   Lab Units 09/07/22  0623 09/05/22  1710   WBC 10*3/mm3 4.08 4.20   HEMOGLOBIN g/dL 13.1 13.1   HEMATOCRIT % 41.0 40.3   PLATELETS 10*3/mm3 118* 124*           Culture Data:   No results found for: BLOODCX  No results found for: URINECX  No results found for: RESPCX  No results found for: WOUNDCX  No results found for: STOOLCX  No components found for: BODYFLD    Radiology Data:   Imaging Results (Last 24 Hours)     ** No results found for the last 24 hours. **           I have reviewed the patient's current medications.     Assessment/Plan     Principal Problem:    Acute on chronic systolic CHF (congestive heart failure) ([facility])  Active Problems:    Diabetes mellitus (HCC)    Obesity (BMI 30-39.9)    Hypotension    Acute congestive heart failure, unspecified heart failure type (HCC)    Acute on chronic systolic heart failure  BETA-BLOCKER: Carvedilol 3.125 mg twice daily  ACE/ARB: Losartan 12.5 mg daily  ENTRESTO: Indicated, can consider  DIURETIC:  Bumex twice daily.  Spironolactone 25 mg daily  SGL2I: Can consider  ALDOSTERONE ANTAGONIST: Start spironolactone 25 mg daily   IMDUR/HYDRALAZINE: N/A  DIGOXIN: N/A  Fluid restriction: 1500 L  Sodium restriction:2 grams  The patient continues to complain of shortness of breath.  He does have less edema of the lower extremities.  Overall appears to be doing much better on clinical exam.  We will continue to monitor.  Patient does have some rib cage pain and it appears to be musculoskeletal in nature we will continue to follow.  Could be onset of possible shingles we will watch for signs of this issue.      S/P AICD placement in [place] 8/2022.  Had AICD placed at [facility] due to LVEF 15 to 20%.  Left subclavian site almost healed, no infection, drainage, or redness at incision.     NICM EF:15-20%. NYHA Class IV, Stage C.  continue meds as noted above     HLD continue lipitor.  Tolerating medication without any problems.  No adverse side effects.    HTN continue Losartan.  Current blood pressure 115/66.  O2 saturation 98% on 2 L nasal cannula    Deconditioning.  Continue to work with physical therapy and Occupational Therapy.    CODE STATUS full code    DVT prophylaxis Eliquis.    I confirmed that the patient's Advance Care Plan is present, code status is documented, or surrogate decision maker is listed in the patient's medical record.     Discharge Planning: In progress.  Possible cardiac rehab on discharge.  Anticipate  discharge in 24 hours.    Moshe Aleman, DO

## 2022-09-09 NOTE — PROGRESS NOTES
"  [facility] Progress Note   LOS: 0 days   Patient Care Team:  Shonna Ng APRN as PCP - General (Family Medicine)    Chief Complaint   Patient presents with   • Chest Pain       Subjective     Interval History:   Patient Denies: chest pain, palpitations, dizziness and syncope  Patient Complaints: shortness of air, PND, orthopnea and edema  History taken from: patient chart RN    Vital signs stable overnight, still having some PVC's noted on telemetry. NSR.   Has diuresed well with Bumex 2 mg IV twice daily.  Lower leg edema has improved significantly.    We discussed that he is showing signs that Bumex is working.  He requested to go back on Lasix.  I told him I did not think that was a good idea.  He is agreeable.     According to I&O he has had good urinary output with [id number] deficit yesterday     Objective     Vital Sign Min/Max for last 24 hours  Temp  Min: 97.1 °F (36.2 °C)  Max: 98.4 °F (36.9 °C)   BP  Min: 100/78  Max: 128/68   Pulse  Min: 53  Max: 92   Resp  Min: 18  Max: 18   SpO2  Min: 96 %  Max: 100 %   Flow (L/min)  Min: 2  Max: 2   Weight  Min: 143 kg (314 lb 6.4 oz)  Max: 143 kg (314 lb 6.4 oz)     Flowsheet Rows    Flowsheet Row First Filed Value   Admission Height 193 cm (76\") Documented at 09/05/2022 1537   Admission Weight 142 kg (313 lb) Documented at 09/05/2022 1537            09/07/22  0416 09/08/22  0500 09/09/22  0543   Weight: (!) 142 kg (312 lb) (!) 143 kg (315 lb) (!) 143 kg (314 lb 6.4 oz)       Physical Exam:  Physical Exam  Vitals and nursing note reviewed.   Constitutional:       Appearance: Normal appearance. He is obese. He is not ill-appearing or diaphoretic.   HENT:      Head: Normocephalic and atraumatic.      Mouth/Throat:      Mouth: Mucous membranes are moist.      Pharynx: Oropharynx is clear. No oropharyngeal exudate.   Eyes:      General: Lids are normal.      Conjunctiva/sclera:      Right eye: Right conjunctiva is not injected.      Left eye: Left conjunctiva is " not injected.   Neck:      Vascular: No JVD.      Trachea: Trachea normal.   Cardiovascular:      Rate and Rhythm: Normal rate and regular rhythm.      Pulses: Normal pulses.      Heart sounds: Normal heart sounds. No murmur heard.  Pulmonary:      Effort: Pulmonary effort is normal.      Breath sounds: Normal breath sounds and air entry. No wheezing or rhonchi.   Abdominal:      General: Abdomen is protuberant.      Palpations: Abdomen is soft.   Musculoskeletal:      Right lower leg: No edema.      Left lower leg: No edema.   Skin:     General: Skin is warm and dry.      Capillary Refill: Capillary refill takes less than 2 seconds.   Neurological:      Mental Status: He is alert and oriented to person, place, and time.   Psychiatric:         Attention and Perception: Attention normal.         Mood and Affect: Mood normal.         Thought Content: Thought content normal.          Results Reviewed by myself:     Results from last 7 days   Lab Units 09/09/22  0521 09/08/22  0903 09/07/22  0623 09/06/22  0549 09/05/22  1710   SODIUM mmol/L 142 141 142   < > 141   POTASSIUM mmol/L 3.8 3.5 3.7   < > 3.3*   CHLORIDE mmol/L 103 103 104   < > 105   CO2 mmol/L 31.0* 29.0 31.0*   < > 28.0   BUN mg/dL 23* 21* 23*   < > 20   CREATININE mg/dL 1.35* 0.96 1.36*   < > 1.46*   CALCIUM mg/dL 9.1 8.4* 8.9   < > 8.8   BILIRUBIN mg/dL  --   --   --   --  1.4*   ALK PHOS U/L  --   --   --   --  65   ALT (SGPT) U/L  --   --   --   --  15   AST (SGOT) U/L  --   --   --   --  14   GLUCOSE mg/dL 133* 148* 101*   < > 144*    < > = values in this interval not displayed.       Estimated Creatinine Clearance: 99.8 mL/min (A) (by C-G formula based on SCr of 1.35 mg/dL (H)).    Results from last 7 days   Lab Units 09/06/22  0549   MAGNESIUM mg/dL 1.9             Results from last 7 days   Lab Units 09/07/22  0623 09/05/22  1710   WBC 10*3/mm3 4.08 4.20   HEMOGLOBIN g/dL 13.1 13.1   PLATELETS 10*3/mm3 118* 124*           Lab Results   Component  Value Date    PROBNP 1,707.0 (H) 09/05/2022       I/O last 3 completed shifts:  In: 1240 [P.O.:1240]  Out: 4105 [Urine:4105]    Cardiographics:  ECG/EMG Results (last 24 hours)     Procedure Component Value Units Date/Time    SCANNED EKG [[id number]] Resulted: 09/05/22     Updated: 09/06/22 1310          Results for orders placed during the hospital encounter of 02/04/19    Adult Transthoracic Echo Limited W/ Cont if Necessary Per Protocol    Interpretation Summary  · 3D acquisition for left ventricular ejection fraction. Live 3D and full volume to assess left ventricular ejection fraction was utilized.  · Left ventricle systolic function is severely decreased. Estimated LVEF is 10%. Left ventricle cavity severely dilated with restrictive diastolic dysfunction. Findings are consistent with dilated cardiomyopathy.  · Right ventricle is mildly dilated with mildly reduced right ventricular systolic function.  · Moderate mitral valve regurgitation is present.  · Moderate tricuspid valve regurgitation is present. Pulmonary hypertension is present with a PA systolic pressure of 70 mmHg.  · There is mild pulmonic valve regurgitation present.  · There is a trivial pericardial effusion adjacent to the left ventricle      XR Chest 2 View    Result Date: 8/19/2022     Stable chest radiograph from the comparison exam with findings as discussed above. Workstation: [id number]    XR Chest 1 View    Result Date: 9/5/2022  Mild-to-moderate left lung base atelectasis versus infiltrate new since prior. Mild to moderate pulmonary vascular congestion. Electronically signed by:  Pedro Day MD  9/5/2022 5:16 PM CDT Workstation: [id number]    XR Chest 1 View    Result Date: 9/2/2022  1. Cardiomegaly. Central vascular congestion. 2. Left base opacity consistent with some combination of airspace consolidation and effusion. 3. Similar right basilar opacities consistent with airspace consolidation and possible effusion. Workstation:  [id number]    XR Chest 1 View    Result Date: 8/18/2022     1. New left chest wall AICD; no pneumothorax. 2. Cardiomegaly with central pulmonary vascular congestion and trace pleural effusions. Workstation: [id number]    XR Chest 1 View    Result Date: 8/17/2022     Cardiomegaly with central pulmonary vascular congestion and trace pleural effusions. Workstation: [id number]      Medication Review:     Current Facility-Administered Medications:   •  albuterol (PROVENTIL) nebulizer solution 0.083% 2.5 mg/3mL, 2.5 mg, Nebulization, Q4H PRN, Dinesh Lange MD  •  apixaban (ELIQUIS) tablet 5 mg, 5 mg, Oral, Q12H, Susana Berkowitz APRN, 5 mg at 09/09/22 0811  •  atorvastatin (LIPITOR) tablet 10 mg, 10 mg, Oral, Daily, Dinesh Lange MD, 10 mg at 09/09/22 0811  •  bumetanide (BUMEX) injection 2 mg, 2 mg, Intravenous, Q12H, Susana Berkowitz APRN, 2 mg at 09/09/22 0547  •  carvedilol (COREG) tablet 6.25 mg, 6.25 mg, Oral, BID With Meals, Susana Berkowitz APRN, 6.25 mg at 09/09/22 0907  •  dextrose (D50W) (25 g/50 mL) IV injection 25 g, 25 g, Intravenous, Q15 Min PRN, Dinesh Lange MD  •  dextrose (GLUTOSE) oral gel 15 g, 15 g, Oral, Q15 Min PRN, Dinesh Lange MD  •  glucagon (human recombinant) (GLUCAGEN DIAGNOSTIC) injection 1 mg, 1 mg, Intramuscular, Q15 Min PRN, Dinesh Lange MD  •  Insulin Aspart (novoLOG) injection 0-7 Units, 0-7 Units, Subcutaneous, TID AC, Dinesh Lange MD  •  losartan (COZAAR) half tablet 12.5 mg, 12.5 mg, Oral, Q24H, Dinesh Lange MD, 12.5 mg at 09/09/22 0812  •  Magnesium Sulfate 2 gram Bolus, followed by 8 gram infusion (total Mg dose 10 grams)- Mg less than or equal to 1mg/dL, 2 g, Intravenous, PRN **OR** Magnesium Sulfate 2 gram / 50mL Infusion (GIVE X 3 BAGS TO EQUAL 6GM TOTAL DOSE) - Mg 1.1 - 1.5 mg/dl, 2 g, Intravenous, PRN **OR** Magnesium Sulfate 4 gram infusion- Mg 1.6-1.9 mg/dL, 4 g, Intravenous, PRN, Dinesh Lange MD  •  potassium chloride (MICRO-K) CR capsule 10 mEq,  10 mEq, Oral, BID With Meals, Dinesh Lange MD, 10 mEq at 09/09/22 0811  •  potassium chloride 10 mEq in 100 mL IVPB, 10 mEq, Intravenous, Q1H PRN, Dinesh Lange MD  •  sodium chloride 0.9 % flush 10 mL, 10 mL, Intravenous, PRN, Dinesh Lange MD  •  sodium chloride 0.9 % flush 10 mL, 10 mL, Intravenous, Q12H, Dinesh Lange MD, 10 mL at 09/09/22 0812  •  sodium chloride 0.9 % flush 10 mL, 10 mL, Intravenous, PRN, Dinesh Lange MD  •  spironolactone (ALDACTONE) tablet 50 mg, 50 mg, Oral, Daily, Susana Berkowitz, APRN, 50 mg at 09/09/22 0811    Patient's recent labs and results as included in the subjective data were reviewed by me. Any pertinent outside data will be included.      Assessment & Plan       Acute on chronic systolic CHF (congestive heart failure) (HCC)    Diabetes mellitus (HCC)    Obesity (BMI 30-39.9)    Hypotension    Acute congestive heart failure, unspecified heart failure type (HCC)    1. Acute on chronic systolic CHF.   He has diuresed and showing good urinary output.  Lower leg edema has resolved.  He still feels like he has fluid on because he is not able to lay down flat.      NICM EF:15-20%. NYHA Class IV, Stage C.   Other than breathing and inability to lay down flat I see no  signs of hypervolemia.   Vital signs stable and he is in a well perfused state    BETA-BLOCKER:  carvedilol 6.25 mg twice daily  ACE/ARB: Losartan 12.5 mg daily  ENTRESTO: Indicated, can consider-however he reports that it drops his blood pressure  DIURETIC:  Continue Bumex 2 mg IV bid  SGL2I: can start as outpt  ALDOSTERONE ANTAGONIST: spironolactone 50 mg daily   IMDUR/HYDRALAZINE: N/A  DIGOXIN: N/A     Fluid restriction: 1500 L, discussed not eating outside food that can be loaded with sodium.  He is agreeable to this.  Sodium restriction:2 grams     Cardiac Rehab: Can consider, does meet criteria  ICD: yes [facility]  ADHF: yes 9/2 [facility] for Acute CHF     -Recommend daily cardiac electrolytes and  renal function.     2. S/P AICD placement in [place] 8/2022.  Had AICD placed at [facility] due to LVEF 15 to 20%.  Left subclavian site almost healed, no infection, drainage, or redness at incision.    3. Poor LV function.  Continue Eliquis 5 mg BID     4.  Pulmonary hypertension.  Last echocardiogram showed pulmonary pressures 70 mmHg.    Continue diuresing  He is not wearing his CPAP machine.    Plan for disposition:Where: Continue current location We we will continue to follow.      This document has been electronically signed by SHELL Garcia on September 9, 2022 11:39 CDT   Electronically signed by SHELL Garcia, 09/09/22, 11:39 PM CDT.    I personally saw and examined Matti Bravo after the APRN.  I personally performed a history and physical examination of the patient.  I personally reviewed independent findings and plan of care.  I discussed management with the APRN.  I agree with the APRN's documentation.    No acute overnight events.  The patient reports walking around the floor with no dyspnea.  He is still not able to lie down flat in the bed; has some orthopnea.  He has responded well to Bumex therapy.  Net fluid balance was -1.76 L in the last 24 hours.  Continue Eliquis.  Continue carvedilol, losartan and spironolactone (25 mg) for cardiomyopathy.  Continue current dose of Bumex for today.    Please feel free to call us any questions.  We will continue to follow.    Electronically signed by Dylon Aranda MD, 09/09/22, 4:13 PM CDT.

## 2022-09-09 NOTE — PAYOR COMM NOTE
"[facility]  Case Managment Extender   Keyla Zambrano  (P) [phone]  (F) [phone]        REF# [id number]      Ethel Davis (52 y.o. Male)             Date of Birth   1970    Social Security Number       Address   [address] [id number]    Home Phone   [phone]    MRN   [id number]       Protestant   Faith    Marital Status                               Admission Date   9/5/22    Admission Type   Emergency    Admitting Provider   Will French MD    Attending Provider   Will French MD    Department, Room/Bed   [facility] WEST, 306/1       Discharge Date       Discharge Disposition       Discharge Destination                               Attending Provider: Will French MD    Allergies: No Known Allergies    Isolation: None   Infection: COVID Screen (preop/placement) (12/08/21)   Code Status: CPR   Advance Care Planning Activity    Ht: 193 cm (75.98\")   Wt: 143 kg (314 lb 6.4 oz)    Admission Cmt: None   Principal Problem: Acute on chronic systolic CHF (congestive heart failure) (HCC) [I50.23]                 Active Insurance as of 9/5/2022     Primary Coverage     Payor Plan Insurance Group Employer/Plan Group    [place] [id number]     Payor Plan Address Payor Plan Phone Number Payor Plan Fax Number Effective Dates    [employer] [address] [address] [phone]  10/8/2021 - None Entered    [facility] [id number]       Subscriber Name Subscriber Birth Date Member ID       ETHEL DAVIS 1970 [id number]                 Emergency Contacts      (Rel.) Home Phone Work Phone Mobile Phone    Courtney Davis (Mother) [phone] -- --               History & Physical      Dinesh Lange MD at 09/05/22 1747          History and Physical  Dinesh Lange MD  Hospitalist    Date of admission: 9/5/2022    Patient Care Team:  Shonna Ng, " SHELL as PCP - General (Family Medicine)    Chief complaint   Chief Complaint   Patient presents with   • Dyspnea, leg edema, weight gain     Subjective     Patient is a 52 y.o. male admitted for worsening shortness of breath, orthopnea accompanied by weight gain and significant lower extremity edema. He has longstanding cardiac issues / has had an AICD placed recently in [place], [place].    History  Past Medical History:   Diagnosis Date   • Asthma    • Chronic systolic heart failure (HCC)    • Diabetes mellitus (HCC)    • Hyperlipidemia    • Hypertension    • Obesity    • Peripheral vascular disease (HCC)    • Sleep apnea      Past Surgical History:   Procedure Laterality Date   • CARDIAC CATHETERIZATION N/A 12/08/2021   • CARDIAC DEFIBRILLATOR PLACEMENT     • VARICOSE VEIN SURGERY Right 04/05/2019     Family History   Problem Relation Age of Onset   • Diabetes Mother    • Hypertension Father      Social History     Tobacco Use   • Smoking status: Former Smoker   • Smokeless tobacco: Never Used   Vaping Use   • Vaping Use: Never used   Substance Use Topics   • Alcohol use: No   • Drug use: No     Medications Prior to Admission   Medication Sig Dispense Refill Last Dose   • apixaban (ELIQUIS) 5 MG tablet tablet Take 5 mg by mouth 2 (Two) Times a Day.   Past Month at Unknown time   • carvedilol (COREG) 25 MG tablet Take 1 tablet by mouth 2 (Two) Times a Day With Meals. 60 tablet 1 9/5/2022 at Unknown time   • furosemide (LASIX) 40 MG tablet Take 1 tablet by mouth 2 (Two) Times a Day. (Patient taking differently: Take 60 mg by mouth 2 (Two) Times a Day.) 60 tablet 6 9/5/2022 at 0900   • lovastatin (MEVACOR) 20 MG tablet Take 1 tablet by mouth Every Night. 30 tablet 1 9/4/2022 at Unknown time   • potassium chloride (K-DUR) 10 MEQ CR tablet Take 1 tablet by mouth Daily. 30 tablet 1 9/5/2022 at 0900   • albuterol sulfate  (90 Base) MCG/ACT inhaler Inhale 2 puffs Every 4 (Four) Hours As Needed for Wheezing. 1 inhaler  3    • sacubitril-valsartan (ENTRESTO) 24-26 MG tablet Take 1 tablet by mouth 2 (Two) Times a Day. (Patient taking differently: Take 0.5 tablets by mouth 2 (Two) Times a Day.) 60 tablet 0      Allergies:  Patient has no known allergies.    Review of Systems  Review of Systems   Constitutional: Positive for fatigue. Negative for fever.   Respiratory: Positive for cough and shortness of breath.    Cardiovascular: Positive for leg swelling. Negative for chest pain and palpitations.   Gastrointestinal: Negative for abdominal distention, abdominal pain, constipation, nausea and vomiting.   Genitourinary: Negative for dysuria, enuresis, frequency, hematuria and urgency.   Musculoskeletal: Positive for arthralgias. Negative for back pain.   Skin: Negative for color change and pallor.   Neurological: Positive for weakness. Negative for seizures.   Psychiatric/Behavioral: Negative for agitation, behavioral problems and confusion.   All other systems reviewed and are negative.      Objective     Vital Signs  Temp:  [97.9 °F (36.6 °C)] 97.9 °F (36.6 °C)  Heart Rate:  [57-89] 57  Resp:  [16-30] 18  BP: ()/(48-80) 88/69    Physical Exam:  Physical Exam  Vitals reviewed.   Constitutional:       General: He is not in acute distress.     Appearance: He is obese.   HENT:      Head: Normocephalic and atraumatic.   Eyes:      General: No scleral icterus.     Extraocular Movements: Extraocular movements intact.      Pupils: Pupils are equal, round, and reactive to light.   Cardiovascular:      Rate and Rhythm: Normal rate and regular rhythm.   Pulmonary:      Effort: No respiratory distress.      Breath sounds: No stridor. Rales present. No wheezing.   Abdominal:      General: Abdomen is flat. Bowel sounds are normal. There is no distension.      Palpations: Abdomen is soft. There is no mass.      Tenderness: There is no abdominal tenderness. There is no right CVA tenderness, left CVA tenderness or guarding.      Hernia: No  hernia is present.   Musculoskeletal:         General: Swelling present. No tenderness or deformity.      Cervical back: Normal range of motion and neck supple. No rigidity or tenderness.      Right lower leg: Edema present.      Left lower leg: Edema present.   Skin:     General: Skin is warm and dry.      Coloration: Skin is not jaundiced or pale.      Findings: No bruising.   Neurological:      General: No focal deficit present.      Mental Status: He is alert and oriented to person, place, and time. Mental status is at baseline.      Cranial Nerves: No cranial nerve deficit.      Sensory: No sensory deficit.      Motor: No weakness.   Psychiatric:         Mood and Affect: Mood normal.         Behavior: Behavior normal.         Results Review:   Lab Results (last 24 hours)     Procedure Component Value Units Date/Time    [place] Draw [[id number]] Collected: 09/05/22 1648    Specimen: Blood Updated: 09/05/22 1817    Narrative:      The following orders were created for panel order [place] Draw.  Procedure                               Abnormality         Status                     ---------                               -----------         ------                     Green Top (Gel)[[id number]]                                  Final result               Lavender Top[[id number]]                                     Final result               Gold Top - SST[[id number]]                                   Final result               Light Blue Top[[id number]]                                   Final result                 Please view results for these tests on the individual orders.    Gold Top - SST [[id number]] Collected: 09/05/22 1710    Specimen: Blood Updated: 09/05/22 1817     Extra Tube Hold for add-ons.     Comment: Auto resulted.       Light Blue Top [[id number]] Collected: 09/05/22 1710    Specimen: Blood Updated: 09/05/22 1817     Extra Tube Hold for add-ons.     Comment: Auto resulted       Green Top (Gel) [[id number]]  Collected: 09/05/22 1648    Specimen: Blood Updated: 09/05/22 1803     Extra Tube Hold for add-ons.     Comment: Auto resulted.       Lavender Top [[id number]] Collected: 09/05/22 1648    Specimen: Blood Updated: 09/05/22 1803     Extra Tube hold for add-on     Comment: Auto resulted       Comprehensive Metabolic Panel [[id number]]  (Abnormal) Collected: 09/05/22 1710    Specimen: Blood Updated: 09/05/22 1733     Glucose 144 mg/dL      BUN 20 mg/dL      Creatinine 1.46 mg/dL      Sodium 141 mmol/L      Potassium 3.3 mmol/L      Chloride 105 mmol/L      CO2 28.0 mmol/L      Calcium 8.8 mg/dL      Total Protein 6.5 g/dL      Albumin 3.30 g/dL      ALT (SGPT) 15 U/L      AST (SGOT) 14 U/L      Alkaline Phosphatase 65 U/L      Total Bilirubin 1.4 mg/dL      Globulin 3.2 gm/dL      A/G Ratio 1.0 g/dL      BUN/Creatinine Ratio 13.7     Anion Gap 8.0 mmol/L      eGFR 57.9 mL/min/1.73      Comment: National Kidney Foundation and American Society of Nephrology (ASN) Task Force recommended calculation based on the Chronic Kidney Disease Epidemiology Collaboration (CKD-EPI) equation refit without adjustment for race.       Narrative:      GFR Normal >60  Chronic Kidney Disease <60  Kidney Failure <15      Troponin [[id number]]  (Normal) Collected: 09/05/22 1710    Specimen: Blood Updated: 09/05/22 1733     Troponin T 0.021 ng/mL     Narrative:      Troponin T Reference Range:  <= 0.03 ng/mL-   Negative for AMI  >0.03 ng/mL-     Abnormal for myocardial necrosis.  Clinicians would have to utilize clinical acumen, EKG, Troponin and serial changes to determine if it is an Acute Myocardial Infarction or myocardial injury due to an underlying chronic condition.       Results may be falsely decreased if patient taking Biotin.      BNP [[id number]]  (Abnormal) Collected: 09/05/22 1710    Specimen: Blood Updated: 09/05/22 1731     proBNP 1,707.0 pg/mL     Narrative:      Among patients with dyspnea, NT-proBNP is highly sensitive for the  detection of acute congestive heart failure. In addition NT-proBNP of <300 pg/ml effectively rules out acute congestive heart failure with 99% negative predictive value.    Results may be falsely decreased if patient taking Biotin.      Urinalysis, Microscopic Only - Urine, Clean Catch [[id number]]  (Abnormal) Collected: 09/05/22 1648    Specimen: Urine, Clean Catch Updated: 09/05/22 1720     RBC, UA 0-2 /HPF      WBC, UA 0-2 /HPF      Bacteria, UA None Seen /HPF      Squamous Epithelial Cells, UA None Seen /HPF      Hyaline Casts, UA None Seen /LPF      Methodology Automated Microscopy    CBC & Differential [[id number]]  (Abnormal) Collected: 09/05/22 1710    Specimen: Blood Updated: 09/05/22 1718    Narrative:      The following orders were created for panel order CBC & Differential.  Procedure                               Abnormality         Status                     ---------                               -----------         ------                     CBC Auto Differential[[id number]]        Abnormal            Final result               Scan Slide[[id number]]                                                                    Please view results for these tests on the individual orders.    CBC Auto Differential [[id number]]  (Abnormal) Collected: 09/05/22 1710    Specimen: Blood Updated: 09/05/22 1718     WBC 4.20 10*3/mm3      RBC 4.40 10*6/mm3      Hemoglobin 13.1 g/dL      Hematocrit 40.3 %      MCV 91.6 fL      MCH 29.8 pg      MCHC 32.5 g/dL      RDW 14.0 %      RDW-SD 46.9 fl      MPV 13.3 fL      Platelets 124 10*3/mm3      Neutrophil % 51.9 %      Lymphocyte % 27.6 %      Monocyte % 14.3 %      Eosinophil % 5.5 %      Basophil % 0.5 %      Immature Grans % 0.2 %      Neutrophils, Absolute 2.18 10*3/mm3      Lymphocytes, Absolute 1.16 10*3/mm3      Monocytes, Absolute 0.60 10*3/mm3      Eosinophils, Absolute 0.23 10*3/mm3      Basophils, Absolute 0.02 10*3/mm3      Immature Grans, Absolute 0.01 10*3/mm3       nRBC 0.0 /100 WBC     Urinalysis With Microscopic If Indicated (No Culture) - Urine, Clean Catch [[id number]]  (Abnormal) Collected: 09/05/22 1648    Specimen: Urine, Clean Catch Updated: 09/05/22 1657     Color, UA Yellow     Appearance, UA Clear     pH, UA 6.0     Specific Gravity, UA 1.016     Glucose, UA Negative     Ketones, UA Negative     Bilirubin, UA Negative     Blood, UA Negative     Protein, UA 30 mg/dL (1+)     Leuk Esterase, UA Negative     Nitrite, UA Negative     Urobilinogen, UA 4.0 E.U./dL          Imaging Results (Last 24 Hours)     Procedure Component Value Units Date/Time    XR Chest 1 View [[id number]] Collected: 09/05/22 1623     Updated: 09/05/22 1719    Narrative:      EXAM: XR CHEST 1 VIEW    HISTORY: sob    COMPARISON: 11/29/2021    TECHNIQUE:   Portable view of the chest.    FINDINGS:   Stable left pacemaker. Stable cardiomegaly.  Mild-to-moderate left lung base atelectasis versus infiltrate new  since prior.  Mild to moderate pulmonary vascular congestion.  The mediastinal contours are within normal limits. .  No evidence of mediastinal shift or pneumothorax.  Unremarkable visualized osseous structures.      Impression:      Mild-to-moderate left lung base atelectasis versus infiltrate new  since prior.  Mild to moderate pulmonary vascular congestion.            Electronically signed by:  Pedro Day MD  9/5/2022 5:16 PM CDT  Workstation: [id number]          Assessment & Plan       Acute on chronic systolic CHF (congestive heart failure) (HCC)    Hypotension    Diabetes mellitus (HCC)    Obesity (BMI 30-39.9)    Restrict fluid intake, start IV Lasix and some of his antihypertensive / cardiac medications at a smaller dose as his admission blood pressure values are much lower than usual.    He has a very poor LV EF of 15 to 20% on the most recent Echo and his last cardiac cath done in December of last year showed no significant coronary atherosclerosis (non ischemic cardiomyopathy).    I  have utilized all available immediate resources to obtain, update, or review the patient's current medications.    I confirmed that the patient's Advance Care Plan is present, code status is documented, or surrogate decision maker is listed in the patient's medical record.      09/05/22  20:45 CDT      Electronically signed by Dinesh Lange MD at 09/05/22 2048          Emergency Department Notes      Manny Taylor MD at 09/05/22 1559          Subjective   Patient presents emergency department with complaint of shortness of breath, chest pain, and weakness.  Patient with known congestive heart failure, diabetes, EF of approximately 15 to 20%.  Patient was seen at [facility] in [place] in the last week.  Patient was actually admitted and scheduled to be transferred to [facility], though evidently there was a bed shortage and the patient improved somewhat and went home.  Patient has gone home and been home approximately 12 to 24 hours that with return of his symptoms of shortness of breath and weakness.  Patient arrives with blood pressures in the 60-90 systolic range.  Patient denies dizziness at this time.  Patient is short of breath and is unable to lie flat or add any type of more acute angle.  Patient is most comfortable sitting on the side of the bed with his legs down.  Chronic with acute edema.  Patient notes a weight gain of approximately 10 to 15 pounds in the last week.          Review of Systems   Constitutional: Positive for activity change and fatigue. Negative for chills and fever.   Respiratory: Positive for shortness of breath.    Cardiovascular: Positive for chest pain and leg swelling.   Gastrointestinal: Negative for abdominal pain, diarrhea, nausea and vomiting.   All other systems reviewed and are negative.      Past Medical History:   Diagnosis Date   • Asthma    • Chronic systolic heart failure (HCC)    • Diabetes mellitus (HCC)    • Hyperlipidemia    • Hypertension    •  Obesity    • Peripheral vascular disease (HCC)    • Sleep apnea        No Known Allergies    Past Surgical History:   Procedure Laterality Date   • CARDIAC CATHETERIZATION     • CARDIAC CATHETERIZATION N/A 12/08/2021   • VARICOSE VEIN SURGERY Right 04/05/2019       Family History   Problem Relation Age of Onset   • Diabetes Mother    • Hypertension Father        Social History     Socioeconomic History   • Marital status:    Tobacco Use   • Smoking status: Former Smoker   • Smokeless tobacco: Never Used   Vaping Use   • Vaping Use: Never used   Substance and Sexual Activity   • Alcohol use: No   • Drug use: No   • Sexual activity: Not Currently           Objective   Physical Exam  Vitals and nursing note reviewed.   Constitutional:       General: He is in acute distress.      Appearance: He is well-developed. He is ill-appearing.   HENT:      Head: Normocephalic and atraumatic.   Eyes:      Conjunctiva/sclera: Conjunctivae normal.      Pupils: Pupils are equal, round, and reactive to light.   Neck:      Vascular: No JVD.   Cardiovascular:      Rate and Rhythm: Normal rate and regular rhythm.      Heart sounds: Heart sounds are distant. No murmur heard.    No friction rub. No gallop.   Pulmonary:      Effort: Tachypnea and respiratory distress present.      Breath sounds: Rhonchi present. No wheezing or rales.   Chest:      Chest wall: No tenderness.   Abdominal:      General: Bowel sounds are normal. There is no distension.      Palpations: Abdomen is soft. There is no mass.      Tenderness: There is no abdominal tenderness. There is no guarding or rebound.   Musculoskeletal:         General: Normal range of motion.      Cervical back: Normal range of motion and neck supple.      Right lower leg: Edema present.      Left lower leg: Edema present.   Skin:     General: Skin is warm and dry.      Capillary Refill: Capillary refill takes less than 2 seconds.   Neurological:      General: No focal deficit  present.      Mental Status: He is alert and oriented to person, place, and time.   Psychiatric:         Mood and Affect: Mood normal.         Behavior: Behavior normal.         Thought Content: Thought content normal.         Judgment: Judgment normal.         Procedures          ED Course  ED Course as of 09/05/22 1818   Mon Sep 05, 2022   1818 Patient with CHF, hypotension.  Signs and symptoms consistent with heart failure.  Diuresis initiated though need to be somewhat concerned with patient's baseline blood pressure with his EF of 15 to 20%.  Patient will be admitted for diuresis. [PC]      ED Course User Index  [PC] Manny Taylor MD                                         Labs Reviewed   COMPREHENSIVE METABOLIC PANEL - Abnormal; Notable for the following components:       Result Value    Glucose 144 (*)     Creatinine 1.46 (*)     Potassium 3.3 (*)     Albumin 3.30 (*)     Total Bilirubin 1.4 (*)     eGFR 57.9 (*)     All other components within normal limits    Narrative:     GFR Normal >60  Chronic Kidney Disease <60  Kidney Failure <15     BNP (IN-HOUSE) - Abnormal; Notable for the following components:    proBNP 1,707.0 (*)     All other components within normal limits    Narrative:     Among patients with dyspnea, NT-proBNP is highly sensitive for the detection of acute congestive heart failure. In addition NT-proBNP of <300 pg/ml effectively rules out acute congestive heart failure with 99% negative predictive value.    Results may be falsely decreased if patient taking Biotin.     URINALYSIS W/ MICROSCOPIC IF INDICATED (NO CULTURE) - Abnormal; Notable for the following components:    Protein, UA 30 mg/dL (1+) (*)     Urobilinogen, UA 4.0 E.U./dL (*)     All other components within normal limits   CBC WITH AUTO DIFFERENTIAL - Abnormal; Notable for the following components:    MPV 13.3 (*)     Platelets 124 (*)     Monocyte % 14.3 (*)     All other components within normal limits   URINALYSIS,  MICROSCOPIC ONLY - Abnormal; Notable for the following components:    RBC, UA 0-2 (*)     All other components within normal limits   TROPONIN (IN-HOUSE) - Normal    Narrative:     Troponin T Reference Range:  <= 0.03 ng/mL-   Negative for AMI  >0.03 ng/mL-     Abnormal for myocardial necrosis.  Clinicians would have to utilize clinical acumen, EKG, Troponin and serial changes to determine if it is an Acute Myocardial Infarction or myocardial injury due to an underlying chronic condition.       Results may be falsely decreased if patient taking Biotin.     RAINBOW DRAW    Narrative:     The following orders were created for panel order [place] Draw.  Procedure                               Abnormality         Status                     ---------                               -----------         ------                     Green Top (Gel)[[id number]]                                  Final result               Lavender Top[[id number]]                                     Final result               Gold Top - SST[[id number]]                                   Final result               Light Blue Top[[id number]]                                   Final result                 Please view results for these tests on the individual orders.   CBC AND DIFFERENTIAL    Narrative:     The following orders were created for panel order CBC & Differential.  Procedure                               Abnormality         Status                     ---------                               -----------         ------                     CBC Auto Differential[[id number]]        Abnormal            Final result               Scan Slide[[id number]]                                                                    Please view results for these tests on the individual orders.   GREEN TOP   LAVENDER TOP   GOLD TOP - SST   LIGHT BLUE TOP       XR Chest 1 View   Final Result   Mild-to-moderate left lung base atelectasis versus infiltrate new   since prior.    Mild to moderate pulmonary vascular congestion.                  Electronically signed by:  Pedro Day MD  9/5/2022 5:16 PM CDT   Workstation: [id number]              [facility]    Final diagnoses:   Acute congestive heart failure, unspecified heart failure type (HCC)   Dyspnea, unspecified type   Chest pain, unspecified type   Hypotension, unspecified hypotension type       ED Disposition  ED Disposition     ED Disposition   Decision to Admit    Condition   --    Comment   Level of Care: Telemetry [5]   Diagnosis: Acute congestive heart failure, unspecified heart failure type (HCC) [[id number]]   Admitting Physician: JAGDEEP SILVER [[id number]]   Attending Physician: JAGDEEP SILVER [[id number]]               No follow-up provider specified.       Medication List      No changes were made to your prescriptions during this visit.          Manny Taylor MD  09/05/22 1818      Electronically signed by Manny Taylor MD at 09/05/22 1818     Angela Montaño RN at 09/05/22 1925          Nursing report ED to floor  Matti Bravo  52 y.o.  male    HPI:   Chief Complaint   Patient presents with   • Chest Pain       Admitting doctor:   Jagdeep Silver MD    Consulting provider(s):  Consults     No orders found from 8/7/2022 to 9/6/2022.           Admitting diagnosis:   The primary encounter diagnosis was Acute congestive heart failure, unspecified heart failure type (HCC). Diagnoses of Dyspnea, unspecified type, Chest pain, unspecified type, and Hypotension, unspecified hypotension type were also pertinent to this visit.    Code status:   Current Code Status     Date Active Code Status Order ID Comments User Context       9/5/2022 1830 CPR (Attempt to Resuscitate) [id number]  Dinesh Lange MD ED     Advance Care Planning Activity      Questions for Current Code Status     Question Answer    Code Status (Patient has no pulse and is not breathing) CPR (Attempt to Resuscitate)    Medical Interventions (Patient has pulse or  is breathing) Full Support          Allergies:   Patient has no known allergies.    Intake and Output  No intake or output data in the 24 hours ending 09/05/22 1925    Weight:       09/05/22  1537   Weight: (!) 142 kg (313 lb)       Most recent vitals:   Vitals:    09/05/22 1700 09/05/22 1801 09/05/22 1856 09/05/22 1919   BP: 108/71 91/63  123/80   BP Location:  Left arm  Left arm   Patient Position:  Sitting  Sitting   Pulse: 72 84 89    Resp:  16 20    Temp:       TempSrc:       SpO2: 100% 95% 94%    Weight:       Height:           Active LDAs/IV Access:   Lines, Drains & Airways     Active LDAs     Name Placement date Placement time Site Days    Peripheral IV 09/05/22 1711 Right Antecubital 09/05/22 1711  Antecubital  less than 1                Labs (abnormal labs have a star):   Labs Reviewed   COMPREHENSIVE METABOLIC PANEL - Abnormal; Notable for the following components:       Result Value    Glucose 144 (*)     Creatinine 1.46 (*)     Potassium 3.3 (*)     Albumin 3.30 (*)     Total Bilirubin 1.4 (*)     eGFR 57.9 (*)     All other components within normal limits    Narrative:     GFR Normal >60  Chronic Kidney Disease <60  Kidney Failure <15     BNP (IN-HOUSE) - Abnormal; Notable for the following components:    proBNP 1,707.0 (*)     All other components within normal limits    Narrative:     Among patients with dyspnea, NT-proBNP is highly sensitive for the detection of acute congestive heart failure. In addition NT-proBNP of <300 pg/ml effectively rules out acute congestive heart failure with 99% negative predictive value.    Results may be falsely decreased if patient taking Biotin.     URINALYSIS W/ MICROSCOPIC IF INDICATED (NO CULTURE) - Abnormal; Notable for the following components:    Protein, UA 30 mg/dL (1+) (*)     Urobilinogen, UA 4.0 E.U./dL (*)     All other components within normal limits   CBC WITH AUTO DIFFERENTIAL - Abnormal; Notable for the following components:    MPV 13.3 (*)      Platelets 124 (*)     Monocyte % 14.3 (*)     All other components within normal limits   URINALYSIS, MICROSCOPIC ONLY - Abnormal; Notable for the following components:    RBC, UA 0-2 (*)     All other components within normal limits   TROPONIN (IN-HOUSE) - Normal    Narrative:     Troponin T Reference Range:  <= 0.03 ng/mL-   Negative for AMI  >0.03 ng/mL-     Abnormal for myocardial necrosis.  Clinicians would have to utilize clinical acumen, EKG, Troponin and serial changes to determine if it is an Acute Myocardial Infarction or myocardial injury due to an underlying chronic condition.       Results may be falsely decreased if patient taking Biotin.     RAINBOW DRAW    Narrative:     The following orders were created for panel order [place] Draw.  Procedure                               Abnormality         Status                     ---------                               -----------         ------                     Green Top (Gel)[[id number]]                                  Final result               Lavender Top[[id number]]                                     Final result               Gold Top - SST[[id number]]                                   Final result               Light Blue Top[[id number]]                                   Final result                 Please view results for these tests on the individual orders.   CBC AND DIFFERENTIAL    Narrative:     The following orders were created for panel order CBC & Differential.  Procedure                               Abnormality         Status                     ---------                               -----------         ------                     CBC Auto Differential[[id number]]        Abnormal            Final result               Scan Slide[[id number]]                                                                    Please view results for these tests on the individual orders.   GREEN TOP   LAVENDER TOP   GOLD TOP - SST   LIGHT BLUE TOP       Meds given  in ED:   Medications   sodium chloride 0.9 % flush 10 mL (has no administration in time range)   furosemide (LASIX) injection 80 mg (80 mg Intravenous Not Given 9/5/22 1849)           NIH Stroke Scale:       Isolation/Infection(s):  No active isolations   COVID Screen (preop/placement)     COVID Testing  Collected no  Resulted NA    Nursing report ED to floor:  Mental status: A&O  Ambulatory status: Ad david  Precautions: NA    ED nurse phone extentsird- [phone]    Electronically signed by Angela Montaño RN at 09/05/22 1926     Angela Montaño RN at 09/05/22 1943        Telemetry then patient can go to floor    Electronically signed by Angela Montaño RN at 09/05/22 1944         Lab Results (last 24 hours)     Procedure Component Value Units Date/Time    POC Glucose Once [[id number]]  (Normal) Collected: 09/09/22 1054    Specimen: Blood Updated: 09/09/22 1106     Glucose 127 mg/dL      Comment: RN NotifiedOperator: [id number] MICHELL MEJIAFERMeter ID: [id number]       POC Glucose Once [[id number]]  (Abnormal) Collected: 09/08/22 1939    Specimen: Blood Updated: 09/09/22 0933     Glucose 188 mg/dL      Comment: RN NotifiedOperator: [id number] PALMER FAITHMeter ID: [id number]       POC Glucose Once [[id number]]  (Normal) Collected: 09/09/22 0611    Specimen: Blood Updated: 09/09/22 0706     Glucose 117 mg/dL      Comment: RN NotifiedOperator: [id number] PALMER FAITHMeter ID: [id number]       Extra Tubes [[id number]] Collected: 09/09/22 0521    Specimen: Blood, Venous Line Updated: 09/09/22 0632    Narrative:      The following orders were created for panel order Extra Tubes.  Procedure                               Abnormality         Status                     ---------                               -----------         ------                     Lavender Top[[id number]]                                     Final result                 Please view results for these tests on the individual orders.    Lavender Top [[id number]]  Collected: 09/09/22 0521    Specimen: Blood Updated: 09/09/22 0632     Extra Tube hold for add-on     Comment: Auto resulted       Basic Metabolic Panel [[id number]]  (Abnormal) Collected: 09/09/22 0521    Specimen: Blood Updated: 09/09/22 0606     Glucose 133 mg/dL      BUN 23 mg/dL      Creatinine 1.35 mg/dL      Sodium 142 mmol/L      Potassium 3.8 mmol/L      Chloride 103 mmol/L      CO2 31.0 mmol/L      Calcium 9.1 mg/dL      BUN/Creatinine Ratio 17.0     Anion Gap 8.0 mmol/L      eGFR 63.6 mL/min/1.73      Comment: National Kidney Foundation and American Society of Nephrology (ASN) Task Force recommended calculation based on the Chronic Kidney Disease Epidemiology Collaboration (CKD-EPI) equation refit without adjustment for race.       Narrative:      GFR Normal >60  Chronic Kidney Disease <60  Kidney Failure <15      POC Glucose Once [[id number]]  (Normal) Collected: 09/07/22 0558    Specimen: Blood Updated: 09/08/22 1630     Glucose 119 mg/dL      Comment: RN NotifiedOperator: [id number] TALON MADISONMeter ID: [id number]       POC Glucose Once [[id number]]  (Normal) Collected: 09/08/22 1605    Specimen: Blood Updated: 09/08/22 1620     Glucose 109 mg/dL      Comment: RN NotifiedOperator: [id number] PHUC MEJIAFERMeter ID: [id number]           Imaging Results (Last 24 Hours)     ** No results found for the last 24 hours. **        ECG/EMG Results (last 24 hours)     Procedure Component Value Units Date/Time    SCANNED - TELEMETRY   [[id number]] Resulted: 09/05/22     Updated: 09/08/22 1407    SCANNED - TELEMETRY   [[id number]] Resulted: 09/05/22     Updated: 09/08/22 1415    SCANNED - TELEMETRY   [[id number]] Resulted: 09/05/22     Updated: 09/08/22 1800    SCANNED - TELEMETRY   [[id number]] Resulted: 09/05/22     Updated: 09/08/22 1819    SCANNED - TELEMETRY   [[id number]] Resulted: 09/05/22     Updated: 09/08/22 1819    ECG 12 Lead [[id number]] Collected: 09/09/22 0602     Updated: 09/09/22 0656     QT  Interval 392 ms      QTC Interval 487 ms     Narrative:      Test Reason : cp  Blood Pressure :   */*   mmHG  Vent. Rate :  93 BPM     Atrial Rate :  93 BPM     P-R Int : 208 ms          QRS Dur : 120 ms      QT Int : 392 ms       P-R-T Axes :  63 -36  87 degrees     QTc Int : 487 ms    Sinus rhythm with frequent and consecutive Premature ventricular complexes  Left atrial enlargement  Left axis deviation  Septal infarct (cited on or before 04-FEB-2019)  Abnormal ECG  When compared with ECG of 06-SEP-2022 06:09,  Premature ventricular complexes are now Present  Premature supraventricular complexes are no longer Present    Referred By:            Confirmed By:     SCANNED - TELEMETRY   [[id number]] Resulted: 09/05/22     Updated: 09/09/22 1007             Physician Progress Notes (last 24 hours)      Moshe Aleman DO at 09/08/22 1205              [facility] Medicine Services  INPATIENT PROGRESS NOTE    Length of Stay: 0  Date of Admission: 9/5/2022  Primary Care Physician: Shonna Ng APRN    Subjective   Chief Complaint: SOB    HPI:    Patient presents emergency department with complaint of shortness of breath, chest pain, and weakness.  Patient with known congestive heart failure, diabetes, EF of approximately 15 to 20%.  Patient was seen at [facility] in [place] in the last week.  Patient was actually admitted and scheduled to be transferred to [facility], though evidently there was a bed shortage and the patient improved somewhat and went home.  Patient has gone home and been home approximately 12 to 24 hours that with return of his symptoms of shortness of breath and weakness.  Patient arrives with blood pressures in the 60-90 systolic range.  Patient denies dizziness at this time.  Patient is short of breath and is unable to lie flat or add any type of more acute angle.  Patient is most comfortable sitting on the side of the bed with his legs  down.  Chronic with acute edema.  Patient notes a weight gain of approximately 10 to 15 pounds in the last week..   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  He states he is feeling poorly.  Not having any strength.  Reviewed medications with him the patient is currently on Bumex twice a day along with Aldactone.  He states he normally takes Lasix 60 mg at home twice a day.  He has no chest pain.  He continues to have shortness of breath at rest and on exertion.  He has no cough.  No fevers or chills.  No nausea vomiting no urinary complaints no GI complaints.  Vital signs are stable patient is afebrile.      Review of Systems   Constitutional: Positive for fatigue. Negative for chills and fever.   HENT: Negative.    Eyes: Negative.    Respiratory: Positive for shortness of breath.    Cardiovascular: Negative.  Negative for chest pain.   Gastrointestinal: Negative.    Endocrine: Negative.    Genitourinary: Negative.    Musculoskeletal: Negative.    Neurological: Positive for weakness. Negative for dizziness.   Hematological: Negative.    Psychiatric/Behavioral: Negative.         All pertinent negatives and positives are as above. All other systems have been reviewed and are negative unless otherwise stated.     Objective    Temp:  [97.3 °F (36.3 °C)-98 °F (36.7 °C)] 98 °F (36.7 °C)  Heart Rate:  [52-94] 52  Resp:  [18] 18  BP: (110-134)/(68-78) 130/78    Physical Exam  Vitals and nursing note reviewed.   Constitutional:       Appearance: He is obese.   HENT:      Head: Normocephalic and atraumatic.      Right Ear: External ear normal.      Left Ear: External ear normal.      Nose: Nose normal.      Mouth/Throat:      Mouth: Mucous membranes are moist.      Pharynx: Oropharynx is clear.   Eyes:      Extraocular Movements: Extraocular movements intact.       Conjunctiva/sclera: Conjunctivae normal.      Pupils: Pupils are equal, round, and reactive to light.   Cardiovascular:      Rate and Rhythm: Normal rate and regular rhythm.      Pulses: Normal pulses.      Heart sounds: Normal heart sounds.   Pulmonary:      Effort: Pulmonary effort is normal.      Comments: Decreased breath sounds at bases bilateral  Abdominal:      General: Bowel sounds are normal.      Palpations: Abdomen is soft.   Musculoskeletal:         General: Normal range of motion.      Cervical back: Normal range of motion and neck supple.      Right lower leg: Edema present.      Left lower leg: Edema present.      Comments: Lower extremity edema essentially unchanged   Skin:     General: Skin is warm and dry.   Neurological:      General: No focal deficit present.      Mental Status: He is alert.      Motor: Weakness present.   Psychiatric:         Mood and Affect: Mood normal.         Behavior: Behavior normal.           Results Review:  I have reviewed the labs, radiology results, and diagnostic studies.    Laboratory Data:   Results from last 7 days   Lab Units 09/08/22  0903 09/07/22  0623 09/06/22  0549 09/05/22  1710 09/05/22  1710 09/02/22  1000   SODIUM mmol/L 141 142 143   < > 141 140   POTASSIUM mmol/L 3.5 3.7 3.5   < > 3.3* 3.2*   CHLORIDE mmol/L 103 104 103   < > 105 103   CO2 mmol/L 29.0 31.0* 31.0*   < > 28.0  --    BUN mg/dL 21* 23* 22*   < > 20 24*   CREATININE mg/dL 0.96 1.36* 1.44*   < > 1.46* 1.6*   GLUCOSE mg/dL 148* 101* 131*   < > 144*  --    CALCIUM mg/dL 8.4* 8.9 8.9   < > 8.8 8.7   BILIRUBIN mg/dL  --   --   --   --  1.4* 1.50*   ALK PHOS U/L  --   --   --   --  65 66   ALT (SGPT) U/L  --   --   --   --  15 21   AST (SGOT) U/L  --   --   --   --  14 14*   ANION GAP mmol/L 9.0 7.0 9.0   < > 8.0  --     < > = values in this interval not displayed.     Estimated Creatinine Clearance: 140.4 mL/min (by C-G formula based on SCr of 0.96 mg/dL).  Results from last 7 days   Lab Units  09/06/22  0549 09/02/22  1000   MAGNESIUM mg/dL 1.9 1.9         Results from last 7 days   Lab Units 09/07/22  0623 09/05/22  1710   WBC 10*3/mm3 4.08 4.20   HEMOGLOBIN g/dL 13.1 13.1   HEMATOCRIT % 41.0 40.3   PLATELETS 10*3/mm3 118* 124*     Results from last 7 days   Lab Units 09/02/22  1000   INR  1.57*       Culture Data:   No results found for: BLOODCX  No results found for: URINECX  No results found for: RESPCX  No results found for: WOUNDCX  No results found for: STOOLCX  No components found for: BODYFLD    Radiology Data:   Imaging Results (Last 24 Hours)     ** No results found for the last 24 hours. **          I have reviewed the patient's current medications.     Assessment/Plan     Principal Problem:    Acute on chronic systolic CHF (congestive heart failure) ([facility])  Active Problems:    Diabetes mellitus ([facility])    Obesity (BMI 30-39.9)    Hypotension    Acute on chronic systolic heart failure  BETA-BLOCKER: Carvedilol 3.125 mg twice daily  ACE/ARB: Losartan 12.5 mg daily  ENTRESTO:  Indicated, can consider  DIURETIC:  Bumex twice daily.  Spironolactone 25 mg daily  SGL2I: Can consider  ALDOSTERONE ANTAGONIST: Start spironolactone 25 mg daily   IMDUR/HYDRALAZINE: N/A  DIGOXIN: N/A  Fluid restriction: 1500 L  Sodium restriction:2 grams  After review of the chart and evaluation of the patient review of labs it appears as though the patient would probably benefit from an adjustment in diuretic therapy      S/P AICD placement in [place] 8/2022.  Had AICD placed at [facility] due to LVEF 15 to 20%.  Left subclavian site almost healed, no infection, drainage, or redness at incision.     NICM EF:15-20%. NYHA Class IV, Stage C.  continue meds as noted above     HLD continue lipitor    HTN continue Losartan.  Current blood pressure 130/70 O2 saturation 96% room air.    Deconditioning.  We will ask physical therapy and Occupational Therapy to eval and treat.    CODE STATUS full code    DVT prophylaxis  Eliquis.    I confirmed that the patient's Advance Care Plan is present, code status is documented, or surrogate decision maker is listed in the patient's medical record.     Discharge Planning: In progress.  Possible cardiac rehab on discharge.  Anticipate discharge in 24 hours.    Moshe Aleman DO    Electronically signed by Moshe Aleman DO at 09/08/22 1212       Medical Student Notes (last 24 hours)  Notes from 09/08/22 1201 through 09/09/22 1201   No notes of this type exist for this encounter.         Consult Notes (last 24 hours)  Notes from 09/08/22 1201 through 09/09/22 1201   No notes of this type exist for this encounter.

## 2022-09-10 LAB
ANION GAP SERPL CALCULATED.3IONS-SCNC: 9 MMOL/L (ref 5–15)
BUN SERPL-MCNC: 23 MG/DL (ref 6–20)
BUN/CREAT SERPL: 17.2 (ref 7–25)
CALCIUM SPEC-SCNC: 9.1 MG/DL (ref 8.6–10.5)
CHLORIDE SERPL-SCNC: 101 MMOL/L (ref 98–107)
CO2 SERPL-SCNC: 30 MMOL/L (ref 22–29)
CREAT SERPL-MCNC: 1.34 MG/DL (ref 0.76–1.27)
EGFRCR SERPLBLD CKD-EPI 2021: 64.1 ML/MIN/1.73
GLUCOSE BLDC GLUCOMTR-MCNC: 104 MG/DL (ref 70–130)
GLUCOSE BLDC GLUCOMTR-MCNC: 144 MG/DL (ref 70–130)
GLUCOSE BLDC GLUCOMTR-MCNC: 156 MG/DL (ref 70–130)
GLUCOSE BLDC GLUCOMTR-MCNC: 165 MG/DL (ref 70–130)
GLUCOSE BLDC GLUCOMTR-MCNC: 167 MG/DL (ref 70–130)
GLUCOSE SERPL-MCNC: 104 MG/DL (ref 65–99)
POTASSIUM SERPL-SCNC: 3.9 MMOL/L (ref 3.5–5.2)
SODIUM SERPL-SCNC: 140 MMOL/L (ref 136–145)

## 2022-09-10 PROCEDURE — 82962 GLUCOSE BLOOD TEST: CPT

## 2022-09-10 PROCEDURE — 25010000002 KETOROLAC TROMETHAMINE PER 15 MG: Performed by: HOSPITALIST

## 2022-09-10 PROCEDURE — 80048 BASIC METABOLIC PNL TOTAL CA: CPT | Performed by: NURSE PRACTITIONER

## 2022-09-10 PROCEDURE — 99233 SBSQ HOSP IP/OBS HIGH 50: CPT | Performed by: NURSE PRACTITIONER

## 2022-09-10 PROCEDURE — 25010000002 ONDANSETRON PER 1 MG: Performed by: HOSPITALIST

## 2022-09-10 RX ORDER — BUMETANIDE 1 MG/1
2 TABLET ORAL 2 TIMES DAILY
Status: DISCONTINUED | OUTPATIENT
Start: 2022-09-10 | End: 2022-09-11 | Stop reason: HOSPADM

## 2022-09-10 RX ORDER — CARVEDILOL 12.5 MG/1
12.5 TABLET ORAL 2 TIMES DAILY WITH MEALS
Status: DISCONTINUED | OUTPATIENT
Start: 2022-09-10 | End: 2022-09-11 | Stop reason: HOSPADM

## 2022-09-10 RX ORDER — KETOROLAC TROMETHAMINE 30 MG/ML
30 INJECTION, SOLUTION INTRAMUSCULAR; INTRAVENOUS ONCE
Status: COMPLETED | OUTPATIENT
Start: 2022-09-10 | End: 2022-09-10

## 2022-09-10 RX ADMIN — APIXABAN 5 MG: 5 TABLET, FILM COATED ORAL at 11:34

## 2022-09-10 RX ADMIN — ATORVASTATIN CALCIUM 10 MG: 10 TABLET, FILM COATED ORAL at 09:28

## 2022-09-10 RX ADMIN — KETOROLAC TROMETHAMINE 30 MG: 30 INJECTION, SOLUTION INTRAMUSCULAR; INTRAVENOUS at 16:07

## 2022-09-10 RX ADMIN — POTASSIUM CHLORIDE 10 MEQ: 750 CAPSULE, EXTENDED RELEASE ORAL at 18:08

## 2022-09-10 RX ADMIN — Medication 10 ML: at 09:31

## 2022-09-10 RX ADMIN — Medication 10 ML: at 20:23

## 2022-09-10 RX ADMIN — BUMETANIDE 2 MG: 1 TABLET ORAL at 18:08

## 2022-09-10 RX ADMIN — APIXABAN 5 MG: 5 TABLET, FILM COATED ORAL at 20:23

## 2022-09-10 RX ADMIN — Medication 12.5 MG: at 09:28

## 2022-09-10 RX ADMIN — CARVEDILOL 6.25 MG: 6.25 TABLET, FILM COATED ORAL at 09:28

## 2022-09-10 RX ADMIN — ONDANSETRON 4 MG: 2 INJECTION INTRAMUSCULAR; INTRAVENOUS at 08:01

## 2022-09-10 RX ADMIN — POTASSIUM CHLORIDE 10 MEQ: 750 CAPSULE, EXTENDED RELEASE ORAL at 09:28

## 2022-09-10 RX ADMIN — SPIRONOLACTONE 25 MG: 25 TABLET ORAL at 09:28

## 2022-09-10 RX ADMIN — BUMETANIDE 2 MG: 0.25 INJECTION INTRAMUSCULAR; INTRAVENOUS at 08:00

## 2022-09-10 RX ADMIN — CARVEDILOL 12.5 MG: 12.5 TABLET, FILM COATED ORAL at 18:08

## 2022-09-10 NOTE — PROGRESS NOTES
"  [facility] [place] [facility] Progress Note   LOS: 1 day   Patient Care Team:  Shonna Ng APRN as PCP - General (Family Medicine)    Chief Complaint   Patient presents with   • Chest Pain         Subjective     Interval History:   Patient Denies: chest pain, palpitations, dizziness and syncope  Patient Complaints: shortness of air and edema, reports edema and shortness of air improving.  He even tried to lay down flat last evening and was able to tolerate it for about 30 minutes.  History taken from: patient chart RN     Vital signs stable overnight.  He is in normal sinus rhythm with frequent PVCs.  This is not new.  He is still diuresing.  Lower leg edema has improved.    Objective     Vital Sign Min/Max for last 24 hours  Temp  Min: 97.1 °F (36.2 °C)  Max: 98.2 °F (36.8 °C)   BP  Min: 112/80  Max: 141/63   Pulse  Min: 72  Max: 108   Resp  Min: 16  Max: 18   SpO2  Min: 95 %  Max: 99 %   Flow (L/min)  Min: 2  Max: 2   Weight  Min: 143 kg (314 lb 3.2 oz)  Max: 143 kg (314 lb 3.2 oz)     Flowsheet Rows    Flowsheet Row First Filed Value   Admission Height 193 cm (76\") Documented at 09/05/2022 1537   Admission Weight 142 kg (313 lb) Documented at 09/05/2022 1537            09/08/22  0500 09/09/22  0543 09/10/22  0626   Weight: (!) 143 kg (315 lb) (!) 143 kg (314 lb 6.4 oz) (!) 143 kg (314 lb 3.2 oz)       Physical Exam:  Physical Exam  Vitals and nursing note reviewed.   Constitutional:       Appearance: He is obese. He is not ill-appearing or diaphoretic.   HENT:      Head: Normocephalic and atraumatic.      Mouth/Throat:      Mouth: Mucous membranes are moist.      Pharynx: Oropharynx is clear. No oropharyngeal exudate.   Eyes:      General: Lids are normal.         Right eye: No discharge.         Left eye: No discharge.      Conjunctiva/sclera: Conjunctivae normal.   Neck:      Thyroid: No thyromegaly.      Vascular: No JVD.      Trachea: Trachea normal.   Cardiovascular:      Rate and Rhythm: Normal rate and regular " rhythm.      Pulses: Normal pulses.      Heart sounds: Normal heart sounds, S1 normal and S2 normal.   Pulmonary:      Effort: Pulmonary effort is normal. No respiratory distress.      Breath sounds: Normal breath sounds and air entry. No wheezing or rhonchi.   Abdominal:      General: Abdomen is flat. Bowel sounds are normal. There is no distension.      Palpations: Abdomen is soft.   Musculoskeletal:      Right lower leg: Edema present.      Left lower leg: Edema present.      Comments: Trace bilateral lower leg edema   Skin:     General: Skin is warm and dry.      Capillary Refill: Capillary refill takes less than 2 seconds.   Neurological:      Mental Status: He is alert and oriented to person, place, and time.   Psychiatric:         Attention and Perception: Attention normal.         Mood and Affect: Mood normal.         Speech: Speech normal.          Results Reviewed by myself:     Results from last 7 days   Lab Units 09/10/22  0536 09/09/22  0521 09/08/22  0903 09/06/22  0549 09/05/22  1710   SODIUM mmol/L 140 142 141   < > 141   POTASSIUM mmol/L 3.9 3.8 3.5   < > 3.3*   CHLORIDE mmol/L 101 103 103   < > 105   CO2 mmol/L 30.0* 31.0* 29.0   < > 28.0   BUN mg/dL 23* 23* 21*   < > 20   CREATININE mg/dL 1.34* 1.35* 0.96   < > 1.46*   CALCIUM mg/dL 9.1 9.1 8.4*   < > 8.8   BILIRUBIN mg/dL  --   --   --   --  1.4*   ALK PHOS U/L  --   --   --   --  65   ALT (SGPT) U/L  --   --   --   --  15   AST (SGOT) U/L  --   --   --   --  14   GLUCOSE mg/dL 104* 133* 148*   < > 144*    < > = values in this interval not displayed.       Estimated Creatinine Clearance: 100.5 mL/min (A) (by C-G formula based on SCr of 1.34 mg/dL (H)).    Results from last 7 days   Lab Units 09/06/22  0549   MAGNESIUM mg/dL 1.9             Results from last 7 days   Lab Units 09/07/22  0623 09/05/22  1710   WBC 10*3/mm3 4.08 4.20   HEMOGLOBIN g/dL 13.1 13.1   PLATELETS 10*3/mm3 118* 124*           Lab Results   Component Value Date    PROBNP  1,707.0 (H) 09/05/2022       I/O last 3 completed shifts:  In: 720 [P.O.:720]  Out: 2180 [Urine:2180]    Cardiographics:  ECG/EMG Results (last 24 hours)     Procedure Component Value Units Date/Time    SCANNED - TELEMETRY   [[id number]] Resulted: 09/05/22     Updated: 09/09/22 1759    SCANNED - TELEMETRY   [[id number]] Resulted: 09/05/22     Updated: 09/09/22 2013    SCANNED - TELEMETRY   [[id number]] Resulted: 09/05/22     Updated: 09/09/22 2013          Results for orders placed during the hospital encounter of 02/04/19    Adult Transthoracic Echo Limited W/ Cont if Necessary Per Protocol    Interpretation Summary  · 3D acquisition for left ventricular ejection fraction. Live 3D and full volume to assess left ventricular ejection fraction was utilized.  · Left ventricle systolic function is severely decreased. Estimated LVEF is 10%. Left ventricle cavity severely dilated with restrictive diastolic dysfunction. Findings are consistent with dilated cardiomyopathy.  · Right ventricle is mildly dilated with mildly reduced right ventricular systolic function.  · Moderate mitral valve regurgitation is present.  · Moderate tricuspid valve regurgitation is present. Pulmonary hypertension is present with a PA systolic pressure of 70 mmHg.  · There is mild pulmonic valve regurgitation present.  · There is a trivial pericardial effusion adjacent to the left ventricle      XR Chest 2 View    Result Date: 8/19/2022     Stable chest radiograph from the comparison exam with findings as discussed above. Workstation: [id number]    XR Chest 1 View    Result Date: 9/5/2022  Mild-to-moderate left lung base atelectasis versus infiltrate new since prior. Mild to moderate pulmonary vascular congestion. Electronically signed by:  Pedro Day MD  9/5/2022 5:16 PM CDT Workstation: [id number]    XR Chest 1 View    Result Date: 9/2/2022  1. Cardiomegaly. Central vascular congestion. 2. Left base opacity consistent with some combination of  airspace consolidation and effusion. 3. Similar right basilar opacities consistent with airspace consolidation and possible effusion. Workstation: [id number]    XR Chest 1 View    Result Date: 8/18/2022     1. New left chest wall AICD; no pneumothorax. 2. Cardiomegaly with central pulmonary vascular congestion and trace pleural effusions. Workstation: [id number]    XR Chest 1 View    Result Date: 8/17/2022     Cardiomegaly with central pulmonary vascular congestion and trace pleural effusions. Workstation: [id number]      Medication Review:     Current Facility-Administered Medications:   •  acetaminophen (TYLENOL) tablet 650 mg, 650 mg, Oral, Q6H PRN, Behroozi, Saeid, MD  •  albuterol (PROVENTIL) nebulizer solution 0.083% 2.5 mg/3mL, 2.5 mg, Nebulization, Q4H PRN, Dinesh Lange MD  •  apixaban (ELIQUIS) tablet 5 mg, 5 mg, Oral, Q12H, Susana Berkowitz, APRN, 5 mg at 09/10/22 1134  •  atorvastatin (LIPITOR) tablet 10 mg, 10 mg, Oral, Daily, Dinesh Lange MD, 10 mg at 09/10/22 0928  •  bumetanide (BUMEX) tablet 2 mg, 2 mg, Oral, BID, Susana Berkowitz, APRN  •  carvedilol (COREG) tablet 12.5 mg, 12.5 mg, Oral, BID With Meals, Susana Berkowitz, APRN  •  dextrose (D50W) (25 g/50 mL) IV injection 25 g, 25 g, Intravenous, Q15 Min PRN, Dinesh Lange MD  •  dextrose (GLUTOSE) oral gel 15 g, 15 g, Oral, Q15 Min PRN, Dinesh Lange MD  •  glucagon (human recombinant) (GLUCAGEN DIAGNOSTIC) injection 1 mg, 1 mg, Intramuscular, Q15 Min PRN, Dinesh Lange MD  •  Insulin Aspart (novoLOG) injection 0-7 Units, 0-7 Units, Subcutaneous, TID AC, Dinesh Lange MD  •  losartan (COZAAR) half tablet 12.5 mg, 12.5 mg, Oral, Q24H, Dinesh Lange MD, 12.5 mg at 09/10/22 0928  •  Magnesium Sulfate 2 gram Bolus, followed by 8 gram infusion (total Mg dose 10 grams)- Mg less than or equal to 1mg/dL, 2 g, Intravenous, PRN **OR** Magnesium Sulfate 2 gram / 50mL Infusion (GIVE X 3 BAGS TO EQUAL 6GM TOTAL DOSE) - Mg 1.1 - 1.5 mg/dl, 2  g, Intravenous, PRN **OR** Magnesium Sulfate 4 gram infusion- Mg 1.6-1.9 mg/dL, 4 g, Intravenous, PRN, Dinesh Lange MD  •  ondansetron (ZOFRAN) injection 4 mg, 4 mg, Intravenous, Q6H PRN, Moshe Aleman DO, 4 mg at 09/10/22 0801  •  potassium chloride (MICRO-K) CR capsule 10 mEq, 10 mEq, Oral, BID With Meals, Dinesh Lange MD, 10 mEq at 09/10/22 0928  •  potassium chloride 10 mEq in 100 mL IVPB, 10 mEq, Intravenous, Q1H PRN, Dinesh Lange MD  •  sodium chloride 0.9 % flush 10 mL, 10 mL, Intravenous, PRN, Dinesh Lange MD  •  sodium chloride 0.9 % flush 10 mL, 10 mL, Intravenous, Q12H, Dinesh Lange MD, 10 mL at 09/10/22 0931  •  sodium chloride 0.9 % flush 10 mL, 10 mL, Intravenous, PRN, Dinesh Lange MD  •  spironolactone (ALDACTONE) tablet 25 mg, 25 mg, Oral, Daily, Dylon Aranda MD, 25 mg at 09/10/22 0928    Patient's recent labs and results as included in the subjective data were reviewed by me. Any pertinent outside data will be included.        Assessment & Plan       Acute on chronic systolic CHF (congestive heart failure) (HCC)    Diabetes mellitus (HCC)    Obesity (BMI 30-39.9)    Hypotension    Acute congestive heart failure, unspecified heart failure type (HCC)    1. Acute on chronic systolic CHF.   He has diuresed and showing good urinary output.  Lower leg edema has improved.    He reports that his shortness of breath is improving.       NICM EF:15-20%. NYHA Class III, Stage C.     Showing signs of slight hypervolemia.   Vital signs stable and he is in a well perfused state     BETA-BLOCKER:  carvedilol 6.25 mg twice daily  ACE/ARB: Losartan 12.5 mg daily  ENTRESTO: Indicated, can consider-however he reports that it drops his blood pressure he feels bad on Entresto  DIURETIC:   Changed to Bumex 2 mg twice daily   SGL2I: can start at discharge  ALDOSTERONE ANTAGONIST: spironolactone 50 mg daily   IMDUR/HYDRALAZINE: N/A  DIGOXIN: N/A     Fluid restriction: 1500 L, discussed not  eating outside food that can be loaded with sodium.  He is agreeable to this.  Sodium restriction:2 grams     Cardiac Rehab: Can consider, does meet criteria  ICD: yes [facility]   ADHF: yes 9/2 [facility] for Acute CHF        2. S/P AICD placement in [place] 8/2022.  Had AICD placed at [facility] due to LVEF 15 to 20%.  Left subclavian site almost healed, no infection, drainage, or redness at incision.     3. Poor LV function.  Continue Eliquis 5 mg BI.  He is concerned that Eliquis be causing shortness of breath.  I do not think Eliquis is causing shortness of breath and we discussed another alternative such as Xarelto but he wants to stick with Eliquis.    4.  Pulmonary hypertension.  Last echocardiogram showed pulmonary pressures 70 mmHg.     Continue diuresing  He is not wearing his CPAP machine.    Plan for disposition:Where: Continue current location We we will continue to follow.  He may can go home tomorrow from cardiology standpoint and follow-up with CHF clinic.  We will reassess in the morning.        This document has been electronically signed by SHELL Garcia on September 10, 2022 15:17 CDT   Electronically signed by SHELL Garcia, 09/10/22, 3:53 PM CDT.    I personally saw and examined Matti Bravo after the APRN.  I personally performed a history and physical examination of the patient.  I personally reviewed independent findings and plan of care.  I discussed management with the APRN.  I agree with the APRN's documentation.    No acute overnight events.  His symptomatology has improved to some extent.   Continue diuresis and GDMT for cardiomyopathy.       Electronically signed by Dylon Aranda MD, 09/10/22, 9:26 PM CDT.

## 2022-09-10 NOTE — SIGNIFICANT NOTE
09/10/22 1337   OTHER   Discipline physical therapy assistant   Rehab Time/Intention   Session Not Performed patient/family declined treatment  (pt states he is doing good, getting up and down in his room w/out problems, defers PT stating he doesn't need it)

## 2022-09-10 NOTE — PLAN OF CARE
Problem: Adult Inpatient Plan of Care  Goal: Plan of Care Review  Outcome: Ongoing, Progressing  Goal: Patient-Specific Goal (Individualized)  Outcome: Ongoing, Progressing  Goal: Absence of Hospital-Acquired Illness or Injury  Outcome: Ongoing, Progressing  Intervention: Identify and Manage Fall Risk  Recent Flowsheet Documentation  Taken 9/9/2022 2005 by Adina Swain RN  Safety Promotion/Fall Prevention: safety round/check completed  Goal: Optimal Comfort and Wellbeing  Outcome: Ongoing, Progressing  Intervention: Monitor Pain and Promote Comfort  Recent Flowsheet Documentation  Taken 9/9/2022 2139 by Adina Swain, RN  Pain Management Interventions: see MAR  Goal: Readiness for Transition of Care  Outcome: Ongoing, Progressing   Goal Outcome Evaluation:

## 2022-09-10 NOTE — PLAN OF CARE
Problem: Adult Inpatient Plan of Care  Goal: Plan of Care Review  Outcome: Ongoing, Progressing  Flowsheets (Taken 9/10/2022 1737)  Progress: no change  Plan of Care Reviewed With: patient  Outcome Evaluation: Patient verbalizes shortness of breath improving, one time dose of pain medication administered with relief of pain, vital signs stable, patient denies any other complaints or discomfort.  Goal: Patient-Specific Goal (Individualized)  Outcome: Ongoing, Progressing  Goal: Absence of Hospital-Acquired Illness or Injury  Outcome: Ongoing, Progressing  Intervention: Identify and Manage Fall Risk  Recent Flowsheet Documentation  Taken 9/10/2022 1705 by Rosario Waite RN  Safety Promotion/Fall Prevention: safety round/check completed  Taken 9/10/2022 1500 by Rosario Waite RN  Safety Promotion/Fall Prevention: safety round/check completed  Taken 9/10/2022 1321 by Rosario Waite RN  Safety Promotion/Fall Prevention: safety round/check completed  Taken 9/10/2022 1100 by Rosario Waite RN  Safety Promotion/Fall Prevention: safety round/check completed  Taken 9/10/2022 0905 by Rosario Waite RN  Safety Promotion/Fall Prevention: safety round/check completed  Taken 9/10/2022 0742 by Rosario Waite RN  Safety Promotion/Fall Prevention:   clutter free environment maintained   muscle strengthening facilitated   nonskid shoes/slippers when out of bed   room organization consistent   safety round/check completed  Taken 9/10/2022 0705 by Rosario Waite RN  Safety Promotion/Fall Prevention: safety round/check completed  Intervention: Prevent and Manage VTE (Venous Thromboembolism) Risk  Recent Flowsheet Documentation  Taken 9/10/2022 0742 by Rosario Waite RN  Activity Management: up ad david  Goal: Optimal Comfort and Wellbeing  Outcome: Ongoing, Progressing  Intervention: Monitor Pain and Promote Comfort  Recent Flowsheet Documentation  Taken 9/10/2022 1607 by Rosario Waite RN  Pain Management Interventions: see  MAR  Intervention: Provide Person-Centered Care  Recent Flowsheet Documentation  Taken 9/10/2022 0742 by Rosario Waite RN  Trust Relationship/Rapport:   care explained   choices provided  Goal: Readiness for Transition of Care  Outcome: Ongoing, Progressing     Problem: Heart Failure Comorbidity  Goal: Maintenance of Heart Failure Symptom Control  Outcome: Ongoing, Progressing     Problem: Adjustment to Illness (Heart Failure)  Goal: Optimal Coping  Outcome: Ongoing, Progressing     Problem: Cardiac Output Decreased (Heart Failure)  Goal: Optimal Cardiac Output  Outcome: Ongoing, Progressing     Problem: Dysrhythmia (Heart Failure)  Goal: Stable Heart Rate and Rhythm  Outcome: Ongoing, Progressing     Problem: Fluid Imbalance (Heart Failure)  Goal: Fluid Balance  Outcome: Ongoing, Progressing     Problem: Functional Ability Impaired (Heart Failure)  Goal: Optimal Functional Ability  Outcome: Ongoing, Progressing  Intervention: Optimize Functional Ability  Recent Flowsheet Documentation  Taken 9/10/2022 0742 by Rosario Waite RN  Activity Management: up ad david     Problem: Oral Intake Inadequate (Heart Failure)  Goal: Optimal Nutrition Intake  Outcome: Ongoing, Progressing     Problem: Respiratory Compromise (Heart Failure)  Goal: Effective Oxygenation and Ventilation  Outcome: Ongoing, Progressing   Goal Outcome Evaluation:  Plan of Care Reviewed With: patient        Progress: no change  Outcome Evaluation: Patient verbalizes shortness of breath improving, one time dose of pain medication administered with relief of pain, vital signs stable, patient denies any other complaints or discomfort.

## 2022-09-10 NOTE — PROGRESS NOTES
[facility] Medicine Services  INPATIENT PROGRESS NOTE    Length of Stay: 1  Date of Admission: 9/5/2022  Primary Care Physician: Shonna Ng APRN    Subjective   Chief Complaint: SOB    HPI:    Patient presents emergency department with complaint of shortness of breath, chest pain, and weakness.  Patient with known congestive heart failure, diabetes, EF of approximately 15 to 20%.  Patient was seen at [facility] in [place] in the last week.  Patient was actually admitted and scheduled to be transferred to [facility], though evidently there was a bed shortage and the patient improved somewhat and went home.  Patient has gone home and been home approximately 12 to 24 hours that with return of his symptoms of shortness of breath and weakness.  Patient arrives with blood pressures in the 60-90 systolic range.  Patient denies dizziness at this time.  Patient is short of breath and is unable to lie flat or add any type of more acute angle.  Patient is most comfortable sitting on the side of the bed with his legs down.  Chronic with acute edema.  Patient notes a weight gain of approximately 10 to 15 pounds in the last week..   Past history of methamphetamine use, he reports quitting 4 years ago     On exam today 1922.  The patient is clinically unchanged from yesterday's exam.  He is awake and alert.  He goes back to sleep after questions are answered.  He is concerned about his edema.  He remains short of breath on exertion.  He has no chest pain.  No fevers or chills.  No nausea or vomiting.  No diarrhea constipation.  Vital signs are currently stable he is afebrile.    Review of Systems   Constitutional: Positive for fatigue. Negative for chills and fever.        The patient states he is feeling about the same as yesterday.  Concerned about his edema however on evaluation the edema is improved.  The patient is sitting up in the chair sleeping woke questions  answered and went right back to sleep.   HENT: Negative.    Eyes: Negative.    Respiratory: Positive for shortness of breath.    Cardiovascular: Negative.  Negative for chest pain.   Gastrointestinal: Negative.    Endocrine: Negative.    Genitourinary: Negative.    Musculoskeletal: Negative.    Neurological: Positive for weakness. Negative for dizziness.   Hematological: Negative.    Psychiatric/Behavioral: Negative.         All pertinent negatives and positives are as above. All other systems have been reviewed and are negative unless otherwise stated.     Objective    Temp:  [97.1 °F (36.2 °C)-98.2 °F (36.8 °C)] 97.8 °F (36.6 °C)  Heart Rate:  [68-89] 72  Resp:  [16-18] 16  BP: (111-130)/(58-82) 120/82    Physical Exam  Vitals and nursing note reviewed.   Constitutional:       Appearance: He is obese.      Comments: Resting in ER on exam.  Easily aroused.  Remains clinically unchanged from yesterday's evaluation.   HENT:      Head: Normocephalic and atraumatic.      Right Ear: External ear normal.      Left Ear: External ear normal.      Nose: Nose normal.      Mouth/Throat:      Mouth: Mucous membranes are moist.      Pharynx: Oropharynx is clear.   Eyes:      Extraocular Movements: Extraocular movements intact.      Conjunctiva/sclera: Conjunctivae normal.      Pupils: Pupils are equal, round, and reactive to light.   Cardiovascular:      Rate and Rhythm: Normal rate and regular rhythm.      Pulses: Normal pulses.      Heart sounds: Normal heart sounds.   Pulmonary:      Effort: Pulmonary effort is normal.      Comments: Decreased breath sounds at bases bilateral  Abdominal:      General: Bowel sounds are normal.      Palpations: Abdomen is soft.   Musculoskeletal:         General: Normal range of motion.      Cervical back: Normal range of motion and neck supple.      Right lower leg: Edema present.      Left lower leg: Edema present.      Comments: Stable.  Bilateral lower extremity edema is improving.    Skin:     General: Skin is warm and dry.   Neurological:      General: No focal deficit present.      Mental Status: He is alert.      Motor: Weakness present.   Psychiatric:         Mood and Affect: Mood normal.         Behavior: Behavior normal.           Results Review:  I have reviewed the labs, radiology results, and diagnostic studies.    Laboratory Data:   Results from last 7 days   Lab Units 09/10/22  0536 09/09/22  0521 09/08/22  0903 09/06/22  0549 09/05/22  1710   SODIUM mmol/L 140 142 141   < > 141   POTASSIUM mmol/L 3.9 3.8 3.5   < > 3.3*   CHLORIDE mmol/L 101 103 103   < > 105   CO2 mmol/L 30.0* 31.0* 29.0   < > 28.0   BUN mg/dL 23* 23* 21*   < > 20   CREATININE mg/dL 1.34* 1.35* 0.96   < > 1.46*   GLUCOSE mg/dL 104* 133* 148*   < > 144*   CALCIUM mg/dL 9.1 9.1 8.4*   < > 8.8   BILIRUBIN mg/dL  --   --   --   --  1.4*   ALK PHOS U/L  --   --   --   --  65   ALT (SGPT) U/L  --   --   --   --  15   AST (SGOT) U/L  --   --   --   --  14   ANION GAP mmol/L 9.0 8.0 9.0   < > 8.0    < > = values in this interval not displayed.     Estimated Creatinine Clearance: 100.5 mL/min (A) (by C-G formula based on SCr of 1.34 mg/dL (H)).  Results from last 7 days   Lab Units 09/06/22  0549   MAGNESIUM mg/dL 1.9         Results from last 7 days   Lab Units 09/07/22  0623 09/05/22  1710   WBC 10*3/mm3 4.08 4.20   HEMOGLOBIN g/dL 13.1 13.1   HEMATOCRIT % 41.0 40.3   PLATELETS 10*3/mm3 118* 124*           Culture Data:   No results found for: BLOODCX  No results found for: URINECX  No results found for: RESPCX  No results found for: WOUNDCX  No results found for: STOOLCX  No components found for: BODYFLD    Radiology Data:   Imaging Results (Last 24 Hours)     ** No results found for the last 24 hours. **          I have reviewed the patient's current medications.     Assessment/Plan     Principal Problem:    Acute on chronic systolic CHF (congestive heart failure) (HCC)  Active Problems:    Diabetes mellitus (HCC)     Obesity (BMI 30-39.9)    Hypotension    Acute congestive heart failure, unspecified heart failure type (HCC)    Acute on chronic systolic heart failure  BETA-BLOCKER: Carvedilol 3.125 mg twice daily  ACE/ARB: Losartan 12.5 mg daily  ENTRESTO: Indicated, can consider  DIURETIC:  Bumex twice daily.  Spironolactone 25 mg daily  SGL2I: Can consider  ALDOSTERONE ANTAGONIST: Start spironolactone 50 mg daily   IMDUR/HYDRALAZINE: N/A  DIGOXIN: N/A  Fluid restriction: 1500 L  Sodium restriction:2 grams  The patient continues to complain of shortness of breath.  He does have less edema of the lower extremities.  Overall appears to be doing much better on clinical exam.  We will continue to monitor.  Patient does have some rib cage pain and it appears to be musculoskeletal in nature we will continue to follow.  Could be onset of possible shingles we will watch for signs of this issue.      S/P AICD placement in [place] 8/2022.  Had AICD placed at [facility] due to LVEF 15 to 20%.  Left subclavian site almost healed, no infection, drainage, or redness at incision.     NICM EF:15-20%. NYHA Class IV, Stage C.  continue meds as noted above     HLD continue lipitor.  Tolerating medication without any problems.  No adverse side effects.    HTN continue Losartan.  Current blood pressure 115/66.  O2 saturation 98% on 2 L nasal cannula    Deconditioning.  Continue to work with physical therapy and Occupational Therapy.    CODE STATUS full code    DVT prophylaxis Eliquis.    Overall the patient remained stable.  We will continue current treatment plan continue to monitor.  Physical therapy has been working with the patient patient remains unable to progress to standing and ambulation yesterday vital signs were stable during evaluation we will continue treatment.  Patient might benefit from skilled nursing facility on discharge.  If not then home health would definitely be indicated.    I confirmed that the patient's Advance Care  Plan is present, code status is documented, or surrogate decision maker is listed in the patient's medical record.     Discharge Planning: In progress.  Possible cardiac rehab on discharge.  Anticipate discharge in 24 hours.    Moshe Aleman, DO

## 2022-09-11 ENCOUNTER — READMISSION MANAGEMENT (OUTPATIENT)
Dept: CALL CENTER | Facility: HOSPITAL | Age: 52
End: 2022-09-11

## 2022-09-11 VITALS
WEIGHT: 315 LBS | RESPIRATION RATE: 18 BRPM | DIASTOLIC BLOOD PRESSURE: 72 MMHG | HEIGHT: 76 IN | SYSTOLIC BLOOD PRESSURE: 130 MMHG | TEMPERATURE: 97.7 F | OXYGEN SATURATION: 95 % | HEART RATE: 75 BPM | BODY MASS INDEX: 38.36 KG/M2

## 2022-09-11 LAB
ANION GAP SERPL CALCULATED.3IONS-SCNC: 10 MMOL/L (ref 5–15)
BUN SERPL-MCNC: 27 MG/DL (ref 6–20)
BUN/CREAT SERPL: 17.3 (ref 7–25)
CALCIUM SPEC-SCNC: 9.3 MG/DL (ref 8.6–10.5)
CHLORIDE SERPL-SCNC: 99 MMOL/L (ref 98–107)
CO2 SERPL-SCNC: 31 MMOL/L (ref 22–29)
CREAT SERPL-MCNC: 1.56 MG/DL (ref 0.76–1.27)
EGFRCR SERPLBLD CKD-EPI 2021: 53.4 ML/MIN/1.73
GLUCOSE BLDC GLUCOMTR-MCNC: 112 MG/DL (ref 70–130)
GLUCOSE SERPL-MCNC: 98 MG/DL (ref 65–99)
POTASSIUM SERPL-SCNC: 4.4 MMOL/L (ref 3.5–5.2)
SODIUM SERPL-SCNC: 140 MMOL/L (ref 136–145)

## 2022-09-11 PROCEDURE — 82962 GLUCOSE BLOOD TEST: CPT

## 2022-09-11 PROCEDURE — 99233 SBSQ HOSP IP/OBS HIGH 50: CPT | Performed by: NURSE PRACTITIONER

## 2022-09-11 PROCEDURE — 80048 BASIC METABOLIC PNL TOTAL CA: CPT | Performed by: NURSE PRACTITIONER

## 2022-09-11 RX ORDER — SPIRONOLACTONE 25 MG/1
25 TABLET ORAL DAILY
Qty: 30 TABLET | Refills: 0 | Status: SHIPPED | OUTPATIENT
Start: 2022-09-12 | End: 2022-09-23

## 2022-09-11 RX ORDER — CARVEDILOL 12.5 MG/1
12.5 TABLET ORAL 2 TIMES DAILY WITH MEALS
Qty: 60 TABLET | Refills: 0 | Status: SHIPPED | OUTPATIENT
Start: 2022-09-11 | End: 2022-10-04 | Stop reason: SDUPTHER

## 2022-09-11 RX ORDER — BUMETANIDE 2 MG/1
2 TABLET ORAL 2 TIMES DAILY
Qty: 60 TABLET | Refills: 0 | Status: ON HOLD | OUTPATIENT
Start: 2022-09-11 | End: 2022-09-27 | Stop reason: SDUPTHER

## 2022-09-11 RX ORDER — LOSARTAN POTASSIUM 25 MG/1
12.5 TABLET ORAL
Qty: 15 TABLET | Refills: 0 | Status: SHIPPED | OUTPATIENT
Start: 2022-09-12 | End: 2022-10-04 | Stop reason: SDUPTHER

## 2022-09-11 RX ADMIN — ATORVASTATIN CALCIUM 10 MG: 10 TABLET, FILM COATED ORAL at 08:43

## 2022-09-11 RX ADMIN — SPIRONOLACTONE 25 MG: 25 TABLET ORAL at 08:43

## 2022-09-11 RX ADMIN — POTASSIUM CHLORIDE 10 MEQ: 750 CAPSULE, EXTENDED RELEASE ORAL at 08:43

## 2022-09-11 RX ADMIN — Medication 12.5 MG: at 08:43

## 2022-09-11 RX ADMIN — BUMETANIDE 2 MG: 1 TABLET ORAL at 08:43

## 2022-09-11 RX ADMIN — Medication 10 ML: at 08:43

## 2022-09-11 RX ADMIN — CARVEDILOL 12.5 MG: 12.5 TABLET, FILM COATED ORAL at 08:43

## 2022-09-11 NOTE — PROGRESS NOTES
"  [facility] Cardiology Progress Note   LOS: 2 days   Patient Care Team:  Shonna Ng APRN as PCP - General (Family Medicine)    Chief Complaint   Patient presents with   • Chest Pain         Subjective     Interval History:   Patient Denies: shortness of air, chest pain, palpitations, dizziness and syncope  Patient Complaints: orthopnea and edema,   History taken from: patient chart RN     Vital signs stable overnight.  He is in normal sinus rhythm with occassional PVCs.  Lower leg edema has improved.    Objective      Vital Sign Min/Max for last 24 hours  Temp  Min: 96.8 °F (36 °C)  Max: 97.9 °F (36.6 °C)   BP  Min: 90/58  Max: 141/63   Pulse  Min: 62  Max: 85   Resp  Min: 16  Max: 18   SpO2  Min: 91 %  Max: 99 %   Flow (L/min)  Min: 2  Max: 2   Weight  Min: 143 kg (316 lb 3.2 oz)  Max: 143 kg (316 lb 3.2 oz)     Flowsheet Rows    Flowsheet Row First Filed Value   Admission Height 193 cm (76\") Documented at 09/05/2022 1537   Admission Weight 142 kg (313 lb) Documented at 09/05/2022 1537            09/09/22  0543 09/10/22  0626 09/11/22  0528   Weight: (!) 143 kg (314 lb 6.4 oz) (!) 143 kg (314 lb 3.2 oz) (!) 143 kg (316 lb 3.2 oz)       Physical Exam:  Physical Exam  Vitals and nursing note reviewed.   Constitutional:       Appearance: He is obese. He is not ill-appearing or diaphoretic.   HENT:      Head: Normocephalic and atraumatic.      Mouth/Throat:      Mouth: Mucous membranes are moist.      Pharynx: Oropharynx is clear. No oropharyngeal exudate.   Eyes:      General: Lids are normal.         Right eye: No discharge.         Left eye: No discharge.      Conjunctiva/sclera: Conjunctivae normal.   Neck:      Thyroid: No thyromegaly.      Vascular: No JVD.      Trachea: Trachea normal.   Cardiovascular:      Rate and Rhythm: Normal rate and regular rhythm.      Pulses: Normal pulses.      Heart sounds: Normal heart sounds, S1 normal and S2 normal.   Pulmonary:      Effort: Pulmonary effort is normal. No " respiratory distress.      Breath sounds: Normal breath sounds and air entry. No wheezing or rhonchi.   Musculoskeletal:      Right lower leg: Edema present.      Left lower leg: Edema present.      Comments: Trace bilateral lower leg edema   Skin:     General: Skin is warm and dry.      Capillary Refill: Capillary refill takes less than 2 seconds.   Neurological:      Mental Status: He is alert and oriented to person, place, and time.   Psychiatric:         Attention and Perception: Attention normal.         Mood and Affect: Mood normal.         Speech: Speech normal.          Results Reviewed by myself:     Results from last 7 days   Lab Units 09/11/22  0535 09/10/22  0536 09/09/22  0521 09/06/22  0549 09/05/22  1710   SODIUM mmol/L 140 140 142   < > 141   POTASSIUM mmol/L 4.4 3.9 3.8   < > 3.3*   CHLORIDE mmol/L 99 101 103   < > 105   CO2 mmol/L 31.0* 30.0* 31.0*   < > 28.0   BUN mg/dL 27* 23* 23*   < > 20   CREATININE mg/dL 1.56* 1.34* 1.35*   < > 1.46*   CALCIUM mg/dL 9.3 9.1 9.1   < > 8.8   BILIRUBIN mg/dL  --   --   --   --  1.4*   ALK PHOS U/L  --   --   --   --  65   ALT (SGPT) U/L  --   --   --   --  15   AST (SGOT) U/L  --   --   --   --  14   GLUCOSE mg/dL 98 104* 133*   < > 144*    < > = values in this interval not displayed.       Estimated Creatinine Clearance: 86.4 mL/min (A) (by C-G formula based on SCr of 1.56 mg/dL (H)).    Results from last 7 days   Lab Units 09/06/22  0549   MAGNESIUM mg/dL 1.9             Results from last 7 days   Lab Units 09/07/22  0623 09/05/22  1710   WBC 10*3/mm3 4.08 4.20   HEMOGLOBIN g/dL 13.1 13.1   PLATELETS 10*3/mm3 118* 124*           Lab Results   Component Value Date    PROBNP 1,707.0 (H) 09/05/2022       I/O last 3 completed shifts:  In: 480 [P.O.:480]  Out: 2000 [Urine:2000]    Cardiographics:  ECG/EMG Results (last 24 hours)     Procedure Component Value Units Date/Time    SCANNED - TELEMETRY   [[id number]] Resulted: 09/05/22     Updated: 09/09/22 1754     SCANNED - TELEMETRY   [[id number]] Resulted: 09/05/22     Updated: 09/09/22 2013    SCANNED - TELEMETRY   [[id number]] Resulted: 09/05/22     Updated: 09/09/22 2013          Results for orders placed during the hospital encounter of 02/04/19    Adult Transthoracic Echo Limited W/ Cont if Necessary Per Protocol    Interpretation Summary  · 3D acquisition for left ventricular ejection fraction. Live 3D and full volume to assess left ventricular ejection fraction was utilized.  · Left ventricle systolic function is severely decreased. Estimated LVEF is 10%. Left ventricle cavity severely dilated with restrictive diastolic dysfunction. Findings are consistent with dilated cardiomyopathy.  · Right ventricle is mildly dilated with mildly reduced right ventricular systolic function.  · Moderate mitral valve regurgitation is present.  · Moderate tricuspid valve regurgitation is present. Pulmonary hypertension is present with a PA systolic pressure of 70 mmHg.  · There is mild pulmonic valve regurgitation present.  · There is a trivial pericardial effusion adjacent to the left ventricle      XR Chest 2 View    Result Date: 8/19/2022     Stable chest radiograph from the comparison exam with findings as discussed above. Workstation: [id number]    XR Chest 1 View    Result Date: 9/5/2022  Mild-to-moderate left lung base atelectasis versus infiltrate new since prior. Mild to moderate pulmonary vascular congestion. Electronically signed by:  Pedro Day MD  9/5/2022 5:16 PM CDT Workstation: [id number]    XR Chest 1 View    Result Date: 9/2/2022  1. Cardiomegaly. Central vascular congestion. 2. Left base opacity consistent with some combination of airspace consolidation and effusion. 3. Similar right basilar opacities consistent with airspace consolidation and possible effusion. Workstation: [id number]    XR Chest 1 View    Result Date: 8/18/2022     1. New left chest wall AICD; no pneumothorax. 2. Cardiomegaly with central pulmonary  vascular congestion and trace pleural effusions. Workstation: [id number]    XR Chest 1 View    Result Date: 8/17/2022     Cardiomegaly with central pulmonary vascular congestion and trace pleural effusions. Workstation: [id number]      Medication Review:     Current Facility-Administered Medications:   •  acetaminophen (TYLENOL) tablet 650 mg, 650 mg, Oral, Q6H PRN, Behroozi, Saeid, MD  •  albuterol (PROVENTIL) nebulizer solution 0.083% 2.5 mg/3mL, 2.5 mg, Nebulization, Q4H PRN, Dinesh Lange MD  •  apixaban (ELIQUIS) tablet 5 mg, 5 mg, Oral, Q12H, Susana Berkowitz APRN, 5 mg at 09/10/22 2023  •  atorvastatin (LIPITOR) tablet 10 mg, 10 mg, Oral, Daily, Dinesh Lange MD, 10 mg at 09/11/22 0843  •  bumetanide (BUMEX) tablet 2 mg, 2 mg, Oral, BID, Susana Berkowitz APRN, 2 mg at 09/11/22 0843  •  carvedilol (COREG) tablet 12.5 mg, 12.5 mg, Oral, BID With Meals, Susana Berkowitz APRN, 12.5 mg at 09/11/22 0843  •  dextrose (D50W) (25 g/50 mL) IV injection 25 g, 25 g, Intravenous, Q15 Min PRN, Dinesh Lange MD  •  dextrose (GLUTOSE) oral gel 15 g, 15 g, Oral, Q15 Min PRN, Dinesh Lange MD  •  glucagon (human recombinant) (GLUCAGEN DIAGNOSTIC) injection 1 mg, 1 mg, Intramuscular, Q15 Min PRN, Dinesh Lange MD  •  Insulin Aspart (novoLOG) injection 0-7 Units, 0-7 Units, Subcutaneous, TID AC, Dinesh Lange MD  •  losartan (COZAAR) half tablet 12.5 mg, 12.5 mg, Oral, Q24H, Dinesh Lange MD, 12.5 mg at 09/11/22 0843  •  Magnesium Sulfate 2 gram Bolus, followed by 8 gram infusion (total Mg dose 10 grams)- Mg less than or equal to 1mg/dL, 2 g, Intravenous, PRN **OR** Magnesium Sulfate 2 gram / 50mL Infusion (GIVE X 3 BAGS TO EQUAL 6GM TOTAL DOSE) - Mg 1.1 - 1.5 mg/dl, 2 g, Intravenous, PRN **OR** Magnesium Sulfate 4 gram infusion- Mg 1.6-1.9 mg/dL, 4 g, Intravenous, PRN, Dinesh Lange MD  •  ondansetron (ZOFRAN) injection 4 mg, 4 mg, Intravenous, Q6H PRN, Moshe Aleman DO, 4 mg at 09/10/22 0801  •   potassium chloride (MICRO-K) CR capsule 10 mEq, 10 mEq, Oral, BID With Meals, Dinesh Lange MD, 10 mEq at 09/11/22 0843  •  potassium chloride 10 mEq in 100 mL IVPB, 10 mEq, Intravenous, Q1H PRN, Dinesh Lange MD  •  sodium chloride 0.9 % flush 10 mL, 10 mL, Intravenous, PRN, Dinesh Lange MD  •  sodium chloride 0.9 % flush 10 mL, 10 mL, Intravenous, Q12H, Dinesh Lange MD, 10 mL at 09/11/22 0843  •  sodium chloride 0.9 % flush 10 mL, 10 mL, Intravenous, PRN, Dinesh Lange MD  •  spironolactone (ALDACTONE) tablet 25 mg, 25 mg, Oral, Daily, Dylon Aranda MD, 25 mg at 09/11/22 0843    Patient's recent labs and results as included in the subjective data were reviewed by me. Any pertinent outside data will be included.        Assessment & Plan       Acute on chronic systolic CHF (congestive heart failure) (HCC)    Diabetes mellitus (HCC)    Obesity (BMI 30-39.9)    Hypotension    Acute congestive heart failure, unspecified heart failure type (HCC)    1. Acute on chronic systolic CHF.   He has diuresed and showing good urinary output.  Lower leg edema has improved.  He walked in the fu without shortness of breath.        NICM EF:15-20%. NYHA Class III, Stage C.   Vital signs stable and he is in a well perfused state. He appears euvolemic.     BETA-BLOCKER:  carvedilol 6.25 mg twice daily  ACE/ARB: Losartan 12.5 mg daily  ENTRESTO: Indicated, can consider-however he reports that it drops his blood pressure he feels bad on Entresto  DIURETIC:   Bumex 2 mg twice daily   SGL2I: can start as outpatient.   ALDOSTERONE ANTAGONIST: spironolactone 50 mg daily   IMDUR/HYDRALAZINE: N/A  DIGOXIN: N/A     Fluid restriction: 1500 L, discussed not eating outside food that can be loaded with sodium.  He is agreeable to this.  Sodium restriction:2 grams     Cardiac Rehab: Can consider, does meet criteria  ICD: yes [facility]   ADHF: yes 9/2 [facility] for Acute CHF        2. S/P AICD placement in [place] 8/2022.  Had  AICD placed at [facility] due to LVEF 15 to 20%.  Left subclavian site almost healed, no infection, drainage, or redness at incision.     3. Poor LV function.  Continue Eliquis 5 mg BI.     4.  Pulmonary hypertension.  Last echocardiogram showed pulmonary pressures 70 mmHg.     Continue bumex and spironolactone. Continue losartan and coreg  He is not wearing his CPAP machine.    Plan for disposition:We can continue to see him as an outpatient. F/U with me in CHF clinic in 1 week      This document has been electronically signed by SHELL Garcia on September 11, 2022 11:41 CDT   Electronically signed by SHELL Garcia, 09/11/22, 11:41 AM CDT.    I personally saw and examined aMtti Bravo after the APRN.  I personally performed a history and physical examination of the patient.  I personally reviewed independent findings and plan of care.  I discussed management with the APRN.  I agree with the APRN's documentation.    No acute overnight events.    He reported doing okay today. He was able to walk on the floor without any dyspnea. He was not using his CPAP at night time in the hospital.    Discharge regimen as detailed above.  Follow up in CHF clinic in one week to allow close monitoring of symptoms, medication titration and CHF education.    Electronically signed by Dylon Aranda MD, 09/11/22, 8:30 PM CDT.

## 2022-09-11 NOTE — DISCHARGE SUMMARY
[facility] Medicine Services  DISCHARGE SUMMARY       Date of Admission: 9/5/2022  Date of Discharge:  9/11/2022  Primary Care Physician: Shonna Ng APRN    Presenting Problem/History of Present Illness:  Hypotension, unspecified hypotension type [I95.9]  Dyspnea, unspecified type [R06.00]  Chest pain, unspecified type [R07.9]  Acute congestive heart failure, unspecified heart failure type (HCC) [I50.9]       Final Discharge Diagnoses:  Active Hospital Problems    Diagnosis    • **Acute on chronic systolic CHF (congestive heart failure) (HCC)    • Acute congestive heart failure, unspecified heart failure type (HCC)    • Obesity (BMI 30-39.9)    • Hypotension    • Diabetes mellitus (HCC)        Consults:   Consults     Date and Time Order Name Status Description    9/6/2022 12:31 AM Inpatient Cardiology Consult Completed           Procedures Performed:                 Pertinent Test Results:   Lab Results (most recent)     Procedure Component Value Units Date/Time    Basic Metabolic Panel [[id number]]  (Abnormal) Collected: 09/11/22 0535    Specimen: Blood Updated: 09/11/22 0644     Glucose 98 mg/dL      BUN 27 mg/dL      Creatinine 1.56 mg/dL      Sodium 140 mmol/L      Potassium 4.4 mmol/L      Chloride 99 mmol/L      CO2 31.0 mmol/L      Calcium 9.3 mg/dL      BUN/Creatinine Ratio 17.3     Anion Gap 10.0 mmol/L      eGFR 53.4 mL/min/1.73      Comment: National Kidney Foundation and American Society of Nephrology (ASN) Task Force recommended calculation based on the Chronic Kidney Disease Epidemiology Collaboration (CKD-EPI) equation refit without adjustment for race.       Narrative:      GFR Normal >60  Chronic Kidney Disease <60  Kidney Failure <15      POC Glucose Once [[id number]]  (Normal) Collected: 09/11/22 0556    Specimen: Blood Updated: 09/11/22 0629     Glucose 112 mg/dL      Comment: RN NotifiedOperator: [id number] CASIMIRO Oro ID: [id number]        POC Glucose Once [[id number]]  (Abnormal) Collected: 09/10/22 1935    Specimen: Blood Updated: 09/10/22 2044     Glucose 167 mg/dL      Comment: RN NotifiedOperator: [id number] CASIMIRO Oro ID: [id number]       Basic Metabolic Panel [[id number]]  (Abnormal) Collected: 09/10/22 0536    Specimen: Blood Updated: 09/10/22 0646     Glucose 104 mg/dL      BUN 23 mg/dL      Creatinine 1.34 mg/dL      Sodium 140 mmol/L      Potassium 3.9 mmol/L      Chloride 101 mmol/L      CO2 30.0 mmol/L      Calcium 9.1 mg/dL      BUN/Creatinine Ratio 17.2     Anion Gap 9.0 mmol/L      eGFR 64.1 mL/min/1.73      Comment: National Kidney Foundation and American Society of Nephrology (ASN) Task Force recommended calculation based on the Chronic Kidney Disease Epidemiology Collaboration (CKD-EPI) equation refit without adjustment for race.       Narrative:      GFR Normal >60  Chronic Kidney Disease <60  Kidney Failure <15      Extra Tubes [[id number]] Collected: 09/09/22 0521    Specimen: Blood, Venous Line Updated: 09/09/22 0632    Narrative:      The following orders were created for panel order Extra Tubes.  Procedure                               Abnormality         Status                     ---------                               -----------         ------                     Lavender Top[[id number]]                                     Final result                 Please view results for these tests on the individual orders.    Lavender Top [[id number]] Collected: 09/09/22 0521    Specimen: Blood Updated: 09/09/22 0632     Extra Tube hold for add-on     Comment: Auto resulted       Manual Differential [[id number]]  (Abnormal) Collected: 09/07/22 0623    Specimen: Blood Updated: 09/07/22 0727     Neutrophil % 52.0 %      Lymphocyte % 30.0 %      Monocyte % 12.0 %      Eosinophil % 4.0 %      Basophil % 2.0 %      Neutrophils Absolute 2.12 10*3/mm3      Lymphocytes Absolute 1.22 10*3/mm3      Monocytes Absolute 0.49  10*3/mm3      Eosinophils Absolute 0.16 10*3/mm3      Basophils Absolute 0.08 10*3/mm3      RBC Morphology Normal     WBC Morphology Normal     Platelet Morphology Normal    CBC & Differential [[id number]]  (Abnormal) Collected: 09/07/22 0623    Specimen: Blood Updated: 09/07/22 0636    Narrative:      The following orders were created for panel order CBC & Differential.  Procedure                               Abnormality         Status                     ---------                               -----------         ------                     CBC Auto Differential[[id number]]        Abnormal            Final result               Scan Slide[[id number]]                                                                    Please view results for these tests on the individual orders.    CBC Auto Differential [[id number]]  (Abnormal) Collected: 09/07/22 0623    Specimen: Blood Updated: 09/07/22 0636     WBC 4.08 10*3/mm3      RBC 4.31 10*6/mm3      Hemoglobin 13.1 g/dL      Hematocrit 41.0 %      MCV 95.1 fL      MCH 30.4 pg      MCHC 32.0 g/dL      RDW 14.1 %      RDW-SD 49.1 fl      MPV 12.8 fL      Platelets 118 10*3/mm3     Troponin [[id number]]  (Normal) Collected: 09/06/22 0549    Specimen: Blood Updated: 09/06/22 0633     Troponin T 0.025 ng/mL     Narrative:      Troponin T Reference Range:  <= 0.03 ng/mL-   Negative for AMI  >0.03 ng/mL-     Abnormal for myocardial necrosis.  Clinicians would have to utilize clinical acumen, EKG, Troponin and serial changes to determine if it is an Acute Myocardial Infarction or myocardial injury due to an underlying chronic condition.       Results may be falsely decreased if patient taking Biotin.      TSH [[id number]]  (Normal) Collected: 09/06/22 0549    Specimen: Blood Updated: 09/06/22 0632     TSH 1.350 uIU/mL     Magnesium [[id number]]  (Normal) Collected: 09/06/22 0549    Specimen: Blood Updated: 09/06/22 0631     Magnesium 1.9 mg/dL     [place] Draw [[id number]] Collected:  09/05/22 1648    Specimen: Blood Updated: 09/05/22 1817    Narrative:      The following orders were created for panel order [place] Draw.  Procedure                               Abnormality         Status                     ---------                               -----------         ------                     Green Top (Gel)[[id number]]                                  Final result               Lavender Top[[id number]]                                     Final result               Gold Top - SST[[id number]]                                   Final result               Light Blue Top[[id number]]                                   Final result                 Please view results for these tests on the individual orders.    Gold Top - SST [[id number]] Collected: 09/05/22 1710    Specimen: Blood Updated: 09/05/22 1817     Extra Tube Hold for add-ons.     Comment: Auto resulted.       Light Blue Top [[id number]] Collected: 09/05/22 1710    Specimen: Blood Updated: 09/05/22 1817     Extra Tube Hold for add-ons.     Comment: Auto resulted       Green Top (Gel) [[id number]] Collected: 09/05/22 1648    Specimen: Blood Updated: 09/05/22 1803     Extra Tube Hold for add-ons.     Comment: Auto resulted.       Lavender Top [[id number]] Collected: 09/05/22 1648    Specimen: Blood Updated: 09/05/22 1803     Extra Tube hold for add-on     Comment: Auto resulted       Comprehensive Metabolic Panel [[id number]]  (Abnormal) Collected: 09/05/22 1710    Specimen: Blood Updated: 09/05/22 1733     Glucose 144 mg/dL      BUN 20 mg/dL      Creatinine 1.46 mg/dL      Sodium 141 mmol/L      Potassium 3.3 mmol/L      Chloride 105 mmol/L      CO2 28.0 mmol/L      Calcium 8.8 mg/dL      Total Protein 6.5 g/dL      Albumin 3.30 g/dL      ALT (SGPT) 15 U/L      AST (SGOT) 14 U/L      Alkaline Phosphatase 65 U/L      Total Bilirubin 1.4 mg/dL      Globulin 3.2 gm/dL      A/G Ratio 1.0 g/dL      BUN/Creatinine Ratio 13.7     Anion Gap 8.0 mmol/L      eGFR  57.9 mL/min/1.73      Comment: National Kidney Foundation and American Society of Nephrology (ASN) Task Force recommended calculation based on the Chronic Kidney Disease Epidemiology Collaboration (CKD-EPI) equation refit without adjustment for race.       Narrative:      GFR Normal >60  Chronic Kidney Disease <60  Kidney Failure <15      Troponin [[id number]]  (Normal) Collected: 09/05/22 1710    Specimen: Blood Updated: 09/05/22 1733     Troponin T 0.021 ng/mL     Narrative:      Troponin T Reference Range:  <= 0.03 ng/mL-   Negative for AMI  >0.03 ng/mL-     Abnormal for myocardial necrosis.  Clinicians would have to utilize clinical acumen, EKG, Troponin and serial changes to determine if it is an Acute Myocardial Infarction or myocardial injury due to an underlying chronic condition.       Results may be falsely decreased if patient taking Biotin.      BNP [[id number]]  (Abnormal) Collected: 09/05/22 1710    Specimen: Blood Updated: 09/05/22 1731     proBNP 1,707.0 pg/mL     Narrative:      Among patients with dyspnea, NT-proBNP is highly sensitive for the detection of acute congestive heart failure. In addition NT-proBNP of <300 pg/ml effectively rules out acute congestive heart failure with 99% negative predictive value.    Results may be falsely decreased if patient taking Biotin.      Urinalysis, Microscopic Only - Urine, Clean Catch [[id number]]  (Abnormal) Collected: 09/05/22 1648    Specimen: Urine, Clean Catch Updated: 09/05/22 1720     RBC, UA 0-2 /HPF      WBC, UA 0-2 /HPF      Bacteria, UA None Seen /HPF      Squamous Epithelial Cells, UA None Seen /HPF      Hyaline Casts, UA None Seen /LPF      Methodology Automated Microscopy    CBC & Differential [[id number]]  (Abnormal) Collected: 09/05/22 1710    Specimen: Blood Updated: 09/05/22 1718    Narrative:      The following orders were created for panel order CBC & Differential.  Procedure                               Abnormality         Status                      ---------                               -----------         ------                     CBC Auto Differential[[id number]]        Abnormal            Final result               Scan Slide[[id number]]                                                                    Please view results for these tests on the individual orders.    CBC Auto Differential [[id number]]  (Abnormal) Collected: 09/05/22 1710    Specimen: Blood Updated: 09/05/22 1718     WBC 4.20 10*3/mm3      RBC 4.40 10*6/mm3      Hemoglobin 13.1 g/dL      Hematocrit 40.3 %      MCV 91.6 fL      MCH 29.8 pg      MCHC 32.5 g/dL      RDW 14.0 %      RDW-SD 46.9 fl      MPV 13.3 fL      Platelets 124 10*3/mm3      Neutrophil % 51.9 %      Lymphocyte % 27.6 %      Monocyte % 14.3 %      Eosinophil % 5.5 %      Basophil % 0.5 %      Immature Grans % 0.2 %      Neutrophils, Absolute 2.18 10*3/mm3      Lymphocytes, Absolute 1.16 10*3/mm3      Monocytes, Absolute 0.60 10*3/mm3      Eosinophils, Absolute 0.23 10*3/mm3      Basophils, Absolute 0.02 10*3/mm3      Immature Grans, Absolute 0.01 10*3/mm3      nRBC 0.0 /100 WBC     Urinalysis With Microscopic If Indicated (No Culture) - Urine, Clean Catch [[id number]]  (Abnormal) Collected: 09/05/22 1648    Specimen: Urine, Clean Catch Updated: 09/05/22 1657     Color, UA Yellow     Appearance, UA Clear     pH, UA 6.0     Specific Gravity, UA 1.016     Glucose, UA Negative     Ketones, UA Negative     Bilirubin, UA Negative     Blood, UA Negative     Protein, UA 30 mg/dL (1+)     Leuk Esterase, UA Negative     Nitrite, UA Negative     Urobilinogen, UA 4.0 E.U./dL        Imaging Results (Most Recent)     Procedure Component Value Units Date/Time    XR Chest 1 View [[id number]] Collected: 09/05/22 1623     Updated: 09/05/22 1719    Narrative:      EXAM: XR CHEST 1 VIEW    HISTORY: sob    COMPARISON: 11/29/2021    TECHNIQUE:   Portable view of the chest.    FINDINGS:   Stable left pacemaker. Stable  cardiomegaly.  Mild-to-moderate left lung base atelectasis versus infiltrate new  since prior.  Mild to moderate pulmonary vascular congestion.  The mediastinal contours are within normal limits. .  No evidence of mediastinal shift or pneumothorax.  Unremarkable visualized osseous structures.      Impression:      Mild-to-moderate left lung base atelectasis versus infiltrate new  since prior.  Mild to moderate pulmonary vascular congestion.            Electronically signed by:  Pedro Day MD  9/5/2022 5:16 PM CDT  Workstation: [id number]          Chief Complaint on Day of Discharge: Shortness of breath    Hospital Course:  The patient is a 52 y.o. male who presented to [facility] with shortness of breath.  Patient is admitted to the hospital on 9/5/2022.  Is discharged home on 9/11/2022.    Final diagnosis  Acute on chronic systolic heart failure  Status post AICD placement in [place] 8-22  Nonischemic cardiomyopathy EF 15 to 20%  Hyperlipidemia  Hypertension  Deconditioning    History:  The patient is a 52-year-old male admitted the hospital 9/5/2022.  Patient presents emergency department with complaint of shortness of breath, chest pain, and weakness.  Patient with known congestive heart failure, diabetes, EF of approximately 15 to 20%.  Patient was seen at [facility] in [place] in the last week.  Patient was actually admitted and scheduled to be transferred to [facility], though evidently there was a bed shortage and the patient improved somewhat and went home.  Patient has gone home and been home approximately 12 to 24 hours that with return of his symptoms of shortness of breath and weakness.  Patient arrives with blood pressures in the 60-90 systolic range.  Patient denies dizziness at this time.  Patient is short of breath and is unable to lie flat or add any type of more acute angle.  Patient is most comfortable sitting on the side of the bed with his legs down.  Chronic  "with acute edema.  Patient notes a weight gain of approximately 10 to 15 pounds in the last week..   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 Aranda on 9/6/2022 from cardiology.  At that time he was noted with acute on chronic systolic congestive heart failure.  Was volume overloaded.  Dr. Aranda recommended Coreg, losartan, Lasix Aldactone.  The patient continued to have issues regarding his breathing.  Recommendations were to continue current medications Coreg was increased to 6.25 and patient was continued to be monitored.  Medications were changed from Lasix to Bumex which did start to help the patient as far his his edema and his breathing.  He did start to breathe much easier he had less shortness of breath..  During the patient's stay the TSH was checked and it was noted at 1.3.  The patient was very weak and deconditioned.  Physical therapy and Occupational Therapy saw the patient during his stay and indicated that the patient would benefit from ongoing PT and OT.  The patient did improve.  Very slowly.  On day of discharge the patient's vital signs are stable and the patient is afebrile.  Dr. Aranda saw the patient and recommended the patient could go home on medications as noted.  The patient is recommended to follow-up with primary care provider within 1 week of discharge and follow-up with Dr. Aranda in 2 to 4 weeks.  His activity will be as tolerated.  Diet is as noted in the chart.  I reviewed all findings discharge medications follow-up instructions with the patient he states he understands will follow recommendations.  X-rays were reviewed during the patient's stay on 9 2 and 9 5.  Showing vascular congestion.        Condition on Discharge: Stable    Physical Exam on Discharge:  /72 (BP Location: Right arm, Patient Position: Sitting)   Pulse 75   Temp 97.7 °F (36.5 °C) (Temporal)   Resp 18   Ht 193 cm (75.98\")   Wt (!) " 143 kg (316 lb 3.2 oz)   SpO2 95%   BMI 38.51 kg/m²   Physical Exam  Vitals and nursing note reviewed.   Constitutional:       Appearance: He is obese.   HENT:      Head: Normocephalic and atraumatic.      Right Ear: External ear normal.      Left Ear: External ear normal.      Nose: Nose normal.      Mouth/Throat:      Mouth: Mucous membranes are moist.      Pharynx: Oropharynx is clear.   Eyes:      Extraocular Movements: Extraocular movements intact.      Conjunctiva/sclera: Conjunctivae normal.      Pupils: Pupils are equal, round, and reactive to light.   Cardiovascular:      Rate and Rhythm: Normal rate and regular rhythm.      Pulses: Normal pulses.      Heart sounds: Normal heart sounds.   Pulmonary:      Effort: Pulmonary effort is normal.      Breath sounds: Normal breath sounds.   Abdominal:      General: Bowel sounds are normal.      Palpations: Abdomen is soft.   Musculoskeletal:         General: Normal range of motion.      Cervical back: Normal range of motion.   Skin:     General: Skin is dry.   Neurological:      General: No focal deficit present.      Mental Status: He is alert and oriented to person, place, and time.   Psychiatric:         Mood and Affect: Mood normal.         Behavior: Behavior normal.           Discharge Disposition:  Home or Self Care    Discharge Medications:     Discharge Medications      New Medications      Instructions Start Date   bumetanide 2 MG tablet  Commonly known as: BUMEX   2 mg, Oral, 2 Times Daily      losartan 25 MG tablet  Commonly known as: COZAAR   12.5 mg, Oral, Every 24 Hours Scheduled   Start Date: September 12, 2022     spironolactone 25 MG tablet  Commonly known as: ALDACTONE   25 mg, Oral, Daily   Start Date: September 12, 2022        Changes to Medications      Instructions Start Date   carvedilol 12.5 MG tablet  Commonly known as: COREG  What changed:   · medication strength  · how much to take   12.5 mg, Oral, 2 Times Daily With Meals          Continue These Medications      Instructions Start Date   albuterol sulfate  (90 Base) MCG/ACT inhaler  Commonly known as: PROVENTIL HFA;VENTOLIN HFA;PROAIR HFA   2 puffs, Inhalation, Every 4 Hours PRN      apixaban 5 MG tablet tablet  Commonly known as: ELIQUIS   5 mg, Oral, 2 Times Daily      potassium chloride 10 MEQ CR tablet   10 mEq, Oral, Daily         Stop These Medications    furosemide 40 MG tablet  Commonly known as: LASIX     lovastatin 20 MG tablet  Commonly known as: MEVACOR     sacubitril-valsartan 24-26 MG tablet  Commonly known as: ENTRESTO            Discharge Diet:   Diet Instructions     Diet: Regular, Cardiac      Discharge Diet:  Regular  Cardiac       Fluid Restriction per day: 1000 mL Fluid          Activity at Discharge:   Activity Instructions     Activity as Tolerated            Discharge Care Plan/Instructions: Discharged home today.  Follow-up with primary care provider within 1 week of discharge.  Follow-up with cardiology in 2 to 4 weeks.  Activity is as tolerated.  Diet is cardiac 1000 mL fluid restriction.  Continue home medications as noted.    Follow-up Appointments:   No future appointments.    Test Results Pending at Discharge:     The patient has current or prior documentation of LVEF less than 40%, or moderate to severely depressed left ventricular systolic function. The patient was prescribed or already taking a beta-blocker.      The patient has current or prior documentation of LVEF less than 40%, or moderate to severely depressed left ventricular systolic function. The patent was prescribed or already taking an ACE inhibitor or ARB.     Moshe Aleman DO    Time: Time to complete discharge greater than 30 minutes

## 2022-09-12 NOTE — OUTREACH NOTE
Prep Survey    Flowsheet Row Responses   [facility] facility patient discharged from? [facility]   Is LACE score < 7 ? No   Emergency Room discharge w/ pulse ox? No   Eligibility Readm Mgmt   Discharge diagnosis Acute on chronic systolic CHF (congestive heart failure)    Does the patient have one of the following disease processes/diagnoses(primary or secondary)? CHF   Does the patient have Home health ordered? No   Is there a DME ordered? No   Medication alerts for this patient see AVS   Prep survey completed? Yes          JASE GEIGER - Registered Nurse

## 2022-09-12 NOTE — PAYOR COMM NOTE
"Geraldine Jose  [facility]  Case Management Extender  [phone] phone  [phone] fax      Ref# [id number]    Ethel Davis (52 y.o. Male)             Date of Birth   1970    Social Security Number       Address   [address] [address]    Home Phone   [phone]    MRN   [id number]       Religious   [facility]    Marital Status                               Admission Date   9/5/22    Admission Type   Emergency    Admitting Provider   Will French MD    Attending Provider       Department, Room/Bed   [facility] 3 WEST, 306/1       Discharge Date   9/11/2022    Discharge Disposition   Home or Self Care    Discharge Destination                               Attending Provider: (none)   Allergies: No Known Allergies    Isolation: None   Infection: COVID Screen (preop/placement) (12/08/21)   Code Status: Prior   Advance Care Planning Activity    Ht: 193 cm (75.98\")   Wt: 143 kg (316 lb 3.2 oz)    Admission Cmt: None   Principal Problem: Acute on chronic systolic CHF (congestive heart failure) (HCC) [I50.23]                 Active Insurance as of 9/5/2022     Primary Coverage     Payor Plan Insurance Group Employer/Plan Group    [place] [id number]     Payor Plan Address Payor Plan Phone Number Payor Plan Fax Number Effective Dates    [employer] [address] [address] [phone]  10/8/2021 - None Entered    [facility] [id number]       Subscriber Name Subscriber Birth Date Member ID       ETHEL DAVIS 1970 [id number]                 Emergency Contacts      (Rel.) Home Phone Work Phone Mobile Phone    Courtney Davis (Mother) [phone] -- --               Discharge Summary      Moshe Aleman DO at 09/11/22 [address] [place] [address] Medicine Services  DISCHARGE SUMMARY       Date of Admission: 9/5/2022  Date of " Discharge:  9/11/2022  Primary Care Physician: Shonna Ng APRN    Presenting Problem/History of Present Illness:  Hypotension, unspecified hypotension type [I95.9]  Dyspnea, unspecified type [R06.00]  Chest pain, unspecified type [R07.9]  Acute congestive heart failure, unspecified heart failure type (HCC) [I50.9]       Final Discharge Diagnoses:  Active Hospital Problems    Diagnosis    • **Acute on chronic systolic CHF (congestive heart failure) (HCC)    • Acute congestive heart failure, unspecified heart failure type (HCC)    • Obesity (BMI 30-39.9)    • Hypotension    • Diabetes mellitus (HCC)        Consults:   Consults     Date and Time Order Name Status Description    9/6/2022 12:31 AM Inpatient Cardiology Consult Completed           Procedures Performed:                 Pertinent Test Results:   Lab Results (most recent)     Procedure Component Value Units Date/Time    Basic Metabolic Panel [[id number]]  (Abnormal) Collected: 09/11/22 0535    Specimen: Blood Updated: 09/11/22 0644     Glucose 98 mg/dL      BUN 27 mg/dL      Creatinine 1.56 mg/dL      Sodium 140 mmol/L      Potassium 4.4 mmol/L      Chloride 99 mmol/L      CO2 31.0 mmol/L      Calcium 9.3 mg/dL      BUN/Creatinine Ratio 17.3     Anion Gap 10.0 mmol/L      eGFR 53.4 mL/min/1.73      Comment: National Kidney Foundation and American Society of Nephrology (ASN) Task Force recommended calculation based on the Chronic Kidney Disease Epidemiology Collaboration (CKD-EPI) equation refit without adjustment for race.       Narrative:      GFR Normal >60  Chronic Kidney Disease <60  Kidney Failure <15      POC Glucose Once [[id number]]  (Normal) Collected: 09/11/22 0556    Specimen: Blood Updated: 09/11/22 0629     Glucose 112 mg/dL      Comment: RN NotifiedOperator: [id number] KIRKPATRICK JOANNA Oro ID: [id number]       POC Glucose Once [[id number]]  (Abnormal) Collected: 09/10/22 1935    Specimen: Blood Updated: 09/10/22 2044     Glucose 167  mg/dL      Comment: RN NotifiedOperator: [id number] CASIMIRO Oro ID: [id number]       Basic Metabolic Panel [[id number]]  (Abnormal) Collected: 09/10/22 0536    Specimen: Blood Updated: 09/10/22 0646     Glucose 104 mg/dL      BUN 23 mg/dL      Creatinine 1.34 mg/dL      Sodium 140 mmol/L      Potassium 3.9 mmol/L      Chloride 101 mmol/L      CO2 30.0 mmol/L      Calcium 9.1 mg/dL      BUN/Creatinine Ratio 17.2     Anion Gap 9.0 mmol/L      eGFR 64.1 mL/min/1.73      Comment: National Kidney Foundation and American Society of Nephrology (ASN) Task Force recommended calculation based on the Chronic Kidney Disease Epidemiology Collaboration (CKD-EPI) equation refit without adjustment for race.       Narrative:      GFR Normal >60  Chronic Kidney Disease <60  Kidney Failure <15      Extra Tubes [[id number]] Collected: 09/09/22 0521    Specimen: Blood, Venous Line Updated: 09/09/22 0632    Narrative:      The following orders were created for panel order Extra Tubes.  Procedure                               Abnormality         Status                     ---------                               -----------         ------                     Lavender Top[[id number]]                                     Final result                 Please view results for these tests on the individual orders.    Lavender Top [[id number]] Collected: 09/09/22 0521    Specimen: Blood Updated: 09/09/22 0632     Extra Tube hold for add-on     Comment: Auto resulted       Manual Differential [[id number]]  (Abnormal) Collected: 09/07/22 0623    Specimen: Blood Updated: 09/07/22 0727     Neutrophil % 52.0 %      Lymphocyte % 30.0 %      Monocyte % 12.0 %      Eosinophil % 4.0 %      Basophil % 2.0 %      Neutrophils Absolute 2.12 10*3/mm3      Lymphocytes Absolute 1.22 10*3/mm3      Monocytes Absolute 0.49 10*3/mm3      Eosinophils Absolute 0.16 10*3/mm3      Basophils Absolute 0.08 10*3/mm3      RBC Morphology Normal     WBC Morphology  Normal     Platelet Morphology Normal    CBC & Differential [[id number]]  (Abnormal) Collected: 09/07/22 0623    Specimen: Blood Updated: 09/07/22 0636    Narrative:      The following orders were created for panel order CBC & Differential.  Procedure                               Abnormality         Status                     ---------                               -----------         ------                     CBC Auto Differential[[id number]]        Abnormal            Final result               Scan Slide[[id number]]                                                                    Please view results for these tests on the individual orders.    CBC Auto Differential [[id number]]  (Abnormal) Collected: 09/07/22 0623    Specimen: Blood Updated: 09/07/22 0636     WBC 4.08 10*3/mm3      RBC 4.31 10*6/mm3      Hemoglobin 13.1 g/dL      Hematocrit 41.0 %      MCV 95.1 fL      MCH 30.4 pg      MCHC 32.0 g/dL      RDW 14.1 %      RDW-SD 49.1 fl      MPV 12.8 fL      Platelets 118 10*3/mm3     Troponin [[id number]]  (Normal) Collected: 09/06/22 0549    Specimen: Blood Updated: 09/06/22 0633     Troponin T 0.025 ng/mL     Narrative:      Troponin T Reference Range:  <= 0.03 ng/mL-   Negative for AMI  >0.03 ng/mL-     Abnormal for myocardial necrosis.  Clinicians would have to utilize clinical acumen, EKG, Troponin and serial changes to determine if it is an Acute Myocardial Infarction or myocardial injury due to an underlying chronic condition.       Results may be falsely decreased if patient taking Biotin.      TSH [[id number]]  (Normal) Collected: 09/06/22 0549    Specimen: Blood Updated: 09/06/22 0632     TSH 1.350 uIU/mL     Magnesium [[id number]]  (Normal) Collected: 09/06/22 0549    Specimen: Blood Updated: 09/06/22 0631     Magnesium 1.9 mg/dL     Gillespie Draw [[id number]] Collected: 09/05/22 1648    Specimen: Blood Updated: 09/05/22 1817    Narrative:      The following orders were created for panel order Gillespie  Draw.  Procedure                               Abnormality         Status                     ---------                               -----------         ------                     Green Top (Gel)[[id number]]                                  Final result               Lavender Top[[id number]]                                     Final result               Gold Top - SST[[id number]]                                   Final result               Light Blue Top[[id number]]                                   Final result                 Please view results for these tests on the individual orders.    Gold Top - SST [[id number]] Collected: 09/05/22 1710    Specimen: Blood Updated: 09/05/22 1817     Extra Tube Hold for add-ons.     Comment: Auto resulted.       Light Blue Top [[id number]] Collected: 09/05/22 1710    Specimen: Blood Updated: 09/05/22 1817     Extra Tube Hold for add-ons.     Comment: Auto resulted       Green Top (Gel) [[id number]] Collected: 09/05/22 1648    Specimen: Blood Updated: 09/05/22 1803     Extra Tube Hold for add-ons.     Comment: Auto resulted.       Lavender Top [[id number]] Collected: 09/05/22 1648    Specimen: Blood Updated: 09/05/22 1803     Extra Tube hold for add-on     Comment: Auto resulted       Comprehensive Metabolic Panel [[id number]]  (Abnormal) Collected: 09/05/22 1710    Specimen: Blood Updated: 09/05/22 1733     Glucose 144 mg/dL      BUN 20 mg/dL      Creatinine 1.46 mg/dL      Sodium 141 mmol/L      Potassium 3.3 mmol/L      Chloride 105 mmol/L      CO2 28.0 mmol/L      Calcium 8.8 mg/dL      Total Protein 6.5 g/dL      Albumin 3.30 g/dL      ALT (SGPT) 15 U/L      AST (SGOT) 14 U/L      Alkaline Phosphatase 65 U/L      Total Bilirubin 1.4 mg/dL      Globulin 3.2 gm/dL      A/G Ratio 1.0 g/dL      BUN/Creatinine Ratio 13.7     Anion Gap 8.0 mmol/L      eGFR 57.9 mL/min/1.73      Comment: National Kidney Foundation and American Society of Nephrology (ASN) Task Force recommended calculation  based on the Chronic Kidney Disease Epidemiology Collaboration (CKD-EPI) equation refit without adjustment for race.       Narrative:      GFR Normal >60  Chronic Kidney Disease <60  Kidney Failure <15      Troponin [[id number]]  (Normal) Collected: 09/05/22 1710    Specimen: Blood Updated: 09/05/22 1733     Troponin T 0.021 ng/mL     Narrative:      Troponin T Reference Range:  <= 0.03 ng/mL-   Negative for AMI  >0.03 ng/mL-     Abnormal for myocardial necrosis.  Clinicians would have to utilize clinical acumen, EKG, Troponin and serial changes to determine if it is an Acute Myocardial Infarction or myocardial injury due to an underlying chronic condition.       Results may be falsely decreased if patient taking Biotin.      BNP [[id number]]  (Abnormal) Collected: 09/05/22 1710    Specimen: Blood Updated: 09/05/22 1731     proBNP 1,707.0 pg/mL     Narrative:      Among patients with dyspnea, NT-proBNP is highly sensitive for the detection of acute congestive heart failure. In addition NT-proBNP of <300 pg/ml effectively rules out acute congestive heart failure with 99% negative predictive value.    Results may be falsely decreased if patient taking Biotin.      Urinalysis, Microscopic Only - Urine, Clean Catch [[id number]]  (Abnormal) Collected: 09/05/22 1648    Specimen: Urine, Clean Catch Updated: 09/05/22 1720     RBC, UA 0-2 /HPF      WBC, UA 0-2 /HPF      Bacteria, UA None Seen /HPF      Squamous Epithelial Cells, UA None Seen /HPF      Hyaline Casts, UA None Seen /LPF      Methodology Automated Microscopy    CBC & Differential [[id number]]  (Abnormal) Collected: 09/05/22 1710    Specimen: Blood Updated: 09/05/22 1718    Narrative:      The following orders were created for panel order CBC & Differential.  Procedure                               Abnormality         Status                     ---------                               -----------         ------                     CBC Auto Differential[[id number]]         Abnormal            Final result               Scan Slide[[id number]]                                                                    Please view results for these tests on the individual orders.    CBC Auto Differential [[id number]]  (Abnormal) Collected: 09/05/22 1710    Specimen: Blood Updated: 09/05/22 1718     WBC 4.20 10*3/mm3      RBC 4.40 10*6/mm3      Hemoglobin 13.1 g/dL      Hematocrit 40.3 %      MCV 91.6 fL      MCH 29.8 pg      MCHC 32.5 g/dL      RDW 14.0 %      RDW-SD 46.9 fl      MPV 13.3 fL      Platelets 124 10*3/mm3      Neutrophil % 51.9 %      Lymphocyte % 27.6 %      Monocyte % 14.3 %      Eosinophil % 5.5 %      Basophil % 0.5 %      Immature Grans % 0.2 %      Neutrophils, Absolute 2.18 10*3/mm3      Lymphocytes, Absolute 1.16 10*3/mm3      Monocytes, Absolute 0.60 10*3/mm3      Eosinophils, Absolute 0.23 10*3/mm3      Basophils, Absolute 0.02 10*3/mm3      Immature Grans, Absolute 0.01 10*3/mm3      nRBC 0.0 /100 WBC     Urinalysis With Microscopic If Indicated (No Culture) - Urine, Clean Catch [[id number]]  (Abnormal) Collected: 09/05/22 1648    Specimen: Urine, Clean Catch Updated: 09/05/22 1657     Color, UA Yellow     Appearance, UA Clear     pH, UA 6.0     Specific Gravity, UA 1.016     Glucose, UA Negative     Ketones, UA Negative     Bilirubin, UA Negative     Blood, UA Negative     Protein, UA 30 mg/dL (1+)     Leuk Esterase, UA Negative     Nitrite, UA Negative     Urobilinogen, UA 4.0 E.U./dL        Imaging Results (Most Recent)     Procedure Component Value Units Date/Time    XR Chest 1 View [[id number]] Collected: 09/05/22 1623     Updated: 09/05/22 1719    Narrative:      EXAM: XR CHEST 1 VIEW    HISTORY: sob    COMPARISON: 11/29/2021    TECHNIQUE:   Portable view of the chest.    FINDINGS:   Stable left pacemaker. Stable cardiomegaly.  Mild-to-moderate left lung base atelectasis versus infiltrate new  since prior.  Mild to moderate pulmonary vascular congestion.  The  mediastinal contours are within normal limits. .  No evidence of mediastinal shift or pneumothorax.  Unremarkable visualized osseous structures.      Impression:      Mild-to-moderate left lung base atelectasis versus infiltrate new  since prior.  Mild to moderate pulmonary vascular congestion.            Electronically signed by:  Pedro Day MD  9/5/2022 5:16 PM CDT  Workstation: [id number]          Chief Complaint on Day of Discharge: Shortness of breath    Hospital Course:  The patient is a 52 y.o. male who [facility].  Patient is admitted to the hospital on 9/5/2022.  Is discharged home on 9/11/2022.    Final diagnosis  Acute on chronic systolic heart failure  Status post AICD placement in [place] 8-22  Nonischemic cardiomyopathy EF 15 to 20%  Hyperlipidemia  Hypertension  Deconditioning    History:  The patient is a 52-year-old male admitted the hospital 9/5/2022.  Patient presents emergency department with complaint of shortness of breath, chest pain, and weakness.  Patient with known congestive heart failure, diabetes, EF of approximately 15 to 20%.  Patient was seen at [facility] in [place] in the last week.  Patient was actually admitted and scheduled to be transferred to [facility], though evidently there was a bed shortage and the patient improved somewhat and went home.  Patient has gone home and been home approximately 12 to 24 hours that with return of his symptoms of shortness of breath and weakness.  Patient arrives with blood pressures in the 60-90 systolic range.  Patient denies dizziness at this time.  Patient is short of breath and is unable to lie flat or add any type of more acute angle.  Patient is most comfortable sitting on the side of the bed with his legs down.  Chronic with acute edema.  Patient notes a weight gain of approximately 10 to 15 pounds in the last week..   Past history of methamphetamine use, he reports  "quitting 4 years ago  The patient was seen in consultation by Dr. Aranda on 9/6/2022 from cardiology.  At that time he was noted with acute on chronic systolic congestive heart failure.  Was volume overloaded.  Dr. Aranda recommended Coreg, losartan, Lasix Aldactone.  The patient continued to have issues regarding his breathing.  Recommendations were to continue current medications Coreg was increased to 6.25 and patient was continued to be monitored.  Medications were changed from Lasix to Bumex which did start to help the patient as far his his edema and his breathing.  He did start to breathe much easier he had less shortness of breath..  During the patient's stay the TSH was checked and it was noted at 1.3.  The patient was very weak and deconditioned.  Physical therapy and Occupational Therapy saw the patient during his stay and indicated that the patient would benefit from ongoing PT and OT.  The patient did improve.  Very slowly.  On day of discharge the patient's vital signs are stable and the patient is afebrile.  Dr. Aranda saw the patient and recommended the patient could go home on medications as noted.  The patient is recommended to follow-up with primary care provider within 1 week of discharge and follow-up with Dr. Aranda in 2 to 4 weeks.  His activity will be as tolerated.  Diet is as noted in the chart.  I reviewed all findings discharge medications follow-up instructions with the patient he states he understands will follow recommendations.  X-rays were reviewed during the patient's stay on 9 2 and 9 5.  Showing vascular congestion.        Condition on Discharge: Stable    Physical Exam on Discharge:  /72 (BP Location: Right arm, Patient Position: Sitting)   Pulse 75   Temp 97.7 °F (36.5 °C) (Temporal)   Resp 18   Ht 193 cm (75.98\")   Wt (!) 143 kg (316 lb 3.2 oz)   SpO2 95%   BMI 38.51 kg/m²   Physical Exam  Vitals and nursing note reviewed.   Constitutional:       Appearance: He is " obese.   HENT:      Head: Normocephalic and atraumatic.      Right Ear: External ear normal.      Left Ear: External ear normal.      Nose: Nose normal.      Mouth/Throat:      Mouth: Mucous membranes are moist.      Pharynx: Oropharynx is clear.   Eyes:      Extraocular Movements: Extraocular movements intact.      Conjunctiva/sclera: Conjunctivae normal.      Pupils: Pupils are equal, round, and reactive to light.   Cardiovascular:      Rate and Rhythm: Normal rate and regular rhythm.      Pulses: Normal pulses.      Heart sounds: Normal heart sounds.   Pulmonary:      Effort: Pulmonary effort is normal.      Breath sounds: Normal breath sounds.   Abdominal:      General: Bowel sounds are normal.      Palpations: Abdomen is soft.   Musculoskeletal:         General: Normal range of motion.      Cervical back: Normal range of motion.   Skin:     General: Skin is dry.   Neurological:      General: No focal deficit present.      Mental Status: He is alert and oriented to person, place, and time.   Psychiatric:         Mood and Affect: Mood normal.         Behavior: Behavior normal.           Discharge Disposition:  Home or Self Care    Discharge Medications:     Discharge Medications      New Medications      Instructions Start Date   bumetanide 2 MG tablet  Commonly known as: BUMEX   2 mg, Oral, 2 Times Daily      losartan 25 MG tablet  Commonly known as: COZAAR   12.5 mg, Oral, Every 24 Hours Scheduled   Start Date: September 12, 2022     spironolactone 25 MG tablet  Commonly known as: ALDACTONE   25 mg, Oral, Daily   Start Date: September 12, 2022        Changes to Medications      Instructions Start Date   carvedilol 12.5 MG tablet  Commonly known as: COREG  What changed:   · medication strength  · how much to take   12.5 mg, Oral, 2 Times Daily With Meals         Continue These Medications      Instructions Start Date   albuterol sulfate  (90 Base) MCG/ACT inhaler  Commonly known as: PROVENTIL HFA;VENTOLIN  HFA;PROAIR HFA   2 puffs, Inhalation, Every 4 Hours PRN      apixaban 5 MG tablet tablet  Commonly known as: ELIQUIS   5 mg, Oral, 2 Times Daily      potassium chloride 10 MEQ CR tablet   10 mEq, Oral, Daily         Stop These Medications    furosemide 40 MG tablet  Commonly known as: LASIX     lovastatin 20 MG tablet  Commonly known as: MEVACOR     sacubitril-valsartan 24-26 MG tablet  Commonly known as: ENTRESTO            Discharge Diet:   Diet Instructions     Diet: Regular, Cardiac      Discharge Diet:  Regular  Cardiac       Fluid Restriction per day: 1000 mL Fluid          Activity at Discharge:   Activity Instructions     Activity as Tolerated            Discharge Care Plan/Instructions: Discharged home today.  Follow-up with primary care provider within 1 week of discharge.  Follow-up with cardiology in 2 to 4 weeks.  Activity is as tolerated.  Diet is cardiac 1000 mL fluid restriction.  Continue home medications as noted.    Follow-up Appointments:   No future appointments.    Test Results Pending at Discharge:     The patient has current or prior documentation of LVEF less than 40%, or moderate to severely depressed left ventricular systolic function. The patient was prescribed or already taking a beta-blocker.      The patient has current or prior documentation of LVEF less than 40%, or moderate to severely depressed left ventricular systolic function. The patent was prescribed or already taking an ACE inhibitor or ARB.     Moshe Aleman DO    Time: Time to complete discharge greater than 30 minutes    Electronically signed by Moshe Aleman DO at 09/11/22 7476

## 2022-09-15 ENCOUNTER — READMISSION MANAGEMENT (OUTPATIENT)
Dept: CALL CENTER | Facility: HOSPITAL | Age: 52
End: 2022-09-15

## 2022-09-15 NOTE — OUTREACH NOTE
CHF Week 1 Survey    Flowsheet Row Responses   [facility] facility patient discharged from? [place]   Does the patient have one of the following disease processes/diagnoses(primary or secondary)? CHF   CHF Week 1 attempt successful? No   Unsuccessful attempts Attempt 1          WAGNER OLIVEIRA - Registered Nurse

## 2022-09-16 ENCOUNTER — OFFICE VISIT (OUTPATIENT)
Dept: CARDIOLOGY | Facility: CLINIC | Age: 52
End: 2022-09-16

## 2022-09-16 VITALS
HEIGHT: 76 IN | WEIGHT: 308.4 LBS | DIASTOLIC BLOOD PRESSURE: 80 MMHG | SYSTOLIC BLOOD PRESSURE: 110 MMHG | BODY MASS INDEX: 37.56 KG/M2 | OXYGEN SATURATION: 97 % | HEART RATE: 82 BPM

## 2022-09-16 DIAGNOSIS — I27.20 PULMONARY HYPERTENSION: ICD-10-CM

## 2022-09-16 DIAGNOSIS — I51.9 SEVERE LEFT VENTRICULAR SYSTOLIC DYSFUNCTION: ICD-10-CM

## 2022-09-16 DIAGNOSIS — I50.22 CHRONIC SYSTOLIC HEART FAILURE: Primary | ICD-10-CM

## 2022-09-16 DIAGNOSIS — I42.8 NICM (NONISCHEMIC CARDIOMYOPATHY): ICD-10-CM

## 2022-09-16 DIAGNOSIS — G47.33 OBSTRUCTIVE SLEEP APNEA: ICD-10-CM

## 2022-09-16 PROCEDURE — 99215 OFFICE O/P EST HI 40 MIN: CPT | Performed by: NURSE PRACTITIONER

## 2022-09-16 RX ORDER — POTASSIUM CHLORIDE 750 MG/1
20 TABLET, EXTENDED RELEASE ORAL DAILY
COMMUNITY
Start: 2022-08-11 | End: 2022-09-23

## 2022-09-17 LAB
QT INTERVAL: 422 MS
QTC INTERVAL: 486 MS

## 2022-09-20 ENCOUNTER — READMISSION MANAGEMENT (OUTPATIENT)
Dept: CALL CENTER | Facility: HOSPITAL | Age: 52
End: 2022-09-20

## 2022-09-20 NOTE — OUTREACH NOTE
CHF Week 2 Survey    Flowsheet Row Responses   [facility] facility patient discharged from? [place]   Does the patient have one of the following disease processes/diagnoses(primary or secondary)? CHF   Week 2 attempt successful? No   Unsuccessful attempts Attempt 1          ARTHUR GARCES - Registered Nurse

## 2022-09-23 ENCOUNTER — READMISSION MANAGEMENT (OUTPATIENT)
Dept: CALL CENTER | Facility: HOSPITAL | Age: 52
End: 2022-09-23

## 2022-09-23 ENCOUNTER — APPOINTMENT (OUTPATIENT)
Dept: GENERAL RADIOLOGY | Facility: HOSPITAL | Age: 52
End: 2022-09-23

## 2022-09-23 ENCOUNTER — HOSPITAL ENCOUNTER (OUTPATIENT)
Facility: HOSPITAL | Age: 52
Setting detail: OBSERVATION
Discharge: HOME HEALTH | End: 2022-09-27
Attending: STUDENT IN AN ORGANIZED HEALTH CARE EDUCATION/TRAINING PROGRAM | Admitting: INTERNAL MEDICINE

## 2022-09-23 DIAGNOSIS — R06.02 SHORTNESS OF BREATH: Primary | ICD-10-CM

## 2022-09-23 LAB
ALBUMIN SERPL-MCNC: 3.7 G/DL (ref 3.5–5.2)
ALBUMIN/GLOB SERPL: 1.1 G/DL
ALP SERPL-CCNC: 68 U/L (ref 39–117)
ALT SERPL W P-5'-P-CCNC: 15 U/L (ref 1–41)
AMPHET+METHAMPHET UR QL: NEGATIVE
AMPHETAMINES UR QL: NEGATIVE
ANION GAP SERPL CALCULATED.3IONS-SCNC: 11 MMOL/L (ref 5–15)
ARTERIAL PATENCY WRIST A: ABNORMAL
AST SERPL-CCNC: 16 U/L (ref 1–40)
ATMOSPHERIC PRESS: 751 MMHG
BACTERIA UR QL AUTO: ABNORMAL /HPF
BARBITURATES UR QL SCN: NEGATIVE
BASE EXCESS BLDA CALC-SCNC: 5 MMOL/L (ref 0–2)
BASOPHILS # BLD AUTO: 0.04 10*3/MM3 (ref 0–0.2)
BASOPHILS NFR BLD AUTO: 0.7 % (ref 0–1.5)
BDY SITE: ABNORMAL
BENZODIAZ UR QL SCN: NEGATIVE
BILIRUB SERPL-MCNC: 2.3 MG/DL (ref 0–1.2)
BILIRUB UR QL STRIP: ABNORMAL
BUN SERPL-MCNC: 22 MG/DL (ref 6–20)
BUN/CREAT SERPL: 15 (ref 7–25)
BUPRENORPHINE SERPL-MCNC: NEGATIVE NG/ML
CALCIUM SPEC-SCNC: 9.1 MG/DL (ref 8.6–10.5)
CANNABINOIDS SERPL QL: NEGATIVE
CHLORIDE SERPL-SCNC: 100 MMOL/L (ref 98–107)
CLARITY UR: CLEAR
CO2 SERPL-SCNC: 29 MMOL/L (ref 22–29)
COCAINE UR QL: NEGATIVE
COLOR UR: ABNORMAL
CREAT SERPL-MCNC: 1.47 MG/DL (ref 0.76–1.27)
DEPRECATED RDW RBC AUTO: 49.5 FL (ref 37–54)
EGFRCR SERPLBLD CKD-EPI 2021: 57.4 ML/MIN/1.73
EOSINOPHIL # BLD AUTO: 0.03 10*3/MM3 (ref 0–0.4)
EOSINOPHIL NFR BLD AUTO: 0.6 % (ref 0.3–6.2)
ERYTHROCYTE [DISTWIDTH] IN BLOOD BY AUTOMATED COUNT: 15 % (ref 12.3–15.4)
FLUAV SUBTYP SPEC NAA+PROBE: NOT DETECTED
FLUBV RNA ISLT QL NAA+PROBE: NOT DETECTED
GLOBULIN UR ELPH-MCNC: 3.5 GM/DL
GLUCOSE BLDC GLUCOMTR-MCNC: 106 MG/DL (ref 70–130)
GLUCOSE BLDC GLUCOMTR-MCNC: 153 MG/DL (ref 70–130)
GLUCOSE SERPL-MCNC: 120 MG/DL (ref 65–99)
GLUCOSE UR STRIP-MCNC: NEGATIVE MG/DL
HCO3 BLDA-SCNC: 28.6 MMOL/L (ref 20–26)
HCT VFR BLD AUTO: 41.1 % (ref 37.5–51)
HGB BLD-MCNC: 13.3 G/DL (ref 13–17.7)
HGB UR QL STRIP.AUTO: ABNORMAL
HOLD SPECIMEN: NORMAL
HOLD SPECIMEN: NORMAL
HYALINE CASTS UR QL AUTO: ABNORMAL /LPF
IMM GRANULOCYTES # BLD AUTO: 0.04 10*3/MM3 (ref 0–0.05)
IMM GRANULOCYTES NFR BLD AUTO: 0.7 % (ref 0–0.5)
INHALED O2 CONCENTRATION: <21 %
INR PPP: 1.81 (ref 0.8–1.2)
KETONES UR QL STRIP: ABNORMAL
LEUKOCYTE ESTERASE UR QL STRIP.AUTO: ABNORMAL
LYMPHOCYTES # BLD AUTO: 1.1 10*3/MM3 (ref 0.7–3.1)
LYMPHOCYTES NFR BLD AUTO: 20.3 % (ref 19.6–45.3)
Lab: ABNORMAL
MCH RBC QN AUTO: 29.4 PG (ref 26.6–33)
MCHC RBC AUTO-ENTMCNC: 32.4 G/DL (ref 31.5–35.7)
MCV RBC AUTO: 90.9 FL (ref 79–97)
METHADONE UR QL SCN: NEGATIVE
MODALITY: ABNORMAL
MONOCYTES # BLD AUTO: 0.81 10*3/MM3 (ref 0.1–0.9)
MONOCYTES NFR BLD AUTO: 15 % (ref 5–12)
NEUTROPHILS NFR BLD AUTO: 3.39 10*3/MM3 (ref 1.7–7)
NEUTROPHILS NFR BLD AUTO: 62.7 % (ref 42.7–76)
NITRITE UR QL STRIP: POSITIVE
NRBC BLD AUTO-RTO: 0 /100 WBC (ref 0–0.2)
NT-PROBNP SERPL-MCNC: 2021 PG/ML (ref 0–900)
OPIATES UR QL: NEGATIVE
OXYCODONE UR QL SCN: NEGATIVE
PCO2 BLDA: 37.9 MM HG (ref 35–45)
PCP UR QL SCN: NEGATIVE
PH BLDA: 7.49 PH UNITS (ref 7.35–7.45)
PH UR STRIP.AUTO: 6 [PH] (ref 5–9)
PLATELET # BLD AUTO: 143 10*3/MM3 (ref 140–450)
PMV BLD AUTO: 13.5 FL (ref 6–12)
PO2 BLDA: 74.6 MM HG (ref 83–108)
POTASSIUM SERPL-SCNC: 3.8 MMOL/L (ref 3.5–5.2)
PROPOXYPH UR QL: NEGATIVE
PROT SERPL-MCNC: 7.2 G/DL (ref 6–8.5)
PROT UR QL STRIP: ABNORMAL
PROTHROMBIN TIME: 21 SECONDS (ref 11.1–15.3)
QT INTERVAL: 383 MS
QT INTERVAL: 395 MS
QTC INTERVAL: 476 MS
QTC INTERVAL: 477 MS
RBC # BLD AUTO: 4.52 10*6/MM3 (ref 4.14–5.8)
RBC # UR STRIP: ABNORMAL /HPF
REF LAB TEST METHOD: ABNORMAL
SAO2 % BLDCOA: 95.3 % (ref 94–99)
SARS-COV-2 RNA PNL SPEC NAA+PROBE: NOT DETECTED
SODIUM SERPL-SCNC: 140 MMOL/L (ref 136–145)
SP GR UR STRIP: 1.02 (ref 1–1.03)
SQUAMOUS #/AREA URNS HPF: ABNORMAL /HPF
TRICYCLICS UR QL SCN: NEGATIVE
TROPONIN T SERPL-MCNC: 0.02 NG/ML (ref 0–0.03)
TROPONIN T SERPL-MCNC: 0.03 NG/ML (ref 0–0.03)
UROBILINOGEN UR QL STRIP: ABNORMAL
VENTILATOR MODE: ABNORMAL
WBC # UR STRIP: ABNORMAL /HPF
WBC NRBC COR # BLD: 5.41 10*3/MM3 (ref 3.4–10.8)

## 2022-09-23 PROCEDURE — 87636 SARSCOV2 & INF A&B AMP PRB: CPT | Performed by: NURSE PRACTITIONER

## 2022-09-23 PROCEDURE — 84484 ASSAY OF TROPONIN QUANT: CPT | Performed by: NURSE PRACTITIONER

## 2022-09-23 PROCEDURE — 80306 DRUG TEST PRSMV INSTRMNT: CPT | Performed by: NURSE PRACTITIONER

## 2022-09-23 PROCEDURE — 96375 TX/PRO/DX INJ NEW DRUG ADDON: CPT

## 2022-09-23 PROCEDURE — 99284 EMERGENCY DEPT VISIT MOD MDM: CPT

## 2022-09-23 PROCEDURE — 25010000002 ONDANSETRON PER 1 MG: Performed by: NURSE PRACTITIONER

## 2022-09-23 PROCEDURE — 82803 BLOOD GASES ANY COMBINATION: CPT

## 2022-09-23 PROCEDURE — 83970 ASSAY OF PARATHORMONE: CPT | Performed by: NURSE PRACTITIONER

## 2022-09-23 PROCEDURE — 36600 WITHDRAWAL OF ARTERIAL BLOOD: CPT

## 2022-09-23 PROCEDURE — 81001 URINALYSIS AUTO W/SCOPE: CPT | Performed by: NURSE PRACTITIONER

## 2022-09-23 PROCEDURE — 82306 VITAMIN D 25 HYDROXY: CPT | Performed by: NURSE PRACTITIONER

## 2022-09-23 PROCEDURE — 85025 COMPLETE CBC W/AUTO DIFF WBC: CPT | Performed by: NURSE PRACTITIONER

## 2022-09-23 PROCEDURE — G0378 HOSPITAL OBSERVATION PER HR: HCPCS

## 2022-09-23 PROCEDURE — 80053 COMPREHEN METABOLIC PANEL: CPT | Performed by: NURSE PRACTITIONER

## 2022-09-23 PROCEDURE — 96376 TX/PRO/DX INJ SAME DRUG ADON: CPT

## 2022-09-23 PROCEDURE — 93010 ELECTROCARDIOGRAM REPORT: CPT | Performed by: INTERNAL MEDICINE

## 2022-09-23 PROCEDURE — 83880 ASSAY OF NATRIURETIC PEPTIDE: CPT | Performed by: NURSE PRACTITIONER

## 2022-09-23 PROCEDURE — 71045 X-RAY EXAM CHEST 1 VIEW: CPT

## 2022-09-23 PROCEDURE — C9803 HOPD COVID-19 SPEC COLLECT: HCPCS

## 2022-09-23 PROCEDURE — 85610 PROTHROMBIN TIME: CPT | Performed by: NURSE PRACTITIONER

## 2022-09-23 PROCEDURE — 82962 GLUCOSE BLOOD TEST: CPT

## 2022-09-23 PROCEDURE — 25010000002 FUROSEMIDE PER 20 MG: Performed by: NURSE PRACTITIONER

## 2022-09-23 PROCEDURE — 93005 ELECTROCARDIOGRAM TRACING: CPT | Performed by: NURSE PRACTITIONER

## 2022-09-23 PROCEDURE — 36415 COLL VENOUS BLD VENIPUNCTURE: CPT

## 2022-09-23 RX ORDER — ACETAMINOPHEN 650 MG/1
650 SUPPOSITORY RECTAL EVERY 4 HOURS PRN
Status: DISCONTINUED | OUTPATIENT
Start: 2022-09-23 | End: 2022-09-27 | Stop reason: HOSPADM

## 2022-09-23 RX ORDER — POTASSIUM CHLORIDE 750 MG/1
10 CAPSULE, EXTENDED RELEASE ORAL DAILY
Status: DISCONTINUED | OUTPATIENT
Start: 2022-09-23 | End: 2022-09-27 | Stop reason: HOSPADM

## 2022-09-23 RX ORDER — FUROSEMIDE 10 MG/ML
80 INJECTION INTRAMUSCULAR; INTRAVENOUS ONCE
Status: COMPLETED | OUTPATIENT
Start: 2022-09-23 | End: 2022-09-23

## 2022-09-23 RX ORDER — FUROSEMIDE 10 MG/ML
40 INJECTION INTRAMUSCULAR; INTRAVENOUS EVERY 12 HOURS
Status: DISCONTINUED | OUTPATIENT
Start: 2022-09-23 | End: 2022-09-24

## 2022-09-23 RX ORDER — CARVEDILOL 12.5 MG/1
12.5 TABLET ORAL 2 TIMES DAILY WITH MEALS
Status: DISCONTINUED | OUTPATIENT
Start: 2022-09-23 | End: 2022-09-27 | Stop reason: HOSPADM

## 2022-09-23 RX ORDER — ONDANSETRON 2 MG/ML
4 INJECTION INTRAMUSCULAR; INTRAVENOUS ONCE
Status: COMPLETED | OUTPATIENT
Start: 2022-09-23 | End: 2022-09-23

## 2022-09-23 RX ORDER — NICOTINE POLACRILEX 4 MG
15 LOZENGE BUCCAL
Status: DISCONTINUED | OUTPATIENT
Start: 2022-09-23 | End: 2022-09-27 | Stop reason: HOSPADM

## 2022-09-23 RX ORDER — ATORVASTATIN CALCIUM 10 MG/1
10 TABLET, FILM COATED ORAL DAILY
Status: DISCONTINUED | OUTPATIENT
Start: 2022-09-23 | End: 2022-09-27 | Stop reason: HOSPADM

## 2022-09-23 RX ORDER — SODIUM CHLORIDE 0.9 % (FLUSH) 0.9 %
10 SYRINGE (ML) INJECTION AS NEEDED
Status: DISCONTINUED | OUTPATIENT
Start: 2022-09-23 | End: 2022-09-27 | Stop reason: HOSPADM

## 2022-09-23 RX ORDER — ALBUTEROL SULFATE 90 UG/1
2 AEROSOL, METERED RESPIRATORY (INHALATION) EVERY 4 HOURS PRN
Status: DISCONTINUED | OUTPATIENT
Start: 2022-09-23 | End: 2022-09-27 | Stop reason: HOSPADM

## 2022-09-23 RX ORDER — ACETAMINOPHEN 160 MG/5ML
650 SOLUTION ORAL EVERY 4 HOURS PRN
Status: DISCONTINUED | OUTPATIENT
Start: 2022-09-23 | End: 2022-09-27 | Stop reason: HOSPADM

## 2022-09-23 RX ORDER — DEXTROSE MONOHYDRATE 25 G/50ML
25 INJECTION, SOLUTION INTRAVENOUS
Status: DISCONTINUED | OUTPATIENT
Start: 2022-09-23 | End: 2022-09-27 | Stop reason: HOSPADM

## 2022-09-23 RX ORDER — ACETAMINOPHEN 325 MG/1
650 TABLET ORAL EVERY 4 HOURS PRN
Status: DISCONTINUED | OUTPATIENT
Start: 2022-09-23 | End: 2022-09-27 | Stop reason: HOSPADM

## 2022-09-23 RX ORDER — ONDANSETRON 2 MG/ML
4 INJECTION INTRAMUSCULAR; INTRAVENOUS EVERY 6 HOURS PRN
Status: DISCONTINUED | OUTPATIENT
Start: 2022-09-23 | End: 2022-09-27 | Stop reason: HOSPADM

## 2022-09-23 RX ORDER — LOSARTAN POTASSIUM 25 MG/1
12.5 TABLET ORAL
Status: DISCONTINUED | OUTPATIENT
Start: 2022-09-23 | End: 2022-09-27 | Stop reason: HOSPADM

## 2022-09-23 RX ORDER — INSULIN ASPART 100 [IU]/ML
0-9 INJECTION, SOLUTION INTRAVENOUS; SUBCUTANEOUS
Status: DISCONTINUED | OUTPATIENT
Start: 2022-09-23 | End: 2022-09-27 | Stop reason: HOSPADM

## 2022-09-23 RX ADMIN — FUROSEMIDE 40 MG: 10 INJECTION, SOLUTION INTRAVENOUS at 17:21

## 2022-09-23 RX ADMIN — APIXABAN 5 MG: 5 TABLET, FILM COATED ORAL at 20:36

## 2022-09-23 RX ADMIN — POTASSIUM CHLORIDE 10 MEQ: 750 CAPSULE, EXTENDED RELEASE ORAL at 17:21

## 2022-09-23 RX ADMIN — CARVEDILOL 12.5 MG: 12.5 TABLET, FILM COATED ORAL at 17:21

## 2022-09-23 RX ADMIN — ONDANSETRON 4 MG: 2 INJECTION INTRAMUSCULAR; INTRAVENOUS at 20:35

## 2022-09-23 RX ADMIN — ACETAMINOPHEN 650 MG: 325 TABLET, FILM COATED ORAL at 23:48

## 2022-09-23 RX ADMIN — FUROSEMIDE 80 MG: 10 INJECTION, SOLUTION INTRAVENOUS at 13:42

## 2022-09-23 RX ADMIN — ATORVASTATIN CALCIUM 10 MG: 10 TABLET, FILM COATED ORAL at 17:21

## 2022-09-23 RX ADMIN — ONDANSETRON 4 MG: 2 INJECTION INTRAMUSCULAR; INTRAVENOUS at 12:58

## 2022-09-23 RX ADMIN — Medication 12.5 MG: at 17:21

## 2022-09-23 NOTE — OUTREACH NOTE
CHF Week 2 Survey    Flowsheet Row Responses   [facility] facility patient discharged from? [place]   Does the patient have one of the following disease processes/diagnoses(primary or secondary)? CHF   Week 2 attempt successful? No   Unsuccessful attempts Attempt 2  [patient currently in the ED]          MARCIO WITT - Registered Nurse

## 2022-09-24 ENCOUNTER — READMISSION MANAGEMENT (OUTPATIENT)
Dept: CALL CENTER | Facility: HOSPITAL | Age: 52
End: 2022-09-24

## 2022-09-24 LAB
ANION GAP SERPL CALCULATED.3IONS-SCNC: 8 MMOL/L (ref 5–15)
BASOPHILS # BLD AUTO: 0.04 10*3/MM3 (ref 0–0.2)
BASOPHILS NFR BLD AUTO: 0.8 % (ref 0–1.5)
BUN SERPL-MCNC: 25 MG/DL (ref 6–20)
BUN/CREAT SERPL: 15.2 (ref 7–25)
CALCIUM SPEC-SCNC: 9 MG/DL (ref 8.6–10.5)
CHLORIDE SERPL-SCNC: 98 MMOL/L (ref 98–107)
CO2 SERPL-SCNC: 32 MMOL/L (ref 22–29)
CREAT SERPL-MCNC: 1.65 MG/DL (ref 0.76–1.27)
DEPRECATED RDW RBC AUTO: 50.9 FL (ref 37–54)
EGFRCR SERPLBLD CKD-EPI 2021: 50 ML/MIN/1.73
EOSINOPHIL # BLD AUTO: 0.04 10*3/MM3 (ref 0–0.4)
EOSINOPHIL NFR BLD AUTO: 0.8 % (ref 0.3–6.2)
ERYTHROCYTE [DISTWIDTH] IN BLOOD BY AUTOMATED COUNT: 15 % (ref 12.3–15.4)
GLUCOSE BLDC GLUCOMTR-MCNC: 110 MG/DL (ref 70–130)
GLUCOSE BLDC GLUCOMTR-MCNC: 127 MG/DL (ref 70–130)
GLUCOSE BLDC GLUCOMTR-MCNC: 164 MG/DL (ref 70–130)
GLUCOSE BLDC GLUCOMTR-MCNC: 169 MG/DL (ref 70–130)
GLUCOSE SERPL-MCNC: 128 MG/DL (ref 65–99)
HCT VFR BLD AUTO: 41.1 % (ref 37.5–51)
HGB BLD-MCNC: 13.4 G/DL (ref 13–17.7)
IMM GRANULOCYTES # BLD AUTO: 0.02 10*3/MM3 (ref 0–0.05)
IMM GRANULOCYTES NFR BLD AUTO: 0.4 % (ref 0–0.5)
LYMPHOCYTES # BLD AUTO: 1.45 10*3/MM3 (ref 0.7–3.1)
LYMPHOCYTES NFR BLD AUTO: 29.1 % (ref 19.6–45.3)
MCH RBC QN AUTO: 30.2 PG (ref 26.6–33)
MCHC RBC AUTO-ENTMCNC: 32.6 G/DL (ref 31.5–35.7)
MCV RBC AUTO: 92.6 FL (ref 79–97)
MONOCYTES # BLD AUTO: 0.96 10*3/MM3 (ref 0.1–0.9)
MONOCYTES NFR BLD AUTO: 19.2 % (ref 5–12)
NEUTROPHILS NFR BLD AUTO: 2.48 10*3/MM3 (ref 1.7–7)
NEUTROPHILS NFR BLD AUTO: 49.7 % (ref 42.7–76)
NRBC BLD AUTO-RTO: 0 /100 WBC (ref 0–0.2)
PLATELET # BLD AUTO: 126 10*3/MM3 (ref 140–450)
PMV BLD AUTO: 13 FL (ref 6–12)
POTASSIUM SERPL-SCNC: 3.9 MMOL/L (ref 3.5–5.2)
QT INTERVAL: 392 MS
QTC INTERVAL: 488 MS
RBC # BLD AUTO: 4.44 10*6/MM3 (ref 4.14–5.8)
SODIUM SERPL-SCNC: 138 MMOL/L (ref 136–145)
WBC NRBC COR # BLD: 4.99 10*3/MM3 (ref 3.4–10.8)

## 2022-09-24 PROCEDURE — 96376 TX/PRO/DX INJ SAME DRUG ADON: CPT

## 2022-09-24 PROCEDURE — 82962 GLUCOSE BLOOD TEST: CPT

## 2022-09-24 PROCEDURE — P9047 ALBUMIN (HUMAN), 25%, 50ML: HCPCS | Performed by: NURSE PRACTITIONER

## 2022-09-24 PROCEDURE — 25010000002 ALBUMIN HUMAN 25% PER 50 ML: Performed by: NURSE PRACTITIONER

## 2022-09-24 PROCEDURE — G0378 HOSPITAL OBSERVATION PER HR: HCPCS

## 2022-09-24 PROCEDURE — 85025 COMPLETE CBC W/AUTO DIFF WBC: CPT | Performed by: NURSE PRACTITIONER

## 2022-09-24 PROCEDURE — 96375 TX/PRO/DX INJ NEW DRUG ADDON: CPT

## 2022-09-24 PROCEDURE — 97161 PT EVAL LOW COMPLEX 20 MIN: CPT | Performed by: PHYSICAL THERAPIST

## 2022-09-24 PROCEDURE — 80048 BASIC METABOLIC PNL TOTAL CA: CPT | Performed by: NURSE PRACTITIONER

## 2022-09-24 PROCEDURE — 99244 OFF/OP CNSLTJ NEW/EST MOD 40: CPT | Performed by: INTERNAL MEDICINE

## 2022-09-24 RX ORDER — BUMETANIDE 0.25 MG/ML
1 INJECTION INTRAMUSCULAR; INTRAVENOUS EVERY 12 HOURS
Status: DISCONTINUED | OUTPATIENT
Start: 2022-09-24 | End: 2022-09-24

## 2022-09-24 RX ORDER — METOLAZONE 2.5 MG/1
2.5 TABLET ORAL ONCE
Status: COMPLETED | OUTPATIENT
Start: 2022-09-24 | End: 2022-09-24

## 2022-09-24 RX ORDER — BUMETANIDE 0.25 MG/ML
1 INJECTION INTRAMUSCULAR; INTRAVENOUS ONCE
Status: COMPLETED | OUTPATIENT
Start: 2022-09-24 | End: 2022-09-24

## 2022-09-24 RX ORDER — ALBUMIN (HUMAN) 12.5 G/50ML
12.5 SOLUTION INTRAVENOUS 2 TIMES DAILY
Status: COMPLETED | OUTPATIENT
Start: 2022-09-24 | End: 2022-09-26

## 2022-09-24 RX ORDER — BUMETANIDE 0.25 MG/ML
2 INJECTION INTRAMUSCULAR; INTRAVENOUS EVERY 12 HOURS
Status: DISCONTINUED | OUTPATIENT
Start: 2022-09-25 | End: 2022-09-24

## 2022-09-24 RX ORDER — BUMETANIDE 0.25 MG/ML
2 INJECTION INTRAMUSCULAR; INTRAVENOUS
Status: DISCONTINUED | OUTPATIENT
Start: 2022-09-24 | End: 2022-09-25

## 2022-09-24 RX ADMIN — BUMETANIDE 1 MG: 0.25 INJECTION INTRAMUSCULAR; INTRAVENOUS at 05:46

## 2022-09-24 RX ADMIN — ALBUMIN HUMAN 12.5 G: 0.25 SOLUTION INTRAVENOUS at 21:13

## 2022-09-24 RX ADMIN — BUMETANIDE 1 MG: 0.25 INJECTION INTRAMUSCULAR; INTRAVENOUS at 13:19

## 2022-09-24 RX ADMIN — BUMETANIDE 1 MG: 0.25 INJECTION INTRAMUSCULAR; INTRAVENOUS at 14:03

## 2022-09-24 RX ADMIN — APIXABAN 5 MG: 5 TABLET, FILM COATED ORAL at 21:12

## 2022-09-24 RX ADMIN — Medication 12.5 MG: at 08:21

## 2022-09-24 RX ADMIN — CARVEDILOL 12.5 MG: 12.5 TABLET, FILM COATED ORAL at 17:16

## 2022-09-24 RX ADMIN — APIXABAN 5 MG: 5 TABLET, FILM COATED ORAL at 08:21

## 2022-09-24 RX ADMIN — METOLAZONE 2.5 MG: 2.5 TABLET ORAL at 15:23

## 2022-09-24 RX ADMIN — ATORVASTATIN CALCIUM 10 MG: 10 TABLET, FILM COATED ORAL at 08:21

## 2022-09-24 RX ADMIN — POTASSIUM CHLORIDE 10 MEQ: 750 CAPSULE, EXTENDED RELEASE ORAL at 08:21

## 2022-09-24 RX ADMIN — CARVEDILOL 12.5 MG: 12.5 TABLET, FILM COATED ORAL at 08:21

## 2022-09-24 NOTE — OUTREACH NOTE
CHF Week 3 Survey    Flowsheet Row Responses   [facility] patient discharged from? [place]   Does the patient have one of the following disease processes/diagnoses(primary or secondary)? CHF   Week 3 attempt successful? No   Unsuccessful attempts Attempt 1   Revoke Readmitted          HANNA PEREZ - Registered Nurse

## 2022-09-25 ENCOUNTER — APPOINTMENT (OUTPATIENT)
Dept: ULTRASOUND IMAGING | Facility: HOSPITAL | Age: 52
End: 2022-09-25

## 2022-09-25 LAB
25(OH)D3 SERPL-MCNC: 9.2 NG/ML (ref 30–100)
ANION GAP SERPL CALCULATED.3IONS-SCNC: 7 MMOL/L (ref 5–15)
BASOPHILS # BLD AUTO: 0.03 10*3/MM3 (ref 0–0.2)
BASOPHILS NFR BLD AUTO: 0.7 % (ref 0–1.5)
BUN SERPL-MCNC: 30 MG/DL (ref 6–20)
BUN/CREAT SERPL: 18 (ref 7–25)
CALCIUM SPEC-SCNC: 8.8 MG/DL (ref 8.6–10.5)
CHLORIDE SERPL-SCNC: 97 MMOL/L (ref 98–107)
CO2 SERPL-SCNC: 33 MMOL/L (ref 22–29)
CREAT SERPL-MCNC: 1.67 MG/DL (ref 0.76–1.27)
CREAT UR-MCNC: 37.8 MG/DL
DEPRECATED RDW RBC AUTO: 50.4 FL (ref 37–54)
EGFRCR SERPLBLD CKD-EPI 2021: 49.2 ML/MIN/1.73
EOSINOPHIL # BLD AUTO: 0.13 10*3/MM3 (ref 0–0.4)
EOSINOPHIL NFR BLD AUTO: 2.9 % (ref 0.3–6.2)
ERYTHROCYTE [DISTWIDTH] IN BLOOD BY AUTOMATED COUNT: 14.7 % (ref 12.3–15.4)
GLUCOSE BLDC GLUCOMTR-MCNC: 146 MG/DL (ref 70–130)
GLUCOSE BLDC GLUCOMTR-MCNC: 146 MG/DL (ref 70–130)
GLUCOSE BLDC GLUCOMTR-MCNC: 156 MG/DL (ref 70–130)
GLUCOSE BLDC GLUCOMTR-MCNC: 209 MG/DL (ref 70–130)
GLUCOSE SERPL-MCNC: 169 MG/DL (ref 65–99)
HBA1C MFR BLD: 6.4 % (ref 4.8–5.6)
HCT VFR BLD AUTO: 40.9 % (ref 37.5–51)
HGB BLD-MCNC: 13.1 G/DL (ref 13–17.7)
IMM GRANULOCYTES # BLD AUTO: 0.01 10*3/MM3 (ref 0–0.05)
IMM GRANULOCYTES NFR BLD AUTO: 0.2 % (ref 0–0.5)
LYMPHOCYTES # BLD AUTO: 1.49 10*3/MM3 (ref 0.7–3.1)
LYMPHOCYTES NFR BLD AUTO: 33.6 % (ref 19.6–45.3)
MAGNESIUM SERPL-MCNC: 1.9 MG/DL (ref 1.6–2.6)
MCH RBC QN AUTO: 29.7 PG (ref 26.6–33)
MCHC RBC AUTO-ENTMCNC: 32 G/DL (ref 31.5–35.7)
MCV RBC AUTO: 92.7 FL (ref 79–97)
MONOCYTES # BLD AUTO: 0.73 10*3/MM3 (ref 0.1–0.9)
MONOCYTES NFR BLD AUTO: 16.5 % (ref 5–12)
NEUTROPHILS NFR BLD AUTO: 2.04 10*3/MM3 (ref 1.7–7)
NEUTROPHILS NFR BLD AUTO: 46.1 % (ref 42.7–76)
NRBC BLD AUTO-RTO: 0 /100 WBC (ref 0–0.2)
PHOSPHATE SERPL-MCNC: 5 MG/DL (ref 2.5–4.5)
PLATELET # BLD AUTO: 120 10*3/MM3 (ref 140–450)
PMV BLD AUTO: 13 FL (ref 6–12)
POTASSIUM SERPL-SCNC: 3.5 MMOL/L (ref 3.5–5.2)
PROT ?TM UR-MCNC: 6.1 MG/DL
PROT/CREAT UR: 161.4 MG/G CREA (ref 0–200)
PTH-INTACT SERPL-MCNC: 86 PG/ML (ref 15–65)
RBC # BLD AUTO: 4.41 10*6/MM3 (ref 4.14–5.8)
SODIUM SERPL-SCNC: 137 MMOL/L (ref 136–145)
SODIUM UR-SCNC: 71 MMOL/L
WBC NRBC COR # BLD: 4.43 10*3/MM3 (ref 3.4–10.8)

## 2022-09-25 PROCEDURE — 84156 ASSAY OF PROTEIN URINE: CPT | Performed by: NURSE PRACTITIONER

## 2022-09-25 PROCEDURE — 25010000002 ALBUMIN HUMAN 25% PER 50 ML: Performed by: NURSE PRACTITIONER

## 2022-09-25 PROCEDURE — 84100 ASSAY OF PHOSPHORUS: CPT | Performed by: NURSE PRACTITIONER

## 2022-09-25 PROCEDURE — 80048 BASIC METABOLIC PNL TOTAL CA: CPT | Performed by: NURSE PRACTITIONER

## 2022-09-25 PROCEDURE — 96366 THER/PROPH/DIAG IV INF ADDON: CPT

## 2022-09-25 PROCEDURE — 99214 OFFICE O/P EST MOD 30 MIN: CPT | Performed by: INTERNAL MEDICINE

## 2022-09-25 PROCEDURE — 96376 TX/PRO/DX INJ SAME DRUG ADON: CPT

## 2022-09-25 PROCEDURE — 83036 HEMOGLOBIN GLYCOSYLATED A1C: CPT | Performed by: NURSE PRACTITIONER

## 2022-09-25 PROCEDURE — 82570 ASSAY OF URINE CREATININE: CPT | Performed by: NURSE PRACTITIONER

## 2022-09-25 PROCEDURE — 84300 ASSAY OF URINE SODIUM: CPT | Performed by: NURSE PRACTITIONER

## 2022-09-25 PROCEDURE — 96365 THER/PROPH/DIAG IV INF INIT: CPT

## 2022-09-25 PROCEDURE — 83735 ASSAY OF MAGNESIUM: CPT | Performed by: NURSE PRACTITIONER

## 2022-09-25 PROCEDURE — 76775 US EXAM ABDO BACK WALL LIM: CPT

## 2022-09-25 PROCEDURE — G0378 HOSPITAL OBSERVATION PER HR: HCPCS

## 2022-09-25 PROCEDURE — 82962 GLUCOSE BLOOD TEST: CPT

## 2022-09-25 PROCEDURE — P9047 ALBUMIN (HUMAN), 25%, 50ML: HCPCS | Performed by: NURSE PRACTITIONER

## 2022-09-25 PROCEDURE — 85025 COMPLETE CBC W/AUTO DIFF WBC: CPT | Performed by: NURSE PRACTITIONER

## 2022-09-25 RX ORDER — BUMETANIDE 0.25 MG/ML
2 INJECTION INTRAMUSCULAR; INTRAVENOUS DAILY
Status: DISCONTINUED | OUTPATIENT
Start: 2022-09-26 | End: 2022-09-27 | Stop reason: HOSPADM

## 2022-09-25 RX ORDER — SODIUM CHLORIDE 9 MG/ML
INJECTION, SOLUTION INTRAVENOUS
Status: DISPENSED
Start: 2022-09-25 | End: 2022-09-25

## 2022-09-25 RX ORDER — METOLAZONE 2.5 MG/1
2.5 TABLET ORAL ONCE
Status: COMPLETED | OUTPATIENT
Start: 2022-09-25 | End: 2022-09-25

## 2022-09-25 RX ADMIN — ALBUMIN HUMAN 12.5 G: 0.25 SOLUTION INTRAVENOUS at 20:53

## 2022-09-25 RX ADMIN — METOLAZONE 2.5 MG: 2.5 TABLET ORAL at 12:23

## 2022-09-25 RX ADMIN — CARVEDILOL 12.5 MG: 12.5 TABLET, FILM COATED ORAL at 17:25

## 2022-09-25 RX ADMIN — APIXABAN 5 MG: 5 TABLET, FILM COATED ORAL at 20:53

## 2022-09-25 RX ADMIN — ATORVASTATIN CALCIUM 10 MG: 10 TABLET, FILM COATED ORAL at 08:29

## 2022-09-25 RX ADMIN — POTASSIUM CHLORIDE 10 MEQ: 750 CAPSULE, EXTENDED RELEASE ORAL at 08:29

## 2022-09-25 RX ADMIN — Medication 12.5 MG: at 08:29

## 2022-09-25 RX ADMIN — ALBUMIN HUMAN 12.5 G: 0.25 SOLUTION INTRAVENOUS at 08:36

## 2022-09-25 RX ADMIN — BUMETANIDE 2 MG: 0.25 INJECTION INTRAMUSCULAR; INTRAVENOUS at 09:45

## 2022-09-25 RX ADMIN — APIXABAN 5 MG: 5 TABLET, FILM COATED ORAL at 08:29

## 2022-09-25 RX ADMIN — CARVEDILOL 12.5 MG: 12.5 TABLET, FILM COATED ORAL at 08:29

## 2022-09-26 LAB
ANION GAP SERPL CALCULATED.3IONS-SCNC: 8 MMOL/L (ref 5–15)
ANISOCYTOSIS BLD QL: ABNORMAL
BASOPHILS # BLD MANUAL: 0.04 10*3/MM3 (ref 0–0.2)
BASOPHILS NFR BLD MANUAL: 1 % (ref 0–1.5)
BUN SERPL-MCNC: 30 MG/DL (ref 6–20)
BUN/CREAT SERPL: 18.5 (ref 7–25)
CALCIUM SPEC-SCNC: 8.9 MG/DL (ref 8.6–10.5)
CHLORIDE SERPL-SCNC: 96 MMOL/L (ref 98–107)
CO2 SERPL-SCNC: 34 MMOL/L (ref 22–29)
CREAT SERPL-MCNC: 1.62 MG/DL (ref 0.76–1.27)
DEPRECATED RDW RBC AUTO: 48.9 FL (ref 37–54)
EGFRCR SERPLBLD CKD-EPI 2021: 51.1 ML/MIN/1.73
EOSINOPHIL # BLD MANUAL: 0.11 10*3/MM3 (ref 0–0.4)
EOSINOPHIL NFR BLD MANUAL: 3 % (ref 0.3–6.2)
ERYTHROCYTE [DISTWIDTH] IN BLOOD BY AUTOMATED COUNT: 14.5 % (ref 12.3–15.4)
GLUCOSE BLDC GLUCOMTR-MCNC: 123 MG/DL (ref 70–130)
GLUCOSE BLDC GLUCOMTR-MCNC: 163 MG/DL (ref 70–130)
GLUCOSE BLDC GLUCOMTR-MCNC: 176 MG/DL (ref 70–130)
GLUCOSE SERPL-MCNC: 108 MG/DL (ref 65–99)
GLUCOSE SERPL-MCNC: 119 MG/DL (ref 65–99)
HCT VFR BLD AUTO: 37.4 % (ref 37.5–51)
HGB BLD-MCNC: 11.9 G/DL (ref 13–17.7)
LYMPHOCYTES # BLD MANUAL: 1.27 10*3/MM3 (ref 0.7–3.1)
LYMPHOCYTES NFR BLD MANUAL: 15 % (ref 5–12)
MAGNESIUM SERPL-MCNC: 1.8 MG/DL (ref 1.6–2.6)
MCH RBC QN AUTO: 29.3 PG (ref 26.6–33)
MCHC RBC AUTO-ENTMCNC: 31.8 G/DL (ref 31.5–35.7)
MCV RBC AUTO: 92.1 FL (ref 79–97)
MONOCYTES # BLD: 0.54 10*3/MM3 (ref 0.1–0.9)
NEUTROPHILS # BLD AUTO: 1.67 10*3/MM3 (ref 1.7–7)
NEUTROPHILS NFR BLD MANUAL: 46 % (ref 42.7–76)
PLATELET # BLD AUTO: 114 10*3/MM3 (ref 140–450)
PMV BLD AUTO: 13.7 FL (ref 6–12)
POTASSIUM SERPL-SCNC: 3 MMOL/L (ref 3.5–5.2)
RBC # BLD AUTO: 4.06 10*6/MM3 (ref 4.14–5.8)
SMALL PLATELETS BLD QL SMEAR: ABNORMAL
SODIUM SERPL-SCNC: 138 MMOL/L (ref 136–145)
VARIANT LYMPHS NFR BLD MANUAL: 35 % (ref 19.6–45.3)
WBC MORPH BLD: NORMAL
WBC NRBC COR # BLD: 3.63 10*3/MM3 (ref 3.4–10.8)

## 2022-09-26 PROCEDURE — 96366 THER/PROPH/DIAG IV INF ADDON: CPT

## 2022-09-26 PROCEDURE — G0378 HOSPITAL OBSERVATION PER HR: HCPCS

## 2022-09-26 PROCEDURE — 83735 ASSAY OF MAGNESIUM: CPT | Performed by: INTERNAL MEDICINE

## 2022-09-26 PROCEDURE — 25010000002 ALBUMIN HUMAN 25% PER 50 ML: Performed by: NURSE PRACTITIONER

## 2022-09-26 PROCEDURE — 85007 BL SMEAR W/DIFF WBC COUNT: CPT | Performed by: NURSE PRACTITIONER

## 2022-09-26 PROCEDURE — 82962 GLUCOSE BLOOD TEST: CPT

## 2022-09-26 PROCEDURE — 96376 TX/PRO/DX INJ SAME DRUG ADON: CPT

## 2022-09-26 PROCEDURE — 80048 BASIC METABOLIC PNL TOTAL CA: CPT | Performed by: NURSE PRACTITIONER

## 2022-09-26 PROCEDURE — 82947 ASSAY GLUCOSE BLOOD QUANT: CPT | Performed by: INTERNAL MEDICINE

## 2022-09-26 PROCEDURE — P9047 ALBUMIN (HUMAN), 25%, 50ML: HCPCS | Performed by: NURSE PRACTITIONER

## 2022-09-26 PROCEDURE — 63710000001 INSULIN ASPART PER 5 UNITS: Performed by: NURSE PRACTITIONER

## 2022-09-26 PROCEDURE — 85025 COMPLETE CBC W/AUTO DIFF WBC: CPT | Performed by: NURSE PRACTITIONER

## 2022-09-26 PROCEDURE — 99214 OFFICE O/P EST MOD 30 MIN: CPT | Performed by: INTERNAL MEDICINE

## 2022-09-26 RX ORDER — POTASSIUM CHLORIDE 750 MG/1
40 CAPSULE, EXTENDED RELEASE ORAL ONCE
Status: COMPLETED | OUTPATIENT
Start: 2022-09-26 | End: 2022-09-26

## 2022-09-26 RX ORDER — METOLAZONE 2.5 MG/1
2.5 TABLET ORAL ONCE
Status: COMPLETED | OUTPATIENT
Start: 2022-09-26 | End: 2022-09-26

## 2022-09-26 RX ADMIN — POTASSIUM CHLORIDE 10 MEQ: 750 CAPSULE, EXTENDED RELEASE ORAL at 09:25

## 2022-09-26 RX ADMIN — APIXABAN 5 MG: 5 TABLET, FILM COATED ORAL at 19:36

## 2022-09-26 RX ADMIN — APIXABAN 5 MG: 5 TABLET, FILM COATED ORAL at 09:25

## 2022-09-26 RX ADMIN — METOLAZONE 2.5 MG: 2.5 TABLET ORAL at 11:25

## 2022-09-26 RX ADMIN — POTASSIUM CHLORIDE 40 MEQ: 750 CAPSULE, EXTENDED RELEASE ORAL at 17:35

## 2022-09-26 RX ADMIN — Medication 10 ML: at 09:25

## 2022-09-26 RX ADMIN — ATORVASTATIN CALCIUM 10 MG: 10 TABLET, FILM COATED ORAL at 09:25

## 2022-09-26 RX ADMIN — CARVEDILOL 12.5 MG: 12.5 TABLET, FILM COATED ORAL at 17:35

## 2022-09-26 RX ADMIN — POTASSIUM CHLORIDE 40 MEQ: 750 CAPSULE, EXTENDED RELEASE ORAL at 11:25

## 2022-09-26 RX ADMIN — INSULIN ASPART 2 UNITS: 100 INJECTION, SOLUTION INTRAVENOUS; SUBCUTANEOUS at 11:26

## 2022-09-26 RX ADMIN — Medication 12.5 MG: at 09:25

## 2022-09-26 RX ADMIN — BUMETANIDE 2 MG: 0.25 INJECTION INTRAMUSCULAR; INTRAVENOUS at 09:26

## 2022-09-26 RX ADMIN — CARVEDILOL 12.5 MG: 12.5 TABLET, FILM COATED ORAL at 09:25

## 2022-09-26 RX ADMIN — ALBUMIN HUMAN 12.5 G: 0.25 SOLUTION INTRAVENOUS at 10:05

## 2022-09-27 ENCOUNTER — READMISSION MANAGEMENT (OUTPATIENT)
Dept: CALL CENTER | Facility: HOSPITAL | Age: 52
End: 2022-09-27

## 2022-09-27 VITALS
SYSTOLIC BLOOD PRESSURE: 137 MMHG | OXYGEN SATURATION: 98 % | WEIGHT: 300 LBS | HEIGHT: 76 IN | RESPIRATION RATE: 18 BRPM | HEART RATE: 66 BPM | BODY MASS INDEX: 36.53 KG/M2 | DIASTOLIC BLOOD PRESSURE: 54 MMHG | TEMPERATURE: 97.1 F

## 2022-09-27 LAB
ANION GAP SERPL CALCULATED.3IONS-SCNC: 8 MMOL/L (ref 5–15)
BASOPHILS # BLD AUTO: 0.02 10*3/MM3 (ref 0–0.2)
BASOPHILS NFR BLD AUTO: 0.5 % (ref 0–1.5)
BUN SERPL-MCNC: 28 MG/DL (ref 6–20)
BUN/CREAT SERPL: 20 (ref 7–25)
CALCIUM SPEC-SCNC: 9.4 MG/DL (ref 8.6–10.5)
CHLORIDE SERPL-SCNC: 96 MMOL/L (ref 98–107)
CO2 SERPL-SCNC: 35 MMOL/L (ref 22–29)
CREAT SERPL-MCNC: 1.4 MG/DL (ref 0.76–1.27)
DEPRECATED RDW RBC AUTO: 47.9 FL (ref 37–54)
EGFRCR SERPLBLD CKD-EPI 2021: 60.9 ML/MIN/1.73
EOSINOPHIL # BLD AUTO: 0.17 10*3/MM3 (ref 0–0.4)
EOSINOPHIL NFR BLD AUTO: 4.7 % (ref 0.3–6.2)
ERYTHROCYTE [DISTWIDTH] IN BLOOD BY AUTOMATED COUNT: 14.3 % (ref 12.3–15.4)
GLUCOSE BLDC GLUCOMTR-MCNC: 114 MG/DL (ref 70–130)
GLUCOSE BLDC GLUCOMTR-MCNC: 131 MG/DL (ref 70–130)
GLUCOSE BLDC GLUCOMTR-MCNC: 145 MG/DL (ref 70–130)
GLUCOSE SERPL-MCNC: 120 MG/DL (ref 65–99)
HCT VFR BLD AUTO: 39.6 % (ref 37.5–51)
HGB BLD-MCNC: 12.7 G/DL (ref 13–17.7)
IMM GRANULOCYTES # BLD AUTO: 0.01 10*3/MM3 (ref 0–0.05)
IMM GRANULOCYTES NFR BLD AUTO: 0.3 % (ref 0–0.5)
LYMPHOCYTES # BLD AUTO: 1.14 10*3/MM3 (ref 0.7–3.1)
LYMPHOCYTES NFR BLD AUTO: 31.2 % (ref 19.6–45.3)
MAGNESIUM SERPL-MCNC: 1.8 MG/DL (ref 1.6–2.6)
MCH RBC QN AUTO: 29.3 PG (ref 26.6–33)
MCHC RBC AUTO-ENTMCNC: 32.1 G/DL (ref 31.5–35.7)
MCV RBC AUTO: 91.2 FL (ref 79–97)
MONOCYTES # BLD AUTO: 0.6 10*3/MM3 (ref 0.1–0.9)
MONOCYTES NFR BLD AUTO: 16.4 % (ref 5–12)
NEUTROPHILS NFR BLD AUTO: 1.71 10*3/MM3 (ref 1.7–7)
NEUTROPHILS NFR BLD AUTO: 46.9 % (ref 42.7–76)
NRBC BLD AUTO-RTO: 0 /100 WBC (ref 0–0.2)
PLATELET # BLD AUTO: 120 10*3/MM3 (ref 140–450)
PMV BLD AUTO: 13.6 FL (ref 6–12)
POTASSIUM SERPL-SCNC: 3.2 MMOL/L (ref 3.5–5.2)
RBC # BLD AUTO: 4.34 10*6/MM3 (ref 4.14–5.8)
SODIUM SERPL-SCNC: 139 MMOL/L (ref 136–145)
WBC NRBC COR # BLD: 3.65 10*3/MM3 (ref 3.4–10.8)

## 2022-09-27 PROCEDURE — 96376 TX/PRO/DX INJ SAME DRUG ADON: CPT

## 2022-09-27 PROCEDURE — 80048 BASIC METABOLIC PNL TOTAL CA: CPT | Performed by: NURSE PRACTITIONER

## 2022-09-27 PROCEDURE — G0378 HOSPITAL OBSERVATION PER HR: HCPCS

## 2022-09-27 PROCEDURE — 83735 ASSAY OF MAGNESIUM: CPT | Performed by: INTERNAL MEDICINE

## 2022-09-27 PROCEDURE — 85025 COMPLETE CBC W/AUTO DIFF WBC: CPT | Performed by: NURSE PRACTITIONER

## 2022-09-27 PROCEDURE — 82962 GLUCOSE BLOOD TEST: CPT

## 2022-09-27 RX ORDER — BUMETANIDE 2 MG/1
1 TABLET ORAL 2 TIMES DAILY
Qty: 30 TABLET | Refills: 0 | Status: SHIPPED | OUTPATIENT
Start: 2022-09-27 | End: 2022-10-04 | Stop reason: SDUPTHER

## 2022-09-27 RX ORDER — POTASSIUM CHLORIDE 750 MG/1
40 CAPSULE, EXTENDED RELEASE ORAL ONCE
Status: COMPLETED | OUTPATIENT
Start: 2022-09-27 | End: 2022-09-27

## 2022-09-27 RX ORDER — METOLAZONE 2.5 MG/1
2.5 TABLET ORAL EVERY OTHER DAY
Qty: 15 TABLET | Refills: 3 | Status: SHIPPED | OUTPATIENT
Start: 2022-09-27 | End: 2022-10-04 | Stop reason: SDUPTHER

## 2022-09-27 RX ORDER — POTASSIUM CHLORIDE 750 MG/1
20 TABLET, FILM COATED, EXTENDED RELEASE ORAL DAILY
Qty: 30 TABLET | Refills: 1 | Status: SHIPPED | OUTPATIENT
Start: 2022-09-27 | End: 2023-01-14

## 2022-09-27 RX ADMIN — POTASSIUM CHLORIDE 40 MEQ: 750 CAPSULE, EXTENDED RELEASE ORAL at 11:23

## 2022-09-27 RX ADMIN — ATORVASTATIN CALCIUM 10 MG: 10 TABLET, FILM COATED ORAL at 08:21

## 2022-09-27 RX ADMIN — Medication 12.5 MG: at 08:21

## 2022-09-27 RX ADMIN — CARVEDILOL 12.5 MG: 12.5 TABLET, FILM COATED ORAL at 08:21

## 2022-09-27 RX ADMIN — POTASSIUM CHLORIDE 10 MEQ: 750 CAPSULE, EXTENDED RELEASE ORAL at 08:21

## 2022-09-27 RX ADMIN — APIXABAN 5 MG: 5 TABLET, FILM COATED ORAL at 08:21

## 2022-09-27 RX ADMIN — BUMETANIDE 2 MG: 0.25 INJECTION INTRAMUSCULAR; INTRAVENOUS at 08:22

## 2022-09-28 ENCOUNTER — DOCUMENTATION (OUTPATIENT)
Dept: CARDIAC REHAB | Facility: HOSPITAL | Age: 52
End: 2022-09-28

## 2022-09-28 NOTE — PROGRESS NOTES
Cardiac Rehab Evaluation; He could start CR in November if he meets 6 weeks stable CHF. Consult CR when ready.

## 2022-09-28 NOTE — OUTREACH NOTE
Prep Survey    Flowsheet Row Responses   [facility] facility patient discharged from? [place]   Is LACE score < 7 ? No   Emergency Room discharge w/ pulse ox? No   Eligibility Readm Mgmt   Discharge diagnosis Shortness of breath   Does the patient have one of the following disease processes/diagnoses(primary or secondary)? CHF   Does the patient have Home health ordered? No   Is there a DME ordered? No   Medication alerts for this patient Karyroxbrandenn    Prep survey completed? Yes          ROBERTH OLIVEIRA - Registered Nurse

## 2022-09-30 ENCOUNTER — READMISSION MANAGEMENT (OUTPATIENT)
Dept: CALL CENTER | Facility: HOSPITAL | Age: 52
End: 2022-09-30

## 2022-09-30 NOTE — OUTREACH NOTE
CHF Week 1 Survey    Flowsheet Row Responses   [facility] patient discharged from? [place]   Does the patient have one of the following disease processes/diagnoses(primary or secondary)? CHF   CHF Week 1 attempt successful? Yes   Call start time 1224   Call end time 1228   Discharge diagnosis Shortness of breath   Is patient permission given to speak with other caregiver? Yes   List who call center can speak with mother   Person spoke with today (if not patient) and relationship patient   Medication alerts for this patient [place]    Meds reviewed with patient/caregiver? Yes   Is the patient having any side effects they believe may be caused by any medication additions or changes? No   Does the patient have all medications ordered at discharge? Yes   Is the patient taking all medications as directed (includes completed medication regime)? Yes   Does the patient have a primary care provider?  Yes   Does the patient have an appointment with their PCP within 7 days of discharge? Yes   Has the patient kept scheduled appointments due by today? N/A   Has home health visited the patient within 72 hours of discharge? N/A   Pulse Ox monitoring None   Psychosocial issues? No   Did the patient receive a copy of their discharge instructions? Yes   Nursing interventions Reviewed instructions with patient   What is the patient's perception of their health status since discharge? Improving   Nursing interventions Nurse provided patient education   Is the patient able to teach back signs and symptoms of worsening condition? (i.e. weight gain, shortness of air, etc.) Yes   If the patient is a current smoker, are they able to teach back resources for cessation? Not a smoker   Is the patient/caregiver able to teach back the hierarchy of who to call/visit for symptoms/problems? PCP, Specialist, Home health nurse, Urgent Care, ED, 911 Yes   Additional teach back comments Says he is losing 3-4 # per day. Encouraged fresh meat  and veggies.    Notified Case Management Education issues   Is the patient able to teach back Heart Failure Zones? Yes   CHF Nursing Interventions Education provided on various zones   CHF Zone this Call Green Zone   Green Zone Patient reports doing well, No new or worsening shortness of breath, Physical activity level is normal for you, Weight check stable, No chest pain, No new swelling -  feet, ankles and legs look normal for you   Green Zone Interventions Daily weight check, Low sodium diet, Follow up visits planned    CHF Week 1 call completed? Yes   Wrap up additional comments Says he will see PCP and cards same day, says he is down to 283#.   Call end time 1222          JOSSE GEIGER - Registered Nurse

## 2022-10-04 ENCOUNTER — OFFICE VISIT (OUTPATIENT)
Dept: CARDIOLOGY | Facility: CLINIC | Age: 52
End: 2022-10-04

## 2022-10-04 VITALS
WEIGHT: 280 LBS | DIASTOLIC BLOOD PRESSURE: 68 MMHG | HEIGHT: 76 IN | OXYGEN SATURATION: 99 % | HEART RATE: 88 BPM | BODY MASS INDEX: 34.1 KG/M2 | SYSTOLIC BLOOD PRESSURE: 104 MMHG

## 2022-10-04 DIAGNOSIS — I42.8 NICM (NONISCHEMIC CARDIOMYOPATHY): ICD-10-CM

## 2022-10-04 DIAGNOSIS — I50.22 CHRONIC SYSTOLIC HEART FAILURE: Primary | ICD-10-CM

## 2022-10-04 DIAGNOSIS — I27.20 PULMONARY HYPERTENSION: ICD-10-CM

## 2022-10-04 DIAGNOSIS — G47.33 OBSTRUCTIVE SLEEP APNEA: ICD-10-CM

## 2022-10-04 PROCEDURE — 99214 OFFICE O/P EST MOD 30 MIN: CPT | Performed by: NURSE PRACTITIONER

## 2022-10-04 RX ORDER — BUMETANIDE 2 MG/1
2 TABLET ORAL 2 TIMES DAILY
Qty: 60 TABLET | Refills: 3 | Status: SHIPPED | OUTPATIENT
Start: 2022-10-04 | End: 2022-12-08 | Stop reason: HOSPADM

## 2022-10-04 RX ORDER — CARVEDILOL 12.5 MG/1
12.5 TABLET ORAL 2 TIMES DAILY WITH MEALS
Qty: 60 TABLET | Refills: 3 | Status: SHIPPED | OUTPATIENT
Start: 2022-10-04 | End: 2023-01-14

## 2022-10-04 RX ORDER — LOSARTAN POTASSIUM 25 MG/1
12.5 TABLET ORAL
Qty: 15 TABLET | Refills: 3 | Status: SHIPPED | OUTPATIENT
Start: 2022-10-04 | End: 2023-01-14

## 2022-10-04 RX ORDER — METOLAZONE 2.5 MG/1
2.5 TABLET ORAL 3 TIMES WEEKLY
Qty: 15 TABLET | Refills: 3 | Status: SHIPPED | OUTPATIENT
Start: 2022-10-05 | End: 2023-01-14

## 2022-10-04 NOTE — PROGRESS NOTES
Chronic systolic heart failure       History of Present Illness  Hospital admissions:   9/2/2022 [facility] Systolic HF  9/5/2022 [facility] Systolic HF, hypotension  9/23-9/27/22 [facility] CHF      Mr. Matti Bravo is a 52-year-old male with medical history of nonischemic cardiomyopathy.  LVEF last noted to be 15 to 20%.  Also has history of diabetes, hypertension, obesity, hyperlipidemia, CKD, and venous insufficiency. Hx of AICD placed at [facility].  He was also recently evaluated at [facility] ER on 9/2/2022 for systolic heart failure, worsening shortness of breath, and worsening lower leg edema. He signed out AMA at that time. He presented to our facility and was admitted in Sept 2022 for going symptoms of shortness of breath, chest pain, weakness found to be hypotensive.  Chest x-ray showed pulmonary vascular congestion and left lung mild to moderate atelectasis.  Patient was diuresed and guideline directed medical therapy was adjusted.     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  Denied alcohol use.Hx of NICM with [facility] 12/8/2021 showed minimal plaquing, nonobstructive CAD. Echocardiogram at [facility] this on 8/4/2022 shows LVEF 15 to 20%, Moderate MVR, mild TVR, and PI. Patient reports doing well since discharge from hospital. He has been compliant with medications, sodium and fluid restriction, and daily weights. Weight is 275 lbs on home scale. On our scale he has lost 36 lbs. He denies orthopnea, PND, chest pain, palpitations. SOA with exertion improved one class to now at class II.      Past Medical History:   Diagnosis Date   • Asthma    • Chronic systolic heart failure (HCC)    • Diabetes mellitus (HCC)    • Hyperlipidemia    • Hypertension    • Obesity    • Peripheral vascular disease (HCC)    • Sleep apnea      Past Surgical History:   Procedure Laterality Date   • CARDIAC CATHETERIZATION N/A 12/08/2021   • CARDIAC DEFIBRILLATOR PLACEMENT     • VARICOSE VEIN SURGERY Right  04/05/2019     Social History     Socioeconomic History   • Marital status:    Tobacco Use   • Smoking status: Former Smoker   • Smokeless tobacco: Never Used   Vaping Use   • Vaping Use: Never used   Substance and Sexual Activity   • Alcohol use: No   • Drug use: No   • Sexual activity: Not Currently     Family History   Problem Relation Age of Onset   • Diabetes Mother    • Hypertension Father        ALLERGIES:  No Known Allergies      Review of Systems   Constitutional: Negative for chills, diaphoresis, malaise/fatigue and night sweats.   HENT: Negative for congestion and hoarse voice.    Eyes: Negative for blurred vision and discharge.   Cardiovascular: Positive for dyspnea on exertion and leg swelling. Negative for chest pain, irregular heartbeat, orthopnea, paroxysmal nocturnal dyspnea and syncope.   Respiratory: Positive for shortness of breath. Negative for cough and wheezing.    Endocrine: Negative for polyphagia and polyuria.   Hematologic/Lymphatic: Negative for adenopathy and bleeding problem.   Musculoskeletal: Negative for joint swelling, muscle weakness and myalgias.   Gastrointestinal: Negative for bloating, heartburn and nausea.   Genitourinary: Negative for dysuria, hematuria and hesitancy.   Neurological: Negative for dizziness, headaches, light-headedness, loss of balance and weakness.       Current Outpatient Medications   Medication Sig Dispense Refill   • albuterol sulfate  (90 Base) MCG/ACT inhaler Inhale 2 puffs Every 4 (Four) Hours As Needed for Wheezing. 1 inhaler 3   • apixaban (ELIQUIS) 5 MG tablet tablet Take 1 tablet by mouth 2 (Two) Times a Day. 60 tablet 3   • bumetanide (BUMEX) 2 MG tablet Take 1 tablet by mouth 2 (Two) Times a Day. 60 tablet 3   • carvedilol (COREG) 12.5 MG tablet Take 1 tablet by mouth 2 (Two) Times a Day With Meals for 30 days. 60 tablet 3   • losartan (COZAAR) 25 MG tablet Take 0.5 tablets by mouth Daily for 30 days. 15 tablet 3   • lovastatin  (ALTOPREV) 20 MG 24 hr tablet Take 20 mg by mouth.     • [START ON 10/5/2022] metOLazone (ZAROXOLYN) 2.5 MG tablet Take 1 tablet by mouth 3 (Three) Times a Week. 15 tablet 3   • potassium chloride 10 MEQ CR tablet Take 2 tablets by mouth Daily. 30 tablet 1     No current facility-administered medications for this visit.       OBJECTIVE:    Physical Exam:   Vitals reviewed.   Constitutional:       Appearance: Well-groomed and not in distress. Obese.   Eyes:      General: Lids are normal.      Conjunctiva/sclera: Conjunctivae normal.      Right eye: Right conjunctiva is not injected.      Left eye: Left conjunctiva is not injected.      Pupils: Pupils are equal, round, and reactive to light.   HENT:      Head: Normocephalic and atraumatic.   Neck:      Thyroid: No thyromegaly.      Vascular: No JVD. JVD normal.      Trachea: Trachea normal.   Pulmonary:      Effort: Pulmonary effort is normal.      Breath sounds: Normal breath sounds and air entry. No decreased air movement. No wheezing. No rhonchi.   Cardiovascular:      PMI at left midclavicular line. Normal rate. Regular rhythm. Normal S1 with normal intensity. Normal S2 with normal intensity.      Murmurs: There is no murmur.      No gallop.   Edema:     Pretibial: edema of the left pretibial area.     Ankle: bilateral trace edema of the ankle.     Feet: bilateral trace edema of the feet.  Abdominal:      General: Abdomen is protuberant. There is no distension.      Palpations: Abdomen is soft. There is no shifting dullness or fluid wave.   Musculoskeletal:      Cervical back: Normal range of motion. Skin:     General: Skin is warm and dry.      Capillary Refill: Capillary refill takes less than 2 seconds.   Neurological:      Mental Status: Alert, oriented to person, place, and time and oriented to person, place and time.   Psychiatric:         Attention and Perception: Attention normal.         Mood and Affect: Mood normal.         Speech: Speech normal.          "Thought Content: Thought content normal.       Vitals:    10/04/22 1451   BP: 104/68   BP Location: Left arm   Patient Position: Sitting   Cuff Size: Adult   Pulse: 88   SpO2: 99%   Weight: 127 kg (280 lb)   Height: 193 cm (76\")     DATA REVIEWED:   Results for orders placed during the hospital encounter of 02/04/19    Adult Transthoracic Echo Limited W/ Cont if Necessary Per Protocol    Interpretation Summary  · 3D acquisition for left ventricular ejection fraction. Live 3D and full volume to assess left ventricular ejection fraction was utilized.  · Left ventricle systolic function is severely decreased. Estimated LVEF is 10%. Left ventricle cavity severely dilated with restrictive diastolic dysfunction. Findings are consistent with dilated cardiomyopathy.  · Right ventricle is mildly dilated with mildly reduced right ventricular systolic function.  · Moderate mitral valve regurgitation is present.  · Moderate tricuspid valve regurgitation is present. Pulmonary hypertension is present with a PA systolic pressure of 70 mmHg.  · There is mild pulmonic valve regurgitation present.  · There is a trivial pericardial effusion adjacent to the left ventricle      XR Chest 1 View    Result Date: 9/23/2022  Cardiomegaly. Pulmonary vascular prominence. No change since prior exam. Electronically signed by:  Lev Markham MD  9/23/2022 10:33 AM CDT Workstation: [id number]    XR Chest 1 View    Result Date: 9/5/2022  Mild-to-moderate left lung base atelectasis versus infiltrate new since prior. Mild to moderate pulmonary vascular congestion. Electronically signed by:  Pedro Day MD  9/5/2022 5:16 PM CDT Workstation: [id number]    US Renal Bilateral    Result Date: 9/25/2022  No hydronephrosis on either side. Bilateral chronic medical renal disease. Suboptimal assessment of the urinary bladder. Electronically signed by:  Pedro Day MD  9/25/2022 11:26 AM CDT Workstation: [id number]    CXR:     CT:     Labs: BMP, CBC, LIPID, " TSH  Lab Results   Component Value Date    GLUCOSE 120 (H) 2022    CALCIUM 9.4 2022     2022    K 3.2 (L) 2022    CO2 35.0 (H) 2022    CL 96 (L) 2022    BUN 28 (H) 2022    CREATININE 1.40 (H) 2022    EGFRIFAFRI 91 2021    BCR 20.0 2022    ANIONGAP 8.0 2022     Lab Results   Component Value Date    WBC 3.65 2022    HGB 12.7 (L) 2022    HCT 39.6 2022    MCV 91.2 2022     (L) 2022     Lab Results   Component Value Date    CHOL 147 2021    CHOL 127 2019     Lab Results   Component Value Date    TRIG 88 2022    TRIG 85 2021    TRIG 98 2019     Lab Results   Component Value Date    HDL 30 2022    HDL 31 (L) 2021    HDL 33 (L) 2019     No components found for: LDLCALC  Lab Results   Component Value Date    LDL 76 2022     2021    LDL 81 2019     No results found for: HDLLDLRATIO  No components found for: CHOLHDL  Lab Results   Component Value Date    TSH 1.350 2022     Lab Results   Component Value Date    PROBNP 2,021.0 (H) 2022     EK2022    TTE: 2021  Interpretation Summary    · No ECG evidence of myocardial ischemia on lexiscan stress.  · Left ventricular ejection fraction is severely reduced. (Calculated EF = 19%). Enlarged LV cavity size. Abnormal LV wall motion consistent with severe hypokinesis.  · A large sized myocardial perfusion defect of moderate severity is noted in basal-mid inferior, anterior and lateral walls is noted at rest which improves significantly on stress images with attenuation correction. In the setting of severely reduced LV function, underlying ischemia cannot be ruled out.  · Impressions are consistent with an intermediate risk study.       [facility]: 2021 Dr. Aranda  Conclusion :  Angiographically normal left main, LAD and LCx.  Minimal luminal irregularities noted in proximal and mid  segments of RCA.  Nonischemic cardiomyopathy.     Plan:   Optimize goal-directed medical therapy for cardiomyopathy.  Further work-up to evaluate for nonischemic causes.  Routine post cath care instructions (no driving for 48 hours, not lifting more than 10 pounds from right arm for 5 days) were discussed with the patient and family.  TR band release per protocol.  Discharge later today if criteria met.  Outpatient follow-up with cardiology.  Dr. Cabrera to discuss ICD placement with the patient in the near future.      This document has been electronically signed by Dylon Aranda MD on December 8, 2021 11:17 CST       The following portions of the patient's history were reviewed and updated as appropriate: allergies, current medications, past family history, past medical history, past social history, past surgical history and problem list.  Old records reviewed and pertinent information is included in the above objective data.     ASSESSMENT/PLAN:     Diagnosis Plan   1. Chronic systolic heart failure (HCC)  metOLazone (ZAROXOLYN) 2.5 MG tablet    bumetanide (BUMEX) 2 MG tablet    losartan (COZAAR) 25 MG tablet    carvedilol (COREG) 12.5 MG tablet    apixaban (ELIQUIS) 5 MG tablet tablet   2. NICM (nonischemic cardiomyopathy) (HCC)  metOLazone (ZAROXOLYN) 2.5 MG tablet    bumetanide (BUMEX) 2 MG tablet    losartan (COZAAR) 25 MG tablet    carvedilol (COREG) 12.5 MG tablet    apixaban (ELIQUIS) 5 MG tablet tablet   3. Obstructive sleep apnea  Ambulatory Referral to Sleep Medicine   4. Pulmonary hypertension (HCC)  Ambulatory Referral to Sleep Medicine       #1. Chronic systolic CHF/NICM: EF 15-20%. NYHA Class II, Stage C.  Tpday in office patient appears euvolemic with adequate perfusion and hemodynamics. Continue Eliquis 5mg BID, given severe LV dysfunction he is at risk for LV thrombus, OAC is still indicated.      BETA-BLOCKER:  carvedilol 12.5 mg twice daily  ACE/ARB: Losartan 12.5 mg  daily  ENTRESTO: Indicated, can consider-however he reports that it drops his blood pressure he feels bad on Entresto  DIURETIC:   Bumex 2 mg twice daily, advised to start decreasing metolazone down to PRN if able.  SGL2I: should be considered  ALDOSTERONE ANTAGONIST: Patient reports could not tolerate spironolactone. Developed a cough and side effects. Given marginal b.p can reassess and consider a different MRA  IMDUR/HYDRALAZINE: N/A  DIGOXIN: N/A  FR: 1,500  NA Restrction: 2grams     Reiterated all educational points this visit.  Continue daily weight monitoring.  Continue restricted dietary sodium intake.  Discussed patient action plan for heart failure. Contact information for this office verbalized.  Recommended avoiding NSAIDs use.  Discussed warning signs requiring additional medical attention for heart failure.   Education points repeated back by patient.     Cardiac Rehab: Can consider, does meet criteria  ICD: yes [facility]  8/2022    #3.KHANH, #4, PAH: Referral placed to sleep medicine. Continue GDMT as discussed above.       Follow up: 1 month with Susana.     I spent 32 minutes caring for Matti on this date of service. This time includes time spent by me in the following activities: preparing for the visit, reviewing tests, obtaining and/or reviewing a separately obtained history, performing a medically appropriate examination and/or evaluation, counseling and educating the patient/family/caregiver, documenting information in the medical record and independently interpreting results and communicating that information with the patient/family/caregiver            This document has been electronically signed by SHELL Ruvalcaba on October 4, 2022 15:58 CDT

## 2022-10-04 NOTE — PATIENT INSTRUCTIONS
Try to start lowering Metolazone. 3 x week, then 2 x week, Then as needed for weight gain or swelling. 3 lbs weight over night or 5 lbs in one week

## 2022-10-11 ENCOUNTER — OFFICE VISIT (OUTPATIENT)
Dept: SLEEP MEDICINE | Facility: HOSPITAL | Age: 52
End: 2022-10-11

## 2022-10-11 VITALS
BODY MASS INDEX: 34.58 KG/M2 | SYSTOLIC BLOOD PRESSURE: 108 MMHG | HEART RATE: 86 BPM | HEIGHT: 76 IN | WEIGHT: 284 LBS | OXYGEN SATURATION: 93 % | DIASTOLIC BLOOD PRESSURE: 66 MMHG

## 2022-10-11 DIAGNOSIS — G47.33 OBSTRUCTIVE SLEEP APNEA: Primary | ICD-10-CM

## 2022-10-11 PROCEDURE — 99214 OFFICE O/P EST MOD 30 MIN: CPT | Performed by: NURSE PRACTITIONER

## 2022-10-11 NOTE — PROGRESS NOTES
New Patient Sleep Medicine Consultation    Encounter Date: 10/11/2022         Patient's Primary Care Provider: Shonna Ng APRN  Referring Provider: Vania Andujar APRN  Reason for consultation/chief complaint: snoring, awakening gasping for breath, excessive daytime sleepiness and unrefreshing sleep    Matti Bravo is a 52 y.o. male who admits to snoring, unrestful sleep, high blood pressure, gasping during sleep, decreased libido, stopping breathing during sleep, up to bathroom at night and restless legs at night. He does not endorse these symptoms when using CPAP.     He denies cataplexy, sleep paralysis, or hypnagogic hallucinations. His bedtime is ~ 0000. He  falls asleep after 5-10 minutes, and is up ~6 times per night. He wakes up ~ 0600. He endorses 3-4 hours of sleep. He drinks 0 cups of coffee, 0 teas, and 3 sodas per day. He drinks 0 alcoholic beverages per week. He is a current smoker. He does not take sedatives or hypnotics. He has no sleepiness with driving. He naps every day for ~30 minutes at a time.     Patient has been on PAP therapy since 2014 with the exception of a period of time when he was incarcerated. He began using his machine shortly after he was released, and his seals on his mask broke recently. He has been without new supplies for ~2 months.     He has a history of HFrEF, cardiomyopathy, CAD, AICD placement, and pulmonary arterial hypertension. Most recent echocardiogram 08/18/2022, EF 15-20%.    [place] - 16  [place] Sleepiness Scale  Sitting and reading: Moderate chance of dozing  Watching TV: Moderate chance of dozing  Sitting, inactive in a public place (e.g. a theatre or a meeting): Moderate chance of dozing  As a passenger in a car for an hour without a break: Moderate chance of dozing  Lying down to rest in the afternoon when circumstances permit: Moderate chance of dozing  Sitting and talking to someone: Moderate chance of dozing  Sitting quietly after a  lunch without alcohol: Moderate chance of dozing  In a car, while stopped for a few minutes in traffic: Moderate chance of dozing  Total score: 16    Prior Sleep Testin. PSG on 2014, AHI of 49.8   2. CPAP titration on same day, recommended 10 cm H2O   3. Previously on 10 cm H2O - has not been using for a couple of months    PAP Data:    Mask type: Full face mask  DME: [employer]  Machine type: [id number]      Past Medical History:   Diagnosis Date   • Asthma    • Chronic systolic heart failure (HCC)    • Diabetes mellitus (HCC)    • Hyperlipidemia    • Hypertension    • Obesity    • Peripheral vascular disease (HCC)    • Sleep apnea      Social History     Socioeconomic History   • Marital status:    Tobacco Use   • Smoking status: Former   • Smokeless tobacco: Never   Vaping Use   • Vaping Use: Never used   Substance and Sexual Activity   • Alcohol use: No   • Drug use: No   • Sexual activity: Not Currently     Family History   Problem Relation Age of Onset   • Diabetes Mother    • Hypertension Father      Prior T&A, UPPP, maxillofacial, or bariatric surgery: None  Family history of sleep disorders: Mother - KHANH  Other family history + for: As above  Occupation: Unemployed  Marital status: Unmarried, has girlfriend  Children: yes  Has 2 brothers and 1 sisters  Smoking history: smoked never    Review of Systems:  Constitutional: positive for fatigue  Ears, nose, mouth, throat, and face: positive for snoring (without CPAP)  Respiratory: negative  Cardiovascular: negative  Gastrointestinal: negative  Genitourinary:negative  Musculoskeletal:negative  Neurological: negative  Behavioral/Psych: negative  Patient advised to discuss any positive ROS with PCP.      Vitals:    10/11/22 1322   BP: 108/66   Pulse: 86   SpO2: 93%           10/11/22  1322   Weight: 129 kg (284 lb)       Body mass index is 34.58 kg/m². BMI is >= 30 and <35. (Class 1 Obesity). The following options were  "offered after discussion;: referral to primary care    Tobacco Use: Medium Risk   • Smoking Tobacco Use: Former   • Smokeless Tobacco Use: Never   • Passive Exposure: Not on file       Physical Exam:        General: Alert. Cooperative. Well developed. No acute distress.   Head/Neck:  Normocephalic. Symmetrical. Atraumatic.     Neck circumference: 16.5\"             Eyes: Sclera clear. No icterus. PERRLA. Normal EOM.             Ears: No deformities. Normal hearing.             Nose: No septal deviation. No drainage.          Throat: No oral lesions. No thrush. Moist mucous membranes. Trachea midline    Tongue is enlarged     Dentition is fair       Pharynx: Posterior pharyngeal pillars are narrow    Mallampati score of II (hard and soft palate, upper portion of tonsils anduvula visible)    Pharynx is nonerythematous, with both tonsils mildly enlarged   Chest Wall:  Normal shape. Symmetric expansion with respiration. No tenderness.          Lungs:  Clear to auscultation bilaterally. No wheezes. No rhonchi. No rales. Respirations regular, even and unlabored.            Heart:  Regular rhythm and normal rate. Normal S1 and S2. No murmur.     Abdomen:  Soft, non-tender and non-distended. Normal bowel sounds. No masses.  Extremities:  Moves all extremities well. No edema.           Pulses: Pulses palpable and equal bilaterally.               Skin: Dry. Intact. No bleeding. No rash.           Neuro: Moves all 4 extremities and cranial nerves grossly intact.  Psychiatric: Normal mood and affect.      Current Outpatient Medications:   •  apixaban (ELIQUIS) 5 MG tablet tablet, Take 1 tablet by mouth 2 (Two) Times a Day., Disp: 60 tablet, Rfl: 3  •  bumetanide (BUMEX) 2 MG tablet, Take 1 tablet by mouth 2 (Two) Times a Day., Disp: 60 tablet, Rfl: 3  •  carvedilol (COREG) 12.5 MG tablet, Take 1 tablet by mouth 2 (Two) Times a Day With Meals for 30 days., Disp: 60 tablet, Rfl: 3  •  losartan (COZAAR) 25 MG tablet, Take 0.5 " tablets by mouth Daily for 30 days., Disp: 15 tablet, Rfl: 3  •  lovastatin (ALTOPREV) 20 MG 24 hr tablet, Take 20 mg by mouth., Disp: , Rfl:   •  metOLazone (ZAROXOLYN) 2.5 MG tablet, Take 1 tablet by mouth 3 (Three) Times a Week., Disp: 15 tablet, Rfl: 3  •  potassium chloride 10 MEQ CR tablet, Take 2 tablets by mouth Daily., Disp: 30 tablet, Rfl: 1  •  albuterol sulfate  (90 Base) MCG/ACT inhaler, Inhale 2 puffs Every 4 (Four) Hours As Needed for Wheezing., Disp: 1 inhaler, Rfl: 3    WBC   Date Value Ref Range Status   09/27/2022 3.65 3.40 - 10.80 10*3/mm3 Final     RBC   Date Value Ref Range Status   09/27/2022 4.34 4.14 - 5.80 10*6/mm3 Final     Hemoglobin   Date Value Ref Range Status   09/27/2022 12.7 (L) 13.0 - 17.7 g/dL Final     Hematocrit   Date Value Ref Range Status   09/27/2022 39.6 37.5 - 51.0 % Final     MCV   Date Value Ref Range Status   09/27/2022 91.2 79.0 - 97.0 fL Final     MCH   Date Value Ref Range Status   09/27/2022 29.3 26.6 - 33.0 pg Final     MCHC   Date Value Ref Range Status   09/27/2022 32.1 31.5 - 35.7 g/dL Final     RDW   Date Value Ref Range Status   09/27/2022 14.3 12.3 - 15.4 % Final     RDW-SD   Date Value Ref Range Status   09/27/2022 47.9 37.0 - 54.0 fl Final     MPV   Date Value Ref Range Status   09/27/2022 13.6 (H) 6.0 - 12.0 fL Final     Platelets   Date Value Ref Range Status   09/27/2022 120 (L) 140 - 450 10*3/mm3 Final     Neutrophil %   Date Value Ref Range Status   09/27/2022 46.9 42.7 - 76.0 % Final     Lymphocyte %   Date Value Ref Range Status   09/27/2022 31.2 19.6 - 45.3 % Final     Monocyte %   Date Value Ref Range Status   09/27/2022 16.4 (H) 5.0 - 12.0 % Final     Eosinophil %   Date Value Ref Range Status   09/27/2022 4.7 0.3 - 6.2 % Final     Basophil %   Date Value Ref Range Status   09/27/2022 0.5 0.0 - 1.5 % Final     Immature Grans %   Date Value Ref Range Status   09/27/2022 0.3 0.0 - 0.5 % Final     Neutrophils, Absolute   Date Value Ref Range  Status   09/27/2022 1.71 1.70 - 7.00 10*3/mm3 Final     Lymphocytes, Absolute   Date Value Ref Range Status   09/27/2022 1.14 0.70 - 3.10 10*3/mm3 Final     Monocytes, Absolute   Date Value Ref Range Status   09/27/2022 0.60 0.10 - 0.90 10*3/mm3 Final     Eosinophils, Absolute   Date Value Ref Range Status   09/27/2022 0.17 0.00 - 0.40 10*3/mm3 Final     Basophils, Absolute   Date Value Ref Range Status   09/27/2022 0.02 0.00 - 0.20 10*3/mm3 Final     Immature Grans, Absolute   Date Value Ref Range Status   09/27/2022 0.01 0.00 - 0.05 10*3/mm3 Final     nRBC   Date Value Ref Range Status   09/27/2022 0.0 0.0 - 0.2 /100 WBC Final     Lab Results   Component Value Date    GLUCOSE 120 (H) 09/27/2022    BUN 28 (H) 09/27/2022    CREATININE 1.40 (H) 09/27/2022    EGFRIFAFRI 91 12/08/2021    BCR 20.0 09/27/2022    K 3.2 (L) 09/27/2022    CO2 35.0 (H) 09/27/2022    CALCIUM 9.4 09/27/2022    ALBUMIN 3.70 09/23/2022    AST 16 09/23/2022    ALT 15 09/23/2022       ASSESSMENT:  1. Obstructive sleep apnea - New (to me), no additional work-up planned (3)  1. Script for PAP supplies  2. Resume PAP therapy as previously ordered  3. Follow up in 2 months to verify appropriate AHI and absence of central events given patient's cardiac history  4. Advised patient to call if any issus arise before scheduled follow up  2. HFrEF, cardiomyopathy, PAH - New (to me), no additional work-up planned (3)  1. Continue following specialties      I spent 30 minutes caring for Matti on this date of service. This time includes time spent by me in the following activities: preparing for the visit, reviewing tests, obtaining and/or reviewing a separately obtained history, performing a medically appropriate examination and/or evaluation , counseling and educating the patient/family/caregiver, documenting information in the medical record and care coordination; discussing PAP therapy, PAP compliance and PAP maintenance    RTC in 2 months. Patient agrees  to return sooner if changes in symptoms.         This document has been electronically signed by SHELL Cardenas on October 11, 2022 13:27 CDT          CC: Shonna Ng APRN Kelley, Michelle L, APRN

## 2022-10-24 ENCOUNTER — LAB (OUTPATIENT)
Dept: LAB | Facility: OTHER | Age: 52
End: 2022-10-24

## 2022-10-24 ENCOUNTER — TRANSCRIBE ORDERS (OUTPATIENT)
Dept: LAB | Facility: OTHER | Age: 52
End: 2022-10-24

## 2022-10-24 DIAGNOSIS — I50.20 SYSTOLIC HEART FAILURE, UNSPECIFIED HF CHRONICITY: ICD-10-CM

## 2022-10-24 DIAGNOSIS — I12.9 HYPERTENSIVE NEPHROPATHY: Primary | ICD-10-CM

## 2022-10-24 DIAGNOSIS — R73.03 PREDIABETES: ICD-10-CM

## 2022-10-24 DIAGNOSIS — N17.9 ACUTE RENAL FAILURE, UNSPECIFIED ACUTE RENAL FAILURE TYPE: ICD-10-CM

## 2022-10-24 DIAGNOSIS — I12.9 HYPERTENSIVE NEPHROPATHY: ICD-10-CM

## 2022-10-24 DIAGNOSIS — N18.2 CHRONIC RENAL DISEASE, STAGE II: ICD-10-CM

## 2022-10-24 LAB
ALBUMIN SERPL-MCNC: 4.4 G/DL (ref 3.5–5)
ANION GAP SERPL CALCULATED.3IONS-SCNC: 11 MMOL/L (ref 5–15)
BASOPHILS # BLD AUTO: 0.05 10*3/MM3 (ref 0–0.2)
BASOPHILS NFR BLD AUTO: 1.1 % (ref 0–1.5)
BUN SERPL-MCNC: 34 MG/DL (ref 7–23)
BUN/CREAT SERPL: 24.5 (ref 7–25)
CALCIUM SPEC-SCNC: 9.4 MG/DL (ref 8.4–10.2)
CHLORIDE SERPL-SCNC: 92 MMOL/L (ref 101–112)
CO2 SERPL-SCNC: 31 MMOL/L (ref 22–30)
CREAT SERPL-MCNC: 1.39 MG/DL (ref 0.7–1.3)
DEPRECATED RDW RBC AUTO: 46.8 FL (ref 37–54)
EGFRCR SERPLBLD CKD-EPI 2021: 61.4 ML/MIN/1.73
EOSINOPHIL # BLD AUTO: 0.14 10*3/MM3 (ref 0–0.4)
EOSINOPHIL NFR BLD AUTO: 3 % (ref 0.3–6.2)
ERYTHROCYTE [DISTWIDTH] IN BLOOD BY AUTOMATED COUNT: 14.7 % (ref 12.3–15.4)
GLUCOSE SERPL-MCNC: 155 MG/DL (ref 70–99)
HCT VFR BLD AUTO: 42.4 % (ref 37.5–51)
HGB BLD-MCNC: 13.7 G/DL (ref 13–17.7)
LYMPHOCYTES # BLD AUTO: 1.48 10*3/MM3 (ref 0.7–3.1)
LYMPHOCYTES NFR BLD AUTO: 31.5 % (ref 19.6–45.3)
MCH RBC QN AUTO: 28.9 PG (ref 26.6–33)
MCHC RBC AUTO-ENTMCNC: 32.3 G/DL (ref 31.5–35.7)
MCV RBC AUTO: 89.5 FL (ref 79–97)
MONOCYTES # BLD AUTO: 0.77 10*3/MM3 (ref 0.1–0.9)
MONOCYTES NFR BLD AUTO: 16.4 % (ref 5–12)
NEUTROPHILS NFR BLD AUTO: 2.26 10*3/MM3 (ref 1.7–7)
NEUTROPHILS NFR BLD AUTO: 48 % (ref 42.7–76)
PHOSPHATE SERPL-MCNC: 5 MG/DL (ref 2.5–4.5)
PLATELET # BLD AUTO: 147 10*3/MM3 (ref 140–450)
PMV BLD AUTO: 11.9 FL (ref 6–12)
POTASSIUM SERPL-SCNC: 2.5 MMOL/L (ref 3.4–5)
RBC # BLD AUTO: 4.74 10*6/MM3 (ref 4.14–5.8)
SODIUM SERPL-SCNC: 134 MMOL/L (ref 137–145)
URATE SERPL-MCNC: 11.7 MG/DL (ref 3.5–8.5)
WBC NRBC COR # BLD: 4.7 10*3/MM3 (ref 3.4–10.8)

## 2022-10-24 PROCEDURE — 85025 COMPLETE CBC W/AUTO DIFF WBC: CPT | Performed by: INTERNAL MEDICINE

## 2022-10-24 PROCEDURE — 82306 VITAMIN D 25 HYDROXY: CPT | Performed by: NURSE PRACTITIONER

## 2022-10-24 PROCEDURE — 80069 RENAL FUNCTION PANEL: CPT | Performed by: INTERNAL MEDICINE

## 2022-10-24 PROCEDURE — 83970 ASSAY OF PARATHORMONE: CPT | Performed by: NURSE PRACTITIONER

## 2022-10-24 PROCEDURE — 84550 ASSAY OF BLOOD/URIC ACID: CPT | Performed by: INTERNAL MEDICINE

## 2022-10-24 PROCEDURE — 84156 ASSAY OF PROTEIN URINE: CPT | Performed by: NURSE PRACTITIONER

## 2022-10-24 PROCEDURE — 82570 ASSAY OF URINE CREATININE: CPT | Performed by: NURSE PRACTITIONER

## 2022-10-25 ENCOUNTER — TRANSCRIBE ORDERS (OUTPATIENT)
Dept: LAB | Facility: OTHER | Age: 52
End: 2022-10-25

## 2022-10-25 DIAGNOSIS — E87.6 HYPOPOTASSEMIA: Primary | ICD-10-CM

## 2022-10-25 LAB
25(OH)D3 SERPL-MCNC: 11 NG/ML (ref 30–100)
CREAT UR-MCNC: 49.2 MG/DL
PROT ?TM UR-MCNC: 23.3 MG/DL
PROT/CREAT UR: 473.6 MG/G CREA (ref 0–200)
PTH-INTACT SERPL-MCNC: 68.5 PG/ML (ref 15–65)

## 2022-10-28 ENCOUNTER — TRANSCRIBE ORDERS (OUTPATIENT)
Dept: LAB | Facility: OTHER | Age: 52
End: 2022-10-28

## 2022-10-28 ENCOUNTER — LAB (OUTPATIENT)
Dept: LAB | Facility: OTHER | Age: 52
End: 2022-10-28

## 2022-10-28 ENCOUNTER — DOCUMENTATION (OUTPATIENT)
Dept: CARDIOLOGY | Facility: CLINIC | Age: 52
End: 2022-10-28

## 2022-10-28 DIAGNOSIS — I50.22 CHRONIC SYSTOLIC HEART FAILURE: ICD-10-CM

## 2022-10-28 DIAGNOSIS — N18.2 CHRONIC KIDNEY DISEASE, STAGE II (MILD): ICD-10-CM

## 2022-10-28 DIAGNOSIS — E87.6 HYPOPOTASSEMIA: ICD-10-CM

## 2022-10-28 DIAGNOSIS — I50.20 HEART FAILURE WITH REDUCED LEFT VENTRICULAR FUNCTION: ICD-10-CM

## 2022-10-28 DIAGNOSIS — R73.03 PRE-DIABETES: ICD-10-CM

## 2022-10-28 DIAGNOSIS — N17.9 ACUTE RENAL FAILURE, UNSPECIFIED ACUTE RENAL FAILURE TYPE: ICD-10-CM

## 2022-10-28 DIAGNOSIS — I12.9 HYPERTENSIVE NEPHROPATHY: Primary | ICD-10-CM

## 2022-10-28 DIAGNOSIS — I12.9 HYPERTENSIVE NEPHROPATHY: ICD-10-CM

## 2022-10-28 LAB
ALBUMIN SERPL-MCNC: 4.1 G/DL (ref 3.5–5)
ANION GAP SERPL CALCULATED.3IONS-SCNC: 9 MMOL/L (ref 5–15)
BUN SERPL-MCNC: 39 MG/DL (ref 7–23)
BUN/CREAT SERPL: 27.3 (ref 7–25)
CALCIUM SPEC-SCNC: 9.1 MG/DL (ref 8.4–10.2)
CHLORIDE SERPL-SCNC: 93 MMOL/L (ref 101–112)
CO2 SERPL-SCNC: 35 MMOL/L (ref 22–30)
CREAT SERPL-MCNC: 1.43 MG/DL (ref 0.7–1.3)
EGFRCR SERPLBLD CKD-EPI 2021: 59.3 ML/MIN/1.73
GLUCOSE SERPL-MCNC: 175 MG/DL (ref 70–99)
HCT VFR BLD AUTO: 42.7 % (ref 37.5–51)
HGB BLD-MCNC: 13.6 G/DL (ref 13–17.7)
PHOSPHATE SERPL-MCNC: 4.7 MG/DL (ref 2.5–4.5)
POTASSIUM SERPL-SCNC: 2.8 MMOL/L (ref 3.4–5)
SODIUM SERPL-SCNC: 137 MMOL/L (ref 137–145)
URATE SERPL-MCNC: 12.2 MG/DL (ref 3.5–8.5)

## 2022-10-28 PROCEDURE — 82306 VITAMIN D 25 HYDROXY: CPT | Performed by: NURSE PRACTITIONER

## 2022-10-28 PROCEDURE — 83970 ASSAY OF PARATHORMONE: CPT | Performed by: NURSE PRACTITIONER

## 2022-10-28 PROCEDURE — 82570 ASSAY OF URINE CREATININE: CPT | Performed by: NURSE PRACTITIONER

## 2022-10-28 PROCEDURE — 80069 RENAL FUNCTION PANEL: CPT | Performed by: INTERNAL MEDICINE

## 2022-10-28 PROCEDURE — 85018 HEMOGLOBIN: CPT | Performed by: INTERNAL MEDICINE

## 2022-10-28 PROCEDURE — 84156 ASSAY OF PROTEIN URINE: CPT | Performed by: NURSE PRACTITIONER

## 2022-10-28 PROCEDURE — 85014 HEMATOCRIT: CPT | Performed by: INTERNAL MEDICINE

## 2022-10-28 PROCEDURE — 84550 ASSAY OF BLOOD/URIC ACID: CPT | Performed by: INTERNAL MEDICINE

## 2022-10-29 LAB
25(OH)D3 SERPL-MCNC: 9.7 NG/ML (ref 30–100)
CREAT UR-MCNC: 59.5 MG/DL
PROT ?TM UR-MCNC: 14 MG/DL
PROT/CREAT UR: 235.3 MG/G CREA (ref 0–200)
PTH-INTACT SERPL-MCNC: 66.3 PG/ML (ref 15–65)

## 2022-10-31 ENCOUNTER — TRANSCRIBE ORDERS (OUTPATIENT)
Dept: LAB | Facility: OTHER | Age: 52
End: 2022-10-31

## 2022-10-31 DIAGNOSIS — E87.6 HYPOPOTASSEMIA: Primary | ICD-10-CM

## 2022-11-07 ENCOUNTER — LAB (OUTPATIENT)
Dept: LAB | Facility: OTHER | Age: 52
End: 2022-11-07

## 2022-11-07 DIAGNOSIS — E87.6 HYPOPOTASSEMIA: ICD-10-CM

## 2022-11-07 LAB — POTASSIUM SERPL-SCNC: 2.6 MMOL/L (ref 3.4–5)

## 2022-11-07 PROCEDURE — 84132 ASSAY OF SERUM POTASSIUM: CPT | Performed by: INTERNAL MEDICINE

## 2022-11-08 ENCOUNTER — TRANSCRIBE ORDERS (OUTPATIENT)
Dept: LAB | Facility: OTHER | Age: 52
End: 2022-11-08

## 2022-11-08 DIAGNOSIS — E87.6 HYPOPOTASSEMIA: Primary | ICD-10-CM

## 2022-11-10 ENCOUNTER — LAB (OUTPATIENT)
Dept: LAB | Facility: OTHER | Age: 52
End: 2022-11-10

## 2022-11-10 DIAGNOSIS — E87.6 HYPOPOTASSEMIA: ICD-10-CM

## 2022-11-10 LAB — POTASSIUM SERPL-SCNC: 3.3 MMOL/L (ref 3.4–5)

## 2022-11-10 PROCEDURE — 84132 ASSAY OF SERUM POTASSIUM: CPT | Performed by: INTERNAL MEDICINE

## 2022-11-13 LAB
QT INTERVAL: 416 MS
QTC INTERVAL: 477 MS

## 2022-11-30 ENCOUNTER — APPOINTMENT (OUTPATIENT)
Dept: GENERAL RADIOLOGY | Facility: HOSPITAL | Age: 52
End: 2022-11-30

## 2022-11-30 ENCOUNTER — HOSPITAL ENCOUNTER (EMERGENCY)
Facility: HOSPITAL | Age: 52
Discharge: HOME | End: 2022-11-30
Attending: STUDENT IN AN ORGANIZED HEALTH CARE EDUCATION/TRAINING PROGRAM | Admitting: STUDENT IN AN ORGANIZED HEALTH CARE EDUCATION/TRAINING PROGRAM

## 2022-11-30 VITALS
SYSTOLIC BLOOD PRESSURE: 96 MMHG | HEIGHT: 76 IN | WEIGHT: 284.7 LBS | TEMPERATURE: 97.5 F | OXYGEN SATURATION: 97 % | BODY MASS INDEX: 34.67 KG/M2 | RESPIRATION RATE: 20 BRPM | HEART RATE: 79 BPM | DIASTOLIC BLOOD PRESSURE: 70 MMHG

## 2022-11-30 DIAGNOSIS — R91.8 LUNG INFILTRATE: ICD-10-CM

## 2022-11-30 DIAGNOSIS — R06.02 SOB (SHORTNESS OF BREATH): Primary | ICD-10-CM

## 2022-11-30 DIAGNOSIS — E87.6 HYPOKALEMIA: ICD-10-CM

## 2022-11-30 LAB
ALBUMIN SERPL-MCNC: 4 G/DL (ref 3.5–5.2)
ALBUMIN/GLOB SERPL: 1 G/DL
ALP SERPL-CCNC: 66 U/L (ref 39–117)
ALT SERPL W P-5'-P-CCNC: 14 U/L (ref 1–41)
ANION GAP SERPL CALCULATED.3IONS-SCNC: 14 MMOL/L (ref 5–15)
AST SERPL-CCNC: 15 U/L (ref 1–40)
BASOPHILS # BLD AUTO: 0.04 10*3/MM3 (ref 0–0.2)
BASOPHILS NFR BLD AUTO: 0.8 % (ref 0–1.5)
BILIRUB SERPL-MCNC: 2.4 MG/DL (ref 0–1.2)
BUN SERPL-MCNC: 37 MG/DL (ref 6–20)
BUN/CREAT SERPL: 19.4 (ref 7–25)
CALCIUM SPEC-SCNC: 9.7 MG/DL (ref 8.6–10.5)
CHLORIDE SERPL-SCNC: 95 MMOL/L (ref 98–107)
CO2 SERPL-SCNC: 31 MMOL/L (ref 22–29)
CREAT SERPL-MCNC: 1.91 MG/DL (ref 0.76–1.27)
DEPRECATED RDW RBC AUTO: 47.2 FL (ref 37–54)
EGFRCR SERPLBLD CKD-EPI 2021: 41.9 ML/MIN/1.73
EOSINOPHIL # BLD AUTO: 0.07 10*3/MM3 (ref 0–0.4)
EOSINOPHIL NFR BLD AUTO: 1.4 % (ref 0.3–6.2)
ERYTHROCYTE [DISTWIDTH] IN BLOOD BY AUTOMATED COUNT: 14.6 % (ref 12.3–15.4)
FLUAV RNA RESP QL NAA+PROBE: NOT DETECTED
FLUBV RNA RESP QL NAA+PROBE: NOT DETECTED
GLOBULIN UR ELPH-MCNC: 3.9 GM/DL
GLUCOSE SERPL-MCNC: 144 MG/DL (ref 65–99)
HCT VFR BLD AUTO: 44.3 % (ref 37.5–51)
HGB BLD-MCNC: 14.5 G/DL (ref 13–17.7)
HOLD SPECIMEN: NORMAL
HOLD SPECIMEN: NORMAL
IMM GRANULOCYTES # BLD AUTO: 0.01 10*3/MM3 (ref 0–0.05)
IMM GRANULOCYTES NFR BLD AUTO: 0.2 % (ref 0–0.5)
LYMPHOCYTES # BLD AUTO: 1.2 10*3/MM3 (ref 0.7–3.1)
LYMPHOCYTES NFR BLD AUTO: 24.6 % (ref 19.6–45.3)
MAGNESIUM SERPL-MCNC: 1.8 MG/DL (ref 1.6–2.6)
MCH RBC QN AUTO: 29.5 PG (ref 26.6–33)
MCHC RBC AUTO-ENTMCNC: 32.7 G/DL (ref 31.5–35.7)
MCV RBC AUTO: 90.2 FL (ref 79–97)
MONOCYTES # BLD AUTO: 0.77 10*3/MM3 (ref 0.1–0.9)
MONOCYTES NFR BLD AUTO: 15.8 % (ref 5–12)
NEUTROPHILS NFR BLD AUTO: 2.79 10*3/MM3 (ref 1.7–7)
NEUTROPHILS NFR BLD AUTO: 57.2 % (ref 42.7–76)
NRBC BLD AUTO-RTO: 0 /100 WBC (ref 0–0.2)
NT-PROBNP SERPL-MCNC: 3795 PG/ML (ref 0–900)
PLATELET # BLD AUTO: 139 10*3/MM3 (ref 140–450)
PMV BLD AUTO: 13.3 FL (ref 6–12)
POTASSIUM SERPL-SCNC: 2.9 MMOL/L (ref 3.5–5.2)
PROT SERPL-MCNC: 7.9 G/DL (ref 6–8.5)
RBC # BLD AUTO: 4.91 10*6/MM3 (ref 4.14–5.8)
SARS-COV-2 RNA RESP QL NAA+PROBE: NOT DETECTED
SODIUM SERPL-SCNC: 140 MMOL/L (ref 136–145)
TROPONIN T SERPL-MCNC: 0.05 NG/ML (ref 0–0.03)
TROPONIN T SERPL-MCNC: 0.06 NG/ML (ref 0–0.03)
WBC NRBC COR # BLD: 4.88 10*3/MM3 (ref 3.4–10.8)
WHOLE BLOOD HOLD COAG: NORMAL
WHOLE BLOOD HOLD SPECIMEN: NORMAL

## 2022-11-30 PROCEDURE — 93005 ELECTROCARDIOGRAM TRACING: CPT | Performed by: STUDENT IN AN ORGANIZED HEALTH CARE EDUCATION/TRAINING PROGRAM

## 2022-11-30 PROCEDURE — 36415 COLL VENOUS BLD VENIPUNCTURE: CPT

## 2022-11-30 PROCEDURE — 84484 ASSAY OF TROPONIN QUANT: CPT | Performed by: STUDENT IN AN ORGANIZED HEALTH CARE EDUCATION/TRAINING PROGRAM

## 2022-11-30 PROCEDURE — 99284 EMERGENCY DEPT VISIT MOD MDM: CPT

## 2022-11-30 PROCEDURE — 93005 ELECTROCARDIOGRAM TRACING: CPT

## 2022-11-30 PROCEDURE — 83880 ASSAY OF NATRIURETIC PEPTIDE: CPT | Performed by: STUDENT IN AN ORGANIZED HEALTH CARE EDUCATION/TRAINING PROGRAM

## 2022-11-30 PROCEDURE — 87636 SARSCOV2 & INF A&B AMP PRB: CPT | Performed by: STUDENT IN AN ORGANIZED HEALTH CARE EDUCATION/TRAINING PROGRAM

## 2022-11-30 PROCEDURE — 85025 COMPLETE CBC W/AUTO DIFF WBC: CPT | Performed by: STUDENT IN AN ORGANIZED HEALTH CARE EDUCATION/TRAINING PROGRAM

## 2022-11-30 PROCEDURE — 93010 ELECTROCARDIOGRAM REPORT: CPT | Performed by: INTERNAL MEDICINE

## 2022-11-30 PROCEDURE — 83735 ASSAY OF MAGNESIUM: CPT | Performed by: STUDENT IN AN ORGANIZED HEALTH CARE EDUCATION/TRAINING PROGRAM

## 2022-11-30 PROCEDURE — 80053 COMPREHEN METABOLIC PANEL: CPT | Performed by: STUDENT IN AN ORGANIZED HEALTH CARE EDUCATION/TRAINING PROGRAM

## 2022-11-30 PROCEDURE — 71046 X-RAY EXAM CHEST 2 VIEWS: CPT

## 2022-11-30 RX ORDER — DOXYCYCLINE 100 MG/1
100 CAPSULE ORAL 2 TIMES DAILY
Qty: 10 CAPSULE | Refills: 0 | Status: SHIPPED | OUTPATIENT
Start: 2022-11-30 | End: 2022-12-08 | Stop reason: HOSPADM

## 2022-11-30 RX ORDER — POTASSIUM CHLORIDE 750 MG/1
40 CAPSULE, EXTENDED RELEASE ORAL ONCE
Status: COMPLETED | OUTPATIENT
Start: 2022-11-30 | End: 2022-11-30

## 2022-11-30 RX ORDER — SODIUM CHLORIDE 0.9 % (FLUSH) 0.9 %
10 SYRINGE (ML) INJECTION AS NEEDED
Status: DISCONTINUED | OUTPATIENT
Start: 2022-11-30 | End: 2022-11-30 | Stop reason: HOSPADM

## 2022-11-30 RX ORDER — FUROSEMIDE 10 MG/ML
40 INJECTION INTRAMUSCULAR; INTRAVENOUS ONCE
Status: DISCONTINUED | OUTPATIENT
Start: 2022-11-30 | End: 2022-11-30

## 2022-11-30 RX ADMIN — POTASSIUM CHLORIDE 40 MEQ: 750 CAPSULE, EXTENDED RELEASE ORAL at 04:11

## 2022-12-01 ENCOUNTER — HOSPITAL ENCOUNTER (EMERGENCY)
Facility: HOSPITAL | Age: 52
Discharge: HOME | End: 2022-12-01
Attending: STUDENT IN AN ORGANIZED HEALTH CARE EDUCATION/TRAINING PROGRAM | Admitting: STUDENT IN AN ORGANIZED HEALTH CARE EDUCATION/TRAINING PROGRAM

## 2022-12-01 VITALS
RESPIRATION RATE: 18 BRPM | HEIGHT: 76 IN | HEART RATE: 79 BPM | BODY MASS INDEX: 34.1 KG/M2 | DIASTOLIC BLOOD PRESSURE: 81 MMHG | WEIGHT: 280 LBS | SYSTOLIC BLOOD PRESSURE: 119 MMHG | OXYGEN SATURATION: 97 % | TEMPERATURE: 97.4 F

## 2022-12-01 DIAGNOSIS — F41.9 ANXIETY: ICD-10-CM

## 2022-12-01 DIAGNOSIS — R06.02 SOB (SHORTNESS OF BREATH): Primary | ICD-10-CM

## 2022-12-01 LAB
ARTERIAL PATENCY WRIST A: ABNORMAL
ATMOSPHERIC PRESS: 760 MMHG
BASE EXCESS BLDA CALC-SCNC: 6.4 MMOL/L (ref 0–2)
BDY SITE: ABNORMAL
HCO3 BLDA-SCNC: 30.4 MMOL/L (ref 20–26)
INHALED O2 CONCENTRATION: 21 %
Lab: ABNORMAL
MODALITY: ABNORMAL
PCO2 BLDA: 40.1 MM HG (ref 35–45)
PH BLDA: 7.49 PH UNITS (ref 7.35–7.45)
PO2 BLDA: 80.2 MM HG (ref 83–108)
SAO2 % BLDCOA: 96 % (ref 94–99)
VENTILATOR MODE: ABNORMAL

## 2022-12-01 PROCEDURE — 99282 EMERGENCY DEPT VISIT SF MDM: CPT

## 2022-12-01 PROCEDURE — 82803 BLOOD GASES ANY COMBINATION: CPT

## 2022-12-01 PROCEDURE — 93010 ELECTROCARDIOGRAM REPORT: CPT | Performed by: INTERNAL MEDICINE

## 2022-12-01 PROCEDURE — 36600 WITHDRAWAL OF ARTERIAL BLOOD: CPT

## 2022-12-01 PROCEDURE — 93005 ELECTROCARDIOGRAM TRACING: CPT | Performed by: STUDENT IN AN ORGANIZED HEALTH CARE EDUCATION/TRAINING PROGRAM

## 2022-12-01 NOTE — ED TRIAGE NOTES
"Pt seen in this ED yesterday. States \"haven't been able to sleep for 2 days because I keep losing my air. The oxygen meter at home says 96-97, but I don't believe it.\" Pt reports does have hx of sleep apnea and supposed to use cpap, but does not have one currently. Pt states he is taking all meds as prescribed and has appt to follow-up with PCP next week.  "

## 2022-12-01 NOTE — ED PROVIDER NOTES
Subjective   History of Present Illness  52-year-old male comes back to the ER with chief complaint of shortness of breath.  Patient states that whenever he dozes off he can feel the fluid move up from his legs into his chest and he wakes up gasping for air.  He reports having a history of sleep apnea and has not been on his sleep apnea machine.  Patient was seen yesterday in the ER and had a work-up remarkable for possible pneumonia, potassium 2.9, creatinine increase to 1.9.  Patient picked up his antibiotics and took it today.    History provided by:  Patient   used: No        Review of Systems   Constitutional: Negative for chills and fever.   HENT: Negative for drooling.    Eyes: Negative for redness.   Respiratory: Positive for shortness of breath. Negative for cough, chest tightness and wheezing.    Cardiovascular: Negative for chest pain.   Gastrointestinal: Negative for abdominal pain, nausea and vomiting.   Genitourinary: Negative for flank pain.   Skin: Negative for color change.   Neurological: Negative for seizures.   Psychiatric/Behavioral: Negative for confusion. The patient is nervous/anxious.        Past Medical History:   Diagnosis Date   • Asthma    • Chronic systolic heart failure (HCC)    • Diabetes mellitus (HCC)    • Hyperlipidemia    • Hypertension    • Obesity    • Peripheral vascular disease (HCC)    • Sleep apnea        No Known Allergies    Past Surgical History:   Procedure Laterality Date   • CARDIAC CATHETERIZATION N/A 12/08/2021   • CARDIAC DEFIBRILLATOR PLACEMENT     • VARICOSE VEIN SURGERY Right 04/05/2019       Family History   Problem Relation Age of Onset   • Diabetes Mother    • Hypertension Father        Social History     Socioeconomic History   • Marital status:    Tobacco Use   • Smoking status: Former   • Smokeless tobacco: Never   Vaping Use   • Vaping Use: Never used   Substance and Sexual Activity   • Alcohol use: No   • Drug use: No   •  "Sexual activity: Not Currently           Objective    Vitals:    12/01/22 0301   BP: 143/71   BP Location: Right arm   Patient Position: Sitting   Pulse: 52   Resp: 22   Temp: 97.4 °F (36.3 °C)   TempSrc: Oral   SpO2: 96%   Weight: 127 kg (280 lb)   Height: 193 cm (76\")       Physical Exam  Vitals and nursing note reviewed.   Constitutional:       General: He is not in acute distress.     Appearance: He is well-developed. He is obese. He is not ill-appearing, toxic-appearing or diaphoretic.   HENT:      Head: Normocephalic.      Right Ear: External ear normal.      Left Ear: External ear normal.   Eyes:      Conjunctiva/sclera: Conjunctivae normal.   Pulmonary:      Effort: Pulmonary effort is normal. No accessory muscle usage or respiratory distress.   Chest:      Chest wall: No tenderness.   Abdominal:      Palpations: Abdomen is soft.      Tenderness: There is no abdominal tenderness (deep palpation).   Skin:     General: Skin is warm and dry.      Capillary Refill: Capillary refill takes less than 2 seconds.   Neurological:      Mental Status: He is alert.   Psychiatric:         Mood and Affect: Mood is anxious.         Behavior: Behavior normal.         ECG 12 Lead      Date/Time: 12/1/2022 4:25 AM  Performed by: Seth Emmanuel MD  Authorized by: Seth Emmanuel MD   Interpreted by physician  Rhythm: sinus rhythm  Rate: normal  BPM: 83  QRS axis: left  Conduction: 1st degree  ST segment elevation noted on lead: none.  Clinical impression: abnormal ECG                 ED Course      Results for orders placed or performed during the hospital encounter of 12/01/22   Blood Gas, Arterial -    Specimen: Arterial Blood   Result Value Ref Range    Site Right Radial     Landon's Test N/A     pH, Arterial 7.488 (H) 7.350 - 7.450 pH units    pCO2, Arterial 40.1 35.0 - 45.0 mm Hg    pO2, Arterial 80.2 (L) 83.0 - 108.0 mm Hg    HCO3, Arterial 30.4 (H) 20.0 - 26.0 mmol/L    Base Excess, Arterial 6.4 (H) 0.0 - 2.0 mmol/L "    O2 Saturation, Arterial 96.0 94.0 - 99.0 %    Barometric Pressure for Blood Gas 760 mmHg    Modality Room Air     FIO2 21 %    Ventilator Mode NA     Collected by RT    ECG 12 Lead Dyspnea   Result Value Ref Range    QT Interval 452 ms    QTC Interval 531 ms                                          MDM  Number of Diagnoses or Management Options  Anxiety: new and requires workup  SOB (shortness of breath): new and requires workup  Diagnosis management comments: Vital signs are stable, afebrile.  ABG is unremarkable.  Patient satting well on room air.  Given the patient's history and what he is describing I think it is a lot to do with his untreated sleep apnea and anxiety.  He has an appointment today with his kidney doctor.  Recommend that he also follow-up with his PCP.  Return cautions given.  Recommend he continue taking the antibiotics.  Patient states understanding and is agreeable to the plan.      Final diagnoses:   SOB (shortness of breath)   Anxiety       ED Disposition  ED Disposition     ED Disposition   Discharge    Condition   Stable    Comment   --             Shonna Ng APRN  [address] [place], [place] [id number] [phone]    Call today  ER follow up         Medication List      No changes were made to your prescriptions during this visit.          Seth Emmanuel MD  12/01/22 0435

## 2022-12-04 ENCOUNTER — HOSPITAL ENCOUNTER (OUTPATIENT)
Facility: HOSPITAL | Age: 52
Setting detail: OBSERVATION
Discharge: HOME | End: 2022-12-08
Attending: INTERNAL MEDICINE | Admitting: INTERNAL MEDICINE

## 2022-12-04 ENCOUNTER — APPOINTMENT (OUTPATIENT)
Dept: GENERAL RADIOLOGY | Facility: HOSPITAL | Age: 52
End: 2022-12-04

## 2022-12-04 DIAGNOSIS — Z78.9 IMPAIRED MOBILITY AND ADLS: ICD-10-CM

## 2022-12-04 DIAGNOSIS — Z74.09 IMPAIRED FUNCTIONAL MOBILITY, BALANCE, GAIT, AND ENDURANCE: ICD-10-CM

## 2022-12-04 DIAGNOSIS — Z74.09 IMPAIRED MOBILITY AND ADLS: ICD-10-CM

## 2022-12-04 DIAGNOSIS — I42.8 NICM (NONISCHEMIC CARDIOMYOPATHY): ICD-10-CM

## 2022-12-04 DIAGNOSIS — I50.22 CHRONIC SYSTOLIC HEART FAILURE: ICD-10-CM

## 2022-12-04 PROBLEM — I50.9 CHF EXACERBATION: Status: ACTIVE | Noted: 2022-12-04

## 2022-12-04 PROBLEM — I50.9 HEART FAILURE: Status: ACTIVE | Noted: 2022-12-04

## 2022-12-04 LAB
ALBUMIN SERPL-MCNC: 3.9 G/DL (ref 3.5–5.2)
ALBUMIN/GLOB SERPL: 1.1 G/DL
ALP SERPL-CCNC: 59 U/L (ref 39–117)
ALT SERPL W P-5'-P-CCNC: 28 U/L (ref 1–41)
AMPHET+METHAMPHET UR QL: NEGATIVE
AMPHETAMINES UR QL: NEGATIVE
ANION GAP SERPL CALCULATED.3IONS-SCNC: 13 MMOL/L (ref 5–15)
AST SERPL-CCNC: 28 U/L (ref 1–40)
BARBITURATES UR QL SCN: NEGATIVE
BENZODIAZ UR QL SCN: NEGATIVE
BILIRUB SERPL-MCNC: 2.9 MG/DL (ref 0–1.2)
BUN SERPL-MCNC: 42 MG/DL (ref 6–20)
BUN/CREAT SERPL: 23.6 (ref 7–25)
BUPRENORPHINE SERPL-MCNC: NEGATIVE NG/ML
CALCIUM SPEC-SCNC: 9.6 MG/DL (ref 8.6–10.5)
CANNABINOIDS SERPL QL: NEGATIVE
CHLORIDE SERPL-SCNC: 97 MMOL/L (ref 98–107)
CO2 SERPL-SCNC: 31 MMOL/L (ref 22–29)
COCAINE UR QL: NEGATIVE
CREAT SERPL-MCNC: 1.78 MG/DL (ref 0.76–1.27)
DEPRECATED RDW RBC AUTO: 48.1 FL (ref 37–54)
EGFRCR SERPLBLD CKD-EPI 2021: 45.6 ML/MIN/1.73
ERYTHROCYTE [DISTWIDTH] IN BLOOD BY AUTOMATED COUNT: 14.6 % (ref 12.3–15.4)
GLOBULIN UR ELPH-MCNC: 3.5 GM/DL
GLUCOSE SERPL-MCNC: 126 MG/DL (ref 65–99)
HCT VFR BLD AUTO: 45.3 % (ref 37.5–51)
HGB BLD-MCNC: 14.6 G/DL (ref 13–17.7)
INR PPP: 2.5 (ref 0.8–1.2)
MAGNESIUM SERPL-MCNC: 1.6 MG/DL (ref 1.6–2.6)
MCH RBC QN AUTO: 29.1 PG (ref 26.6–33)
MCHC RBC AUTO-ENTMCNC: 32.2 G/DL (ref 31.5–35.7)
MCV RBC AUTO: 90.4 FL (ref 79–97)
METHADONE UR QL SCN: NEGATIVE
NT-PROBNP SERPL-MCNC: 2485 PG/ML (ref 0–900)
OPIATES UR QL: NEGATIVE
OXYCODONE UR QL SCN: NEGATIVE
PCP UR QL SCN: NEGATIVE
PLATELET # BLD AUTO: 143 10*3/MM3 (ref 140–450)
PMV BLD AUTO: 13.2 FL (ref 6–12)
POTASSIUM SERPL-SCNC: 3.4 MMOL/L (ref 3.5–5.2)
PROPOXYPH UR QL: NEGATIVE
PROT SERPL-MCNC: 7.4 G/DL (ref 6–8.5)
PROTHROMBIN TIME: 27.1 SECONDS (ref 11.1–15.3)
RBC # BLD AUTO: 5.01 10*6/MM3 (ref 4.14–5.8)
SODIUM SERPL-SCNC: 141 MMOL/L (ref 136–145)
TRICYCLICS UR QL SCN: NEGATIVE
TROPONIN T SERPL-MCNC: 0.05 NG/ML (ref 0–0.03)
WBC NRBC COR # BLD: 4.38 10*3/MM3 (ref 3.4–10.8)

## 2022-12-04 PROCEDURE — 87150 DNA/RNA AMPLIFIED PROBE: CPT | Performed by: INTERNAL MEDICINE

## 2022-12-04 PROCEDURE — 82962 GLUCOSE BLOOD TEST: CPT

## 2022-12-04 PROCEDURE — G0379 DIRECT REFER HOSPITAL OBSERV: HCPCS

## 2022-12-04 PROCEDURE — 80306 DRUG TEST PRSMV INSTRMNT: CPT | Performed by: INTERNAL MEDICINE

## 2022-12-04 PROCEDURE — G0378 HOSPITAL OBSERVATION PER HR: HCPCS

## 2022-12-04 PROCEDURE — 71045 X-RAY EXAM CHEST 1 VIEW: CPT

## 2022-12-04 PROCEDURE — 85610 PROTHROMBIN TIME: CPT | Performed by: INTERNAL MEDICINE

## 2022-12-04 PROCEDURE — 96367 TX/PROPH/DG ADDL SEQ IV INF: CPT

## 2022-12-04 PROCEDURE — 83880 ASSAY OF NATRIURETIC PEPTIDE: CPT | Performed by: INTERNAL MEDICINE

## 2022-12-04 PROCEDURE — 94799 UNLISTED PULMONARY SVC/PX: CPT

## 2022-12-04 PROCEDURE — 94660 CPAP INITIATION&MGMT: CPT

## 2022-12-04 PROCEDURE — 96365 THER/PROPH/DIAG IV INF INIT: CPT

## 2022-12-04 PROCEDURE — 80053 COMPREHEN METABOLIC PANEL: CPT | Performed by: INTERNAL MEDICINE

## 2022-12-04 PROCEDURE — 25010000002 CEFTRIAXONE PER 250 MG: Performed by: INTERNAL MEDICINE

## 2022-12-04 PROCEDURE — 87147 CULTURE TYPE IMMUNOLOGIC: CPT | Performed by: INTERNAL MEDICINE

## 2022-12-04 PROCEDURE — 94760 N-INVAS EAR/PLS OXIMETRY 1: CPT

## 2022-12-04 PROCEDURE — 96375 TX/PRO/DX INJ NEW DRUG ADDON: CPT

## 2022-12-04 PROCEDURE — 83735 ASSAY OF MAGNESIUM: CPT | Performed by: INTERNAL MEDICINE

## 2022-12-04 PROCEDURE — 84484 ASSAY OF TROPONIN QUANT: CPT | Performed by: INTERNAL MEDICINE

## 2022-12-04 PROCEDURE — 87040 BLOOD CULTURE FOR BACTERIA: CPT | Performed by: INTERNAL MEDICINE

## 2022-12-04 PROCEDURE — 85027 COMPLETE CBC AUTOMATED: CPT | Performed by: INTERNAL MEDICINE

## 2022-12-04 RX ORDER — CARVEDILOL 12.5 MG/1
12.5 TABLET ORAL 2 TIMES DAILY WITH MEALS
Status: DISCONTINUED | OUTPATIENT
Start: 2022-12-04 | End: 2022-12-08 | Stop reason: HOSPADM

## 2022-12-04 RX ORDER — SODIUM CHLORIDE 9 MG/ML
40 INJECTION, SOLUTION INTRAVENOUS AS NEEDED
Status: DISCONTINUED | OUTPATIENT
Start: 2022-12-04 | End: 2022-12-08 | Stop reason: HOSPADM

## 2022-12-04 RX ORDER — SODIUM CHLORIDE 0.9 % (FLUSH) 0.9 %
10 SYRINGE (ML) INJECTION AS NEEDED
Status: DISCONTINUED | OUTPATIENT
Start: 2022-12-04 | End: 2022-12-08 | Stop reason: HOSPADM

## 2022-12-04 RX ORDER — SODIUM CHLORIDE 0.9 % (FLUSH) 0.9 %
10 SYRINGE (ML) INJECTION EVERY 12 HOURS SCHEDULED
Status: DISCONTINUED | OUTPATIENT
Start: 2022-12-04 | End: 2022-12-08 | Stop reason: HOSPADM

## 2022-12-04 RX ORDER — ASPIRIN 81 MG/1
81 TABLET ORAL DAILY
Status: DISCONTINUED | OUTPATIENT
Start: 2022-12-05 | End: 2022-12-05

## 2022-12-04 RX ORDER — BUMETANIDE 0.25 MG/ML
1 INJECTION INTRAMUSCULAR; INTRAVENOUS
Status: DISCONTINUED | OUTPATIENT
Start: 2022-12-04 | End: 2022-12-06

## 2022-12-04 RX ORDER — POTASSIUM CHLORIDE 750 MG/1
20 CAPSULE, EXTENDED RELEASE ORAL ONCE
Status: COMPLETED | OUTPATIENT
Start: 2022-12-04 | End: 2022-12-04

## 2022-12-04 RX ADMIN — CEFTRIAXONE SODIUM 1 G: 1 INJECTION, POWDER, FOR SOLUTION INTRAMUSCULAR; INTRAVENOUS at 23:18

## 2022-12-04 RX ADMIN — BUMETANIDE 1 MG: 0.25 INJECTION INTRAMUSCULAR; INTRAVENOUS at 21:18

## 2022-12-04 RX ADMIN — DOXYCYCLINE 100 MG: 100 INJECTION, POWDER, LYOPHILIZED, FOR SOLUTION INTRAVENOUS at 23:40

## 2022-12-04 RX ADMIN — Medication 10 ML: at 21:20

## 2022-12-04 RX ADMIN — APIXABAN 5 MG: 5 TABLET, FILM COATED ORAL at 21:20

## 2022-12-04 RX ADMIN — CARVEDILOL 12.5 MG: 12.5 TABLET, FILM COATED ORAL at 21:19

## 2022-12-04 RX ADMIN — POTASSIUM CHLORIDE 20 MEQ: 10 CAPSULE, COATED, EXTENDED RELEASE ORAL at 23:00

## 2022-12-04 RX ADMIN — SACUBITRIL AND VALSARTAN 1 TABLET: 24; 26 TABLET, FILM COATED ORAL at 21:19

## 2022-12-05 LAB
ALBUMIN SERPL-MCNC: 3.8 G/DL (ref 3.5–5.2)
ALBUMIN/GLOB SERPL: 1.1 G/DL
ALP SERPL-CCNC: 60 U/L (ref 39–117)
ALT SERPL W P-5'-P-CCNC: 29 U/L (ref 1–41)
ANION GAP SERPL CALCULATED.3IONS-SCNC: 14 MMOL/L (ref 5–15)
AST SERPL-CCNC: 27 U/L (ref 1–40)
BACTERIA BLD CULT: ABNORMAL
BILIRUB SERPL-MCNC: 2.5 MG/DL (ref 0–1.2)
BUN SERPL-MCNC: 40 MG/DL (ref 6–20)
BUN/CREAT SERPL: 25.5 (ref 7–25)
CALCIUM SPEC-SCNC: 9.1 MG/DL (ref 8.6–10.5)
CHLORIDE SERPL-SCNC: 95 MMOL/L (ref 98–107)
CHOLEST SERPL-MCNC: 150 MG/DL (ref 0–200)
CO2 SERPL-SCNC: 32 MMOL/L (ref 22–29)
CREAT SERPL-MCNC: 1.57 MG/DL (ref 0.76–1.27)
DEPRECATED RDW RBC AUTO: 47.7 FL (ref 37–54)
EGFRCR SERPLBLD CKD-EPI 2021: 53 ML/MIN/1.73
ERYTHROCYTE [DISTWIDTH] IN BLOOD BY AUTOMATED COUNT: 14.6 % (ref 12.3–15.4)
GLOBULIN UR ELPH-MCNC: 3.6 GM/DL
GLUCOSE BLDC GLUCOMTR-MCNC: 145 MG/DL (ref 70–130)
GLUCOSE BLDC GLUCOMTR-MCNC: 83 MG/DL (ref 70–130)
GLUCOSE SERPL-MCNC: 101 MG/DL (ref 65–99)
HBA1C MFR BLD: 6.6 % (ref 4.8–5.6)
HCT VFR BLD AUTO: 44.5 % (ref 37.5–51)
HDLC SERPL-MCNC: 21 MG/DL (ref 40–60)
HGB BLD-MCNC: 14.3 G/DL (ref 13–17.7)
LDLC SERPL CALC-MCNC: 113 MG/DL (ref 0–100)
LDLC/HDLC SERPL: 5.35 {RATIO}
MAGNESIUM SERPL-MCNC: 1.8 MG/DL (ref 1.6–2.6)
MCH RBC QN AUTO: 29.1 PG (ref 26.6–33)
MCHC RBC AUTO-ENTMCNC: 32.1 G/DL (ref 31.5–35.7)
MCV RBC AUTO: 90.4 FL (ref 79–97)
PLATELET # BLD AUTO: 132 10*3/MM3 (ref 140–450)
PMV BLD AUTO: 12.9 FL (ref 6–12)
POTASSIUM SERPL-SCNC: 2.8 MMOL/L (ref 3.5–5.2)
POTASSIUM SERPL-SCNC: 3.3 MMOL/L (ref 3.5–5.2)
PROCALCITONIN SERPL-MCNC: 0.08 NG/ML (ref 0–0.25)
PROT SERPL-MCNC: 7.4 G/DL (ref 6–8.5)
QT INTERVAL: 410 MS
QTC INTERVAL: 482 MS
RBC # BLD AUTO: 4.92 10*6/MM3 (ref 4.14–5.8)
SODIUM SERPL-SCNC: 141 MMOL/L (ref 136–145)
TRIGL SERPL-MCNC: 83 MG/DL (ref 0–150)
TROPONIN T SERPL-MCNC: 0.05 NG/ML (ref 0–0.03)
VLDLC SERPL-MCNC: 16 MG/DL (ref 5–40)
WBC NRBC COR # BLD: 3.83 10*3/MM3 (ref 3.4–10.8)

## 2022-12-05 PROCEDURE — 84132 ASSAY OF SERUM POTASSIUM: CPT | Performed by: INTERNAL MEDICINE

## 2022-12-05 PROCEDURE — 80053 COMPREHEN METABOLIC PANEL: CPT | Performed by: INTERNAL MEDICINE

## 2022-12-05 PROCEDURE — G0378 HOSPITAL OBSERVATION PER HR: HCPCS

## 2022-12-05 PROCEDURE — 25010000002 CEFTRIAXONE PER 250 MG: Performed by: INTERNAL MEDICINE

## 2022-12-05 PROCEDURE — 94660 CPAP INITIATION&MGMT: CPT

## 2022-12-05 PROCEDURE — 87102 FUNGUS ISOLATION CULTURE: CPT | Performed by: INTERNAL MEDICINE

## 2022-12-05 PROCEDURE — 84145 PROCALCITONIN (PCT): CPT | Performed by: INTERNAL MEDICINE

## 2022-12-05 PROCEDURE — 0 POTASSIUM CHLORIDE 10 MEQ/100ML SOLUTION: Performed by: INTERNAL MEDICINE

## 2022-12-05 PROCEDURE — 83036 HEMOGLOBIN GLYCOSYLATED A1C: CPT | Performed by: INTERNAL MEDICINE

## 2022-12-05 PROCEDURE — 96366 THER/PROPH/DIAG IV INF ADDON: CPT

## 2022-12-05 PROCEDURE — 96376 TX/PRO/DX INJ SAME DRUG ADON: CPT

## 2022-12-05 PROCEDURE — 99244 OFF/OP CNSLTJ NEW/EST MOD 40: CPT | Performed by: INTERNAL MEDICINE

## 2022-12-05 PROCEDURE — 84484 ASSAY OF TROPONIN QUANT: CPT | Performed by: INTERNAL MEDICINE

## 2022-12-05 PROCEDURE — 83735 ASSAY OF MAGNESIUM: CPT | Performed by: INTERNAL MEDICINE

## 2022-12-05 PROCEDURE — 80061 LIPID PANEL: CPT | Performed by: INTERNAL MEDICINE

## 2022-12-05 PROCEDURE — 85027 COMPLETE CBC AUTOMATED: CPT | Performed by: INTERNAL MEDICINE

## 2022-12-05 PROCEDURE — 25010000002 MAGNESIUM SULFATE IN D5W 1G/100ML (PREMIX) 1-5 GM/100ML-% SOLUTION: Performed by: INTERNAL MEDICINE

## 2022-12-05 PROCEDURE — 96367 TX/PROPH/DG ADDL SEQ IV INF: CPT

## 2022-12-05 PROCEDURE — 82962 GLUCOSE BLOOD TEST: CPT

## 2022-12-05 RX ORDER — POTASSIUM CHLORIDE 750 MG/1
40 CAPSULE, EXTENDED RELEASE ORAL AS NEEDED
Status: DISCONTINUED | OUTPATIENT
Start: 2022-12-05 | End: 2022-12-08 | Stop reason: HOSPADM

## 2022-12-05 RX ORDER — POTASSIUM CHLORIDE 7.45 MG/ML
10 INJECTION INTRAVENOUS
Status: DISCONTINUED | OUTPATIENT
Start: 2022-12-05 | End: 2022-12-05

## 2022-12-05 RX ORDER — POTASSIUM CHLORIDE 1.5 G/1.77G
40 POWDER, FOR SOLUTION ORAL AS NEEDED
Status: DISCONTINUED | OUTPATIENT
Start: 2022-12-05 | End: 2022-12-08 | Stop reason: HOSPADM

## 2022-12-05 RX ORDER — POTASSIUM CHLORIDE 7.45 MG/ML
10 INJECTION INTRAVENOUS
Status: DISCONTINUED | OUTPATIENT
Start: 2022-12-05 | End: 2022-12-08 | Stop reason: HOSPADM

## 2022-12-05 RX ORDER — ATORVASTATIN CALCIUM 10 MG/1
10 TABLET, FILM COATED ORAL DAILY
Status: DISCONTINUED | OUTPATIENT
Start: 2022-12-05 | End: 2022-12-08 | Stop reason: HOSPADM

## 2022-12-05 RX ORDER — POTASSIUM CHLORIDE 750 MG/1
40 CAPSULE, EXTENDED RELEASE ORAL ONCE
Status: COMPLETED | OUTPATIENT
Start: 2022-12-05 | End: 2022-12-05

## 2022-12-05 RX ORDER — MAGNESIUM SULFATE 1 G/100ML
1 INJECTION INTRAVENOUS ONCE
Status: COMPLETED | OUTPATIENT
Start: 2022-12-05 | End: 2022-12-05

## 2022-12-05 RX ADMIN — BUMETANIDE 1 MG: 0.25 INJECTION INTRAMUSCULAR; INTRAVENOUS at 17:20

## 2022-12-05 RX ADMIN — POTASSIUM CHLORIDE 40 MEQ: 10 CAPSULE, COATED, EXTENDED RELEASE ORAL at 22:21

## 2022-12-05 RX ADMIN — ASPIRIN 81 MG: 81 TABLET, FILM COATED ORAL at 08:15

## 2022-12-05 RX ADMIN — CARVEDILOL 12.5 MG: 12.5 TABLET, FILM COATED ORAL at 08:15

## 2022-12-05 RX ADMIN — CEFTRIAXONE SODIUM 1 G: 1 INJECTION, POWDER, FOR SOLUTION INTRAMUSCULAR; INTRAVENOUS at 23:04

## 2022-12-05 RX ADMIN — POTASSIUM CHLORIDE 40 MEQ: 10 CAPSULE, COATED, EXTENDED RELEASE ORAL at 06:52

## 2022-12-05 RX ADMIN — MAGNESIUM SULFATE HEPTAHYDRATE 1 G: 1 INJECTION, SOLUTION INTRAVENOUS at 01:22

## 2022-12-05 RX ADMIN — ATORVASTATIN CALCIUM 10 MG: 10 TABLET, FILM COATED ORAL at 12:13

## 2022-12-05 RX ADMIN — BUMETANIDE 1 MG: 0.25 INJECTION INTRAMUSCULAR; INTRAVENOUS at 08:15

## 2022-12-05 RX ADMIN — CARVEDILOL 12.5 MG: 12.5 TABLET, FILM COATED ORAL at 17:20

## 2022-12-05 RX ADMIN — APIXABAN 5 MG: 5 TABLET, FILM COATED ORAL at 20:55

## 2022-12-05 RX ADMIN — POTASSIUM CHLORIDE 10 MEQ: 7.46 INJECTION, SOLUTION INTRAVENOUS at 06:55

## 2022-12-05 RX ADMIN — POTASSIUM CHLORIDE 40 MEQ: 10 CAPSULE, COATED, EXTENDED RELEASE ORAL at 18:01

## 2022-12-05 RX ADMIN — DOXYCYCLINE 100 MG: 100 INJECTION, POWDER, LYOPHILIZED, FOR SOLUTION INTRAVENOUS at 12:13

## 2022-12-05 RX ADMIN — SACUBITRIL AND VALSARTAN 1 TABLET: 24; 26 TABLET, FILM COATED ORAL at 08:15

## 2022-12-05 RX ADMIN — Medication 10 ML: at 21:04

## 2022-12-05 RX ADMIN — APIXABAN 5 MG: 5 TABLET, FILM COATED ORAL at 08:15

## 2022-12-05 RX ADMIN — POTASSIUM CHLORIDE 10 MEQ: 7.46 INJECTION, SOLUTION INTRAVENOUS at 08:20

## 2022-12-05 RX ADMIN — SACUBITRIL AND VALSARTAN 1 TABLET: 24; 26 TABLET, FILM COATED ORAL at 20:54

## 2022-12-05 NOTE — CONSULTS
"    [facility] Cardiology Consult    Referring Provider: Provider, No Known    Reason for Consultation: NSTEMI    Patient Care Team:  Shonna Ng APRN as PCP - General (Family Medicine)    Chief complaint \"Shortness of breath\"      Subjective .     History of present illness:     Mr. Matti Bravo is a 52-year-old male with medical history of nonischemic cardiomyopathy.  LVEF last noted to be 15 to 20%.  Also has history of diabetes, hypertension, obesity, hyperlipidemia, CKD, and venous insufficiency. Hx of AICD placed at [facility].  He was also recently evaluated at [facility] ER on 9/2/2022 for systolic heart failure, worsening shortness of breath, and worsening lower leg edema. He signed out AMA at that time.  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  Denied alcohol use. Hx of NICM with [facility] 12/8/2021 showed minimal plaquing, nonobstructive CAD. Echocardiogram at [facility] this on 8/4/2022 shows LVEF 15 to 20%, Moderate MVR, mild TVR, and PI.     Patient was seen in the ER twice recently for shortness of breath and sent home with outpatient antibiotic and potassium was replaced.  He returned to the ER at [facility] yesterday for similar symptoms and was found to have elevated troponin.  Patient was transferred to our hospital.  Patient was admitted for NSTEMI and CHF decompensation.  Patient reports shortness of breath worsening with orthopnea, dyspnea on exertion x1 week.  He also reported an episode of chest pain, nausea and vomiting.  Potassium was found to be low.  Troponin mildly elevated.  No acute EKG changes. CXR with small left pleural effusion.     The CVD Risk score (D'Agostino, et al., 2008) failed to calculate for the following reasons:    The patient has a prior MI, stroke, CHF, or peripheral vascular disease diagnosis      Review of Systems  Review of Systems   Constitutional: Negative for activity change, chills, fatigue, fever and unexpected weight change. "   HENT: Negative for hearing loss, nosebleeds, tinnitus, trouble swallowing and voice change.    Eyes: Negative for visual disturbance.   Respiratory: Positive for shortness of breath. Negative for apnea, cough, chest tightness and wheezing.    Cardiovascular: Negative for chest pain, palpitations and leg swelling.   Gastrointestinal: Positive for abdominal pain, nausea and vomiting. Negative for abdominal distention and blood in stool.   Endocrine: Negative for cold intolerance, heat intolerance, polydipsia, polyphagia and polyuria.   Genitourinary: Negative.    Musculoskeletal: Negative for arthralgias and back pain.   Skin: Negative.    Allergic/Immunologic: Negative.    Neurological: Negative for seizures, syncope, speech difficulty, numbness and headaches.   Hematological: Negative for adenopathy. Does not bruise/bleed easily.   Psychiatric/Behavioral: Negative for agitation, behavioral problems and suicidal ideas. The patient is not nervous/anxious.        History  Past Medical History:   Diagnosis Date   • Asthma    • Chronic systolic heart failure (HCC)    • Diabetes mellitus (HCC)    • Hyperlipidemia    • Hypertension    • Obesity    • Peripheral vascular disease (HCC)    • Sleep apnea    ,   Past Surgical History:   Procedure Laterality Date   • CARDIAC CATHETERIZATION N/A 12/08/2021   • CARDIAC DEFIBRILLATOR PLACEMENT     • VARICOSE VEIN SURGERY Right 04/05/2019   ,   Family History   Problem Relation Age of Onset   • Diabetes Mother    • Hypertension Father    ,   Social History     Tobacco Use   • Smoking status: Former   • Smokeless tobacco: Never   Vaping Use   • Vaping Use: Never used   Substance Use Topics   • Alcohol use: No   • Drug use: No   ,   Medications Prior to Admission   Medication Sig Dispense Refill Last Dose   • apixaban (ELIQUIS) 5 MG tablet tablet Take 1 tablet by mouth 2 (Two) Times a Day. 60 tablet 3 12/4/2022   • bumetanide (BUMEX) 2 MG tablet Take 1 tablet by mouth 2 (Two) Times a  "Day. 60 tablet 3 12/4/2022   • doxycycline (MONODOX) 100 MG capsule Take 1 capsule by mouth 2 (Two) Times a Day for 5 days. 10 capsule 0 12/4/2022   • potassium chloride 10 MEQ CR tablet Take 2 tablets by mouth Daily. 30 tablet 1 12/4/2022   • albuterol sulfate  (90 Base) MCG/ACT inhaler Inhale 2 puffs Every 4 (Four) Hours As Needed for Wheezing. 1 inhaler 3 Unknown   • carvedilol (COREG) 12.5 MG tablet Take 1 tablet by mouth 2 (Two) Times a Day With Meals for 30 days. (Patient taking differently: Take 12.5 mg by mouth 2 (Two) Times a Day With Meals. Pt states he has some meds and is still taking this.) 60 tablet 3    • losartan (COZAAR) 25 MG tablet Take 0.5 tablets by mouth Daily for 30 days. 15 tablet 3    • lovastatin (ALTOPREV) 20 MG 24 hr tablet Take 20 mg by mouth.   More than a month   • metOLazone (ZAROXOLYN) 2.5 MG tablet Take 1 tablet by mouth 3 (Three) Times a Week. 15 tablet 3 More than a month      ALLERGIES  Patient has no known allergies.    Objective     Vital Sign Min/Max for last 24 hours  Temp  Min: 97.5 °F (36.4 °C)  Max: 97.6 °F (36.4 °C)   BP  Min: 93/62  Max: 150/92   Pulse  Min: 59  Max: 121   Resp  Min: 14  Max: 18   SpO2  Min: 87 %  Max: 100 %   No data recorded   Weight  Min: 123 kg (271 lb 13.2 oz)  Max: 123 kg (271 lb 13.2 oz)     Flowsheet Rows    Flowsheet Row First Filed Value   Admission Height 198.1 cm (78\") Documented at 12/04/2022 1958   Admission Weight 123 kg (271 lb 13.2 oz) Documented at 12/04/2022 1958             Physical Exam:  Physical Exam  Vitals and nursing note reviewed.   Constitutional:       General: He is not in acute distress.     Appearance: Normal appearance. He is well-developed. He is not diaphoretic.   HENT:      Head: Normocephalic.      Right Ear: External ear normal.      Left Ear: External ear normal.   Eyes:      General: Lids are normal.      Pupils: Pupils are equal, round, and reactive to light.   Neck:      Thyroid: No thyromegaly.      " Vascular: No carotid bruit or JVD.      Trachea: No tracheal deviation.   Cardiovascular:      Rate and Rhythm: Normal rate and regular rhythm.      Heart sounds: Normal heart sounds. No murmur heard.    No friction rub. No gallop.   Pulmonary:      Effort: Pulmonary effort is normal. No respiratory distress.      Breath sounds: Normal breath sounds. No stridor. No wheezing or rales.   Chest:      Chest wall: No tenderness.   Abdominal:      General: Bowel sounds are normal. There is no distension.      Palpations: Abdomen is soft.      Tenderness: There is no abdominal tenderness.   Musculoskeletal:      Right lower leg: Edema (trace) present.      Left lower leg: Edema (trace) present.   Skin:     General: Skin is warm and dry.      Capillary Refill: Capillary refill takes less than 2 seconds.      Findings: No erythema or rash.   Neurological:      Mental Status: He is alert and oriented to person, place, and time.      Cranial Nerves: No cranial nerve deficit.      Deep Tendon Reflexes: Reflexes are normal and symmetric. Reflexes normal.   Psychiatric:         Behavior: Behavior normal.         Thought Content: Thought content normal.         Judgment: Judgment normal.            Results Review:     Results from last 7 days   Lab Units 12/05/22 0434 12/04/22 2049 12/04/22  1450 11/30/22  0238   SODIUM mmol/L 141 141 132* 140   POTASSIUM mmol/L 2.8* 3.4* 2.9* 2.9*   CHLORIDE mmol/L 95* 97* 92* 95*   CO2 mmol/L 32.0* 31.0*  --  31.0*   BUN mg/dL 40* 42* 46* 37*   CREATININE mg/dL 1.57* 1.78* 2.2* 1.91*   CALCIUM mg/dL 9.1 9.6 9.3 9.7   BILIRUBIN mg/dL 2.5* 2.9* 3.40* 2.4*   ALK PHOS U/L 60 59 64 66   ALT (SGPT) U/L 29 28 33 14   AST (SGOT) U/L 27 28 30 15   GLUCOSE mg/dL 101* 126*  --  144*       Estimated Creatinine Clearance: 81.9 mL/min (A) (by C-G formula based on SCr of 1.57 mg/dL (H)).    Results from last 7 days   Lab Units 12/05/22 0434 12/04/22 2049 12/04/22  1450   MAGNESIUM mg/dL 1.8 1.6 1.8        Results from last 7 days   Lab Units 12/05/22  0434 12/04/22 2049 11/30/22  0238   WBC 10*3/mm3 3.83 4.38 4.88   HEMOGLOBIN g/dL 14.3 14.6 14.5   PLATELETS 10*3/mm3 132* 143 139*       Results from last 7 days   Lab Units 12/04/22 2049 12/04/22  1450   INR  2.50* 2.19*       Lab Results   Component Value Date    CKTOTAL 114 12/04/2022    CKMB 174 05/24/2022    TROPONINI 0.05 (H) 08/18/2022    TROPONINT 0.053 (C) 12/05/2022     Lab Results   Component Value Date    PROBNP 2,485.0 (H) 12/04/2022       I/O last 3 completed shifts:  In: 200 [IV Piggyback:200]  Out: 1175 [Urine:1175]    Cardiographics  ECG/EMG Results (last 24 hours)     ** No results found for the last 24 hours. **        Results for orders placed during the hospital encounter of 02/04/19    Adult Transthoracic Echo Limited W/ Cont if Necessary Per Protocol    Interpretation Summary  · 3D acquisition for left ventricular ejection fraction. Live 3D and full volume to assess left ventricular ejection fraction was utilized.  · Left ventricle systolic function is severely decreased. Estimated LVEF is 10%. Left ventricle cavity severely dilated with restrictive diastolic dysfunction. Findings are consistent with dilated cardiomyopathy.  · Right ventricle is mildly dilated with mildly reduced right ventricular systolic function.  · Moderate mitral valve regurgitation is present.  · Moderate tricuspid valve regurgitation is present. Pulmonary hypertension is present with a PA systolic pressure of 70 mmHg.  · There is mild pulmonic valve regurgitation present.  · There is a trivial pericardial effusion adjacent to the left ventricle        XR Chest 2 View    Result Date: 11/30/2022  Rounded opacity projects over the mid and lower lung on the lateral view, which may be related to summation artifact or pneumonia. Consider short interval radiographic follow-up or chest CT Electronically signed by:  Raimundo Salas DO  11/30/2022 5:26 AM [facility] Workstation:  [id number]    XR Chest 1 View    Result Date: 12/4/2022  Retrocardiac opacification is suspicious for infiltrate. Large cardiac silhouette again seen. Suspect at least small left pleural effusion. Electronically signed by:  Deacon Ac MD  12/4/2022 9:02 PM CST Workstation: [id number]    XR Chest 1 View    Result Date: 12/4/2022  Stable left pacemaker. Stable cardiomegaly. Stable left lung base atelectasis, versus infiltrate with probable left pleural effusion.. Workstation: [id number]      Intake/Output       12/04/22 0700 - 12/05/22 0659    Intake (ml) 200    Output (ml) 1175    Net (ml) -975    Last Weight 123 kg (271 lb 13.2 oz)            Assessment & Plan       CHF exacerbation (HCC)    Heart failure (HCC)    #1. Abnormal Troponin not consistent with ACS.   This is likely secondary to acute medical illness. No high-risk features. EKG negative for acute changes.   Most recent troponin was 0.052. Trend is flat. LHC in 2021 was relatively normal.   -Continue with BB and statin.     #2. Chronic systolic CHF/NICM: EF 15-20%. NYHA Class II, Stage C.  Patient appears euvolemic with adequate perfusion and hemodynamics. Continue Eliquis 5mg BID, given severe LV dysfunction he is at risk for LV thrombus, OAC is still indicated.      BETA-BLOCKER:  carvedilol 12.5 mg twice daily  ACE/ARB/ENTRESTO: Entresto 24-26mg BID  DIURETIC: Bumex 1mg IV BID, Change to po tomorrow  SGL2I: should be considered  ALDOSTERONE ANTAGONIST: Patient reports could not tolerate spironolactone. Developed a cough and side effects. Given marginal b.p can reassess and consider a different MRA  IMDUR/HYDRALAZINE: N/A  DIGOXIN: N/A  FR: 1,500  NA Restrction: 2grams  ICD: yes [employer]  8/2022  Daily weight  Strict intake/output  Continuous cardiac monitoring    I discussed the patient's findings and my recommendations with patient and Dr. Aranda. Thank you for the consult. We will continue to follow.             This document has been  electronically signed by SHELL Ruvalcaba on December 5, 2022 11:39 CST     Electronically signed by SHELL Ruvalcaba, 12/05/22, 11:39 AM CST.    I personally saw and examined Matti Bravo after the APRN.  I personally performed a history and physical examination of the patient.  I personally reviewed independent findings and plan of care.  I discussed management with the APRN.  I agree with the APRN's documentation.    The patient is a 52-year-old gentleman with a known history of nonischemic cardiomyopathy with severe LV systolic dysfunction.  In December 2021, coronary angiography showed minor luminal irregularities in proximal and mid RCA.  Left main/LAD/LCx appears angiographically normal.  He is s/p ICD placement.  He has been on Eliquis therapy because of severe LV systolic dysfunction.  He presented to the hospital with dyspnea and chest congestion.  He has been initiated on antibiotics for possible pneumonia by the medical team.    Vitals:    12/05/22 1400 12/05/22 1503 12/05/22 1600 12/05/22 1720   BP: (!) 182/89 159/89 161/91 96/64   BP Location:       Pulse: 63 80 66 76   Resp: 24 18 20    Temp:       TempSrc:       SpO2: 93% 97% 91%    Weight:       Height:         Elevated serum troponins: Stable mildly elevated serum troponin levels with no significant upward or downward trend are noted.  No ischemic changes are noted on ECGs. Likely type II MI in the setting of known cardiomyopathy, chronic kidney disease and possible pneumonia.  In December 2021, coronary angiography showed minor luminal irregularities in proximal and mid RCA.  Left main/LAD/LCx appears angiographically normal.    Nonischemic cardiomyopathy: Continue carvedilol and Entresto therapy.  He has not been able to tolerate MRA in the past.  Hold off on up titration for now because of borderline hemodynamics.  Consider switching Bumex to oral from tomorrow.  Continue intake and output monitoring, fluid and salt  restriction.    Hypokalemia: Management per primary medical team.    Thanks for the consultation.  Please feel free to call us with questions.    Electronically signed by Dylon Aranda MD, 12/05/22, 5:39 PM CST.

## 2022-12-05 NOTE — PLAN OF CARE
Problem: Adult Inpatient Plan of Care  Goal: Plan of Care Review  Outcome: Ongoing, Progressing  Flowsheets (Taken 12/5/2022 1006)  Plan of Care Reviewed With: patient   Goal Outcome Evaluation:  Plan of Care Reviewed With: patient            CHF and 2+ BLE edema, IV bumex BID. Hx PVD and DM. Glu wnl, no DM noted. Cardiac diet, no intakes available. Epic notes Sept 2022 wt 300+ # and # w/ BMI 31.4. Pt reports good appetite, and wt is down r/t fluids. Pt not able to be serious re: diet education- kept rhyming and telling jokes except to ask for fruit cups on tray. Will attach diet ed r/t low sodium to print w/ d/c. Monitor Glu for need of ADA diet as well. RD following.

## 2022-12-05 NOTE — PROGRESS NOTES
[facility] Medicine Services  INPATIENT PROGRESS NOTE    Length of Stay: 1  Date of Admission: 12/4/2022  Primary Care Physician: Shonna Ng APRN    Subjective   Chief Complaint: No new complaints.    HPI: Patient is seen for follow-up today 12/05/2022.  52-year-old male with history of nonischemic cardiomyopathy/chronic systolic heart failure (ejection fraction 15 to 20%), hypertension, chronic kidney disease, dyslipidemia who was admitted for non-STEMI and CHF decompensation.    Patient is doing better this morning, denies chest pain, shortness of air, is less deconditioned and voices no new complaints.  He is maintaining O2 saturation in the low 90s on room air.    Review of Systems   Constitutional: Positive for activity change and fatigue. Negative for appetite change, chills, diaphoresis and fever.   HENT: Negative for trouble swallowing and voice change.    Eyes: Negative for photophobia and visual disturbance.   Respiratory: Negative for cough, choking, chest tightness, shortness of breath, wheezing and stridor.    Cardiovascular: Positive for leg swelling. Negative for chest pain and palpitations.   Gastrointestinal: Negative for abdominal distention, abdominal pain, blood in stool, constipation, diarrhea, nausea and vomiting.   Endocrine: Negative for cold intolerance, heat intolerance, polydipsia, polyphagia and polyuria.   Genitourinary: Negative for decreased urine volume, difficulty urinating, dysuria, enuresis, flank pain, frequency, hematuria and urgency.   Musculoskeletal: Negative for arthralgias, gait problem, myalgias, neck pain and neck stiffness.   Skin: Negative for pallor, rash and wound.   Neurological: Negative for dizziness, tremors, seizures, syncope, facial asymmetry, speech difficulty, weakness, light-headedness, numbness and headaches.   Hematological: Does not bruise/bleed easily.   Psychiatric/Behavioral: Negative for agitation,  behavioral problems and confusion.       Objective    Temp:  [97.5 °F (36.4 °C)-97.6 °F (36.4 °C)] 97.5 °F (36.4 °C)  Heart Rate:  [] 67  Resp:  [14-18] 18  BP: ()/(55-92) 112/85         Physical Exam  Vitals and nursing note reviewed.   Constitutional:       General: He is not in acute distress.     Appearance: He is well-developed. He is obese. He is ill-appearing. He is not diaphoretic.   HENT:      Head: Normocephalic and atraumatic.   Eyes:      General: No scleral icterus.        Right eye: No discharge.         Left eye: No discharge.      Extraocular Movements: Extraocular movements intact.      Pupils: Pupils are equal, round, and reactive to light.   Neck:      Thyroid: No thyromegaly.      Vascular: No carotid bruit or JVD.   Cardiovascular:      Rate and Rhythm: Normal rate and regular rhythm.      Heart sounds: Normal heart sounds. No murmur heard.    No friction rub. No gallop.   Pulmonary:      Effort: Pulmonary effort is normal. No respiratory distress.      Breath sounds: Normal breath sounds. No stridor. No wheezing or rales.   Chest:      Chest wall: No tenderness.   Abdominal:      General: Bowel sounds are normal. There is no distension.      Palpations: Abdomen is soft. There is no mass.      Tenderness: There is no abdominal tenderness. There is no right CVA tenderness, left CVA tenderness, guarding or rebound.      Hernia: No hernia is present.   Musculoskeletal:         General: No swelling, tenderness or deformity.      Cervical back: Normal range of motion and neck supple.      Right lower leg: Edema present.      Left lower leg: Edema present.   Skin:     General: Skin is warm and dry.      Coloration: Skin is not jaundiced or pale.      Findings: No bruising, erythema, lesion or rash.   Neurological:      General: No focal deficit present.      Mental Status: He is alert and oriented to person, place, and time.      Cranial Nerves: No cranial nerve deficit.      Sensory: No  sensory deficit.      Motor: No weakness or abnormal muscle tone.      Coordination: Coordination normal.      Gait: Gait normal.      Deep Tendon Reflexes: Reflexes normal.   Psychiatric:         Mood and Affect: Mood normal.         Behavior: Behavior normal.         Thought Content: Thought content normal.         Judgment: Judgment normal.           Medication Review:    Current Facility-Administered Medications:   •  apixaban (ELIQUIS) tablet 5 mg, 5 mg, Oral, Q12H, Behroozi, Saeid, MD, 5 mg at 12/05/22 0815  •  aspirin EC tablet 81 mg, 81 mg, Oral, Daily, Behroozi, Saeid, MD, 81 mg at 12/05/22 0815  •  bumetanide (BUMEX) injection 1 mg, 1 mg, Intravenous, BID, Behroozi, Saeid, MD, 1 mg at 12/05/22 0815  •  carvedilol (COREG) tablet 12.5 mg, 12.5 mg, Oral, BID With Meals, Behroozi, Saeid, MD, 12.5 mg at 12/05/22 0815  •  cefTRIAXone (ROCEPHIN) 1 g/100 mL 0.9% NS (MBP), 1 g, Intravenous, Q24H, Behroozi, Saeid, MD, Stopped at 12/04/22 2348  •  doxycycline (VIBRAMYCIN) 100 mg/100 mL 0.9% NS MBP, 100 mg, Intravenous, Q12H, Behroozi, Saeid, MD, Stopped at 12/05/22 0140  •  potassium chloride (MICRO-K) CR capsule 40 mEq, 40 mEq, Oral, PRN **OR** potassium chloride (KLOR-CON) packet 40 mEq, 40 mEq, Oral, PRN **OR** potassium chloride 10 mEq in 100 mL IVPB, 10 mEq, Intravenous, Q1H PRN, Ayaan Cuba MD  •  sacubitril-valsartan (ENTRESTO) 24-26 MG tablet 1 tablet, 1 tablet, Oral, Q12H, Behroozi, Saeid, MD, 1 tablet at 12/05/22 0815  •  sodium chloride 0.9 % flush 10 mL, 10 mL, Intravenous, Q12H, Behroozi, Saeid, MD, 10 mL at 12/04/22 2120  •  sodium chloride 0.9 % flush 10 mL, 10 mL, Intravenous, PRN, Behroozi, Saeid, MD  •  sodium chloride 0.9 % infusion 40 mL, 40 mL, Intravenous, PRN, Behroozi, Saeid, MD    Results Review:  I have reviewed the labs, radiology results, and diagnostic studies.    Laboratory Data:   Results from last 7 days   Lab Units 12/05/22  0434 12/04/22  2049 12/04/22  1450 11/30/22  0238    SODIUM mmol/L 141 141 132* 140   POTASSIUM mmol/L 2.8* 3.4* 2.9* 2.9*   CHLORIDE mmol/L 95* 97* 92* 95*   CO2 mmol/L 32.0* 31.0*  --  31.0*   BUN mg/dL 40* 42* 46* 37*   CREATININE mg/dL 1.57* 1.78* 2.2* 1.91*   GLUCOSE mg/dL 101* 126*  --  144*   CALCIUM mg/dL 9.1 9.6 9.3 9.7   BILIRUBIN mg/dL 2.5* 2.9* 3.40* 2.4*   ALK PHOS U/L 60 59 64 66   ALT (SGPT) U/L 29 28 33 14   AST (SGOT) U/L 27 28 30 15   ANION GAP mmol/L 14.0 13.0  --  14.0     Estimated Creatinine Clearance: 81.9 mL/min (A) (by C-G formula based on SCr of 1.57 mg/dL (H)).  Results from last 7 days   Lab Units 12/05/22 0434 12/04/22 2049 12/04/22  1450   MAGNESIUM mg/dL 1.8 1.6 1.8         Results from last 7 days   Lab Units 12/05/22 0434 12/04/22 2049 11/30/22  0238   WBC 10*3/mm3 3.83 4.38 4.88   HEMOGLOBIN g/dL 14.3 14.6 14.5   HEMATOCRIT % 44.5 45.3 44.3   PLATELETS 10*3/mm3 132* 143 139*     Results from last 7 days   Lab Units 12/04/22 2049 12/04/22  1450   INR  2.50* 2.19*       Culture Data:   No results found for: BLOODCX  No results found for: URINECX  No results found for: RESPCX  No results found for: WOUNDCX  No results found for: STOOLCX  No components found for: BODYFLD    Radiology Data:   Imaging Results (Last 24 Hours)     Procedure Component Value Units Date/Time    XR Chest 1 View [[id number]] Collected: 12/04/22 2037     Updated: 12/04/22 2104    Narrative:      PROCEDURE: XR CHEST 1 VIEW, 12/4/2022 8:37 PM CST    CLINICAL INDICATION:    sob.    COMPARISON: 11/30/22    TECHNIQUE:  AP portable radiograph of chest     FINDINGS:    Large cardiac silhouette. Retrocardiac opacification. Suspect at  least small left pleural effusion. No pneumothorax.      Impression:        Retrocardiac opacification is suspicious for infiltrate.    Large cardiac silhouette again seen.    Suspect at least small left pleural effusion.          Electronically signed by:  Deacon Ac MD  12/4/2022 9:02 PM  CST Workstation: [id number]          I have  reviewed the patient's current medications.     Assessment/Plan     Hospital Problem List:  Principal Problem:    CHF exacerbation (HCC)  Active Problems:    Heart failure (HCC)    Non-STEMI: Patient is chest pain-free.  Continue guideline directed medical therapy and consult cardiologist.  Echocardiogram is pending.  Previous echocardiogram done 8/18/2022 showed an estimated ejection fraction of 15 to 20%.    Acute on chronic systolic heart failure: Continue Bumex, Entresto, beta-blocker with strict I's and O's, daily weights, salt and fluid restriction.  He may require dobutamine infusion if the need arises.    Chronic kidney disease stage II/III: Patient's baseline creatinine is about 1.9-2.0.  Creatinine is at baseline.  Continue to monitor and consult nephrologist if the need arises.    Hypokalemia: Begin potassium repletion protocol.  Magnesium level is normal.    Continue GI and DVT prophylaxis.    Discharge Planning: In progress.    I confirmed that the patient's Advance Care Plan is present, code status is documented, or surrogate decision maker is listed in the patient's medical record.     I have utilized all available immediate resources to obtain, update, or review the patient's current medications      Ayaan Cuba MD   12/05/22   10:13 CST

## 2022-12-05 NOTE — H&P
[facility] Medicine Admission      Date of Admission: 12/4/2022      Primary Care Physician: Shonna Ng APRN      Chief Complaint: Shortness of breath    HPI:  Patient is a 52-year-old obese male with known past medical history of chronic systolic heart failure with reduced left ventricular ejection fraction of 15 to 20%, hypertension, peripheral vascular disease, hyperlipidemia, diabetes mellitus type 2, obstructive sleep apnea with last hospitalization to [facility] in September 2022 with shortness, and per patient report has been released from snf in October and has not been on any CPAP/BiPAP since that presented to outside facility with complaint of shortness of breath and per ED record feels like something is stuck in distal esophagus, cough and intermittent shortness of breath.  Per report to daytime hospitalist shift apparently his troponin was 1773 BNP was 3725,  creatinine of 2.2 and chest x-ray showed cardiomegaly.  Prior to transfer report cardiologist Dr. Donald was contacted.  Daytime hospitalist service was called for transfer of the patient to this facility.    On review of record from transferring facility, available lab records: WBC was 4.4 hemoglobin 15 hematocrit 45.9 and platelet of 158.   lipase 79 magnesium 1.8 high-sensitivity troponin 1773.  Do not appreciate any EKG or chest x-ray results at this time.    Patient was seen and examined in ICU room 6 upon arrival.  Patient is on room air.  Patient is nonspecific with giving information.  Overall he reports for the last at least 1 week he has worsening shortness of breath, orthopnea, dyspnea on exertion.  Only on questioning he reports occasional mediastinal chest pain.  He had nausea and on occasion intermittent nonbilious nonbloody vomiting.  He reports compliance with intake of his medications.  On 1 occasion he had chills.  He denies any fall injury trauma fever sore throat  syncope near syncope palpitation back pain abdominal pain constipation diarrhea UTI-like symptoms, bleeding in particular.  Patient asked for food to eat.    Per review of record patient was evaluated on 12/1/2022 in ED for shortness of breath.    Concurrent Medical History:  has a past medical history of Asthma, Chronic systolic heart failure (HCC), Diabetes mellitus (HCC), Hyperlipidemia, Hypertension, Obesity, Peripheral vascular disease (HCC), and Sleep apnea.    Past Surgical History:  has a past surgical history that includes Varicose vein surgery (Right, 04/05/2019); Cardiac catheterization (N/A, 12/08/2021); and Cardiac defibrillator placement.    Family History: family history includes Diabetes in his mother; Hypertension in his father.     Social History:  reports that he has quit smoking. He has never used smokeless tobacco. He reports that he does not drink alcohol and does not use drugs.    Allergies: No Known Allergies    Medications:   Prior to Admission medications    Medication Sig Start Date End Date Taking? Authorizing Provider   albuterol sulfate  (90 Base) MCG/ACT inhaler Inhale 2 puffs Every 4 (Four) Hours As Needed for Wheezing. 3/9/19   Keagan Choi MD   apixaban (ELIQUIS) 5 MG tablet tablet Take 1 tablet by mouth 2 (Two) Times a Day. 10/4/22   Vania Andujar APRN   bumetanide (BUMEX) 2 MG tablet Take 1 tablet by mouth 2 (Two) Times a Day. 10/4/22   Vania Andujar APRN   carvedilol (COREG) 12.5 MG tablet Take 1 tablet by mouth 2 (Two) Times a Day With Meals for 30 days. 10/4/22 11/3/22  Vania Andujar APRN   doxycycline (MONODOX) 100 MG capsule Take 1 capsule by mouth 2 (Two) Times a Day for 5 days. 11/30/22 12/5/22  Seth Emmanuel MD   losartan (COZAAR) 25 MG tablet Take 0.5 tablets by mouth Daily for 30 days. 10/4/22 11/3/22  Vania Andujar APRN   lovastatin (ALTOPREV) 20 MG 24 hr tablet Take 20 mg by mouth.    Provider, MD Aroldo   metOLazone  (ZAROXOLYN) 2.5 MG tablet Take 1 tablet by mouth 3 (Three) Times a Week. 10/5/22   Vania Andujar APRN   potassium chloride 10 MEQ CR tablet Take 2 tablets by mouth Daily. 9/27/22   Jacqueline Laguna APRN       Review of Systems:  Review of Systems   Constitutional: Negative for chills, diaphoresis, fatigue and fever.   HENT: Negative for congestion, dental problem, ear pain, facial swelling, rhinorrhea and sinus pressure.    Eyes: Negative for photophobia, discharge, redness, itching and visual disturbance.   Respiratory: Positive for shortness of breath. Negative for apnea, cough, choking, chest tightness, wheezing and stridor.    Cardiovascular: Positive for chest pain. Negative for palpitations and leg swelling.   Gastrointestinal: Positive for nausea and vomiting. Negative for abdominal distention, abdominal pain, anal bleeding, blood in stool, diarrhea and rectal pain.   Endocrine: Negative for cold intolerance, heat intolerance, polydipsia, polyphagia and polyuria.   Genitourinary: Negative for difficulty urinating, flank pain, frequency, hematuria and urgency.   Musculoskeletal: Negative for arthralgias, back pain, joint swelling and myalgias.   Skin: Negative for pallor, rash and wound.   Allergic/Immunologic: Negative for environmental allergies and immunocompromised state.   Neurological: Negative for dizziness, tremors, seizures, facial asymmetry, speech difficulty, weakness, light-headedness, numbness and headaches.   Hematological: Negative for adenopathy. Does not bruise/bleed easily.   Psychiatric/Behavioral: Negative for agitation, behavioral problems and hallucinations. The patient is not nervous/anxious.       Otherwise complete ROS is negative except as mentioned above.    Physical Exam:   Temp:  [97.6 °F (36.4 °C)] 97.6 °F (36.4 °C)  Heart Rate:  [78] 78  Resp:  [18] 18  BP: (123)/(65) 123/65  Physical Exam  Constitutional:       General: He is not in acute distress.     Appearance: He is  obese. He is ill-appearing (chronic). He is not toxic-appearing or diaphoretic.   HENT:      Head: Normocephalic and atraumatic.      Right Ear: External ear normal.      Left Ear: External ear normal.      Nose: Nose normal.      Mouth/Throat:      Mouth: Mucous membranes are moist.      Pharynx: Oropharynx is clear.   Eyes:      Extraocular Movements: Extraocular movements intact.      Conjunctiva/sclera: Conjunctivae normal.      Pupils: Pupils are equal, round, and reactive to light.   Cardiovascular:      Rate and Rhythm: Normal rate and regular rhythm.      Heart sounds:     No friction rub. No gallop.   Pulmonary:      Effort: No respiratory distress.      Breath sounds: No stridor. No wheezing or rales.   Chest:      Chest wall: No tenderness.   Abdominal:      General: Abdomen is flat. There is no distension.      Palpations: Abdomen is soft.      Tenderness: There is no abdominal tenderness. There is no guarding or rebound.   Musculoskeletal:         General: No tenderness or signs of injury.      Cervical back: No rigidity or tenderness.      Right lower leg: Edema present.      Left lower leg: Edema present.   Lymphadenopathy:      Cervical: No cervical adenopathy.   Skin:     General: Skin is warm and dry.      Coloration: Skin is not jaundiced.      Findings: No erythema.   Neurological:      Mental Status: He is alert and oriented to person, place, and time. Mental status is at baseline.      Sensory: No sensory deficit.      Motor: No weakness.      Coordination: Coordination normal.   Psychiatric:         Mood and Affect: Mood normal.         Behavior: Behavior normal.         Judgment: Judgment normal.           Results Reviewed:  I have personally reviewed current lab, radiology, and data and agree with results.  Lab Results (last 24 hours)     ** No results found for the last 24 hours. **        Imaging Results (Last 24 Hours)     ** No results found for the last 24 hours. **     "    Echocardiogram in August 2022 showed    1. Emergent and limited echo completed in EP lab during device implant for   assessment of possible pericardial effusion.     2. A Pacer lead is seen within the Right ventricle.     3. Left ventricular chamber dimension is severely enlarged.     4. Left ventricular ejection fraction is severely reduced, with an ejection   fraction of 15-20%.     5. Right ventricular systolic function is reduced.     6. Left atrial chamber dimension is moderately enlarged.     7. Right atrial chamber dimension is moderately enlarged.     8. There is no pericardial effusion.      Left heart catheterization results from December 2021 reviewed      Assessment:    Active Hospital Problems    Diagnosis    • **CHF exacerbation (HCC)    • Heart failure (HCC)      # Shortness of breath (complaint)  # Acute on chronic systolic congestive heart failure exacerbation with low left ventricular ejection fraction of 15 to 20%  # Nonischemic cardiomyopathy with history of left heart catheterization in December 2021 which showed  \"Angiographically normal left main, LAD and LCx.  Minimal luminal irregularities noted in proximal and mid segments of RCA. Nonischemic cardiomyopathy.\"  # Chronic kidney disease stage II-III  # Elevated troponin level nonspecific patient with known history of severe nonischemic cardiomyopathy  # Diabetes mellitus type 2  # Hyperlipidemia   # Obesity  # Obstructive sleep apnea       Addendum: Available lab, chest x-ray reviewed maintain on antibiotic for potential pneumonia pending further evaluation.  Replace hypokalemia.        Plan:  Place on telemetry  Obtain  stat laboratory work-up including CBC CMP troponin level, urine drug screen  Obtain a stat EKG  Obtain serial EKG and cardiac enzyme  Obtain stat chest x-ray  Diuretic therapy Bumex 1 mg twice daily  Continue baseline home medication Eliquis, Coreg, Entresto, statin  Place on aspirin  Avoid electrolyte " abnormalities  Accu-Chek ACH S  Insulin sliding scale  Diabetic diet, 2 g sodium diet  Comorbidities or chronic medical problems will be treated appropriately  Reconcile home medication and continue with essential home medications.  DVT and GI prophylaxis will be initiated  Please see orders for comprehensive plan        I confirmed that the patient's Advance Care Plan is present, code status is documented, or surrogate decision maker is listed in the patient's medical record.     I have utilized all available immediate resources to obtain, update, or review the patient's current medications.     I discussed the patient's findings and my recommendations with: Patient and he agreed with above plan of care.      Saeid Behroozi, MD   12/04/22   20:17 CST

## 2022-12-05 NOTE — PROGRESS NOTES
Adult Nutrition  Assessment/PES    Patient Name:  Matti Bravo  YOB: 1970  MRN: [id number]  Admit Date:  12/4/2022    Assessment Date:  12/5/2022    Comments:  51yo male admit w/ CHF and 2+ BLE edema, IV bumex BID. Hx PVD and DM. Glu wnl, no DM noted. Cardiac diet, no intakes available. Epic notes Sept 2022 wt 300+ # and # w/ BMI 31.4. Pt reports good appetite, and wt is down r/t fluids. Pt not able to be serious re: diet education- kept rhyming and telling jokes except to ask for fruit cups on tray. Will attach diet ed r/t low sodium to print w/ d/c. Monitor Glu for need of ADA diet as well. RD to follow hospital course.     Reason for Assessment     Row Name 12/05/22 0956          Reason for Assessment    Reason For Assessment identified at risk by screening criteria     Diagnosis cardiac disease     Identified At Risk by Screening Criteria need for education                Nutrition/Diet History     Row Name 12/05/22 0957          Nutrition/Diet History    Typical Intake (Food/Fluid/EN/PN) Pt states nkfa, no c/s problems. He reports good appetite, and wt is down r/t fluids. Pt not able to be serious re: diet education- kept rhyming and telling jokes     Food Preferences likes the fruit cups                Labs/Tests/Procedures/Meds     Row Name 12/05/22 0959          Labs/Procedures/Meds    Lab Results Reviewed reviewed     Lab Results Comments Glu 101, BUN 40H/Cr 1.5H, alb 3.8        Diagnostic Tests/Procedures    Diagnostic Test/Procedure Reviewed reviewed        Medications    Pertinent Medications Reviewed reviewed     Pertinent Medications Comments IV bumex BID                  Estimated/Assessed Needs - Anthropometrics     Row Name 12/05/22 0959 12/05/22 0300       Anthropometrics    Weight -- 123 kg (271 lb 13.2 oz)    Weight for Calculation 123 kg (271 lb) --       Estimated/Assessed Needs    Additional Documentation Fluid Requirements (Group);Estimated Calorie Needs  (Group);KCAL/KG (Group);Protein Requirements (Group) --       Estimated Calorie Needs    Estimated Calorie Requirement (kcal/day) 2200 --       KCAL/KG    KCAL/KG 18 Kcal/Kg (kcal) --    18 Kcal/Kg (kcal) 2212.65 --       Protein Requirements    Weight Used For Protein Calculations 123 kg (271 lb) --    Est Protein Requirement Amount (gms/kg) 0.8 gm protein --    Estimated Protein Requirements (gms/day) 98.34 --       Fluid Requirements    Fluid Requirements (mL/day) 2000 --    RDA Method (mL) 2000 --               Nutrition Prescription Ordered     Row Name 12/05/22 1000          Nutrition Prescription PO    Current PO Diet Regular     Common Modifiers Cardiac                     Problem/Interventions:   Problem 1     Row Name 12/05/22 1000          Nutrition Diagnoses Problem 1    Problem 1 Knowledge Deficit     Etiology (related to) Medical Diagnosis     Cardiac CHF     Signs/Symptoms (evidenced by) Potential Information Deficit                      Intervention Goal     Row Name 12/05/22 1000          Intervention Goal    General Maintain nutrition;Meet nutritional needs for age/condition;Reduce/improve symptoms;Improved nutrition related lab(s)     PO Establish PO;Meet estimated needs     Weight Maintain weight                Nutrition Intervention     Row Name 12/05/22 1001          Nutrition Intervention    RD/Tech Action Interview for preference;Encourage intake;Care plan reviewd;Follow Tx progress;Menu adjusted                  Education/Evaluation     Row Name 12/05/22 1001          Education    Education Education topics     Education Topics Na+        Monitor/Evaluation    Monitor PO intake;Pertinent labs;Weight;Symptoms     Education Follow-up Reinforce PRN                 Electronically signed by:  Sienna Whitaker RD  12/05/22 10:01 CST

## 2022-12-06 LAB
ANION GAP SERPL CALCULATED.3IONS-SCNC: 11 MMOL/L (ref 5–15)
BASOPHILS # BLD AUTO: 0.03 10*3/MM3 (ref 0–0.2)
BASOPHILS NFR BLD AUTO: 0.7 % (ref 0–1.5)
BUN SERPL-MCNC: 46 MG/DL (ref 6–20)
BUN/CREAT SERPL: 23.8 (ref 7–25)
CALCIUM SPEC-SCNC: 8.6 MG/DL (ref 8.6–10.5)
CHLORIDE SERPL-SCNC: 98 MMOL/L (ref 98–107)
CO2 SERPL-SCNC: 31 MMOL/L (ref 22–29)
CREAT SERPL-MCNC: 1.93 MG/DL (ref 0.76–1.27)
DEPRECATED RDW RBC AUTO: 46.6 FL (ref 37–54)
EGFRCR SERPLBLD CKD-EPI 2021: 41.4 ML/MIN/1.73
EOSINOPHIL # BLD AUTO: 0.08 10*3/MM3 (ref 0–0.4)
EOSINOPHIL NFR BLD AUTO: 2 % (ref 0.3–6.2)
ERYTHROCYTE [DISTWIDTH] IN BLOOD BY AUTOMATED COUNT: 14.4 % (ref 12.3–15.4)
GLUCOSE SERPL-MCNC: 103 MG/DL (ref 65–99)
HCT VFR BLD AUTO: 44 % (ref 37.5–51)
HGB BLD-MCNC: 14.3 G/DL (ref 13–17.7)
IMM GRANULOCYTES # BLD AUTO: 0.01 10*3/MM3 (ref 0–0.05)
IMM GRANULOCYTES NFR BLD AUTO: 0.2 % (ref 0–0.5)
LYMPHOCYTES # BLD AUTO: 1.21 10*3/MM3 (ref 0.7–3.1)
LYMPHOCYTES NFR BLD AUTO: 30 % (ref 19.6–45.3)
MCH RBC QN AUTO: 29.2 PG (ref 26.6–33)
MCHC RBC AUTO-ENTMCNC: 32.5 G/DL (ref 31.5–35.7)
MCV RBC AUTO: 90 FL (ref 79–97)
MONOCYTES # BLD AUTO: 0.67 10*3/MM3 (ref 0.1–0.9)
MONOCYTES NFR BLD AUTO: 16.6 % (ref 5–12)
NEUTROPHILS NFR BLD AUTO: 2.03 10*3/MM3 (ref 1.7–7)
NEUTROPHILS NFR BLD AUTO: 50.5 % (ref 42.7–76)
NRBC BLD AUTO-RTO: 0 /100 WBC (ref 0–0.2)
PLATELET # BLD AUTO: 140 10*3/MM3 (ref 140–450)
PMV BLD AUTO: 13.4 FL (ref 6–12)
POTASSIUM SERPL-SCNC: 3.2 MMOL/L (ref 3.5–5.2)
POTASSIUM SERPL-SCNC: 3.2 MMOL/L (ref 3.5–5.2)
POTASSIUM SERPL-SCNC: 4.2 MMOL/L (ref 3.5–5.2)
RBC # BLD AUTO: 4.89 10*6/MM3 (ref 4.14–5.8)
SODIUM SERPL-SCNC: 140 MMOL/L (ref 136–145)
WBC NRBC COR # BLD: 4.03 10*3/MM3 (ref 3.4–10.8)

## 2022-12-06 PROCEDURE — 80048 BASIC METABOLIC PNL TOTAL CA: CPT | Performed by: INTERNAL MEDICINE

## 2022-12-06 PROCEDURE — 25010000002 CEFTRIAXONE PER 250 MG: Performed by: INTERNAL MEDICINE

## 2022-12-06 PROCEDURE — G0378 HOSPITAL OBSERVATION PER HR: HCPCS

## 2022-12-06 PROCEDURE — 84132 ASSAY OF SERUM POTASSIUM: CPT | Performed by: INTERNAL MEDICINE

## 2022-12-06 PROCEDURE — 99214 OFFICE O/P EST MOD 30 MIN: CPT | Performed by: INTERNAL MEDICINE

## 2022-12-06 PROCEDURE — 85025 COMPLETE CBC W/AUTO DIFF WBC: CPT | Performed by: INTERNAL MEDICINE

## 2022-12-06 PROCEDURE — 96366 THER/PROPH/DIAG IV INF ADDON: CPT

## 2022-12-06 RX ORDER — BUMETANIDE 1 MG/1
1 TABLET ORAL 2 TIMES DAILY
Status: DISCONTINUED | OUTPATIENT
Start: 2022-12-06 | End: 2022-12-08 | Stop reason: HOSPADM

## 2022-12-06 RX ORDER — LOSARTAN POTASSIUM 25 MG/1
12.5 TABLET ORAL
Status: DISCONTINUED | OUTPATIENT
Start: 2022-12-07 | End: 2022-12-08 | Stop reason: HOSPADM

## 2022-12-06 RX ADMIN — APIXABAN 5 MG: 5 TABLET, FILM COATED ORAL at 20:12

## 2022-12-06 RX ADMIN — CEFTRIAXONE SODIUM 1 G: 1 INJECTION, POWDER, FOR SOLUTION INTRAMUSCULAR; INTRAVENOUS at 23:45

## 2022-12-06 RX ADMIN — ATORVASTATIN CALCIUM 10 MG: 10 TABLET, FILM COATED ORAL at 08:09

## 2022-12-06 RX ADMIN — Medication 10 ML: at 08:23

## 2022-12-06 RX ADMIN — DOXYCYCLINE 100 MG: 100 INJECTION, POWDER, LYOPHILIZED, FOR SOLUTION INTRAVENOUS at 13:46

## 2022-12-06 RX ADMIN — APIXABAN 5 MG: 5 TABLET, FILM COATED ORAL at 08:09

## 2022-12-06 RX ADMIN — BUMETANIDE 1 MG: 1 TABLET ORAL at 20:12

## 2022-12-06 RX ADMIN — POTASSIUM CHLORIDE 40 MEQ: 10 CAPSULE, COATED, EXTENDED RELEASE ORAL at 06:19

## 2022-12-06 RX ADMIN — DOXYCYCLINE 100 MG: 100 INJECTION, POWDER, LYOPHILIZED, FOR SOLUTION INTRAVENOUS at 00:21

## 2022-12-06 RX ADMIN — POTASSIUM CHLORIDE 40 MEQ: 10 CAPSULE, COATED, EXTENDED RELEASE ORAL at 02:29

## 2022-12-06 RX ADMIN — Medication 10 ML: at 20:26

## 2022-12-06 NOTE — SIGNIFICANT NOTE
12/06/22 1411   OTHER   Discipline occupational therapist;physical therapist   Rehab Time/Intention   Session Not Performed other (see comments)  (2nd PT/OT initial eval attempted; BP 89/45 supine, BP 78/60 long sitting; will f/u as able.)   Recommendation   PT - Next Appointment 12/07/22   Recommendation   OT - Next Appointment 12/07/22

## 2022-12-06 NOTE — PROGRESS NOTES
"[facility] [place] Progress Note   LOS: 1 day   Patient Care Team:  Shonna Ng APRN as PCP - General (Family Medicine)    Chief Complaint: \"shortness of breath\"       Subjective     Interval History:   Patient Denies: no complaints today  Patient Complaints: no complaints today  History taken from: patient chart     Patient denies any complaints today and reports orthopnea and shortness of breath improved. Some hypotension noted. Entresto stopped, patient has been off this awhile and was changed to losartan as an outpatient due to same concern of hypotension.     Objective     Vital Sign Min/Max for last 24 hours  Temp  Min: 96.9 °F (36.1 °C)  Max: 97.6 °F (36.4 °C)   BP  Min: 79/50  Max: 182/89   Pulse  Min: 57  Max: 80   Resp  Min: 18  Max: 24   SpO2  Min: 91 %  Max: 98 %   No data recorded   Weight  Min: 125 kg (275 lb 9.6 oz)  Max: 126 kg (277 lb 6.4 oz)     Flowsheet Rows    Flowsheet Row First Filed Value   Admission Height 198.1 cm (78\") Documented at 12/04/2022 1958   Admission Weight 123 kg (271 lb 13.2 oz) Documented at 12/04/2022 1958 12/05/22  0300 12/05/22  1750 12/06/22  0600   Weight: 123 kg (271 lb 13.2 oz) 125 kg (275 lb 9.6 oz) 126 kg (277 lb 6.4 oz)       Physical Exam:  Physical Exam  Vitals and nursing note reviewed.   Constitutional:       General: He is not in acute distress.     Appearance: He is well-groomed and overweight. He is not ill-appearing, toxic-appearing or diaphoretic.   HENT:      Head: Normocephalic.      Right Ear: External ear normal.      Left Ear: External ear normal.   Eyes:      General: Lids are normal.      Pupils: Pupils are equal, round, and reactive to light.   Neck:      Thyroid: No thyromegaly.      Vascular: No carotid bruit or JVD.      Trachea: No tracheal deviation.   Cardiovascular:      Rate and Rhythm: Normal rate and regular rhythm.      Heart sounds: Normal heart sounds. No murmur heard.    No friction rub. No gallop.   Pulmonary:      " Effort: Pulmonary effort is normal. No respiratory distress.      Breath sounds: Normal breath sounds. No stridor. No wheezing or rales.   Chest:      Chest wall: No tenderness.   Abdominal:      General: Bowel sounds are normal. There is no distension.      Palpations: Abdomen is soft.      Tenderness: There is no abdominal tenderness.   Skin:     General: Skin is warm and dry.      Capillary Refill: Capillary refill takes less than 2 seconds.      Findings: No erythema or rash.   Neurological:      Mental Status: He is alert and oriented to person, place, and time.      Cranial Nerves: No cranial nerve deficit.      Deep Tendon Reflexes: Reflexes are normal and symmetric. Reflexes normal.   Psychiatric:         Behavior: Behavior normal.         Thought Content: Thought content normal.         Judgment: Judgment normal.          Results Review:     Results from last 7 days   Lab Units 12/06/22  0201 12/05/22 1413 12/05/22 0434 12/04/22 2049 12/04/22  1450   SODIUM mmol/L 140  --  141 141 132*   POTASSIUM mmol/L 3.2*  3.2* 3.3* 2.8* 3.4* 2.9*   CHLORIDE mmol/L 98  --  95* 97* 92*   CO2 mmol/L 31.0*  --  32.0* 31.0*  --    BUN mg/dL 46*  --  40* 42* 46*   CREATININE mg/dL 1.93*  --  1.57* 1.78* 2.2*   CALCIUM mg/dL 8.6  --  9.1 9.6 9.3   BILIRUBIN mg/dL  --   --  2.5* 2.9* 3.40*   ALK PHOS U/L  --   --  60 59 64   ALT (SGPT) U/L  --   --  29 28 33   AST (SGOT) U/L  --   --  27 28 30   GLUCOSE mg/dL 103*  --  101* 126*  --        Estimated Creatinine Clearance: 65.3 mL/min (A) (by C-G formula based on SCr of 1.93 mg/dL (H)).    Results from last 7 days   Lab Units 12/05/22 0434 12/04/22 2049 12/04/22  1450   MAGNESIUM mg/dL 1.8 1.6 1.8             Results from last 7 days   Lab Units 12/06/22  0202 12/05/22 0434 12/04/22 2049 11/30/22  0238   WBC 10*3/mm3 4.03 3.83 4.38 4.88   HEMOGLOBIN g/dL 14.3 14.3 14.6 14.5   PLATELETS 10*3/mm3 140 132* 143 139*       Results from last 7 days   Lab Units 12/04/22 2049  12/04/22  1450   INR  2.50* 2.19*     Lab Results   Component Value Date    PROBNP 2,485.0 (H) 12/04/2022       I/O last 3 completed shifts:  In: 920 [P.O.:720; IV Piggyback:200]  Out: 1975 [Urine:1975]    Cardiographics:  ECG/EMG Results (last 24 hours)     Procedure Component Value Units Date/Time    SCANNED EKG [[id number]] Resulted: 12/04/22     Updated: 12/05/22 2009    SCANNED EKG [[id number]] Resulted: 12/04/22     Updated: 12/05/22 2019        Results for orders placed during the hospital encounter of 02/04/19    Adult Transthoracic Echo Limited W/ Cont if Necessary Per Protocol    Interpretation Summary  · 3D acquisition for left ventricular ejection fraction. Live 3D and full volume to assess left ventricular ejection fraction was utilized.  · Left ventricle systolic function is severely decreased. Estimated LVEF is 10%. Left ventricle cavity severely dilated with restrictive diastolic dysfunction. Findings are consistent with dilated cardiomyopathy.  · Right ventricle is mildly dilated with mildly reduced right ventricular systolic function.  · Moderate mitral valve regurgitation is present.  · Moderate tricuspid valve regurgitation is present. Pulmonary hypertension is present with a PA systolic pressure of 70 mmHg.  · There is mild pulmonic valve regurgitation present.  · There is a trivial pericardial effusion adjacent to the left ventricle      Imaging Results (Most Recent)     Procedure Component Value Units Date/Time    XR Chest 1 View [[id number]] Collected: 12/04/22 2037     Updated: 12/04/22 2104    Narrative:      PROCEDURE: XR CHEST 1 VIEW, 12/4/2022 8:37 PM CST    CLINICAL INDICATION:    sob.    COMPARISON: 11/30/22    TECHNIQUE:  AP portable radiograph of chest     FINDINGS:    Large cardiac silhouette. Retrocardiac opacification. Suspect at  least small left pleural effusion. No pneumothorax.      Impression:        Retrocardiac opacification is suspicious for infiltrate.    Large cardiac  silhouette again seen.    Suspect at least small left pleural effusion.          Electronically signed by:  Deacon Ac MD  12/4/2022 9:02 PM  CST Workstation: [id number]          XR Chest 2 View    Result Date: 11/30/2022  Rounded opacity projects over the mid and lower lung on the lateral view, which may be related to summation artifact or pneumonia. Consider short interval radiographic follow-up or chest CT Electronically signed by:  Raimundo Salas DO  11/30/2022 5:26 AM CST Workstation: [id number]    XR Chest 1 View    Result Date: 12/4/2022  Retrocardiac opacification is suspicious for infiltrate. Large cardiac silhouette again seen. Suspect at least small left pleural effusion. Electronically signed by:  Deaocn Ac MD  12/4/2022 9:02 PM CST Workstation: [id number]    XR Chest 1 View    Result Date: 12/4/2022  Stable left pacemaker. Stable cardiomegaly. Stable left lung base atelectasis, versus infiltrate with probable left pleural effusion.. Workstation: [id number]      Medication Review:     Current Facility-Administered Medications:   •  apixaban (ELIQUIS) tablet 5 mg, 5 mg, Oral, Q12H, Ayaan Cuba MD, 5 mg at 12/06/22 0809  •  atorvastatin (LIPITOR) tablet 10 mg, 10 mg, Oral, Daily, Ayaan Cuba MD, 10 mg at 12/06/22 0809  •  bumetanide (BUMEX) tablet 1 mg, 1 mg, Oral, BID, Vania Andujar APRN  •  carvedilol (COREG) tablet 12.5 mg, 12.5 mg, Oral, BID With Meals, Ayaan Cuba MD, 12.5 mg at 12/05/22 1720  •  cefTRIAXone (ROCEPHIN) 1 g/100 mL 0.9% NS (MBP), 1 g, Intravenous, Q24H, Ayaan Cuba MD, Stopped at 12/05/22 2333  •  doxycycline (VIBRAMYCIN) 100 mg/100 mL 0.9% NS MBP, 100 mg, Intravenous, Q12H, Ayaan Cuba MD, Stopped at 12/06/22 0130  •  [START ON 12/7/2022] losartan (COZAAR) half tablet 12.5 mg, 12.5 mg, Oral, Q24H, Vania Andujar, APRN  •  potassium chloride (MICRO-K) CR capsule 40 mEq, 40 mEq, Oral, PRN, 40 mEq at 12/06/22 0619 **OR** potassium  chloride (KLOR-CON) packet 40 mEq, 40 mEq, Oral, PRN **OR** potassium chloride 10 mEq in 100 mL IVPB, 10 mEq, Intravenous, Q1H PRN, Ayaan Cuba MD  •  sodium chloride 0.9 % flush 10 mL, 10 mL, Intravenous, Q12H, Ayaan Cuba MD, 10 mL at 12/06/22 0823  •  sodium chloride 0.9 % flush 10 mL, 10 mL, Intravenous, PRN, Ayaan Cuba MD  •  sodium chloride 0.9 % infusion 40 mL, 40 mL, Intravenous, PRN, Ayaan Cuba MD    Assessment & Plan       CHF exacerbation (HCC)    Heart failure (HCC)     #1. Abnormal Troponin not consistent with ACS.   This is likely secondary to acute medical illness. No high-risk features. EKG negative for acute changes.   Most recent troponin was 0.052. Trend is flat. LHC in 2021 was relatively normal.   -Continue with BB and statin.      #2. Chronic systolic CHF/NICM: EF 15-20%. NYHA Class II, Stage C.  Patient appears euvolemic with adequate perfusion and hemodynamics. Continue Eliquis 5mg BID, given severe LV dysfunction he is at risk for LV thrombus, OAC is still indicated.      BETA-BLOCKER:  carvedilol 12.5 mg twice daily  ACE/ARB/ENTRESTO: STOP Entresto due to hypotension, this was stopped already in outpatient setting and for reason was restarted during admission. Restart Losartan 12.5mg tomorrow  DIURETIC: change to po Bumex 1mg BID  SGL2I: should be considered  ALDOSTERONE ANTAGONIST: Patient reports could not tolerate spironolactone. Developed a cough and side effects. Given marginal b.p can reassess and consider a different MRA  IMDUR/HYDRALAZINE: N/A  DIGOXIN: N/A  FR: 1,500  NA Restrction: 2grams  ICD: yes [place] [employer]  8/2022  Daily weight  Strict intake/output  Continuous cardiac monitoring        Plan for disposition: Continue [address]. Likely d/c home tomorrow.               This document has been electronically signed by SHELL Ruvalcaba on December 6, 2022 10:09 CST     Electronically signed by SHELL Ruvalcaba, 12/06/22, 10:09 AM  CST.    I personally saw and examined Matti Bravo after the APRN.  I personally performed a history and physical examination of the patient.  I personally reviewed independent findings and plan of care.  I discussed management with the APRN.  I agree with the APRN's documentation.    Some low BP readings were noted overnight.  He has not had any chest discomfort.  Will switch IV Bumex to oral Bumex.  Will discontinue Entresto as he has not tolerated this in the past and switched to low-dose losartan.  Continue carvedilol and Eliquis therapy.  Serum K levels appeared to have improved later today.      Electronically signed by Dylon Aranda MD, 12/06/22, 5:08 PM CST.

## 2022-12-06 NOTE — SIGNIFICANT NOTE
12/06/22 0814   OTHER   Discipline occupational therapist   Rehab Time/Intention   Session Not Performed other (see comments)   Recommendation   OT - Next Appointment 12/07/22   OT eval attempted. OT spent 10 minutes with patient. Patient found sitting EOB eating breakfast. BP sitting EOB was 88/63. HR was 111. RN notified. RN reports she is going to hold BP medication this AM. OT will hold off for now until BP is more appropriate. OT will f/u as able.

## 2022-12-06 NOTE — PROGRESS NOTES
[facility] Medicine Services  INPATIENT PROGRESS NOTE    Length of Stay: 1  Date of Admission: 12/4/2022  Primary Care Physician: Shonna Ng APRN    Subjective   Chief Complaint: No new complaints.    HPI: Patient is seen for follow-up today 12/06/2022.  52-year-old male with history of nonischemic cardiomyopathy/chronic systolic heart failure (ejection fraction 15 to 20%) status post AICD placement, hypertension, chronic kidney disease, dyslipidemia who was admitted for non-STEMI and CHF decompensation.    Patient is doing better, denies chest pain, shortness of air, is less deconditioned and voices no new complaints.  He is maintaining O2 saturation in the mid 90s on room air.    Review of Systems   Constitutional: Positive for activity change and fatigue. Negative for appetite change, chills, diaphoresis and fever.   HENT: Negative for trouble swallowing and voice change.    Eyes: Negative for photophobia and visual disturbance.   Respiratory: Negative for cough, choking, chest tightness, shortness of breath, wheezing and stridor.    Cardiovascular: Positive for leg swelling. Negative for chest pain and palpitations.   Gastrointestinal: Negative for abdominal distention, abdominal pain, blood in stool, constipation, diarrhea, nausea and vomiting.   Endocrine: Negative for cold intolerance, heat intolerance, polydipsia, polyphagia and polyuria.   Genitourinary: Negative for decreased urine volume, difficulty urinating, dysuria, enuresis, flank pain, frequency, hematuria and urgency.   Musculoskeletal: Negative for arthralgias, gait problem, myalgias, neck pain and neck stiffness.   Skin: Negative for pallor, rash and wound.   Neurological: Negative for dizziness, tremors, seizures, syncope, facial asymmetry, speech difficulty, weakness, light-headedness, numbness and headaches.   Hematological: Does not bruise/bleed easily.   Psychiatric/Behavioral: Negative for  agitation, behavioral problems and confusion.       Objective    Temp:  [96.9 °F (36.1 °C)-97.6 °F (36.4 °C)] 97.6 °F (36.4 °C)  Heart Rate:  [57-80] 76  Resp:  [18-24] 18  BP: ()/(40-91) 102/69    AM-PAC 6 Clicks Score (PT): 24 (12/06/22 0810)    Physical Exam  Vitals and nursing note reviewed.   Constitutional:       General: He is not in acute distress.     Appearance: He is well-developed. He is obese. He is ill-appearing. He is not diaphoretic.   HENT:      Head: Normocephalic and atraumatic.   Eyes:      General: No scleral icterus.        Right eye: No discharge.         Left eye: No discharge.      Extraocular Movements: Extraocular movements intact.      Pupils: Pupils are equal, round, and reactive to light.   Neck:      Thyroid: No thyromegaly.      Vascular: No carotid bruit or JVD.   Cardiovascular:      Rate and Rhythm: Normal rate and regular rhythm.      Heart sounds: Normal heart sounds. No murmur heard.    No friction rub. No gallop.   Pulmonary:      Effort: Pulmonary effort is normal. No respiratory distress.      Breath sounds: Normal breath sounds. No stridor. No wheezing or rales.   Chest:      Chest wall: No tenderness.   Abdominal:      General: Bowel sounds are normal. There is no distension.      Palpations: Abdomen is soft. There is no mass.      Tenderness: There is no abdominal tenderness. There is no right CVA tenderness, left CVA tenderness, guarding or rebound.      Hernia: No hernia is present.   Musculoskeletal:         General: No swelling, tenderness or deformity.      Cervical back: Normal range of motion and neck supple.      Right lower leg: Edema present.      Left lower leg: Edema present.   Skin:     General: Skin is warm and dry.      Coloration: Skin is not jaundiced or pale.      Findings: No bruising, erythema, lesion or rash.   Neurological:      General: No focal deficit present.      Mental Status: He is alert and oriented to person, place, and time.       Cranial Nerves: No cranial nerve deficit.      Sensory: No sensory deficit.      Motor: No weakness or abnormal muscle tone.      Coordination: Coordination normal.      Gait: Gait normal.      Deep Tendon Reflexes: Reflexes normal.   Psychiatric:         Mood and Affect: Mood normal.         Behavior: Behavior normal.         Thought Content: Thought content normal.         Judgment: Judgment normal.           Medication Review:    Current Facility-Administered Medications:   •  apixaban (ELIQUIS) tablet 5 mg, 5 mg, Oral, Q12H, Ayaan Cuba MD, 5 mg at 12/06/22 0809  •  atorvastatin (LIPITOR) tablet 10 mg, 10 mg, Oral, Daily, Ayaan Cuba MD, 10 mg at 12/06/22 0809  •  bumetanide (BUMEX) tablet 1 mg, 1 mg, Oral, BID, Vania Andujar APRN  •  carvedilol (COREG) tablet 12.5 mg, 12.5 mg, Oral, BID With Meals, Ayaan Cuba MD, 12.5 mg at 12/05/22 1720  •  cefTRIAXone (ROCEPHIN) 1 g/100 mL 0.9% NS (MBP), 1 g, Intravenous, Q24H, Ayaan Cuba MD, Stopped at 12/05/22 2333  •  doxycycline (VIBRAMYCIN) 100 mg/100 mL 0.9% NS MBP, 100 mg, Intravenous, Q12H, Ayaan Cuba MD, Stopped at 12/06/22 0130  •  [START ON 12/7/2022] losartan (COZAAR) half tablet 12.5 mg, 12.5 mg, Oral, Q24H, Vania Andujar APRN  •  potassium chloride (MICRO-K) CR capsule 40 mEq, 40 mEq, Oral, PRN, 40 mEq at 12/06/22 0619 **OR** potassium chloride (KLOR-CON) packet 40 mEq, 40 mEq, Oral, PRN **OR** potassium chloride 10 mEq in 100 mL IVPB, 10 mEq, Intravenous, Q1H PRN, Ayaan Cuba MD  •  sodium chloride 0.9 % flush 10 mL, 10 mL, Intravenous, Q12H, Ayaan Cuba MD, 10 mL at 12/06/22 0823  •  sodium chloride 0.9 % flush 10 mL, 10 mL, Intravenous, PRN, Ayaan Cuba MD  •  sodium chloride 0.9 % infusion 40 mL, 40 mL, Intravenous, PRN, Ayaan Cuba MD    Results Review:  I have reviewed the labs, radiology results, and diagnostic studies.    Laboratory Data:   Results from last 7 days    Lab Units 12/06/22  1011 12/06/22  0201 12/05/22  1413 12/05/22 0434 12/04/22 2049 12/04/22  1450   SODIUM mmol/L  --  140  --  141 141 132*   POTASSIUM mmol/L 4.2 3.2*  3.2* 3.3* 2.8* 3.4* 2.9*   CHLORIDE mmol/L  --  98  --  95* 97* 92*   CO2 mmol/L  --  31.0*  --  32.0* 31.0*  --    BUN mg/dL  --  46*  --  40* 42* 46*   CREATININE mg/dL  --  1.93*  --  1.57* 1.78* 2.2*   GLUCOSE mg/dL  --  103*  --  101* 126*  --    CALCIUM mg/dL  --  8.6  --  9.1 9.6 9.3   BILIRUBIN mg/dL  --   --   --  2.5* 2.9* 3.40*   ALK PHOS U/L  --   --   --  60 59 64   ALT (SGPT) U/L  --   --   --  29 28 33   AST (SGOT) U/L  --   --   --  27 28 30   ANION GAP mmol/L  --  11.0  --  14.0 13.0  --      Estimated Creatinine Clearance: 65.3 mL/min (A) (by C-G formula based on SCr of 1.93 mg/dL (H)).  Results from last 7 days   Lab Units 12/05/22 0434 12/04/22 2049 12/04/22  1450   MAGNESIUM mg/dL 1.8 1.6 1.8         Results from last 7 days   Lab Units 12/06/22  0202 12/05/22 0434 12/04/22 2049 11/30/22  0238   WBC 10*3/mm3 4.03 3.83 4.38 4.88   HEMOGLOBIN g/dL 14.3 14.3 14.6 14.5   HEMATOCRIT % 44.0 44.5 45.3 44.3   PLATELETS 10*3/mm3 140 132* 143 139*     Results from last 7 days   Lab Units 12/04/22 2049 12/04/22  1450   INR  2.50* 2.19*       Culture Data:   Blood Culture   Date Value Ref Range Status   12/04/2022 No growth at 24 hours  Preliminary   12/04/2022 Abnormal Stain (C)  Preliminary     No results found for: URINECX  No results found for: RESPCX  No results found for: WOUNDCX  No results found for: STOOLCX  No components found for: BODYFLD    Radiology Data:   Imaging Results (Last 24 Hours)     ** No results found for the last 24 hours. **          I have reviewed the patient's current medications.     Assessment/Plan     Hospital Problem List:  Principal Problem:    CHF exacerbation (HCC)  Active Problems:    Heart failure (HCC)    Possible Non-STEMI: Patient is chest pain-free.  Elevated troponin has been attributed  to acute illness as patient has had previous invasive coronary angiogram which did not show any evidence of atherosclerosis.  Continue guideline directed medical therapy.  Input by cardiologist is appreciated.  Previous echocardiogram done 8/18/2022 showed an estimated ejection fraction of 15 to 20%    Acute on chronic systolic heart failure: Patient currently seems euvolemic and Entresto has been discontinued secondary to borderline blood pressure.  Continue Bumex, beta-blocker with strict I's and O's, daily weights, salt and fluid restriction.  He is to be started on low-dose losartan tomorrow.  He is to continue Eliquis 5 mg twice daily secondary to risk of LV thrombus.  Repeat chest x-ray in a.m.    Chronic kidney disease stage II/III: Patient's baseline creatinine is about 1.9-2.0.  Creatinine is at baseline.  Continue to monitor and consult nephrologist if the need arises.    Hypokalemia: Resolved. Magnesium level is normal.    Possible retrocardiac infiltrate: Continue IV Rocephin.  Anaerobic blood culture showed staph species but updated blood culture showed no growth.    Deconditioning: Continue PT and OT.    Continue GI and DVT prophylaxis.    Discharge Planning: Likely discharge in a.m.      I confirmed that the patient's Advance Care Plan is present, code status is documented, or surrogate decision maker is listed in the patient's medical record.     I have utilized all available immediate resources to obtain, update, or review the patient's current medications      Ayaan Cuba MD   12/06/22   11:24 CST

## 2022-12-06 NOTE — PLAN OF CARE
Problem: Adult Inpatient Plan of Care  Goal: Plan of Care Review  Outcome: Ongoing, Progressing  Flowsheets (Taken 12/6/2022 0333)  Progress: no change  Plan of Care Reviewed With: patient     Problem: Adult Inpatient Plan of Care  Goal: Patient-Specific Goal (Individualized)  Outcome: Ongoing, Progressing     Problem: Adult Inpatient Plan of Care  Goal: Absence of Hospital-Acquired Illness or Injury  Outcome: Ongoing, Progressing  Intervention: Identify and Manage Fall Risk  Recent Flowsheet Documentation  Taken 12/6/2022 0200 by Jessica Shipman RN  Safety Promotion/Fall Prevention: safety round/check completed  Taken 12/6/2022 0000 by Jessica Shipman RN  Safety Promotion/Fall Prevention: safety round/check completed  Taken 12/5/2022 2221 by Jessica Shipman RN  Safety Promotion/Fall Prevention: safety round/check completed  Taken 12/5/2022 2200 by Jessica Shipman RN  Safety Promotion/Fall Prevention: safety round/check completed  Taken 12/5/2022 2054 by Jessica Shipman RN  Safety Promotion/Fall Prevention: safety round/check completed  Taken 12/5/2022 1900 by Jessica Shipman RN  Safety Promotion/Fall Prevention: safety round/check completed  Intervention: Prevent and Manage VTE (Venous Thromboembolism) Risk  Recent Flowsheet Documentation  Taken 12/5/2022 2054 by Jessica Shipman RN  Activity Management: activity adjusted per tolerance   Goal Outcome Evaluation:  Plan of Care Reviewed With: patient        Progress: no change

## 2022-12-06 NOTE — NURSING NOTE
SBAR report called to MODE Carey on [address]. Pt transported via W/C with tele monitor in place and accompanied by Transporter to Room 306

## 2022-12-07 ENCOUNTER — APPOINTMENT (OUTPATIENT)
Dept: GENERAL RADIOLOGY | Facility: HOSPITAL | Age: 52
End: 2022-12-07

## 2022-12-07 LAB
ANION GAP SERPL CALCULATED.3IONS-SCNC: 10 MMOL/L (ref 5–15)
BACTERIA SPEC AEROBE CULT: ABNORMAL
BASOPHILS # BLD AUTO: 0.05 10*3/MM3 (ref 0–0.2)
BASOPHILS NFR BLD AUTO: 0.9 % (ref 0–1.5)
BUN SERPL-MCNC: 35 MG/DL (ref 6–20)
BUN/CREAT SERPL: 22.9 (ref 7–25)
CALCIUM SPEC-SCNC: 8.7 MG/DL (ref 8.6–10.5)
CHLORIDE SERPL-SCNC: 101 MMOL/L (ref 98–107)
CO2 SERPL-SCNC: 29 MMOL/L (ref 22–29)
CREAT SERPL-MCNC: 1.53 MG/DL (ref 0.76–1.27)
DEPRECATED RDW RBC AUTO: 48.6 FL (ref 37–54)
EGFRCR SERPLBLD CKD-EPI 2021: 54.7 ML/MIN/1.73
EOSINOPHIL # BLD AUTO: 0.25 10*3/MM3 (ref 0–0.4)
EOSINOPHIL NFR BLD AUTO: 4.5 % (ref 0.3–6.2)
ERYTHROCYTE [DISTWIDTH] IN BLOOD BY AUTOMATED COUNT: 14.6 % (ref 12.3–15.4)
GLUCOSE BLDC GLUCOMTR-MCNC: 109 MG/DL (ref 70–130)
GLUCOSE SERPL-MCNC: 65 MG/DL (ref 65–99)
GRAM STN SPEC: ABNORMAL
HCT VFR BLD AUTO: 44.8 % (ref 37.5–51)
HGB BLD-MCNC: 14.6 G/DL (ref 13–17.7)
IMM GRANULOCYTES # BLD AUTO: 0.02 10*3/MM3 (ref 0–0.05)
IMM GRANULOCYTES NFR BLD AUTO: 0.4 % (ref 0–0.5)
ISOLATED FROM: ABNORMAL
LYMPHOCYTES # BLD AUTO: 1.43 10*3/MM3 (ref 0.7–3.1)
LYMPHOCYTES NFR BLD AUTO: 25.9 % (ref 19.6–45.3)
MAGNESIUM SERPL-MCNC: 1.7 MG/DL (ref 1.6–2.6)
MCH RBC QN AUTO: 29.7 PG (ref 26.6–33)
MCHC RBC AUTO-ENTMCNC: 32.6 G/DL (ref 31.5–35.7)
MCV RBC AUTO: 91.2 FL (ref 79–97)
MONOCYTES # BLD AUTO: 1.05 10*3/MM3 (ref 0.1–0.9)
MONOCYTES NFR BLD AUTO: 19 % (ref 5–12)
NEUTROPHILS NFR BLD AUTO: 2.73 10*3/MM3 (ref 1.7–7)
NEUTROPHILS NFR BLD AUTO: 49.3 % (ref 42.7–76)
NRBC BLD AUTO-RTO: 0 /100 WBC (ref 0–0.2)
PLATELET # BLD AUTO: 150 10*3/MM3 (ref 140–450)
PMV BLD AUTO: 13.4 FL (ref 6–12)
POTASSIUM SERPL-SCNC: 3.4 MMOL/L (ref 3.5–5.2)
POTASSIUM SERPL-SCNC: 3.8 MMOL/L (ref 3.5–5.2)
RBC # BLD AUTO: 4.91 10*6/MM3 (ref 4.14–5.8)
SODIUM SERPL-SCNC: 140 MMOL/L (ref 136–145)
WBC NRBC COR # BLD: 5.53 10*3/MM3 (ref 3.4–10.8)

## 2022-12-07 PROCEDURE — 97166 OT EVAL MOD COMPLEX 45 MIN: CPT

## 2022-12-07 PROCEDURE — 83735 ASSAY OF MAGNESIUM: CPT | Performed by: INTERNAL MEDICINE

## 2022-12-07 PROCEDURE — 97161 PT EVAL LOW COMPLEX 20 MIN: CPT

## 2022-12-07 PROCEDURE — 71046 X-RAY EXAM CHEST 2 VIEWS: CPT

## 2022-12-07 PROCEDURE — 84132 ASSAY OF SERUM POTASSIUM: CPT | Performed by: INTERNAL MEDICINE

## 2022-12-07 PROCEDURE — G0378 HOSPITAL OBSERVATION PER HR: HCPCS

## 2022-12-07 PROCEDURE — 80048 BASIC METABOLIC PNL TOTAL CA: CPT | Performed by: INTERNAL MEDICINE

## 2022-12-07 PROCEDURE — 82962 GLUCOSE BLOOD TEST: CPT

## 2022-12-07 PROCEDURE — 85025 COMPLETE CBC W/AUTO DIFF WBC: CPT | Performed by: INTERNAL MEDICINE

## 2022-12-07 PROCEDURE — 99214 OFFICE O/P EST MOD 30 MIN: CPT | Performed by: INTERNAL MEDICINE

## 2022-12-07 RX ADMIN — DOXYCYCLINE 100 MG: 100 INJECTION, POWDER, LYOPHILIZED, FOR SOLUTION INTRAVENOUS at 00:00

## 2022-12-07 RX ADMIN — Medication 10 ML: at 09:39

## 2022-12-07 RX ADMIN — POTASSIUM CHLORIDE 40 MEQ: 10 CAPSULE, COATED, EXTENDED RELEASE ORAL at 14:14

## 2022-12-07 RX ADMIN — CARVEDILOL 12.5 MG: 12.5 TABLET, FILM COATED ORAL at 09:38

## 2022-12-07 RX ADMIN — POTASSIUM CHLORIDE 40 MEQ: 10 CAPSULE, COATED, EXTENDED RELEASE ORAL at 09:38

## 2022-12-07 RX ADMIN — APIXABAN 5 MG: 5 TABLET, FILM COATED ORAL at 09:38

## 2022-12-07 RX ADMIN — BUMETANIDE 1 MG: 1 TABLET ORAL at 09:38

## 2022-12-07 RX ADMIN — Medication 10 ML: at 20:17

## 2022-12-07 RX ADMIN — BUMETANIDE 1 MG: 1 TABLET ORAL at 20:17

## 2022-12-07 RX ADMIN — ATORVASTATIN CALCIUM 10 MG: 10 TABLET, FILM COATED ORAL at 09:38

## 2022-12-07 RX ADMIN — APIXABAN 5 MG: 5 TABLET, FILM COATED ORAL at 20:17

## 2022-12-07 NOTE — PLAN OF CARE
Problem: Heart Failure Comorbidity  Goal: Maintenance of Heart Failure Symptom Control  Intervention: Maintain Heart Failure-Management  Recent Flowsheet Documentation  Taken 12/6/2022 1930 by Aj Manzano RN  Medication Review/Management: medications reviewed     Problem: Hypertension Comorbidity  Goal: Blood Pressure in Desired Range  Outcome: Ongoing, Progressing  Intervention: Maintain Blood Pressure Management  Recent Flowsheet Documentation  Taken 12/6/2022 1930 by Aj Manzano, RN  Medication Review/Management: medications reviewed     Problem: Obstructive Sleep Apnea Risk or Actual Comorbidity Management  Goal: Unobstructed Breathing During Sleep  Outcome: Ongoing, Progressing   Goal Outcome Evaluation:

## 2022-12-07 NOTE — THERAPY DISCHARGE NOTE
Patient Name: Matti Bravo  : 1970    MRN: [id number]                              Today's Date: 2022       Admit Date: 2022    Visit Dx:     ICD-10-CM ICD-9-CM   1. Impaired mobility and ADLs  Z74.09 V49.89    Z78.9    2. Impaired functional mobility, balance, gait, and endurance  Z74.09 V49.89     Patient Active Problem List   Diagnosis   • Chronic systolic heart failure (HCC)   • Essential hypertension   • Mixed hyperlipidemia   • Obstructive sleep apnea   • Morbid obesity (HCC)   • Restrictive lung disease secondary to obesity   • Multiple lung nodules on CT   • Diabetes mellitus (HCC)   • Varicose veins of both lower extremities with pain   • Venous insufficiency (chronic) (peripheral)   • Surgical aftercare, circulatory system   • Chest pain   • Acute on chronic congestive heart failure (HCC)   • Abnormal nuclear stress test   • Acute congestive heart failure (HCC)   • Obesity (BMI 30-39.9)   • Hypotension   • Acute congestive heart failure, unspecified heart failure type (HCC)   • Shortness of breath   • NICM (nonischemic cardiomyopathy) (HCC)   • Pulmonary hypertension (HCC)   • CHF exacerbation (HCC)   • Heart failure (HCC)     Past Medical History:   Diagnosis Date   • Asthma    • Chronic systolic heart failure (HCC)    • Diabetes mellitus (HCC)    • Hyperlipidemia    • Hypertension    • Obesity    • Peripheral vascular disease (HCC)    • Sleep apnea      Past Surgical History:   Procedure Laterality Date   • CARDIAC CATHETERIZATION N/A 2021   • CARDIAC DEFIBRILLATOR PLACEMENT     • VARICOSE VEIN SURGERY Right 2019      General Information     Row Name 22 0845          Physical Therapy Time and Intention    Document Type evaluation  -CZ     Mode of Treatment physical therapy;occupational therapy  -CZ     Row Name 22 0845          General Information    Patient Profile Reviewed yes  -CZ     Prior Level of Function independent:;all household mobility  -CZ      Barriers to Rehab medically complex  -CZ     Row Name 12/07/22 0845          Living Environment    People in Home spouse  -CZ     Row Name 12/07/22 0845          Home Main Entrance    Stair Railings, Main Entrance none  -CZ     Row Name 12/07/22 0845          Stairs Within Home, Primary    Stairs, Within Home, Primary (I) ambulation without an AD.  -CZ     Number of Stairs, Within Home, Primary none  -CZ     Row Name 12/07/22 0845          Cognition    Orientation Status (Cognition) oriented x 4  -CZ           User Key  (r) = Recorded By, (t) = Taken By, (c) = Cosigned By    Initials Name Provider Type    CZ Karl Rueda, PT Physical Therapist               Mobility     Row Name 12/07/22 0845          Bed Mobility    Bed Mobility supine-sit;sit-supine  -CZ     Supine-Sit [place] (Bed Mobility) modified independence  -CZ     Sit-Supine [place] (Bed Mobility) modified independence  -CZ     Assistive Device (Bed Mobility) head of bed elevated  -CZ     Row Name 12/07/22 0845          Sit-Stand Transfer    Sit-Stand [place] (Transfers) independent  -CZ     Row Name 12/07/22 0845          Gait/Stairs (Locomotion)    [place] Level (Gait) independent  -CZ     Distance in Feet (Gait) 30'x1.  -CZ     Number of Steps (Stairs) No AD, no LOB, able to moonwalk.  -CZ           User Key  (r) = Recorded By, (t) = Taken By, (c) = Cosigned By    Initials Name Provider Type    CZ Karl Rueda, PT Physical Therapist               Obj/Interventions     Row Name 12/07/22 0845          Range of Motion Comprehensive    General Range of Motion bilateral lower extremity ROM WFL  -CZ     Row Name 12/07/22 0845          Strength Comprehensive (MMT)    General Manual Muscle Testing (MMT) Assessment no strength deficits identified  -CZ     Row Name 12/07/22 0845          Sensory Assessment (Somatosensory)    Sensory Assessment (Somatosensory) LE sensation intact  -CZ           User Key  (r) = Recorded By, (t) =  Taken By, (c) = Cosigned By    Initials Name Provider Type    CZ Karl Rueda, PT Physical Therapist               Goals/Plan     Row Name 12/07/22 0845          Bed Mobility Goal 1 (PT)    Activity/Assistive Device (Bed Mobility Goal 1, PT) --  -CZ           User Key  (r) = Recorded By, (t) = Taken By, (c) = Cosigned By    Initials Name Provider Type    CZ Karl Rueda, PT Physical Therapist               Clinical Impression     Row Name 12/07/22 0845          Pain    Pretreatment Pain Rating 0/10 - no pain  -CZ     Posttreatment Pain Rating 0/10 - no pain  -CZ     Row Name 12/07/22 0845          Plan of Care Review    Outcome Evaluation Iniitial PT evaluation complete, co-evaluation with OT.  Patient is alert and cooperative, eager to discharge.  Patient demonstrates (I) with bed mobility, transfers and gait.  He ambulates 30'x1 without an AD, no LOB, demonstrates superb multani-walking skills.  Tinetti assessed: 28/28 or low fall risk, FWW not necessary.  HR steady at 60 BPM, /76.  Patient is at PLOF, demonstrates (I) with functional mobility, is low risk for falls.  No further PT indicated, will update RN.  -CZ     Row Name 12/07/22 0845          Therapy Assessment/Plan (PT)    Criteria for Skilled Interventions Met (PT) no;no problems identified which require skilled intervention  -CZ     Therapy Frequency (PT) evaluation only  -CZ     Row Name 12/07/22 0845          Vital Signs    Pre Systolic BP Rehab 128  -CZ     Pre Treatment Diastolic BP 76  -CZ     Pretreatment Heart Rate (beats/min) 60  -CZ     Pre SpO2 (%) 97  -CZ     O2 Delivery Pre Treatment room air  -CZ     Pre Patient Position Sitting  -CZ     Row Name 12/07/22 0845          Positioning and Restraints    Pre-Treatment Position in bed  -CZ     Post Treatment Position bed  -CZ     In Bed sitting EOB;call light within reach  -CZ           User Key  (r) = Recorded By, (t) = Taken By, (c) = Cosigned By    Initials Name Provider Type    CZ  "Karl Rueda, PT Physical Therapist               Outcome Measures     Row Name 12/07/22 0845          How much help from another person do you currently need...    Turning from your back to your side while in flat bed without using bedrails? 4  -CZ     Moving from lying on back to sitting on the side of a flat bed without bedrails? 4  -CZ     Moving to and from a bed to a chair (including a wheelchair)? 4  -CZ     Standing up from a chair using your arms (e.g., wheelchair, bedside chair)? 4  -CZ     Climbing 3-5 steps with a railing? 4  -CZ     To walk in hospital room? 4  -CZ     AM-PAC 6 Clicks Score (PT) 24  -CZ     Highest level of mobility 8 --> Walked 250 feet or more  -CZ     Row Name 12/07/22 0845          Tinetti Assessment    Tinetti Assessment yes  -CZ     Sitting Balance 1  -CZ     Arises 2  -CZ     Attempts to Rise 2  -CZ     Immediate Standing Balance (first 5 sec) 2  -CZ     Standing Balance 2  -CZ     Sternal Nudge (feet close together) 2  -CZ     Eyes Closed (feet close together) 1  -CZ     Turning 360 Degrees- Steps 1  -CZ     Turning 360 Degrees- Steadiness 1  -CZ     Sitting Down 2  -CZ     Tinetti Balance Score 16  -CZ     Gait Initiation (immediate after told \"go\") 1  -CZ     Step Length- Right Swing 1  -CZ     Step Length- Left Swing 1  -CZ     Foot Clearance- Right Foot 1  -CZ     Foot Clearance- Left Foot 1  -CZ     Step Symmetry 1  -CZ     Step Continuity 1  -CZ     Path (excursion) 2  -CZ     Trunk 2  -CZ     Base of Support 1  -CZ     Gait Score 12  -CZ     Tinetti Total Score 28  -CZ     Tinetti Assessment Comments No AD.  -CZ     Row Name 12/07/22 0845 12/07/22 0844       Functional Assessment    Outcome Measure Options AM-PAC 6 Clicks Basic Mobility (PT);Tinetti  -CZ AM-PAC 6 Clicks Daily Activity (OT)  -CM          User Key  (r) = Recorded By, (t) = Taken By, (c) = Cosigned By    Initials Name Provider Type    CZ Karl Rueda, PT Physical Therapist    Ruth Rubalcava, ABIGAIL " Occupational Therapist              Physical Therapy Education     Title: PT OT SLP Therapies (In Progress)     Topic: Physical Therapy (In Progress)     Point: Mobility training (Not Started)     Learner Progress:  Not documented in this visit.          Point: Home exercise program (Not Started)     Learner Progress:  Not documented in this visit.          Point: Body mechanics (Not Started)     Learner Progress:  Not documented in this visit.          Point: Precautions (Done)     Learning Progress Summary           Patient Acceptance, E, VU by  at 12/7/2022 0904    Comment: Tinetti assessed: 28/28 or low fall risk, FWW not necessary.                               User Key     Initials Effective Dates Name Provider Type Discipline     09/18/22 -  Karl Rueda, PT Physical Therapist PT              PT Recommendation and Plan     Outcome Evaluation: Iniitial PT evaluation complete, co-evaluation with OT.  Patient is alert and cooperative, eager to discharge.  Patient demonstrates (I) with bed mobility, transfers and gait.  He ambulates 30'x1 without an AD, no LOB, demonstrates superb multani-walking skills.  Tinetti assessed: 28/28 or low fall risk, FWW not necessary.  HR steady at 60 BPM, /76.  Patient is at PLOF, demonstrates (I) with functional mobility, is low risk for falls.  No further PT indicated, will update RN.     Time Calculation:    PT Charges     Row Name 12/07/22 0908             Time Calculation    Start Time 0845  -      Stop Time 0908  -      Time Calculation (min) 23 min  -CZ      PT Received On 12/07/22  -      PT Goal Re-Cert Due Date 12/07/22  -         Untimed Charges    PT Eval/Re-eval Minutes 23  -CZ         Total Minutes    Untimed Charges Total Minutes 23  -CZ       Total Minutes 23  -CZ            User Key  (r) = Recorded By, (t) = Taken By, (c) = Cosigned By    Initials Name Provider Type     Karl Rueda, PT Physical Therapist              Therapy Charges for  Today     Code Description Service Date Service Provider Modifiers Qty    35382457056 HC PT EVAL LOW COMPLEXITY 2 12/7/2022 Karl Rueda, PT GP 1          PT G-Codes  Outcome Measure Options: AM-PAC 6 Clicks Basic Mobility (PT), Tinetti  AM-PAC 6 Clicks Score (PT): 24  AM-PAC 6 Clicks Score (OT): 24  Tinetti Total Score: 28    PT Discharge Summary  Anticipated Discharge Disposition (PT): home    Karl Rueda, PT  12/7/2022

## 2022-12-07 NOTE — PLAN OF CARE
Goal Outcome Evaluation:  Plan of Care Reviewed With: patient           Outcome Evaluation: OT initial eval completed. Co-eval with PT. Pt reports he is (I) with all ADLs and IADLs. Able to complete functional mobility in room (I). Demonstrated BUE 5/5 MMT. Reported he has been toileting and showering in bathroom (I). OT will sign off d/t (I) with ADLs.

## 2022-12-07 NOTE — PLAN OF CARE
Goal Outcome Evaluation:              Outcome Evaluation: Iniitial PT evaluation complete, co-evaluation with OT.  Patient is alert and cooperative, eager to discharge.  Patient demonstrates (I) with bed mobility, transfers and gait.  He ambulates 30'x1 without an AD, no LOB, demonstrates superb multani-walking skills.  Tinetti assessed: 28/28 or low fall risk, FWW not necessary.  HR steady at 60 BPM, /76.  Patient is at PLOF, demonstrates (I) with functional mobility, is low risk for falls.  No further PT indicated, will update RN.

## 2022-12-07 NOTE — THERAPY DISCHARGE NOTE
Acute Care - Occupational Therapy Discharge  [facility]    Patient Name: Matti Bravo  : 1970    MRN: [id number]                              Today's Date: 2022       Admit Date: 2022    Visit Dx:     ICD-10-CM ICD-9-CM   1. Impaired mobility and ADLs  Z74.09 V49.89    Z78.9      Patient Active Problem List   Diagnosis   • Chronic systolic heart failure (HCC)   • Essential hypertension   • Mixed hyperlipidemia   • Obstructive sleep apnea   • Morbid obesity (HCC)   • Restrictive lung disease secondary to obesity   • Multiple lung nodules on CT   • Diabetes mellitus (HCC)   • Varicose veins of both lower extremities with pain   • Venous insufficiency (chronic) (peripheral)   • Surgical aftercare, circulatory system   • Chest pain   • Acute on chronic congestive heart failure (HCC)   • Abnormal nuclear stress test   • Acute congestive heart failure (HCC)   • Obesity (BMI 30-39.9)   • Hypotension   • Acute congestive heart failure, unspecified heart failure type (HCC)   • Shortness of breath   • NICM (nonischemic cardiomyopathy) (HCC)   • Pulmonary hypertension (HCC)   • CHF exacerbation (HCC)   • Heart failure (HCC)     Past Medical History:   Diagnosis Date   • Asthma    • Chronic systolic heart failure (HCC)    • Diabetes mellitus (HCC)    • Hyperlipidemia    • Hypertension    • Obesity    • Peripheral vascular disease (HCC)    • Sleep apnea      Past Surgical History:   Procedure Laterality Date   • CARDIAC CATHETERIZATION N/A 2021   • CARDIAC DEFIBRILLATOR PLACEMENT     • VARICOSE VEIN SURGERY Right 2019      General Information     Row Name 22 0844          OT Time and Intention    Document Type evaluation  -CM     Mode of Treatment physical therapy;occupational therapy;co-treatment  -CM     Row Name 22 0844          General Information    Patient Profile Reviewed yes  -CM     Prior Level of Function independent:;all household mobility;ADL's  -CM     Row  Name 12/07/22 0844          Living Environment    People in Home spouse  -CM     Row Name 12/07/22 0844          Stairs Within Home, Primary    Stairs, Within Home, Primary (I) with functional mobility without AD.  -CM     Row Name 12/07/22 0844          Cognition    Orientation Status (Cognition) oriented x 4  -CM           User Key  (r) = Recorded By, (t) = Taken By, (c) = Cosigned By    Initials Name Provider Type    Ruth Rubalcava OT Occupational Therapist               Mobility/ADL's     Row Name 12/07/22 0844          Bed Mobility    Bed Mobility supine-sit  -CM     Supine-Sit [place] (Bed Mobility) modified independence  -CM     Assistive Device (Bed Mobility) head of bed elevated  -CM     Row Name 12/07/22 0844          Transfers    Transfers sit-stand transfer;stand-sit transfer  -CM     Row Name 12/07/22 0844          Sit-Stand Transfer    Sit-Stand [place] (Transfers) independent  -CM     Row Name 12/07/22 0844          Stand-Sit Transfer    Stand-Sit [place] (Transfers) independent  -CM     Row Name 12/07/22 0844          Functional Mobility    Functional Mobility- Ind. Level independent  -CM           User Key  (r) = Recorded By, (t) = Taken By, (c) = Cosigned By    Initials Name Provider Type    Ruth Rubalcava OT Occupational Therapist               Obj/Interventions     Row Name 12/07/22 0844          Sensory Assessment (Somatosensory)    Sensory Assessment (Somatosensory) UE sensation intact  -CM     Row Name 12/07/22 0844          Range of Motion Comprehensive    General Range of Motion bilateral upper extremity ROM WFL  -CM     Row Name 12/07/22 0844          Strength Comprehensive (MMT)    General Manual Muscle Testing (MMT) Assessment no strength deficits identified  -CM     Comment, General Manual Muscle Testing (MMT) Assessment BUEs 5/5 grossly  -CM           User Key  (r) = Recorded By, (t) = Taken By, (c) = Cosigned By    Initials Name Provider Type    CM Ruth Ribera  OT Occupational Therapist               Goals/Plan    No documentation.                Clinical Impression     Row Name 12/07/22 0844          Pain Assessment    Pretreatment Pain Rating 0/10 - no pain  -CM     Posttreatment Pain Rating 0/10 - no pain  -CM     Row Name 12/07/22 0844          Plan of Care Review    Plan of Care Reviewed With patient  -CM     Outcome Evaluation OT initial eval completed. Co-eval with PT. Pt reports he is (I) with all ADLs and IADLs. Able to complete functional mobility in room (I). Demonstrated BUE 5/5 MMT. Reported he has been toileting and showering in bathroom (I). OT will sign off d/t (I) with ADLs.  -CM     Row Name 12/07/22 0844          Therapy Assessment/Plan (OT)    Therapy Frequency (OT) evaluation only  -CM     Row Name 12/07/22 0844          Therapy Plan Review/Discharge Plan (OT)    Anticipated Discharge Disposition (OT) home  -CM     Row Name 12/07/22 0844          Vital Signs    Pre Systolic BP Rehab 128  -CM     Pre Treatment Diastolic BP 76  -CM     Pretreatment Heart Rate (beats/min) 60  -CM     Pre SpO2 (%) 97  -CM     O2 Delivery Pre Treatment room air  -CM     Pre Patient Position Sitting  -CM     Row Name 12/07/22 0844          Positioning and Restraints    Pre-Treatment Position in bed  -CM     Post Treatment Position bed  -CM     In Bed sitting EOB;call light within reach  -CM           User Key  (r) = Recorded By, (t) = Taken By, (c) = Cosigned By    Initials Name Provider Type    Ruth Rubalcava OT Occupational Therapist               Outcome Measures     Row Name 12/07/22 0844          How much help from another is currently needed...    Putting on and taking off regular lower body clothing? 4  -CM     Bathing (including washing, rinsing, and drying) 4  -CM     Toileting (which includes using toilet bed pan or urinal) 4  -CM     Putting on and taking off regular upper body clothing 4  -CM     Taking care of personal grooming (such as brushing teeth) 4  -CM      Eating meals 4  -CM     AM-PAC 6 Clicks Score (OT) 24  -CM     Row Name 12/07/22 0845          How much help from another person do you currently need...    Turning from your back to your side while in flat bed without using bedrails? 4  -CZ     Moving from lying on back to sitting on the side of a flat bed without bedrails? 4  -CZ     Moving to and from a bed to a chair (including a wheelchair)? 4  -CZ     Standing up from a chair using your arms (e.g., wheelchair, bedside chair)? 4  -CZ     Climbing 3-5 steps with a railing? 4  -CZ     To walk in hospital room? 4  -CZ     AM-PAC 6 Clicks Score (PT) 24  -CZ     Highest level of mobility 8 --> Walked 250 feet or more  -CZ     Row Name 12/07/22 0845 12/07/22 0844       Functional Assessment    Outcome Measure Options AM-PAC 6 Clicks Basic Mobility (PT);[place]  -CZ AM-PAC 6 Clicks Daily Activity (OT)  -CM          User Key  (r) = Recorded By, (t) = Taken By, (c) = Cosigned By    Initials Name Provider Type    CZ Karl Rueda, PT Physical Therapist    CM Ruth Ribera OT Occupational Therapist              Occupational Therapy Education     Title: PT OT SLP Therapies (In Progress)     Topic: Occupational Therapy (Not Started)     Point: ADL training (Not Started)     Description:   Instruct learner(s) on proper safety adaptation and remediation techniques during self care or transfers.   Instruct in proper use of assistive devices.              Learner Progress:  Not documented in this visit.          Point: Home exercise program (Not Started)     Description:   Instruct learner(s) on appropriate technique for monitoring, assisting and/or progressing therapeutic exercises/activities.              Learner Progress:  Not documented in this visit.          Point: Precautions (Not Started)     Description:   Instruct learner(s) on prescribed precautions during self-care and functional transfers.              Learner Progress:  Not documented in this visit.           Point: Body mechanics (Not Started)     Description:   Instruct learner(s) on proper positioning and spine alignment during self-care, functional mobility activities and/or exercises.              Learner Progress:  Not documented in this visit.                          OT Recommendation and Plan  Therapy Frequency (OT): evaluation only  Plan of Care Review  Plan of Care Reviewed With: patient  Outcome Evaluation: OT initial eval completed. Co-eval with PT. Pt reports he is (I) with all ADLs and IADLs. Able to complete functional mobility in room (I). Demonstrated BUE 5/5 MMT. Reported he has been toileting and showering in bathroom (I). OT will sign off d/t (I) with ADLs.  Plan of Care Reviewed With: patient  Outcome Evaluation: OT initial eval completed. Co-eval with PT. Pt reports he is (I) with all ADLs and IADLs. Able to complete functional mobility in room (I). Demonstrated BUE 5/5 MMT. Reported he has been toileting and showering in bathroom (I). OT will sign off d/t (I) with ADLs.     Time Calculation:    Time Calculation- OT     Row Name 12/07/22 0907             Time Calculation- OT    OT Start Time 0844  -CM      OT Stop Time 0908  -CM      OT Time Calculation (min) 24 min  -CM      OT Received On 12/07/22  -CM         Untimed Charges    OT Eval/Re-eval Minutes 24  -CM         Total Minutes    Untimed Charges Total Minutes 24  -CM       Total Minutes 24  -CM            User Key  (r) = Recorded By, (t) = Taken By, (c) = Cosigned By    Initials Name Provider Type    Ruth Rubalcava OT Occupational Therapist              Therapy Charges for Today     Code Description Service Date Service Provider Modifiers Qty    80683756729  OT EVAL MOD COMPLEXITY 2 12/7/2022 Ruth Ribera OT GO 1             OT Discharge Summary  Anticipated Discharge Disposition (OT): home    Ruth Ribera OT  12/7/2022

## 2022-12-07 NOTE — PROGRESS NOTES
[facility] Room Medicine Services  INPATIENT PROGRESS NOTE    Length of Stay: 1  Date of Admission: 12/4/2022  Primary Care Physician: Shonna Ng APRN    Subjective   Chief Complaint: No new complaints.    HPI: Patient is seen for follow-up today 12/07/2022.  52-year-old male with history of nonischemic cardiomyopathy/chronic systolic heart failure (ejection fraction 15 to 20%) status post AICD placement, hypertension, chronic kidney disease, dyslipidemia who was admitted for non-STEMI and CHF decompensation.    Patient is doing better, denies chest pain, shortness of air, is less deconditioned and voices no new complaints.  He is maintaining O2 saturation in the mid 90s on room air and is eager to be discharged.  His heart rate seems to be fluctuating with a low of 46 this morning.    Review of Systems   Constitutional: Positive for activity change and fatigue. Negative for appetite change, chills, diaphoresis and fever.   HENT: Negative for trouble swallowing and voice change.    Eyes: Negative for photophobia and visual disturbance.   Respiratory: Negative for cough, choking, chest tightness, shortness of breath, wheezing and stridor.    Cardiovascular: Positive for leg swelling. Negative for chest pain and palpitations.   Gastrointestinal: Negative for abdominal distention, abdominal pain, blood in stool, constipation, diarrhea, nausea and vomiting.   Endocrine: Negative for cold intolerance, heat intolerance, polydipsia, polyphagia and polyuria.   Genitourinary: Negative for decreased urine volume, difficulty urinating, dysuria, enuresis, flank pain, frequency, hematuria and urgency.   Musculoskeletal: Negative for arthralgias, gait problem, myalgias, neck pain and neck stiffness.   Skin: Negative for pallor, rash and wound.   Neurological: Negative for dizziness, tremors, seizures, syncope, facial asymmetry, speech difficulty, weakness, light-headedness, numbness and  headaches.   Hematological: Does not bruise/bleed easily.   Psychiatric/Behavioral: Negative for agitation, behavioral problems and confusion.       Objective    Temp:  [97 °F (36.1 °C)-98 °F (36.7 °C)] 97.8 °F (36.6 °C)  Heart Rate:  [46-85] 85  Resp:  [16-18] 18  BP: ()/(52-80) 128/76    AM-PAC 6 Clicks Score (PT): 24 (12/07/22 0845)    Physical Exam  Vitals and nursing note reviewed.   Constitutional:       General: He is not in acute distress.     Appearance: He is well-developed. He is obese. He is ill-appearing. He is not diaphoretic.   HENT:      Head: Normocephalic and atraumatic.   Eyes:      General: No scleral icterus.        Right eye: No discharge.         Left eye: No discharge.      Extraocular Movements: Extraocular movements intact.      Pupils: Pupils are equal, round, and reactive to light.   Neck:      Thyroid: No thyromegaly.      Vascular: No carotid bruit or JVD.   Cardiovascular:      Rate and Rhythm: Normal rate and regular rhythm.      Heart sounds: Normal heart sounds. No murmur heard.    No friction rub. No gallop.   Pulmonary:      Effort: Pulmonary effort is normal. No respiratory distress.      Breath sounds: Normal breath sounds. No stridor. No wheezing or rales.   Chest:      Chest wall: No tenderness.   Abdominal:      General: Bowel sounds are normal. There is no distension.      Palpations: Abdomen is soft. There is no mass.      Tenderness: There is no abdominal tenderness. There is no right CVA tenderness, left CVA tenderness, guarding or rebound.      Hernia: No hernia is present.   Musculoskeletal:         General: No swelling, tenderness or deformity.      Cervical back: Normal range of motion and neck supple.      Right lower leg: Edema present.      Left lower leg: Edema present.   Skin:     General: Skin is warm and dry.      Coloration: Skin is not jaundiced or pale.      Findings: No bruising, erythema, lesion or rash.   Neurological:      General: No focal deficit  present.      Mental Status: He is alert and oriented to person, place, and time.      Cranial Nerves: No cranial nerve deficit.      Sensory: No sensory deficit.      Motor: No weakness or abnormal muscle tone.      Coordination: Coordination normal.      Gait: Gait normal.      Deep Tendon Reflexes: Reflexes normal.   Psychiatric:         Mood and Affect: Mood normal.         Behavior: Behavior normal.         Thought Content: Thought content normal.         Judgment: Judgment normal.           Medication Review:    Current Facility-Administered Medications:   •  apixaban (ELIQUIS) tablet 5 mg, 5 mg, Oral, Q12H, Ayaan Cuba MD, 5 mg at 12/07/22 0938  •  atorvastatin (LIPITOR) tablet 10 mg, 10 mg, Oral, Daily, Ayaan Cuba MD, 10 mg at 12/07/22 0938  •  bumetanide (BUMEX) tablet 1 mg, 1 mg, Oral, BID, Vania Andujar APRN, 1 mg at 12/07/22 0938  •  carvedilol (COREG) tablet 12.5 mg, 12.5 mg, Oral, BID With Meals, Ayaan Cuba MD, 12.5 mg at 12/07/22 0938  •  cefTRIAXone (ROCEPHIN) 1 g/100 mL 0.9% NS (MBP), 1 g, Intravenous, Q24H, Ayaan Cuba MD, Last Rate: 0 mL/hr at 12/05/22 2333, 1 g at 12/06/22 2345  •  doxycycline (VIBRAMYCIN) 100 mg/100 mL 0.9% NS MBP, 100 mg, Intravenous, Q12H, Ayaan Cuba MD, Last Rate: 0 mL/hr at 12/06/22 1446, 100 mg at 12/07/22 0000  •  losartan (COZAAR) half tablet 12.5 mg, 12.5 mg, Oral, Q24H, Vania Andujar APRN  •  potassium chloride (MICRO-K) CR capsule 40 mEq, 40 mEq, Oral, PRN, 40 mEq at 12/07/22 0938 **OR** potassium chloride (KLOR-CON) packet 40 mEq, 40 mEq, Oral, PRN **OR** potassium chloride 10 mEq in 100 mL IVPB, 10 mEq, Intravenous, Q1H PRN, Ayaan Cuba MD  •  sodium chloride 0.9 % flush 10 mL, 10 mL, Intravenous, Q12H, Ayaan Cuba MD, 10 mL at 12/07/22 0939  •  sodium chloride 0.9 % flush 10 mL, 10 mL, Intravenous, PRN, Ayaan Cuba MD  •  sodium chloride 0.9 % infusion 40 mL, 40 mL, Intravenous, PRN,  Ayaan Cuba MD    Results Review:  I have reviewed the labs, radiology results, and diagnostic studies.    Laboratory Data:   Results from last 7 days   Lab Units 12/07/22 0339 12/06/22  1011 12/06/22 0201 12/05/22 1413 12/05/22 0434 12/04/22 2049 12/04/22 2049 12/04/22  1450   SODIUM mmol/L 140  --  140  --  141   < > 141 132*   POTASSIUM mmol/L 3.4* 4.2 3.2*  3.2*   < > 2.8*   < > 3.4* 2.9*   CHLORIDE mmol/L 101  --  98  --  95*   < > 97* 92*   CO2 mmol/L 29.0  --  31.0*  --  32.0*   < > 31.0*  --    BUN mg/dL 35*  --  46*  --  40*   < > 42* 46*   CREATININE mg/dL 1.53*  --  1.93*  --  1.57*   < > 1.78* 2.2*   GLUCOSE mg/dL 65  --  103*  --  101*   < > 126*  --    CALCIUM mg/dL 8.7  --  8.6  --  9.1   < > 9.6 9.3   BILIRUBIN mg/dL  --   --   --   --  2.5*  --  2.9* 3.40*   ALK PHOS U/L  --   --   --   --  60  --  59 64   ALT (SGPT) U/L  --   --   --   --  29  --  28 33   AST (SGOT) U/L  --   --   --   --  27  --  28 30   ANION GAP mmol/L 10.0  --  11.0  --  14.0   < > 13.0  --     < > = values in this interval not displayed.     Estimated Creatinine Clearance: 84 mL/min (A) (by C-G formula based on SCr of 1.53 mg/dL (H)).  Results from last 7 days   Lab Units 12/07/22 0339 12/05/22 0434 12/04/22 2049   MAGNESIUM mg/dL 1.7 1.8 1.6         Results from last 7 days   Lab Units 12/07/22 0339 12/06/22 0202 12/05/22 0434 12/04/22 2049   WBC 10*3/mm3 5.53 4.03 3.83 4.38   HEMOGLOBIN g/dL 14.6 14.3 14.3 14.6   HEMATOCRIT % 44.8 44.0 44.5 45.3   PLATELETS 10*3/mm3 150 140 132* 143     Results from last 7 days   Lab Units 12/04/22 2049 12/04/22  1450   INR  2.50* 2.19*       Culture Data:   Blood Culture   Date Value Ref Range Status   12/04/2022 No growth at 2 days  Preliminary   12/04/2022 Staphylococcus, coagulase negative (C)  Final     No results found for: URINECX  No results found for: RESPCX  No results found for: WOUNDCX  No results found for: STOOLCX  No components found for:  [facility]    Radiology Data:   Imaging Results (Last 24 Hours)     Procedure Component Value Units Date/Time    XR Chest PA & Lateral [[id number]] Collected: 12/07/22 0630     Updated: 12/07/22 0730    Narrative:          PROCEDURE: Chest PA and lateral    REASON FOR EXAM: Follow-up possible pneumonia/CHF.    FINDINGS: Comparison study dated December 4, 2022.  Stable  pacemaker and lead. Cardiomegaly. Pulmonary vasculature appears  within normal limits. Lungs are clear.. Pleural spaces are normal  . No acute osseous abnormality.      Impression:      1.  Cardiomegaly without decompensation.  2.  Otherwise unremarkable chest.    Electronically signed by:  Pj Cummins MD  12/7/2022 7:28 AM CST  Workstation: [id number]          I have reviewed the patient's current medications.     Assessment/Plan     Hospital Problem List:  Principal Problem:    CHF exacerbation (HCC)  Active Problems:    Heart failure (HCC)    Possible Non-STEMI: Patient is chest pain-free.  Elevated troponin has been attributed to acute illness as patient has had previous invasive coronary angiogram which did not show any evidence of atherosclerosis.  Continue guideline directed medical therapy.  Input by cardiologist is appreciated.  Previous echocardiogram done 8/18/2022 showed an estimated ejection fraction of 15 to 20%    Acute on chronic systolic heart failure: Patient currently seems euvolemic and chest x-ray done this morning showed cardiomegaly with no acute changes. Entresto has been discontinued secondary to borderline blood pressure.  Continue Bumex, beta-blocker with strict I's and O's, daily weights, salt and fluid restriction.  He is to be started on low-dose losartan today and is to continue Eliquis 5 mg twice daily secondary to risk of LV thrombus.  He may need adjustment to the dose of his beta-blocker.  Cardiology is following.    Chronic kidney disease stage II/III: Patient's baseline creatinine is about 1.9-2.0.  Creatinine is at  baseline.  Continue to monitor and consult nephrologist if the need arises.    Hypokalemia: Continue potassium repletion protocol. Magnesium level is normal.    Possible retrocardiac infiltrate: Continue IV Rocephin.  Anaerobic blood culture showed coag negative staph which is likely a contaminant.  Chest x-ray done this morning did not show any acute changes.  We will discontinue antimicrobial therapy.     Deconditioning: Continue PT and OT.    Continue GI and DVT prophylaxis.    Discharge Planning: Likely discharge in a.m.      I confirmed that the patient's Advance Care Plan is present, code status is documented, or surrogate decision maker is listed in the patient's medical record.     I have utilized all available immediate resources to obtain, update, or review the patient's current medications      Ayaan Cuba MD   12/07/22   10:54 CST

## 2022-12-07 NOTE — PROGRESS NOTES
"[facility] [place] Cardiology Progress Note   LOS: 1 day   Patient Care Team:  Shonna Ng APRN as PCP - General (Family Medicine)    Chief Complaint: \"shortness of breath\"       Subjective     Interval History:   Patient Denies: no complaints today  Patient Complaints: no complaints today  History taken from: patient chart     Patient denies any complaints today and reports orthopnea and shortness of breath improved. Telemetry reviewed with some PVCs. No bradycardia noted.         Objective     Vital Sign Min/Max for last 24 hours  Temp  Min: 97 °F (36.1 °C)  Max: 98.3 °F (36.8 °C)   BP  Min: 83/55  Max: 128/76   Pulse  Min: 46  Max: 92   Resp  Min: 16  Max: 18   SpO2  Min: 92 %  Max: 99 %   No data recorded   Weight  Min: 130 kg (285 lb 9.6 oz)  Max: 130 kg (285 lb 9.6 oz)     Flowsheet Rows    Flowsheet Row First Filed Value   Admission Height 198.1 cm (78\") Documented at 12/04/2022 1958   Admission Weight 123 kg (271 lb 13.2 oz) Documented at 12/04/2022 1958 12/05/22  1750 12/06/22  0600 12/07/22  0400   Weight: 125 kg (275 lb 9.6 oz) 126 kg (277 lb 6.4 oz) 130 kg (285 lb 9.6 oz)       Physical Exam:  Physical Exam  Vitals and nursing note reviewed.   Constitutional:       General: He is not in acute distress.     Appearance: He is well-groomed and overweight. He is not ill-appearing, toxic-appearing or diaphoretic.   HENT:      Head: Normocephalic.      Right Ear: External ear normal.      Left Ear: External ear normal.   Eyes:      General: Lids are normal.      Pupils: Pupils are equal, round, and reactive to light.   Neck:      Thyroid: No thyromegaly.      Vascular: No carotid bruit or JVD.      Trachea: No tracheal deviation.   Cardiovascular:      Rate and Rhythm: Normal rate and regular rhythm.      Heart sounds: Normal heart sounds. No murmur heard.    No friction rub. No gallop.   Pulmonary:      Effort: Pulmonary effort is normal. No respiratory distress.      Breath sounds: Normal breath sounds. " No stridor. No wheezing or rales.   Chest:      Chest wall: No tenderness.   Abdominal:      General: Bowel sounds are normal. There is no distension.      Palpations: Abdomen is soft.      Tenderness: There is no abdominal tenderness.   Skin:     General: Skin is warm and dry.      Capillary Refill: Capillary refill takes less than 2 seconds.      Findings: No erythema or rash.   Neurological:      Mental Status: He is alert and oriented to person, place, and time.      Cranial Nerves: No cranial nerve deficit.      Deep Tendon Reflexes: Reflexes are normal and symmetric. Reflexes normal.   Psychiatric:         Behavior: Behavior normal.         Thought Content: Thought content normal.         Judgment: Judgment normal.          Results Review:     Results from last 7 days   Lab Units 12/07/22  0339 12/06/22  1011 12/06/22  0201 12/05/22  1413 12/05/22  0434 12/04/22  2049 12/04/22  2049 12/04/22  1450   SODIUM mmol/L 140  --  140  --  141   < > 141 132*   POTASSIUM mmol/L 3.4* 4.2 3.2*  3.2*   < > 2.8*   < > 3.4* 2.9*   CHLORIDE mmol/L 101  --  98  --  95*   < > 97* 92*   CO2 mmol/L 29.0  --  31.0*  --  32.0*   < > 31.0*  --    BUN mg/dL 35*  --  46*  --  40*   < > 42* 46*   CREATININE mg/dL 1.53*  --  1.93*  --  1.57*   < > 1.78* 2.2*   CALCIUM mg/dL 8.7  --  8.6  --  9.1   < > 9.6 9.3   BILIRUBIN mg/dL  --   --   --   --  2.5*  --  2.9* 3.40*   ALK PHOS U/L  --   --   --   --  60  --  59 64   ALT (SGPT) U/L  --   --   --   --  29  --  28 33   AST (SGOT) U/L  --   --   --   --  27  --  28 30   GLUCOSE mg/dL 65  --  103*  --  101*   < > 126*  --     < > = values in this interval not displayed.       Estimated Creatinine Clearance: 84 mL/min (A) (by C-G formula based on SCr of 1.53 mg/dL (H)).    Results from last 7 days   Lab Units 12/07/22  0339 12/05/22 0434 12/04/22 2049   MAGNESIUM mg/dL 1.7 1.8 1.6             Results from last 7 days   Lab Units 12/07/22  0339 12/06/22  0202 12/05/22 0434 12/04/22 2049    WBC 10*3/mm3 5.53 4.03 3.83 4.38   HEMOGLOBIN g/dL 14.6 14.3 14.3 14.6   PLATELETS 10*3/mm3 150 140 132* 143       Results from last 7 days   Lab Units 12/04/22 2049 12/04/22  1450   INR  2.50* 2.19*     Lab Results   Component Value Date    PROBNP 2,485.0 (H) 12/04/2022       I/O last 3 completed shifts:  In: 840 [P.O.:840]  Out: 250 [Urine:250]    Cardiographics:  ECG/EMG Results (last 24 hours)     Procedure Component Value Units Date/Time    SCANNED EKG [[id number]] Resulted: 12/04/22     Updated: 12/05/22 2009    SCANNED EKG [[id number]] Resulted: 12/04/22     Updated: 12/05/22 2019        Results for orders placed during the hospital encounter of 02/04/19    Adult Transthoracic Echo Limited W/ Cont if Necessary Per Protocol    Interpretation Summary  · 3D acquisition for left ventricular ejection fraction. Live 3D and full volume to assess left ventricular ejection fraction was utilized.  · Left ventricle systolic function is severely decreased. Estimated LVEF is 10%. Left ventricle cavity severely dilated with restrictive diastolic dysfunction. Findings are consistent with dilated cardiomyopathy.  · Right ventricle is mildly dilated with mildly reduced right ventricular systolic function.  · Moderate mitral valve regurgitation is present.  · Moderate tricuspid valve regurgitation is present. Pulmonary hypertension is present with a PA systolic pressure of 70 mmHg.  · There is mild pulmonic valve regurgitation present.  · There is a trivial pericardial effusion adjacent to the left ventricle      Imaging Results (Most Recent)     Procedure Component Value Units Date/Time    XR Chest PA & Lateral [[id number]] Collected: 12/07/22 0630     Updated: 12/07/22 0730    Narrative:          PROCEDURE: Chest PA and lateral    REASON FOR EXAM: Follow-up possible pneumonia/CHF.    FINDINGS: Comparison study dated December 4, 2022.  Stable  pacemaker and lead. Cardiomegaly. Pulmonary vasculature appears  within normal  limits. Lungs are clear.. Pleural spaces are normal  . No acute osseous abnormality.      Impression:      1.  Cardiomegaly without decompensation.  2.  Otherwise unremarkable chest.    Electronically signed by:  Pj Cummins MD  12/7/2022 7:28 AM CST  Workstation: [id number]    XR Chest 1 View [[id number]] Collected: 12/04/22 2037     Updated: 12/04/22 2104    Narrative:      PROCEDURE: XR CHEST 1 VIEW, 12/4/2022 8:37 PM CST    CLINICAL INDICATION:    sob.    COMPARISON: 11/30/22    TECHNIQUE:  AP portable radiograph of chest     FINDINGS:    Large cardiac silhouette. Retrocardiac opacification. Suspect at  least small left pleural effusion. No pneumothorax.      Impression:        Retrocardiac opacification is suspicious for infiltrate.    Large cardiac silhouette again seen.    Suspect at least small left pleural effusion.          Electronically signed by:  Deacon Ac MD  12/4/2022 9:02 PM  CST Workstation: [id number]          XR Chest 2 View    Result Date: 11/30/2022  Rounded opacity projects over the mid and lower lung on the lateral view, which may be related to summation artifact or pneumonia. Consider short interval radiographic follow-up or chest CT Electronically signed by:  Raimundo Salas DO  11/30/2022 5:26 AM CST Workstation: [id number]    XR Chest 1 View    Result Date: 12/4/2022  Retrocardiac opacification is suspicious for infiltrate. Large cardiac silhouette again seen. Suspect at least small left pleural effusion. Electronically signed by:  Deacon Ac MD  12/4/2022 9:02 PM CST Workstation: [id number]    XR Chest 1 View    Result Date: 12/4/2022  Stable left pacemaker. Stable cardiomegaly. Stable left lung base atelectasis, versus infiltrate with probable left pleural effusion.. Workstation: [id number]    XR Chest PA & Lateral    Result Date: 12/7/2022  1.  Cardiomegaly without decompensation. 2.  Otherwise unremarkable chest. Electronically signed by:  Pj Cummins MD  12/7/2022 7:28 AM CST  Workstation: [id number]      Medication Review:     Current Facility-Administered Medications:   •  apixaban (ELIQUIS) tablet 5 mg, 5 mg, Oral, Q12H, Ayaan Cuba MD, 5 mg at 12/07/22 0938  •  atorvastatin (LIPITOR) tablet 10 mg, 10 mg, Oral, Daily, Ayaan Cuba MD, 10 mg at 12/07/22 0938  •  bumetanide (BUMEX) tablet 1 mg, 1 mg, Oral, BID, Vania Andujar, APRN, 1 mg at 12/07/22 0938  •  carvedilol (COREG) tablet 12.5 mg, 12.5 mg, Oral, BID With Meals, Ayaan Cuba MD, 12.5 mg at 12/07/22 0938  •  losartan (COZAAR) half tablet 12.5 mg, 12.5 mg, Oral, Q24H, Vania Andujar, APRN  •  potassium chloride (MICRO-K) CR capsule 40 mEq, 40 mEq, Oral, PRN, 40 mEq at 12/07/22 0938 **OR** potassium chloride (KLOR-CON) packet 40 mEq, 40 mEq, Oral, PRN **OR** potassium chloride 10 mEq in 100 mL IVPB, 10 mEq, Intravenous, Q1H PRN, Ayaan Cuba MD  •  sodium chloride 0.9 % flush 10 mL, 10 mL, Intravenous, Q12H, Ayaan Cuba MD, 10 mL at 12/07/22 0939  •  sodium chloride 0.9 % flush 10 mL, 10 mL, Intravenous, PRN, Ayaan Cuba MD  •  sodium chloride 0.9 % infusion 40 mL, 40 mL, Intravenous, PRN, Ayaan Cuba MD    Assessment & Plan       CHF exacerbation (HCC)    Heart failure (HCC)     #1. Abnormal Troponin not consistent with ACS.   This is likely secondary to acute medical illness. No high-risk features. EKG negative for acute changes.   Most recent troponin was 0.052. Trend is flat. LHC in 2021 was relatively normal.   -Continue with BB and statin.      #2. Chronic systolic CHF/NICM: EF 15-20%. NYHA Class II, Stage C.  Patient appears euvolemic with adequate perfusion and hemodynamics. Continue Eliquis 5mg BID, given severe LV dysfunction he is at risk for LV thrombus, OAC is still indicated.      BETA-BLOCKER:  carvedilol 12.5 mg twice daily  ACE/ARB/ENTRESTO: Losartan 12.5mg   DIURETIC:  po Bumex 1mg BID  SGL2I: should be considered  ALDOSTERONE ANTAGONIST: Patient  reports could not tolerate spironolactone. Developed a cough and side effects. Given marginal b.p can reassess and consider a different MRA  IMDUR/HYDRALAZINE: N/A  DIGOXIN: N/A  FR: 1,500   NA Restrction: 2grams  ICD: yes [employer]  8/2022  Daily weight  Strict intake/output  Continuous cardiac monitoring      Plan for disposition: Continue 3 west. May discharge home from a cardiac standpoint.  We will SIGN OFF. Follow up with cardiology in 2-3 weeks.               This document has been electronically signed by SHELL Ruvalcaba on December 7, 2022 12:29 CST     Electronically signed by SHELL Ruvalcaba, 12/07/22, 12:29 PM CST.      I personally saw and examined Matti Bravo after the APRN.  I personally performed a history and physical examination of the patient.  I personally reviewed independent findings and plan of care.  I discussed management with the APRN.  I agree with the APRN's documentation.    No acute overnight events.  His symptoms have improved significantly.  Continue Eliquis, carvedilol, losartan and Bumex therapy for CHF and nonischemic cardiomyopathy.  Continue to follow labs as needed.  Recommend outpatient follow-up with cardiology (CHF clinic) within 2 weeks after hospital discharge.  Rest of the management per primary medical team.  Please feel free to call us any questions.    Electronically signed by Dylon Aranda MD, 12/07/22, 4:58 PM CST.

## 2022-12-08 ENCOUNTER — READMISSION MANAGEMENT (OUTPATIENT)
Dept: CALL CENTER | Facility: HOSPITAL | Age: 52
End: 2022-12-08

## 2022-12-08 VITALS
TEMPERATURE: 97.5 F | WEIGHT: 285.1 LBS | OXYGEN SATURATION: 95 % | DIASTOLIC BLOOD PRESSURE: 89 MMHG | SYSTOLIC BLOOD PRESSURE: 119 MMHG | HEIGHT: 76 IN | RESPIRATION RATE: 18 BRPM | BODY MASS INDEX: 34.72 KG/M2 | HEART RATE: 54 BPM

## 2022-12-08 LAB
ANION GAP SERPL CALCULATED.3IONS-SCNC: 5 MMOL/L (ref 5–15)
BUN SERPL-MCNC: 26 MG/DL (ref 6–20)
BUN/CREAT SERPL: 21.8 (ref 7–25)
CALCIUM SPEC-SCNC: 9.1 MG/DL (ref 8.6–10.5)
CHLORIDE SERPL-SCNC: 101 MMOL/L (ref 98–107)
CO2 SERPL-SCNC: 31 MMOL/L (ref 22–29)
CREAT SERPL-MCNC: 1.19 MG/DL (ref 0.76–1.27)
EGFRCR SERPLBLD CKD-EPI 2021: 74 ML/MIN/1.73
GLUCOSE SERPL-MCNC: 112 MG/DL (ref 65–99)
POTASSIUM SERPL-SCNC: 3.9 MMOL/L (ref 3.5–5.2)
SODIUM SERPL-SCNC: 137 MMOL/L (ref 136–145)
WHOLE BLOOD HOLD SPECIMEN: NORMAL

## 2022-12-08 PROCEDURE — 80048 BASIC METABOLIC PNL TOTAL CA: CPT | Performed by: INTERNAL MEDICINE

## 2022-12-08 PROCEDURE — G0378 HOSPITAL OBSERVATION PER HR: HCPCS

## 2022-12-08 RX ORDER — BUMETANIDE 1 MG/1
1 TABLET ORAL 2 TIMES DAILY
Qty: 60 TABLET | Refills: 2 | Status: SHIPPED | OUTPATIENT
Start: 2022-12-08 | End: 2023-01-14

## 2022-12-08 RX ADMIN — ATORVASTATIN CALCIUM 10 MG: 10 TABLET, FILM COATED ORAL at 08:10

## 2022-12-08 RX ADMIN — APIXABAN 5 MG: 5 TABLET, FILM COATED ORAL at 08:10

## 2022-12-08 RX ADMIN — BUMETANIDE 1 MG: 1 TABLET ORAL at 08:10

## 2022-12-08 RX ADMIN — Medication 10 ML: at 08:10

## 2022-12-08 RX ADMIN — CARVEDILOL 12.5 MG: 12.5 TABLET, FILM COATED ORAL at 08:10

## 2022-12-08 NOTE — PLAN OF CARE
Problem: Adult Inpatient Plan of Care  Goal: Absence of Hospital-Acquired Illness or Injury  Intervention: Identify and Manage Fall Risk  Recent Flowsheet Documentation  Taken 12/8/2022 0000 by Aj Manzano RN  Safety Promotion/Fall Prevention:   safety round/check completed   room organization consistent   nonskid shoes/slippers when out of bed  Taken 12/7/2022 2300 by Aj Manzano RN  Safety Promotion/Fall Prevention:   nonskid shoes/slippers when out of bed   safety round/check completed  Taken 12/7/2022 2200 by Aj Manzano RN  Safety Promotion/Fall Prevention: safety round/check completed  Taken 12/7/2022 2100 by Aj Manzano RN  Safety Promotion/Fall Prevention:   safety round/check completed   nonskid shoes/slippers when out of bed  Taken 12/7/2022 2000 by Aj Manzano RN  Safety Promotion/Fall Prevention:   fall prevention program maintained   safety round/check completed  Intervention: Prevent Skin Injury  Recent Flowsheet Documentation  Taken 12/8/2022 0000 by Aj Manzano RN  Body Position: position changed independently  Taken 12/7/2022 2300 by Aj Manzano RN  Body Position: position changed independently  Taken 12/7/2022 2200 by Aj Manzano RN  Body Position: position changed independently  Taken 12/7/2022 2100 by Aj Manzano RN  Body Position: position changed independently  Taken 12/7/2022 2000 by Aj Manzano RN  Body Position:   position changed independently   side-lying   right  Intervention: Prevent and Manage VTE (Venous Thromboembolism) Risk  Recent Flowsheet Documentation  Taken 12/8/2022 0000 by Aj Manzano RN  Activity Management: bedrest  Taken 12/7/2022 2300 by Aj Manzano RN  Activity Management: bedrest  Taken 12/7/2022 2200 by Aj Manzano RN  Activity Management: bedrest  Taken 12/7/2022 2100 by Aj Manzano RN  Activity Management: bedrest  Taken 12/7/2022 2000 by Aj Manzano RN  Activity Management:  bedrest  Intervention: Prevent Infection  Recent Flowsheet Documentation  Taken 12/8/2022 0000 by Aj Manzano RN  Infection Prevention:   rest/sleep promoted   hand hygiene promoted  Taken 12/7/2022 2300 by Aj Manzano RN  Infection Prevention: rest/sleep promoted  Taken 12/7/2022 2200 by Aj Manzano RN  Infection Prevention: rest/sleep promoted  Taken 12/7/2022 2100 by Aj Manzano RN  Infection Prevention: rest/sleep promoted     Problem: Adult Inpatient Plan of Care  Goal: Absence of Hospital-Acquired Illness or Injury  Intervention: Identify and Manage Fall Risk  Recent Flowsheet Documentation  Taken 12/8/2022 0000 by Aj Manzano RN  Safety Promotion/Fall Prevention:   safety round/check completed   room organization consistent   nonskid shoes/slippers when out of bed  Taken 12/7/2022 2300 by Aj Manzano RN  Safety Promotion/Fall Prevention:   nonskid shoes/slippers when out of bed   safety round/check completed  Taken 12/7/2022 2200 by Aj Manzano RN  Safety Promotion/Fall Prevention: safety round/check completed  Taken 12/7/2022 2100 by Aj Manzano RN  Safety Promotion/Fall Prevention:   safety round/check completed   nonskid shoes/slippers when out of bed  Taken 12/7/2022 2000 by Aj Manzano RN  Safety Promotion/Fall Prevention:   fall prevention program maintained   safety round/check completed   Goal Outcome Evaluation:

## 2022-12-08 NOTE — DISCHARGE SUMMARY
[facility] Medicine Services  DISCHARGE SUMMARY       Date of Admission: 12/4/2022  Date of Discharge:  12/8/2022  Primary Care Physician: Shonna Ng APRN    Presenting Problem/History of Present Illness:  CHF exacerbation (HCC) [I50.9]  Heart failure (HCC) [I50.9]       Final Discharge Diagnoses:  Active Hospital Problems    Diagnosis    • **CHF exacerbation (HCC)    • Heart failure (HCC)        Consults:   Consults     Date and Time Order Name Status Description    12/5/2022  9:39 AM Inpatient Cardiology Consult Completed           Procedures Performed: None.                Pertinent Test Results:   Lab Results (last 7 days)     Procedure Component Value Units Date/Time    Extra Tubes [[id number]] Collected: 12/08/22 0529    Specimen: Blood, Venous Line Updated: 12/08/22 0630    Narrative:      The following orders were created for panel order Extra Tubes.  Procedure                               Abnormality         Status                     ---------                               -----------         ------                     Lavender Top[[id number]]                                     Final result                 Please view results for these tests on the individual orders.    Lavender Top [[id number]] Collected: 12/08/22 0529    Specimen: Blood Updated: 12/08/22 0630     Extra Tube hold for add-on     Comment: Auto resulted       Basic Metabolic Panel [[id number]]  (Abnormal) Collected: 12/08/22 0529    Specimen: Blood Updated: 12/08/22 0615     Glucose 112 mg/dL      BUN 26 mg/dL      Creatinine 1.19 mg/dL      Sodium 137 mmol/L      Potassium 3.9 mmol/L      Chloride 101 mmol/L      CO2 31.0 mmol/L      Calcium 9.1 mg/dL      BUN/Creatinine Ratio 21.8     Anion Gap 5.0 mmol/L      eGFR 74.0 mL/min/1.73      Comment: National Kidney Foundation and American Society of Nephrology (ASN) Task Force recommended calculation based on the Chronic Kidney Disease  Epidemiology Collaboration (CKD-EPI) equation refit without adjustment for race.       Narrative:      GFR Normal >60  Chronic Kidney Disease <60  Kidney Failure <15      CANDIDA AURIS SCREEN - Swab, Axilla Right, Axilla Left and Groin [[id number]]  (Normal) Collected: 12/05/22 0227    Specimen: Swab from Axilla Right, Axilla Left and Groin Updated: 12/08/22 0230     Candida Auris Screen Culture No Candida auris isolated at 3 days    Blood Culture - Blood, Hand, Right [[id number]]  (Normal) Collected: 12/04/22 2309    Specimen: Blood from Hand, Right Updated: 12/07/22 2315     Blood Culture No growth at 3 days    Potassium [[id number]]  (Normal) Collected: 12/07/22 1751    Specimen: Blood Updated: 12/07/22 1820     Potassium 3.8 mmol/L     POC Glucose Once [[id number]]  (Normal) Collected: 12/07/22 0852    Specimen: Blood Updated: 12/07/22 1107     Glucose 109 mg/dL      Comment: RN NotifiedOperator: [id number] PHUC MEJIAFERMeter ID: [id number]       Magnesium [[id number]]  (Normal) Collected: 12/07/22 0339    Specimen: Blood Updated: 12/07/22 0911     Magnesium 1.7 mg/dL     Blood Culture - Blood, Arm, Left [[id number]]  (Abnormal) Collected: 12/04/22 2301    Specimen: Blood from Arm, Left Updated: 12/07/22 0524     Blood Culture Staphylococcus, coagulase negative     Isolated from Anaerobic Bottle     Gram Stain Anaerobic Bottle Gram positive cocci in clusters     Comment: 1 bottle positive of 3 drawn for GPCCL       Narrative:      Probable contaminant requires clinical correlation, susceptibility not performed unless requested by physician.      Basic Metabolic Panel [[id number]]  (Abnormal) Collected: 12/07/22 0339    Specimen: Blood Updated: 12/07/22 0428     Glucose 65 mg/dL      BUN 35 mg/dL      Creatinine 1.53 mg/dL      Sodium 140 mmol/L      Potassium 3.4 mmol/L      Chloride 101 mmol/L      CO2 29.0 mmol/L      Calcium 8.7 mg/dL      BUN/Creatinine Ratio 22.9     Anion Gap 10.0 mmol/L      eGFR 54.7  mL/min/1.73      Comment: National Kidney Foundation and American Society of Nephrology (ASN) Task Force recommended calculation based on the Chronic Kidney Disease Epidemiology Collaboration (CKD-EPI) equation refit without adjustment for race.       Narrative:      GFR Normal >60  Chronic Kidney Disease <60  Kidney Failure <15      CBC & Differential [[id number]]  (Abnormal) Collected: 12/07/22 0339    Specimen: Blood Updated: 12/07/22 0425    Narrative:      The following orders were created for panel order CBC & Differential.  Procedure                               Abnormality         Status                     ---------                               -----------         ------                     CBC Auto Differential[[id number]]        Abnormal            Final result               Scan Slide[[id number]]                                                                    Please view results for these tests on the individual orders.    CBC Auto Differential [[id number]]  (Abnormal) Collected: 12/07/22 0339    Specimen: Blood Updated: 12/07/22 0425     WBC 5.53 10*3/mm3      RBC 4.91 10*6/mm3      Hemoglobin 14.6 g/dL      Hematocrit 44.8 %      MCV 91.2 fL      MCH 29.7 pg      MCHC 32.6 g/dL      RDW 14.6 %      RDW-SD 48.6 fl      MPV 13.4 fL      Platelets 150 10*3/mm3      Neutrophil % 49.3 %      Lymphocyte % 25.9 %      Monocyte % 19.0 %      Eosinophil % 4.5 %      Basophil % 0.9 %      Immature Grans % 0.4 %      Neutrophils, Absolute 2.73 10*3/mm3      Lymphocytes, Absolute 1.43 10*3/mm3      Monocytes, Absolute 1.05 10*3/mm3      Eosinophils, Absolute 0.25 10*3/mm3      Basophils, Absolute 0.05 10*3/mm3      Immature Grans, Absolute 0.02 10*3/mm3      nRBC 0.0 /100 WBC     Potassium [[id number]]  (Normal) Collected: 12/06/22 1011    Specimen: Blood Updated: 12/06/22 1047     Potassium 4.2 mmol/L     CBC & Differential [[id number]]  (Abnormal) Collected: 12/06/22 0202    Specimen: Blood Updated: 12/06/22 0303     Narrative:      The following orders were created for panel order CBC & Differential.  Procedure                               Abnormality         Status                     ---------                               -----------         ------                     CBC Auto Differential[[id number]]        Abnormal            Final result               Scan Slide[[id number]]                                                                    Please view results for these tests on the individual orders.    CBC Auto Differential [[id number]]  (Abnormal) Collected: 12/06/22 0202    Specimen: Blood Updated: 12/06/22 0302     WBC 4.03 10*3/mm3      RBC 4.89 10*6/mm3      Hemoglobin 14.3 g/dL      Hematocrit 44.0 %      MCV 90.0 fL      MCH 29.2 pg      MCHC 32.5 g/dL      RDW 14.4 %      RDW-SD 46.6 fl      MPV 13.4 fL      Platelets 140 10*3/mm3      Neutrophil % 50.5 %      Lymphocyte % 30.0 %      Monocyte % 16.6 %      Eosinophil % 2.0 %      Basophil % 0.7 %      Immature Grans % 0.2 %      Neutrophils, Absolute 2.03 10*3/mm3      Lymphocytes, Absolute 1.21 10*3/mm3      Monocytes, Absolute 0.67 10*3/mm3      Eosinophils, Absolute 0.08 10*3/mm3      Basophils, Absolute 0.03 10*3/mm3      Immature Grans, Absolute 0.01 10*3/mm3      nRBC 0.0 /100 WBC     Potassium [[id number]]  (Abnormal) Collected: 12/06/22 0201    Specimen: Blood Updated: 12/06/22 0224     Potassium 3.2 mmol/L     Basic Metabolic Panel [[id number]]  (Abnormal) Collected: 12/06/22 0201    Specimen: Blood Updated: 12/06/22 0224     Glucose 103 mg/dL      BUN 46 mg/dL      Creatinine 1.93 mg/dL      Sodium 140 mmol/L      Potassium 3.2 mmol/L      Chloride 98 mmol/L      CO2 31.0 mmol/L      Calcium 8.6 mg/dL      BUN/Creatinine Ratio 23.8     Anion Gap 11.0 mmol/L      eGFR 41.4 mL/min/1.73      Comment: National Kidney Foundation and American Society of Nephrology (ASN) Task Force recommended calculation based on the Chronic Kidney Disease Epidemiology  "Collaboration (CKD-EPI) equation refit without adjustment for race.       Narrative:      GFR Normal >60  Chronic Kidney Disease <60  Kidney Failure <15      Blood Culture ID, PCR - Blood, Arm, Left [[id number]]  (Abnormal) Collected: 12/04/22 2301    Specimen: Blood from Arm, Left Updated: 12/05/22 2207     BCID, PCR Staph spp, not aureus or lugdunensis. Identification by BCID2 PCR.    Potassium [[id number]]  (Abnormal) Collected: 12/05/22 1413    Specimen: Blood Updated: 12/05/22 1437     Potassium 3.3 mmol/L     POC Glucose Once [[id number]]  (Abnormal) Collected: 12/04/22 2051    Specimen: Blood Updated: 12/05/22 1242     Glucose 145 mg/dL      Comment: RN NotifiedOperator: [id number] JOSLYN PAMMeter ID: [id number]       POC Glucose Once [[id number]]  (Normal) Collected: 12/05/22 1207    Specimen: Blood Updated: 12/05/22 1219     Glucose 83 mg/dL      Comment: : [id number] SANTANA AMYMeter ID: [id number]       Procalcitonin [[id number]]  (Normal) Collected: 12/05/22 0434    Specimen: Blood Updated: 12/05/22 0540     Procalcitonin 0.08 ng/mL     Narrative:      As a Marker for Sepsis (Non-Neonates):    1. <0.5 ng/mL represents a low risk of severe sepsis and/or septic shock.  2. >2 ng/mL represents a high risk of severe sepsis and/or septic shock.    As a Marker for Lower Respiratory Tract Infections that require antibiotic therapy:    PCT on Admission    Antibiotic Therapy       6-12 Hrs later    >0.5                Strongly Recommended  >0.25 - <0.5        Recommended   0.1 - 0.25          Discouraged              Remeasure/reassess PCT  <0.1                Strongly Discouraged     Remeasure/reassess PCT    As 28 day mortality risk marker: \"Change in Procalcitonin Result\" (>80% or <=80%) if Day 0 (or Day 1) and Day 4 values are available. Refer to http://www.[facility]pct[facility]calculator.com    Change in PCT <=80%  A decrease of PCT levels below or equal to 80% defines a positive change in PCT test result " representing a higher risk for 28-day all-cause mortality of patients diagnosed with severe sepsis for septic shock.    Change in PCT >80%  A decrease of PCT levels of more than 80% defines a negative change in PCT result representing a lower risk for 28-day all-cause mortality of patients diagnosed with severe sepsis or septic shock.       Troponin [[id number]]  (Abnormal) Collected: 12/05/22 0434    Specimen: Blood Updated: 12/05/22 0534     Troponin T 0.053 ng/mL     Narrative:      Troponin T Reference Range:  <= 0.03 ng/mL-   Negative for AMI  >0.03 ng/mL-     Abnormal for myocardial necrosis.  Clinicians would have to utilize clinical acumen, EKG, Troponin and serial changes to determine if it is an Acute Myocardial Infarction or myocardial injury due to an underlying chronic condition.       Results may be falsely decreased if patient taking Biotin.      Comprehensive Metabolic Panel [[id number]]  (Abnormal) Collected: 12/05/22 0434    Specimen: Blood Updated: 12/05/22 0534     Glucose 101 mg/dL      BUN 40 mg/dL      Creatinine 1.57 mg/dL      Sodium 141 mmol/L      Potassium 2.8 mmol/L      Chloride 95 mmol/L      CO2 32.0 mmol/L      Calcium 9.1 mg/dL      Total Protein 7.4 g/dL      Albumin 3.80 g/dL      ALT (SGPT) 29 U/L      AST (SGOT) 27 U/L      Alkaline Phosphatase 60 U/L      Total Bilirubin 2.5 mg/dL      Globulin 3.6 gm/dL      A/G Ratio 1.1 g/dL      BUN/Creatinine Ratio 25.5     Anion Gap 14.0 mmol/L      eGFR 53.0 mL/min/1.73      Comment: National Kidney Foundation and American Society of Nephrology (ASN) Task Force recommended calculation based on the Chronic Kidney Disease Epidemiology Collaboration (CKD-EPI) equation refit without adjustment for race.       Narrative:      GFR Normal >60  Chronic Kidney Disease <60  Kidney Failure <15      Lipid Panel [[id number]]  (Abnormal) Collected: 12/05/22 0434    Specimen: Blood Updated: 12/05/22 0534     Total Cholesterol 150 mg/dL      Triglycerides  83 mg/dL      HDL Cholesterol 21 mg/dL      LDL Cholesterol  113 mg/dL      VLDL Cholesterol 16 mg/dL      LDL/HDL Ratio 5.35    Narrative:      Cholesterol Reference Ranges  (U.S. Department of Health and Human Services ATP III Classifications)    Desirable          <200 mg/dL  Borderline High    200-239 mg/dL  High Risk          >240 mg/dL      Triglyceride Reference Ranges  (U.S. Department of Health and Human Services ATP III Classifications)    Normal           <150 mg/dL  Borderline High  150-199 mg/dL  High             200-499 mg/dL  Very High        >500 mg/dL    HDL Reference Ranges  (U.S. Department of Health and Human Services ATP III Classifications)    Low     <40 mg/dl (major risk factor for CHD)  High    >60 mg/dl ('negative' risk factor for CHD)        LDL Reference Ranges  (U.S. Department of Health and Human Services ATP III Classifications)    Optimal          <100 mg/dL  Near Optimal     100-129 mg/dL  Borderline High  130-159 mg/dL  High             160-189 mg/dL  Very High        >189 mg/dL    Magnesium [[id number]]  (Normal) Collected: 12/05/22 0434    Specimen: Blood Updated: 12/05/22 0534     Magnesium 1.8 mg/dL     Hemoglobin A1c [[id number]]  (Abnormal) Collected: 12/05/22 0433    Specimen: Blood Updated: 12/05/22 0528     Hemoglobin A1C 6.60 %     Narrative:      Hemoglobin A1C Ranges:    Increased Risk for Diabetes  5.7% to 6.4%  Diabetes                     >= 6.5%  Diabetic Goal                < 7.0%    CBC (No Diff) [[id number]]  (Abnormal) Collected: 12/05/22 0434    Specimen: Blood Updated: 12/05/22 0515     WBC 3.83 10*3/mm3      RBC 4.92 10*6/mm3      Hemoglobin 14.3 g/dL      Hematocrit 44.5 %      MCV 90.4 fL      MCH 29.1 pg      MCHC 32.1 g/dL      RDW 14.6 %      RDW-SD 47.7 fl      MPV 12.9 fL      Platelets 132 10*3/mm3     Urine Drug Screen - Urine, Clean Catch [[id number]]  (Normal) Collected: 12/04/22 2309    Specimen: Urine, Clean Catch Updated: 12/04/22 2323     THC,  Screen, Urine Negative     Phencyclidine (PCP), Urine Negative     Cocaine Screen, Urine Negative     Methamphetamine, Ur Negative     Opiate Screen Negative     Amphetamine Screen, Urine Negative     Benzodiazepine Screen, Urine Negative     Tricyclic Antidepressants Screen Negative     Methadone Screen, Urine Negative     Barbiturates Screen, Urine Negative     Oxycodone Screen, Urine Negative     Propoxyphene Screen Negative     Buprenorphine, Screen, Urine Negative    Narrative:      Cutoff For Drugs Screened:    Amphetamines               500 ng/ml  Barbiturates               200 ng/ml  Benzodiazepines            150 ng/ml  Cocaine                    150 ng/ml  Methadone                  200 ng/ml  Opiates                    100 ng/ml  Phencyclidine               25 ng/ml  THC                            50 ng/ml  Methamphetamine            500 ng/ml  Tricyclic Antidepressants  300 ng/ml  Oxycodone                  100 ng/ml  Propoxyphene               300 ng/ml  Buprenorphine               10 ng/ml    The normal value for all drugs tested is negative. This report includes unconfirmed screening results, with the cutoff values listed, to be used for medical treatment purposes only.  Unconfirmed results must not be used for non-medical purposes such as employment or legal testing.  Clinical consideration should be applied to any drug of abuse test, particularly when unconfirmed results are used.      Protime-INR [[id number]]  (Abnormal) Collected: 12/04/22 2049    Specimen: Blood Updated: 12/04/22 2147     Protime 27.1 Seconds      INR 2.50    Narrative:      Therapeutic range for most indications is 2.0-3.0 INR,  or 2.5-3.5 for mechanical heart valves.    Comprehensive Metabolic Panel [[id number]]  (Abnormal) Collected: 12/04/22 2049    Specimen: Blood Updated: 12/04/22 2145     Glucose 126 mg/dL      BUN 42 mg/dL      Creatinine 1.78 mg/dL      Sodium 141 mmol/L      Potassium 3.4 mmol/L      Chloride 97 mmol/L       CO2 31.0 mmol/L      Calcium 9.6 mg/dL      Total Protein 7.4 g/dL      Albumin 3.90 g/dL      ALT (SGPT) 28 U/L      AST (SGOT) 28 U/L      Alkaline Phosphatase 59 U/L      Total Bilirubin 2.9 mg/dL      Globulin 3.5 gm/dL      A/G Ratio 1.1 g/dL      BUN/Creatinine Ratio 23.6     Anion Gap 13.0 mmol/L      eGFR 45.6 mL/min/1.73      Comment: National Kidney Foundation and American Society of Nephrology (ASN) Task Force recommended calculation based on the Chronic Kidney Disease Epidemiology Collaboration (CKD-EPI) equation refit without adjustment for race.       Narrative:      GFR Normal >60  Chronic Kidney Disease <60  Kidney Failure <15      Magnesium [[id number]]  (Normal) Collected: 12/04/22 2049    Specimen: Blood Updated: 12/04/22 2145     Magnesium 1.6 mg/dL     Troponin [[id number]]  (Abnormal) Collected: 12/04/22 2049    Specimen: Blood Updated: 12/04/22 2144     Troponin T 0.052 ng/mL     Narrative:      Troponin T Reference Range:  <= 0.03 ng/mL-   Negative for AMI  >0.03 ng/mL-     Abnormal for myocardial necrosis.  Clinicians would have to utilize clinical acumen, EKG, Troponin and serial changes to determine if it is an Acute Myocardial Infarction or myocardial injury due to an underlying chronic condition.       Results may be falsely decreased if patient taking Biotin.      BNP [[id number]]  (Abnormal) Collected: 12/04/22 2049    Specimen: Blood Updated: 12/04/22 2143     proBNP 2,485.0 pg/mL     Narrative:      Among patients with dyspnea, NT-proBNP is highly sensitive for the detection of acute congestive heart failure. In addition NT-proBNP of <300 pg/ml effectively rules out acute congestive heart failure with 99% negative predictive value.    Results may be falsely decreased if patient taking Biotin.      CBC (No Diff) [[id number]]  (Abnormal) Collected: 12/04/22 2049    Specimen: Blood Updated: 12/04/22 2124     WBC 4.38 10*3/mm3      RBC 5.01 10*6/mm3      Hemoglobin 14.6 g/dL       Hematocrit 45.3 %      MCV 90.4 fL      MCH 29.1 pg      MCHC 32.2 g/dL      RDW 14.6 %      RDW-SD 48.1 fl      MPV 13.2 fL      Platelets 143 10*3/mm3         Imaging Results (Last 7 Days)     Procedure Component Value Units Date/Time    XR Chest PA & Lateral [[id number]] Collected: 12/07/22 0630     Updated: 12/07/22 0730    Narrative:          PROCEDURE: Chest PA and lateral    REASON FOR EXAM: Follow-up possible pneumonia/CHF.    FINDINGS: Comparison study dated December 4, 2022.  Stable  pacemaker and lead. Cardiomegaly. Pulmonary vasculature appears  within normal limits. Lungs are clear.. Pleural spaces are normal  . No acute osseous abnormality.      Impression:      1.  Cardiomegaly without decompensation.  2.  Otherwise unremarkable chest.    Electronically signed by:  Pj Cummins MD  12/7/2022 7:28 AM CST  Workstation: [id number]    XR Chest 1 View [[id number]] Collected: 12/04/22 2037     Updated: 12/04/22 2104    Narrative:      PROCEDURE: XR CHEST 1 VIEW, 12/4/2022 8:37 PM CST    CLINICAL INDICATION:    sob.    COMPARISON: 11/30/22    TECHNIQUE:  AP portable radiograph of chest     FINDINGS:    Large cardiac silhouette. Retrocardiac opacification. Suspect at  least small left pleural effusion. No pneumothorax.      Impression:        Retrocardiac opacification is suspicious for infiltrate.    Large cardiac silhouette again seen.    Suspect at least small left pleural effusion.          Electronically signed by:  Deacon Ac MD  12/4/2022 9:02 PM  CST Workstation: [id number]            Chief Complaint on Day of Discharge: None.    Hospital Course:  The patient was admitted and managed as follows:    Possible Non-STEMI: Patient remained chest pain-free and elevated troponin has been attributed to acute illness as patient has had previous invasive coronary angiogram which did not show any evidence of atherosclerosis.  He was seen by the cardiologist on consult and recommended continuation of medical  "management.  Previous echocardiogram done 8/18/2022 showed an estimated ejection fraction of 15 to 20%     Acute on chronic systolic heart failure: Patient was started on Bumex, beta-blockers, Entresto with strict I's and O's, daily weights, salt and fluid restriction.  He did improve, became euvolemic and chest x-ray done a day prior to discharge showed cardiomegaly with no acute changes. Entresto has been discontinued secondary to borderline blood pressure and patient started on low-dose losartan. He is to continue Eliquis 5 mg twice daily secondary to risk of LV thrombus.  He was cleared for discharge by the cardiologist and will be seen for follow-up as outpatient in 3 to 4 weeks.      Possible retrocardiac infiltrate: He was started on IV Rocephin.  Anaerobic blood culture showed coag negative staph which is likely a contaminant.  Chest x-ray done on 12/7/2022 did not show any acute changes and antimicrobial therapy was discontinued.    He did receive potassium repletion secondary to hypokalemia and was continued on potassium supplementation on discharge.  He benefited from PT and OT secondary to deconditioning and was less deconditioned prior to discharge.      Condition on Discharge: Stable and improved.    Physical Exam on Discharge:  /89 (BP Location: Right arm, Patient Position: Lying)   Pulse 54   Temp 97.5 °F (36.4 °C) (Temporal)   Resp 18   Ht 193 cm (76\")   Wt 129 kg (285 lb 1.6 oz)   SpO2 95%   BMI 34.70 kg/m²   Physical Exam  Vitals and nursing note reviewed.   Constitutional:       General: He is not in acute distress.     Appearance: He is well-developed. He is obese. He is ill-appearing. He is not diaphoretic.   HENT:      Head: Normocephalic and atraumatic.   Eyes:      General: No scleral icterus.        Right eye: No discharge.         Left eye: No discharge.      Extraocular Movements: Extraocular movements intact.      Pupils: Pupils are equal, round, and reactive to light. "   Neck:      Thyroid: No thyromegaly.      Vascular: No carotid bruit or JVD.   Cardiovascular:      Rate and Rhythm: Normal rate and regular rhythm.      Heart sounds: Normal heart sounds. No murmur heard.    No friction rub. No gallop.   Pulmonary:      Effort: Pulmonary effort is normal. No respiratory distress.      Breath sounds: Normal breath sounds. No stridor. No wheezing or rales.   Chest:      Chest wall: No tenderness.   Abdominal:      General: Bowel sounds are normal. There is no distension.      Palpations: Abdomen is soft. There is no mass.      Tenderness: There is no abdominal tenderness. There is no right CVA tenderness, left CVA tenderness, guarding or rebound.      Hernia: No hernia is present.   Musculoskeletal:         General: No swelling, tenderness or deformity.      Cervical back: Normal range of motion and neck supple.      Right lower leg: No edema.      Left lower leg: No edema.      Comments: He has trace edema both legs.   Skin:     General: Skin is warm and dry.      Coloration: Skin is not jaundiced or pale.      Findings: No bruising, erythema, lesion or rash.   Neurological:      General: No focal deficit present.      Mental Status: He is alert and oriented to person, place, and time.      Cranial Nerves: No cranial nerve deficit.      Sensory: No sensory deficit.      Motor: No weakness or abnormal muscle tone.      Coordination: Coordination normal.      Gait: Gait normal.      Deep Tendon Reflexes: Reflexes normal.   Psychiatric:         Mood and Affect: Mood normal.         Behavior: Behavior normal.         Thought Content: Thought content normal.         Judgment: Judgment normal.           Discharge Disposition:  Home or Self Care    Discharge Medications:     Discharge Medications      Changes to Medications      Instructions Start Date   bumetanide 1 MG tablet  Commonly known as: BUMEX  What changed:   · medication strength  · how much to take   1 mg, Oral, 2 Times  Daily      carvedilol 12.5 MG tablet  Commonly known as: COREG  What changed: additional instructions   12.5 mg, Oral, 2 Times Daily With Meals         Continue These Medications      Instructions Start Date   albuterol sulfate  (90 Base) MCG/ACT inhaler  Commonly known as: PROVENTIL HFA;VENTOLIN HFA;PROAIR HFA   2 puffs, Inhalation, Every 4 Hours PRN      apixaban 5 MG tablet tablet  Commonly known as: ELIQUIS   5 mg, Oral, 2 Times Daily      losartan 25 MG tablet  Commonly known as: COZAAR   12.5 mg, Oral, Every 24 Hours Scheduled      lovastatin 20 MG 24 hr tablet  Commonly known as: ALTOPREV   20 mg, Oral      metOLazone 2.5 MG tablet  Commonly known as: ZAROXOLYN   2.5 mg, Oral, 3 Times Weekly      potassium chloride 10 MEQ CR tablet   20 mEq, Oral, Daily         Stop These Medications    doxycycline 100 MG capsule  Commonly known as: MONODOX            Discharge Diet: Cardiac and renal.     Activity at Discharge: As tolerated.    Discharge Care Plan/Instructions: Patient has been advised to take his medications as prescribed and return to the emergency room in the event of any worsening symptoms.    Follow-up Appointments:   Future Appointments   Date Time Provider Department Center   12/13/2022  2:30 PM Yanira Irving APRN [facility]   12/19/2022  3:30 PM Susana Berkowitz APRN [facility] None       Test Results Pending at Discharge:   Pending Labs     Order Current Status    Blood Culture - Blood, Hand, Right Preliminary result    CANDIDA AURIS SCREEN - Swab, Axilla Right, Axilla Left and Groin Preliminary result          The patient has current or prior documentation of LVEF less than 40%, or moderate to severely depressed left ventricular systolic function. The patent was prescribed or already taking an ACE inhibitor or ARB.     The patient has current or prior documentation of LVEF less than 40%, or moderate to severely depressed left ventricular systolic function. The patient was  prescribed or already taking a beta-blocker.       Ayaan Cuba MD  12/08/22  08:13 CST    Time: 35 minutes.

## 2022-12-09 LAB — BACTERIA SPEC AEROBE CULT: NORMAL

## 2022-12-09 NOTE — OUTREACH NOTE
Prep Survey    Flowsheet Row Responses   [facility] facility patient discharged from? [place]   Is LACE score < 7 ? No   Emergency Room discharge w/ pulse ox? No   Eligibility Readm Mgmt   Discharge diagnosis CHF exacerbation   Does the patient have one of the following disease processes/diagnoses(primary or secondary)? CHF   Does the patient have Home health ordered? No   Is there a DME ordered? No   Medication alerts for this patient [facility]   Prep survey completed? Yes          JASE GEIGER - Registered Nurse

## 2022-12-10 LAB — BACTERIA ISLT: NORMAL

## 2022-12-12 ENCOUNTER — READMISSION MANAGEMENT (OUTPATIENT)
Dept: CALL CENTER | Facility: HOSPITAL | Age: 52
End: 2022-12-12

## 2022-12-12 NOTE — OUTREACH NOTE
CHF Week 1 Survey    Flowsheet Row Responses   [facility] facility patient discharged from? [place]   Does the patient have one of the following disease processes/diagnoses(primary or secondary)? CHF   CHF Week 1 attempt successful? No   Unsuccessful attempts Attempt 1          SUSAN YANG - Registered Nurse

## 2022-12-13 ENCOUNTER — OFFICE VISIT (OUTPATIENT)
Dept: SLEEP MEDICINE | Facility: HOSPITAL | Age: 52
End: 2022-12-13

## 2022-12-13 VITALS
HEIGHT: 76 IN | BODY MASS INDEX: 34.22 KG/M2 | OXYGEN SATURATION: 95 % | SYSTOLIC BLOOD PRESSURE: 71 MMHG | DIASTOLIC BLOOD PRESSURE: 54 MMHG | HEART RATE: 73 BPM | WEIGHT: 281 LBS

## 2022-12-13 DIAGNOSIS — G47.33 OBSTRUCTIVE SLEEP APNEA: Primary | ICD-10-CM

## 2022-12-13 DIAGNOSIS — I27.20 PULMONARY HYPERTENSION: ICD-10-CM

## 2022-12-13 DIAGNOSIS — I42.8 NICM (NONISCHEMIC CARDIOMYOPATHY): ICD-10-CM

## 2022-12-13 DIAGNOSIS — I50.20 HFREF (HEART FAILURE WITH REDUCED EJECTION FRACTION): ICD-10-CM

## 2022-12-13 PROCEDURE — 99213 OFFICE O/P EST LOW 20 MIN: CPT | Performed by: NURSE PRACTITIONER

## 2022-12-13 RX ORDER — ERGOCALCIFEROL 1.25 MG/1
50000 CAPSULE ORAL WEEKLY
COMMUNITY
Start: 2022-10-27 | End: 2023-01-14

## 2022-12-13 NOTE — PROGRESS NOTES
Sleep Clinic Follow Up    Date: 2022  Primary Care Provider: Shonna Ng APRN    Last office visit: 10/11/2022 (I reviewed this note)    CC: Follow up: KHANH on CPAP      Interim History:  Since the last visit:    1) severe KHANH -  Matti Bravo has not received CPAP supplies to resume using his CPAP yet. This visit was for compliance check ~2 months after resuming CPAP, however, he sates that he never got in contact with [facility] regarding new supplies.     Sleep Testin. PSG on 2014, AHI of 49.8   2. CPAP titration on same day, recommended 10 cm H2O   3. Previously on 10 cm H2O - unable to resume PAP therapy due to needing supplies    PAP Data:    Mask type: Full face mask  DME: [facility]  Machine type: [employer] Dream[employer]    Bed time: 0000  Sleep latency: 5-10 minutes  Number of times awakens during the night: 5-6  Wake time: 0600  Estimated total sleep time at night: 3-4 hours  Caffeine intake: 0 cups of coffee, 0 cups of tea, and 3 sodas per day  Alcohol intake: 0 drinks per week  Nap time: all day   Sleepiness with Driving: none/rare     [place] - 16  [place] Sleepiness Scale  Sitting and reading: High chance of dozing  Watching TV: High chance of dozing  Sitting, inactive in a public place (e.g. a theatre or a meeting): High chance of dozing  As a passenger in a car for an hour without a break: Moderate chance of dozing  Lying down to rest in the afternoon when circumstances permit: Moderate chance of dozing  Sitting and talking to someone: Slight chance of dozing  Sitting quietly after a lunch without alcohol: Slight chance of dozing  In a car, while stopped for a few minutes in traffic: Slight chance of dozing  Total score: 16    PMHx, FH, SH reviewed and pertinent changes are: Had pneumonia - was hospitalized.     Review of Systems:   Negative for chest pain, SOA, fever, chills, cough, N/V/D, abdominal pain.    Smoking:none    Matti Bravo  reports that  he has quit smoking. He has never used smokeless tobacco.    Physical Exam:  Vitals:    12/13/22 1408   BP: (!) 71/54   Pulse: 73   SpO2: 95%   *patient is asymptomatic and reports that he runs low like this - thinks he has taken all of his blood pressure medications today        12/13/22  1408   Weight: 127 kg (281 lb)     Body mass index is 34.22 kg/m². BMI is >= 30 and <35. (Class 1 Obesity). The following options were offered after discussion;: referral to primary care    Gen:                No distress, conversant, pleasant, appears stated age, alert, oriented  Eyes:               Anicteric sclera, moist conjunctiva, no lid lag                           PERRL, EOMI   Heent:             NC/AT                          Oropharynx clear                          Normal hearing  Lungs:             Normal effort, non-labored breathing      CV:                  Normal S1/S2                          No lower extremity edema  ABD:               Soft, rounded, non-distended                Psych:             Appropriate affect  Neuro:             CN 2-12 appear intact    Past Medical History:   Diagnosis Date   • Asthma    • Chronic systolic heart failure (HCC)    • Diabetes mellitus (HCC)    • Hyperlipidemia    • Hypertension    • Obesity    • Peripheral vascular disease (HCC)    • Sleep apnea        Current Outpatient Medications:   •  albuterol sulfate  (90 Base) MCG/ACT inhaler, Inhale 2 puffs Every 4 (Four) Hours As Needed for Wheezing., Disp: 1 inhaler, Rfl: 3  •  apixaban (ELIQUIS) 5 MG tablet tablet, Take 1 tablet by mouth 2 (Two) Times a Day., Disp: 60 tablet, Rfl: 3  •  bumetanide (BUMEX) 1 MG tablet, Take 1 tablet by mouth 2 (Two) Times a Day., Disp: 60 tablet, Rfl: 2  •  lovastatin (ALTOPREV) 20 MG 24 hr tablet, Take 20 mg by mouth., Disp: , Rfl:   •  metOLazone (ZAROXOLYN) 2.5 MG tablet, Take 1 tablet by mouth 3 (Three) Times a Week., Disp: 15 tablet, Rfl: 3  •  potassium chloride 10 MEQ CR tablet, Take  2 tablets by mouth Daily., Disp: 30 tablet, Rfl: 1  •  carvedilol (COREG) 12.5 MG tablet, Take 1 tablet by mouth 2 (Two) Times a Day With Meals for 30 days. (Patient taking differently: Take 12.5 mg by mouth 2 (Two) Times a Day With Meals. Pt states he has some meds and is still taking this.), Disp: 60 tablet, Rfl: 3  •  losartan (COZAAR) 25 MG tablet, Take 0.5 tablets by mouth Daily for 30 days., Disp: 15 tablet, Rfl: 3  •  vitamin D (ERGOCALCIFEROL) 1.25 MG (95509 UT) capsule capsule, Take 50,000 Units by mouth 1 (One) Time Per Week., Disp: , Rfl:     WBC   Date Value Ref Range Status   12/07/2022 5.53 3.40 - 10.80 10*3/mm3 Final     RBC   Date Value Ref Range Status   12/07/2022 4.91 4.14 - 5.80 10*6/mm3 Final     Hemoglobin   Date Value Ref Range Status   12/07/2022 14.6 13.0 - 17.7 g/dL Final     Hematocrit   Date Value Ref Range Status   12/07/2022 44.8 37.5 - 51.0 % Final     MCV   Date Value Ref Range Status   12/07/2022 91.2 79.0 - 97.0 fL Final     MCH   Date Value Ref Range Status   12/07/2022 29.7 26.6 - 33.0 pg Final     MCHC   Date Value Ref Range Status   12/07/2022 32.6 31.5 - 35.7 g/dL Final     RDW   Date Value Ref Range Status   12/07/2022 14.6 12.3 - 15.4 % Final     RDW-SD   Date Value Ref Range Status   12/07/2022 48.6 37.0 - 54.0 fl Final     MPV   Date Value Ref Range Status   12/07/2022 13.4 (H) 6.0 - 12.0 fL Final     Platelets   Date Value Ref Range Status   12/07/2022 150 140 - 450 10*3/mm3 Final     Neutrophil %   Date Value Ref Range Status   12/07/2022 49.3 42.7 - 76.0 % Final     Lymphocyte %   Date Value Ref Range Status   12/07/2022 25.9 19.6 - 45.3 % Final     Monocyte %   Date Value Ref Range Status   12/07/2022 19.0 (H) 5.0 - 12.0 % Final     Eosinophil %   Date Value Ref Range Status   12/07/2022 4.5 0.3 - 6.2 % Final     Basophil %   Date Value Ref Range Status   12/07/2022 0.9 0.0 - 1.5 % Final     Immature Grans %   Date Value Ref Range Status   12/07/2022 0.4 0.0 - 0.5 %  Final     Neutrophils, Absolute   Date Value Ref Range Status   12/07/2022 2.73 1.70 - 7.00 10*3/mm3 Final     Lymphocytes, Absolute   Date Value Ref Range Status   12/07/2022 1.43 0.70 - 3.10 10*3/mm3 Final     Monocytes, Absolute   Date Value Ref Range Status   12/07/2022 1.05 (H) 0.10 - 0.90 10*3/mm3 Final     Eosinophils, Absolute   Date Value Ref Range Status   12/07/2022 0.25 0.00 - 0.40 10*3/mm3 Final     Basophils, Absolute   Date Value Ref Range Status   12/07/2022 0.05 0.00 - 0.20 10*3/mm3 Final     Immature Grans, Absolute   Date Value Ref Range Status   12/07/2022 0.02 0.00 - 0.05 10*3/mm3 Final     nRBC   Date Value Ref Range Status   12/07/2022 0.0 0.0 - 0.2 /100 WBC Final       Lab Results   Component Value Date    GLUCOSE 112 (H) 12/08/2022    BUN 26 (H) 12/08/2022    CREATININE 1.19 12/08/2022    EGFRIFAFRI 91 12/08/2021    BCR 21.8 12/08/2022    K 3.9 12/08/2022    CO2 31.0 (H) 12/08/2022    CALCIUM 9.1 12/08/2022    ALBUMIN 3.80 12/05/2022    AST 27 12/05/2022    ALT 29 12/05/2022       Assessment and Plan:    1. Obstructive sleep apnea - Established, not controlled  1. Non-compliant with PAP therapy due to inability  2. Continue PAP as prescribed  3. Script for PAP supplies  4. Pertinent labs were reviewed as listed above  5. Return to clinic in 2 months with compliance report unless change in symptoms in interim period  2. HFrEF, cardiomyopathy, pulmonary hypertension, AICD placement - Established, stable (1)   1. Needs to resume CPAP as soon as possible  2. Follow up with specialties      I spent 15 minutes caring for Matti on this date of service. This time includes time spent by me in the following activities: preparing for the visit, reviewing tests, obtaining and/or reviewing a separately obtained history, performing a medically appropriate examination and/or evaluation , counseling and educating the patient/family/caregiver, documenting information in the medical record and care  coordination; discussing PAP therapy, PAP compliance and PAP maintenance    RTC in 2 months. Patient agrees to return sooner if changes in symptoms.        This document has been electronically signed by SHELL Cardenas on December 13, 2022 14:13 CST          CC: Shonna Ng APRN          No ref. provider found

## 2022-12-15 ENCOUNTER — READMISSION MANAGEMENT (OUTPATIENT)
Dept: CALL CENTER | Facility: HOSPITAL | Age: 52
End: 2022-12-15

## 2022-12-15 NOTE — OUTREACH NOTE
CHF Week 1 Survey    Flowsheet Row Responses   [facility] facility patient discharged from? [place]   Does the patient have one of the following disease processes/diagnoses(primary or secondary)? CHF   CHF Week 1 attempt successful? No   Unsuccessful attempts Attempt 2          MALGORZATA FALCON - Registered Nurse

## 2022-12-19 ENCOUNTER — READMISSION MANAGEMENT (OUTPATIENT)
Dept: CALL CENTER | Facility: HOSPITAL | Age: 52
End: 2022-12-19

## 2022-12-19 NOTE — OUTREACH NOTE
CHF Week 1 Survey    Flowsheet Row Responses   [facility] patient discharged from? [place]   Does the patient have one of the following disease processes/diagnoses(primary or secondary)? CHF   CHF Week 1 attempt successful? Yes   Call start time 1725   Call end time 1727   Discharge diagnosis CHF exacerbation   Medication alerts for this patient [facility]   Meds reviewed with patient/caregiver? Yes   Is the patient having any side effects they believe may be caused by any medication additions or changes? No   Does the patient have all medications ordered at discharge? Yes   Is the patient taking all medications as directed (includes completed medication regime)? Yes   Comments regarding appointments f/u with cardiology on 12/29   Does the patient have a primary care provider?  Yes   Comments regarding PCP has followed up with his PCP   Has the patient kept scheduled appointments due by today? Yes   Has home health visited the patient within 72 hours of discharge? N/A   Psychosocial issues? No   Did the patient receive a copy of their discharge instructions? Yes   Nursing interventions Reviewed instructions with patient   What is the patient's perception of their health status since discharge? Improving   Nursing interventions Nurse provided patient education   Is the patient able to teach back signs and symptoms of worsening condition? (i.e. weight gain, shortness of air, etc.) Yes   Is the patient/caregiver able to teach back the hierarchy of who to call/visit for symptoms/problems? PCP, Specialist, Home health nurse, Urgent Care, ED, 911 Yes   Is the patient able to teach back Heart Failure Zones? Yes   CHF Zone this Call Green Zone   Green Zone Patient reports doing well, No new or worsening shortness of breath, Physical activity level is normal for you, No new swelling -  feet, ankles and legs look normal for you, Weight check stable, No chest pain   Green Zone Interventions Daily weight check, Meds as  directed, Follow up visits planned    CHF Week 1 call completed? Yes   Wrap up additional comments Doing well, no questions, he has seen his PCP and is doing daily weight checks.   Call end time 1727          ROHIT DORADO - Registered Nurse

## 2022-12-20 ENCOUNTER — HOSPITAL ENCOUNTER (INPATIENT)
Facility: HOSPITAL | Age: 52
LOS: 24 days | Discharge: ANOTHER HOSPITAL | DRG: 682 | End: 2023-01-14
Attending: EMERGENCY MEDICINE | Admitting: HOSPITALIST
Payer: MEDICAID

## 2022-12-20 ENCOUNTER — READMISSION MANAGEMENT (OUTPATIENT)
Dept: CALL CENTER | Facility: HOSPITAL | Age: 52
End: 2022-12-20

## 2022-12-20 ENCOUNTER — APPOINTMENT (OUTPATIENT)
Dept: GENERAL RADIOLOGY | Facility: HOSPITAL | Age: 52
DRG: 682 | End: 2022-12-20
Payer: MEDICAID

## 2022-12-20 ENCOUNTER — APPOINTMENT (OUTPATIENT)
Dept: CT IMAGING | Facility: HOSPITAL | Age: 52
DRG: 682 | End: 2022-12-20
Payer: MEDICAID

## 2022-12-20 DIAGNOSIS — Z74.09 IMPAIRED FUNCTIONAL MOBILITY AND ACTIVITY TOLERANCE: ICD-10-CM

## 2022-12-20 DIAGNOSIS — R06.02 SHORTNESS OF BREATH: ICD-10-CM

## 2022-12-20 DIAGNOSIS — E87.6 HYPOKALEMIA: ICD-10-CM

## 2022-12-20 DIAGNOSIS — Z74.09 IMPAIRED MOBILITY AND ADLS: ICD-10-CM

## 2022-12-20 DIAGNOSIS — I48.19 ATRIAL FIBRILLATION, PERSISTENT: ICD-10-CM

## 2022-12-20 DIAGNOSIS — R79.89 ELEVATED BRAIN NATRIURETIC PEPTIDE (BNP) LEVEL: ICD-10-CM

## 2022-12-20 DIAGNOSIS — Z74.09 IMPAIRED FUNCTIONAL MOBILITY, BALANCE, GAIT, AND ENDURANCE: ICD-10-CM

## 2022-12-20 DIAGNOSIS — Z78.9 IMPAIRED MOBILITY AND ADLS: ICD-10-CM

## 2022-12-20 DIAGNOSIS — N17.9 AKI (ACUTE KIDNEY INJURY): Primary | ICD-10-CM

## 2022-12-20 LAB
ALBUMIN SERPL-MCNC: 4.1 G/DL (ref 3.5–5.2)
ALBUMIN/GLOB SERPL: 1.1 G/DL
ALP SERPL-CCNC: 63 U/L (ref 39–117)
ALT SERPL W P-5'-P-CCNC: 11 U/L (ref 1–41)
AMPHET+METHAMPHET UR QL: NEGATIVE
AMPHETAMINES UR QL: NEGATIVE
ANION GAP SERPL CALCULATED.3IONS-SCNC: 16 MMOL/L (ref 5–15)
APAP SERPL-MCNC: <5 MCG/ML (ref 0–30)
AST SERPL-CCNC: 15 U/L (ref 1–40)
BACTERIA UR QL AUTO: ABNORMAL /HPF
BARBITURATES UR QL SCN: NEGATIVE
BASOPHILS # BLD AUTO: 0.05 10*3/MM3 (ref 0–0.2)
BASOPHILS NFR BLD AUTO: 1.3 % (ref 0–1.5)
BENZODIAZ UR QL SCN: NEGATIVE
BILIRUB SERPL-MCNC: 2.2 MG/DL (ref 0–1.2)
BILIRUB UR QL STRIP: NEGATIVE
BUN SERPL-MCNC: 40 MG/DL (ref 6–20)
BUN/CREAT SERPL: 23.3 (ref 7–25)
BUPRENORPHINE SERPL-MCNC: NEGATIVE NG/ML
CALCIUM SPEC-SCNC: 9.3 MG/DL (ref 8.6–10.5)
CANNABINOIDS SERPL QL: NEGATIVE
CHLORIDE SERPL-SCNC: 97 MMOL/L (ref 98–107)
CK SERPL-CCNC: 87 U/L (ref 20–200)
CLARITY UR: CLEAR
CO2 SERPL-SCNC: 25 MMOL/L (ref 22–29)
COCAINE UR QL: NEGATIVE
COLOR UR: ABNORMAL
CREAT SERPL-MCNC: 1.72 MG/DL (ref 0.76–1.27)
D-LACTATE SERPL-SCNC: 2 MMOL/L (ref 0.5–2)
DEPRECATED RDW RBC AUTO: 46.5 FL (ref 37–54)
EGFRCR SERPLBLD CKD-EPI 2021: 47.5 ML/MIN/1.73
EOSINOPHIL # BLD AUTO: 0.09 10*3/MM3 (ref 0–0.4)
EOSINOPHIL NFR BLD AUTO: 2.4 % (ref 0.3–6.2)
ERYTHROCYTE [DISTWIDTH] IN BLOOD BY AUTOMATED COUNT: 14.6 % (ref 12.3–15.4)
ETHANOL BLD-MCNC: <10 MG/DL (ref 0–10)
ETHANOL UR QL: <0.01 %
FLUAV RNA RESP QL NAA+PROBE: NOT DETECTED
FLUBV RNA RESP QL NAA+PROBE: NOT DETECTED
GLOBULIN UR ELPH-MCNC: 3.6 GM/DL
GLUCOSE SERPL-MCNC: 145 MG/DL (ref 65–99)
GLUCOSE UR STRIP-MCNC: NEGATIVE MG/DL
HCT VFR BLD AUTO: 45 % (ref 37.5–51)
HGB BLD-MCNC: 14.9 G/DL (ref 13–17.7)
HGB UR QL STRIP.AUTO: NEGATIVE
HOLD SPECIMEN: NORMAL
HYALINE CASTS UR QL AUTO: ABNORMAL /LPF
IMM GRANULOCYTES # BLD AUTO: 0.01 10*3/MM3 (ref 0–0.05)
IMM GRANULOCYTES NFR BLD AUTO: 0.3 % (ref 0–0.5)
KETONES UR QL STRIP: ABNORMAL
LEUKOCYTE ESTERASE UR QL STRIP.AUTO: ABNORMAL
LYMPHOCYTES # BLD AUTO: 1.21 10*3/MM3 (ref 0.7–3.1)
LYMPHOCYTES NFR BLD AUTO: 32.4 % (ref 19.6–45.3)
MAGNESIUM SERPL-MCNC: 1.9 MG/DL (ref 1.6–2.6)
MCH RBC QN AUTO: 29.6 PG (ref 26.6–33)
MCHC RBC AUTO-ENTMCNC: 33.1 G/DL (ref 31.5–35.7)
MCV RBC AUTO: 89.5 FL (ref 79–97)
METHADONE UR QL SCN: NEGATIVE
MONOCYTES # BLD AUTO: 0.67 10*3/MM3 (ref 0.1–0.9)
MONOCYTES NFR BLD AUTO: 18 % (ref 5–12)
NEUTROPHILS NFR BLD AUTO: 1.7 10*3/MM3 (ref 1.7–7)
NEUTROPHILS NFR BLD AUTO: 45.6 % (ref 42.7–76)
NITRITE UR QL STRIP: NEGATIVE
NRBC BLD AUTO-RTO: 0 /100 WBC (ref 0–0.2)
NT-PROBNP SERPL-MCNC: 5254 PG/ML (ref 0–900)
OPIATES UR QL: NEGATIVE
OXYCODONE UR QL SCN: NEGATIVE
PCP UR QL SCN: NEGATIVE
PH UR STRIP.AUTO: 5.5 [PH] (ref 5–9)
PLATELET # BLD AUTO: 117 10*3/MM3 (ref 140–450)
PMV BLD AUTO: 13.3 FL (ref 6–12)
POTASSIUM SERPL-SCNC: 2.9 MMOL/L (ref 3.5–5.2)
PROPOXYPH UR QL: NEGATIVE
PROT SERPL-MCNC: 7.7 G/DL (ref 6–8.5)
PROT UR QL STRIP: ABNORMAL
RBC # BLD AUTO: 5.03 10*6/MM3 (ref 4.14–5.8)
RBC # UR STRIP: ABNORMAL /HPF
REF LAB TEST METHOD: ABNORMAL
SALICYLATES SERPL-MCNC: <0.3 MG/DL
SARS-COV-2 RNA RESP QL NAA+PROBE: NOT DETECTED
SODIUM SERPL-SCNC: 138 MMOL/L (ref 136–145)
SP GR UR STRIP: 1.02 (ref 1–1.03)
SQUAMOUS #/AREA URNS HPF: ABNORMAL /HPF
T4 FREE SERPL-MCNC: 1.34 NG/DL (ref 0.93–1.7)
TRICYCLICS UR QL SCN: NEGATIVE
TROPONIN T SERPL-MCNC: 0.04 NG/ML (ref 0–0.03)
TROPONIN T SERPL-MCNC: 0.04 NG/ML (ref 0–0.03)
TSH SERPL DL<=0.05 MIU/L-ACNC: 1.11 UIU/ML (ref 0.27–4.2)
UROBILINOGEN UR QL STRIP: ABNORMAL
WBC # UR STRIP: ABNORMAL /HPF
WBC NRBC COR # BLD: 3.73 10*3/MM3 (ref 3.4–10.8)

## 2022-12-20 PROCEDURE — G0378 HOSPITAL OBSERVATION PER HR: HCPCS

## 2022-12-20 PROCEDURE — 84300 ASSAY OF URINE SODIUM: CPT | Performed by: INTERNAL MEDICINE

## 2022-12-20 PROCEDURE — 71045 X-RAY EXAM CHEST 1 VIEW: CPT

## 2022-12-20 PROCEDURE — 0 POTASSIUM CHLORIDE 10 MEQ/100ML SOLUTION: Performed by: EMERGENCY MEDICINE

## 2022-12-20 PROCEDURE — 99285 EMERGENCY DEPT VISIT HI MDM: CPT

## 2022-12-20 PROCEDURE — 80179 DRUG ASSAY SALICYLATE: CPT | Performed by: EMERGENCY MEDICINE

## 2022-12-20 PROCEDURE — 83735 ASSAY OF MAGNESIUM: CPT | Performed by: EMERGENCY MEDICINE

## 2022-12-20 PROCEDURE — 84484 ASSAY OF TROPONIN QUANT: CPT | Performed by: EMERGENCY MEDICINE

## 2022-12-20 PROCEDURE — 82077 ASSAY SPEC XCP UR&BREATH IA: CPT | Performed by: EMERGENCY MEDICINE

## 2022-12-20 PROCEDURE — 83605 ASSAY OF LACTIC ACID: CPT | Performed by: EMERGENCY MEDICINE

## 2022-12-20 PROCEDURE — 93005 ELECTROCARDIOGRAM TRACING: CPT

## 2022-12-20 PROCEDURE — 80053 COMPREHEN METABOLIC PANEL: CPT | Performed by: EMERGENCY MEDICINE

## 2022-12-20 PROCEDURE — 83880 ASSAY OF NATRIURETIC PEPTIDE: CPT | Performed by: EMERGENCY MEDICINE

## 2022-12-20 PROCEDURE — 94660 CPAP INITIATION&MGMT: CPT

## 2022-12-20 PROCEDURE — 94799 UNLISTED PULMONARY SVC/PX: CPT

## 2022-12-20 PROCEDURE — 93010 ELECTROCARDIOGRAM REPORT: CPT | Performed by: INTERNAL MEDICINE

## 2022-12-20 PROCEDURE — 74177 CT ABD & PELVIS W/CONTRAST: CPT

## 2022-12-20 PROCEDURE — 80143 DRUG ASSAY ACETAMINOPHEN: CPT | Performed by: EMERGENCY MEDICINE

## 2022-12-20 PROCEDURE — 25010000002 CEFTRIAXONE PER 250 MG: Performed by: EMERGENCY MEDICINE

## 2022-12-20 PROCEDURE — 81001 URINALYSIS AUTO W/SCOPE: CPT | Performed by: EMERGENCY MEDICINE

## 2022-12-20 PROCEDURE — 93005 ELECTROCARDIOGRAM TRACING: CPT | Performed by: EMERGENCY MEDICINE

## 2022-12-20 PROCEDURE — 85025 COMPLETE CBC W/AUTO DIFF WBC: CPT | Performed by: EMERGENCY MEDICINE

## 2022-12-20 PROCEDURE — 87636 SARSCOV2 & INF A&B AMP PRB: CPT | Performed by: EMERGENCY MEDICINE

## 2022-12-20 PROCEDURE — 80306 DRUG TEST PRSMV INSTRMNT: CPT | Performed by: EMERGENCY MEDICINE

## 2022-12-20 PROCEDURE — 87040 BLOOD CULTURE FOR BACTERIA: CPT | Performed by: EMERGENCY MEDICINE

## 2022-12-20 PROCEDURE — 84443 ASSAY THYROID STIM HORMONE: CPT | Performed by: EMERGENCY MEDICINE

## 2022-12-20 PROCEDURE — 84439 ASSAY OF FREE THYROXINE: CPT | Performed by: EMERGENCY MEDICINE

## 2022-12-20 PROCEDURE — 71275 CT ANGIOGRAPHY CHEST: CPT

## 2022-12-20 PROCEDURE — 25010000002 ONDANSETRON PER 1 MG: Performed by: EMERGENCY MEDICINE

## 2022-12-20 PROCEDURE — 82550 ASSAY OF CK (CPK): CPT | Performed by: EMERGENCY MEDICINE

## 2022-12-20 RX ORDER — POTASSIUM CHLORIDE 7.45 MG/ML
10 INJECTION INTRAVENOUS ONCE
Status: COMPLETED | OUTPATIENT
Start: 2022-12-20 | End: 2022-12-20

## 2022-12-20 RX ORDER — POTASSIUM CHLORIDE 750 MG/1
40 CAPSULE, EXTENDED RELEASE ORAL ONCE
Status: COMPLETED | OUTPATIENT
Start: 2022-12-20 | End: 2022-12-20

## 2022-12-20 RX ORDER — SODIUM CHLORIDE 0.9 % (FLUSH) 0.9 %
10 SYRINGE (ML) INJECTION EVERY 12 HOURS SCHEDULED
Status: DISCONTINUED | OUTPATIENT
Start: 2022-12-20 | End: 2023-01-14 | Stop reason: HOSPADM

## 2022-12-20 RX ORDER — SODIUM CHLORIDE 0.9 % (FLUSH) 0.9 %
10 SYRINGE (ML) INJECTION AS NEEDED
Status: DISCONTINUED | OUTPATIENT
Start: 2022-12-20 | End: 2023-01-14 | Stop reason: HOSPADM

## 2022-12-20 RX ORDER — ATORVASTATIN CALCIUM 10 MG/1
10 TABLET, FILM COATED ORAL DAILY
Status: DISCONTINUED | OUTPATIENT
Start: 2022-12-20 | End: 2023-01-04

## 2022-12-20 RX ORDER — NOREPINEPHRINE BIT/0.9 % NACL 8 MG/250ML
.02-.3 INFUSION BOTTLE (ML) INTRAVENOUS
Status: DISCONTINUED | OUTPATIENT
Start: 2022-12-20 | End: 2022-12-25

## 2022-12-20 RX ORDER — CARVEDILOL 12.5 MG/1
12.5 TABLET ORAL 2 TIMES DAILY WITH MEALS
Status: DISCONTINUED | OUTPATIENT
Start: 2022-12-20 | End: 2022-12-26

## 2022-12-20 RX ORDER — ONDANSETRON 2 MG/ML
4 INJECTION INTRAMUSCULAR; INTRAVENOUS ONCE
Status: COMPLETED | OUTPATIENT
Start: 2022-12-20 | End: 2022-12-20

## 2022-12-20 RX ORDER — LOSARTAN POTASSIUM 25 MG/1
12.5 TABLET ORAL
Status: DISCONTINUED | OUTPATIENT
Start: 2022-12-22 | End: 2023-01-01

## 2022-12-20 RX ORDER — METOLAZONE 2.5 MG/1
2.5 TABLET ORAL 3 TIMES WEEKLY
Status: DISCONTINUED | OUTPATIENT
Start: 2022-12-21 | End: 2022-12-21

## 2022-12-20 RX ORDER — ALBUTEROL SULFATE 2.5 MG/3ML
2.5 SOLUTION RESPIRATORY (INHALATION) EVERY 4 HOURS PRN
Status: DISCONTINUED | OUTPATIENT
Start: 2022-12-20 | End: 2023-01-14 | Stop reason: HOSPADM

## 2022-12-20 RX ORDER — SODIUM CHLORIDE 9 MG/ML
40 INJECTION, SOLUTION INTRAVENOUS AS NEEDED
Status: DISCONTINUED | OUTPATIENT
Start: 2022-12-20 | End: 2023-01-14 | Stop reason: HOSPADM

## 2022-12-20 RX ADMIN — SODIUM CHLORIDE, POTASSIUM CHLORIDE, SODIUM LACTATE AND CALCIUM CHLORIDE 500 ML: 600; 310; 30; 20 INJECTION, SOLUTION INTRAVENOUS at 17:40

## 2022-12-20 RX ADMIN — POTASSIUM CHLORIDE 40 MEQ: 750 CAPSULE, EXTENDED RELEASE ORAL at 19:28

## 2022-12-20 RX ADMIN — SODIUM CHLORIDE 1000 ML: 9 INJECTION, SOLUTION INTRAVENOUS at 19:40

## 2022-12-20 RX ADMIN — Medication 10 ML: at 21:49

## 2022-12-20 RX ADMIN — APIXABAN 5 MG: 5 TABLET, FILM COATED ORAL at 21:50

## 2022-12-20 RX ADMIN — ATORVASTATIN CALCIUM 10 MG: 10 TABLET, FILM COATED ORAL at 21:50

## 2022-12-20 RX ADMIN — POTASSIUM CHLORIDE 10 MEQ: 7.46 INJECTION, SOLUTION INTRAVENOUS at 19:40

## 2022-12-20 RX ADMIN — ONDANSETRON 4 MG: 2 INJECTION INTRAMUSCULAR; INTRAVENOUS at 19:33

## 2022-12-20 RX ADMIN — NOREPINEPHRINE BITARTRATE 0.02 MCG/KG/MIN: 1 INJECTION, SOLUTION, CONCENTRATE INTRAVENOUS at 22:07

## 2022-12-20 NOTE — ED NOTES
Bear hugger placed. Warm fluids started. Patient continues to refuse rectal temp despise education

## 2022-12-20 NOTE — ED PROVIDER NOTES
Subjective     Shortness of Breath  Severity:  Moderate  Onset quality:  Gradual  Duration:  1 week  Timing:  Constant  Progression:  Worsening  Chronicity:  Recurrent  Context comment:  History of congestive heart failure.  Recent discharge from the hospital.  History of issues with activities of daily living at baseline.  Relieved by:  Nothing  Exacerbated by: Lying down flat.  Ineffective treatments: Home medications.  Associated symptoms: no abdominal pain, no chest pain, no cough, no diaphoresis, no fever, no hemoptysis, no rash, no sputum production, no syncope and no wheezing    Risk factors comment:  Recent AICD placement.      Review of Systems   Constitutional: Negative for diaphoresis and fever.   Respiratory: Positive for shortness of breath. Negative for cough, hemoptysis, sputum production and wheezing.    Cardiovascular: Negative for chest pain and syncope.   Gastrointestinal: Negative for abdominal pain.   Skin: Negative for rash.   All other systems reviewed and are negative.      Past Medical History:   Diagnosis Date   • Asthma    • Chronic systolic heart failure (HCC)    • Diabetes mellitus (HCC)    • Hyperlipidemia    • Hypertension    • Obesity    • Peripheral vascular disease (HCC)    • Sleep apnea        No Known Allergies    Past Surgical History:   Procedure Laterality Date   • CARDIAC CATHETERIZATION N/A 12/08/2021   • CARDIAC DEFIBRILLATOR PLACEMENT     • VARICOSE VEIN SURGERY Right 04/05/2019       Family History   Problem Relation Age of Onset   • Diabetes Mother    • Hypertension Father        Social History     Socioeconomic History   • Marital status:    Tobacco Use   • Smoking status: Former   • Smokeless tobacco: Never   Vaping Use   • Vaping Use: Never used   Substance and Sexual Activity   • Alcohol use: No   • Drug use: No   • Sexual activity: Not Currently           Objective   Physical Exam  Vitals and nursing note reviewed.   Constitutional:       General: He is not  in acute distress.     Appearance: He is obese.   HENT:      Head: Normocephalic.      Mouth/Throat:      Mouth: Mucous membranes are moist.      Comments: Poor dentition.  Neck:      Vascular: No JVD.   Cardiovascular:      Rate and Rhythm: Normal rate and regular rhythm.   Pulmonary:      Effort: Pulmonary effort is normal.      Breath sounds: Examination of the right-lower field reveals decreased breath sounds. Examination of the left-lower field reveals decreased breath sounds. Decreased breath sounds present.   Chest:      Chest wall: No tenderness.   Abdominal:      Palpations: Abdomen is soft.      Tenderness: There is no abdominal tenderness.   Musculoskeletal:      Right lower leg: No tenderness. Edema present.      Left lower leg: No tenderness. Edema present.      Comments: Trace edema   Skin:     General: Skin is warm and dry.      Findings: No rash.   Neurological:      Mental Status: He is alert and oriented to person, place, and time.   Psychiatric:      Comments: Seems depressed         ECG 12 Lead      Date/Time: 12/20/2022 5:07 PM  Performed by: Ketan French DO  Authorized by: Ketan French DO   Interpreted by physician  Comments: EKG December 20, 2022 at 1707 reveals sinus rhythm with frequent PVCs and sinus arrhythmia at a rate of 87 bpm.  Left axis deviation and left ventricular hypertrophy.  No obvious ST elevation or depression.  QTc 498.                 ED Course  ED Course as of 12/20/22 1951 Tue Dec 20, 2022   1818 Troponin T(!!): 0.045  Troponin is chronically elevated to a similar extent. [JV]      ED Course User Index  [JV] Rory Portillo DO      Labs Reviewed   COMPREHENSIVE METABOLIC PANEL - Abnormal; Notable for the following components:       Result Value    Glucose 145 (*)     BUN 40 (*)     Creatinine 1.72 (*)     Potassium 2.9 (*)     Chloride 97 (*)     Total Bilirubin 2.2 (*)     Anion Gap 16.0 (*)     eGFR 47.5 (*)     All other components within normal limits     Narrative:     GFR Normal >60  Chronic Kidney Disease <60  Kidney Failure <15     URINALYSIS W/ CULTURE IF INDICATED - Abnormal; Notable for the following components:    Ketones, UA Trace (*)     Protein, UA Trace (*)     Leuk Esterase, UA Trace (*)     Urobilinogen, UA 2.0 E.U./dL (*)     All other components within normal limits    Narrative:     In absence of clinical symptoms, the presence of pyuria, bacteria, and/or nitrites on the urinalysis result does not correlate with infection.   BNP (IN-HOUSE) - Abnormal; Notable for the following components:    proBNP 5,254.0 (*)     All other components within normal limits    Narrative:     Among patients with dyspnea, NT-proBNP is highly sensitive for the detection of acute congestive heart failure. In addition NT-proBNP of <300 pg/ml effectively rules out acute congestive heart failure with 99% negative predictive value.    Results may be falsely decreased if patient taking Biotin.     TROPONIN (IN-HOUSE) - Abnormal; Notable for the following components:    Troponin T 0.045 (*)     All other components within normal limits    Narrative:     Troponin T Reference Range:  <= 0.03 ng/mL-   Negative for AMI  >0.03 ng/mL-     Abnormal for myocardial necrosis.  Clinicians would have to utilize clinical acumen, EKG, Troponin and serial changes to determine if it is an Acute Myocardial Infarction or myocardial injury due to an underlying chronic condition.       Results may be falsely decreased if patient taking Biotin.     CBC WITH AUTO DIFFERENTIAL - Abnormal; Notable for the following components:    MPV 13.3 (*)     Platelets 117 (*)     Monocyte % 18.0 (*)     All other components within normal limits   URINALYSIS, MICROSCOPIC ONLY - Abnormal; Notable for the following components:    RBC, UA 3-5 (*)     All other components within normal limits   TROPONIN (IN-HOUSE) - Abnormal; Notable for the following components:    Troponin T 0.042 (*)     All other components  within normal limits    Narrative:     Troponin T Reference Range:  <= 0.03 ng/mL-   Negative for AMI  >0.03 ng/mL-     Abnormal for myocardial necrosis.  Clinicians would have to utilize clinical acumen, EKG, Troponin and serial changes to determine if it is an Acute Myocardial Infarction or myocardial injury due to an underlying chronic condition.       Results may be falsely decreased if patient taking Biotin.     COVID-19 AND FLU A/B, NP SWAB IN TRANSPORT MEDIA 8-12 HR TAT - Normal    Narrative:     Fact sheet for providers: https://www.fda.gov/media/443024/download    Fact sheet for patients: https://www.fda.gov/media/603043/download    Test performed by PCR.   LACTIC ACID, PLASMA - Normal   ACETAMINOPHEN LEVEL - Normal   URINE DRUG SCREEN - Normal    Narrative:     Cutoff For Drugs Screened:    Amphetamines               500 ng/ml  Barbiturates               200 ng/ml  Benzodiazepines            150 ng/ml  Cocaine                    150 ng/ml  Methadone                  200 ng/ml  Opiates                    100 ng/ml  Phencyclidine               25 ng/ml  THC                            50 ng/ml  Methamphetamine            500 ng/ml  Tricyclic Antidepressants  300 ng/ml  Oxycodone                  100 ng/ml  Propoxyphene               300 ng/ml  Buprenorphine               10 ng/ml    The normal value for all drugs tested is negative. This report includes unconfirmed screening results, with the cutoff values listed, to be used for medical treatment purposes only.  Unconfirmed results must not be used for non-medical purposes such as employment or legal testing.  Clinical consideration should be applied to any drug of abuse test, particularly when unconfirmed results are used.     SALICYLATE LEVEL - Normal   TSH - Normal   T4, FREE - Normal    Narrative:     Results may be falsely increased if patient taking Biotin.     MAGNESIUM - Normal   CK - Normal   BLOOD CULTURE   BLOOD CULTURE   ETHANOL   BLOOD GAS,  ARTERIAL   RAINBOW DRAW    Narrative:     The following orders were created for panel order [place] Draw.  Procedure                               Abnormality         Status                     ---------                               -----------         ------                     Gold Top - SST[[id number]]                                                              Light Blue Top[[id number]]                                                                Please view results for these tests on the individual orders.   CBC AND DIFFERENTIAL    Narrative:     The following orders were created for panel order CBC & Differential.  Procedure                               Abnormality         Status                     ---------                               -----------         ------                     CBC Auto Differential[[id number]]        Abnormal            Final result               Scan Slide[[id number]]                                                                    Please view results for these tests on the individual orders.   GOLD TOP - SST   LIGHT BLUE TOP     XR Chest 2 View    Result Date: 11/30/2022  Narrative: EXAM: XR CHEST 2 VIEWS HISTORY: CHF/COPD Protocol COMPARISON: September 23 FINDINGS: Heart: Enlarged Mediastinum: Left pacemaker Lungs: Rounded opacity projects over the mid and lower lung on the lateral view. No significant effusion. Bones: Mild degenerative changes in the thoracolumbar spine Abdomen: Visualized upper abdomen is unremarkable     Impression: Rounded opacity projects over the mid and lower lung on the lateral view, which may be related to summation artifact or pneumonia. Consider short interval radiographic follow-up or chest CT Electronically signed by:  Raimundo Salas DO  11/30/2022 5:26 AM CST Workstation: [id number]    XR Chest 1 View    Result Date: 12/20/2022  Narrative: PORTABLE CHEST HISTORY: Shortness of air Portable AP upright film of the chest was obtained at 5:22 PM.  COMPARISON: December 7, 2022 FINDINGS: Linear atelectasis medial right lung base. Left-sided pacemaker remains in place with lead projected over the right ventricle. Stable cardiomegaly. The pulmonary vasculature is not increased. No pleural effusion. No pneumothorax. No acute osseous abnormality. Dextroscoliosis thoracic spine. Old left rib fractures.     Impression: CONCLUSION: Linear atelectasis medial right lung base. Left-sided pacemaker remains in place with lead projected over the right ventricle. Stable cardiomegaly. [id number] Electronically signed by:  Mukul Klein MD  12/20/2022 5:42 PM CST Workstation: [id number]    XR Chest 1 View    Result Date: 12/4/2022  Narrative: PROCEDURE: XR CHEST 1 VIEW, 12/4/2022 8:37 PM CST CLINICAL INDICATION:    sob. COMPARISON: 11/30/22 TECHNIQUE:  AP portable radiograph of chest FINDINGS: Large cardiac silhouette. Retrocardiac opacification. Suspect at least small left pleural effusion. No pneumothorax.     Impression: Retrocardiac opacification is suspicious for infiltrate. Large cardiac silhouette again seen. Suspect at least small left pleural effusion. Electronically signed by:  Deacon Ac MD  12/4/2022 9:02 PM CST Workstation: [id number]    XR Chest 1 View    Result Date: 12/4/2022  Narrative: EXAM: XR CHEST 1 VIEW HISTORY: SHORTNESS OF BREATH; Sore throat, chest congestion, Pt states he feels like something is stuck in distal esophagus. Cough and intermittent soa. COMPARISON: 9/2/2022 TECHNIQUE: Portable view of the chest. FINDINGS: Stable left pacemaker. Stable cardiomegaly. Stable left lung base atelectasis, versus infiltrate with probable left pleural effusion.. The right lung remains well aerated. There is no significant right pleural effusion. The mediastinal contours are within normal limits. . No evidence of mediastinal shift or pneumothorax. Unremarkable visualized osseous structures.    Impression: Stable left pacemaker. Stable cardiomegaly. Stable left lung base  atelectasis, versus infiltrate with probable left pleural effusion.. Workstation: [id number]    XR Chest PA & Lateral    Result Date: 12/7/2022  Narrative: PROCEDURE: Chest PA and lateral REASON FOR EXAM: Follow-up possible pneumonia/CHF. FINDINGS: Comparison study dated December 4, 2022.  Stable pacemaker and lead. Cardiomegaly. Pulmonary vasculature appears within normal limits. Lungs are clear.. Pleural spaces are normal . No acute osseous abnormality.     Impression: 1.  Cardiomegaly without decompensation. 2.  Otherwise unremarkable chest. Electronically signed by:  Pj Cummins MD  12/7/2022 7:28 AM CST Workstation: [id number]      MDM  Number of Diagnoses or Management Options     Amount and/or Complexity of Data Reviewed  Clinical lab tests: reviewed  Tests in the radiology section of CPT®: reviewed  Tests in the medicine section of CPT®: reviewed  Decide to obtain previous medical records or to obtain history from someone other than the patient: yes  Review and summarize past medical records: yes (The patient was seen for obstructive sleep apnea in the clinic about a week ago and had a documented hypotensive blood pressure during that visit.)    Patient Progress  Patient progress: stable    Patient was signed out to me by Dr. Portillo at the end of his shift.  Patient was awaiting some fluids and results of some labs.  Patient was found to have acute kidney injury.  Upon my evaluation he is feeling more short of breath after a 500 cc bolus.  He has been taking his Bumex as he was previously directed and not as he was directed at discharge.  BNP is higher than at discharge.  Troponin is chronically elevated but slightly better than discharge.    Final diagnoses:   FRANCK (acute kidney injury) (HCC)   Shortness of breath   Hypokalemia   Elevated brain natriuretic peptide (BNP) level       ED Disposition  ED Disposition     ED Disposition   Decision to Admit    Condition   --    Comment   Level of Care: Stepdown  [25]   Diagnosis: FRANCK (acute kidney injury) (Formerly [facility]) [[id number]]   Admitting Physician: JAGDEEP FRENCH [[id number]]               No follow-up provider specified.       Medication List      No changes were made to your prescriptions during this visit.          Ketan French DO  12/20/22 1954

## 2022-12-21 ENCOUNTER — APPOINTMENT (OUTPATIENT)
Dept: ULTRASOUND IMAGING | Facility: HOSPITAL | Age: 52
DRG: 682 | End: 2022-12-21
Payer: MEDICAID

## 2022-12-21 LAB
ANION GAP SERPL CALCULATED.3IONS-SCNC: 17 MMOL/L (ref 5–15)
BASOPHILS # BLD AUTO: 0.05 10*3/MM3 (ref 0–0.2)
BASOPHILS NFR BLD AUTO: 1 % (ref 0–1.5)
BUN SERPL-MCNC: 43 MG/DL (ref 6–20)
BUN/CREAT SERPL: 21.5 (ref 7–25)
CALCIUM SPEC-SCNC: 9.5 MG/DL (ref 8.6–10.5)
CHLORIDE SERPL-SCNC: 95 MMOL/L (ref 98–107)
CO2 SERPL-SCNC: 26 MMOL/L (ref 22–29)
CREAT SERPL-MCNC: 2 MG/DL (ref 0.76–1.27)
D-LACTATE SERPL-SCNC: 3 MMOL/L (ref 0.5–2)
DEPRECATED RDW RBC AUTO: 48.5 FL (ref 37–54)
EGFRCR SERPLBLD CKD-EPI 2021: 39.7 ML/MIN/1.73
EOSINOPHIL # BLD AUTO: 0.03 10*3/MM3 (ref 0–0.4)
EOSINOPHIL NFR BLD AUTO: 0.6 % (ref 0.3–6.2)
ERYTHROCYTE [DISTWIDTH] IN BLOOD BY AUTOMATED COUNT: 14.9 % (ref 12.3–15.4)
GLUCOSE SERPL-MCNC: 121 MG/DL (ref 65–99)
HCT VFR BLD AUTO: 44.3 % (ref 37.5–51)
HGB BLD-MCNC: 14.7 G/DL (ref 13–17.7)
IMM GRANULOCYTES # BLD AUTO: 0.02 10*3/MM3 (ref 0–0.05)
IMM GRANULOCYTES NFR BLD AUTO: 0.4 % (ref 0–0.5)
LYMPHOCYTES # BLD AUTO: 1.35 10*3/MM3 (ref 0.7–3.1)
LYMPHOCYTES NFR BLD AUTO: 26.2 % (ref 19.6–45.3)
MAGNESIUM SERPL-MCNC: 2.1 MG/DL (ref 1.6–2.6)
MCH RBC QN AUTO: 29.9 PG (ref 26.6–33)
MCHC RBC AUTO-ENTMCNC: 33.2 G/DL (ref 31.5–35.7)
MCV RBC AUTO: 90.2 FL (ref 79–97)
MONOCYTES # BLD AUTO: 0.93 10*3/MM3 (ref 0.1–0.9)
MONOCYTES NFR BLD AUTO: 18.1 % (ref 5–12)
NEUTROPHILS NFR BLD AUTO: 2.77 10*3/MM3 (ref 1.7–7)
NEUTROPHILS NFR BLD AUTO: 53.7 % (ref 42.7–76)
NRBC BLD AUTO-RTO: 0 /100 WBC (ref 0–0.2)
PLATELET # BLD AUTO: 121 10*3/MM3 (ref 140–450)
PMV BLD AUTO: 14.1 FL (ref 6–12)
POTASSIUM SERPL-SCNC: 3.3 MMOL/L (ref 3.5–5.2)
POTASSIUM SERPL-SCNC: 3.6 MMOL/L (ref 3.5–5.2)
RBC # BLD AUTO: 4.91 10*6/MM3 (ref 4.14–5.8)
SODIUM SERPL-SCNC: 138 MMOL/L (ref 136–145)
SODIUM UR-SCNC: <20 MMOL/L
WBC NRBC COR # BLD: 5.15 10*3/MM3 (ref 3.4–10.8)

## 2022-12-21 PROCEDURE — 94799 UNLISTED PULMONARY SVC/PX: CPT

## 2022-12-21 PROCEDURE — 76705 ECHO EXAM OF ABDOMEN: CPT

## 2022-12-21 PROCEDURE — 25010000002 PIPERACILLIN SOD-TAZOBACTAM PER 1 G

## 2022-12-21 PROCEDURE — 25010000002 ONDANSETRON PER 1 MG

## 2022-12-21 PROCEDURE — 0 IOPAMIDOL PER 1 ML: Performed by: HOSPITALIST

## 2022-12-21 PROCEDURE — 87040 BLOOD CULTURE FOR BACTERIA: CPT

## 2022-12-21 PROCEDURE — 83735 ASSAY OF MAGNESIUM: CPT | Performed by: HOSPITALIST

## 2022-12-21 PROCEDURE — 0 POTASSIUM CHLORIDE 10 MEQ/100ML SOLUTION

## 2022-12-21 PROCEDURE — 25010000002 MORPHINE PER 10 MG

## 2022-12-21 PROCEDURE — 25010000002 DROPERIDOL PER 5 MG

## 2022-12-21 PROCEDURE — 80048 BASIC METABOLIC PNL TOTAL CA: CPT | Performed by: HOSPITALIST

## 2022-12-21 PROCEDURE — 85025 COMPLETE CBC W/AUTO DIFF WBC: CPT | Performed by: HOSPITALIST

## 2022-12-21 PROCEDURE — G0378 HOSPITAL OBSERVATION PER HR: HCPCS

## 2022-12-21 PROCEDURE — 36415 COLL VENOUS BLD VENIPUNCTURE: CPT | Performed by: HOSPITALIST

## 2022-12-21 PROCEDURE — 84132 ASSAY OF SERUM POTASSIUM: CPT | Performed by: HOSPITALIST

## 2022-12-21 PROCEDURE — 83605 ASSAY OF LACTIC ACID: CPT

## 2022-12-21 RX ORDER — ONDANSETRON 2 MG/ML
4 INJECTION INTRAMUSCULAR; INTRAVENOUS EVERY 6 HOURS PRN
Status: DISCONTINUED | OUTPATIENT
Start: 2022-12-21 | End: 2023-01-14 | Stop reason: HOSPADM

## 2022-12-21 RX ORDER — POTASSIUM CHLORIDE 7.45 MG/ML
10 INJECTION INTRAVENOUS
Status: DISCONTINUED | OUTPATIENT
Start: 2022-12-21 | End: 2022-12-21

## 2022-12-21 RX ORDER — DROPERIDOL 2.5 MG/ML
1.25 INJECTION, SOLUTION INTRAMUSCULAR; INTRAVENOUS EVERY 6 HOURS PRN
Status: DISCONTINUED | OUTPATIENT
Start: 2022-12-21 | End: 2023-01-14 | Stop reason: HOSPADM

## 2022-12-21 RX ORDER — DOBUTAMINE HYDROCHLORIDE 100 MG/100ML
2-20 INJECTION INTRAVENOUS
Status: DISCONTINUED | OUTPATIENT
Start: 2022-12-21 | End: 2022-12-25

## 2022-12-21 RX ORDER — POTASSIUM CHLORIDE 1.5 G/1.77G
40 POWDER, FOR SOLUTION ORAL AS NEEDED
Status: DISCONTINUED | OUTPATIENT
Start: 2022-12-21 | End: 2022-12-21

## 2022-12-21 RX ORDER — PANTOPRAZOLE SODIUM 40 MG/10ML
40 INJECTION, POWDER, LYOPHILIZED, FOR SOLUTION INTRAVENOUS
Status: DISCONTINUED | OUTPATIENT
Start: 2022-12-21 | End: 2023-01-03

## 2022-12-21 RX ORDER — MORPHINE SULFATE 2 MG/ML
2 INJECTION, SOLUTION INTRAMUSCULAR; INTRAVENOUS
Status: DISCONTINUED | OUTPATIENT
Start: 2022-12-21 | End: 2022-12-26

## 2022-12-21 RX ORDER — POTASSIUM CHLORIDE 750 MG/1
40 CAPSULE, EXTENDED RELEASE ORAL AS NEEDED
Status: DISCONTINUED | OUTPATIENT
Start: 2022-12-21 | End: 2022-12-21

## 2022-12-21 RX ORDER — SODIUM CHLORIDE, SODIUM LACTATE, POTASSIUM CHLORIDE, CALCIUM CHLORIDE 600; 310; 30; 20 MG/100ML; MG/100ML; MG/100ML; MG/100ML
50 INJECTION, SOLUTION INTRAVENOUS CONTINUOUS
Status: DISCONTINUED | OUTPATIENT
Start: 2022-12-21 | End: 2022-12-24

## 2022-12-21 RX ORDER — ACETAMINOPHEN 500 MG
1000 TABLET ORAL EVERY 6 HOURS PRN
Status: DISCONTINUED | OUTPATIENT
Start: 2022-12-21 | End: 2023-01-14 | Stop reason: HOSPADM

## 2022-12-21 RX ADMIN — APIXABAN 5 MG: 5 TABLET, FILM COATED ORAL at 10:20

## 2022-12-21 RX ADMIN — PIPERACILLIN AND TAZOBACTAM 4.5 G: 4; .5 INJECTION, POWDER, LYOPHILIZED, FOR SOLUTION INTRAVENOUS; PARENTERAL at 23:45

## 2022-12-21 RX ADMIN — PANTOPRAZOLE SODIUM 40 MG: 40 INJECTION, POWDER, FOR SOLUTION INTRAVENOUS at 11:37

## 2022-12-21 RX ADMIN — SODIUM CHLORIDE, POTASSIUM CHLORIDE, SODIUM LACTATE AND CALCIUM CHLORIDE 50 ML/HR: 600; 310; 30; 20 INJECTION, SOLUTION INTRAVENOUS at 01:48

## 2022-12-21 RX ADMIN — IOPAMIDOL 95 ML: 755 INJECTION, SOLUTION INTRAVENOUS at 00:00

## 2022-12-21 RX ADMIN — MORPHINE SULFATE 2 MG: 2 INJECTION, SOLUTION INTRAMUSCULAR; INTRAVENOUS at 03:43

## 2022-12-21 RX ADMIN — DROPERIDOL 1.25 MG: 2.5 INJECTION, SOLUTION INTRAMUSCULAR; INTRAVENOUS at 03:34

## 2022-12-21 RX ADMIN — ATORVASTATIN CALCIUM 10 MG: 10 TABLET, FILM COATED ORAL at 10:20

## 2022-12-21 RX ADMIN — POTASSIUM CHLORIDE 10 MEQ: 7.46 INJECTION, SOLUTION INTRAVENOUS at 11:38

## 2022-12-21 RX ADMIN — SODIUM CHLORIDE, POTASSIUM CHLORIDE, SODIUM LACTATE AND CALCIUM CHLORIDE 50 ML/HR: 600; 310; 30; 20 INJECTION, SOLUTION INTRAVENOUS at 23:45

## 2022-12-21 RX ADMIN — Medication 10 ML: at 10:21

## 2022-12-21 RX ADMIN — CARVEDILOL 12.5 MG: 12.5 TABLET, FILM COATED ORAL at 10:20

## 2022-12-21 RX ADMIN — POTASSIUM CHLORIDE 10 MEQ: 7.46 INJECTION, SOLUTION INTRAVENOUS at 09:22

## 2022-12-21 RX ADMIN — APIXABAN 5 MG: 5 TABLET, FILM COATED ORAL at 20:56

## 2022-12-21 RX ADMIN — POTASSIUM CHLORIDE 10 MEQ: 7.46 INJECTION, SOLUTION INTRAVENOUS at 07:42

## 2022-12-21 RX ADMIN — ONDANSETRON 4 MG: 2 INJECTION INTRAMUSCULAR; INTRAVENOUS at 00:20

## 2022-12-21 RX ADMIN — POTASSIUM CHLORIDE 10 MEQ: 7.46 INJECTION, SOLUTION INTRAVENOUS at 10:33

## 2022-12-21 NOTE — SIGNIFICANT NOTE
[facility] Medicine Services  INPATIENT PROGRESS NOTE    Length of Stay: 0  Date of Admission: 12/20/2022  Primary Care Physician: Shonna Ng APRN    Subjective   Chief Complaint:   Hypotension  Abdo pain    HPI:    I was informed by the nursing staff that the patient had developed hypotension.  The patient was asymptomatic from this perspective, however.  Patient also developed abdominal pain which he did not notify anyone of when he first presented to the hospital.  He states that the generalized pain but cannot characterize it.  He denies any other GI or  symptoms except for nausea and vomiting.      Review of Systems   Constitutional: Positive for activity change. Negative for chills, diaphoresis and fever.   HENT: Negative for congestion, ear pain, rhinorrhea, sinus pressure, sneezing and sore throat.    Respiratory: Positive for shortness of breath. Negative for cough, wheezing and stridor.    Cardiovascular: Negative for chest pain, palpitations and leg swelling.   Gastrointestinal: Positive for abdominal pain, nausea and vomiting. Negative for abdominal distention, constipation and diarrhea.   Genitourinary: Negative for difficulty urinating, dysuria, frequency, hematuria and urgency.   Musculoskeletal: Negative for arthralgias, joint swelling and myalgias.   Skin: Negative for color change, pallor and rash.   Neurological: Negative for dizziness, syncope, light-headedness and headaches.   Psychiatric/Behavioral: Negative for agitation, confusion and hallucinations. The patient is nervous/anxious.         All pertinent negatives and positives are as above. All other systems have been reviewed and are negative unless otherwise stated.     Objective    Temp:  [94.3 °F (34.6 °C)-97.4 °F (36.3 °C)] 97 °F (36.1 °C)  Heart Rate:  [] 109  Resp:  [12-22] 16  BP: ()/(40-92) 121/62    Physical Exam  Vitals and nursing note reviewed.   Constitutional:        General: He is awake. He is in acute distress.      Appearance: He is obese. He is ill-appearing. He is not toxic-appearing or diaphoretic.      Interventions: He is not intubated.     Comments: On CPAP   HENT:      Head: Normocephalic and atraumatic.      Nose: Nose normal. No congestion or rhinorrhea.      Mouth/Throat:      Mouth: Mucous membranes are dry.      Pharynx: Oropharynx is clear. No oropharyngeal exudate or posterior oropharyngeal erythema.   Eyes:      General:         Right eye: No discharge.         Left eye: No discharge.      Extraocular Movements: Extraocular movements intact.      Conjunctiva/sclera: Conjunctivae normal.      Pupils: Pupils are equal, round, and reactive to light.   Cardiovascular:      Rate and Rhythm: Regular rhythm. Tachycardia present.      Pulses: Normal pulses.      Heart sounds: Normal heart sounds. No murmur heard.    No friction rub. No gallop.   Pulmonary:      Effort: Accessory muscle usage, prolonged expiration and retractions present. No tachypnea or bradypnea. He is not intubated.      Breath sounds: Decreased air movement present. No stridor or transmitted upper airway sounds. Wheezing present. No decreased breath sounds, rhonchi or rales.   Abdominal:      General: Abdomen is flat. There is no distension.      Palpations: Abdomen is soft. There is no mass.      Tenderness: There is abdominal tenderness (generalised). There is no guarding or rebound.      Hernia: No hernia is present.   Musculoskeletal:         General: No swelling, tenderness, deformity or signs of injury.   Skin:     Capillary Refill: Capillary refill takes less than 2 seconds.      Coloration: Skin is not jaundiced.      Findings: No bruising, erythema or rash.   Neurological:      General: No focal deficit present.      Mental Status: He is alert and oriented to person, place, and time. Mental status is at baseline.      Cranial Nerves: No cranial nerve deficit.   Psychiatric:          Behavior: Behavior normal. Behavior is cooperative.         Thought Content: Thought content normal.         Judgment: Judgment normal.         Results Review:  I have reviewed the labs, radiology results, and diagnostic studies.    Laboratory Data:   Results from last 7 days   Lab Units 12/20/22  1733   SODIUM mmol/L 138   POTASSIUM mmol/L 2.9*   CHLORIDE mmol/L 97*   CO2 mmol/L 25.0   BUN mg/dL 40*   CREATININE mg/dL 1.72*   GLUCOSE mg/dL 145*   CALCIUM mg/dL 9.3   BILIRUBIN mg/dL 2.2*   ALK PHOS U/L 63   ALT (SGPT) U/L 11   AST (SGOT) U/L 15   ANION GAP mmol/L 16.0*     Estimated Creatinine Clearance: 72.6 mL/min (A) (by C-G formula based on SCr of 1.72 mg/dL (H)).  Results from last 7 days   Lab Units 12/20/22  1733   MAGNESIUM mg/dL 1.9         Results from last 7 days   Lab Units 12/20/22  1733   WBC 10*3/mm3 3.73   HEMOGLOBIN g/dL 14.9   HEMATOCRIT % 45.0   PLATELETS 10*3/mm3 117*           Culture Data:   No results found for: BLOODCX  No results found for: URINECX  No results found for: RESPCX  No results found for: WOUNDCX  No results found for: STOOLCX  No components found for: BODYFLD    Radiology Data:   Imaging Results (Last 24 Hours)     Procedure Component Value Units Date/Time    CT Angiogram Chest [[id number]] Collected: 12/20/22 9189     Updated: 12/21/22 0041    Narrative:      EXAM:  CT Angiography Chest With Intravenous Contrast    CLINICAL HISTORY:  Pulmonary embolism (PE) suspected, unknown  D-dimer, N17.9 Acute kidney failure, unspecified R06.02 Shortness  of breath E87.6 Hypokalemia R79.89 Other specified abnormal  findings of blood chemistry    TECHNIQUE:  Axial computed tomographic angiography images of the  chest with intravenous contrast.  Sagittal and coronal  reformatted images were created and reviewed.  This CT exam was  performed using one or more of the following dose reduction  techniques:  automated exposure control, adjustment of the mA  and/or kV according to patient size,  and/or use of iterative  reconstruction technique.  MIP reconstructed images were created  and reviewed.    COMPARISON:  No relevant prior studies available.    FINDINGS:    Pulmonary arteries:  Nondilated pulmonary arteries.  Nondiagnostic assessment of the pulmonary arteries for filling  defect due to minimal enhancement of the pulmonary arteries. Most  of the intravenously injected contrast contrast within the right  upper extremity and superior vena cava at the time of image  acquisition.    Aorta: No aortic aneurysm.    Lungs:  No acute pulmonary infiltrate.  No mass.    Pleural space:  No pleural effusion or pneumothorax.    Heart:  Dilation of the bilateral atria and the left ventricle.   No pericardial effusion. Left chest ICD, with lead in the right  ventricular apex.    Bones/joints:  No acute fracture.  No dislocation.    Soft tissues: Normal.    Lymph nodes:  No enlarged lymph nodes.      Impression:      1.  Nondiagnostic assessment of the pulmonary arteries. Minimal  enhancement of the pulmonary arteries.   2.  Dilation of the left heart chambers and of the right atrium.  3.  No acute pulmonary infiltrate.    Electronically signed by:  India Mayfield MD  12/21/2022 12:39  AM CST Workstation: [id number]    CT Abdomen Pelvis With Contrast [[id number]] Collected: 12/20/22 2359     Updated: 12/21/22 0032    Narrative:      PROCEDURE: CT ABDOMEN PELVIS W CONTRAST    CLINICAL HISTORY: 52 years  Male  Abdominal pain, acute,  nonlocalized, N17.9 Acute kidney failure, unspecified R06.02  Shortness of breath E87.6 Hypokalemia R79.89 Other specified  abnormal findings of blood chemistry    TECHNIQUE: Contiguous axial images obtained through the abdomen  and pelvis following intravenous contrast administration. Coronal  and sagittal reformatted images provided.    This CT exam was performed according to our departmental  dose-optimization program, which includes one or more of the  following dose reduction  techniques: automated exposure control,  adjustment of the mA and/or kV according to patient size, and/or  use of iterative reconstruction technique.    COMPARISON: No prior exams provided for comparison.     FINDINGS:    Severe cardiomegaly with pacemaker wire in place. Mild bibasilar  pulmonary edema.    Hepatomegaly without focal lesion. Gallbladder distention without  definite biliary dilatation.    The pancreas, spleen, adrenal glands, kidneys, and urinary  bladder are normal.    There is no bowel inflammation, obstruction, free intraperitoneal  air, or ascites. The appendix is normal.    Chronic degenerative changes present throughout the spine.    Atherosclerosis without abdominal aortic aneurysm or dissection      Impression:        Severe cardiomegaly. Mild bibasilar pulmonary edema.    Hepatomegaly. Nonspecific gallbladder distention. Consider right  upper quadrant ultrasound if there is pain.    Electronically signed by:  Charlee Yan MD  12/21/2022 12:29 AM  CST Workstation: [id number]    XR Chest 1 View [[id number]] Collected: 12/20/22 1722     Updated: 12/20/22 1744    Narrative:        PORTABLE CHEST    HISTORY: Shortness of air    Portable AP upright film of the chest was obtained at 5:22 PM.  COMPARISON: December 7, 2022    FINDINGS:   Linear atelectasis medial right lung base.  Left-sided pacemaker remains in place with lead projected over  the right ventricle.  Stable cardiomegaly.  The pulmonary vasculature is not increased.  No pleural effusion.  No pneumothorax.  No acute osseous abnormality.  Dextroscoliosis thoracic spine.  Old left rib fractures.      Impression:      CONCLUSION:  Linear atelectasis medial right lung base.  Left-sided pacemaker remains in place with lead projected over  the right ventricle.  Stable cardiomegaly.    [id number]    Electronically signed by:  Mukul Klein MD  12/20/2022 5:42 PM  CST Workstation: [id number]          I have reviewed the patient's current medications.      Assessment/Plan     Active Hospital Problems    Diagnosis    • **FRANCK (acute kidney injury) (HCC)        Plan:    1.  CTPA  2.  CT Abdo/Pelvis  3.  Levophed to maintain MAP greater than or equal to 65  4.  Slow IVF      Sudeep Wolf MD    40 minutes of dedicated critical care clinical times with this patient's acute medical management    Electronically signed by Sudeep Wolf MD, 12/21/22, 8:32 AM CST.

## 2022-12-21 NOTE — PLAN OF CARE
Goal Outcome Evaluation:  Plan of Care Reviewed With: caregiver, patient        Progress: no change  Outcome Evaluation: Nutrition New Assessment:  Pt visited due to MST with wt loss and a decreased appetite  suspect wt loss is due to fluids.  Pt declined education.  Will monitor POC and po intake

## 2022-12-21 NOTE — PROGRESS NOTES
[facility] Medicine Services  INPATIENT PROGRESS NOTE    Length of Stay: 0  Date of Admission: 12/20/2022  Primary Care Physician: Shonna Ng APRN    Subjective   Chief Complaint: Shortness of breath  HPI: Patient states he is slightly less short of breath today.  Overall maybe doing a slight bit better.    As of today, 12/21/22  Review of Systems   Constitutional: Positive for activity change and fatigue. Negative for appetite change, chills and fever.   Respiratory: Positive for shortness of breath. Negative for chest tightness.    Cardiovascular: Negative for chest pain, palpitations and leg swelling.   Gastrointestinal: Negative for abdominal pain, constipation, diarrhea, nausea and vomiting.   Skin: Negative for wound.   Neurological: Negative for dizziness, weakness, light-headedness, numbness and headaches.        All pertinent negatives and positives are as above. All other systems have been reviewed and are negative unless otherwise stated.    Objective    Temp:  [94.3 °F (34.6 °C)-97.9 °F (36.6 °C)] 97.9 °F (36.6 °C)  Heart Rate:  [] 93  Resp:  [12-25] 13  BP: ()/(40-96) 119/81         As of today, 12/21/22  Physical Exam  Vitals reviewed.   Constitutional:       Appearance: He is well-developed.   HENT:      Head: Normocephalic and atraumatic.   Eyes:      Pupils: Pupils are equal, round, and reactive to light.   Cardiovascular:      Rate and Rhythm: Normal rate. Rhythm irregularly irregular.      Heart sounds: Normal heart sounds. No murmur heard.    No friction rub. No gallop.   Pulmonary:      Effort: Pulmonary effort is normal. No respiratory distress.      Breath sounds: Normal breath sounds. No wheezing or rales.   Chest:      Chest wall: No tenderness.   Abdominal:      General: Bowel sounds are normal. There is no distension.      Palpations: Abdomen is soft.      Tenderness: There is no abdominal tenderness.   Musculoskeletal:       Cervical back: Normal range of motion and neck supple.   Psychiatric:         Behavior: Behavior normal.           Results Review:  I have reviewed the labs, radiology results, and diagnostic studies.    Laboratory Data:   Results from last 7 days   Lab Units 12/21/22  0547 12/20/22  1733   SODIUM mmol/L 138 138   POTASSIUM mmol/L 3.3* 2.9*   CHLORIDE mmol/L 95* 97*   CO2 mmol/L 26.0 25.0   BUN mg/dL 43* 40*   CREATININE mg/dL 2.00* 1.72*   GLUCOSE mg/dL 121* 145*   CALCIUM mg/dL 9.5 9.3   BILIRUBIN mg/dL  --  2.2*   ALK PHOS U/L  --  63   ALT (SGPT) U/L  --  11   AST (SGOT) U/L  --  15   ANION GAP mmol/L 17.0* 16.0*     Estimated Creatinine Clearance: 62.4 mL/min (A) (by C-G formula based on SCr of 2 mg/dL (H)).  Results from last 7 days   Lab Units 12/20/22  1733   MAGNESIUM mg/dL 1.9         Results from last 7 days   Lab Units 12/21/22  0547 12/20/22  1733   WBC 10*3/mm3 5.15 3.73   HEMOGLOBIN g/dL 14.7 14.9   HEMATOCRIT % 44.3 45.0   PLATELETS 10*3/mm3 121* 117*           Culture Data:   No results found for: BLOODCX  No results found for: URINECX  No results found for: RESPCX  No results found for: WOUNDCX  No results found for: STOOLCX  No components found for: BODYFLD    Radiology Data:   Imaging Results (Last 24 Hours)     Procedure Component Value Units Date/Time    US Gallbladder [[id number]] Collected: 12/21/22 0906     Updated: 12/21/22 0954    Narrative:        COMPARISON:     12/20/2022    INDICATION:     Pain and vomiting. Abnormal CT    FINDINGS: Liver is enlarged measuring 19.5 cm craniocaudal. There  is no intrahepatic or extrahepatic biliary dilatation.  The  extrahepatic bile duct measures 0.4 cm which is within normal  limits.     The gallbladder is abnormal in appearance. Wall thickening with  intramural edema. Layering sludge and stones.     The right kidney measures 12.0 x 5.1 x 6.3 cm in length.  There  is no hydronephrosis.     The visualized pancreas appears normal size configuration  and  echotexture.     There is no ascites.       Impression:      1.  Abnormal appearance of the gallbladder, as above, suspicious  for acute cholecystitis. If clinically indeterminate recommend  nuclear medicine hepatobiliary scan.  2. Hepatomegaly.        RP core team activated 9:51 AM for results notification.    Electronically signed by:  Rc Wheatley MD  12/21/2022 9:52 AM CST  Workstation: [id number]    CT Angiogram Chest [[id number]] Collected: 12/20/22 2359     Updated: 12/21/22 0041    Narrative:      EXAM:  CT Angiography Chest With Intravenous Contrast    CLINICAL HISTORY:  Pulmonary embolism (PE) suspected, unknown  D-dimer, N17.9 Acute kidney failure, unspecified R06.02 Shortness  of breath E87.6 Hypokalemia R79.89 Other specified abnormal  findings of blood chemistry    TECHNIQUE:  Axial computed tomographic angiography images of the  chest with intravenous contrast.  Sagittal and coronal  reformatted images were created and reviewed.  This CT exam was  performed using one or more of the following dose reduction  techniques:  automated exposure control, adjustment of the mA  and/or kV according to patient size, and/or use of iterative  reconstruction technique.  MIP reconstructed images were created  and reviewed.    COMPARISON:  No relevant prior studies available.    FINDINGS:    Pulmonary arteries:  Nondilated pulmonary arteries.  Nondiagnostic assessment of the pulmonary arteries for filling  defect due to minimal enhancement of the pulmonary arteries. Most  of the intravenously injected contrast contrast within the right  upper extremity and superior vena cava at the time of image  acquisition.    Aorta: No aortic aneurysm.    Lungs:  No acute pulmonary infiltrate.  No mass.    Pleural space:  No pleural effusion or pneumothorax.    Heart:  Dilation of the bilateral atria and the left ventricle.   No pericardial effusion. Left chest ICD, with lead in the right  ventricular apex.    Bones/joints:  No  acute fracture.  No dislocation.    Soft tissues: Normal.    Lymph nodes:  No enlarged lymph nodes.      Impression:      1.  Nondiagnostic assessment of the pulmonary arteries. Minimal  enhancement of the pulmonary arteries.   2.  Dilation of the left heart chambers and of the right atrium.  3.  No acute pulmonary infiltrate.    Electronically signed by:  India Mayfield MD  12/21/2022 12:39  AM CST Workstation: [id number]    CT Abdomen Pelvis With Contrast [[id number]] Collected: 12/20/22 2359     Updated: 12/21/22 0032    Narrative:      PROCEDURE: CT ABDOMEN PELVIS W CONTRAST    CLINICAL HISTORY: 52 years  Male  Abdominal pain, acute,  nonlocalized, N17.9 Acute kidney failure, unspecified R06.02  Shortness of breath E87.6 Hypokalemia R79.89 Other specified  abnormal findings of blood chemistry    TECHNIQUE: Contiguous axial images obtained through the abdomen  and pelvis following intravenous contrast administration. Coronal  and sagittal reformatted images provided.    This CT exam was performed according to our departmental  dose-optimization program, which includes one or more of the  following dose reduction techniques: automated exposure control,  adjustment of the mA and/or kV according to patient size, and/or  use of iterative reconstruction technique.    COMPARISON: No prior exams provided for comparison.     FINDINGS:    Severe cardiomegaly with pacemaker wire in place. Mild bibasilar  pulmonary edema.    Hepatomegaly without focal lesion. Gallbladder distention without  definite biliary dilatation.    The pancreas, spleen, adrenal glands, kidneys, and urinary  bladder are normal.    There is no bowel inflammation, obstruction, free intraperitoneal  air, or ascites. The appendix is normal.    Chronic degenerative changes present throughout the spine.    Atherosclerosis without abdominal aortic aneurysm or dissection      Impression:        Severe cardiomegaly. Mild bibasilar pulmonary  edema.    Hepatomegaly. Nonspecific gallbladder distention. Consider right  upper quadrant ultrasound if there is pain.    Electronically signed by:  Charlee Yan MD  12/21/2022 12:29 AM  CST Workstation: [id number]    XR Chest 1 View [[id number]] Collected: 12/20/22 1722     Updated: 12/20/22 1744    Narrative:        PORTABLE CHEST    HISTORY: Shortness of air    Portable AP upright film of the chest was obtained at 5:22 PM.  COMPARISON: December 7, 2022    FINDINGS:   Linear atelectasis medial right lung base.  Left-sided pacemaker remains in place with lead projected over  the right ventricle.  Stable cardiomegaly.  The pulmonary vasculature is not increased.  No pleural effusion.  No pneumothorax.  No acute osseous abnormality.  Dextroscoliosis thoracic spine.  Old left rib fractures.      Impression:      CONCLUSION:  Linear atelectasis medial right lung base.  Left-sided pacemaker remains in place with lead projected over  the right ventricle.  Stable cardiomegaly.    [id number]    Electronically signed by:  Muukl Klein MD  12/20/2022 5:42 PM  CST Workstation: [id number]          I have reviewed the patient's current medications.     Assessment/Plan     Active Hospital Problems    Diagnosis    • **FRANCK (acute kidney injury) ([facility])        Plan:    1.  Shortness of breath: May be slightly better.  CT scan for pulmonary embolism was nondiagnostic.  May need VQ scan shortness of breath continues.  2.  Volume depletion: Hydrating gently.  We will continue for now.  Nephrology following.  3.  Chronic systolic congestive heart failure: Minimally hypovolemic currently.  Continue home medications.  Continue anticoagulation for risk of thrombus.  4.  History of hyperglycemia: No diagnosis of diabetes.  Will place on sliding scale insulin for now.  5.  Obstructive sleep apnea: Patient states its been over 2 years since he has had a CPAP.  He is scheduled to go  his CPAP on 12/29.  6.  Acute kidney injury: Superimposed upon  chronic kidney disease.  Nephrology consulted.   7.  DVT prophylaxis: Eliquis.        The patient was evaluated during the global COVID-19 pandemic, and the diagnosis was suspected/considered upon their initial presentation.  Evaluation, treatment, and testing were consistent with current guidelines for patients who present with complaints or symptoms that may be related to COVID-19.    I confirmed that the patient's Advance Care Plan is present, code status is documented, or surrogate decision maker is listed in the patient's medical record.       Discharge Planning: I expect patient to be discharged to home in 3-4 days.        This document has been electronically signed by Will French MD on December 21, 2022 12:09 CST

## 2022-12-21 NOTE — CONSULTS
[facility]  [address] [place] [address]. [id number]  T - [phone]  F  [phone]     Consultation         PATIENT  DEMOGRAPHICS   PATIENT NAME: Matti Medinalard                      PHYSICIAN: SHELL Hodges  : 1970  MRN: [id number]    Subjective   SUBJECTIVE   Referring Provider: Dr. French  Reason for Consultation: FRANCK  History of present illness: This is a 52-year-old male with a past medical history significant for chronic systolic heart failure, diabetes, hyperlipidemia, hypertension, sleep apnea, CKD who presented to the hospital on the  with complaints of shortness of breath.  Surprisingly he did not have any hypervolemia on this admission. There is no leg edema. He was taking bumex 2mg bid at home though he was prescribed 1 mg bid  He was admitted to the hospital for further treatment and nephrology was consulted to help manage his FRANCK.    Past Medical History:   Diagnosis Date   • Asthma    • Chronic systolic heart failure (HCC)    • Diabetes mellitus (HCC)    • Hyperlipidemia    • Hypertension    • Obesity    • Peripheral vascular disease (HCC)    • Sleep apnea      Past Surgical History:   Procedure Laterality Date   • CARDIAC CATHETERIZATION N/A 2021   • CARDIAC DEFIBRILLATOR PLACEMENT     • VARICOSE VEIN SURGERY Right 2019     Family History   Problem Relation Age of Onset   • Diabetes Mother    • Hypertension Father      Social History     Tobacco Use   • Smoking status: Former   • Smokeless tobacco: Never   Vaping Use   • Vaping Use: Never used   Substance Use Topics   • Alcohol use: No   • Drug use: No     Allergies:  Patient has no known allergies.     REVIEW OF SYSTEMS    Review of Systems   All other systems reviewed and are negative.      Objective   OBJECTIVE   Vital Signs  Temp:  [94.3 °F (34.6 °C)-97.9 °F (36.6 °C)] 97.9 °F (36.6 °C)  Heart Rate:  [] 114  Resp:  [12-25] 16  BP: ()/(40-96) 98/67    Flowsheet Rows   "  Flowsheet Row First Filed Value   Admission Height 193 cm (76\") Documented at 12/20/2022 1700   Admission Weight 127 kg (280 lb) Documented at 12/20/2022 1700           I/O last 3 completed shifts:  In: 709.7 [I.V.:209.7; IV Piggyback:500]  Out: 200 [Urine:200]    PHYSICAL EXAM    Physical Exam  Constitutional:       Appearance: He is well-developed.   HENT:      Head: Normocephalic and atraumatic.   Eyes:      Conjunctiva/sclera: Conjunctivae normal.      Pupils: Pupils are equal, round, and reactive to light.   Cardiovascular:      Rate and Rhythm: Normal rate and regular rhythm.   Pulmonary:      Effort: Pulmonary effort is normal.      Breath sounds: Normal breath sounds.   Abdominal:      Palpations: Abdomen is soft.   Musculoskeletal:      Cervical back: Neck supple.      Right lower leg: No edema.      Left lower leg: No edema.   Skin:     General: Skin is warm and dry.   Neurological:      Mental Status: He is alert and oriented to person, place, and time.   Psychiatric:         Mood and Affect: Mood normal.         Behavior: Behavior normal.         RESULTS   Results Review:    Results from last 7 days   Lab Units 12/21/22  0547 12/20/22  1733   SODIUM mmol/L 138 138   POTASSIUM mmol/L 3.3* 2.9*   CHLORIDE mmol/L 95* 97*   CO2 mmol/L 26.0 25.0   BUN mg/dL 43* 40*   CREATININE mg/dL 2.00* 1.72*   CALCIUM mg/dL 9.5 9.3   BILIRUBIN mg/dL  --  2.2*   ALK PHOS U/L  --  63   ALT (SGPT) U/L  --  11   AST (SGOT) U/L  --  15   GLUCOSE mg/dL 121* 145*       Estimated Creatinine Clearance: 62.4 mL/min (A) (by C-G formula based on SCr of 2 mg/dL (H)).    Results from last 7 days   Lab Units 12/20/22  1733   MAGNESIUM mg/dL 1.9             Results from last 7 days   Lab Units 12/21/22  0547 12/20/22  1733   WBC 10*3/mm3 5.15 3.73   HEMOGLOBIN g/dL 14.7 14.9   PLATELETS 10*3/mm3 121* 117*              MEDICATIONS    apixaban, 5 mg, Oral, BID  atorvastatin, 10 mg, Oral, Daily  carvedilol, 12.5 mg, Oral, BID With " Meals  [START ON 12/22/2022] losartan, 12.5 mg, Oral, Q24H  Morphine, 4 mg, Intravenous, Once  pantoprazole, 40 mg, Intravenous, Q AM  sodium chloride, 10 mL, Intravenous, Q12H      DOBUTamine, 2-20 mcg/kg/min  lactated ringers, 50 mL/hr, Last Rate: 50 mL/hr (12/21/22 0148)  norepinephrine, 0.02-0.3 mcg/kg/min, Last Rate: Stopped (12/21/22 0639)      Medications Prior to Admission   Medication Sig Dispense Refill Last Dose   • albuterol sulfate  (90 Base) MCG/ACT inhaler Inhale 2 puffs Every 4 (Four) Hours As Needed for Wheezing. 1 inhaler 3    • apixaban (ELIQUIS) 5 MG tablet tablet Take 1 tablet by mouth 2 (Two) Times a Day. 60 tablet 3    • bumetanide (BUMEX) 1 MG tablet Take 1 tablet by mouth 2 (Two) Times a Day. 60 tablet 2    • carvedilol (COREG) 12.5 MG tablet Take 1 tablet by mouth 2 (Two) Times a Day With Meals for 30 days. (Patient taking differently: Take 12.5 mg by mouth 2 (Two) Times a Day With Meals. Pt states he has some meds and is still taking this.) 60 tablet 3    • losartan (COZAAR) 25 MG tablet Take 0.5 tablets by mouth Daily for 30 days. 15 tablet 3    • lovastatin (ALTOPREV) 20 MG 24 hr tablet Take 20 mg by mouth.      • metOLazone (ZAROXOLYN) 2.5 MG tablet Take 1 tablet by mouth 3 (Three) Times a Week. 15 tablet 3    • potassium chloride 10 MEQ CR tablet Take 2 tablets by mouth Daily. 30 tablet 1    • vitamin D (ERGOCALCIFEROL) 1.25 MG (85234 UT) capsule capsule Take 50,000 Units by mouth 1 (One) Time Per Week.        Assessment & Plan   ASSESSMENT / PLAN      FRANCK (acute kidney injury) (HCC)    1.  FRANCK on CKD- Baseline creatinine 1.3-1.5, up to 2.0 now.  Etiology of FRANCK likely secondary to overdiuresis.  Agree with careful fluid resuscitation and I agree with LR at 50cc/hr.  Unfortunately he did receive contrast during this admission we will need to monitor his renal function closely. bumex on hold and I will also hold metolazone    2.  Shortness of breath- underwent CT with contrast  which was nondiagnostic, no marked fluid overload on exam. No pulmonary edema on xray . soa etiology is unclear at present    3.  Chronic systolic CHF /aicd before/ non ischemic cardiomyopathy    4.  Prediabetes    5.  Hypertension- now on the low side, was on Levophed    6.  KHANH- supposed to be using CPAP at home    7. Possible acute cholecystitis - denies marked abdominal pain and also has some nausea and vomiting x 1 yesterday. Denies acid reflux. Will add protonix.     Thank you for the consult, we will continue to follow the patient.         I discussed the patients findings and my recommendations with patient      This document has been electronically signed by SHELL Hodges on December 21, 2022 12:41 CST      For this patient encounter, I have reviewed the Nurse Practitioner's documentation, medical decision making, and treatment plan and personally spent time with the patient.

## 2022-12-21 NOTE — TREATMENT PLAN
The patient's CTPA came back with inadequate views as the contrast did not fill his pulmonary vessels appropriately (most likely due to his very poor EF).  We will proceed to a VQ scan to further evaluate for the possibility of a PE     Electronically signed by Sudeep Wolf MD, 12/21/22, 8:33 AM CST.

## 2022-12-21 NOTE — CONSULTS
"Adult Nutrition  Assessment/PES    Patient Name:  Matti Bravo  YOB: 1970  MRN: [id number]  Admit Date:  12/20/2022    Assessment Date:  12/21/2022    Comments:  Pt admitted for FRANCK; SOA with CHF; & Volume depletion related to diuresis with pt taking the wrong dose.  He is currently waiting for a test.  States that he has been eating but his appetite has been lower than usually.  He has lost wt but it is probably related to fluids.  Per wt hx he has lost from 285# on 12/7 to current wt of 269#--there again he is volume depleted related to extra diuretics and has a dx of CHF.  He was recently discharged from the hospital after being treated for possible NSTEM and Heart failure.  He declines education.  Per H&P pt has been noncompliant with his dietary restrictions at home.  RD will add educational materials for home use and monitor POC and po intake.      Reason for Assessment     Row Name 12/21/22 1517          Reason for Assessment    Reason For Assessment identified at risk by screening criteria     Diagnosis cardiac disease;renal disease     Identified At Risk by Screening Criteria MST SCORE 2+;reduced oral intake over the last month;large or nonhealing wound, burn or pressure injury                Nutrition/Diet History     Row Name 12/21/22 1519          Nutrition/Diet History    Typical Intake (Food/Fluid/EN/PN) `Pt c/o thirst.  He is having a test and cannot drink until it is completed.  He reports wt loss but it is probably related to fluid with heart failure.  He hasn't been eating that great either.  He knows \"all about\" a low sodium diet.                Labs/Tests/Procedures/Meds     Row Name 12/21/22 5105          Labs/Procedures/Meds    Lab Results Reviewed reviewed, pertinent     Lab Results Comments K+ 3.1; Bun 43; Creat 2.0; Plat 121; Gluc 141/121        Diagnostic Tests/Procedures    Diagnostic Test/Procedure Reviewed reviewed, pertinent     Diagnostic Test/Procedures " "Comments Cardiac consult; IVF; Zaroxolyn        Medications    Pertinent Medications Reviewed reviewed, pertinent     Pertinent Medications Comments Zaroxolyn; Dobutamine; LR 50cc/hr                  Estimated/Assessed Needs - Anthropometrics     Row Name 12/21/22 1519          Anthropometrics    Height for Calculation 1.93 m (6' 4\")     Weight for Calculation 91.6 kg (202 lb)  IBW        Estimated/Assessed Needs    Additional Documentation Additional Requirements (Group);Fluid Requirements (Group);Modified [place] Equation (Group);Estimated Calorie Needs (Group);KCAL/KG (Group);King [place] Queen-St. Jeor Equation (Group);Protein Requirements (Group)        Estimated Calorie Needs    Estimated Calorie Requirement (kcal/day) 2350 for moderate wt loss     Estimated Calorie Need Method King and Queen-St Jeor        King and Queen-St. Jeor Equation    RMR (King and Queen-St. Jeor Equation) 1872.77     King and Queen-St. Jeor Activity Factors 1.4 - 1.5     Activity Factors (King and Queen-St. Jeor) 2621.878 - 2809.155        Protein Requirements    Weight Used For Protein Calculations 91.6 kg (202 lb)     Est Protein Requirement Amount (gms/kg) 0.8 gm protein     Estimated Protein Requirements (gms/day) 73.3        Fluid Requirements    Fluid Requirements (mL/day) 2000     Estimated Fluid Requirement Method other (see comments)  CHF dx     RDA Method (mL) 2000                Nutrition Prescription Ordered     Row Name 12/21/22 1521          Nutrition Prescription PO    Current PO Diet Regular     Common Modifiers Cardiac;Consistent Carbohydrate                Evaluation of Received Nutrient/Fluid Intake     Row Name 12/21/22 1522          PO Evaluation    Number of Days PO Intake Evaluated Insufficient Data                   Problem/Interventions:   Problem 1     Row Name 12/21/22 1523          Nutrition Diagnoses Problem 1    Problem 1 Altered Nutrition Related to Labs     Etiology (related to) Medical Diagnosis;Medication Interaction     Fluid Status " Dehydration     Metabolic/ICU Hypokalemia     Renal FRANCK     Food/Medication Interaction Diuretic     Signs/Symptoms (evidenced by) Biochemical     Specific Labs Noted BUN;Creatinine;K+                Problem 2     Row Name 12/21/22 1528          Nutrition Diagnoses Problem 2    Problem 2 Limited Adherence to Diet Modifications     Etiology (related to) Medical Diagnosis     Cardiac CHF     Other Comment Per H&P pt with noncompliant to dietary restrictions                    Intervention Goal     Row Name 12/21/22 1530          Intervention Goal    General Reduce/improve symptoms;Provide information regarding MNT for treatment/condition     Weight No significant weight loss                Nutrition Intervention     Row Name 12/21/22 1530          Nutrition Intervention    RD/Tech Action Follow Tx progress;Care plan reviewd                  Education/Evaluation     Row Name 12/21/22 1531          Education    Education Education topics;Education offered and refused;Advised regarding habits/behavior     Education Topics Basic nutrition;Na+     Na+ (mg/day) --  The improtance of following a Low sodium diet.        Monitor/Evaluation    Monitor Per protocol;I&O;Pertinent labs;PO intake;Weight;Skin status;Symptoms     Education Follow-up Reinforce PRN                 Electronically signed by:  Maria Guadalupe Hernández RD  12/21/22 15:34 CST

## 2022-12-21 NOTE — ED NOTES
Nursing report ED to floor  Matti Bravo  52 y.o.  male    HPI:   Chief Complaint   Patient presents with    Shortness of Breath       Admitting doctor:   Will French MD    Consulting provider(s):  Consults       Date and Time Order Name Status Description    12/20/2022  7:31 PM Hospitalist (on-call MD unless specified)      12/5/2022  9:39 AM Inpatient Cardiology Consult Completed              Admitting diagnosis:   There were no encounter diagnoses.    Code status:   Current Code Status       Date Active Code Status Order ID Comments User Context       Prior            Allergies:   Patient has no known allergies.    Intake and Output    Intake/Output Summary (Last 24 hours) at 12/20/2022 1941  Last data filed at 12/20/2022 1857  Gross per 24 hour   Intake 500 ml   Output --   Net 500 ml       Weight:       12/20/22  1700   Weight: 127 kg (280 lb)       Most recent vitals:   Vitals:    12/20/22 1731 12/20/22 1808 12/20/22 1902 12/20/22 1933   BP: (!) 80/52 98/63  (!) 82/50   Pulse:  90     Resp:  22     Temp:   97.4 °F (36.3 °C)    TempSrc:   Oral    SpO2:  98%     Weight:       Height:         Oxygen Therapy: RA    Active LDAs/IV Access:   Lines, Drains & Airways       Active LDAs       Name Placement date Placement time Site Days    Peripheral IV 12/20/22 1753 Right Antecubital 12/20/22 1753  Antecubital  less than 1    Peripheral IV 12/20/22 1809 Anterior;Right Forearm 12/20/22 1809  Forearm  less than 1                    Labs (abnormal labs have a star):   Labs Reviewed   COMPREHENSIVE METABOLIC PANEL - Abnormal; Notable for the following components:       Result Value    Glucose 145 (*)     BUN 40 (*)     Creatinine 1.72 (*)     Potassium 2.9 (*)     Chloride 97 (*)     Total Bilirubin 2.2 (*)     Anion Gap 16.0 (*)     eGFR 47.5 (*)     All other components within normal limits    Narrative:     GFR Normal >60  Chronic Kidney Disease <60  Kidney Failure <15     URINALYSIS W/ CULTURE IF  INDICATED - Abnormal; Notable for the following components:    Ketones, UA Trace (*)     Protein, UA Trace (*)     Leuk Esterase, UA Trace (*)     Urobilinogen, UA 2.0 E.U./dL (*)     All other components within normal limits    Narrative:     In absence of clinical symptoms, the presence of pyuria, bacteria, and/or nitrites on the urinalysis result does not correlate with infection.   BNP (IN-HOUSE) - Abnormal; Notable for the following components:    proBNP 5,254.0 (*)     All other components within normal limits    Narrative:     Among patients with dyspnea, NT-proBNP is highly sensitive for the detection of acute congestive heart failure. In addition NT-proBNP of <300 pg/ml effectively rules out acute congestive heart failure with 99% negative predictive value.    Results may be falsely decreased if patient taking Biotin.     TROPONIN (IN-HOUSE) - Abnormal; Notable for the following components:    Troponin T 0.045 (*)     All other components within normal limits    Narrative:     Troponin T Reference Range:  <= 0.03 ng/mL-   Negative for AMI  >0.03 ng/mL-     Abnormal for myocardial necrosis.  Clinicians would have to utilize clinical acumen, EKG, Troponin and serial changes to determine if it is an Acute Myocardial Infarction or myocardial injury due to an underlying chronic condition.       Results may be falsely decreased if patient taking Biotin.     CBC WITH AUTO DIFFERENTIAL - Abnormal; Notable for the following components:    MPV 13.3 (*)     Platelets 117 (*)     Monocyte % 18.0 (*)     All other components within normal limits   URINALYSIS, MICROSCOPIC ONLY - Abnormal; Notable for the following components:    RBC, UA 3-5 (*)     All other components within normal limits   TROPONIN (IN-HOUSE) - Abnormal; Notable for the following components:    Troponin T 0.042 (*)     All other components within normal limits    Narrative:     Troponin T Reference Range:  <= 0.03 ng/mL-   Negative for AMI  >0.03  ng/mL-     Abnormal for myocardial necrosis.  Clinicians would have to utilize clinical acumen, EKG, Troponin and serial changes to determine if it is an Acute Myocardial Infarction or myocardial injury due to an underlying chronic condition.       Results may be falsely decreased if patient taking Biotin.     COVID-19 AND FLU A/B, NP SWAB IN TRANSPORT MEDIA 8-12 HR TAT - Normal    Narrative:     Fact sheet for providers: https://www.fda.gov/media/027819/download    Fact sheet for patients: https://www.fda.gov/media/436657/download    Test performed by PCR.   LACTIC ACID, PLASMA - Normal   ACETAMINOPHEN LEVEL - Normal   URINE DRUG SCREEN - Normal    Narrative:     Cutoff For Drugs Screened:    Amphetamines               500 ng/ml  Barbiturates               200 ng/ml  Benzodiazepines            150 ng/ml  Cocaine                    150 ng/ml  Methadone                  200 ng/ml  Opiates                    100 ng/ml  Phencyclidine               25 ng/ml  THC                            50 ng/ml  Methamphetamine            500 ng/ml  Tricyclic Antidepressants  300 ng/ml  Oxycodone                  100 ng/ml  Propoxyphene               300 ng/ml  Buprenorphine               10 ng/ml    The normal value for all drugs tested is negative. This report includes unconfirmed screening results, with the cutoff values listed, to be used for medical treatment purposes only.  Unconfirmed results must not be used for non-medical purposes such as employment or legal testing.  Clinical consideration should be applied to any drug of abuse test, particularly when unconfirmed results are used.     SALICYLATE LEVEL - Normal   TSH - Normal   T4, FREE - Normal    Narrative:     Results may be falsely increased if patient taking Biotin.     MAGNESIUM - Normal   CK - Normal   BLOOD CULTURE   BLOOD CULTURE   ETHANOL   BLOOD GAS, ARTERIAL   RAINBOW DRAW    Narrative:     The following orders were created for panel order [place]  Draw.  Procedure                               Abnormality         Status                     ---------                               -----------         ------                     Gold Top - SST[[id number]]                                                              Light Blue Top[[id number]]                                                                Please view results for these tests on the individual orders.   CBC AND DIFFERENTIAL    Narrative:     The following orders were created for panel order CBC & Differential.  Procedure                               Abnormality         Status                     ---------                               -----------         ------                     CBC Auto Differential[[id number]]        Abnormal            Final result               Scan Slide[[id number]]                                                                    Please view results for these tests on the individual orders.   GOLD TOP - SST   LIGHT BLUE TOP       Meds given in ED:   Medications   potassium chloride 10 mEq in 100 mL IVPB (10 mEq Intravenous New Bag 12/20/22 1940)   morphine injection 4 mg (0 mg Intravenous Hold 12/20/22 1933)   sodium chloride 0.9 % bolus 1,000 mL (1,000 mL Intravenous New Bag 12/20/22 1940)   cefTRIAXone (ROCEPHIN) 2 g/100 mL 0.9% NS IVPB (MBP) (0 g Intravenous Stopped 12/20/22 1857)   lactated ringers bolus 500 mL (0 mL Intravenous Stopped 12/20/22 1857)   potassium chloride (MICRO-K) CR capsule 40 mEq (40 mEq Oral Given 12/20/22 1928)   ondansetron (ZOFRAN) injection 4 mg (4 mg Intravenous Given 12/20/22 1933)           NIH Stroke Scale:       Isolation/Infection(s):  No active isolations   COVID (rule out)     COVID Testing  Collected yes  Resulted neg    Nursing report ED to floor:  Mental status: A&O x4  Ambulatory status: assist x1   Precautions: none    ED nurse phone extentsinz- [phone]

## 2022-12-21 NOTE — NURSING NOTE
Notified Dr. French of decreased urine output and repeat potassium of 3.6. Per protocol patient should have 4 more runs of potassium. Orders to hold this dose as patient's creatinine is 2 and is expected to elevate with the patients acute kidney injury. Magnesium WNL at 2.1.

## 2022-12-21 NOTE — PLAN OF CARE
Goal Outcome Evaluation:  Plan of Care Reviewed With: patient, mother        Progress: no change   Levophed off since 0600. BP WNL. All other VSS. Urine ouput decreased, physician aware. Denies pain. Up ad david. Refused bath. Stable. Will continue to monitor.

## 2022-12-21 NOTE — OUTREACH NOTE
CHF Week 2 Survey    Flowsheet Row Responses   [facility] patient discharged from? [place]   Does the patient have one of the following disease processes/diagnoses(primary or secondary)? CHF   Week 2 attempt successful? No   Unsuccessful attempts Attempt 1   Revoke Readmitted          RADHA OLEARY - Registered Nurse

## 2022-12-21 NOTE — H&P
[facility] Medicine Admission      Date of Admission: 12/20/2022      Primary Care Physician: Shonna Ng APRN      Chief Complaint: Shortness of breath    HPI: Patient is a 52-year-old male past medical history of chronic systolic congestive heart failure, diabetes, hyperlipidemia, hypertension, and sleep apnea.  Patient was recently discharged from the hospital after being treated for possible non-STEMI and heart failure.  Medications were adjusted, but patient states that he has been taking 2 mg of Bumex instead of 1 mg as prescribed.  He states that on discharge he felt good, but over the last 24 to 48 hours he has been feeling much more short of breath.  He describes orthopnea and paroxysmal nocturnal dyspnea.  He has exertional shortness of breath.  He does state that his dietary compliance with prescribed diet is relatively poor.    Concurrent Medical History:  has a past medical history of Asthma, Chronic systolic heart failure (HCC), Diabetes mellitus (HCC), Hyperlipidemia, Hypertension, Obesity, Peripheral vascular disease (HCC), and Sleep apnea.    Past Surgical History:  has a past surgical history that includes Varicose vein surgery (Right, 04/05/2019); Cardiac catheterization (N/A, 12/08/2021); and Cardiac defibrillator placement.    Family History: family history includes Diabetes in his mother; Hypertension in his father.     Social History:  reports that he has quit smoking. He has never used smokeless tobacco. He reports that he does not drink alcohol and does not use drugs.    Allergies: No Known Allergies    Medications:   Prior to Admission medications    Medication Sig Start Date End Date Taking? Authorizing Provider   albuterol sulfate  (90 Base) MCG/ACT inhaler Inhale 2 puffs Every 4 (Four) Hours As Needed for Wheezing. 3/9/19   Keagan Choi MD   apixaban (ELIQUIS) 5 MG tablet tablet Take 1 tablet by mouth 2 (Two) Times  a Day. 10/4/22   Vania Andujar APRN   bumetanide (BUMEX) 1 MG tablet Take 1 tablet by mouth 2 (Two) Times a Day. 12/8/22   Ayaan Cuba MD   carvedilol (COREG) 12.5 MG tablet Take 1 tablet by mouth 2 (Two) Times a Day With Meals for 30 days.  Patient taking differently: Take 12.5 mg by mouth 2 (Two) Times a Day With Meals. Pt states he has some meds and is still taking this. 10/4/22 11/3/22  Vania Andujar APRN   losartan (COZAAR) 25 MG tablet Take 0.5 tablets by mouth Daily for 30 days. 10/4/22 11/3/22  Vania Andujar APRN   lovastatin (ALTOPREV) 20 MG 24 hr tablet Take 20 mg by mouth.    ProviderAroldo MD   metOLazone (ZAROXOLYN) 2.5 MG tablet Take 1 tablet by mouth 3 (Three) Times a Week. 10/5/22   Vania Andujar APRN   potassium chloride 10 MEQ CR tablet Take 2 tablets by mouth Daily. 9/27/22   Jacqueline Laguna APRN   vitamin D (ERGOCALCIFEROL) 1.25 MG (07763 UT) capsule capsule Take 50,000 Units by mouth 1 (One) Time Per Week. 10/27/22   ProviderAroldo MD       Review of Systems:  Review of Systems   Constitutional: Positive for activity change. Negative for appetite change, chills, fatigue, fever and unexpected weight change.   HENT: Negative for congestion, facial swelling, hearing loss, nosebleeds, rhinorrhea, sneezing, trouble swallowing and voice change.    Eyes: Negative for photophobia and visual disturbance.   Respiratory: Positive for shortness of breath. Negative for apnea, cough, choking, chest tightness, wheezing and stridor.    Cardiovascular: Negative for chest pain, palpitations and leg swelling.   Gastrointestinal: Negative for abdominal pain, blood in stool, constipation, diarrhea, nausea and vomiting.   Endocrine: Negative for cold intolerance, heat intolerance, polydipsia, polyphagia and polyuria.   Genitourinary: Negative for dysuria, flank pain and hematuria.   Musculoskeletal: Negative for arthralgias, back pain, myalgias and neck pain.   Skin:  Negative for rash and wound.   Allergic/Immunologic: Negative for immunocompromised state.   Neurological: Negative for dizziness, seizures, syncope, speech difficulty, weakness, light-headedness, numbness and headaches.   Hematological: Does not bruise/bleed easily.   Psychiatric/Behavioral: Negative for agitation, behavioral problems, confusion, decreased concentration, hallucinations, self-injury and suicidal ideas. The patient is not nervous/anxious.       Otherwise complete ROS is negative except as mentioned above.    Physical Exam:   Temp:  [94.3 °F (34.6 °C)-97.4 °F (36.3 °C)] 97.4 °F (36.3 °C)  Heart Rate:  [63-90] 90  Resp:  [22] 22  BP: (80-98)/(50-63) 82/50     Physical Exam  Constitutional:       Appearance: He is well-developed.   HENT:      Head: Normocephalic and atraumatic.      Nose: Nose normal.   Eyes:      General: Lids are normal. No scleral icterus.     Conjunctiva/sclera: Conjunctivae normal.      Pupils: Pupils are equal, round, and reactive to light.   Neck:      Vascular: No JVD.      Trachea: No tracheal tenderness or tracheal deviation.   Cardiovascular:      Rate and Rhythm: Normal rate and regular rhythm.      Pulses: Normal pulses.      Heart sounds: Normal heart sounds, S1 normal and S2 normal. No murmur heard.    No friction rub. No gallop.   Pulmonary:      Effort: Pulmonary effort is normal. No accessory muscle usage or respiratory distress.      Breath sounds: Normal breath sounds. No decreased breath sounds, wheezing or rales.   Chest:      Chest wall: No tenderness.   Abdominal:      General: Bowel sounds are normal. There is no distension.      Palpations: Abdomen is soft. There is no mass.      Tenderness: There is no abdominal tenderness. There is no guarding or rebound.   Musculoskeletal:         General: No tenderness.      Right shoulder: No pain.      Cervical back: Normal range of motion and neck supple. No spinous process tenderness.   Skin:     General: Skin is warm.       Coloration: Skin is not pale.      Findings: No rash.   Neurological:      Mental Status: He is alert and oriented to person, place, and time.      Cranial Nerves: No cranial nerve deficit.      Sensory: No sensory deficit.      Motor: No atrophy, abnormal muscle tone or seizure activity.      Coordination: Coordination normal.      Deep Tendon Reflexes: Reflexes are normal and symmetric. Reflexes normal.   Psychiatric:         Behavior: Behavior normal.         Thought Content: Thought content normal.         Judgment: Judgment normal.           Results Reviewed:  I have personally reviewed current lab, radiology, and data and agree with results.  Lab Results (last 24 hours)     Procedure Component Value Units Date/Time    Troponin [[id number]]  (Abnormal) Collected: 12/20/22 1909    Specimen: Blood Updated: 12/20/22 1935     Troponin T 0.042 ng/mL     Narrative:      Troponin T Reference Range:  <= 0.03 ng/mL-   Negative for AMI  >0.03 ng/mL-     Abnormal for myocardial necrosis.  Clinicians would have to utilize clinical acumen, EKG, Troponin and serial changes to determine if it is an Acute Myocardial Infarction or myocardial injury due to an underlying chronic condition.       Results may be falsely decreased if patient taking Biotin.      Urine Drug Screen - Urine, Clean Catch [[id number]]  (Normal) Collected: 12/20/22 1800    Specimen: Urine, Clean Catch Updated: 12/20/22 1830     THC, Screen, Urine Negative     Phencyclidine (PCP), Urine Negative     Cocaine Screen, Urine Negative     Methamphetamine, Ur Negative     Opiate Screen Negative     Amphetamine Screen, Urine Negative     Benzodiazepine Screen, Urine Negative     Tricyclic Antidepressants Screen Negative     Methadone Screen, Urine Negative     Barbiturates Screen, Urine Negative     Oxycodone Screen, Urine Negative     Propoxyphene Screen Negative     Buprenorphine, Screen, Urine Negative    Narrative:      Cutoff For Drugs  Screened:    Amphetamines               500 ng/ml  Barbiturates               200 ng/ml  Benzodiazepines            150 ng/ml  Cocaine                    150 ng/ml  Methadone                  200 ng/ml  Opiates                    100 ng/ml  Phencyclidine               25 ng/ml  THC                            50 ng/ml  Methamphetamine            500 ng/ml  Tricyclic Antidepressants  300 ng/ml  Oxycodone                  100 ng/ml  Propoxyphene               300 ng/ml  Buprenorphine               10 ng/ml    The normal value for all drugs tested is negative. This report includes unconfirmed screening results, with the cutoff values listed, to be used for medical treatment purposes only.  Unconfirmed results must not be used for non-medical purposes such as employment or legal testing.  Clinical consideration should be applied to any drug of abuse test, particularly when unconfirmed results are used.      Blood Culture - Blood, Arm, Right [[id number]] Collected: 12/20/22 1812    Specimen: Blood from Arm, Right Updated: 12/20/22 1826    Troponin [[id number]]  (Abnormal) Collected: 12/20/22 1733    Specimen: Blood Updated: 12/20/22 1817     Troponin T 0.045 ng/mL     Narrative:      Troponin T Reference Range:  <= 0.03 ng/mL-   Negative for AMI  >0.03 ng/mL-     Abnormal for myocardial necrosis.  Clinicians would have to utilize clinical acumen, EKG, Troponin and serial changes to determine if it is an Acute Myocardial Infarction or myocardial injury due to an underlying chronic condition.       Results may be falsely decreased if patient taking Biotin.      BNP [[id number]]  (Abnormal) Collected: 12/20/22 1733    Specimen: Blood Updated: 12/20/22 1814     proBNP 5,254.0 pg/mL     Narrative:      Among patients with dyspnea, NT-proBNP is highly sensitive for the detection of acute congestive heart failure. In addition NT-proBNP of <300 pg/ml effectively rules out acute congestive heart failure with 99% negative predictive  value.    Results may be falsely decreased if patient taking Biotin.      TSH [[id number]]  (Normal) Collected: 12/20/22 1733    Specimen: Blood Updated: 12/20/22 1814     TSH 1.110 uIU/mL     T4, Free [[id number]]  (Normal) Collected: 12/20/22 1733    Specimen: Blood Updated: 12/20/22 1814     Free T4 1.34 ng/dL     Narrative:      Results may be falsely increased if patient taking Biotin.      Urinalysis, Microscopic Only - Urine, Clean Catch [[id number]]  (Abnormal) Collected: 12/20/22 1800    Specimen: Urine, Clean Catch Updated: 12/20/22 1812     RBC, UA 3-5 /HPF      WBC, UA 0-2 /HPF      Comment: Urine culture not indicated.        Bacteria, UA None Seen /HPF      Squamous Epithelial Cells, UA 0-2 /HPF      Hyaline Casts, UA 21-30 /LPF      Methodology Automated Microscopy    Urinalysis With Culture If Indicated - Urine, Clean Catch [[id number]]  (Abnormal) Collected: 12/20/22 1800    Specimen: Urine, Clean Catch Updated: 12/20/22 1812     Color, UA Dark Yellow     Appearance, UA Clear     pH, UA 5.5     Specific Gravity, UA 1.016     Glucose, UA Negative     Ketones, UA Trace     Bilirubin, UA Negative     Blood, UA Negative     Protein, UA Trace     Leuk Esterase, UA Trace     Nitrite, UA Negative     Urobilinogen, UA 2.0 E.U./dL    Narrative:      In absence of clinical symptoms, the presence of pyuria, bacteria, and/or nitrites on the urinalysis result does not correlate with infection.    Comprehensive Metabolic Panel [[id number]]  (Abnormal) Collected: 12/20/22 1733    Specimen: Blood Updated: 12/20/22 1808     Glucose 145 mg/dL      BUN 40 mg/dL      Creatinine 1.72 mg/dL      Sodium 138 mmol/L      Potassium 2.9 mmol/L      Chloride 97 mmol/L      CO2 25.0 mmol/L      Calcium 9.3 mg/dL      Total Protein 7.7 g/dL      Albumin 4.10 g/dL      ALT (SGPT) 11 U/L      AST (SGOT) 15 U/L      Alkaline Phosphatase 63 U/L      Total Bilirubin 2.2 mg/dL      Globulin 3.6 gm/dL      A/G Ratio 1.1 g/dL       BUN/Creatinine Ratio 23.3     Anion Gap 16.0 mmol/L      eGFR 47.5 mL/min/1.73      Comment: National Kidney Foundation and American Society of Nephrology (ASN) Task Force recommended calculation based on the Chronic Kidney Disease Epidemiology Collaboration (CKD-EPI) equation refit without adjustment for race.       Narrative:      GFR Normal >60  Chronic Kidney Disease <60  Kidney Failure <15      CK [[id number]]  (Normal) Collected: 12/20/22 1733    Specimen: Blood Updated: 12/20/22 1808     Creatine Kinase 87 U/L     Acetaminophen Level [[id number]]  (Normal) Collected: 12/20/22 1733    Specimen: Blood Updated: 12/20/22 1808     Acetaminophen <5.0 mcg/mL     Ethanol [[id number]] Collected: 12/20/22 1733    Specimen: Blood Updated: 12/20/22 1808     Ethanol <10 mg/dL      Ethanol % <0.010 %     Salicylate Level [[id number]]  (Normal) Collected: 12/20/22 1733    Specimen: Blood Updated: 12/20/22 1808     Salicylate <0.3 mg/dL     Magnesium [[id number]]  (Normal) Collected: 12/20/22 1733    Specimen: Blood Updated: 12/20/22 1808     Magnesium 1.9 mg/dL     COVID-19 and FLU A/B PCR - Swab, Nasopharynx [[id number]]  (Normal) Collected: 12/20/22 1733    Specimen: Swab from Nasopharynx Updated: 12/20/22 1802     COVID19 Not Detected     Influenza A PCR Not Detected     Influenza B PCR Not Detected    Narrative:      Fact sheet for providers: https://www.fda.gov/media/071987/download    Fact sheet for patients: https://www.fda.gov/media/743617/download    Test performed by PCR.    Lactic Acid, Plasma [[id number]]  (Normal) Collected: 12/20/22 1733    Specimen: Blood Updated: 12/20/22 1759     Lactate 2.0 mmol/L     CBC & Differential [[id number]]  (Abnormal) Collected: 12/20/22 1733    Specimen: Blood Updated: 12/20/22 1746    Narrative:      The following orders were created for panel order CBC & Differential.  Procedure                               Abnormality         Status                     ---------                                -----------         ------                     CBC Auto Differential[[id number]]        Abnormal            Final result               Scan Slide[[id number]]                                                                    Please view results for these tests on the individual orders.    CBC Auto Differential [[id number]]  (Abnormal) Collected: 12/20/22 1733    Specimen: Blood Updated: 12/20/22 1746     WBC 3.73 10*3/mm3      RBC 5.03 10*6/mm3      Hemoglobin 14.9 g/dL      Hematocrit 45.0 %      MCV 89.5 fL      MCH 29.6 pg      MCHC 33.1 g/dL      RDW 14.6 %      RDW-SD 46.5 fl      MPV 13.3 fL      Platelets 117 10*3/mm3      Neutrophil % 45.6 %      Lymphocyte % 32.4 %      Monocyte % 18.0 %      Eosinophil % 2.4 %      Basophil % 1.3 %      Immature Grans % 0.3 %      Neutrophils, Absolute 1.70 10*3/mm3      Lymphocytes, Absolute 1.21 10*3/mm3      Monocytes, Absolute 0.67 10*3/mm3      Eosinophils, Absolute 0.09 10*3/mm3      Basophils, Absolute 0.05 10*3/mm3      Immature Grans, Absolute 0.01 10*3/mm3      nRBC 0.0 /100 WBC     Blood Culture - Blood, Arm, Left [[id number]] Collected: 12/20/22 1733    Specimen: Blood from Arm, Left Updated: 12/20/22 1738        Imaging Results (Last 24 Hours)     Procedure Component Value Units Date/Time    XR Chest 1 View [[id number]] Collected: 12/20/22 1722     Updated: 12/20/22 1744    Narrative:        PORTABLE CHEST    HISTORY: Shortness of air    Portable AP upright film of the chest was obtained at 5:22 PM.  COMPARISON: December 7, 2022    FINDINGS:   Linear atelectasis medial right lung base.  Left-sided pacemaker remains in place with lead projected over  the right ventricle.  Stable cardiomegaly.  The pulmonary vasculature is not increased.  No pleural effusion.  No pneumothorax.  No acute osseous abnormality.  Dextroscoliosis thoracic spine.  Old left rib fractures.      Impression:      CONCLUSION:  Linear atelectasis medial right lung base.  Left-sided  pacemaker remains in place with lead projected over  the right ventricle.  Stable cardiomegaly.    [id number]    Electronically signed by:  Mukul Klein MD  12/20/2022 5:42 PM  CST Workstation: [id number]            Assessment:    Active Hospital Problems    Diagnosis    • **FRANCK (acute kidney injury) (HCC)              Plan:  1.  Shortness of breath: Patient with chronic systolic congestive heart failure and symptoms of orthopnea with a clear chest x-ray.  We will treat symptomatically at this point.  2.  Volume depletion: Patient with elevated BUN and creatinine over baseline while taking more diuretics than prescribed (not intentionally).  Will gently hydrate for now.  3.  Chronic systolic congestive heart failure: Minimally hypovolemic currently.  Continue home medications.  Continue anticoagulation for risk of thrombus.  4.  History of hyperglycemia: No diagnosis of diabetes.  Will place on sliding scale insulin for now.  5.  Obstructive sleep apnea: Patient states its been over 2 years since he has had a CPAP.  He is scheduled to go  his CPAP on 12/29.  6.  DVT prophylaxis: Eliquis.    I confirmed that the patient's Advance Care Plan is present, code status is documented, or surrogate decision maker is listed in the patient's medical record.     I have utilized all available immediate resources to obtain, update, or review the patient's current medications.     I discussed the patient's findings and my recommendations with: Patient and mother        This document has been electronically signed by Will French MD on December 20, 2022 19:57 CST

## 2022-12-22 LAB
ANION GAP SERPL CALCULATED.3IONS-SCNC: 15 MMOL/L (ref 5–15)
BASOPHILS # BLD AUTO: 0.03 10*3/MM3 (ref 0–0.2)
BASOPHILS NFR BLD AUTO: 0.6 % (ref 0–1.5)
BUN SERPL-MCNC: 48 MG/DL (ref 6–20)
BUN/CREAT SERPL: 22.5 (ref 7–25)
CALCIUM SPEC-SCNC: 9.4 MG/DL (ref 8.6–10.5)
CHLORIDE SERPL-SCNC: 93 MMOL/L (ref 98–107)
CO2 SERPL-SCNC: 25 MMOL/L (ref 22–29)
CREAT SERPL-MCNC: 2.13 MG/DL (ref 0.76–1.27)
D-LACTATE SERPL-SCNC: 3 MMOL/L (ref 0.5–2)
D-LACTATE SERPL-SCNC: 3.2 MMOL/L (ref 0.5–2)
D-LACTATE SERPL-SCNC: 3.4 MMOL/L (ref 0.5–2)
D-LACTATE SERPL-SCNC: 3.7 MMOL/L (ref 0.5–2)
D-LACTATE SERPL-SCNC: 4.3 MMOL/L (ref 0.5–2)
DEPRECATED RDW RBC AUTO: 49 FL (ref 37–54)
EGFRCR SERPLBLD CKD-EPI 2021: 36.8 ML/MIN/1.73
EOSINOPHIL # BLD AUTO: 0.01 10*3/MM3 (ref 0–0.4)
EOSINOPHIL NFR BLD AUTO: 0.2 % (ref 0.3–6.2)
ERYTHROCYTE [DISTWIDTH] IN BLOOD BY AUTOMATED COUNT: 14.8 % (ref 12.3–15.4)
GLUCOSE SERPL-MCNC: 116 MG/DL (ref 65–99)
HCT VFR BLD AUTO: 44.9 % (ref 37.5–51)
HGB BLD-MCNC: 14.4 G/DL (ref 13–17.7)
IMM GRANULOCYTES # BLD AUTO: 0.04 10*3/MM3 (ref 0–0.05)
IMM GRANULOCYTES NFR BLD AUTO: 0.7 % (ref 0–0.5)
LYMPHOCYTES # BLD AUTO: 1.56 10*3/MM3 (ref 0.7–3.1)
LYMPHOCYTES NFR BLD AUTO: 28.7 % (ref 19.6–45.3)
MCH RBC QN AUTO: 29.1 PG (ref 26.6–33)
MCHC RBC AUTO-ENTMCNC: 32.1 G/DL (ref 31.5–35.7)
MCV RBC AUTO: 90.7 FL (ref 79–97)
MONOCYTES # BLD AUTO: 0.87 10*3/MM3 (ref 0.1–0.9)
MONOCYTES NFR BLD AUTO: 16 % (ref 5–12)
NEUTROPHILS NFR BLD AUTO: 2.93 10*3/MM3 (ref 1.7–7)
NEUTROPHILS NFR BLD AUTO: 53.8 % (ref 42.7–76)
NRBC BLD AUTO-RTO: 0 /100 WBC (ref 0–0.2)
PLATELET # BLD AUTO: 110 10*3/MM3 (ref 140–450)
PMV BLD AUTO: 14 FL (ref 6–12)
POTASSIUM SERPL-SCNC: 3.8 MMOL/L (ref 3.5–5.2)
RBC # BLD AUTO: 4.95 10*6/MM3 (ref 4.14–5.8)
SODIUM SERPL-SCNC: 133 MMOL/L (ref 136–145)
WBC NRBC COR # BLD: 5.44 10*3/MM3 (ref 3.4–10.8)

## 2022-12-22 PROCEDURE — 25010000002 DROPERIDOL PER 5 MG

## 2022-12-22 PROCEDURE — 25010000002 PIPERACILLIN SOD-TAZOBACTAM PER 1 G

## 2022-12-22 PROCEDURE — 85025 COMPLETE CBC W/AUTO DIFF WBC: CPT | Performed by: HOSPITALIST

## 2022-12-22 PROCEDURE — 83605 ASSAY OF LACTIC ACID: CPT

## 2022-12-22 PROCEDURE — 84132 ASSAY OF SERUM POTASSIUM: CPT | Performed by: HOSPITALIST

## 2022-12-22 PROCEDURE — 25010000002 ONDANSETRON PER 1 MG

## 2022-12-22 PROCEDURE — 36415 COLL VENOUS BLD VENIPUNCTURE: CPT | Performed by: HOSPITALIST

## 2022-12-22 PROCEDURE — 80048 BASIC METABOLIC PNL TOTAL CA: CPT | Performed by: HOSPITALIST

## 2022-12-22 RX ADMIN — ACETAMINOPHEN 1000 MG: 500 TABLET, FILM COATED ORAL at 12:04

## 2022-12-22 RX ADMIN — DROPERIDOL 1.25 MG: 2.5 INJECTION, SOLUTION INTRAMUSCULAR; INTRAVENOUS at 13:02

## 2022-12-22 RX ADMIN — PANTOPRAZOLE SODIUM 40 MG: 40 INJECTION, POWDER, FOR SOLUTION INTRAVENOUS at 05:25

## 2022-12-22 RX ADMIN — Medication 10 ML: at 08:14

## 2022-12-22 RX ADMIN — Medication 10 ML: at 20:28

## 2022-12-22 RX ADMIN — CARVEDILOL 12.5 MG: 12.5 TABLET, FILM COATED ORAL at 14:25

## 2022-12-22 RX ADMIN — APIXABAN 5 MG: 5 TABLET, FILM COATED ORAL at 08:14

## 2022-12-22 RX ADMIN — PIPERACILLIN AND TAZOBACTAM 4.5 G: 4; .5 INJECTION, POWDER, LYOPHILIZED, FOR SOLUTION INTRAVENOUS; PARENTERAL at 13:03

## 2022-12-22 RX ADMIN — SODIUM CHLORIDE, POTASSIUM CHLORIDE, SODIUM LACTATE AND CALCIUM CHLORIDE 50 ML/HR: 600; 310; 30; 20 INJECTION, SOLUTION INTRAVENOUS at 20:59

## 2022-12-22 RX ADMIN — PIPERACILLIN AND TAZOBACTAM 4.5 G: 4; .5 INJECTION, POWDER, LYOPHILIZED, FOR SOLUTION INTRAVENOUS; PARENTERAL at 20:29

## 2022-12-22 RX ADMIN — ONDANSETRON 4 MG: 2 INJECTION INTRAMUSCULAR; INTRAVENOUS at 08:11

## 2022-12-22 RX ADMIN — ATORVASTATIN CALCIUM 10 MG: 10 TABLET, FILM COATED ORAL at 08:14

## 2022-12-22 RX ADMIN — CARVEDILOL 12.5 MG: 12.5 TABLET, FILM COATED ORAL at 20:37

## 2022-12-22 RX ADMIN — PIPERACILLIN AND TAZOBACTAM 4.5 G: 4; .5 INJECTION, POWDER, LYOPHILIZED, FOR SOLUTION INTRAVENOUS; PARENTERAL at 05:24

## 2022-12-22 RX ADMIN — APIXABAN 5 MG: 5 TABLET, FILM COATED ORAL at 20:34

## 2022-12-22 NOTE — PAYOR COMM NOTE
"Geraldine Garcia  [facility]  Case Management Extender  [phone] phone  [phone] fax      Ref# [id number]    Ethel Davis (52 y.o. Male)     Date of Birth   1970    Social Security Number       Address   [address] [id number]    Home Phone   [phone]    MRN   [id number]       Jainism   [facility]    Marital Status                               Admission Date   12/20/22    Admission Type   Emergency    Admitting Provider   Will French MD    Attending Provider   Will French MD    Department, Room/Bed   [facility] CRITICAL CARE STEPDOWN, 11/A       Discharge Date       Discharge Disposition       Discharge Destination                               Attending Provider: Will French MD    Allergies: No Known Allergies    Isolation: None   Infection: None   Code Status: CPR    Ht: 193 cm (76\")   Wt: 128 kg (282 lb 4.8 oz)    Admission Cmt: None   Principal Problem: FRANCK (acute kidney injury) (HCC) [N17.9]                 Active Insurance as of 12/20/2022     Primary Coverage     Payor Plan Insurance Group Employer/Plan Group    [place] [id number]     Payor Plan Address Payor Plan Phone Number Payor Plan Fax Number Effective Dates    [employer] [address] [address] [phone]  10/8/2021 - None Entered    Formerly [facility] [address]       Subscriber Name Subscriber Birth Date Member ID       ETHEL DAVIS 1970 [id number]                 Emergency Contacts      (Rel.) Home Phone Work Phone Mobile Phone    Courtney Davis (Mother) [phone] -- --               History & Physical      Will French MD at 12/20/22 1957                [facility] Medicine Admission      Date of Admission: 12/20/2022      Primary Care Physician: Shonna Ng APRN      Chief Complaint: Shortness of breath    HPI: " Patient is a 52-year-old male past medical history of chronic systolic congestive heart failure, diabetes, hyperlipidemia, hypertension, and sleep apnea.  Patient was recently discharged from the hospital after being treated for possible non-STEMI and heart failure.  Medications were adjusted, but patient states that he has been taking 2 mg of Bumex instead of 1 mg as prescribed.  He states that on discharge he felt good, but over the last 24 to 48 hours he has been feeling much more short of breath.  He describes orthopnea and paroxysmal nocturnal dyspnea.  He has exertional shortness of breath.  He does state that his dietary compliance with prescribed diet is relatively poor.    Concurrent Medical History:  has a past medical history of Asthma, Chronic systolic heart failure (HCC), Diabetes mellitus (HCC), Hyperlipidemia, Hypertension, Obesity, Peripheral vascular disease (HCC), and Sleep apnea.    Past Surgical History:  has a past surgical history that includes Varicose vein surgery (Right, 04/05/2019); Cardiac catheterization (N/A, 12/08/2021); and Cardiac defibrillator placement.    Family History: family history includes Diabetes in his mother; Hypertension in his father.     Social History:  reports that he has quit smoking. He has never used smokeless tobacco. He reports that he does not drink alcohol and does not use drugs.    Allergies: No Known Allergies    Medications:   Prior to Admission medications    Medication Sig Start Date End Date Taking? Authorizing Provider   albuterol sulfate  (90 Base) MCG/ACT inhaler Inhale 2 puffs Every 4 (Four) Hours As Needed for Wheezing. 3/9/19   Keagan Choi MD   apixaban (ELIQUIS) 5 MG tablet tablet Take 1 tablet by mouth 2 (Two) Times a Day. 10/4/22   Vania Andujar APRN   bumetanide (BUMEX) 1 MG tablet Take 1 tablet by mouth 2 (Two) Times a Day. 12/8/22   Ayaan Cuba MD   carvedilol (COREG) 12.5 MG tablet Take 1 tablet by mouth 2 (Two)  Times a Day With Meals for 30 days.  Patient taking differently: Take 12.5 mg by mouth 2 (Two) Times a Day With Meals. Pt states he has some meds and is still taking this. 10/4/22 11/3/22  Vania Andujar APRN   losartan (COZAAR) 25 MG tablet Take 0.5 tablets by mouth Daily for 30 days. 10/4/22 11/3/22  Vania Andujar APRN   lovastatin (ALTOPREV) 20 MG 24 hr tablet Take 20 mg by mouth.    Provider, MD Aroldo   metOLazone (ZAROXOLYN) 2.5 MG tablet Take 1 tablet by mouth 3 (Three) Times a Week. 10/5/22   Vania Andujar APRN   potassium chloride 10 MEQ CR tablet Take 2 tablets by mouth Daily. 9/27/22   Jacqueline Laguna APRN   vitamin D (ERGOCALCIFEROL) 1.25 MG (99129 UT) capsule capsule Take 50,000 Units by mouth 1 (One) Time Per Week. 10/27/22   Provider, MD Aroldo       Review of Systems:  Review of Systems   Constitutional: Positive for activity change. Negative for appetite change, chills, fatigue, fever and unexpected weight change.   HENT: Negative for congestion, facial swelling, hearing loss, nosebleeds, rhinorrhea, sneezing, trouble swallowing and voice change.    Eyes: Negative for photophobia and visual disturbance.   Respiratory: Positive for shortness of breath. Negative for apnea, cough, choking, chest tightness, wheezing and stridor.    Cardiovascular: Negative for chest pain, palpitations and leg swelling.   Gastrointestinal: Negative for abdominal pain, blood in stool, constipation, diarrhea, nausea and vomiting.   Endocrine: Negative for cold intolerance, heat intolerance, polydipsia, polyphagia and polyuria.   Genitourinary: Negative for dysuria, flank pain and hematuria.   Musculoskeletal: Negative for arthralgias, back pain, myalgias and neck pain.   Skin: Negative for rash and wound.   Allergic/Immunologic: Negative for immunocompromised state.   Neurological: Negative for dizziness, seizures, syncope, speech difficulty, weakness, light-headedness, numbness and headaches.    Hematological: Does not bruise/bleed easily.   Psychiatric/Behavioral: Negative for agitation, behavioral problems, confusion, decreased concentration, hallucinations, self-injury and suicidal ideas. The patient is not nervous/anxious.       Otherwise complete ROS is negative except as mentioned above.    Physical Exam:   Temp:  [94.3 °F (34.6 °C)-97.4 °F (36.3 °C)] 97.4 °F (36.3 °C)  Heart Rate:  [63-90] 90  Resp:  [22] 22  BP: (80-98)/(50-63) 82/50     Physical Exam  Constitutional:       Appearance: He is well-developed.   HENT:      Head: Normocephalic and atraumatic.      Nose: Nose normal.   Eyes:      General: Lids are normal. No scleral icterus.     Conjunctiva/sclera: Conjunctivae normal.      Pupils: Pupils are equal, round, and reactive to light.   Neck:      Vascular: No JVD.      Trachea: No tracheal tenderness or tracheal deviation.   Cardiovascular:      Rate and Rhythm: Normal rate and regular rhythm.      Pulses: Normal pulses.      Heart sounds: Normal heart sounds, S1 normal and S2 normal. No murmur heard.    No friction rub. No gallop.   Pulmonary:      Effort: Pulmonary effort is normal. No accessory muscle usage or respiratory distress.      Breath sounds: Normal breath sounds. No decreased breath sounds, wheezing or rales.   Chest:      Chest wall: No tenderness.   Abdominal:      General: Bowel sounds are normal. There is no distension.      Palpations: Abdomen is soft. There is no mass.      Tenderness: There is no abdominal tenderness. There is no guarding or rebound.   Musculoskeletal:         General: No tenderness.      Right shoulder: No pain.      Cervical back: Normal range of motion and neck supple. No spinous process tenderness.   Skin:     General: Skin is warm.      Coloration: Skin is not pale.      Findings: No rash.   Neurological:      Mental Status: He is alert and oriented to person, place, and time.      Cranial Nerves: No cranial nerve deficit.      Sensory: No sensory  deficit.      Motor: No atrophy, abnormal muscle tone or seizure activity.      Coordination: Coordination normal.      Deep Tendon Reflexes: Reflexes are normal and symmetric. Reflexes normal.   Psychiatric:         Behavior: Behavior normal.         Thought Content: Thought content normal.         Judgment: Judgment normal.           Results Reviewed:  I have personally reviewed current lab, radiology, and data and agree with results.  Lab Results (last 24 hours)     Procedure Component Value Units Date/Time    Troponin [[id number]]  (Abnormal) Collected: 12/20/22 1909    Specimen: Blood Updated: 12/20/22 1935     Troponin T 0.042 ng/mL     Narrative:      Troponin T Reference Range:  <= 0.03 ng/mL-   Negative for AMI  >0.03 ng/mL-     Abnormal for myocardial necrosis.  Clinicians would have to utilize clinical acumen, EKG, Troponin and serial changes to determine if it is an Acute Myocardial Infarction or myocardial injury due to an underlying chronic condition.       Results may be falsely decreased if patient taking Biotin.      Urine Drug Screen - Urine, Clean Catch [[id number]]  (Normal) Collected: 12/20/22 1800    Specimen: Urine, Clean Catch Updated: 12/20/22 1830     THC, Screen, Urine Negative     Phencyclidine (PCP), Urine Negative     Cocaine Screen, Urine Negative     Methamphetamine, Ur Negative     Opiate Screen Negative     Amphetamine Screen, Urine Negative     Benzodiazepine Screen, Urine Negative     Tricyclic Antidepressants Screen Negative     Methadone Screen, Urine Negative     Barbiturates Screen, Urine Negative     Oxycodone Screen, Urine Negative     Propoxyphene Screen Negative     Buprenorphine, Screen, Urine Negative    Narrative:      Cutoff For Drugs Screened:    Amphetamines               500 ng/ml  Barbiturates               200 ng/ml  Benzodiazepines            150 ng/ml  Cocaine                    150 ng/ml  Methadone                  200 ng/ml  Opiates                    100  ng/ml  Phencyclidine               25 ng/ml  THC                            50 ng/ml  Methamphetamine            500 ng/ml  Tricyclic Antidepressants  300 ng/ml  Oxycodone                  100 ng/ml  Propoxyphene               300 ng/ml  Buprenorphine               10 ng/ml    The normal value for all drugs tested is negative. This report includes unconfirmed screening results, with the cutoff values listed, to be used for medical treatment purposes only.  Unconfirmed results must not be used for non-medical purposes such as employment or legal testing.  Clinical consideration should be applied to any drug of abuse test, particularly when unconfirmed results are used.      Blood Culture - Blood, Arm, Right [[id number]] Collected: 12/20/22 1812    Specimen: Blood from Arm, Right Updated: 12/20/22 1826    Troponin [[id number]]  (Abnormal) Collected: 12/20/22 1733    Specimen: Blood Updated: 12/20/22 1817     Troponin T 0.045 ng/mL     Narrative:      Troponin T Reference Range:  <= 0.03 ng/mL-   Negative for AMI  >0.03 ng/mL-     Abnormal for myocardial necrosis.  Clinicians would have to utilize clinical acumen, EKG, Troponin and serial changes to determine if it is an Acute Myocardial Infarction or myocardial injury due to an underlying chronic condition.       Results may be falsely decreased if patient taking Biotin.      BNP [[id number]]  (Abnormal) Collected: 12/20/22 1733    Specimen: Blood Updated: 12/20/22 1814     proBNP 5,254.0 pg/mL     Narrative:      Among patients with dyspnea, NT-proBNP is highly sensitive for the detection of acute congestive heart failure. In addition NT-proBNP of <300 pg/ml effectively rules out acute congestive heart failure with 99% negative predictive value.    Results may be falsely decreased if patient taking Biotin.      TSH [[id number]]  (Normal) Collected: 12/20/22 1733    Specimen: Blood Updated: 12/20/22 1814     TSH 1.110 uIU/mL     T4, Free [[id number]]  (Normal) Collected:  12/20/22 1733    Specimen: Blood Updated: 12/20/22 1814     Free T4 1.34 ng/dL     Narrative:      Results may be falsely increased if patient taking Biotin.      Urinalysis, Microscopic Only - Urine, Clean Catch [[id number]]  (Abnormal) Collected: 12/20/22 1800    Specimen: Urine, Clean Catch Updated: 12/20/22 1812     RBC, UA 3-5 /HPF      WBC, UA 0-2 /HPF      Comment: Urine culture not indicated.        Bacteria, UA None Seen /HPF      Squamous Epithelial Cells, UA 0-2 /HPF      Hyaline Casts, UA 21-30 /LPF      Methodology Automated Microscopy    Urinalysis With Culture If Indicated - Urine, Clean Catch [[id number]]  (Abnormal) Collected: 12/20/22 1800    Specimen: Urine, Clean Catch Updated: 12/20/22 1812     Color, UA Dark Yellow     Appearance, UA Clear     pH, UA 5.5     Specific Gravity, UA 1.016     Glucose, UA Negative     Ketones, UA Trace     Bilirubin, UA Negative     Blood, UA Negative     Protein, UA Trace     Leuk Esterase, UA Trace     Nitrite, UA Negative     Urobilinogen, UA 2.0 E.U./dL    Narrative:      In absence of clinical symptoms, the presence of pyuria, bacteria, and/or nitrites on the urinalysis result does not correlate with infection.    Comprehensive Metabolic Panel [[id number]]  (Abnormal) Collected: 12/20/22 1733    Specimen: Blood Updated: 12/20/22 1808     Glucose 145 mg/dL      BUN 40 mg/dL      Creatinine 1.72 mg/dL      Sodium 138 mmol/L      Potassium 2.9 mmol/L      Chloride 97 mmol/L      CO2 25.0 mmol/L      Calcium 9.3 mg/dL      Total Protein 7.7 g/dL      Albumin 4.10 g/dL      ALT (SGPT) 11 U/L      AST (SGOT) 15 U/L      Alkaline Phosphatase 63 U/L      Total Bilirubin 2.2 mg/dL      Globulin 3.6 gm/dL      A/G Ratio 1.1 g/dL      BUN/Creatinine Ratio 23.3     Anion Gap 16.0 mmol/L      eGFR 47.5 mL/min/1.73      Comment: National Kidney Foundation and American Society of Nephrology (ASN) Task Force recommended calculation based on the Chronic Kidney Disease Epidemiology  Collaboration (CKD-EPI) equation refit without adjustment for race.       Narrative:      GFR Normal >60  Chronic Kidney Disease <60  Kidney Failure <15      CK [[id number]]  (Normal) Collected: 12/20/22 1733    Specimen: Blood Updated: 12/20/22 1808     Creatine Kinase 87 U/L     Acetaminophen Level [[id number]]  (Normal) Collected: 12/20/22 1733    Specimen: Blood Updated: 12/20/22 1808     Acetaminophen <5.0 mcg/mL     Ethanol [[id number]] Collected: 12/20/22 1733    Specimen: Blood Updated: 12/20/22 1808     Ethanol <10 mg/dL      Ethanol % <0.010 %     Salicylate Level [[id number]]  (Normal) Collected: 12/20/22 1733    Specimen: Blood Updated: 12/20/22 1808     Salicylate <0.3 mg/dL     Magnesium [[id number]]  (Normal) Collected: 12/20/22 1733    Specimen: Blood Updated: 12/20/22 1808     Magnesium 1.9 mg/dL     COVID-19 and FLU A/B PCR - Swab, Nasopharynx [[id number]]  (Normal) Collected: 12/20/22 1733    Specimen: Swab from Nasopharynx Updated: 12/20/22 1802     COVID19 Not Detected     Influenza A PCR Not Detected     Influenza B PCR Not Detected    Narrative:      Fact sheet for providers: https://www.fda.gov/media/368041/download    Fact sheet for patients: https://www.fda.gov/media/028938/download    Test performed by PCR.    Lactic Acid, Plasma [[id number]]  (Normal) Collected: 12/20/22 1733    Specimen: Blood Updated: 12/20/22 1759     Lactate 2.0 mmol/L     CBC & Differential [[id number]]  (Abnormal) Collected: 12/20/22 1733    Specimen: Blood Updated: 12/20/22 1746    Narrative:      The following orders were created for panel order CBC & Differential.  Procedure                               Abnormality         Status                     ---------                               -----------         ------                     CBC Auto Differential[[id number]]        Abnormal            Final result               Scan Slide[[id number]]                                                                    Please view  results for these tests on the individual orders.    CBC Auto Differential [[id number]]  (Abnormal) Collected: 12/20/22 1733    Specimen: Blood Updated: 12/20/22 1746     WBC 3.73 10*3/mm3      RBC 5.03 10*6/mm3      Hemoglobin 14.9 g/dL      Hematocrit 45.0 %      MCV 89.5 fL      MCH 29.6 pg      MCHC 33.1 g/dL      RDW 14.6 %      RDW-SD 46.5 fl      MPV 13.3 fL      Platelets 117 10*3/mm3      Neutrophil % 45.6 %      Lymphocyte % 32.4 %      Monocyte % 18.0 %      Eosinophil % 2.4 %      Basophil % 1.3 %      Immature Grans % 0.3 %      Neutrophils, Absolute 1.70 10*3/mm3      Lymphocytes, Absolute 1.21 10*3/mm3      Monocytes, Absolute 0.67 10*3/mm3      Eosinophils, Absolute 0.09 10*3/mm3      Basophils, Absolute 0.05 10*3/mm3      Immature Grans, Absolute 0.01 10*3/mm3      nRBC 0.0 /100 WBC     Blood Culture - Blood, Arm, Left [[id number]] Collected: 12/20/22 1733    Specimen: Blood from Arm, Left Updated: 12/20/22 1738        Imaging Results (Last 24 Hours)     Procedure Component Value Units Date/Time    XR Chest 1 View [[id number]] Collected: 12/20/22 1722     Updated: 12/20/22 1744    Narrative:        PORTABLE CHEST    HISTORY: Shortness of air    Portable AP upright film of the chest was obtained at 5:22 PM.  COMPARISON: December 7, 2022    FINDINGS:   Linear atelectasis medial right lung base.  Left-sided pacemaker remains in place with lead projected over  the right ventricle.  Stable cardiomegaly.  The pulmonary vasculature is not increased.  No pleural effusion.  No pneumothorax.  No acute osseous abnormality.  Dextroscoliosis thoracic spine.  Old left rib fractures.      Impression:      CONCLUSION:  Linear atelectasis medial right lung base.  Left-sided pacemaker remains in place with lead projected over  the right ventricle.  Stable cardiomegaly.    [id number]    Electronically signed by:  Mukul Klein MD  12/20/2022 5:42 PM  CST Workstation: [id number]            Assessment:    Active Hospital Problems     Diagnosis    • **FRANCK (acute kidney injury) ([facility])              Plan:  1.  Shortness of breath: Patient with chronic systolic congestive heart failure and symptoms of orthopnea with a clear chest x-ray.  We will treat symptomatically at this point.  2.  Volume depletion: Patient with elevated BUN and creatinine over baseline while taking more diuretics than prescribed (not intentionally).  Will gently hydrate for now.  3.  Chronic systolic congestive heart failure: Minimally hypovolemic currently.  Continue home medications.  Continue anticoagulation for risk of thrombus.  4.  History of hyperglycemia: No diagnosis of diabetes.  Will place on sliding scale insulin for now.  5.  Obstructive sleep apnea: Patient states its been over 2 years since he has had a CPAP.  He is scheduled to go  his CPAP on 12/29.  6.  DVT prophylaxis: Eliquis.    I confirmed that the patient's Advance Care Plan is present, code status is documented, or surrogate decision maker is listed in the patient's medical record.     I have utilized all available immediate resources to obtain, update, or review the patient's current medications.     I discussed the patient's findings and my recommendations with: Patient and mother        This document has been electronically signed by Will French MD on December 20, 2022 19:57 CST                    Electronically signed by Will French MD at 12/20/22 2004          Emergency Department Notes      Kaci Franco RN at 12/20/22 1715        Patient refusing rectal temp    Electronically signed by Kaci Franco RN at 12/20/22 1716     Ketan French DO at 12/20/22 1719      Procedure Orders    1. ECG 12 Lead [[id number]] ordered by Ketan French DO               Subjective     Shortness of Breath  Severity:  Moderate  Onset quality:  Gradual  Duration:  1 week  Timing:  Constant  Progression:  Worsening  Chronicity:  Recurrent  Context comment:  History of congestive  heart failure.  Recent discharge from the hospital.  History of issues with activities of daily living at baseline.  Relieved by:  Nothing  Exacerbated by: Lying down flat.  Ineffective treatments: Home medications.  Associated symptoms: no abdominal pain, no chest pain, no cough, no diaphoresis, no fever, no hemoptysis, no rash, no sputum production, no syncope and no wheezing    Risk factors comment:  Recent AICD placement.      Review of Systems   Constitutional: Negative for diaphoresis and fever.   Respiratory: Positive for shortness of breath. Negative for cough, hemoptysis, sputum production and wheezing.    Cardiovascular: Negative for chest pain and syncope.   Gastrointestinal: Negative for abdominal pain.   Skin: Negative for rash.   All other systems reviewed and are negative.      Past Medical History:   Diagnosis Date   • Asthma    • Chronic systolic heart failure (HCC)    • Diabetes mellitus (HCC)    • Hyperlipidemia    • Hypertension    • Obesity    • Peripheral vascular disease (HCC)    • Sleep apnea        No Known Allergies    Past Surgical History:   Procedure Laterality Date   • CARDIAC CATHETERIZATION N/A 12/08/2021   • CARDIAC DEFIBRILLATOR PLACEMENT     • VARICOSE VEIN SURGERY Right 04/05/2019       Family History   Problem Relation Age of Onset   • Diabetes Mother    • Hypertension Father        Social History     Socioeconomic History   • Marital status:    Tobacco Use   • Smoking status: Former   • Smokeless tobacco: Never   Vaping Use   • Vaping Use: Never used   Substance and Sexual Activity   • Alcohol use: No   • Drug use: No   • Sexual activity: Not Currently           Objective   Physical Exam  Vitals and nursing note reviewed.   Constitutional:       General: He is not in acute distress.     Appearance: He is obese.   HENT:      Head: Normocephalic.      Mouth/Throat:      Mouth: Mucous membranes are moist.      Comments: Poor dentition.  Neck:      Vascular: No JVD.    Cardiovascular:      Rate and Rhythm: Normal rate and regular rhythm.   Pulmonary:      Effort: Pulmonary effort is normal.      Breath sounds: Examination of the right-lower field reveals decreased breath sounds. Examination of the left-lower field reveals decreased breath sounds. Decreased breath sounds present.   Chest:      Chest wall: No tenderness.   Abdominal:      Palpations: Abdomen is soft.      Tenderness: There is no abdominal tenderness.   Musculoskeletal:      Right lower leg: No tenderness. Edema present.      Left lower leg: No tenderness. Edema present.      Comments: Trace edema   Skin:     General: Skin is warm and dry.      Findings: No rash.   Neurological:      Mental Status: He is alert and oriented to person, place, and time.   Psychiatric:      Comments: Seems depressed         ECG 12 Lead      Date/Time: 12/20/2022 5:07 PM  Performed by: Ketan French DO  Authorized by: Ketan French DO   Interpreted by physician  Comments: EKG December 20, 2022 at 1707 reveals sinus rhythm with frequent PVCs and sinus arrhythmia at a rate of 87 bpm.  Left axis deviation and left ventricular hypertrophy.  No obvious ST elevation or depression.  QTc 498.                ED Course  ED Course as of 12/20/22 1951 Tue Dec 20, 2022   1818 Troponin T(!!): 0.045  Troponin is chronically elevated to a similar extent. [JV]      ED Course User Index  [JV] Rory Portillo DO      Labs Reviewed   COMPREHENSIVE METABOLIC PANEL - Abnormal; Notable for the following components:       Result Value    Glucose 145 (*)     BUN 40 (*)     Creatinine 1.72 (*)     Potassium 2.9 (*)     Chloride 97 (*)     Total Bilirubin 2.2 (*)     Anion Gap 16.0 (*)     eGFR 47.5 (*)     All other components within normal limits    Narrative:     GFR Normal >60  Chronic Kidney Disease <60  Kidney Failure <15     URINALYSIS W/ CULTURE IF INDICATED - Abnormal; Notable for the following components:    Ketones, UA Trace (*)      Protein, UA Trace (*)     Leuk Esterase, UA Trace (*)     Urobilinogen, UA 2.0 E.U./dL (*)     All other components within normal limits    Narrative:     In absence of clinical symptoms, the presence of pyuria, bacteria, and/or nitrites on the urinalysis result does not correlate with infection.   BNP (IN-HOUSE) - Abnormal; Notable for the following components:    proBNP 5,254.0 (*)     All other components within normal limits    Narrative:     Among patients with dyspnea, NT-proBNP is highly sensitive for the detection of acute congestive heart failure. In addition NT-proBNP of <300 pg/ml effectively rules out acute congestive heart failure with 99% negative predictive value.    Results may be falsely decreased if patient taking Biotin.     TROPONIN (IN-HOUSE) - Abnormal; Notable for the following components:    Troponin T 0.045 (*)     All other components within normal limits    Narrative:     Troponin T Reference Range:  <= 0.03 ng/mL-   Negative for AMI  >0.03 ng/mL-     Abnormal for myocardial necrosis.  Clinicians would have to utilize clinical acumen, EKG, Troponin and serial changes to determine if it is an Acute Myocardial Infarction or myocardial injury due to an underlying chronic condition.       Results may be falsely decreased if patient taking Biotin.     CBC WITH AUTO DIFFERENTIAL - Abnormal; Notable for the following components:    MPV 13.3 (*)     Platelets 117 (*)     Monocyte % 18.0 (*)     All other components within normal limits   URINALYSIS, MICROSCOPIC ONLY - Abnormal; Notable for the following components:    RBC, UA 3-5 (*)     All other components within normal limits   TROPONIN (IN-HOUSE) - Abnormal; Notable for the following components:    Troponin T 0.042 (*)     All other components within normal limits    Narrative:     Troponin T Reference Range:  <= 0.03 ng/mL-   Negative for AMI  >0.03 ng/mL-     Abnormal for myocardial necrosis.  Clinicians would have to utilize clinical  acumen, EKG, Troponin and serial changes to determine if it is an Acute Myocardial Infarction or myocardial injury due to an underlying chronic condition.       Results may be falsely decreased if patient taking Biotin.     COVID-19 AND FLU A/B, NP SWAB IN TRANSPORT MEDIA 8-12 HR TAT - Normal    Narrative:     Fact sheet for providers: https://www.fda.gov/media/767096/download    Fact sheet for patients: https://www.fda.gov/media/109045/download    Test performed by PCR.   LACTIC ACID, PLASMA - Normal   ACETAMINOPHEN LEVEL - Normal   URINE DRUG SCREEN - Normal    Narrative:     Cutoff For Drugs Screened:    Amphetamines               500 ng/ml  Barbiturates               200 ng/ml  Benzodiazepines            150 ng/ml  Cocaine                    150 ng/ml  Methadone                  200 ng/ml  Opiates                    100 ng/ml  Phencyclidine               25 ng/ml  THC                            50 ng/ml  Methamphetamine            500 ng/ml  Tricyclic Antidepressants  300 ng/ml  Oxycodone                  100 ng/ml  Propoxyphene               300 ng/ml  Buprenorphine               10 ng/ml    The normal value for all drugs tested is negative. This report includes unconfirmed screening results, with the cutoff values listed, to be used for medical treatment purposes only.  Unconfirmed results must not be used for non-medical purposes such as employment or legal testing.  Clinical consideration should be applied to any drug of abuse test, particularly when unconfirmed results are used.     SALICYLATE LEVEL - Normal   TSH - Normal   T4, FREE - Normal    Narrative:     Results may be falsely increased if patient taking Biotin.     MAGNESIUM - Normal   CK - Normal   BLOOD CULTURE   BLOOD CULTURE   ETHANOL   BLOOD GAS, ARTERIAL   RAINBOW DRAW    Narrative:     The following orders were created for panel order [place] Draw.  Procedure                               Abnormality         Status                      ---------                               -----------         ------                     Gold Top - SST[[id number]]                                                              Light Blue Top[[id number]]                                                                Please view results for these tests on the individual orders.   CBC AND DIFFERENTIAL    Narrative:     The following orders were created for panel order CBC & Differential.  Procedure                               Abnormality         Status                     ---------                               -----------         ------                     CBC Auto Differential[[id number]]        Abnormal            Final result               Scan Slide[[id number]]                                                                    Please view results for these tests on the individual orders.   GOLD TOP - SST   LIGHT BLUE TOP     XR Chest 2 View    Result Date: 11/30/2022  Narrative: EXAM: XR CHEST 2 VIEWS HISTORY: CHF/COPD Protocol COMPARISON: September 23 FINDINGS: Heart: Enlarged Mediastinum: Left pacemaker Lungs: Rounded opacity projects over the mid and lower lung on the lateral view. No significant effusion. Bones: Mild degenerative changes in the thoracolumbar spine Abdomen: Visualized upper abdomen is unremarkable     Impression: Rounded opacity projects over the mid and lower lung on the lateral view, which may be related to summation artifact or pneumonia. Consider short interval radiographic follow-up or chest CT Electronically signed by:  Raimundo Salas DO  11/30/2022 5:26 AM CST Workstation: [id number]    XR Chest 1 View    Result Date: 12/20/2022  Narrative: PORTABLE CHEST HISTORY: Shortness of air Portable AP upright film of the chest was obtained at 5:22 PM. COMPARISON: December 7, 2022 FINDINGS: Linear atelectasis medial right lung base. Left-sided pacemaker remains in place with lead projected over the right ventricle. Stable cardiomegaly. The  pulmonary vasculature is not increased. No pleural effusion. No pneumothorax. No acute osseous abnormality. Dextroscoliosis thoracic spine. Old left rib fractures.     Impression: CONCLUSION: Linear atelectasis medial right lung base. Left-sided pacemaker remains in place with lead projected over the right ventricle. Stable cardiomegaly. [id number] Electronically signed by:  Mukul Klein MD  12/20/2022 5:42 PM CST Workstation: [id number]    XR Chest 1 View    Result Date: 12/4/2022  Narrative: PROCEDURE: XR CHEST 1 VIEW, 12/4/2022 8:37 PM CST CLINICAL INDICATION:    sob. COMPARISON: 11/30/22 TECHNIQUE:  AP portable radiograph of chest FINDINGS: Large cardiac silhouette. Retrocardiac opacification. Suspect at least small left pleural effusion. No pneumothorax.     Impression: Retrocardiac opacification is suspicious for infiltrate. Large cardiac silhouette again seen. Suspect at least small left pleural effusion. Electronically signed by:  Deacon Ac MD  12/4/2022 9:02 PM CST Workstation: [id number]    XR Chest 1 View    Result Date: 12/4/2022  Narrative: EXAM: XR CHEST 1 VIEW HISTORY: SHORTNESS OF BREATH; Sore throat, chest congestion, Pt states he feels like something is stuck in distal esophagus. Cough and intermittent soa. COMPARISON: 9/2/2022 TECHNIQUE: Portable view of the chest. FINDINGS: Stable left pacemaker. Stable cardiomegaly. Stable left lung base atelectasis, versus infiltrate with probable left pleural effusion.. The right lung remains well aerated. There is no significant right pleural effusion. The mediastinal contours are within normal limits. . No evidence of mediastinal shift or pneumothorax. Unremarkable visualized osseous structures.    Impression: Stable left pacemaker. Stable cardiomegaly. Stable left lung base atelectasis, versus infiltrate with probable left pleural effusion.. Workstation: [id number]    XR Chest PA & Lateral    Result Date: 12/7/2022  Narrative: PROCEDURE: Chest PA and lateral  REASON FOR EXAM: Follow-up possible pneumonia/CHF. FINDINGS: Comparison study dated December 4, 2022.  Stable pacemaker and lead. Cardiomegaly. Pulmonary vasculature appears within normal limits. Lungs are clear.. Pleural spaces are normal . No acute osseous abnormality.     Impression: 1.  Cardiomegaly without decompensation. 2.  Otherwise unremarkable chest. Electronically signed by:  Pj Cummins MD  12/7/2022 7:28 AM CST Workstation: [id number]      MDM  Number of Diagnoses or Management Options     Amount and/or Complexity of Data Reviewed  Clinical lab tests: reviewed  Tests in the radiology section of CPT®: reviewed  Tests in the medicine section of CPT®: reviewed  Decide to obtain previous medical records or to obtain history from someone other than the patient: yes  Review and summarize past medical records: yes (The patient was seen for obstructive sleep apnea in the clinic about a week ago and had a documented hypotensive blood pressure during that visit.)    Patient Progress  Patient progress: stable    Patient was signed out to me by Dr. Portillo at the end of his shift.  Patient was awaiting some fluids and results of some labs.  Patient was found to have acute kidney injury.  Upon my evaluation he is feeling more short of breath after a 500 cc bolus.  He has been taking his Bumex as he was previously directed and not as he was directed at discharge.  BNP is higher than at discharge.  Troponin is chronically elevated but slightly better than discharge.    Final diagnoses:   FRANCK (acute kidney injury) (HCC)   Shortness of breath   Hypokalemia   Elevated brain natriuretic peptide (BNP) level       ED Disposition  ED Disposition     ED Disposition   Decision to Admit    Condition   --    Comment   Level of Care: Stepdown [25]   Diagnosis: FRANCK (acute kidney injury) (HCC) [711096]   Admitting Physician: JAGDEEP SILVER [[id number]]               No follow-up provider specified.       Medication List      No  changes were made to your prescriptions during this visit.          Ketan French DO  12/20/22 1954      Electronically signed by Ketan French DO at 12/20/22 1954     Kaci Franco, RN at 12/20/22 1741        Bear hugger placed. Warm fluids started. Patient continues to refuse rectal temp despise education     Electronically signed by Kaci Franco, RN at 12/20/22 1741     Kaci Franco, RN at 12/20/22 1941          Nursing report ED to floor  Matti Bravo  52 y.o.  male    HPI:   Chief Complaint   Patient presents with   • Shortness of Breath       Admitting doctor:   Will French MD    Consulting provider(s):  Consults       Date and Time Order Name Status Description    12/20/2022  7:31 PM Hospitalist (on-call MD unless specified)      12/5/2022  9:39 AM Inpatient Cardiology Consult Completed              Admitting diagnosis:   There were no encounter diagnoses.    Code status:   Current Code Status       Date Active Code Status Order ID Comments User Context       Prior            Allergies:   Patient has no known allergies.    Intake and Output    Intake/Output Summary (Last 24 hours) at 12/20/2022 1941  Last data filed at 12/20/2022 1857  Gross per 24 hour   Intake 500 ml   Output --   Net 500 ml       Weight:       12/20/22  1700   Weight: 127 kg (280 lb)       Most recent vitals:   Vitals:    12/20/22 1731 12/20/22 1808 12/20/22 1902 12/20/22 1933   BP: (!) 80/52 98/63  (!) 82/50   Pulse:  90     Resp:  22     Temp:   97.4 °F (36.3 °C)    TempSrc:   Oral    SpO2:  98%     Weight:       Height:         Oxygen Therapy: RA    Active LDAs/IV Access:   Lines, Drains & Airways       Active LDAs       Name Placement date Placement time Site Days    Peripheral IV 12/20/22 1753 Right Antecubital 12/20/22 1753  Antecubital  less than 1    Peripheral IV 12/20/22 1809 Anterior;Right Forearm 12/20/22 1809  Forearm  less than 1                    Labs (abnormal labs have a star):    Labs Reviewed   COMPREHENSIVE METABOLIC PANEL - Abnormal; Notable for the following components:       Result Value    Glucose 145 (*)     BUN 40 (*)     Creatinine 1.72 (*)     Potassium 2.9 (*)     Chloride 97 (*)     Total Bilirubin 2.2 (*)     Anion Gap 16.0 (*)     eGFR 47.5 (*)     All other components within normal limits    Narrative:     GFR Normal >60  Chronic Kidney Disease <60  Kidney Failure <15     URINALYSIS W/ CULTURE IF INDICATED - Abnormal; Notable for the following components:    Ketones, UA Trace (*)     Protein, UA Trace (*)     Leuk Esterase, UA Trace (*)     Urobilinogen, UA 2.0 E.U./dL (*)     All other components within normal limits    Narrative:     In absence of clinical symptoms, the presence of pyuria, bacteria, and/or nitrites on the urinalysis result does not correlate with infection.   BNP (IN-HOUSE) - Abnormal; Notable for the following components:    proBNP 5,254.0 (*)     All other components within normal limits    Narrative:     Among patients with dyspnea, NT-proBNP is highly sensitive for the detection of acute congestive heart failure. In addition NT-proBNP of <300 pg/ml effectively rules out acute congestive heart failure with 99% negative predictive value.    Results may be falsely decreased if patient taking Biotin.     TROPONIN (IN-HOUSE) - Abnormal; Notable for the following components:    Troponin T 0.045 (*)     All other components within normal limits    Narrative:     Troponin T Reference Range:  <= 0.03 ng/mL-   Negative for AMI  >0.03 ng/mL-     Abnormal for myocardial necrosis.  Clinicians would have to utilize clinical acumen, EKG, Troponin and serial changes to determine if it is an Acute Myocardial Infarction or myocardial injury due to an underlying chronic condition.       Results may be falsely decreased if patient taking Biotin.     CBC WITH AUTO DIFFERENTIAL - Abnormal; Notable for the following components:    MPV 13.3 (*)     Platelets 117 (*)      Monocyte % 18.0 (*)     All other components within normal limits   URINALYSIS, MICROSCOPIC ONLY - Abnormal; Notable for the following components:    RBC, UA 3-5 (*)     All other components within normal limits   TROPONIN (IN-HOUSE) - Abnormal; Notable for the following components:    Troponin T 0.042 (*)     All other components within normal limits    Narrative:     Troponin T Reference Range:  <= 0.03 ng/mL-   Negative for AMI  >0.03 ng/mL-     Abnormal for myocardial necrosis.  Clinicians would have to utilize clinical acumen, EKG, Troponin and serial changes to determine if it is an Acute Myocardial Infarction or myocardial injury due to an underlying chronic condition.       Results may be falsely decreased if patient taking Biotin.     COVID-19 AND FLU A/B, NP SWAB IN TRANSPORT MEDIA 8-12 HR TAT - Normal    Narrative:     Fact sheet for providers: https://www.fda.gov/media/734002/download    Fact sheet for patients: https://www.fda.gov/media/954932/download    Test performed by PCR.   LACTIC ACID, PLASMA - Normal   ACETAMINOPHEN LEVEL - Normal   URINE DRUG SCREEN - Normal    Narrative:     Cutoff For Drugs Screened:    Amphetamines               500 ng/ml  Barbiturates               200 ng/ml  Benzodiazepines            150 ng/ml  Cocaine                    150 ng/ml  Methadone                  200 ng/ml  Opiates                    100 ng/ml  Phencyclidine               25 ng/ml  THC                            50 ng/ml  Methamphetamine            500 ng/ml  Tricyclic Antidepressants  300 ng/ml  Oxycodone                  100 ng/ml  Propoxyphene               300 ng/ml  Buprenorphine               10 ng/ml    The normal value for all drugs tested is negative. This report includes unconfirmed screening results, with the cutoff values listed, to be used for medical treatment purposes only.  Unconfirmed results must not be used for non-medical purposes such as employment or legal testing.  Clinical consideration  should be applied to any drug of abuse test, particularly when unconfirmed results are used.     SALICYLATE LEVEL - Normal   TSH - Normal   T4, FREE - Normal    Narrative:     Results may be falsely increased if patient taking Biotin.     MAGNESIUM - Normal   CK - Normal   BLOOD CULTURE   BLOOD CULTURE   ETHANOL   BLOOD GAS, ARTERIAL   RAINBOW DRAW    Narrative:     The following orders were created for panel order [place] Draw.  Procedure                               Abnormality         Status                     ---------                               -----------         ------                     Gold Top - SST[[id number]]                                                              Light Blue Top[[id number]]                                                                Please view results for these tests on the individual orders.   CBC AND DIFFERENTIAL    Narrative:     The following orders were created for panel order CBC & Differential.  Procedure                               Abnormality         Status                     ---------                               -----------         ------                     CBC Auto Differential[[id number]]        Abnormal            Final result               Scan Slide[[id number]]                                                                    Please view results for these tests on the individual orders.   GOLD TOP - SST   LIGHT BLUE TOP       Meds given in ED:   Medications   potassium chloride 10 mEq in 100 mL IVPB (10 mEq Intravenous New Bag 12/20/22 1940)   morphine injection 4 mg (0 mg Intravenous Hold 12/20/22 1933)   sodium chloride 0.9 % bolus 1,000 mL (1,000 mL Intravenous New Bag 12/20/22 1940)   cefTRIAXone (ROCEPHIN) 2 g/100 mL 0.9% NS IVPB (MBP) (0 g Intravenous Stopped 12/20/22 1857)   lactated ringers bolus 500 mL (0 mL Intravenous Stopped 12/20/22 1857)   potassium chloride (MICRO-K) CR capsule 40 mEq (40 mEq Oral Given 12/20/22 1928)   ondansetron (ZOFRAN)  injection 4 mg (4 mg Intravenous Given 12/20/22 1933)           NIH Stroke Scale:       Isolation/Infection(s):  No active isolations   COVID (rule out)     COVID Testing  Collected yes  Resulted neg    Nursing report ED to floor:  Mental status: A&O x4  Ambulatory status: assist x1   Precautions: none    ED nurse phone extentsion- [phone]    Electronically signed by Kaci Franco RN at 12/20/22 1942       Lab Results (last 48 hours)     Procedure Component Value Units Date/Time    STAT Lactic Acid, Reflex [[id number]]  (Abnormal) Collected: 12/22/22 0806    Specimen: Blood Updated: 12/22/22 0857     Lactate 3.4 mmol/L     STAT Lactic Acid, Reflex [[id number]]  (Abnormal) Collected: 12/22/22 0501    Specimen: Blood Updated: 12/22/22 0537     Lactate 3.2 mmol/L     STAT Lactic Acid, Reflex [[id number]]  (Abnormal) Collected: 12/22/22 0201    Specimen: Blood Updated: 12/22/22 0304     Lactate 3.0 mmol/L     Basic Metabolic Panel [[id number]]  (Abnormal) Collected: 12/22/22 0201    Specimen: Blood Updated: 12/22/22 0236     Glucose 116 mg/dL      BUN 48 mg/dL      Creatinine 2.13 mg/dL      Sodium 133 mmol/L      Potassium 3.8 mmol/L      Comment: Slight hemolysis detected by analyzer. Results may be affected.        Chloride 93 mmol/L      CO2 25.0 mmol/L      Calcium 9.4 mg/dL      BUN/Creatinine Ratio 22.5     Anion Gap 15.0 mmol/L      eGFR 36.8 mL/min/1.73      Comment: National Kidney Foundation and American Society of Nephrology (ASN) Task Force recommended calculation based on the Chronic Kidney Disease Epidemiology Collaboration (CKD-EPI) equation refit without adjustment for race.       Narrative:      GFR Normal >60  Chronic Kidney Disease <60  Kidney Failure <15      CBC & Differential [[id number]]  (Abnormal) Collected: 12/22/22 0201    Specimen: Blood Updated: 12/22/22 0224    Narrative:      The following orders were created for panel order CBC & Differential.  Procedure                                Abnormality         Status                     ---------                               -----------         ------                     CBC Auto Differential[[id number]]        Abnormal            Final result               Scan Slide[[id number]]                                                                    Please view results for these tests on the individual orders.    CBC Auto Differential [[id number]]  (Abnormal) Collected: 12/22/22 0201    Specimen: Blood Updated: 12/22/22 0224     WBC 5.44 10*3/mm3      RBC 4.95 10*6/mm3      Hemoglobin 14.4 g/dL      Hematocrit 44.9 %      MCV 90.7 fL      MCH 29.1 pg      MCHC 32.1 g/dL      RDW 14.8 %      RDW-SD 49.0 fl      MPV 14.0 fL      Platelets 110 10*3/mm3      Neutrophil % 53.8 %      Lymphocyte % 28.7 %      Monocyte % 16.0 %      Eosinophil % 0.2 %      Basophil % 0.6 %      Immature Grans % 0.7 %      Neutrophils, Absolute 2.93 10*3/mm3      Lymphocytes, Absolute 1.56 10*3/mm3      Monocytes, Absolute 0.87 10*3/mm3      Eosinophils, Absolute 0.01 10*3/mm3      Basophils, Absolute 0.03 10*3/mm3      Immature Grans, Absolute 0.04 10*3/mm3      nRBC 0.0 /100 WBC     Lactic Acid, Plasma [[id number]]  (Abnormal) Collected: 12/21/22 2248    Specimen: Blood Updated: 12/21/22 2321     Lactate 3.0 mmol/L     Blood Culture - Blood, Arm, Left [[id number]] Collected: 12/21/22 2248    Specimen: Blood from Arm, Left Updated: 12/21/22 2303    Blood Culture - Blood, Arm, Right [[id number]]  (Normal) Collected: 12/20/22 1812    Specimen: Blood from Arm, Right Updated: 12/21/22 1831     Blood Culture No growth at 24 hours    Blood Culture - Blood, Arm, Left [[id number]]  (Normal) Collected: 12/20/22 1733    Specimen: Blood from Arm, Left Updated: 12/21/22 1745     Blood Culture No growth at 24 hours    Magnesium [[id number]]  (Normal) Collected: 12/21/22 1612    Specimen: Blood Updated: 12/21/22 1654     Magnesium 2.1 mg/dL     Potassium [[id number]]  (Normal) Collected: 12/21/22 9437     Specimen: Blood Updated: 12/21/22 1632     Potassium 3.6 mmol/L     [place] Draw [[id number]] Collected: 12/20/22 2009    Specimen: Blood Updated: 12/21/22 1403    Narrative:      The following orders were created for panel order [place] Draw.  Procedure                               Abnormality         Status                     ---------                               -----------         ------                     Gold Top - SST[[id number]]                                   Final result               Light Blue Top[[id number]]                                                                Please view results for these tests on the individual orders.    Sodium, Urine, Random - Urine, Clean Catch [[id number]] Collected: 12/20/22 1800    Specimen: Urine, Clean Catch Updated: 12/21/22 1238     Sodium, Urine <20 mmol/L     Narrative:      Reference intervals for random urine have not been established.  Clinical usage is dependent upon physician's interpretation in combination with other laboratory tests.       Basic Metabolic Panel [[id number]]  (Abnormal) Collected: 12/21/22 0547    Specimen: Blood Updated: 12/21/22 0645     Glucose 121 mg/dL      BUN 43 mg/dL      Creatinine 2.00 mg/dL      Sodium 138 mmol/L      Potassium 3.3 mmol/L      Chloride 95 mmol/L      CO2 26.0 mmol/L      Calcium 9.5 mg/dL      BUN/Creatinine Ratio 21.5     Anion Gap 17.0 mmol/L      eGFR 39.7 mL/min/1.73      Comment: National Kidney Foundation and American Society of Nephrology (ASN) Task Force recommended calculation based on the Chronic Kidney Disease Epidemiology Collaboration (CKD-EPI) equation refit without adjustment for race.       Narrative:      GFR Normal >60  Chronic Kidney Disease <60  Kidney Failure <15      CBC & Differential [[id number]]  (Abnormal) Collected: 12/21/22 0547    Specimen: Blood Updated: 12/21/22 0625    Narrative:      The following orders were created for panel order CBC & Differential.  Procedure                                Abnormality         Status                     ---------                               -----------         ------                     CBC Auto Differential[[id number]]        Abnormal            Final result               Scan Slide[[id number]]                                                                    Please view results for these tests on the individual orders.    CBC Auto Differential [[id number]]  (Abnormal) Collected: 12/21/22 0547    Specimen: Blood Updated: 12/21/22 0625     WBC 5.15 10*3/mm3      RBC 4.91 10*6/mm3      Hemoglobin 14.7 g/dL      Hematocrit 44.3 %      MCV 90.2 fL      MCH 29.9 pg      MCHC 33.2 g/dL      RDW 14.9 %      RDW-SD 48.5 fl      MPV 14.1 fL      Platelets 121 10*3/mm3      Neutrophil % 53.7 %      Lymphocyte % 26.2 %      Monocyte % 18.1 %      Eosinophil % 0.6 %      Basophil % 1.0 %      Immature Grans % 0.4 %      Neutrophils, Absolute 2.77 10*3/mm3      Lymphocytes, Absolute 1.35 10*3/mm3      Monocytes, Absolute 0.93 10*3/mm3      Eosinophils, Absolute 0.03 10*3/mm3      Basophils, Absolute 0.05 10*3/mm3      Immature Grans, Absolute 0.02 10*3/mm3      nRBC 0.0 /100 WBC     Gold Top - SST [[id number]] Collected: 12/20/22 2009    Specimen: Blood Updated: 12/20/22 2115     Extra Tube Hold for add-ons.     Comment: Auto resulted.       Troponin [[id number]]  (Abnormal) Collected: 12/20/22 1909    Specimen: Blood Updated: 12/20/22 1935     Troponin T 0.042 ng/mL     Narrative:      Troponin T Reference Range:  <= 0.03 ng/mL-   Negative for AMI  >0.03 ng/mL-     Abnormal for myocardial necrosis.  Clinicians would have to utilize clinical acumen, EKG, Troponin and serial changes to determine if it is an Acute Myocardial Infarction or myocardial injury due to an underlying chronic condition.       Results may be falsely decreased if patient taking Biotin.      Urine Drug Screen - Urine, Clean Catch [[id number]]  (Normal) Collected: 12/20/22 1800    Specimen: Urine,  Clean Catch Updated: 12/20/22 1830     THC, Screen, Urine Negative     Phencyclidine (PCP), Urine Negative     Cocaine Screen, Urine Negative     Methamphetamine, Ur Negative     Opiate Screen Negative     Amphetamine Screen, Urine Negative     Benzodiazepine Screen, Urine Negative     Tricyclic Antidepressants Screen Negative     Methadone Screen, Urine Negative     Barbiturates Screen, Urine Negative     Oxycodone Screen, Urine Negative     Propoxyphene Screen Negative     Buprenorphine, Screen, Urine Negative    Narrative:      Cutoff For Drugs Screened:    Amphetamines               500 ng/ml  Barbiturates               200 ng/ml  Benzodiazepines            150 ng/ml  Cocaine                    150 ng/ml  Methadone                  200 ng/ml  Opiates                    100 ng/ml  Phencyclidine               25 ng/ml  THC                            50 ng/ml  Methamphetamine            500 ng/ml  Tricyclic Antidepressants  300 ng/ml  Oxycodone                  100 ng/ml  Propoxyphene               300 ng/ml  Buprenorphine               10 ng/ml    The normal value for all drugs tested is negative. This report includes unconfirmed screening results, with the cutoff values listed, to be used for medical treatment purposes only.  Unconfirmed results must not be used for non-medical purposes such as employment or legal testing.  Clinical consideration should be applied to any drug of abuse test, particularly when unconfirmed results are used.      Troponin [[id number]]  (Abnormal) Collected: 12/20/22 1733    Specimen: Blood Updated: 12/20/22 1817     Troponin T 0.045 ng/mL     Narrative:      Troponin T Reference Range:  <= 0.03 ng/mL-   Negative for AMI  >0.03 ng/mL-     Abnormal for myocardial necrosis.  Clinicians would have to utilize clinical acumen, EKG, Troponin and serial changes to determine if it is an Acute Myocardial Infarction or myocardial injury due to an underlying chronic condition.       Results may  be falsely decreased if patient taking Biotin.      BNP [[id number]]  (Abnormal) Collected: 12/20/22 1733    Specimen: Blood Updated: 12/20/22 1814     proBNP 5,254.0 pg/mL     Narrative:      Among patients with dyspnea, NT-proBNP is highly sensitive for the detection of acute congestive heart failure. In addition NT-proBNP of <300 pg/ml effectively rules out acute congestive heart failure with 99% negative predictive value.    Results may be falsely decreased if patient taking Biotin.      TSH [[id number]]  (Normal) Collected: 12/20/22 1733    Specimen: Blood Updated: 12/20/22 1814     TSH 1.110 uIU/mL     T4, Free [[id number]]  (Normal) Collected: 12/20/22 1733    Specimen: Blood Updated: 12/20/22 1814     Free T4 1.34 ng/dL     Narrative:      Results may be falsely increased if patient taking Biotin.      Urinalysis, Microscopic Only - Urine, Clean Catch [[id number]]  (Abnormal) Collected: 12/20/22 1800    Specimen: Urine, Clean Catch Updated: 12/20/22 1812     RBC, UA 3-5 /HPF      WBC, UA 0-2 /HPF      Comment: Urine culture not indicated.        Bacteria, UA None Seen /HPF      Squamous Epithelial Cells, UA 0-2 /HPF      Hyaline Casts, UA 21-30 /LPF      Methodology Automated Microscopy    Urinalysis With Culture If Indicated - Urine, Clean Catch [[id number]]  (Abnormal) Collected: 12/20/22 1800    Specimen: Urine, Clean Catch Updated: 12/20/22 1812     Color, UA Dark Yellow     Appearance, UA Clear     pH, UA 5.5     Specific Gravity, UA 1.016     Glucose, UA Negative     Ketones, UA Trace     Bilirubin, UA Negative     Blood, UA Negative     Protein, UA Trace     Leuk Esterase, UA Trace     Nitrite, UA Negative     Urobilinogen, UA 2.0 E.U./dL    Narrative:      In absence of clinical symptoms, the presence of pyuria, bacteria, and/or nitrites on the urinalysis result does not correlate with infection.    Comprehensive Metabolic Panel [[id number]]  (Abnormal) Collected: 12/20/22 1733    Specimen: Blood Updated:  12/20/22 1808     Glucose 145 mg/dL      BUN 40 mg/dL      Creatinine 1.72 mg/dL      Sodium 138 mmol/L      Potassium 2.9 mmol/L      Chloride 97 mmol/L      CO2 25.0 mmol/L      Calcium 9.3 mg/dL      Total Protein 7.7 g/dL      Albumin 4.10 g/dL      ALT (SGPT) 11 U/L      AST (SGOT) 15 U/L      Alkaline Phosphatase 63 U/L      Total Bilirubin 2.2 mg/dL      Globulin 3.6 gm/dL      A/G Ratio 1.1 g/dL      BUN/Creatinine Ratio 23.3     Anion Gap 16.0 mmol/L      eGFR 47.5 mL/min/1.73      Comment: National Kidney Foundation and American Society of Nephrology (ASN) Task Force recommended calculation based on the Chronic Kidney Disease Epidemiology Collaboration (CKD-EPI) equation refit without adjustment for race.       Narrative:      GFR Normal >60  Chronic Kidney Disease <60  Kidney Failure <15      CK [[id number]]  (Normal) Collected: 12/20/22 1733    Specimen: Blood Updated: 12/20/22 1808     Creatine Kinase 87 U/L     Acetaminophen Level [[id number]]  (Normal) Collected: 12/20/22 1733    Specimen: Blood Updated: 12/20/22 1808     Acetaminophen <5.0 mcg/mL     Ethanol [[id number]] Collected: 12/20/22 1733    Specimen: Blood Updated: 12/20/22 1808     Ethanol <10 mg/dL      Ethanol % <0.010 %     Salicylate Level [[id number]]  (Normal) Collected: 12/20/22 1733    Specimen: Blood Updated: 12/20/22 1808     Salicylate <0.3 mg/dL     Magnesium [[id number]]  (Normal) Collected: 12/20/22 1733    Specimen: Blood Updated: 12/20/22 1808     Magnesium 1.9 mg/dL     COVID-19 and FLU A/B PCR - Swab, Nasopharynx [[id number]]  (Normal) Collected: 12/20/22 1733    Specimen: Swab from Nasopharynx Updated: 12/20/22 1802     COVID19 Not Detected     Influenza A PCR Not Detected     Influenza B PCR Not Detected    Narrative:      Fact sheet for providers: https://www.fda.gov/media/927809/download    Fact sheet for patients: https://www.fda.gov/media/610363/download    Test performed by PCR.    Lactic Acid, Plasma [[id number]]  (Normal)  Collected: 12/20/22 1733    Specimen: Blood Updated: 12/20/22 1759     Lactate 2.0 mmol/L     CBC & Differential [[id number]]  (Abnormal) Collected: 12/20/22 1733    Specimen: Blood Updated: 12/20/22 1746    Narrative:      The following orders were created for panel order CBC & Differential.  Procedure                               Abnormality         Status                     ---------                               -----------         ------                     CBC Auto Differential[[id number]]        Abnormal            Final result               Scan Slide[[id number]]                                                                    Please view results for these tests on the individual orders.    CBC Auto Differential [[id number]]  (Abnormal) Collected: 12/20/22 1733    Specimen: Blood Updated: 12/20/22 1746     WBC 3.73 10*3/mm3      RBC 5.03 10*6/mm3      Hemoglobin 14.9 g/dL      Hematocrit 45.0 %      MCV 89.5 fL      MCH 29.6 pg      MCHC 33.1 g/dL      RDW 14.6 %      RDW-SD 46.5 fl      MPV 13.3 fL      Platelets 117 10*3/mm3      Neutrophil % 45.6 %      Lymphocyte % 32.4 %      Monocyte % 18.0 %      Eosinophil % 2.4 %      Basophil % 1.3 %      Immature Grans % 0.3 %      Neutrophils, Absolute 1.70 10*3/mm3      Lymphocytes, Absolute 1.21 10*3/mm3      Monocytes, Absolute 0.67 10*3/mm3      Eosinophils, Absolute 0.09 10*3/mm3      Basophils, Absolute 0.05 10*3/mm3      Immature Grans, Absolute 0.01 10*3/mm3      nRBC 0.0 /100 WBC           Imaging Results (Last 48 Hours)     Procedure Component Value Units Date/Time    US Gallbladder [[id number]] Collected: 12/21/22 0906     Updated: 12/21/22 0954    Narrative:        COMPARISON:     12/20/2022    INDICATION:     Pain and vomiting. Abnormal CT    FINDINGS: Liver is enlarged measuring 19.5 cm craniocaudal. There  is no intrahepatic or extrahepatic biliary dilatation.  The  extrahepatic bile duct measures 0.4 cm which is within normal  limits.     The  gallbladder is abnormal in appearance. Wall thickening with  intramural edema. Layering sludge and stones.     The right kidney measures 12.0 x 5.1 x 6.3 cm in length.  There  is no hydronephrosis.     The visualized pancreas appears normal size configuration and  echotexture.     There is no ascites.       Impression:      1.  Abnormal appearance of the gallbladder, as above, suspicious  for acute cholecystitis. If clinically indeterminate recommend  nuclear medicine hepatobiliary scan.  2. Hepatomegaly.        RP core team activated 9:51 AM for results notification.    Electronically signed by:  Rc Wheatley MD  12/21/2022 9:52 AM [facility]  Workstation: [id number]    CT Angiogram Chest [[id number]] Collected: 12/20/22 2359     Updated: 12/21/22 0041    Narrative:      EXAM:  CT Angiography Chest With Intravenous Contrast    CLINICAL HISTORY:  Pulmonary embolism (PE) suspected, unknown  D-dimer, N17.9 Acute kidney failure, unspecified R06.02 Shortness  of breath E87.6 Hypokalemia R79.89 Other specified abnormal  findings of blood chemistry    TECHNIQUE:  Axial computed tomographic angiography images of the  chest with intravenous contrast.  Sagittal and coronal  reformatted images were created and reviewed.  This CT exam was  performed using one or more of the following dose reduction  techniques:  automated exposure control, adjustment of the mA  and/or kV according to patient size, and/or use of iterative  reconstruction technique.  MIP reconstructed images were created  and reviewed.    COMPARISON:  No relevant prior studies available.    FINDINGS:    Pulmonary arteries:  Nondilated pulmonary arteries.  Nondiagnostic assessment of the pulmonary arteries for filling  defect due to minimal enhancement of the pulmonary arteries. Most  of the intravenously injected contrast contrast within the right  upper extremity and superior vena cava at the time of image  acquisition.    Aorta: No aortic aneurysm.    Lungs:  No acute  pulmonary infiltrate.  No mass.    Pleural space:  No pleural effusion or pneumothorax.    Heart:  Dilation of the bilateral atria and the left ventricle.   No pericardial effusion. Left chest ICD, with lead in the right  ventricular apex.    Bones/joints:  No acute fracture.  No dislocation.    Soft tissues: Normal.    Lymph nodes:  No enlarged lymph nodes.      Impression:      1.  Nondiagnostic assessment of the pulmonary arteries. Minimal  enhancement of the pulmonary arteries.   2.  Dilation of the left heart chambers and of the right atrium.  3.  No acute pulmonary infiltrate.    Electronically signed by:  India Mayfield MD  12/21/2022 12:39  AM [facility] [[email or web address]] Workstation: [id number]    CT Abdomen Pelvis With Contrast [[id number]] Collected: 12/20/22 2359     Updated: 12/21/22 0032    Narrative:      PROCEDURE: CT ABDOMEN PELVIS W CONTRAST    CLINICAL HISTORY: 52 years  Male  Abdominal pain, acute,  nonlocalized, N17.9 Acute kidney failure, unspecified R06.02  Shortness of breath E87.6 Hypokalemia R79.89 Other specified  abnormal findings of blood chemistry    TECHNIQUE: Contiguous axial images obtained through the abdomen  and pelvis following intravenous contrast administration. Coronal  and sagittal reformatted images provided.    This CT exam was performed according to our departmental  dose-optimization program, which includes one or more of the  following dose reduction techniques: automated exposure control,  adjustment of the mA and/or kV according to patient size, and/or  use of iterative reconstruction technique.    COMPARISON: No prior exams provided for comparison.     FINDINGS:    Severe cardiomegaly with pacemaker wire in place. Mild bibasilar  pulmonary edema.    Hepatomegaly without focal lesion. Gallbladder distention without  definite biliary dilatation.    The pancreas, spleen, adrenal glands, kidneys, and urinary  bladder are normal.    There is no bowel inflammation, obstruction, free  intraperitoneal  air, or ascites. The appendix is normal.    Chronic degenerative changes present throughout the spine.    Atherosclerosis without abdominal aortic aneurysm or dissection      Impression:        Severe cardiomegaly. Mild bibasilar pulmonary edema.    Hepatomegaly. Nonspecific gallbladder distention. Consider right  upper quadrant ultrasound if there is pain.    Electronically signed by:  Charlee Yan MD  12/21/2022 12:29 AM  [facility] Workstation: [id number]    XR Chest 1 View [[id number]] Collected: 12/20/22 1722     Updated: 12/20/22 1744    Narrative:        PORTABLE CHEST    HISTORY: Shortness of air    Portable AP upright film of the chest was obtained at 5:22 PM.  COMPARISON: December 7, 2022    FINDINGS:   Linear atelectasis medial right lung base.  Left-sided pacemaker remains in place with lead projected over  the right ventricle.  Stable cardiomegaly.  The pulmonary vasculature is not increased.  No pleural effusion.  No pneumothorax.  No acute osseous abnormality.  Dextroscoliosis thoracic spine.  Old left rib fractures.      Impression:      CONCLUSION:  Linear atelectasis medial right lung base.  Left-sided pacemaker remains in place with lead projected over  the right ventricle.  Stable cardiomegaly.    [id number]    Electronically signed by:  Mukul Klein MD  12/20/2022 5:42 PM  [facility] Workstation: [id number]        ECG/EMG Results (last 48 hours)     Procedure Component Value Units Date/Time    ECG 12 Lead [[id number]] Resulted: 12/20/22 1954     Updated: 12/20/22 1954    ECG 12 Lead Dyspnea [[id number]] Collected: 12/20/22 1707     Updated: 12/20/22 2000     QT Interval 414 ms      QTC Interval 498 ms     Narrative:      Test Reason : SOA  Blood Pressure :   */*   mmHG  Vent. Rate :  87 BPM     Atrial Rate : 277 BPM     P-R Int :   * ms          QRS Dur : 148 ms      QT Int : 414 ms       P-R-T Axes :   * -45 105 degrees     QTc Int : 498 ms    Atrial fibrillation with a competing junctional pacemaker  with premature ventricular or aberrantly conducted complexes  Left axis deviation  Left ventricular hypertrophy with QRS widening  Abnormal ECG  When compared with ECG of 01-DEC-2022 03:08,  Atrial fibrillation has replaced Sinus rhythm    Referred By: ARLEN           Confirmed By:     SCANNED EKG [[id number]] Resulted: 12/20/22     Updated: 12/21/22 1306    SCANNED EKG [[id number]] Resulted: 12/20/22     Updated: 12/21/22 1312           Physician Progress Notes (last 48 hours)      Will French MD at 12/22/22 1107              [facility] Medicine Services  INPATIENT PROGRESS NOTE    Length of Stay: 0  Date of Admission: 12/20/2022  Primary Care Physician: Shonna Ng APRN    Subjective   Chief Complaint: Shortness of breath  HPI: Patient states he is slightly less short of breath today.  Overall maybe doing a slight bit better.    As of today, 12/22/22  Review of Systems   Constitutional: Positive for activity change and fatigue. Negative for appetite change, chills and fever.   Respiratory: Positive for shortness of breath. Negative for chest tightness.    Cardiovascular: Negative for chest pain, palpitations and leg swelling.   Gastrointestinal: Negative for abdominal pain, constipation, diarrhea, nausea and vomiting.   Skin: Negative for wound.   Neurological: Negative for dizziness, weakness, light-headedness, numbness and headaches.        All pertinent negatives and positives are as above. All other systems have been reviewed and are negative unless otherwise stated.    Objective    Temp:  [96.1 °F (35.6 °C)-98 °F (36.7 °C)] 97 °F (36.1 °C)  Heart Rate:  [] 107  Resp:  [12-23] 16  BP: ()/(54-88) 96/62         As of today, 12/22/22  Physical Exam  Vitals reviewed.   Constitutional:       Appearance: He is well-developed.   HENT:      Head: Normocephalic and atraumatic.   Eyes:      Pupils: Pupils are equal, round, and reactive to light.    Cardiovascular:      Rate and Rhythm: Normal rate. Rhythm irregularly irregular.      Heart sounds: Normal heart sounds. No murmur heard.    No friction rub. No gallop.   Pulmonary:      Effort: Pulmonary effort is normal. No respiratory distress.      Breath sounds: Normal breath sounds. No wheezing or rales.   Chest:      Chest wall: No tenderness.   Abdominal:      General: Bowel sounds are normal. There is no distension.      Palpations: Abdomen is soft.      Tenderness: There is no abdominal tenderness.   Musculoskeletal:      Cervical back: Normal range of motion and neck supple.   Psychiatric:         Behavior: Behavior normal.           Results Review:  I have reviewed the labs, radiology results, and diagnostic studies.    Laboratory Data:   Results from last 7 days   Lab Units 12/22/22  0201 12/21/22  1612 12/21/22  0547 12/20/22  1733   SODIUM mmol/L 133*  --  138 138   POTASSIUM mmol/L 3.8 3.6 3.3* 2.9*   CHLORIDE mmol/L 93*  --  95* 97*   CO2 mmol/L 25.0  --  26.0 25.0   BUN mg/dL 48*  --  43* 40*   CREATININE mg/dL 2.13*  --  2.00* 1.72*   GLUCOSE mg/dL 116*  --  121* 145*   CALCIUM mg/dL 9.4  --  9.5 9.3   BILIRUBIN mg/dL  --   --   --  2.2*   ALK PHOS U/L  --   --   --  63   ALT (SGPT) U/L  --   --   --  11   AST (SGOT) U/L  --   --   --  15   ANION GAP mmol/L 15.0  --  17.0* 16.0*     Estimated Creatinine Clearance: 59.8 mL/min (A) (by C-G formula based on SCr of 2.13 mg/dL (H)).  Results from last 7 days   Lab Units 12/21/22  1612 12/20/22  1733   MAGNESIUM mg/dL 2.1 1.9         Results from last 7 days   Lab Units 12/22/22  0201 12/21/22  0547 12/20/22  1733   WBC 10*3/mm3 5.44 5.15 3.73   HEMOGLOBIN g/dL 14.4 14.7 14.9   HEMATOCRIT % 44.9 44.3 45.0   PLATELETS 10*3/mm3 110* 121* 117*           Culture Data:   Blood Culture   Date Value Ref Range Status   12/20/2022 No growth at 24 hours  Preliminary   12/20/2022 No growth at 24 hours  Preliminary     No results found for: URINECX  No results  found for: RESPCX  No results found for: WOUNDCX  No results found for: STOOLCX  No components found for: BODYFLD    Radiology Data:   Imaging Results (Last 24 Hours)     ** No results found for the last 24 hours. **          I have reviewed the patient's current medications.     Assessment/Plan     Active Hospital Problems    Diagnosis    • **FRANCK (acute kidney injury) ([facility])        Plan:    1.  Shortness of breath: May be slightly better.  CT scan for pulmonary embolism was nondiagnostic.  May need VQ scan shortness of breath continues.  2.  Volume depletion: Continue to hydrate gently.  Appreciate nephrology help.  3.  Chronic systolic congestive heart failure: Minimally hypovolemic currently.  Continue home medications.  Continue anticoagulation for risk of thrombus.  4.  History of hyperglycemia: No diagnosis of diabetes.  Will place on sliding scale insulin for now.  5.  Obstructive sleep apnea: Patient states its been over 2 years since he has had a CPAP.  He is scheduled to go  his CPAP on 12/29.  6.  Acute kidney injury:  Worsening today.  Superimposed upon chronic kidney disease.  Appreciate nephrology help.  7.  Possible cholecystitis:  Medical management for now.  Will need surgical consultation when more stable.  We will leave on Eliquis at this point due to high risk of intracardiac thrombus due to low ejection fraction.   8.  DVT prophylaxis: Eliquis.        The patient was evaluated during the global COVID-19 pandemic, and the diagnosis was suspected/considered upon their initial presentation.  Evaluation, treatment, and testing were consistent with current guidelines for patients who present with complaints or symptoms that may be related to COVID-19.    I confirmed that the patient's Advance Care Plan is present, code status is documented, or surrogate decision maker is listed in the patient's medical record.       Discharge Planning: I expect patient to be discharged to home in 3-4  "days.        This document has been electronically signed by Will French MD on 2022 11:07 CST        Electronically signed by Will French MD at 22 1116     Trevor lAonso MD at 22 0907            [facility]  [address] [place] [address]. [address]  T - [phone]  F - [phone]     Progress Note          PATIENT  DEMOGRAPHICS   PATIENT NAME: Matti Bravo                      PHYSICIAN: Beverley Arana MD  : 1970  MRN: [id number]   LOS: 0 days    Patient Care Team:  Shonna Ng APRN as PCP - General (Family Medicine)  Subjective    SUBJECTIVE   Patient has no acute complaints today.          Objective    OBJECTIVE   Vital Signs  Temp:  [96.1 °F (35.6 °C)-98 °F (36.7 °C)] 96.7 °F (35.9 °C)  Heart Rate:  [] 103  Resp:  [12-25] 18  BP: ()/(54-96) 116/88    Flowsheet Rows    Flowsheet Row First Filed Value   Admission Height 193 cm (76\") Documented at 2022 1700   Admission Weight 127 kg (280 lb) Documented at 2022 1700         I/O last 3 completed shifts:  In: 2384.7 [P.O.:480; I.V.:1404.7; IV Piggyback:500]  Out: 1150 [Urine:1150]    PHYSICAL EXAM    Physical Exam  Vitals reviewed.   HENT:      Head: Normocephalic and atraumatic.      Mouth/Throat:      Pharynx: Oropharynx is clear.   Eyes:      Pupils: Pupils are equal, round, and reactive to light.   Cardiovascular:      Rate and Rhythm: Normal rate and regular rhythm.      Pulses: Normal pulses.      Heart sounds: Normal heart sounds.   Pulmonary:      Effort: Pulmonary effort is normal.      Breath sounds: Normal breath sounds.   Abdominal:      General: Abdomen is flat. Bowel sounds are normal.      Palpations: Abdomen is soft.   Musculoskeletal:         General: Normal range of motion.      Cervical back: Normal range of motion and neck supple.   Skin:     General: Skin is warm and dry.      Capillary Refill: Capillary refill takes less than 2 seconds. "   Neurological:      General: No focal deficit present.      Mental Status: He is alert and oriented to person, place, and time. Mental status is at baseline.   Psychiatric:         Mood and Affect: Mood normal.         Behavior: Behavior normal.         RESULTS   Results Review:    Results from last 7 days   Lab Units 12/22/22  0201 12/21/22  1612 12/21/22  0547 12/20/22  1733   SODIUM mmol/L 133*  --  138 138   POTASSIUM mmol/L 3.8 3.6 3.3* 2.9*   CHLORIDE mmol/L 93*  --  95* 97*   CO2 mmol/L 25.0  --  26.0 25.0   BUN mg/dL 48*  --  43* 40*   CREATININE mg/dL 2.13*  --  2.00* 1.72*   CALCIUM mg/dL 9.4  --  9.5 9.3   BILIRUBIN mg/dL  --   --   --  2.2*   ALK PHOS U/L  --   --   --  63   ALT (SGPT) U/L  --   --   --  11   AST (SGOT) U/L  --   --   --  15   GLUCOSE mg/dL 116*  --  121* 145*       Estimated Creatinine Clearance: 59.8 mL/min (A) (by C-G formula based on SCr of 2.13 mg/dL (H)).    Results from last 7 days   Lab Units 12/21/22  1612 12/20/22  1733   MAGNESIUM mg/dL 2.1 1.9             Results from last 7 days   Lab Units 12/22/22  0201 12/21/22  0547 12/20/22  1733   WBC 10*3/mm3 5.44 5.15 3.73   HEMOGLOBIN g/dL 14.4 14.7 14.9   PLATELETS 10*3/mm3 110* 121* 117*               Imaging Results (Last 24 Hours)     Procedure Component Value Units Date/Time     Gallbladder [[id number]] Collected: 12/21/22 0906     Updated: 12/21/22 0954    Narrative:        COMPARISON:     12/20/2022    INDICATION:     Pain and vomiting. Abnormal CT    FINDINGS: Liver is enlarged measuring 19.5 cm craniocaudal. There  is no intrahepatic or extrahepatic biliary dilatation.  The  extrahepatic bile duct measures 0.4 cm which is within normal  limits.     The gallbladder is abnormal in appearance. Wall thickening with  intramural edema. Layering sludge and stones.     The right kidney measures 12.0 x 5.1 x 6.3 cm in length.  There  is no hydronephrosis.     The visualized pancreas appears normal size configuration  and  echotexture.     There is no ascites.       Impression:      1.  Abnormal appearance of the gallbladder, as above, suspicious  for acute cholecystitis. If clinically indeterminate recommend  nuclear medicine hepatobiliary scan.  2. Hepatomegaly.        RP core team activated 9:51 AM for results notification.    Electronically signed by:  Rc Wheatley MD  12/21/2022 9:52 AM CST  Workstation: [id number]         MEDICATIONS    apixaban, 5 mg, Oral, BID  atorvastatin, 10 mg, Oral, Daily  carvedilol, 12.5 mg, Oral, BID With Meals  losartan, 12.5 mg, Oral, Q24H  Morphine, 4 mg, Intravenous, Once  pantoprazole, 40 mg, Intravenous, Q AM  piperacillin-tazobactam, 4.5 g, Intravenous, Q8H  sodium chloride, 10 mL, Intravenous, Q12H      DOBUTamine, 2-20 mcg/kg/min  lactated ringers, 50 mL/hr, Last Rate: 50 mL/hr (12/21/22 9265)  norepinephrine, 0.02-0.3 mcg/kg/min, Last Rate: Stopped (12/21/22 0462)        Assessment & Plan   ASSESSMENT / PLAN      FRANCK (acute kidney injury) (HCC)    1.  FRANCK on CKD- Baseline creatinine 1.3-1.5, up to 2.13 now.  Etiology of FRANCK likely secondary to overdiuresis.  Continue with LR at 50cc/hr.  Unfortunately he did receive contrast during this admission we will need to monitor his renal function closely. Continue holding bumex and metolazone     2.  Shortness of breath- underwent CT with contrast which was nondiagnostic, no marked fluid overload on exam. No pulmonary edema on xray . soa etiology is unclear at present     3.  Chronic systolic CHF /aicd before/ non ischemic cardiomyopathy     4.  Prediabetes     5.  Hypertension- now on the low side, was on Levophed     6.  KHANH- supposed to be using CPAP at home     7. Acute cholecystitis - denies marked abdominal pain and also has some nausea and vomiting x 1 yesterday. Denies acid reflux. Continue protonix. Patient started on Zoysn. Will observe renal function on zosyn             This document has been electronically signed by Beverley Arana MD on  December 22, 2022 09:07 CST      I have reviewed the case with the resident. I have also examined and seen  the patient.  I agree with the assessment and plan as documented in the resident's note.         This document has been electronically signed by Trevor Alonso MD on December 22, 2022 10:07 CST                 Electronically signed by Trevor Alonso MD at 12/22/22 1007     Will French MD at 12/21/22 1209              [facility]  INPATIENT PROGRESS NOTE    Length of Stay: 0  Date of Admission: 12/20/2022  Primary Care Physician: Shonna Ng APRN    Subjective   Chief Complaint: Shortness of breath  HPI: Patient states he is slightly less short of breath today.  Overall maybe doing a slight bit better.    As of today, 12/21/22  Review of Systems   Constitutional: Positive for activity change and fatigue. Negative for appetite change, chills and fever.   Respiratory: Positive for shortness of breath. Negative for chest tightness.    Cardiovascular: Negative for chest pain, palpitations and leg swelling.   Gastrointestinal: Negative for abdominal pain, constipation, diarrhea, nausea and vomiting.   Skin: Negative for wound.   Neurological: Negative for dizziness, weakness, light-headedness, numbness and headaches.        All pertinent negatives and positives are as above. All other systems have been reviewed and are negative unless otherwise stated.    Objective    Temp:  [94.3 °F (34.6 °C)-97.9 °F (36.6 °C)] 97.9 °F (36.6 °C)  Heart Rate:  [] 93  Resp:  [12-25] 13  BP: ()/(40-96) 119/81         As of today, 12/21/22  Physical Exam  Vitals reviewed.   Constitutional:       Appearance: He is well-developed.   HENT:      Head: Normocephalic and atraumatic.   Eyes:      Pupils: Pupils are equal, round, and reactive to light.   Cardiovascular:      Rate and Rhythm: Normal rate. Rhythm irregularly irregular.      Heart sounds:  Normal heart sounds. No murmur heard.    No friction rub. No gallop.   Pulmonary:      Effort: Pulmonary effort is normal. No respiratory distress.      Breath sounds: Normal breath sounds. No wheezing or rales.   Chest:      Chest wall: No tenderness.   Abdominal:      General: Bowel sounds are normal. There is no distension.      Palpations: Abdomen is soft.      Tenderness: There is no abdominal tenderness.   Musculoskeletal:      Cervical back: Normal range of motion and neck supple.   Psychiatric:         Behavior: Behavior normal.           Results Review:  I have reviewed the labs, radiology results, and diagnostic studies.    Laboratory Data:   Results from last 7 days   Lab Units 12/21/22  0547 12/20/22  1733   SODIUM mmol/L 138 138   POTASSIUM mmol/L 3.3* 2.9*   CHLORIDE mmol/L 95* 97*   CO2 mmol/L 26.0 25.0   BUN mg/dL 43* 40*   CREATININE mg/dL 2.00* 1.72*   GLUCOSE mg/dL 121* 145*   CALCIUM mg/dL 9.5 9.3   BILIRUBIN mg/dL  --  2.2*   ALK PHOS U/L  --  63   ALT (SGPT) U/L  --  11   AST (SGOT) U/L  --  15   ANION GAP mmol/L 17.0* 16.0*     Estimated Creatinine Clearance: 62.4 mL/min (A) (by C-G formula based on SCr of 2 mg/dL (H)).  Results from last 7 days   Lab Units 12/20/22  1733   MAGNESIUM mg/dL 1.9         Results from last 7 days   Lab Units 12/21/22  0547 12/20/22  1733   WBC 10*3/mm3 5.15 3.73   HEMOGLOBIN g/dL 14.7 14.9   HEMATOCRIT % 44.3 45.0   PLATELETS 10*3/mm3 121* 117*           Culture Data:   No results found for: BLOODCX  No results found for: URINECX  No results found for: RESPCX  No results found for: WOUNDCX  No results found for: STOOLCX  No components found for: BODYFLD    Radiology Data:   Imaging Results (Last 24 Hours)     Procedure Component Value Units Date/Time    US Gallbladder [[id number]] Collected: 12/21/22 0906     Updated: 12/21/22 0954    Narrative:        COMPARISON:     12/20/2022    INDICATION:     Pain and vomiting. Abnormal CT    FINDINGS: Liver is enlarged  measuring 19.5 cm craniocaudal. There  is no intrahepatic or extrahepatic biliary dilatation.  The  extrahepatic bile duct measures 0.4 cm which is within normal  limits.     The gallbladder is abnormal in appearance. Wall thickening with  intramural edema. Layering sludge and stones.     The right kidney measures 12.0 x 5.1 x 6.3 cm in length.  There  is no hydronephrosis.     The visualized pancreas appears normal size configuration and  echotexture.     There is no ascites.       Impression:      1.  Abnormal appearance of the gallbladder, as above, suspicious  for acute cholecystitis. If clinically indeterminate recommend  nuclear medicine hepatobiliary scan.  2. Hepatomegaly.        RP core team activated 9:51 AM for results notification.    Electronically signed by:  Rc Wheatley MD  12/21/2022 9:52 AM [employer]  Workstation: [id number]    CT Angiogram Chest [[id number]] Collected: 12/20/22 2359     Updated: 12/21/22 0041    Narrative:      EXAM:  CT Angiography Chest With Intravenous Contrast    CLINICAL HISTORY:  Pulmonary embolism (PE) suspected, unknown  D-dimer, N17.9 Acute kidney failure, unspecified R06.02 Shortness  of breath E87.6 Hypokalemia R79.89 Other specified abnormal  findings of blood chemistry    TECHNIQUE:  Axial computed tomographic angiography images of the  chest with intravenous contrast.  Sagittal and coronal  reformatted images were created and reviewed.  This CT exam was  performed using one or more of the following dose reduction  techniques:  automated exposure control, adjustment of the mA  and/or kV according to patient size, and/or use of iterative  reconstruction technique.  MIP reconstructed images were created  and reviewed.    COMPARISON:  No relevant prior studies available.    FINDINGS:    Pulmonary arteries:  Nondilated pulmonary arteries.  Nondiagnostic assessment of the pulmonary arteries for filling  defect due to minimal enhancement of the pulmonary arteries. Most  of the  intravenously injected contrast contrast within the right  upper extremity and superior vena cava at the time of image  acquisition.    Aorta: No aortic aneurysm.    Lungs:  No acute pulmonary infiltrate.  No mass.    Pleural space:  No pleural effusion or pneumothorax.    Heart:  Dilation of the bilateral atria and the left ventricle.   No pericardial effusion. Left chest ICD, with lead in the right  ventricular apex.    Bones/joints:  No acute fracture.  No dislocation.    Soft tissues: Normal.    Lymph nodes:  No enlarged lymph nodes.      Impression:      1.  Nondiagnostic assessment of the pulmonary arteries. Minimal  enhancement of the pulmonary arteries.   2.  Dilation of the left heart chambers and of the right atrium.  3.  No acute pulmonary infiltrate.    Electronically signed by:  India Mayfield MD  12/21/2022 12:39  AM CST Workstation: [id number]    CT Abdomen Pelvis With Contrast [[id number]] Collected: 12/20/22 2359     Updated: 12/21/22 0032    Narrative:      PROCEDURE: CT ABDOMEN PELVIS W CONTRAST    CLINICAL HISTORY: 52 years  Male  Abdominal pain, acute,  nonlocalized, N17.9 Acute kidney failure, unspecified R06.02  Shortness of breath E87.6 Hypokalemia R79.89 Other specified  abnormal findings of blood chemistry    TECHNIQUE: Contiguous axial images obtained through the abdomen  and pelvis following intravenous contrast administration. Coronal  and sagittal reformatted images provided.    This CT exam was performed according to our departmental  dose-optimization program, which includes one or more of the  following dose reduction techniques: automated exposure control,  adjustment of the mA and/or kV according to patient size, and/or  use of iterative reconstruction technique.    COMPARISON: No prior exams provided for comparison.     FINDINGS:    Severe cardiomegaly with pacemaker wire in place. Mild bibasilar  pulmonary edema.    Hepatomegaly without focal lesion. Gallbladder distention  without  definite biliary dilatation.    The pancreas, spleen, adrenal glands, kidneys, and urinary  bladder are normal.    There is no bowel inflammation, obstruction, free intraperitoneal  air, or ascites. The appendix is normal.    Chronic degenerative changes present throughout the spine.    Atherosclerosis without abdominal aortic aneurysm or dissection      Impression:        Severe cardiomegaly. Mild bibasilar pulmonary edema.    Hepatomegaly. Nonspecific gallbladder distention. Consider right  upper quadrant ultrasound if there is pain.    Electronically signed by:  Charlee Yan MD  12/21/2022 12:29 AM  CST Workstation: [id number]    XR Chest 1 View [[id number]] Collected: 12/20/22 1722     Updated: 12/20/22 1744    Narrative:        PORTABLE CHEST    HISTORY: Shortness of air    Portable AP upright film of the chest was obtained at 5:22 PM.  COMPARISON: December 7, 2022    FINDINGS:   Linear atelectasis medial right lung base.  Left-sided pacemaker remains in place with lead projected over  the right ventricle.  Stable cardiomegaly.  The pulmonary vasculature is not increased.  No pleural effusion.  No pneumothorax.  No acute osseous abnormality.  Dextroscoliosis thoracic spine.  Old left rib fractures.      Impression:      CONCLUSION:  Linear atelectasis medial right lung base.  Left-sided pacemaker remains in place with lead projected over  the right ventricle.  Stable cardiomegaly.    [id number]    Electronically signed by:  Mukul Klein MD  12/20/2022 5:42 PM  CST Workstation: [id number]          I have reviewed the patient's current medications.     Assessment/Plan     Active Hospital Problems    Diagnosis    • **FRANCK (acute kidney injury) ([facility])        Plan:    1.  Shortness of breath: May be slightly better.  CT scan for pulmonary embolism was nondiagnostic.  May need VQ scan shortness of breath continues.  2.  Volume depletion: Hydrating gently.  We will continue for now.  Nephrology following.  3.  Chronic  systolic congestive heart failure: Minimally hypovolemic currently.  Continue home medications.  Continue anticoagulation for risk of thrombus.  4.  History of hyperglycemia: No diagnosis of diabetes.  Will place on sliding scale insulin for now.  5.  Obstructive sleep apnea: Patient states its been over 2 years since he has had a CPAP.  He is scheduled to go  his CPAP on .  6.  Acute kidney injury: Superimposed upon chronic kidney disease.  Nephrology consulted.   7.  DVT prophylaxis: Eliquis.        The patient was evaluated during the global COVID-19 pandemic, and the diagnosis was suspected/considered upon their initial presentation.  Evaluation, treatment, and testing were consistent with current guidelines for patients who present with complaints or symptoms that may be related to COVID-19.    I confirmed that the patient's Advance Care Plan is present, code status is documented, or surrogate decision maker is listed in the patient's medical record.       Discharge Planning: I expect patient to be discharged to home in 3-4 days.        This document has been electronically signed by Will French MD on 2022 12:09 CST        Electronically signed by Will French MD at 22 1212       Medical Student Notes (last 48 hours)  Notes from 22 1303 through 22 1303   No notes of this type exist for this encounter.          Consult Notes (last 48 hours)      Trevor Alonso MD at 22 1241      Consult Orders    1. Inpatient Nephrology Consult [[id number]] ordered by Will French MD at 22 1041               [facility]  [address] [place] [address]. [address]  T - [phone]  F - [phone]     Consultation         PATIENT  DEMOGRAPHICS   PATIENT NAME: Matti Hernandez Bravo                      PHYSICIAN: SHELL Hodges  : 1970  MRN: [id number]    Subjective    SUBJECTIVE   Referring Provider: Dr. French  Reason for  "Consultation: FRANCK  History of present illness: This is a 52-year-old male with a past medical history significant for chronic systolic heart failure, diabetes, hyperlipidemia, hypertension, sleep apnea, CKD who presented to the hospital on the 20th with complaints of shortness of breath.  Surprisingly he did not have any hypervolemia on this admission. There is no leg edema. He was taking bumex 2mg bid at home though he was prescribed 1 mg bid  He was admitted to the hospital for further treatment and nephrology was consulted to help manage his FRANCK.    Past Medical History:   Diagnosis Date   • Asthma    • Chronic systolic heart failure (HCC)    • Diabetes mellitus (HCC)    • Hyperlipidemia    • Hypertension    • Obesity    • Peripheral vascular disease (HCC)    • Sleep apnea      Past Surgical History:   Procedure Laterality Date   • CARDIAC CATHETERIZATION N/A 12/08/2021   • CARDIAC DEFIBRILLATOR PLACEMENT     • VARICOSE VEIN SURGERY Right 04/05/2019     Family History   Problem Relation Age of Onset   • Diabetes Mother    • Hypertension Father      Social History     Tobacco Use   • Smoking status: Former   • Smokeless tobacco: Never   Vaping Use   • Vaping Use: Never used   Substance Use Topics   • Alcohol use: No   • Drug use: No     Allergies:  Patient has no known allergies.     REVIEW OF SYSTEMS    Review of Systems   All other systems reviewed and are negative.      Objective    OBJECTIVE   Vital Signs  Temp:  [94.3 °F (34.6 °C)-97.9 °F (36.6 °C)] 97.9 °F (36.6 °C)  Heart Rate:  [] 114  Resp:  [12-25] 16  BP: ()/(40-96) 98/67    Flowsheet Rows    Flowsheet Row First Filed Value   Admission Height 193 cm (76\") Documented at 12/20/2022 1700   Admission Weight 127 kg (280 lb) Documented at 12/20/2022 1700           I/O last 3 completed shifts:  In: 709.7 [I.V.:209.7; IV Piggyback:500]  Out: 200 [Urine:200]    PHYSICAL EXAM    Physical Exam  Constitutional:       Appearance: He is well-developed. "   HENT:      Head: Normocephalic and atraumatic.   Eyes:      Conjunctiva/sclera: Conjunctivae normal.      Pupils: Pupils are equal, round, and reactive to light.   Cardiovascular:      Rate and Rhythm: Normal rate and regular rhythm.   Pulmonary:      Effort: Pulmonary effort is normal.      Breath sounds: Normal breath sounds.   Abdominal:      Palpations: Abdomen is soft.   Musculoskeletal:      Cervical back: Neck supple.      Right lower leg: No edema.      Left lower leg: No edema.   Skin:     General: Skin is warm and dry.   Neurological:      Mental Status: He is alert and oriented to person, place, and time.   Psychiatric:         Mood and Affect: Mood normal.         Behavior: Behavior normal.         RESULTS   Results Review:    Results from last 7 days   Lab Units 12/21/22  0547 12/20/22  1733   SODIUM mmol/L 138 138   POTASSIUM mmol/L 3.3* 2.9*   CHLORIDE mmol/L 95* 97*   CO2 mmol/L 26.0 25.0   BUN mg/dL 43* 40*   CREATININE mg/dL 2.00* 1.72*   CALCIUM mg/dL 9.5 9.3   BILIRUBIN mg/dL  --  2.2*   ALK PHOS U/L  --  63   ALT (SGPT) U/L  --  11   AST (SGOT) U/L  --  15   GLUCOSE mg/dL 121* 145*       Estimated Creatinine Clearance: 62.4 mL/min (A) (by C-G formula based on SCr of 2 mg/dL (H)).    Results from last 7 days   Lab Units 12/20/22  1733   MAGNESIUM mg/dL 1.9             Results from last 7 days   Lab Units 12/21/22  0547 12/20/22  1733   WBC 10*3/mm3 5.15 3.73   HEMOGLOBIN g/dL 14.7 14.9   PLATELETS 10*3/mm3 121* 117*              MEDICATIONS    apixaban, 5 mg, Oral, BID  atorvastatin, 10 mg, Oral, Daily  carvedilol, 12.5 mg, Oral, BID With Meals  [START ON 12/22/2022] losartan, 12.5 mg, Oral, Q24H  Morphine, 4 mg, Intravenous, Once  pantoprazole, 40 mg, Intravenous, Q AM  sodium chloride, 10 mL, Intravenous, Q12H      DOBUTamine, 2-20 mcg/kg/min  lactated ringers, 50 mL/hr, Last Rate: 50 mL/hr (12/21/22 0148)  norepinephrine, 0.02-0.3 mcg/kg/min, Last Rate: Stopped (12/21/22  0639)      Medications Prior to Admission   Medication Sig Dispense Refill Last Dose   • albuterol sulfate  (90 Base) MCG/ACT inhaler Inhale 2 puffs Every 4 (Four) Hours As Needed for Wheezing. 1 inhaler 3    • apixaban (ELIQUIS) 5 MG tablet tablet Take 1 tablet by mouth 2 (Two) Times a Day. 60 tablet 3    • bumetanide (BUMEX) 1 MG tablet Take 1 tablet by mouth 2 (Two) Times a Day. 60 tablet 2    • carvedilol (COREG) 12.5 MG tablet Take 1 tablet by mouth 2 (Two) Times a Day With Meals for 30 days. (Patient taking differently: Take 12.5 mg by mouth 2 (Two) Times a Day With Meals. Pt states he has some meds and is still taking this.) 60 tablet 3    • losartan (COZAAR) 25 MG tablet Take 0.5 tablets by mouth Daily for 30 days. 15 tablet 3    • lovastatin (ALTOPREV) 20 MG 24 hr tablet Take 20 mg by mouth.      • metOLazone (ZAROXOLYN) 2.5 MG tablet Take 1 tablet by mouth 3 (Three) Times a Week. 15 tablet 3    • potassium chloride 10 MEQ CR tablet Take 2 tablets by mouth Daily. 30 tablet 1    • vitamin D (ERGOCALCIFEROL) 1.25 MG (50679 UT) capsule capsule Take 50,000 Units by mouth 1 (One) Time Per Week.        Assessment & Plan   ASSESSMENT / PLAN      FRANCK (acute kidney injury) (HCC)    1.  FRANCK on CKD- Baseline creatinine 1.3-1.5, up to 2.0 now.  Etiology of FRANCK likely secondary to overdiuresis.  Agree with careful fluid resuscitation and I agree with LR at 50cc/hr.  Unfortunately he did receive contrast during this admission we will need to monitor his renal function closely. bumex on hold and I will also hold metolazone    2.  Shortness of breath- underwent CT with contrast which was nondiagnostic, no marked fluid overload on exam. No pulmonary edema on xray . soa etiology is unclear at present    3.  Chronic systolic CHF /aicd before/ non ischemic cardiomyopathy    4.  Prediabetes    5.  Hypertension- now on the low side, was on Levophed    6.  KHANH- supposed to be using CPAP at home    7. Possible acute  cholecystitis - denies marked abdominal pain and also has some nausea and vomiting x 1 yesterday. Denies acid reflux. Will add protonix.     Thank you for the consult, we will continue to follow the patient.         I discussed the patients findings and my recommendations with patient      This document has been electronically signed by SHELL Hodges on December 21, 2022 12:41 CST      For this patient encounter, I have reviewed the Nurse Practitioner's documentation, medical decision making, and treatment plan and personally spent time with the patient.           Electronically signed by Trevor Alonso MD at 12/21/22 5048

## 2022-12-22 NOTE — PROGRESS NOTES
[facility] Medicine Services  INPATIENT PROGRESS NOTE    Length of Stay: 0  Date of Admission: 12/20/2022  Primary Care Physician: Shonna Ng APRN    Subjective   Chief Complaint: Shortness of breath  HPI: Patient states he is slightly less short of breath today.  Overall maybe doing a slight bit better.    As of today, 12/22/22  Review of Systems   Constitutional: Positive for activity change and fatigue. Negative for appetite change, chills and fever.   Respiratory: Positive for shortness of breath. Negative for chest tightness.    Cardiovascular: Negative for chest pain, palpitations and leg swelling.   Gastrointestinal: Negative for abdominal pain, constipation, diarrhea, nausea and vomiting.   Skin: Negative for wound.   Neurological: Negative for dizziness, weakness, light-headedness, numbness and headaches.        All pertinent negatives and positives are as above. All other systems have been reviewed and are negative unless otherwise stated.    Objective    Temp:  [96.1 °F (35.6 °C)-98 °F (36.7 °C)] 97 °F (36.1 °C)  Heart Rate:  [] 107  Resp:  [12-23] 16  BP: ()/(54-88) 96/62         As of today, 12/22/22  Physical Exam  Vitals reviewed.   Constitutional:       Appearance: He is well-developed.   HENT:      Head: Normocephalic and atraumatic.   Eyes:      Pupils: Pupils are equal, round, and reactive to light.   Cardiovascular:      Rate and Rhythm: Normal rate. Rhythm irregularly irregular.      Heart sounds: Normal heart sounds. No murmur heard.    No friction rub. No gallop.   Pulmonary:      Effort: Pulmonary effort is normal. No respiratory distress.      Breath sounds: Normal breath sounds. No wheezing or rales.   Chest:      Chest wall: No tenderness.   Abdominal:      General: Bowel sounds are normal. There is no distension.      Palpations: Abdomen is soft.      Tenderness: There is no abdominal tenderness.   Musculoskeletal:       Cervical back: Normal range of motion and neck supple.   Psychiatric:         Behavior: Behavior normal.           Results Review:  I have reviewed the labs, radiology results, and diagnostic studies.    Laboratory Data:   Results from last 7 days   Lab Units 12/22/22  0201 12/21/22  1612 12/21/22  0547 12/20/22  1733   SODIUM mmol/L 133*  --  138 138   POTASSIUM mmol/L 3.8 3.6 3.3* 2.9*   CHLORIDE mmol/L 93*  --  95* 97*   CO2 mmol/L 25.0  --  26.0 25.0   BUN mg/dL 48*  --  43* 40*   CREATININE mg/dL 2.13*  --  2.00* 1.72*   GLUCOSE mg/dL 116*  --  121* 145*   CALCIUM mg/dL 9.4  --  9.5 9.3   BILIRUBIN mg/dL  --   --   --  2.2*   ALK PHOS U/L  --   --   --  63   ALT (SGPT) U/L  --   --   --  11   AST (SGOT) U/L  --   --   --  15   ANION GAP mmol/L 15.0  --  17.0* 16.0*     Estimated Creatinine Clearance: 59.8 mL/min (A) (by C-G formula based on SCr of 2.13 mg/dL (H)).  Results from last 7 days   Lab Units 12/21/22  1612 12/20/22  1733   MAGNESIUM mg/dL 2.1 1.9         Results from last 7 days   Lab Units 12/22/22  0201 12/21/22  0547 12/20/22  1733   WBC 10*3/mm3 5.44 5.15 3.73   HEMOGLOBIN g/dL 14.4 14.7 14.9   HEMATOCRIT % 44.9 44.3 45.0   PLATELETS 10*3/mm3 110* 121* 117*           Culture Data:   Blood Culture   Date Value Ref Range Status   12/20/2022 No growth at 24 hours  Preliminary   12/20/2022 No growth at 24 hours  Preliminary     No results found for: URINECX  No results found for: RESPCX  No results found for: WOUNDCX  No results found for: STOOLCX  No components found for: BODYFLD    Radiology Data:   Imaging Results (Last 24 Hours)     ** No results found for the last 24 hours. **          I have reviewed the patient's current medications.     Assessment/Plan     Active Hospital Problems    Diagnosis    • **FRANCK (acute kidney injury) ([facility])        Plan:    1.  Shortness of breath: May be slightly better.  CT scan for pulmonary embolism was nondiagnostic.  May need VQ scan shortness of breath  continues.  2.  Volume depletion: Continue to hydrate gently.  Appreciate nephrology help.  3.  Chronic systolic congestive heart failure: Minimally hypovolemic currently.  Continue home medications.  Continue anticoagulation for risk of thrombus.  4.  History of hyperglycemia: No diagnosis of diabetes.  Will place on sliding scale insulin for now.  5.  Obstructive sleep apnea: Patient states its been over 2 years since he has had a CPAP.  He is scheduled to go  his CPAP on 12/29.  6.  Acute kidney injury:  Worsening today.  Superimposed upon chronic kidney disease.  Appreciate nephrology help.  7.  Possible cholecystitis:  Medical management for now.  Will need surgical consultation when more stable.  We will leave on Eliquis at this point due to high risk of intracardiac thrombus due to low ejection fraction.   8.  DVT prophylaxis: Eliquis.        The patient was evaluated during the global COVID-19 pandemic, and the diagnosis was suspected/considered upon their initial presentation.  Evaluation, treatment, and testing were consistent with current guidelines for patients who present with complaints or symptoms that may be related to COVID-19.    I confirmed that the patient's Advance Care Plan is present, code status is documented, or surrogate decision maker is listed in the patient's medical record.       Discharge Planning: I expect patient to be discharged to home in 3-4 days.        This document has been electronically signed by Will French MD on December 22, 2022 11:07 CST

## 2022-12-22 NOTE — PLAN OF CARE
Problem: Adult Inpatient Plan of Care  Goal: Plan of Care Review  Outcome: Ongoing, Progressing  Flowsheets (Taken 12/22/2022 0331)  Progress: improving  Plan of Care Reviewed With: patient  Outcome Evaluation: Pt has not required levophed or dobutamine during shift.  No c/o of nausea or vomiting.  No c/o pain.   Goal Outcome Evaluation:  Plan of Care Reviewed With: patient        Progress: improving  Outcome Evaluation: Pt has not required levophed or dobutamine during shift.  No c/o of nausea or vomiting.  No c/o pain.

## 2022-12-22 NOTE — PROGRESS NOTES
Nutrition Services    Patient Name:  Matti Bravo  YOB: 1970  MRN: [id number]  Admit Date:  12/20/2022  RD received another consult noting that pt has lost ~80# since Oct 2021.  RD reviewed Epic weights and wt 11/2021 was 307# and he was being managed for CHF and was on Lasix.  Current wt is 282#.  He was seen by Nephrology for worsening renal fx.  Nephrology notes FRANCK/CKD most likely due to Over diuresis.  Hx of Chronic CHF with low Ejection fraction.  He was nauseated this am.  He has also been dxed with Acute Cholecystitis--started on Zyvox.  Will monitor POC and po intake.       Electronically signed by:  Maria Guadalupe Hernández RD  12/22/22 14:41 CST

## 2022-12-22 NOTE — PLAN OF CARE
Problem: Adult Inpatient Plan of Care  Goal: Plan of Care Review  Outcome: Ongoing, Not Progressing  Flowsheets (Taken 12/22/2022 1520)  Progress: no change  Plan of Care Reviewed With: patient  Outcome Evaluation: vss no change   Goal Outcome Evaluation:  Plan of Care Reviewed With: patient        Progress: no change  Outcome Evaluation: vss no change

## 2022-12-22 NOTE — THERAPY RE-EVALUATION
I placed an order for Zosyn to cover for the possibility that the patient has acute cholecystitis.  When I assessed the patient last night the patient was tender particular in the right upper quadrant and the ultrasound today indicates a likely acute cholecystitis.  I understand the patient's bilirubin is elevated chronically.  I also do not see an obvious elevated white cell count or other hepatobiliary enzymes.  However, I think it is prudent to start antimicrobials in his case.    Electronically signed by Sudeep Wolf MD, 12/21/22, 10:28 PM CST.

## 2022-12-22 NOTE — PROGRESS NOTES
"  [facility]  [address] [place] [address]. [address]  T - [phone]  F - [phone]     Progress Note          PATIENT  DEMOGRAPHICS   PATIENT NAME: Matti Bravo                      PHYSICIAN: Beverley Arana MD  : 1970  MRN: [id number]   LOS: 0 days    Patient Care Team:  Shonna Ng APRN as PCP - General (Family Medicine)  Subjective   SUBJECTIVE   Patient has no acute complaints today.          Objective   OBJECTIVE   Vital Signs  Temp:  [96.1 °F (35.6 °C)-98 °F (36.7 °C)] 96.7 °F (35.9 °C)  Heart Rate:  [] 103  Resp:  [12-25] 18  BP: ()/(54-96) 116/88    Flowsheet Rows    Flowsheet Row First Filed Value   Admission Height 193 cm (76\") Documented at 2022 1700   Admission Weight 127 kg (280 lb) Documented at 2022 1700           I/O last 3 completed shifts:  In: 2384.7 [P.O.:480; I.V.:1404.7; IV Piggyback:500]  Out: 1150 [Urine:1150]    PHYSICAL EXAM    Physical Exam  Vitals reviewed.   HENT:      Head: Normocephalic and atraumatic.      Mouth/Throat:      Pharynx: Oropharynx is clear.   Eyes:      Pupils: Pupils are equal, round, and reactive to light.   Cardiovascular:      Rate and Rhythm: Normal rate and regular rhythm.      Pulses: Normal pulses.      Heart sounds: Normal heart sounds.   Pulmonary:      Effort: Pulmonary effort is normal.      Breath sounds: Normal breath sounds.   Abdominal:      General: Abdomen is flat. Bowel sounds are normal.      Palpations: Abdomen is soft.   Musculoskeletal:         General: Normal range of motion.      Cervical back: Normal range of motion and neck supple.   Skin:     General: Skin is warm and dry.      Capillary Refill: Capillary refill takes less than 2 seconds.   Neurological:      General: No focal deficit present.      Mental Status: He is alert and oriented to person, place, and time. Mental status is at baseline.   Psychiatric:         Mood and Affect: Mood normal.         Behavior: " Behavior normal.         RESULTS   Results Review:    Results from last 7 days   Lab Units 12/22/22  0201 12/21/22  1612 12/21/22  0547 12/20/22  1733   SODIUM mmol/L 133*  --  138 138   POTASSIUM mmol/L 3.8 3.6 3.3* 2.9*   CHLORIDE mmol/L 93*  --  95* 97*   CO2 mmol/L 25.0  --  26.0 25.0   BUN mg/dL 48*  --  43* 40*   CREATININE mg/dL 2.13*  --  2.00* 1.72*   CALCIUM mg/dL 9.4  --  9.5 9.3   BILIRUBIN mg/dL  --   --   --  2.2*   ALK PHOS U/L  --   --   --  63   ALT (SGPT) U/L  --   --   --  11   AST (SGOT) U/L  --   --   --  15   GLUCOSE mg/dL 116*  --  121* 145*       Estimated Creatinine Clearance: 59.8 mL/min (A) (by C-G formula based on SCr of 2.13 mg/dL (H)).    Results from last 7 days   Lab Units 12/21/22  1612 12/20/22  1733   MAGNESIUM mg/dL 2.1 1.9             Results from last 7 days   Lab Units 12/22/22  0201 12/21/22  0547 12/20/22  1733   WBC 10*3/mm3 5.44 5.15 3.73   HEMOGLOBIN g/dL 14.4 14.7 14.9   PLATELETS 10*3/mm3 110* 121* 117*               Imaging Results (Last 24 Hours)     Procedure Component Value Units Date/Time    US Gallbladder [[id number]] Collected: 12/21/22 0906     Updated: 12/21/22 0954    Narrative:        COMPARISON:     12/20/2022    INDICATION:     Pain and vomiting. Abnormal CT    FINDINGS: Liver is enlarged measuring 19.5 cm craniocaudal. There  is no intrahepatic or extrahepatic biliary dilatation.  The  extrahepatic bile duct measures 0.4 cm which is within normal  limits.     The gallbladder is abnormal in appearance. Wall thickening with  intramural edema. Layering sludge and stones.     The right kidney measures 12.0 x 5.1 x 6.3 cm in length.  There  is no hydronephrosis.     The visualized pancreas appears normal size configuration and  echotexture.     There is no ascites.       Impression:      1.  Abnormal appearance of the gallbladder, as above, suspicious  for acute cholecystitis. If clinically indeterminate recommend  nuclear medicine hepatobiliary scan.  2.  Hepatomegaly.        RP core team activated 9:51 AM for results notification.    Electronically signed by:  Rc Wheatley MD  12/21/2022 9:52 AM CST  Workstation: [id number]           MEDICATIONS    apixaban, 5 mg, Oral, BID  atorvastatin, 10 mg, Oral, Daily  carvedilol, 12.5 mg, Oral, BID With Meals  losartan, 12.5 mg, Oral, Q24H  Morphine, 4 mg, Intravenous, Once  pantoprazole, 40 mg, Intravenous, Q AM  piperacillin-tazobactam, 4.5 g, Intravenous, Q8H  sodium chloride, 10 mL, Intravenous, Q12H      DOBUTamine, 2-20 mcg/kg/min  lactated ringers, 50 mL/hr, Last Rate: 50 mL/hr (12/21/22 1775)  norepinephrine, 0.02-0.3 mcg/kg/min, Last Rate: Stopped (12/21/22 0639)        Assessment & Plan   ASSESSMENT / PLAN      FRANCK (acute kidney injury) (H[facility])    1.  FRANCK on CKD- Baseline creatinine 1.3-1.5, up to 2.13 now.  Etiology of FRANCK likely secondary to overdiuresis.  Continue with LR at 50cc/hr.  Unfortunately he did receive contrast during this admission we will need to monitor his renal function closely. Continue holding bumex and metolazone     2.  Shortness of breath- underwent CT with contrast which was nondiagnostic, no marked fluid overload on exam. No pulmonary edema on xray . soa etiology is unclear at present     3.  Chronic systolic CHF /aicd before/ non ischemic cardiomyopathy     4.  Prediabetes     5.  Hypertension- now on the low side, was on Levophed     6.  KHANH- supposed to be using CPAP at home     7. Acute cholecystitis - denies marked abdominal pain and also has some nausea and vomiting x 1 yesterday. Denies acid reflux. Continue protonix. Patient started on Zoysn. Will observe renal function on zosyn             This document has been electronically signed by Beverley Arana MD on December 22, 2022 09:07 CST      I have reviewed the case with the resident. I have also examined and seen  the patient.  I agree with the assessment and plan as documented in the resident's note.         This document has been  electronically signed by Trevor Alonso MD on December 22, 2022 10:07 CST

## 2022-12-23 ENCOUNTER — APPOINTMENT (OUTPATIENT)
Dept: NUCLEAR MEDICINE | Facility: HOSPITAL | Age: 52
DRG: 682 | End: 2022-12-23
Payer: MEDICAID

## 2022-12-23 LAB
ANION GAP SERPL CALCULATED.3IONS-SCNC: 17 MMOL/L (ref 5–15)
BASOPHILS # BLD AUTO: 0.05 10*3/MM3 (ref 0–0.2)
BASOPHILS NFR BLD AUTO: 1 % (ref 0–1.5)
BUN SERPL-MCNC: 58 MG/DL (ref 6–20)
BUN/CREAT SERPL: 25.7 (ref 7–25)
CALCIUM SPEC-SCNC: 9.4 MG/DL (ref 8.6–10.5)
CHLORIDE SERPL-SCNC: 92 MMOL/L (ref 98–107)
CO2 SERPL-SCNC: 24 MMOL/L (ref 22–29)
CREAT SERPL-MCNC: 2.26 MG/DL (ref 0.76–1.27)
DEPRECATED RDW RBC AUTO: 48.1 FL (ref 37–54)
EGFRCR SERPLBLD CKD-EPI 2021: 34.3 ML/MIN/1.73
EOSINOPHIL # BLD AUTO: 0.02 10*3/MM3 (ref 0–0.4)
EOSINOPHIL NFR BLD AUTO: 0.4 % (ref 0.3–6.2)
ERYTHROCYTE [DISTWIDTH] IN BLOOD BY AUTOMATED COUNT: 14.6 % (ref 12.3–15.4)
GLUCOSE SERPL-MCNC: 120 MG/DL (ref 65–99)
HCT VFR BLD AUTO: 43.3 % (ref 37.5–51)
HGB BLD-MCNC: 13.9 G/DL (ref 13–17.7)
IMM GRANULOCYTES # BLD AUTO: 0.01 10*3/MM3 (ref 0–0.05)
IMM GRANULOCYTES NFR BLD AUTO: 0.2 % (ref 0–0.5)
LYMPHOCYTES # BLD AUTO: 1.68 10*3/MM3 (ref 0.7–3.1)
LYMPHOCYTES NFR BLD AUTO: 32.1 % (ref 19.6–45.3)
MAGNESIUM SERPL-MCNC: 2.1 MG/DL (ref 1.6–2.6)
MCH RBC QN AUTO: 29.1 PG (ref 26.6–33)
MCHC RBC AUTO-ENTMCNC: 32.1 G/DL (ref 31.5–35.7)
MCV RBC AUTO: 90.8 FL (ref 79–97)
MONOCYTES # BLD AUTO: 0.94 10*3/MM3 (ref 0.1–0.9)
MONOCYTES NFR BLD AUTO: 18 % (ref 5–12)
NEUTROPHILS NFR BLD AUTO: 2.53 10*3/MM3 (ref 1.7–7)
NEUTROPHILS NFR BLD AUTO: 48.3 % (ref 42.7–76)
NRBC BLD AUTO-RTO: 0 /100 WBC (ref 0–0.2)
PLATELET # BLD AUTO: 121 10*3/MM3 (ref 140–450)
PMV BLD AUTO: 14.3 FL (ref 6–12)
POTASSIUM SERPL-SCNC: 3.7 MMOL/L (ref 3.5–5.2)
POTASSIUM SERPL-SCNC: 3.7 MMOL/L (ref 3.5–5.2)
RBC # BLD AUTO: 4.77 10*6/MM3 (ref 4.14–5.8)
SODIUM SERPL-SCNC: 133 MMOL/L (ref 136–145)
WBC NRBC COR # BLD: 5.23 10*3/MM3 (ref 3.4–10.8)

## 2022-12-23 PROCEDURE — A9539 TC99M PENTETATE: HCPCS | Performed by: HOSPITALIST

## 2022-12-23 PROCEDURE — 25010000002 ONDANSETRON PER 1 MG

## 2022-12-23 PROCEDURE — A9540 TC99M MAA: HCPCS | Performed by: HOSPITALIST

## 2022-12-23 PROCEDURE — 0 TECHNETIUM ALBUMIN AGGREGATED: Performed by: HOSPITALIST

## 2022-12-23 PROCEDURE — 85025 COMPLETE CBC W/AUTO DIFF WBC: CPT | Performed by: HOSPITALIST

## 2022-12-23 PROCEDURE — 80048 BASIC METABOLIC PNL TOTAL CA: CPT | Performed by: HOSPITALIST

## 2022-12-23 PROCEDURE — 78582 LUNG VENTILAT&PERFUS IMAGING: CPT

## 2022-12-23 PROCEDURE — 94799 UNLISTED PULMONARY SVC/PX: CPT

## 2022-12-23 PROCEDURE — 0 TECHNETIUM TC 99M PENTETATE KIT: Performed by: HOSPITALIST

## 2022-12-23 PROCEDURE — 94660 CPAP INITIATION&MGMT: CPT

## 2022-12-23 PROCEDURE — 83735 ASSAY OF MAGNESIUM: CPT | Performed by: HOSPITALIST

## 2022-12-23 PROCEDURE — 25010000002 PIPERACILLIN SOD-TAZOBACTAM PER 1 G

## 2022-12-23 RX ADMIN — KIT FOR THE PREPARATION OF TECHNETIUM TC 99M ALBUMIN AGGREGATED 1 DOSE: 2.5 INJECTION, POWDER, FOR SOLUTION INTRAVENOUS at 12:27

## 2022-12-23 RX ADMIN — SODIUM CHLORIDE 250 ML: 9 INJECTION, SOLUTION INTRAVENOUS at 23:50

## 2022-12-23 RX ADMIN — PANTOPRAZOLE SODIUM 40 MG: 40 INJECTION, POWDER, FOR SOLUTION INTRAVENOUS at 05:28

## 2022-12-23 RX ADMIN — PIPERACILLIN AND TAZOBACTAM 4.5 G: 4; .5 INJECTION, POWDER, LYOPHILIZED, FOR SOLUTION INTRAVENOUS; PARENTERAL at 20:38

## 2022-12-23 RX ADMIN — APIXABAN 5 MG: 5 TABLET, FILM COATED ORAL at 20:38

## 2022-12-23 RX ADMIN — PIPERACILLIN AND TAZOBACTAM 4.5 G: 4; .5 INJECTION, POWDER, LYOPHILIZED, FOR SOLUTION INTRAVENOUS; PARENTERAL at 12:56

## 2022-12-23 RX ADMIN — PIPERACILLIN AND TAZOBACTAM 4.5 G: 4; .5 INJECTION, POWDER, LYOPHILIZED, FOR SOLUTION INTRAVENOUS; PARENTERAL at 05:28

## 2022-12-23 RX ADMIN — CARVEDILOL 12.5 MG: 12.5 TABLET, FILM COATED ORAL at 09:05

## 2022-12-23 RX ADMIN — KIT FOR THE PREPARATION OF TECHNETIUM TC 99M PENTETATE 1 DOSE: 20 INJECTION, POWDER, LYOPHILIZED, FOR SOLUTION INTRAVENOUS; RESPIRATORY (INHALATION) at 12:01

## 2022-12-23 RX ADMIN — APIXABAN 5 MG: 5 TABLET, FILM COATED ORAL at 09:05

## 2022-12-23 RX ADMIN — ATORVASTATIN CALCIUM 10 MG: 10 TABLET, FILM COATED ORAL at 09:05

## 2022-12-23 RX ADMIN — ONDANSETRON 4 MG: 2 INJECTION INTRAMUSCULAR; INTRAVENOUS at 18:03

## 2022-12-23 NOTE — PLAN OF CARE
Goal Outcome Evaluation:               Took over care for this patient at 0400. Patient has been alert and oriented. Up in the chair. Has not required levophed. No complaint of pain. VSS at this time.

## 2022-12-23 NOTE — PLAN OF CARE
Goal Outcome Evaluation:      Pt will need continued education and reinforcement of that education  He remained stable through the night. LA was elevated with Dr Wolf notified, see new orders received. K+ was 3.7. no replacement given. UOP remains low with only 250 out at time of this note. Will continue with this POC.

## 2022-12-23 NOTE — NURSING NOTE
Pr stated he felt like has unable to void. He requested an I/O cath. Cath done using sterile tech. 250 ml dark yellow urine returned, with completed relief noted.  Pt tolerated without distress or complaints. Will continue to monitor to discomfort.

## 2022-12-23 NOTE — PROGRESS NOTES
Nutrition Services    Patient Name:  Matti Bravo  YOB: 1970  MRN: [id number]  Admit Date:  12/20/2022    Pt resting.  Per staff he is currently NPO due to possibly cholecystitis.  He continues to be managed for FRANCK/CKD related to Overtures along with contrast received with CT scan this admit; SOB--CT with contrast was non-diagnostic; Hyperglycemia with no hx of DM; Possible cholecystitis--he may need a surgical consult when more stable.      Labs: Na 133; FSBS: 121/116/120; Bun 58; Creat 2.26; Platelets 121  Meds: Eliquis; Coreg; Protonix; Zosyn; Dobutamine    Will continue to monitor POC and make recommendations as appropriate      Electronically signed by:  Maria Guadalupe Hernández RD  12/23/22 11:13 CST

## 2022-12-23 NOTE — PROGRESS NOTES
[facility] Medicine Services  INPATIENT PROGRESS NOTE    Length of Stay: 1  Date of Admission: 12/20/2022  Primary Care Physician: Shonna Ng APRN    Subjective   Chief Complaint: Shortness of breath  HPI: States he is feeling some better.  Abdominal pain improved.  Would like something to eat.    As of today, 12/23/22  Review of Systems   Constitutional: Positive for activity change and fatigue. Negative for appetite change, chills and fever.   Respiratory: Positive for shortness of breath. Negative for chest tightness.    Cardiovascular: Negative for chest pain, palpitations and leg swelling.   Gastrointestinal: Negative for abdominal pain, constipation, diarrhea, nausea and vomiting.   Skin: Negative for wound.   Neurological: Negative for dizziness, weakness, light-headedness, numbness and headaches.        All pertinent negatives and positives are as above. All other systems have been reviewed and are negative unless otherwise stated.    Objective    Temp:  [96.8 °F (36 °C)-98 °F (36.7 °C)] 96.9 °F (36.1 °C)  Heart Rate:  [] 104  Resp:  [18-20] 20  BP: ()/(55-96) 94/64         As of today, 12/23/22  Physical Exam  Vitals reviewed.   Constitutional:       Appearance: He is well-developed.   HENT:      Head: Normocephalic and atraumatic.   Eyes:      Pupils: Pupils are equal, round, and reactive to light.   Cardiovascular:      Rate and Rhythm: Normal rate. Rhythm irregularly irregular.      Heart sounds: Normal heart sounds. No murmur heard.    No friction rub. No gallop.   Pulmonary:      Effort: Pulmonary effort is normal. No respiratory distress.      Breath sounds: Normal breath sounds. No wheezing or rales.   Chest:      Chest wall: No tenderness.   Abdominal:      General: Bowel sounds are normal. There is no distension.      Palpations: Abdomen is soft.      Tenderness: There is no abdominal tenderness.   Musculoskeletal:      Cervical  back: Normal range of motion and neck supple.   Psychiatric:         Behavior: Behavior normal.           Results Review:  I have reviewed the labs, radiology results, and diagnostic studies.    Laboratory Data:   Results from last 7 days   Lab Units 12/23/22 0427 12/22/22 2337 12/22/22 0201 12/21/22 1612 12/21/22  0547 12/20/22  1733   SODIUM mmol/L 133*  --  133*  --  138 138   POTASSIUM mmol/L 3.7 3.7 3.8   < > 3.3* 2.9*   CHLORIDE mmol/L 92*  --  93*  --  95* 97*   CO2 mmol/L 24.0  --  25.0  --  26.0 25.0   BUN mg/dL 58*  --  48*  --  43* 40*   CREATININE mg/dL 2.26*  --  2.13*  --  2.00* 1.72*   GLUCOSE mg/dL 120*  --  116*  --  121* 145*   CALCIUM mg/dL 9.4  --  9.4  --  9.5 9.3   BILIRUBIN mg/dL  --   --   --   --   --  2.2*   ALK PHOS U/L  --   --   --   --   --  63   ALT (SGPT) U/L  --   --   --   --   --  11   AST (SGOT) U/L  --   --   --   --   --  15   ANION GAP mmol/L 17.0*  --  15.0  --  17.0* 16.0*    < > = values in this interval not displayed.     Estimated Creatinine Clearance: 56.9 mL/min (A) (by C-G formula based on SCr of 2.26 mg/dL (H)).  Results from last 7 days   Lab Units 12/23/22 0427 12/21/22 1612 12/20/22  1733   MAGNESIUM mg/dL 2.1 2.1 1.9         Results from last 7 days   Lab Units 12/23/22 0427 12/22/22  0201 12/21/22  0547 12/20/22  1733   WBC 10*3/mm3 5.23 5.44 5.15 3.73   HEMOGLOBIN g/dL 13.9 14.4 14.7 14.9   HEMATOCRIT % 43.3 44.9 44.3 45.0   PLATELETS 10*3/mm3 121* 110* 121* 117*           Culture Data:   Blood Culture   Date Value Ref Range Status   12/21/2022 No growth at 24 hours  Preliminary   12/20/2022 No growth at 2 days  Preliminary   12/20/2022 No growth at 2 days  Preliminary     No results found for: URINECX  No results found for: RESPCX  No results found for: WOUNDCX  No results found for: STOOLCX  No components found for: BODYFLD    Radiology Data:   Imaging Results (Last 24 Hours)     Procedure Component Value Units Date/Time    NM Lung Ventilation Perfusion  [[id number]] Resulted: 12/23/22 1202     Updated: 12/23/22 1230          I have reviewed the patient's current medications.     Assessment/Plan     Active Hospital Problems    Diagnosis    • **FRANCK (acute kidney injury) ([facility])        Plan:    1.  Shortness of breath: May be slightly better.  CT scan for pulmonary embolism was nondiagnostic.   2.  Volume depletion: Continue to hydrate gently.  Appreciate nephrology help.  3.  Chronic systolic congestive heart failure: Minimally hypovolemic currently.  Continue home medications.  Continue anticoagulation for risk of thrombus.  4.  History of hyperglycemia: No diagnosis of diabetes.  Will place on sliding scale insulin for now.  5.  Obstructive sleep apnea: Patient states its been over 2 years since he has had a CPAP.  He is scheduled to go  his CPAP on 12/29.  6.  Acute kidney injury:  Worsening again today.  Superimposed upon chronic kidney disease.  Appreciate nephrology help.  7.  Possible cholecystitis:  Medical management for now.  Clear liquid diet.  Will need surgical consultation when more stable.  We will leave on Eliquis at this point due to high risk of intracardiac thrombus due to low ejection fraction.   8.  DVT prophylaxis: Eliquis.        The patient was evaluated during the global COVID-19 pandemic, and the diagnosis was suspected/considered upon their initial presentation.  Evaluation, treatment, and testing were consistent with current guidelines for patients who present with complaints or symptoms that may be related to COVID-19.    I confirmed that the patient's Advance Care Plan is present, code status is documented, or surrogate decision maker is listed in the patient's medical record.       Discharge Planning: I expect patient to be discharged to home in 3-4 days.        This document has been electronically signed by Will French MD on December 23, 2022 12:49 CST

## 2022-12-23 NOTE — PROGRESS NOTES
"  [facility]  [address] [place] [address]. [address]  T - [phone]  F - [phone]     Progress Note          PATIENT  DEMOGRAPHICS   PATIENT NAME: Matti Bravo                      PHYSICIAN: Trevor Alonso MD  : 1970  MRN: [id number]   LOS: 1 day    Patient Care Team:  Shonna Ng APRN as PCP - General (Family Medicine)  Subjective   SUBJECTIVE   Patient has no acute complaints today. Npo at present . Breathing stable         Objective   OBJECTIVE   Vital Signs  Temp:  [96.8 °F (36 °C)-98 °F (36.7 °C)] 96.9 °F (36.1 °C)  Heart Rate:  [] 81  Resp:  [15-20] 20  BP: ()/(55-96) 97/70    Flowsheet Rows    Flowsheet Row First Filed Value   Admission Height 193 cm (76\") Documented at 2022 1700   Admission Weight 127 kg (280 lb) Documented at 2022 1700           I/O last 3 completed shifts:  In: 2162.5 [I.V.:1744.5; IV Piggyback:418]  Out: 1750 [Urine:1750]    PHYSICAL EXAM    Physical Exam  Vitals reviewed.   HENT:      Head: Normocephalic and atraumatic.      Mouth/Throat:      Pharynx: Oropharynx is clear.   Eyes:      Pupils: Pupils are equal, round, and reactive to light.   Cardiovascular:      Rate and Rhythm: Normal rate and regular rhythm.      Pulses: Normal pulses.      Heart sounds: Normal heart sounds.   Pulmonary:      Effort: Pulmonary effort is normal.      Breath sounds: Normal breath sounds.   Abdominal:      General: Abdomen is flat. Bowel sounds are normal.      Palpations: Abdomen is soft.   Musculoskeletal:         General: Normal range of motion.      Cervical back: Normal range of motion and neck supple.   Skin:     General: Skin is warm and dry.      Capillary Refill: Capillary refill takes less than 2 seconds.   Neurological:      General: No focal deficit present.      Mental Status: He is alert and oriented to person, place, and time. Mental status is at baseline.   Psychiatric:         Mood and Affect: Mood normal.         " Behavior: Behavior normal.         RESULTS   Results Review:    Results from last 7 days   Lab Units 12/23/22 0427 12/22/22  2337 12/22/22  0201 12/21/22  1612 12/21/22  0547 12/20/22  1733   SODIUM mmol/L 133*  --  133*  --  138 138   POTASSIUM mmol/L 3.7 3.7 3.8   < > 3.3* 2.9*   CHLORIDE mmol/L 92*  --  93*  --  95* 97*   CO2 mmol/L 24.0  --  25.0  --  26.0 25.0   BUN mg/dL 58*  --  48*  --  43* 40*   CREATININE mg/dL 2.26*  --  2.13*  --  2.00* 1.72*   CALCIUM mg/dL 9.4  --  9.4  --  9.5 9.3   BILIRUBIN mg/dL  --   --   --   --   --  2.2*   ALK PHOS U/L  --   --   --   --   --  63   ALT (SGPT) U/L  --   --   --   --   --  11   AST (SGOT) U/L  --   --   --   --   --  15   GLUCOSE mg/dL 120*  --  116*  --  121* 145*    < > = values in this interval not displayed.       Estimated Creatinine Clearance: 56.9 mL/min (A) (by C-G formula based on SCr of 2.26 mg/dL (H)).    Results from last 7 days   Lab Units 12/23/22 0427 12/21/22  1612 12/20/22  1733   MAGNESIUM mg/dL 2.1 2.1 1.9             Results from last 7 days   Lab Units 12/23/22  0427 12/22/22  0201 12/21/22  0547 12/20/22  1733   WBC 10*3/mm3 5.23 5.44 5.15 3.73   HEMOGLOBIN g/dL 13.9 14.4 14.7 14.9   PLATELETS 10*3/mm3 121* 110* 121* 117*               Imaging Results (Last 24 Hours)     ** No results found for the last 24 hours. **           MEDICATIONS    apixaban, 5 mg, Oral, BID  atorvastatin, 10 mg, Oral, Daily  carvedilol, 12.5 mg, Oral, BID With Meals  losartan, 12.5 mg, Oral, Q24H  Morphine, 4 mg, Intravenous, Once  pantoprazole, 40 mg, Intravenous, Q AM  piperacillin-tazobactam, 4.5 g, Intravenous, Q8H  sodium chloride, 10 mL, Intravenous, Q12H      DOBUTamine, 2-20 mcg/kg/min  lactated ringers, 50 mL/hr, Last Rate: 50 mL/hr (12/22/22 2059)  norepinephrine, 0.02-0.3 mcg/kg/min, Last Rate: Stopped (12/21/22 0639)        Assessment & Plan   ASSESSMENT / PLAN      FRANCK (acute kidney injury) (HCC)    1.  FRANCK on CKD- Baseline creatinine 1.3-1.5, up to  2.2 now.  Etiology of FRANCK likely secondary to overdiuresis.  Continue with LR at 50cc/hr.  Unfortunately he did receive contrast during this admission we will need to monitor his renal function closely. Continue holding bumex and metolazone     2.  Shortness of breath- underwent CT with contrast which was nondiagnostic, no marked fluid overload on exam. No pulmonary edema on xray . soa etiology is unclear at present. On ivf      3.  Chronic systolic CHF /aicd before/ non ischemic cardiomyopathy     4.  Prediabetes     5.  Hypertension- now on the low side, was on Levophed     6.  KHANH- supposed to be using CPAP at home     7. Acute cholecystitis - denies marked abdominal pain and also has some nausea and vomiting x 1. Denies acid reflux. Continue protonix. Patient started on Zoysn. Will observe renal function on zosyn    Copied text in this note has been reviewed and is accurate as of 12/23/2022.                  This document has been electronically signed by Trevor Alonso MD on December 23, 2022 08:52 CST

## 2022-12-23 NOTE — NURSING NOTE
Pt complained of being unable to void again. In and out cath completed with 500 ml dark, yellow urine returned. Pt stated he feels much better. He tolerated the procedure well and without complaint.

## 2022-12-24 LAB
ANION GAP SERPL CALCULATED.3IONS-SCNC: 14 MMOL/L (ref 5–15)
ANISOCYTOSIS BLD QL: NORMAL
BASOPHILS # BLD AUTO: 0.04 10*3/MM3 (ref 0–0.2)
BASOPHILS NFR BLD AUTO: 0.9 % (ref 0–1.5)
BUN SERPL-MCNC: 61 MG/DL (ref 6–20)
BUN/CREAT SERPL: 28.5 (ref 7–25)
CALCIUM SPEC-SCNC: 9.5 MG/DL (ref 8.6–10.5)
CHLORIDE SERPL-SCNC: 92 MMOL/L (ref 98–107)
CO2 SERPL-SCNC: 26 MMOL/L (ref 22–29)
CREAT SERPL-MCNC: 2.14 MG/DL (ref 0.76–1.27)
DEPRECATED RDW RBC AUTO: 47.8 FL (ref 37–54)
EGFRCR SERPLBLD CKD-EPI 2021: 36.6 ML/MIN/1.73
EOSINOPHIL # BLD AUTO: 0.02 10*3/MM3 (ref 0–0.4)
EOSINOPHIL NFR BLD AUTO: 0.4 % (ref 0.3–6.2)
ERYTHROCYTE [DISTWIDTH] IN BLOOD BY AUTOMATED COUNT: 14.6 % (ref 12.3–15.4)
GLUCOSE SERPL-MCNC: 119 MG/DL (ref 65–99)
HCT VFR BLD AUTO: 44 % (ref 37.5–51)
HGB BLD-MCNC: 14.2 G/DL (ref 13–17.7)
IMM GRANULOCYTES # BLD AUTO: 0.02 10*3/MM3 (ref 0–0.05)
IMM GRANULOCYTES NFR BLD AUTO: 0.4 % (ref 0–0.5)
LARGE PLATELETS: NORMAL
LYMPHOCYTES # BLD AUTO: 1.19 10*3/MM3 (ref 0.7–3.1)
LYMPHOCYTES NFR BLD AUTO: 25.5 % (ref 19.6–45.3)
MCH RBC QN AUTO: 29.4 PG (ref 26.6–33)
MCHC RBC AUTO-ENTMCNC: 32.3 G/DL (ref 31.5–35.7)
MCV RBC AUTO: 91.1 FL (ref 79–97)
MONOCYTES # BLD AUTO: 0.85 10*3/MM3 (ref 0.1–0.9)
MONOCYTES NFR BLD AUTO: 18.2 % (ref 5–12)
NEUTROPHILS NFR BLD AUTO: 2.54 10*3/MM3 (ref 1.7–7)
NEUTROPHILS NFR BLD AUTO: 54.6 % (ref 42.7–76)
NRBC BLD AUTO-RTO: 0 /100 WBC (ref 0–0.2)
PLATELET # BLD AUTO: 112 10*3/MM3 (ref 140–450)
PMV BLD AUTO: ABNORMAL FL
POTASSIUM SERPL-SCNC: 3.7 MMOL/L (ref 3.5–5.2)
RBC # BLD AUTO: 4.83 10*6/MM3 (ref 4.14–5.8)
SMALL PLATELETS BLD QL SMEAR: NORMAL
SODIUM SERPL-SCNC: 132 MMOL/L (ref 136–145)
WBC MORPH BLD: NORMAL
WBC NRBC COR # BLD: 4.66 10*3/MM3 (ref 3.4–10.8)

## 2022-12-24 PROCEDURE — 85025 COMPLETE CBC W/AUTO DIFF WBC: CPT | Performed by: HOSPITALIST

## 2022-12-24 PROCEDURE — 94760 N-INVAS EAR/PLS OXIMETRY 1: CPT

## 2022-12-24 PROCEDURE — 85007 BL SMEAR W/DIFF WBC COUNT: CPT | Performed by: HOSPITALIST

## 2022-12-24 PROCEDURE — 94799 UNLISTED PULMONARY SVC/PX: CPT

## 2022-12-24 PROCEDURE — 25010000002 ONDANSETRON PER 1 MG

## 2022-12-24 PROCEDURE — 80048 BASIC METABOLIC PNL TOTAL CA: CPT | Performed by: HOSPITALIST

## 2022-12-24 PROCEDURE — 25010000002 PIPERACILLIN SOD-TAZOBACTAM PER 1 G

## 2022-12-24 RX ORDER — CALCIUM CARBONATE 200(500)MG
2 TABLET,CHEWABLE ORAL 3 TIMES DAILY PRN
Status: DISCONTINUED | OUTPATIENT
Start: 2022-12-24 | End: 2023-01-14 | Stop reason: HOSPADM

## 2022-12-24 RX ORDER — HYDROCODONE BITARTRATE AND ACETAMINOPHEN 7.5; 325 MG/1; MG/1
1 TABLET ORAL EVERY 6 HOURS PRN
Status: DISPENSED | OUTPATIENT
Start: 2022-12-24 | End: 2022-12-31

## 2022-12-24 RX ADMIN — PIPERACILLIN AND TAZOBACTAM 4.5 G: 4; .5 INJECTION, POWDER, LYOPHILIZED, FOR SOLUTION INTRAVENOUS; PARENTERAL at 05:08

## 2022-12-24 RX ADMIN — CALCIUM CARBONATE (ANTACID) CHEW TAB 500 MG 2 TABLET: 500 CHEW TAB at 14:44

## 2022-12-24 RX ADMIN — PIPERACILLIN AND TAZOBACTAM 4.5 G: 4; .5 INJECTION, POWDER, LYOPHILIZED, FOR SOLUTION INTRAVENOUS; PARENTERAL at 20:15

## 2022-12-24 RX ADMIN — CARVEDILOL 12.5 MG: 12.5 TABLET, FILM COATED ORAL at 17:06

## 2022-12-24 RX ADMIN — Medication 10 ML: at 20:19

## 2022-12-24 RX ADMIN — PANTOPRAZOLE SODIUM 40 MG: 40 INJECTION, POWDER, FOR SOLUTION INTRAVENOUS at 05:08

## 2022-12-24 RX ADMIN — APIXABAN 5 MG: 5 TABLET, FILM COATED ORAL at 20:15

## 2022-12-24 RX ADMIN — HYDROCODONE BITARTRATE AND ACETAMINOPHEN 1 TABLET: 7.5; 325 TABLET ORAL at 23:55

## 2022-12-24 RX ADMIN — ACETAMINOPHEN 1000 MG: 500 TABLET, FILM COATED ORAL at 12:40

## 2022-12-24 RX ADMIN — APIXABAN 5 MG: 5 TABLET, FILM COATED ORAL at 09:15

## 2022-12-24 RX ADMIN — PIPERACILLIN AND TAZOBACTAM 4.5 G: 4; .5 INJECTION, POWDER, LYOPHILIZED, FOR SOLUTION INTRAVENOUS; PARENTERAL at 13:28

## 2022-12-24 RX ADMIN — ATORVASTATIN CALCIUM 10 MG: 10 TABLET, FILM COATED ORAL at 09:15

## 2022-12-24 RX ADMIN — ONDANSETRON 4 MG: 2 INJECTION INTRAMUSCULAR; INTRAVENOUS at 23:55

## 2022-12-24 RX ADMIN — HYDROCODONE BITARTRATE AND ACETAMINOPHEN 1 TABLET: 7.5; 325 TABLET ORAL at 17:06

## 2022-12-24 NOTE — PROGRESS NOTES
[facility]  INPATIENT PROGRESS NOTE    Length of Stay: 2  Date of Admission: 12/20/2022  Primary Care Physician: Shonna Ng APRN    Subjective   Chief Complaint: Shortness of breath  HPI: Doing okay.  Breathing is some better.  Tolerating diet.  No nausea or vomiting.  No abdominal pain.    As of today, 12/24/22  Review of Systems   Constitutional: Positive for activity change and fatigue. Negative for appetite change, chills and fever.   Respiratory: Positive for shortness of breath. Negative for chest tightness.    Cardiovascular: Negative for chest pain, palpitations and leg swelling.   Gastrointestinal: Negative for abdominal pain, constipation, diarrhea, nausea and vomiting.   Skin: Negative for wound.   Neurological: Negative for dizziness, weakness, light-headedness, numbness and headaches.        All pertinent negatives and positives are as above. All other systems have been reviewed and are negative unless otherwise stated.    Objective    Temp:  [94.4 °F (34.7 °C)-98 °F (36.7 °C)] 98 °F (36.7 °C)  Heart Rate:  [] 103  Resp:  [16-20] 18  BP: ()/(50-92) 103/65         As of today, 12/24/22  Physical Exam  Vitals reviewed.   Constitutional:       Appearance: He is well-developed.   HENT:      Head: Normocephalic and atraumatic.   Eyes:      Pupils: Pupils are equal, round, and reactive to light.   Cardiovascular:      Rate and Rhythm: Normal rate. Rhythm irregularly irregular.      Heart sounds: Normal heart sounds. No murmur heard.    No friction rub. No gallop.   Pulmonary:      Effort: Pulmonary effort is normal. No respiratory distress.      Breath sounds: Normal breath sounds. No wheezing or rales.   Chest:      Chest wall: No tenderness.   Abdominal:      General: Bowel sounds are normal. There is no distension.      Palpations: Abdomen is soft.      Tenderness: There is no abdominal tenderness.   Musculoskeletal:       Cervical back: Normal range of motion and neck supple.   Psychiatric:         Behavior: Behavior normal.           Results Review:  I have reviewed the labs, radiology results, and diagnostic studies.    Laboratory Data:   Results from last 7 days   Lab Units 12/24/22  0607 12/23/22 0427 12/22/22  2337 12/22/22  0201 12/21/22  0547 12/20/22  1733   SODIUM mmol/L 132* 133*  --  133*   < > 138   POTASSIUM mmol/L 3.7 3.7 3.7 3.8   < > 2.9*   CHLORIDE mmol/L 92* 92*  --  93*   < > 97*   CO2 mmol/L 26.0 24.0  --  25.0   < > 25.0   BUN mg/dL 61* 58*  --  48*   < > 40*   CREATININE mg/dL 2.14* 2.26*  --  2.13*   < > 1.72*   GLUCOSE mg/dL 119* 120*  --  116*   < > 145*   CALCIUM mg/dL 9.5 9.4  --  9.4   < > 9.3   BILIRUBIN mg/dL  --   --   --   --   --  2.2*   ALK PHOS U/L  --   --   --   --   --  63   ALT (SGPT) U/L  --   --   --   --   --  11   AST (SGOT) U/L  --   --   --   --   --  15   ANION GAP mmol/L 14.0 17.0*  --  15.0   < > 16.0*    < > = values in this interval not displayed.     Estimated Creatinine Clearance: 60.1 mL/min (A) (by C-G formula based on SCr of 2.14 mg/dL (H)).  Results from last 7 days   Lab Units 12/23/22  0427 12/21/22  1612 12/20/22  1733   MAGNESIUM mg/dL 2.1 2.1 1.9         Results from last 7 days   Lab Units 12/24/22  0607 12/23/22  0427 12/22/22  0201 12/21/22  0547 12/20/22  1733   WBC 10*3/mm3 4.66 5.23 5.44 5.15 3.73   HEMOGLOBIN g/dL 14.2 13.9 14.4 14.7 14.9   HEMATOCRIT % 44.0 43.3 44.9 44.3 45.0   PLATELETS 10*3/mm3 112* 121* 110* 121* 117*           Culture Data:   Blood Culture   Date Value Ref Range Status   12/21/2022 No growth at 2 days  Preliminary     No results found for: URINECX  No results found for: RESPCX  No results found for: WOUNDCX  No results found for: STOOLCX  No components found for: BODYFLD    Radiology Data:   Imaging Results (Last 24 Hours)     Procedure Component Value Units Date/Time    NM Lung Ventilation Perfusion [[id number]] Collected: 12/23/22 1158      Updated: 12/23/22 1619    Narrative:      Nuclear medicine ventilation perfusion lung scan    History: Shortness of breath. Pulmonary embolism suspected.    Correlation: Portable chest 5:22 PM December 20, 2022    Following the inhalation of 28 mCi of aerosolized technetium 99m  DTPA, multiple ventilation images obtained.    Following the intravenous administration of 6.0 millicuries of  technetium 99m MAA, multiple perfusion images obtained.    FINDINGS:   Inhomogeneous distribution of radionuclide, more pronounced on  the ventilation images.  No mismatched peripheral perfusion defects to indicate pulmonary  embolism.  Cardiomegaly.  Some elevation right hemidiaphragm.      Impression:      Conclusion:  Low probability for pulmonary embolism.  Cardiomegaly.  Some elevation right hemidiaphragm.    [id number]    Electronically signed by:  Mukul Klein MD  12/23/2022 4:17 PM  CST Workstation: [id number]          I have reviewed the patient's current medications.     Assessment/Plan     Active Hospital Problems    Diagnosis    • **FRANCK (acute kidney injury) ([facility])        Plan:    1.  Shortness of breath: May be slightly better.  CT scan for pulmonary embolism was nondiagnostic.  VQ scan low probability.  2.  Volume depletion: Continue to hydrate gently.  Appreciate nephrology help.  3.  Chronic systolic congestive heart failure: Minimally hypovolemic currently.  Continue home medications.  Continue anticoagulation for risk of thrombus.  4.  History of hyperglycemia: No diagnosis of diabetes.  Will place on sliding scale insulin for now.  5.  Obstructive sleep apnea: Patient states its been over 2 years since he has had a CPAP.  He is scheduled to go  his CPAP on 12/29.  6.  Acute kidney injury: Renal function is some better today.  Superimposed upon chronic kidney disease.  Appreciate nephrology help.  7.  Possible cholecystitis:  Medical management for now.  Clear liquid diet.  Will need surgical consultation when  more stable.  We will leave on Eliquis at this point due to high risk of intracardiac thrombus due to low ejection fraction.   8.  DVT prophylaxis: Eliquis.        The patient was evaluated during the global COVID-19 pandemic, and the diagnosis was suspected/considered upon their initial presentation.  Evaluation, treatment, and testing were consistent with current guidelines for patients who present with complaints or symptoms that may be related to COVID-19.    I confirmed that the patient's Advance Care Plan is present, code status is documented, or surrogate decision maker is listed in the patient's medical record.       Discharge Planning: I expect patient to be discharged to home in 3-4 days.        This document has been electronically signed by Will French MD on December 24, 2022 12:00 CST

## 2022-12-24 NOTE — PROGRESS NOTES
"    [facility]  [address] [place] [address]. [address]  T - [phone]  F - [phone]     Progress Note          PATIENT  DEMOGRAPHICS   PATIENT NAME: Matti Bravo                      PHYSICIAN: Efrain Mas MD  : 1970  MRN: [id number]   LOS: 2 days    Patient Care Team:  Shonna Ng APRN as PCP - General (Family Medicine)  Subjective   SUBJECTIVE   No acute events overnight. Denied any complaints. No chest pain or SOB. Feels better today.          Objective   OBJECTIVE   Vital Signs  Temp:  [94.4 °F (34.7 °C)-98 °F (36.7 °C)] 98 °F (36.7 °C)  Heart Rate:  [] 103  Resp:  [16-20] 18  BP: ()/(50-92) 103/65    Flowsheet Rows    Flowsheet Row First Filed Value   Admission Height 193 cm (76\") Documented at 2022 1700   Admission Weight 127 kg (280 lb) Documented at 2022 1700           I/O last 3 completed shifts:  In: 1475 [I.V.:1157; IV Piggyback:318]  Out: 1750 [Urine:1750]    PHYSICAL EXAM    Physical Exam  Vitals and nursing note reviewed.   Constitutional:       Appearance: Normal appearance.   Cardiovascular:      Rate and Rhythm: Normal rate and regular rhythm.      Heart sounds: Normal heart sounds. No murmur heard.    No friction rub. No gallop.   Pulmonary:      Effort: No respiratory distress.      Breath sounds: Normal breath sounds. No stridor. No wheezing, rhonchi or rales.   Musculoskeletal:      Right lower leg: Edema present.      Left lower leg: Edema present.   Skin:     General: Skin is warm and dry.   Neurological:      Mental Status: He is alert.         RESULTS   Results Review:    Results from last 7 days   Lab Units 22  0607 22  0427 22  2337 22  0201 22  0547 22  1733   SODIUM mmol/L 132* 133*  --  133*   < > 138   POTASSIUM mmol/L 3.7 3.7 3.7 3.8   < > 2.9*   CHLORIDE mmol/L 92* 92*  --  93*   < > 97*   CO2 mmol/L 26.0 24.0  --  25.0   < > 25.0   BUN mg/dL 61* 58*  --  48*   < > " 40*   CREATININE mg/dL 2.14* 2.26*  --  2.13*   < > 1.72*   CALCIUM mg/dL 9.5 9.4  --  9.4   < > 9.3   BILIRUBIN mg/dL  --   --   --   --   --  2.2*   ALK PHOS U/L  --   --   --   --   --  63   ALT (SGPT) U/L  --   --   --   --   --  11   AST (SGOT) U/L  --   --   --   --   --  15   GLUCOSE mg/dL 119* 120*  --  116*   < > 145*    < > = values in this interval not displayed.       Estimated Creatinine Clearance: 60.1 mL/min (A) (by C-G formula based on SCr of 2.14 mg/dL (H)).    Results from last 7 days   Lab Units 12/23/22  0427 12/21/22  1612 12/20/22  1733   MAGNESIUM mg/dL 2.1 2.1 1.9             Results from last 7 days   Lab Units 12/24/22  0607 12/23/22  0427 12/22/22  0201 12/21/22  0547 12/20/22  1733   WBC 10*3/mm3 4.66 5.23 5.44 5.15 3.73   HEMOGLOBIN g/dL 14.2 13.9 14.4 14.7 14.9   PLATELETS 10*3/mm3 112* 121* 110* 121* 117*               Imaging Results (Last 24 Hours)     Procedure Component Value Units Date/Time    NM Lung Ventilation Perfusion [[id number]] Collected: 12/23/22 1158     Updated: 12/23/22 1619    Narrative:      Nuclear medicine ventilation perfusion lung scan    History: Shortness of breath. Pulmonary embolism suspected.    Correlation: Portable chest 5:22 PM December 20, 2022    Following the inhalation of 28 mCi of aerosolized technetium 99m  DTPA, multiple ventilation images obtained.    Following the intravenous administration of 6.0 millicuries of  technetium 99m MAA, multiple perfusion images obtained.    FINDINGS:   Inhomogeneous distribution of radionuclide, more pronounced on  the ventilation images.  No mismatched peripheral perfusion defects to indicate pulmonary  embolism.  Cardiomegaly.  Some elevation right hemidiaphragm.      Impression:      Conclusion:  Low probability for pulmonary embolism.  Cardiomegaly.  Some elevation right hemidiaphragm.    [id number]    Electronically signed by:  Mukul Klein MD  12/23/2022 4:17 PM  [facility] Workstation: [id number]           MEDICATIONS     apixaban, 5 mg, Oral, BID  atorvastatin, 10 mg, Oral, Daily  carvedilol, 12.5 mg, Oral, BID With Meals  losartan, 12.5 mg, Oral, Q24H  Morphine, 4 mg, Intravenous, Once  pantoprazole, 40 mg, Intravenous, Q AM  piperacillin-tazobactam, 4.5 g, Intravenous, Q8H  sodium chloride, 10 mL, Intravenous, Q12H      DOBUTamine, 2-20 mcg/kg/min  lactated ringers, 50 mL/hr, Last Rate: 50 mL/hr (12/24/22 0222)  norepinephrine, 0.02-0.3 mcg/kg/min, Last Rate: Stopped (12/21/22 0639)        Assessment & Plan   ASSESSMENT / PLAN      FRACNK (acute kidney injury) (HCC)    1.  Acute Kidney Failure on CKD 3: Baseline creatinine 1.4-1.7 mg/dl  - Etiology of FRANCK likely contrast induced.    - Urinalysis showed trace protein and negative blood, 3-5 RBCs, 0-2 WBCs.  Urine sodium less than 20.  Urine protein creatinine ratio 235 mg.  - Creatinine is overall stable at 2.14 mg/dL.  Urine output is good.  Discontinue IV fluids.  - Maintain hemodynamics.  Monitor I's and O's.  Avoid nephrotoxins like NSAIDs and IV contrast     2.  Shortness of breath:  - Underwent CT with contrast which was nondiagnostic, no marked fluid overload on exam. No pulmonary edema on xray. SOB etiology is unclear at present.      3.  Chronic systolic CHF /AICD/ non ischemic cardiomyopathy     4.  Prediabetes     5.  Hypertension:  - Blood pressure is borderline low.      6.  KHANH:  - Supposed to be using CPAP at home     7. Acute cholecystitis:  - denies marked abdominal pain and also has some nausea and vomiting x 1. Denies acid reflux. Continue protonix. Patient started on Zoysn. Will observe renal function on zosyn           This document has been electronically signed by Efrain Mas MD on December 24, 2022 14:23 CST

## 2022-12-25 LAB
ANION GAP SERPL CALCULATED.3IONS-SCNC: 14 MMOL/L (ref 5–15)
BACTERIA SPEC AEROBE CULT: NORMAL
BACTERIA SPEC AEROBE CULT: NORMAL
BASOPHILS # BLD AUTO: 0.05 10*3/MM3 (ref 0–0.2)
BASOPHILS NFR BLD AUTO: 1.1 % (ref 0–1.5)
BUN SERPL-MCNC: 61 MG/DL (ref 6–20)
BUN/CREAT SERPL: 28.2 (ref 7–25)
CALCIUM SPEC-SCNC: 9 MG/DL (ref 8.6–10.5)
CHLORIDE SERPL-SCNC: 91 MMOL/L (ref 98–107)
CO2 SERPL-SCNC: 27 MMOL/L (ref 22–29)
CREAT SERPL-MCNC: 2.16 MG/DL (ref 0.76–1.27)
DEPRECATED RDW RBC AUTO: 48.3 FL (ref 37–54)
EGFRCR SERPLBLD CKD-EPI 2021: 36.2 ML/MIN/1.73
EOSINOPHIL # BLD AUTO: 0.03 10*3/MM3 (ref 0–0.4)
EOSINOPHIL NFR BLD AUTO: 0.6 % (ref 0.3–6.2)
ERYTHROCYTE [DISTWIDTH] IN BLOOD BY AUTOMATED COUNT: 14.7 % (ref 12.3–15.4)
GLUCOSE SERPL-MCNC: 139 MG/DL (ref 65–99)
HCT VFR BLD AUTO: 43.4 % (ref 37.5–51)
HGB BLD-MCNC: 14.1 G/DL (ref 13–17.7)
IMM GRANULOCYTES # BLD AUTO: 0.01 10*3/MM3 (ref 0–0.05)
IMM GRANULOCYTES NFR BLD AUTO: 0.2 % (ref 0–0.5)
LYMPHOCYTES # BLD AUTO: 1.21 10*3/MM3 (ref 0.7–3.1)
LYMPHOCYTES NFR BLD AUTO: 26.2 % (ref 19.6–45.3)
MCH RBC QN AUTO: 29.6 PG (ref 26.6–33)
MCHC RBC AUTO-ENTMCNC: 32.5 G/DL (ref 31.5–35.7)
MCV RBC AUTO: 91 FL (ref 79–97)
MONOCYTES # BLD AUTO: 0.92 10*3/MM3 (ref 0.1–0.9)
MONOCYTES NFR BLD AUTO: 19.9 % (ref 5–12)
NEUTROPHILS NFR BLD AUTO: 2.4 10*3/MM3 (ref 1.7–7)
NEUTROPHILS NFR BLD AUTO: 52 % (ref 42.7–76)
NRBC BLD AUTO-RTO: 0 /100 WBC (ref 0–0.2)
PLATELET # BLD AUTO: 113 10*3/MM3 (ref 140–450)
PMV BLD AUTO: 14.3 FL (ref 6–12)
POTASSIUM SERPL-SCNC: 3.7 MMOL/L (ref 3.5–5.2)
RBC # BLD AUTO: 4.77 10*6/MM3 (ref 4.14–5.8)
SODIUM SERPL-SCNC: 132 MMOL/L (ref 136–145)
WBC NRBC COR # BLD: 4.62 10*3/MM3 (ref 3.4–10.8)

## 2022-12-25 PROCEDURE — 97165 OT EVAL LOW COMPLEX 30 MIN: CPT

## 2022-12-25 PROCEDURE — 94760 N-INVAS EAR/PLS OXIMETRY 1: CPT

## 2022-12-25 PROCEDURE — 85025 COMPLETE CBC W/AUTO DIFF WBC: CPT | Performed by: HOSPITALIST

## 2022-12-25 PROCEDURE — 97162 PT EVAL MOD COMPLEX 30 MIN: CPT

## 2022-12-25 PROCEDURE — 25010000002 PIPERACILLIN SOD-TAZOBACTAM PER 1 G

## 2022-12-25 PROCEDURE — 94799 UNLISTED PULMONARY SVC/PX: CPT

## 2022-12-25 PROCEDURE — 80048 BASIC METABOLIC PNL TOTAL CA: CPT | Performed by: HOSPITALIST

## 2022-12-25 PROCEDURE — 25010000002 ONDANSETRON PER 1 MG

## 2022-12-25 RX ORDER — FUROSEMIDE 10 MG/ML
40 INJECTION INTRAMUSCULAR; INTRAVENOUS 2 TIMES DAILY
Status: DISCONTINUED | OUTPATIENT
Start: 2022-12-25 | End: 2022-12-29

## 2022-12-25 RX ORDER — SIMETHICONE 80 MG
80 TABLET,CHEWABLE ORAL 4 TIMES DAILY PRN
Status: DISCONTINUED | OUTPATIENT
Start: 2022-12-25 | End: 2023-01-14 | Stop reason: HOSPADM

## 2022-12-25 RX ADMIN — PIPERACILLIN AND TAZOBACTAM 4.5 G: 4; .5 INJECTION, POWDER, LYOPHILIZED, FOR SOLUTION INTRAVENOUS; PARENTERAL at 14:06

## 2022-12-25 RX ADMIN — ONDANSETRON 4 MG: 2 INJECTION INTRAMUSCULAR; INTRAVENOUS at 10:02

## 2022-12-25 RX ADMIN — SIMETHICONE 80 MG: 80 TABLET, CHEWABLE ORAL at 00:25

## 2022-12-25 RX ADMIN — ATORVASTATIN CALCIUM 10 MG: 10 TABLET, FILM COATED ORAL at 08:15

## 2022-12-25 RX ADMIN — ONDANSETRON 4 MG: 2 INJECTION INTRAMUSCULAR; INTRAVENOUS at 22:19

## 2022-12-25 RX ADMIN — APIXABAN 5 MG: 5 TABLET, FILM COATED ORAL at 08:15

## 2022-12-25 RX ADMIN — SODIUM CHLORIDE 500 ML: 9 INJECTION, SOLUTION INTRAVENOUS at 17:55

## 2022-12-25 RX ADMIN — PIPERACILLIN AND TAZOBACTAM 4.5 G: 4; .5 INJECTION, POWDER, LYOPHILIZED, FOR SOLUTION INTRAVENOUS; PARENTERAL at 22:19

## 2022-12-25 RX ADMIN — CARVEDILOL 12.5 MG: 12.5 TABLET, FILM COATED ORAL at 08:16

## 2022-12-25 RX ADMIN — HYDROCODONE BITARTRATE AND ACETAMINOPHEN 1 TABLET: 7.5; 325 TABLET ORAL at 22:19

## 2022-12-25 RX ADMIN — PIPERACILLIN AND TAZOBACTAM 4.5 G: 4; .5 INJECTION, POWDER, LYOPHILIZED, FOR SOLUTION INTRAVENOUS; PARENTERAL at 05:08

## 2022-12-25 RX ADMIN — PANTOPRAZOLE SODIUM 40 MG: 40 INJECTION, POWDER, FOR SOLUTION INTRAVENOUS at 05:08

## 2022-12-25 NOTE — PLAN OF CARE
Goal Outcome Evaluation:  Plan of Care Reviewed With: patient, mother           Outcome Evaluation: OT eval completed. Patient seated in chair and agreeable to therapy. (I) for sit<>stand at chair. (I) to don<>doff socks seated in chair. Pt reports no skilled IP/OT needs at this time. OT will sign off.

## 2022-12-25 NOTE — THERAPY DISCHARGE NOTE
Acute Care - Occupational Therapy Discharge  [facility]    Patient Name: Matti Bravo  : 1970    MRN: [id number]                              Today's Date: 2022       Admit Date: 2022    Visit Dx:     ICD-10-CM ICD-9-CM   1. FRANKC (acute kidney injury) (HCC)  N17.9 584.9   2. Shortness of breath  R06.02 786.05   3. Hypokalemia  E87.6 276.8   4. Elevated brain natriuretic peptide (BNP) level  R79.89 790.99   5. Impaired mobility and ADLs  Z74.09 V49.89    Z78.9      Patient Active Problem List   Diagnosis   • Chronic systolic heart failure (HCC)   • Essential hypertension   • Mixed hyperlipidemia   • Obstructive sleep apnea   • Morbid obesity (HCC)   • Restrictive lung disease secondary to obesity   • Multiple lung nodules on CT   • Diabetes mellitus (HCC)   • Varicose veins of both lower extremities with pain   • Venous insufficiency (chronic) (peripheral)   • Surgical aftercare, circulatory system   • Chest pain   • Acute on chronic congestive heart failure (HCC)   • Abnormal nuclear stress test   • Acute congestive heart failure (HCC)   • Obesity (BMI 30-39.9)   • Hypotension   • Acute congestive heart failure, unspecified heart failure type (HCC)   • Shortness of breath   • NICM (nonischemic cardiomyopathy) (HCC)   • Pulmonary hypertension (HCC)   • CHF exacerbation (HCC)   • Heart failure (HCC)   • FRANCK (acute kidney injury) (HCC)     Past Medical History:   Diagnosis Date   • Asthma    • Chronic systolic heart failure (HCC)    • Diabetes mellitus (HCC)    • Hyperlipidemia    • Hypertension    • Obesity    • Peripheral vascular disease (HCC)    • Sleep apnea      Past Surgical History:   Procedure Laterality Date   • CARDIAC CATHETERIZATION N/A 2021   • CARDIAC DEFIBRILLATOR PLACEMENT     • VARICOSE VEIN SURGERY Right 2019      General Information     Row Name 22 1300          OT Time and Intention    Document Type evaluation  -SA     Mode of Treatment  co-treatment;occupational therapy;physical therapy  -     Row Name 12/25/22 1300          General Information    Patient Profile Reviewed yes  -SA     Prior Level of Function independent:;all household mobility;community mobility;gait;transfer;ADL's;cooking;cleaning;driving;shopping;using stairs;yard work  -     Existing Precautions/Restrictions fall  -     Barriers to Rehab medically complex  -     Row Name 12/25/22 1300          Living Environment    People in Home parent(s)  -     Row Name 12/25/22 1300          Home Main Entrance    Number of Stairs, Main Entrance five  -SA     Stair Railings, Main Entrance railing on right side (ascending)  -     Row Name 12/25/22 1300          Stairs Within Home, Primary    Stairs, Within Home, Primary Walk in shower.  -     Row Name 12/25/22 1300          Cognition    Orientation Status (Cognition) oriented x 4  -           User Key  (r) = Recorded By, (t) = Taken By, (c) = Cosigned By    Initials Name Provider Type    Cheryl Quevedo OT Occupational Therapist               Mobility/ADL's     Row Name 12/25/22 1300          Transfers    Transfers sit-stand transfer;stand-sit transfer  -[facility] Name 12/25/22 1300          Sit-Stand Transfer    Sit-Stand [place] (Transfers) independent  -     Row Name 12/25/22 1300          Stand-Sit Transfer    Stand-Sit [place] (Transfers) independent  -[facility] Name 12/25/22 1300          Activities of Daily Living    BADL Assessment/Intervention lower body dressing  -     Row Name 12/25/22 1300          Lower Body Dressing Assessment/Training    [place] Level (Lower Body Dressing) doff;don;socks;independent  -     Position (Lower Body Dressing) supported sitting  -           User Key  (r) = Recorded By, (t) = Taken By, (c) = Cosigned By    Initials Name Provider Type    Cheryl Quevedo OT Occupational Therapist               Obj/Interventions     Row Name 12/25/22 1300          Sensory  Assessment (Somatosensory)    Sensory Assessment (Somatosensory) UE sensation intact  -     Row Name 12/25/22 1300          Range of Motion Comprehensive    General Range of Motion bilateral upper extremity ROM WFL  -     Row Name 12/25/22 1300          Strength Comprehensive (MMT)    General Manual Muscle Testing (MMT) Assessment other (see comments)  -     Comment, General Manual Muscle Testing (MMT) Assessment BUE 4+/5 grossly  -SA           User Key  (r) = Recorded By, (t) = Taken By, (c) = Cosigned By    Initials Name Provider Type    Cheryl Quevedo OT Occupational Therapist               Goals/Plan    No documentation.                Clinical Impression     Row Name 12/25/22 1300          Pain Assessment    Pretreatment Pain Rating 0/10 - no pain  -     Posttreatment Pain Rating 0/10 - no pain  -[facility] Name 12/25/22 1300          Plan of Care Review    Plan of Care Reviewed With patient;mother  -     Outcome Evaluation OT eval completed. Patient seated in chair and agreeable to therapy. (I) for sit<>stand at chair. (I) to don<>doff socks seated in chair. Pt reports no skilled IP/OT needs at this time. OT will sign off.  -     Row Name 12/25/22 1300          Therapy Assessment/Plan (OT)    Patient/Family Therapy Goal Statement (OT) return home  -     Criteria for Skilled Therapeutic Interventions Met (OT) no;no problems identified which require skilled intervention  -     Therapy Frequency (OT) evaluation only  -     Row Name 12/25/22 1300          Therapy Plan Review/Discharge Plan (OT)    Anticipated Discharge Disposition (OT) home  -[facility] Name 12/25/22 1300          Positioning and Restraints    Pre-Treatment Position sitting in chair/recliner  -     Post Treatment Position chair  -SA     In Chair with family/caregiver;call light within reach;encouraged to call for assist  -           User Key  (r) = Recorded By, (t) = Taken By, (c) = Cosigned By    Initials Name Provider  Type     Cheryl Ahumada OT Occupational Therapist               Outcome Measures     Row Name 12/25/22 1300          How much help from another is currently needed...    Putting on and taking off regular lower body clothing? 4  -SA     Bathing (including washing, rinsing, and drying) 4  -SA     Toileting (which includes using toilet bed pan or urinal) 4  -SA     Putting on and taking off regular upper body clothing 4  -SA     Taking care of personal grooming (such as brushing teeth) 4  -SA     Eating meals 4  -SA     AM-PAC 6 Clicks Score (OT) 24  -SA     Row Name 12/25/22 1301 12/25/22 0809       How much help from another person do you currently need...    Turning from your back to your side while in flat bed without using bedrails? 4  -LR 4  -AG    Moving from lying on back to sitting on the side of a flat bed without bedrails? 4  -LR 4  -AG    Moving to and from a bed to a chair (including a wheelchair)? 3  -LR 4  -AG    Standing up from a chair using your arms (e.g., wheelchair, bedside chair)? 3  -LR 4  -AG    Climbing 3-5 steps with a railing? 3  -LR 4  -AG    To walk in hospital room? 3  -LR 4  -AG    AM-PAC 6 Clicks Score (PT) 20  -LR 24  -AG    Highest level of mobility 6 --> Walked 10 steps or more  -LR 8 --> Walked 250 feet or more  -AG    Row Name 12/25/22 1300          Functional Assessment    Outcome Measure Options AM-PAC 6 Clicks Daily Activity (OT)  -           User Key  (r) = Recorded By, (t) = Taken By, (c) = Cosigned By    Initials Name Provider Type     Amina William, RN Registered Nurse    LR Sterling Bowman Physical Therapist    Cheryl Quevedo OT Occupational Therapist              Occupational Therapy Education     Title: PT OT SLP Therapies (In Progress)     Topic: Occupational Therapy (In Progress)     Point: ADL training (Done)     Description:   Instruct learner(s) on proper safety adaptation and remediation techniques during self care or transfers.   Instruct in proper  use of assistive devices.              Learning Progress Summary           Patient Acceptance, E,TB, VU by  at 12/25/2022 1313    Comment: OT POC, Role of OT, transfer training                   Point: Home exercise program (Not Started)     Description:   Instruct learner(s) on appropriate technique for monitoring, assisting and/or progressing therapeutic exercises/activities.              Learner Progress:  Not documented in this visit.          Point: Precautions (Done)     Description:   Instruct learner(s) on prescribed precautions during self-care and functional transfers.              Learning Progress Summary           Patient Acceptance, E,TB, VU by  at 12/25/2022 1313    Comment: OT POC, Role of OT, transfer training                   Point: Body mechanics (Done)     Description:   Instruct learner(s) on proper positioning and spine alignment during self-care, functional mobility activities and/or exercises.              Learning Progress Summary           Patient Acceptance, E,TB, VU by  at 12/25/2022 1313    Comment: OT POC, Role of OT, transfer training                               User Key     Initials Effective Dates Name Provider Type Discipline     08/11/22 -  Cheryl Ahumada OT Occupational Therapist OT              OT Recommendation and Plan  Therapy Frequency (OT): evaluation only  Plan of Care Review  Plan of Care Reviewed With: patient, mother  Outcome Evaluation: OT eval completed. Patient seated in chair and agreeable to therapy. (I) for sit<>stand at chair. (I) to don<>doff socks seated in chair. Pt reports no skilled IP/OT needs at this time. OT will sign off.  Plan of Care Reviewed With: patient, mother  Outcome Evaluation: OT eval completed. Patient seated in chair and agreeable to therapy. (I) for sit<>stand at chair. (I) to don<>doff socks seated in chair. Pt reports no skilled IP/OT needs at this time. OT will sign off.     Time Calculation:    Time Calculation- OT     Row  Name 12/25/22 1300 12/25/22 0851          Time Calculation- OT    OT Start Time 1300  -SA --     OT Stop Time 1315  -SA --     OT Time Calculation (min) 15 min  -SA --     OT Received On 12/25/22  -SA --     OT - Next Appointment -- 12/26/22 -SA        Untimed Charges    OT Eval/Re-eval Minutes 15  -SA --        Total Minutes    Untimed Charges Total Minutes 15  -SA --      Total Minutes 15  -SA --           User Key  (r) = Recorded By, (t) = Taken By, (c) = Cosigned By    Initials Name Provider Type    SA Cheryl Ahumada OT Occupational Therapist              Therapy Charges for Today     Code Description Service Date Service Provider Modifiers Qty    [id number] HC OT EVAL LOW COMPLEXITY 1 12/25/2022 Cheryl Ahumada OT GO 1             OT Discharge Summary  Anticipated Discharge Disposition (OT): home    Cheryl Ahumada OT  12/25/2022

## 2022-12-25 NOTE — PLAN OF CARE
Goal Outcome Evaluation:  Plan of Care Reviewed With: patient           Outcome Evaluation: PT eval completed. Patient agreeable to minimal PT evaluation with encouragement. Patient Independent for sit<>stand transfer with no AD. SBA for ambulation 5'x1 with no AD. 28/28 on Tinetti balance and gait assessment which indicates low fall risk. Anticipate home at d/c.

## 2022-12-25 NOTE — SIGNIFICANT NOTE
12/25/22 0851   OTHER   Discipline occupational therapist;physical therapist   Rehab Time/Intention   Session Not Performed patient/family declined evaluation;other (see comments)  (Patient reports he has a bad headache and would rather hold off on therapy today. Therapy will f/u as able.)   Recommendation   PT - Next Appointment 12/26/22   Recommendation   OT - Next Appointment 12/26/22

## 2022-12-25 NOTE — THERAPY EVALUATION
Patient Name: Matti Bravo  : 1970    MRN: [id number]                              Today's Date: 2022       Admit Date: 2022    Visit Dx:     ICD-10-CM ICD-9-CM   1. FRANCK (acute kidney injury) (HCC)  N17.9 584.9   2. Shortness of breath  R06.02 786.05   3. Hypokalemia  E87.6 276.8   4. Elevated brain natriuretic peptide (BNP) level  R79.89 790.99   5. Impaired mobility and ADLs  Z74.09 V49.89    Z78.9    6. Impaired functional mobility and activity tolerance  Z74.09 V49.89     Patient Active Problem List   Diagnosis   • Chronic systolic heart failure (HCC)   • Essential hypertension   • Mixed hyperlipidemia   • Obstructive sleep apnea   • Morbid obesity (HCC)   • Restrictive lung disease secondary to obesity   • Multiple lung nodules on CT   • Diabetes mellitus (HCC)   • Varicose veins of both lower extremities with pain   • Venous insufficiency (chronic) (peripheral)   • Surgical aftercare, circulatory system   • Chest pain   • Acute on chronic congestive heart failure (HCC)   • Abnormal nuclear stress test   • Acute congestive heart failure (HCC)   • Obesity (BMI 30-39.9)   • Hypotension   • Acute congestive heart failure, unspecified heart failure type (HCC)   • Shortness of breath   • NICM (nonischemic cardiomyopathy) (HCC)   • Pulmonary hypertension (HCC)   • CHF exacerbation (HCC)   • Heart failure (HCC)   • FRANCK (acute kidney injury) (HCC)     Past Medical History:   Diagnosis Date   • Asthma    • Chronic systolic heart failure (HCC)    • Diabetes mellitus (HCC)    • Hyperlipidemia    • Hypertension    • Obesity    • Peripheral vascular disease (HCC)    • Sleep apnea      Past Surgical History:   Procedure Laterality Date   • CARDIAC CATHETERIZATION N/A 2021   • CARDIAC DEFIBRILLATOR PLACEMENT     • VARICOSE VEIN SURGERY Right 2019      General Information     Row Name 22 1301          Physical Therapy Time and Intention    Document Type evaluation  -LR     Mode  of Treatment co-treatment;occupational therapy;physical therapy  -LR     Row Name 12/25/22 1301          General Information    Patient Profile Reviewed yes  -LR     Prior Level of Function independent:;all household mobility;community mobility;gait;transfer;bed mobility;ADL's  -LR     Existing Precautions/Restrictions fall  -LR     Barriers to Rehab medically complex  -LR     Row Name 12/25/22 1301          Living Environment    People in Home parent(s)  Mother  -LR     Row Name 12/25/22 1301          Home Main Entrance    Number of Stairs, Main Entrance five  -LR     Stair Railings, Main Entrance railing on right side (ascending)  -LR     Row Name 12/25/22 1301          Stairs Within Home, Primary    Stairs, Within Home, Primary Walk in shower, No DME  -LR     Row Name 12/25/22 1301          Cognition    Orientation Status (Cognition) oriented x 4  -LR     Row Name 12/25/22 1301          Safety Issues, Functional Mobility    Safety Issues Affecting Function (Mobility) safety precautions follow-through/compliance;insight into deficits/self-awareness;safety precaution awareness;ability to follow commands;at risk behavior observed;awareness of need for assistance  -LR     Impairments Affecting Function (Mobility) endurance/activity tolerance;shortness of breath;strength  -LR           User Key  (r) = Recorded By, (t) = Taken By, (c) = Cosigned By    Initials Name Provider Type    LR Sterling Bowman Physical Therapist               Mobility     Row Name 12/25/22 1301          Bed Mobility    Bed Mobility supine-sit;sit-supine  -LR     Supine-Sit [place] (Bed Mobility) not tested  -LR     Sit-Supine [place] (Bed Mobility) not tested  -LR     Comment, (Bed Mobility) Deferred up in chair  -LR     Row Name 12/25/22 1301          Bed-Chair Transfer    Bed-Chair [place] (Transfers) independent  -LR     Row Name 12/25/22 1301          Sit-Stand Transfer    Sit-Stand [place] (Transfers) independent  -LR      Row Name 12/25/22 1301          Gait/Stairs (Locomotion)    [place] Level (Gait) standby assist  -LR     Distance in Feet (Gait) 5'x1  -LR           User Key  (r) = Recorded By, (t) = Taken By, (c) = Cosigned By    Initials Name Provider Type    LR Sterling Bowman Physical Therapist               Obj/Interventions     Row Name 12/25/22 1301          Range of Motion Comprehensive    General Range of Motion no range of motion deficits identified  -LR     Comment, General Range of Motion BLE grossly WFL  -LR     Row Name 12/25/22 1301          Strength Comprehensive (MMT)    Comment, General Manual Muscle Testing (MMT) Assessment BLE grosly 4+/5  -LR     Row Name 12/25/22 1301          Sensory Assessment (Somatosensory)    Sensory Assessment (Somatosensory) sensation intact;LE sensation intact  -LR           User Key  (r) = Recorded By, (t) = Taken By, (c) = Cosigned By    Initials Name Provider Type    LR Sterling Bowman Physical Therapist               Goals/Plan     Row Name 12/25/22 1301          Bed Mobility Goal 1 (PT)    Activity/Assistive Device (Bed Mobility Goal 1, PT) sit to supine;supine to sit  -LR     [place] Level/Cues Needed (Bed Mobility Goal 1, PT) independent  -LR     Time Frame (Bed Mobility Goal 1, PT) by discharge  -LR     Progress/Outcomes (Bed Mobility Goal 1, PT) goal not met  -LR     Row Name 12/25/22 1301          Transfer Goal 1 (PT)    Activity/Assistive Device (Transfer Goal 1, PT) sit-to-stand/stand-to-sit;bed-to-chair/chair-to-bed  -LR     [place] Level/Cues Needed (Transfer Goal 1, PT) independent  -LR     Time Frame (Transfer Goal 1, PT) by discharge  -LR     Progress/Outcome (Transfer Goal 1, PT) goal not met  -LR     Row Name 12/25/22 1301          Gait Training Goal 1 (PT)    Activity/Assistive Device (Gait Training Goal 1, PT) gait (walking locomotion)  -LR     [place] Level (Gait Training Goal 1, PT) independent  -LR     Distance (Gait Training Goal 1, PT)  150'x1  -LR     Time Frame (Gait Training Goal 1, PT) by discharge  -LR     Progress/Outcome (Gait Training Goal 1, PT) goal not met  -LR     Row Name 12/25/22 1301          Stairs Goal 1 (PT)    Activity/Assistive Device (Stairs Goal 1, PT) stairs, all skills;ascending stairs;descending stairs  -LR     [place] Level/Cues Needed (Stairs Goal 1, PT) modified independence  -LR     Number of Stairs (Stairs Goal 1, PT) 5  -LR     Time Frame (Stairs Goal 1, PT) by discharge  -LR     Progress/Outcome (Stairs Goal 1, PT) goal not met  -LR     Row Name 12/25/22 1301          Therapy Assessment/Plan (PT)    Planned Therapy Interventions (PT) balance training;bed mobility training;gait training;home exercise program;joint mobilization;lumbar stabilization;manual therapy techniques;neuromuscular re-education;patient/family education;orthotic fitting/training;postural re-education;prosthetic fitting/training;ROM (range of motion);stair training;strengthening;stretching;transfer training;wheelchair management/propulsion training;vestibular therapy  -LR           User Key  (r) = Recorded By, (t) = Taken By, (c) = Cosigned By    Initials Name Provider Type    LR Sterling Bowman Physical Therapist               Clinical Impression     Row Name 12/25/22 1301          Pain    Pretreatment Pain Rating 0/10 - no pain  -LR     Posttreatment Pain Rating 0/10 - no pain  -LR     Row Name 12/25/22 1301          Plan of Care Review    Plan of Care Reviewed With patient  -LR     Outcome Evaluation PT eval completed. Patient agreeable to minimal PT evaluation with encouragement. Patient Independent for sit<>stand transfer with no AD. SBA for ambulation 5'x1 with no AD. 28/28 on Tinetti balance and gait assessment which indicates low fall risk. Anticipate home at d/c.  -LR     Row Name 12/25/22 1301          Therapy Assessment/Plan (PT)    Patient/Family Therapy Goals Statement (PT) Patient wants to return home.  -LR     Rehab Potential  "(PT) good, to achieve stated therapy goals  -LR     Criteria for Skilled Interventions Met (PT) yes;meets criteria;skilled treatment is necessary  -LR     Therapy Frequency (PT) other (see comments)  3-7d/week  -LR     Predicted Duration of Therapy Intervention (PT) Until d/c or until all goals met  -LR     Row Name 12/25/22 1301          Positioning and Restraints    Pre-Treatment Position sitting in chair/recliner  -LR     Post Treatment Position chair  -LR     In Chair notified nsg;call light within reach;encouraged to call for assist  -LR           User Key  (r) = Recorded By, (t) = Taken By, (c) = Cosigned By    Initials Name Provider Type    LR Sterling Bowman Physical Therapist               Outcome Measures     Row Name 12/25/22 1301 12/25/22 0809       How much help from another person do you currently need...    Turning from your back to your side while in flat bed without using bedrails? 4  -LR 4  -AG    Moving from lying on back to sitting on the side of a flat bed without bedrails? 4  -LR 4  -AG    Moving to and from a bed to a chair (including a wheelchair)? 3  -LR 4  -AG    Standing up from a chair using your arms (e.g., wheelchair, bedside chair)? 3  -LR 4  -AG    Climbing 3-5 steps with a railing? 3  -LR 4  -AG    To walk in hospital room? 3  -LR 4  -AG    AM-PAC 6 Clicks Score (PT) 20  -LR 24  -AG    Highest level of mobility 6 --> Walked 10 steps or more  -LR 8 --> Walked 250 feet or more  -AG    Row Name 12/25/22 1301          Tinetti Assessment    Sitting Balance 1  -LR     Arises 2  -LR     Attempts to Rise 2  -LR     Immediate Standing Balance (first 5 sec) 2  -LR     Standing Balance 2  -LR     Sternal Nudge (feet close together) 2  -LR     Eyes Closed (feet close together) 1  -LR     Turning 360 Degrees- Steps 1  -LR     Turning 360 Degrees- Steadiness 1  -LR     Sitting Down 2  -LR     Tinetti Balance Score 16  -LR     Gait Initiation (immediate after told \"go\") 1  -LR     Step Length- " Right Swing 1  -LR     Step Length- Left Swing 1  -LR     Foot Clearance- Right Foot 1  -LR     Foot Clearance- Left Foot 1  -LR     Step Symmetry 1  -LR     Step Continuity 1  -LR     Path (excursion) 2  -LR     Trunk 2  -LR     Base of Support 1  -LR     Gait Score 12  -LR     [place] Total Score 28  -LR     Row Name 12/25/22 1301 12/25/22 1300       Functional Assessment    Outcome Measure Options AM-PAC 6 Clicks Basic Mobility (PT);[place]  -LR AM-PAC 6 Clicks Daily Activity (OT)  -SA          User Key  (r) = Recorded By, (t) = Taken By, (c) = Cosigned By    Initials Name Provider Type    Amina Forman, RN Registered Nurse    LR Sterling Bowman Physical Therapist    Cheryl Quevedo OT Occupational Therapist                             Physical Therapy Education     Title: PT OT SLP Therapies (In Progress)     Topic: Physical Therapy (Done)     Point: Mobility training (Done)     Learning Progress Summary           Patient Acceptance, E,TB, VU,NR by LR at 12/25/2022 1312    Comment: Educated on PT POC and goals.   Mother Acceptance, E,TB, VU,NR by LR at 12/25/2022 1312    Comment: Educated on PT POC and goals.                   Point: Home exercise program (Done)     Learning Progress Summary           Patient Acceptance, E,TB, VU,NR by LR at 12/25/2022 1312    Comment: Educated on PT POC and goals.   Mother Acceptance, E,TB, VU,NR by LR at 12/25/2022 1312    Comment: Educated on PT POC and goals.                   Point: Body mechanics (Done)     Learning Progress Summary           Patient Acceptance, E,TB, VU,NR by LR at 12/25/2022 1312    Comment: Educated on PT POC and goals.   Mother Acceptance, E,TB, VU,NR by LR at 12/25/2022 1312    Comment: Educated on PT POC and goals.                   Point: Precautions (Done)     Learning Progress Summary           Patient Acceptance, E,TB, VU,NR by LR at 12/25/2022 1312    Comment: Educated on PT POC and goals.   Mother Acceptance, E,TB, VU,NR by LR at  12/25/2022 1312    Comment: Educated on PT POC and goals.                               User Key     Initials Effective Dates Name Provider Type Discipline    LR 06/16/21 -  Sterling Bowman Physical Therapist PT              PT Recommendation and Plan  Planned Therapy Interventions (PT): balance training, bed mobility training, gait training, home exercise program, joint mobilization, lumbar stabilization, manual therapy techniques, neuromuscular re-education, patient/family education, orthotic fitting/training, postural re-education, prosthetic fitting/training, ROM (range of motion), stair training, strengthening, stretching, transfer training, wheelchair management/propulsion training, vestibular therapy  Plan of Care Reviewed With: patient  Outcome Evaluation: PT eval completed. Patient agreeable to minimal PT evaluation with encouragement. Patient Independent for sit<>stand transfer with no AD. SBA for ambulation 5'x1 with no AD. 28/28 on Tinetti balance and gait assessment which indicates low fall risk. Anticipate home at d/c.     Time Calculation:    PT Charges     Row Name 12/25/22 1316 12/25/22 0851          Time Calculation    Start Time 1300  -LR --     Stop Time 1316  -LR --     Time Calculation (min) 16 min  -LR --     PT Received On 12/25/22  -LR --     PT - Next Appointment -- 12/26/22  -     PT Goal Re-Cert Due Date 01/07/23  -LR --        Untimed Charges    PT Eval/Re-eval Minutes 16  -LR --        Total Minutes    Untimed Charges Total Minutes 16  -LR --      Total Minutes 16  -LR --           User Key  (r) = Recorded By, (t) = Taken By, (c) = Cosigned By    Initials Name Provider Type    LR Sterling Bowman Physical Therapist    SA Cheryl Ahumada OT Occupational Therapist              Therapy Charges for Today     Code Description Service Date Service Provider Modifiers Qty    54699599587 HC PT EVAL MOD COMPLEXITY 1 12/25/2022 Sterling Bowman GP 1          PT G-Codes  Outcome Measure Options:  AM-PAC 6 Clicks Basic Mobility (PT), Tinetti  AM-PAC 6 Clicks Score (PT): 20  AM-PAC 6 Clicks Score (OT): 24  Tinetti Total Score: 28  PT Discharge Summary  Anticipated Discharge Disposition (PT): home    Sterling Bowman  12/25/2022

## 2022-12-25 NOTE — PROGRESS NOTES
"    [facility]  [address] [place] [address]. [address]  T - [phone]  F - [phone]     Progress Note          PATIENT  DEMOGRAPHICS   PATIENT NAME: Matti Bravo                      PHYSICIAN: Efrain Mas MD  : 1970  MRN: [id number]   LOS: 3 days    Patient Care Team:  Shonna Ng APRN as PCP - General (Family Medicine)  Subjective   SUBJECTIVE   No acute events overnight. Denied any complaints. No chest pain or SOB.        Objective   OBJECTIVE   Vital Signs  Temp:  [95.4 °F (35.2 °C)-97.3 °F (36.3 °C)] 96 °F (35.6 °C)  Heart Rate:  [] 98  Resp:  [18-20] 20  BP: ()/(62-80) 95/71    Flowsheet Rows    Flowsheet Row First Filed Value   Admission Height 193 cm (76\") Documented at 2022 1700   Admission Weight 127 kg (280 lb) Documented at 2022 1700           I/O last 3 completed shifts:  In: 480 [P.O.:480]  Out: 775 [Urine:775]    PHYSICAL EXAM    Physical Exam  Vitals and nursing note reviewed.   Constitutional:       Appearance: Normal appearance.   Cardiovascular:      Rate and Rhythm: Normal rate and regular rhythm.      Heart sounds: Normal heart sounds. No murmur heard.    No friction rub. No gallop.   Pulmonary:      Effort: No respiratory distress.      Breath sounds: Normal breath sounds. No stridor. No wheezing, rhonchi or rales.   Musculoskeletal:      Right lower leg: Edema present.      Left lower leg: Edema present.   Skin:     General: Skin is warm and dry.   Neurological:      Mental Status: He is alert.         RESULTS   Results Review:    Results from last 7 days   Lab Units 22  0604 22  0607 22  0427 22  0547 22  1733   SODIUM mmol/L 132* 132* 133*   < > 138   POTASSIUM mmol/L 3.7 3.7 3.7   < > 2.9*   CHLORIDE mmol/L 91* 92* 92*   < > 97*   CO2 mmol/L 27.0 26.0 24.0   < > 25.0   BUN mg/dL 61* 61* 58*   < > 40*   CREATININE mg/dL 2.16* 2.14* 2.26*   < > 1.72*   CALCIUM mg/dL 9.0 9.5 9.4   " < > 9.3   BILIRUBIN mg/dL  --   --   --   --  2.2*   ALK PHOS U/L  --   --   --   --  63   ALT (SGPT) U/L  --   --   --   --  11   AST (SGOT) U/L  --   --   --   --  15   GLUCOSE mg/dL 139* 119* 120*   < > 145*    < > = values in this interval not displayed.       Estimated Creatinine Clearance: 59.5 mL/min (A) (by C-G formula based on SCr of 2.16 mg/dL (H)).    Results from last 7 days   Lab Units 12/23/22  0427 12/21/22  1612 12/20/22  1733   MAGNESIUM mg/dL 2.1 2.1 1.9             Results from last 7 days   Lab Units 12/25/22  0604 12/24/22  0607 12/23/22  0427 12/22/22  0201 12/21/22  0547   WBC 10*3/mm3 4.62 4.66 5.23 5.44 5.15   HEMOGLOBIN g/dL 14.1 14.2 13.9 14.4 14.7   PLATELETS 10*3/mm3 113* 112* 121* 110* 121*               Imaging Results (Last 24 Hours)     ** No results found for the last 24 hours. **           MEDICATIONS    apixaban, 5 mg, Oral, BID  atorvastatin, 10 mg, Oral, Daily  carvedilol, 12.5 mg, Oral, BID With Meals  losartan, 12.5 mg, Oral, Q24H  Morphine, 4 mg, Intravenous, Once  pantoprazole, 40 mg, Intravenous, Q AM  piperacillin-tazobactam, 4.5 g, Intravenous, Q8H  sodium chloride, 10 mL, Intravenous, Q12H      DOBUTamine, 2-20 mcg/kg/min  norepinephrine, 0.02-0.3 mcg/kg/min, Last Rate: Stopped (12/21/22 0639)        Assessment & Plan   ASSESSMENT / PLAN      FRANCK (acute kidney injury) (HCC)    1.  Acute Kidney Failure on CKD 3: Baseline creatinine 1.4-1.7 mg/dl  - Etiology of FRANCK likely contrast induced.    - Urinalysis showed trace protein and negative blood, 3-5 RBCs, 0-2 WBCs.  Urine sodium less than 20.  Urine protein creatinine ratio 235 mg.  - Creatinine is overall stable at 2.16 mg/dL.  Off IV fluids. Start Lasix 40 mg IV BID.   - Maintain hemodynamics.  Monitor I's and O's.  Avoid nephrotoxins like NSAIDs and IV contrast     2.  Shortness of breath:  - Underwent CT with contrast which was nondiagnostic, no marked fluid overload on exam. No pulmonary edema on xray. SOB etiology is  unclear at present.      3.  Chronic systolic CHF /AICD/ non ischemic cardiomyopathy     4.  Prediabetes     5.  Hypertension:  - Blood pressure is borderline low.      6.  KHANH:  - Supposed to be using CPAP at home     7. Acute cholecystitis:  - denies marked abdominal pain and also has some nausea and vomiting x 1. Denies acid reflux. Continue protonix. Patient started on Zoysn. Will observe renal function on zosyn           This document has been electronically signed by Efrain Mas MD on December 25, 2022 12:20 CST

## 2022-12-25 NOTE — PROGRESS NOTES
[facility] Medicine Services  INPATIENT PROGRESS NOTE    Length of Stay: 3  Date of Admission: 12/20/2022  Primary Care Physician: Shonna Ng APRN    Subjective   Chief Complaint: Shortness of breath  HPI: Patient is doing okay.  He states that he intermittently will have abdominal pain or shortness of breath.  Some nausea.    As of today, 12/25/22  Review of Systems   Constitutional: Positive for activity change and fatigue. Negative for appetite change, chills and fever.   Respiratory: Positive for shortness of breath. Negative for chest tightness.    Cardiovascular: Negative for chest pain, palpitations and leg swelling.   Gastrointestinal: Positive for abdominal pain and nausea. Negative for constipation, diarrhea and vomiting.   Skin: Negative for wound.   Neurological: Negative for dizziness, weakness, light-headedness, numbness and headaches.        All pertinent negatives and positives are as above. All other systems have been reviewed and are negative unless otherwise stated.    Objective    Temp:  [95.4 °F (35.2 °C)-97.3 °F (36.3 °C)] 96 °F (35.6 °C)  Heart Rate:  [] 98  Resp:  [18-20] 20  BP: ()/(62-80) 95/71    AM-PAC 6 Clicks Score (PT): 20 (12/25/22 1301)    As of today, 12/25/22  Physical Exam  Vitals reviewed.   Constitutional:       Appearance: He is well-developed.   HENT:      Head: Normocephalic and atraumatic.   Eyes:      Pupils: Pupils are equal, round, and reactive to light.   Cardiovascular:      Rate and Rhythm: Normal rate. Rhythm irregularly irregular.      Heart sounds: Normal heart sounds. No murmur heard.    No friction rub. No gallop.   Pulmonary:      Effort: Pulmonary effort is normal. No respiratory distress.      Breath sounds: Normal breath sounds. No wheezing or rales.   Chest:      Chest wall: No tenderness.   Abdominal:      General: Bowel sounds are normal. There is no distension.      Palpations: Abdomen  is soft.      Tenderness: There is no abdominal tenderness.   Musculoskeletal:      Cervical back: Normal range of motion and neck supple.   Psychiatric:         Behavior: Behavior normal.           Results Review:  I have reviewed the labs, radiology results, and diagnostic studies.    Laboratory Data:   Results from last 7 days   Lab Units 12/25/22  0604 12/24/22  0607 12/23/22  0427 12/21/22  0547 12/20/22  1733   SODIUM mmol/L 132* 132* 133*   < > 138   POTASSIUM mmol/L 3.7 3.7 3.7   < > 2.9*   CHLORIDE mmol/L 91* 92* 92*   < > 97*   CO2 mmol/L 27.0 26.0 24.0   < > 25.0   BUN mg/dL 61* 61* 58*   < > 40*   CREATININE mg/dL 2.16* 2.14* 2.26*   < > 1.72*   GLUCOSE mg/dL 139* 119* 120*   < > 145*   CALCIUM mg/dL 9.0 9.5 9.4   < > 9.3   BILIRUBIN mg/dL  --   --   --   --  2.2*   ALK PHOS U/L  --   --   --   --  63   ALT (SGPT) U/L  --   --   --   --  11   AST (SGOT) U/L  --   --   --   --  15   ANION GAP mmol/L 14.0 14.0 17.0*   < > 16.0*    < > = values in this interval not displayed.     Estimated Creatinine Clearance: 59.5 mL/min (A) (by C-G formula based on SCr of 2.16 mg/dL (H)).  Results from last 7 days   Lab Units 12/23/22  0427 12/21/22  1612 12/20/22  1733   MAGNESIUM mg/dL 2.1 2.1 1.9         Results from last 7 days   Lab Units 12/25/22  0604 12/24/22  0607 12/23/22  0427 12/22/22  0201 12/21/22  0547   WBC 10*3/mm3 4.62 4.66 5.23 5.44 5.15   HEMOGLOBIN g/dL 14.1 14.2 13.9 14.4 14.7   HEMATOCRIT % 43.4 44.0 43.3 44.9 44.3   PLATELETS 10*3/mm3 113* 112* 121* 110* 121*           Culture Data:   No results found for: BLOODCX  No results found for: URINECX  No results found for: RESPCX  No results found for: WOUNDCX  No results found for: STOOLCX  No components found for: BODYFLD    Radiology Data:   Imaging Results (Last 24 Hours)     ** No results found for the last 24 hours. **          I have reviewed the patient's current medications.     Assessment/Plan     Active Hospital Problems    Diagnosis    •  **FRANCK (acute kidney injury) ([facility])        Plan:    1.  Shortness of breath: May be slightly better.  CT scan for pulmonary embolism was nondiagnostic.  VQ scan low probability.  2.  Volume depletion: Continue to hydrate gently.  Appreciate nephrology help.  3.  Chronic systolic congestive heart failure: Minimally hypovolemic currently.  Continue home medications.  Continue anticoagulation for risk of thrombus.  4.  History of hyperglycemia: No diagnosis of diabetes.  Will place on sliding scale insulin for now.  5.  Obstructive sleep apnea: Patient states its been over 2 years since he has had a CPAP.  He is scheduled to go  his CPAP on 12/29.  6.  Acute kidney injury: Renal function is some better today.  Superimposed upon chronic kidney disease.  Appreciate nephrology help.  7.  Possible cholecystitis:  Medical management for now.  No leukocytosis.  Continue Zosyn.  Get HIDA scan.  Clear liquid diet.  Will need surgical consultation when more stable.  We will discontinue Eliquis.  If HIDA scan is negative would consider reinitiation of Eliquis soon.  8.  DVT prophylaxis: Eliquis.        The patient was evaluated during the global COVID-19 pandemic, and the diagnosis was suspected/considered upon their initial presentation.  Evaluation, treatment, and testing were consistent with current guidelines for patients who present with complaints or symptoms that may be related to COVID-19.    I confirmed that the patient's Advance Care Plan is present, code status is documented, or surrogate decision maker is listed in the patient's medical record.       Discharge Planning: I expect patient to be discharged to home in 3-4 days.        This document has been electronically signed by Will French MD on December 25, 2022 13:15 CST

## 2022-12-25 NOTE — PLAN OF CARE
Goal Outcome Evaluation:  Plan of Care Reviewed With: patient, mother        Progress: no change  Outcome Evaluation: bp remains low, did not give lasix, patient complains of abdomen pain, does not have appetitie, hydascan ordered, patient requesting to be in and out cath'd/did not meet criteria and then void on his own, resting between care.

## 2022-12-26 LAB
ANION GAP SERPL CALCULATED.3IONS-SCNC: 15 MMOL/L (ref 5–15)
BACTERIA SPEC AEROBE CULT: NORMAL
BASOPHILS # BLD AUTO: 0.04 10*3/MM3 (ref 0–0.2)
BASOPHILS NFR BLD AUTO: 0.7 % (ref 0–1.5)
BUN SERPL-MCNC: 67 MG/DL (ref 6–20)
BUN/CREAT SERPL: 29.4 (ref 7–25)
CALCIUM SPEC-SCNC: 9.1 MG/DL (ref 8.6–10.5)
CHLORIDE SERPL-SCNC: 89 MMOL/L (ref 98–107)
CO2 SERPL-SCNC: 26 MMOL/L (ref 22–29)
CREAT SERPL-MCNC: 2.28 MG/DL (ref 0.76–1.27)
DEPRECATED RDW RBC AUTO: 48.8 FL (ref 37–54)
EGFRCR SERPLBLD CKD-EPI 2021: 33.9 ML/MIN/1.73
EOSINOPHIL # BLD AUTO: 0.02 10*3/MM3 (ref 0–0.4)
EOSINOPHIL NFR BLD AUTO: 0.3 % (ref 0.3–6.2)
ERYTHROCYTE [DISTWIDTH] IN BLOOD BY AUTOMATED COUNT: 14.7 % (ref 12.3–15.4)
GLUCOSE SERPL-MCNC: 118 MG/DL (ref 65–99)
HCT VFR BLD AUTO: 42.4 % (ref 37.5–51)
HGB BLD-MCNC: 14.1 G/DL (ref 13–17.7)
IMM GRANULOCYTES # BLD AUTO: 0.02 10*3/MM3 (ref 0–0.05)
IMM GRANULOCYTES NFR BLD AUTO: 0.3 % (ref 0–0.5)
LYMPHOCYTES # BLD AUTO: 1.27 10*3/MM3 (ref 0.7–3.1)
LYMPHOCYTES NFR BLD AUTO: 21.7 % (ref 19.6–45.3)
MCH RBC QN AUTO: 30.2 PG (ref 26.6–33)
MCHC RBC AUTO-ENTMCNC: 33.3 G/DL (ref 31.5–35.7)
MCV RBC AUTO: 90.8 FL (ref 79–97)
MONOCYTES # BLD AUTO: 1.11 10*3/MM3 (ref 0.1–0.9)
MONOCYTES NFR BLD AUTO: 19 % (ref 5–12)
NEUTROPHILS NFR BLD AUTO: 3.39 10*3/MM3 (ref 1.7–7)
NEUTROPHILS NFR BLD AUTO: 58 % (ref 42.7–76)
NRBC BLD AUTO-RTO: 0.3 /100 WBC (ref 0–0.2)
PLATELET # BLD AUTO: 120 10*3/MM3 (ref 140–450)
PMV BLD AUTO: 14.3 FL (ref 6–12)
POTASSIUM SERPL-SCNC: 3.5 MMOL/L (ref 3.5–5.2)
RBC # BLD AUTO: 4.67 10*6/MM3 (ref 4.14–5.8)
SODIUM SERPL-SCNC: 130 MMOL/L (ref 136–145)
WBC NRBC COR # BLD: 5.85 10*3/MM3 (ref 3.4–10.8)

## 2022-12-26 PROCEDURE — 80048 BASIC METABOLIC PNL TOTAL CA: CPT | Performed by: HOSPITALIST

## 2022-12-26 PROCEDURE — 82962 GLUCOSE BLOOD TEST: CPT

## 2022-12-26 PROCEDURE — 25010000002 PIPERACILLIN SOD-TAZOBACTAM PER 1 G

## 2022-12-26 PROCEDURE — 25010000002 ONDANSETRON PER 1 MG

## 2022-12-26 PROCEDURE — 85025 COMPLETE CBC W/AUTO DIFF WBC: CPT | Performed by: HOSPITALIST

## 2022-12-26 RX ORDER — CARVEDILOL 6.25 MG/1
6.25 TABLET ORAL 2 TIMES DAILY WITH MEALS
Status: DISCONTINUED | OUTPATIENT
Start: 2022-12-26 | End: 2023-01-03

## 2022-12-26 RX ORDER — MORPHINE SULFATE 2 MG/ML
2 INJECTION, SOLUTION INTRAMUSCULAR; INTRAVENOUS
Status: DISPENSED | OUTPATIENT
Start: 2022-12-26 | End: 2023-01-02

## 2022-12-26 RX ADMIN — HYDROCODONE BITARTRATE AND ACETAMINOPHEN 1 TABLET: 7.5; 325 TABLET ORAL at 18:47

## 2022-12-26 RX ADMIN — Medication 10 ML: at 08:00

## 2022-12-26 RX ADMIN — HYDROCODONE BITARTRATE AND ACETAMINOPHEN 1 TABLET: 7.5; 325 TABLET ORAL at 08:07

## 2022-12-26 RX ADMIN — ATORVASTATIN CALCIUM 10 MG: 10 TABLET, FILM COATED ORAL at 08:00

## 2022-12-26 RX ADMIN — PANTOPRAZOLE SODIUM 40 MG: 40 INJECTION, POWDER, FOR SOLUTION INTRAVENOUS at 05:29

## 2022-12-26 RX ADMIN — PIPERACILLIN AND TAZOBACTAM 4.5 G: 4; .5 INJECTION, POWDER, LYOPHILIZED, FOR SOLUTION INTRAVENOUS; PARENTERAL at 12:53

## 2022-12-26 RX ADMIN — PIPERACILLIN AND TAZOBACTAM 4.5 G: 4; .5 INJECTION, POWDER, LYOPHILIZED, FOR SOLUTION INTRAVENOUS; PARENTERAL at 05:36

## 2022-12-26 RX ADMIN — PIPERACILLIN AND TAZOBACTAM 4.5 G: 4; .5 INJECTION, POWDER, LYOPHILIZED, FOR SOLUTION INTRAVENOUS; PARENTERAL at 21:03

## 2022-12-26 RX ADMIN — Medication 10 ML: at 21:03

## 2022-12-26 RX ADMIN — ONDANSETRON 4 MG: 2 INJECTION INTRAMUSCULAR; INTRAVENOUS at 17:54

## 2022-12-26 NOTE — NURSING NOTE
Pt questioning why he has to be in pain. Explained to pt and mother why there were limitations on medicating his pain. Explained about how kidney function, low BP and CHF are factors in his medication management. Appeared satisfied with that answer.

## 2022-12-26 NOTE — PLAN OF CARE
Goal Outcome Evaluation:           Progress: no change   A&O x4, mother at bedside throughout the day. Not feeling well this shift, attempted multiple position changes but unable to get comfortable. C/o abdominal discomfort and voiced complaints of not getting scans and/or surgery done. Informed by radiology that HIDA scan will be done tomorrow, pt made aware. VSS, BP soft. Urine output adequate.

## 2022-12-26 NOTE — NURSING NOTE
At 1935 pt requested for oxygen to be applied again. Stated it helped earlier when he was lightheaded. Applied O2 at 1.5 L. Mother in the room disagreed with this nurses actions. Told pt that he shouldn't wear it because he can become dependent. At 1956 pt called and stated that he couldn't feel any O2 coming out of tubing. Entered room to find O2 was off. Mother was not in room. Told pt that no one is to touch the flow meter. He stated that no one had. Will continue to monitor.

## 2022-12-26 NOTE — PROGRESS NOTES
Adult Nutrition  Assessment    Patient Name:  Matti Bravo  YOB: 1970  MRN: [id number]  Admit Date:  12/20/2022    Assessment Date:  12/26/2022    Comments:   Pt continues treatment for FRANCK/CKD, bun/cr elevated and 12/25 IV lasix 2x day initiated. Pt t/f to CCU last night r/t low BP- no pressors at this time. RN notes pt still in a lot abdominal pain r/t cholecystitis and unable to give pain meds routinely d/t BP. Pt is npo /clears x4 days.  Wt 11/20/22 284#, 12/20 280# and # w/ 2+ general edema noted. Pt has been asking for apple juice and gatorade mostly. If unable to advance diet soon, may need consider Parental nutrition. RD to follow hospital course.      Electronically signed by:  Sienna Whitaker RD  12/26/22 10:47 CST

## 2022-12-26 NOTE — SIGNIFICANT NOTE
12/26/22 1446   OTHER   Discipline physical therapy assistant   Rehab Time/Intention   Session Not Performed other (see comments)  (pt t/f to ccu-left note for restart orders)

## 2022-12-26 NOTE — NURSING NOTE
Pt currently receiving a 500 mL bolus due to low BP. Current manual BP is 84/60. Heart rate showed 49, though telemetry shows 90 and AFib. Pt reports small, clear emesis x1. Pt reports feeling light headed, fatigued, and abdominal pain. Denies difficulty breathing.

## 2022-12-26 NOTE — PROGRESS NOTES
[facility] Medicine Services  INPATIENT PROGRESS NOTE    Length of Stay: 4  Date of Admission: 12/20/2022  Primary Care Physician: Shonna Ng APRN    Subjective   Chief Complaint: Shortness of breath  HPI: Having some abdominal pain.  Shortness of breath seems about baseline.    As of today, 12/26/22  Review of Systems   Constitutional: Positive for activity change and fatigue. Negative for appetite change, chills and fever.   Respiratory: Positive for shortness of breath. Negative for chest tightness.    Cardiovascular: Negative for chest pain, palpitations and leg swelling.   Gastrointestinal: Positive for abdominal pain and nausea. Negative for constipation, diarrhea and vomiting.   Skin: Negative for wound.   Neurological: Negative for dizziness, weakness, light-headedness, numbness and headaches.        All pertinent negatives and positives are as above. All other systems have been reviewed and are negative unless otherwise stated.    Objective    Temp:  [96.3 °F (35.7 °C)-96.9 °F (36.1 °C)] 96.8 °F (36 °C)  Heart Rate:  [] 101  Resp:  [18-20] 18  BP: ()/(41-83) 82/61    AM-PAC 6 Clicks Score (PT): 22 (12/26/22 0100)    As of today, 12/26/22  Physical Exam  Vitals reviewed.   Constitutional:       Appearance: He is well-developed.   HENT:      Head: Normocephalic and atraumatic.   Eyes:      Pupils: Pupils are equal, round, and reactive to light.   Cardiovascular:      Rate and Rhythm: Normal rate. Rhythm irregularly irregular.      Heart sounds: Normal heart sounds. No murmur heard.    No friction rub. No gallop.   Pulmonary:      Effort: Pulmonary effort is normal. No respiratory distress.      Breath sounds: Normal breath sounds. No wheezing or rales.   Chest:      Chest wall: No tenderness.   Abdominal:      General: Bowel sounds are normal. There is no distension.      Palpations: Abdomen is soft.      Tenderness: There is no abdominal  tenderness.   Musculoskeletal:      Cervical back: Normal range of motion and neck supple.   Psychiatric:         Behavior: Behavior normal.           Results Review:  I have reviewed the labs, radiology results, and diagnostic studies.    Laboratory Data:   Results from last 7 days   Lab Units 12/26/22  0314 12/25/22  0604 12/24/22  0607 12/21/22  0547 12/20/22  1733   SODIUM mmol/L 130* 132* 132*   < > 138   POTASSIUM mmol/L 3.5 3.7 3.7   < > 2.9*   CHLORIDE mmol/L 89* 91* 92*   < > 97*   CO2 mmol/L 26.0 27.0 26.0   < > 25.0   BUN mg/dL 67* 61* 61*   < > 40*   CREATININE mg/dL 2.28* 2.16* 2.14*   < > 1.72*   GLUCOSE mg/dL 118* 139* 119*   < > 145*   CALCIUM mg/dL 9.1 9.0 9.5   < > 9.3   BILIRUBIN mg/dL  --   --   --   --  2.2*   ALK PHOS U/L  --   --   --   --  63   ALT (SGPT) U/L  --   --   --   --  11   AST (SGOT) U/L  --   --   --   --  15   ANION GAP mmol/L 15.0 14.0 14.0   < > 16.0*    < > = values in this interval not displayed.     Estimated Creatinine Clearance: 56.4 mL/min (A) (by C-G formula based on SCr of 2.28 mg/dL (H)).  Results from last 7 days   Lab Units 12/23/22  0427 12/21/22  1612 12/20/22  1733   MAGNESIUM mg/dL 2.1 2.1 1.9         Results from last 7 days   Lab Units 12/26/22  0314 12/25/22  0604 12/24/22  0607 12/23/22  0427 12/22/22  0201   WBC 10*3/mm3 5.85 4.62 4.66 5.23 5.44   HEMOGLOBIN g/dL 14.1 14.1 14.2 13.9 14.4   HEMATOCRIT % 42.4 43.4 44.0 43.3 44.9   PLATELETS 10*3/mm3 120* 113* 112* 121* 110*           Culture Data:   No results found for: BLOODCX  No results found for: URINECX  No results found for: RESPCX  No results found for: WOUNDCX  No results found for: STOOLCX  No components found for: BODYFLD    Radiology Data:   Imaging Results (Last 24 Hours)     ** No results found for the last 24 hours. **          I have reviewed the patient's current medications.     Assessment/Plan     Active Hospital Problems    Diagnosis    • **FRANCK (acute kidney injury) ([facility])        Plan:    1.   Shortness of breath: May be slightly better.  CT scan for pulmonary embolism was nondiagnostic.  VQ scan low probability.  2.  Volume depletion: Continue to hydrate gently.  Appreciate nephrology help.  3.  Chronic systolic congestive heart failure: Appears euvolemic..  Continue home medications.    Eliquis currently on hold in case of surgery.  4.  History of hyperglycemia: No diagnosis of diabetes.  Will place on sliding scale insulin for now.  Currently well controlled.  5.  Obstructive sleep apnea: Patient states its been over 2 years since he has had a CPAP.  He is scheduled to go  his CPAP on 12/29.  6.  Acute kidney injury: Renal function is some worse today.  Superimposed upon chronic kidney disease.  Appreciate nephrology help.  7.  Possible cholecystitis:  Medical management for now.  No leukocytosis.  Continue Zosyn.  HIDA scan pending.  Clear liquid diet.  Will need surgical consultation when HIDA scan resulted.  Eliquis discontinued 12/25.  If HIDA scan is negative would consider reinitiation of Eliquis soon.  8.  DVT prophylaxis: SCDs.        The patient was evaluated during the global COVID-19 pandemic, and the diagnosis was suspected/considered upon their initial presentation.  Evaluation, treatment, and testing were consistent with current guidelines for patients who present with complaints or symptoms that may be related to COVID-19.    I confirmed that the patient's Advance Care Plan is present, code status is documented, or surrogate decision maker is listed in the patient's medical record.       Discharge Planning: I expect patient to be discharged to home in 3-4 days.        This document has been electronically signed by Will French MD on December 26, 2022 12:20 CST

## 2022-12-26 NOTE — PROGRESS NOTES
"    [facility]  [address] [place] [address]. [id number]  T - [phone]  F - [phone]     Progress Note          PATIENT  DEMOGRAPHICS   PATIENT NAME: Matti Bravo                      PHYSICIAN: Efrain Mas MD  : 1970  MRN: [id number]   LOS: 4 days    Patient Care Team:  Shonna Ng APRN as PCP - General (Family Medicine)  Subjective   SUBJECTIVE   Transferred to ICU due to low blood pressure. C/o pain on his side. No chest pain or SOB.        Objective   OBJECTIVE   Vital Signs  Temp:  [96.5 °F (35.8 °C)-96.9 °F (36.1 °C)] 96.8 °F (36 °C)  Heart Rate:  [] 102  Resp:  [18-20] 18  BP: ()/(41-83) 90/63    Flowsheet Rows    Flowsheet Row First Filed Value   Admission Height 193 cm (76\") Documented at 2022 1700   Admission Weight 127 kg (280 lb) Documented at 2022 1700           I/O last 3 completed shifts:  In: 120 [P.O.:120]  Out: 1075 [Urine:1075]    PHYSICAL EXAM    Physical Exam  Vitals and nursing note reviewed.   Constitutional:       Appearance: Normal appearance.   Cardiovascular:      Rate and Rhythm: Normal rate and regular rhythm.      Heart sounds: Normal heart sounds. No murmur heard.    No friction rub. No gallop.   Pulmonary:      Effort: No respiratory distress.      Breath sounds: Normal breath sounds. No stridor. No wheezing, rhonchi or rales.   Musculoskeletal:      Right lower leg: Edema present.      Left lower leg: Edema present.   Skin:     General: Skin is warm and dry.   Neurological:      Mental Status: He is alert.         RESULTS   Results Review:    Results from last 7 days   Lab Units 22  0314 22  0604 22  0607 22  0547 22  1733   SODIUM mmol/L 130* 132* 132*   < > 138   POTASSIUM mmol/L 3.5 3.7 3.7   < > 2.9*   CHLORIDE mmol/L 89* 91* 92*   < > 97*   CO2 mmol/L 26.0 27.0 26.0   < > 25.0   BUN mg/dL 67* 61* 61*   < > 40*   CREATININE mg/dL 2.28* 2.16* 2.14*   < > 1.72* "   CALCIUM mg/dL 9.1 9.0 9.5   < > 9.3   BILIRUBIN mg/dL  --   --   --   --  2.2*   ALK PHOS U/L  --   --   --   --  63   ALT (SGPT) U/L  --   --   --   --  11   AST (SGOT) U/L  --   --   --   --  15   GLUCOSE mg/dL 118* 139* 119*   < > 145*    < > = values in this interval not displayed.       Estimated Creatinine Clearance: 56.4 mL/min (A) (by C-G formula based on SCr of 2.28 mg/dL (H)).    Results from last 7 days   Lab Units 12/23/22  0427 12/21/22  1612 12/20/22  1733   MAGNESIUM mg/dL 2.1 2.1 1.9             Results from last 7 days   Lab Units 12/26/22  0314 12/25/22  0604 12/24/22  0607 12/23/22  0427 12/22/22  0201   WBC 10*3/mm3 5.85 4.62 4.66 5.23 5.44   HEMOGLOBIN g/dL 14.1 14.1 14.2 13.9 14.4   PLATELETS 10*3/mm3 120* 113* 112* 121* 110*               Imaging Results (Last 24 Hours)     ** No results found for the last 24 hours. **           MEDICATIONS    atorvastatin, 10 mg, Oral, Daily  carvedilol, 12.5 mg, Oral, BID With Meals  furosemide, 40 mg, Intravenous, BID  losartan, 12.5 mg, Oral, Q24H  Morphine, 4 mg, Intravenous, Once  pantoprazole, 40 mg, Intravenous, Q AM  piperacillin-tazobactam, 4.5 g, Intravenous, Q8H  sodium chloride, 10 mL, Intravenous, Q12H           Assessment & Plan   ASSESSMENT / PLAN      FRANCK (acute kidney injury) (HCC)    1.  Acute Kidney Failure on CKD 3: Baseline creatinine 1.4-1.7 mg/dl  - Etiology of FRANCK likely contrast induced.    - Urinalysis showed trace protein and negative blood, 3-5 RBCs, 0-2 WBCs.  Urine sodium less than 20.  Urine protein creatinine ratio 235 mg.  - Creatinine is overall stable at 2.28 mg/dL.  Keep Lasix 40 mg IV BID. Start fluid restriction of 1500 ml a day.   - Maintain hemodynamics.  Monitor I's and O's.  Avoid nephrotoxins like NSAIDs and IV contrast     2.  Shortness of breath:  - Underwent CT with contrast which was nondiagnostic, no marked fluid overload on exam. No pulmonary edema on xray. SOB etiology is unclear at present.      3.  Chronic  systolic CHF /AICD/ non ischemic cardiomyopathy     4.  Prediabetes     5.  Hypertension:  - Blood pressure is borderline low. Decrease carvedilol to 6.25 mg twice day.      6.  KHANH:  - Supposed to be using CPAP at home     7. Acute cholecystitis:  - denies marked abdominal pain and also has some nausea and vomiting x 1. Denies acid reflux. Continue protonix. Patient started on Zoysn. Will observe renal function on zosyn           This document has been electronically signed by Efrain Mas MD on December 26, 2022 14:34 CST

## 2022-12-26 NOTE — PLAN OF CARE
Goal Outcome Evaluation:  Plan of Care Reviewed With: patient        Progress: no change  Outcome Evaluation: transfer from medical floor this shift. patient's blood pressure remains stable, no pressors have been initiated. UOP adequate. all basic needs met at this time. will continue to monitor.

## 2022-12-27 ENCOUNTER — APPOINTMENT (OUTPATIENT)
Dept: NUCLEAR MEDICINE | Facility: HOSPITAL | Age: 52
DRG: 682 | End: 2022-12-27
Payer: MEDICAID

## 2022-12-27 LAB
ALBUMIN SERPL-MCNC: 3.8 G/DL (ref 3.5–5.2)
ALBUMIN/GLOB SERPL: 1.2 G/DL
ALP SERPL-CCNC: 52 U/L (ref 39–117)
ALT SERPL W P-5'-P-CCNC: 422 U/L (ref 1–41)
ANION GAP SERPL CALCULATED.3IONS-SCNC: 15 MMOL/L (ref 5–15)
ANION GAP SERPL CALCULATED.3IONS-SCNC: 15 MMOL/L (ref 5–15)
AST SERPL-CCNC: 258 U/L (ref 1–40)
BASOPHILS # BLD AUTO: 0.04 10*3/MM3 (ref 0–0.2)
BASOPHILS NFR BLD AUTO: 0.8 % (ref 0–1.5)
BILIRUB SERPL-MCNC: 3.4 MG/DL (ref 0–1.2)
BUN SERPL-MCNC: 71 MG/DL (ref 6–20)
BUN SERPL-MCNC: 73 MG/DL (ref 6–20)
BUN/CREAT SERPL: 30.5 (ref 7–25)
BUN/CREAT SERPL: 31.1 (ref 7–25)
CALCIUM SPEC-SCNC: 9 MG/DL (ref 8.6–10.5)
CALCIUM SPEC-SCNC: 9.2 MG/DL (ref 8.6–10.5)
CHLORIDE SERPL-SCNC: 88 MMOL/L (ref 98–107)
CHLORIDE SERPL-SCNC: 90 MMOL/L (ref 98–107)
CO2 SERPL-SCNC: 25 MMOL/L (ref 22–29)
CO2 SERPL-SCNC: 26 MMOL/L (ref 22–29)
CREAT SERPL-MCNC: 2.28 MG/DL (ref 0.76–1.27)
CREAT SERPL-MCNC: 2.39 MG/DL (ref 0.76–1.27)
DEPRECATED RDW RBC AUTO: 49.1 FL (ref 37–54)
EGFRCR SERPLBLD CKD-EPI 2021: 32 ML/MIN/1.73
EGFRCR SERPLBLD CKD-EPI 2021: 33.9 ML/MIN/1.73
EOSINOPHIL # BLD AUTO: 0.04 10*3/MM3 (ref 0–0.4)
EOSINOPHIL NFR BLD AUTO: 0.8 % (ref 0.3–6.2)
ERYTHROCYTE [DISTWIDTH] IN BLOOD BY AUTOMATED COUNT: 14.7 % (ref 12.3–15.4)
GLOBULIN UR ELPH-MCNC: 3.3 GM/DL
GLUCOSE BLDC GLUCOMTR-MCNC: 131 MG/DL (ref 70–130)
GLUCOSE SERPL-MCNC: 123 MG/DL (ref 65–99)
GLUCOSE SERPL-MCNC: 143 MG/DL (ref 65–99)
HCT VFR BLD AUTO: 43.8 % (ref 37.5–51)
HGB BLD-MCNC: 14.3 G/DL (ref 13–17.7)
IMM GRANULOCYTES # BLD AUTO: 0.02 10*3/MM3 (ref 0–0.05)
IMM GRANULOCYTES NFR BLD AUTO: 0.4 % (ref 0–0.5)
LYMPHOCYTES # BLD AUTO: 1.1 10*3/MM3 (ref 0.7–3.1)
LYMPHOCYTES NFR BLD AUTO: 22.6 % (ref 19.6–45.3)
MCH RBC QN AUTO: 29.8 PG (ref 26.6–33)
MCHC RBC AUTO-ENTMCNC: 32.6 G/DL (ref 31.5–35.7)
MCV RBC AUTO: 91.3 FL (ref 79–97)
MONOCYTES # BLD AUTO: 0.91 10*3/MM3 (ref 0.1–0.9)
MONOCYTES NFR BLD AUTO: 18.7 % (ref 5–12)
NEUTROPHILS NFR BLD AUTO: 2.75 10*3/MM3 (ref 1.7–7)
NEUTROPHILS NFR BLD AUTO: 56.7 % (ref 42.7–76)
NRBC BLD AUTO-RTO: 0.4 /100 WBC (ref 0–0.2)
PLATELET # BLD AUTO: 119 10*3/MM3 (ref 140–450)
PMV BLD AUTO: 14 FL (ref 6–12)
POTASSIUM SERPL-SCNC: 3.4 MMOL/L (ref 3.5–5.2)
POTASSIUM SERPL-SCNC: 3.5 MMOL/L (ref 3.5–5.2)
PROT SERPL-MCNC: 7.1 G/DL (ref 6–8.5)
RBC # BLD AUTO: 4.8 10*6/MM3 (ref 4.14–5.8)
SODIUM SERPL-SCNC: 129 MMOL/L (ref 136–145)
SODIUM SERPL-SCNC: 130 MMOL/L (ref 136–145)
WBC NRBC COR # BLD: 4.86 10*3/MM3 (ref 3.4–10.8)

## 2022-12-27 PROCEDURE — A9537 TC99M MEBROFENIN: HCPCS | Performed by: HOSPITALIST

## 2022-12-27 PROCEDURE — 25010000002 MORPHINE PER 10 MG: Performed by: HOSPITALIST

## 2022-12-27 PROCEDURE — 80053 COMPREHEN METABOLIC PANEL: CPT | Performed by: HOSPITALIST

## 2022-12-27 PROCEDURE — 25010000002 PIPERACILLIN SOD-TAZOBACTAM PER 1 G

## 2022-12-27 PROCEDURE — 25010000002 ONDANSETRON PER 1 MG

## 2022-12-27 PROCEDURE — 99222 1ST HOSP IP/OBS MODERATE 55: CPT | Performed by: SURGERY

## 2022-12-27 PROCEDURE — 0 TECHNETIUM TC 99M MEBROFENIN KIT: Performed by: HOSPITALIST

## 2022-12-27 PROCEDURE — 78226 HEPATOBILIARY SYSTEM IMAGING: CPT

## 2022-12-27 PROCEDURE — 85025 COMPLETE CBC W/AUTO DIFF WBC: CPT | Performed by: HOSPITALIST

## 2022-12-27 PROCEDURE — 25010000002 FUROSEMIDE PER 20 MG: Performed by: INTERNAL MEDICINE

## 2022-12-27 RX ORDER — POTASSIUM CHLORIDE 7.45 MG/ML
10 INJECTION INTRAVENOUS
Status: DISCONTINUED | OUTPATIENT
Start: 2022-12-27 | End: 2023-01-01

## 2022-12-27 RX ORDER — KIT FOR THE PREPARATION OF TECHNETIUM TC 99M MEBROFENIN 45 MG/10ML
1 INJECTION, POWDER, LYOPHILIZED, FOR SOLUTION INTRAVENOUS
Status: COMPLETED | OUTPATIENT
Start: 2022-12-27 | End: 2022-12-27

## 2022-12-27 RX ADMIN — HYDROCODONE BITARTRATE AND ACETAMINOPHEN 1 TABLET: 7.5; 325 TABLET ORAL at 21:31

## 2022-12-27 RX ADMIN — MEBROFENIN 1 DOSE: 45 INJECTION, POWDER, LYOPHILIZED, FOR SOLUTION INTRAVENOUS at 10:00

## 2022-12-27 RX ADMIN — FUROSEMIDE 40 MG: 10 INJECTION, SOLUTION INTRAVENOUS at 21:28

## 2022-12-27 RX ADMIN — PANTOPRAZOLE SODIUM 40 MG: 40 INJECTION, POWDER, FOR SOLUTION INTRAVENOUS at 06:30

## 2022-12-27 RX ADMIN — ONDANSETRON 4 MG: 2 INJECTION INTRAMUSCULAR; INTRAVENOUS at 19:41

## 2022-12-27 RX ADMIN — ONDANSETRON 4 MG: 2 INJECTION INTRAMUSCULAR; INTRAVENOUS at 13:15

## 2022-12-27 RX ADMIN — PIPERACILLIN AND TAZOBACTAM 4.5 G: 4; .5 INJECTION, POWDER, LYOPHILIZED, FOR SOLUTION INTRAVENOUS; PARENTERAL at 21:28

## 2022-12-27 RX ADMIN — Medication 10 ML: at 19:42

## 2022-12-27 RX ADMIN — ONDANSETRON 4 MG: 2 INJECTION INTRAMUSCULAR; INTRAVENOUS at 01:57

## 2022-12-27 RX ADMIN — PIPERACILLIN AND TAZOBACTAM 4.5 G: 4; .5 INJECTION, POWDER, LYOPHILIZED, FOR SOLUTION INTRAVENOUS; PARENTERAL at 05:14

## 2022-12-27 RX ADMIN — PIPERACILLIN AND TAZOBACTAM 4.5 G: 4; .5 INJECTION, POWDER, LYOPHILIZED, FOR SOLUTION INTRAVENOUS; PARENTERAL at 15:40

## 2022-12-27 NOTE — PROGRESS NOTES
[facility] Medicine Services  INPATIENT PROGRESS NOTE    Length of Stay: 5  Date of Admission: 12/20/2022  Primary Care Physician: Shonna Ng APRN    Subjective   Chief Complaint: Shortness of breath  HPI: Having some abdominal pain, but states he is feeling better.  Shortness of breath seems about baseline.    As of today, 12/27/22  Review of Systems   Constitutional: Positive for activity change and fatigue. Negative for appetite change, chills and fever.   Respiratory: Positive for shortness of breath. Negative for chest tightness.    Cardiovascular: Negative for chest pain, palpitations and leg swelling.   Gastrointestinal: Positive for abdominal pain and nausea. Negative for constipation, diarrhea and vomiting.   Skin: Negative for wound.   Neurological: Negative for dizziness, weakness, light-headedness, numbness and headaches.        All pertinent negatives and positives are as above. All other systems have been reviewed and are negative unless otherwise stated.    Objective    Temp:  [96.2 °F (35.7 °C)-98.3 °F (36.8 °C)] 96.2 °F (35.7 °C)  Heart Rate:  [] 87  Resp:  [18-24] 18  BP: ()/(54-81) 111/73    AM-PAC 6 Clicks Score (PT): 23 (12/27/22 0825)    As of today, 12/27/22  Physical Exam  Vitals reviewed.   Constitutional:       Appearance: He is well-developed.   HENT:      Head: Normocephalic and atraumatic.   Eyes:      Pupils: Pupils are equal, round, and reactive to light.   Cardiovascular:      Rate and Rhythm: Normal rate. Rhythm irregularly irregular.      Heart sounds: Normal heart sounds. No murmur heard.    No friction rub. No gallop.   Pulmonary:      Effort: Pulmonary effort is normal. No respiratory distress.      Breath sounds: Normal breath sounds. No wheezing or rales.   Chest:      Chest wall: No tenderness.   Abdominal:      General: Bowel sounds are normal. There is no distension.      Palpations: Abdomen is soft.       Tenderness: There is no abdominal tenderness.   Musculoskeletal:      Cervical back: Normal range of motion and neck supple.   Psychiatric:         Behavior: Behavior normal.           Results Review:  I have reviewed the labs, radiology results, and diagnostic studies.    Laboratory Data:   Results from last 7 days   Lab Units 12/27/22  0433 12/26/22  0314 12/25/22  0604 12/21/22  0547 12/20/22  1733   SODIUM mmol/L 129* 130* 132*   < > 138   POTASSIUM mmol/L 3.4* 3.5 3.7   < > 2.9*   CHLORIDE mmol/L 88* 89* 91*   < > 97*   CO2 mmol/L 26.0 26.0 27.0   < > 25.0   BUN mg/dL 71* 67* 61*   < > 40*   CREATININE mg/dL 2.28* 2.28* 2.16*   < > 1.72*   GLUCOSE mg/dL 123* 118* 139*   < > 145*   CALCIUM mg/dL 9.2 9.1 9.0   < > 9.3   BILIRUBIN mg/dL  --   --   --   --  2.2*   ALK PHOS U/L  --   --   --   --  63   ALT (SGPT) U/L  --   --   --   --  11   AST (SGOT) U/L  --   --   --   --  15   ANION GAP mmol/L 15.0 15.0 14.0   < > 16.0*    < > = values in this interval not displayed.     Estimated Creatinine Clearance: 56.4 mL/min (A) (by C-G formula based on SCr of 2.28 mg/dL (H)).  Results from last 7 days   Lab Units 12/23/22  0427 12/21/22  1612 12/20/22  1733   MAGNESIUM mg/dL 2.1 2.1 1.9         Results from last 7 days   Lab Units 12/27/22  0433 12/26/22  0314 12/25/22  0604 12/24/22  0607 12/23/22  0427   WBC 10*3/mm3 4.86 5.85 4.62 4.66 5.23   HEMOGLOBIN g/dL 14.3 14.1 14.1 14.2 13.9   HEMATOCRIT % 43.8 42.4 43.4 44.0 43.3   PLATELETS 10*3/mm3 119* 120* 113* 112* 121*           Culture Data:   No results found for: BLOODCX  No results found for: URINECX  No results found for: RESPCX  No results found for: WOUNDCX  No results found for: STOOLCX  No components found for: BODYFLD    Radiology Data:   Imaging Results (Last 24 Hours)     Procedure Component Value Units Date/Time    NM HIDA SCAN WITHOUT PHARMACOLOGICAL INTERVENTION [[id number]] Resulted: 12/27/22 1003     Updated: 12/27/22 1222          I have reviewed the  patient's current medications.     Assessment/Plan     Active Hospital Problems    Diagnosis    • **FRANCK (acute kidney injury) ([facility])        Plan:    1.  Shortness of breath: May be slightly better.  CT scan for pulmonary embolism was nondiagnostic.  VQ scan low probability.  2.  Volume depletion: Continue to hydrate gently.  Appreciate nephrology help.  3.  Chronic systolic congestive heart failure: Appears euvolemic..  Continue home medications.    Eliquis currently on hold in case of surgery.  4.  History of hyperglycemia: No diagnosis of diabetes.  Will place on sliding scale insulin for now.  Currently well controlled.  5.  Obstructive sleep apnea: Patient states its been over 2 years since he has had a CPAP.  He is scheduled to go  his CPAP on 12/29.  6.  Acute kidney injury: Renal function is unchanged from yesterday.  Superimposed upon chronic kidney disease.  Appreciate nephrology help.  7.  Possible cholecystitis:  Medical management for now.  No leukocytosis.  Continue Zosyn.  HIDA scan completed this morning.  Result pending.  Clear liquid diet.  Will need surgical consultation when HIDA scan resulted.  Eliquis discontinued 12/25.  If HIDA scan is negative would consider reinitiation of Eliquis soon.  8.  DVT prophylaxis: SCDs.        The patient was evaluated during the global COVID-19 pandemic, and the diagnosis was suspected/considered upon their initial presentation.  Evaluation, treatment, and testing were consistent with current guidelines for patients who present with complaints or symptoms that may be related to COVID-19.    I confirmed that the patient's Advance Care Plan is present, code status is documented, or surrogate decision maker is listed in the patient's medical record.       Discharge Planning: I expect patient to be discharged to home in 3-4 days.        This document has been electronically signed by Will French MD on December 27, 2022 12:31 CST

## 2022-12-27 NOTE — NURSING NOTE
Pt. Requesting to sign form for refusal of bed alarm, stating I can't move around without the alarm going off.  EXPLAINED THE Safety reasons/risks involved and he still wanted to proceed with signing refusal of treatment.

## 2022-12-27 NOTE — CONSULTS
Patient Care Team:  Shonna Ng APRN as PCP - General (Family Medicine)      Subjective .     History of present illness: 52-year-old male with complex cardiac history who was admitted 7 days ago due to increasing shortness of breath.  During his stay, his shortness of breath has improved, but he began having abdominal pain 2 to 3 days ago that is persistent.  He describes the pain as fairly constant on the left.  He has associated increased eructations and singultus.  He states that his pain worsens when he drinks liquids and he has had episodes of emesis immediately after eating or drinking.  His emesis is nonbloody/nonbilious.  He has never had symptoms like this in the past.  He does have some diarrhea as well.    Review of Systems   Constitutional: Positive for appetite change.   HENT: Negative.    Respiratory: Positive for shortness of breath.    Gastrointestinal: Positive for abdominal pain, diarrhea and nausea.   Genitourinary: Negative.    Musculoskeletal: Negative.    Skin: Negative.    Neurological: Negative.    Psychiatric/Behavioral: Negative.          History  Past Medical History:   Diagnosis Date   • Asthma    • Chronic systolic heart failure (HCC)    • Diabetes mellitus (HCC)    • Hyperlipidemia    • Hypertension    • Obesity    • Peripheral vascular disease (HCC)    • Sleep apnea    ,   Past Surgical History:   Procedure Laterality Date   • CARDIAC CATHETERIZATION N/A 12/08/2021   • CARDIAC DEFIBRILLATOR PLACEMENT     • VARICOSE VEIN SURGERY Right 04/05/2019   ,   Family History   Problem Relation Age of Onset   • Diabetes Mother    • Hypertension Father    ,   Social History     Tobacco Use   • Smoking status: Former   • Smokeless tobacco: Never   Vaping Use   • Vaping Use: Never used   Substance Use Topics   • Alcohol use: No   • Drug use: No   , Home Medications:  Prior to Admission medications    Medication Sig Start Date End Date Taking? Authorizing Provider   albuterol sulfate   (90 Base) MCG/ACT inhaler Inhale 2 puffs Every 4 (Four) Hours As Needed for Wheezing. 3/9/19   Keagan Choi MD   apixaban (ELIQUIS) 5 MG tablet tablet Take 1 tablet by mouth 2 (Two) Times a Day. 10/4/22   Vania Andujar APRN   bumetanide (BUMEX) 1 MG tablet Take 1 tablet by mouth 2 (Two) Times a Day. 12/8/22   Ayaan Cuba MD   carvedilol (COREG) 12.5 MG tablet Take 1 tablet by mouth 2 (Two) Times a Day With Meals for 30 days.  Patient taking differently: Take 12.5 mg by mouth 2 (Two) Times a Day With Meals. Pt states he has some meds and is still taking this. 10/4/22 11/3/22  Vania Andujar APRN   losartan (COZAAR) 25 MG tablet Take 0.5 tablets by mouth Daily for 30 days. 10/4/22 11/3/22  Vania Andujar APRN   lovastatin (ALTOPREV) 20 MG 24 hr tablet Take 20 mg by mouth.    Provider, MD Aroldo   metOLazone (ZAROXOLYN) 2.5 MG tablet Take 1 tablet by mouth 3 (Three) Times a Week. 10/5/22   Vania Andujar APRN   potassium chloride 10 MEQ CR tablet Take 2 tablets by mouth Daily. 9/27/22   Jacqueline Laguna APRN   vitamin D (ERGOCALCIFEROL) 1.25 MG (61733 UT) capsule capsule Take 50,000 Units by mouth 1 (One) Time Per Week. 10/27/22   Provider, MD Aroldo   , Scheduled Meds:  atorvastatin, 10 mg, Oral, Daily  carvedilol, 6.25 mg, Oral, BID With Meals  furosemide, 40 mg, Intravenous, BID  losartan, 12.5 mg, Oral, Q24H  Morphine, 4 mg, Intravenous, Once  pantoprazole, 40 mg, Intravenous, Q AM  piperacillin-tazobactam, 4.5 g, Intravenous, Q8H  sodium chloride, 10 mL, Intravenous, Q12H    , Continuous Infusions:   , PRN Meds:  •  acetaminophen  •  albuterol  •  calcium carbonate  •  droperidol  •  HYDROcodone-acetaminophen  •  Morphine  •  ondansetron  •  potassium chloride  •  simethicone  •  sodium chloride  •  sodium chloride and Allergies:  No Known Allergies    Objective     Vital Signs   Temp:  [96.2 °F (35.7 °C)-97.8 °F (36.6 °C)] 96.2 °F (35.7 °C)  Heart Rate:   [] 107  Resp:  [18-24] 18  BP: ()/(61-82) 100/82    Physical Exam:  Physical Exam  Constitutional:       Appearance: Normal appearance.   HENT:      Head: Normocephalic and atraumatic.   Cardiovascular:      Rate and Rhythm: Regular rhythm. Tachycardia present.   Pulmonary:      Effort: Pulmonary effort is normal. No respiratory distress.   Abdominal:      General: There is no distension.      Palpations: Abdomen is soft.      Tenderness: There is no abdominal tenderness.      Comments: No tenderness to palpation anywhere in the abdomen   Skin:     General: Skin is warm.      Coloration: Skin is not jaundiced.   Neurological:      General: No focal deficit present.      Mental Status: He is alert and oriented to person, place, and time.   Psychiatric:         Mood and Affect: Mood normal.         Behavior: Behavior normal.           Results from last 7 days   Lab Units 12/27/22  0433 12/26/22  0314 12/25/22  0604   SODIUM mmol/L 129* 130* 132*   POTASSIUM mmol/L 3.4* 3.5 3.7   CHLORIDE mmol/L 88* 89* 91*   CO2 mmol/L 26.0 26.0 27.0   BUN mg/dL 71* 67* 61*   CREATININE mg/dL 2.28* 2.28* 2.16*   GLUCOSE mg/dL 123* 118* 139*   CALCIUM mg/dL 9.2 9.1 9.0     Results from last 7 days   Lab Units 12/27/22  0433 12/26/22 0314 12/25/22  0604   WBC 10*3/mm3 4.86 5.85 4.62   HEMOGLOBIN g/dL 14.3 14.1 14.1   HEMATOCRIT % 43.8 42.4 43.4   PLATELETS 10*3/mm3 119* 120* 113*       Results Review:   I reviewed the patient's new clinical results.  I personally reviewed the patient's CT scan images and agree with the findings  Patient with an ultrasound of the gallbladder demonstrating stones and concern for acute cholecystitis, however, HIDA scan demonstrated filling of the gallbladder which would rule out cholecystitis.  The ejection fraction was low, however, this can be spurious in the inpatient setting especially when the patient is receiving narcotics.    Assessment & Plan       FRANCK (acute kidney injury)  (HCC)  Abdominal pain    No evidence of acute cholecystitis on HIDA scan.  Patient symptoms are not consistent with a biliary etiology.  Given his ejection fraction less than 20%, I would not recommend an elective cholecystectomy.    I discussed the patient's findings and my recommendations with patient and primary care team      This document has been electronically signed by Femi Hill MD on December 27, 2022 16:55 CST     Femi Hill MD  12/27/22  16:55 CST

## 2022-12-27 NOTE — PLAN OF CARE
Goal Outcome Evaluation:   Pt. Alert/oriented, transferred from ICU this afternoon, Hida scan completed before arriving to the floor, with new order for consultation after ejection fracture at 9 percent. Experienced N/V after Hida scan. Zofran given.

## 2022-12-27 NOTE — PAYOR COMM NOTE
"    Brittaney Shanks RN [facility]  [phone]      Phone  [phone]       Fax  Cont. Stay Review      Ethel Davis (52 y.o. Male)     Date of Birth   1970    Social Security Number       Address   [address] [address]    Home Phone   [phone]    MRN   [id number]       Anabaptism   Restoration    Marital Status                               Admission Date   12/20/22    Admission Type   Emergency    Admitting Provider   Will French MD    Attending Provider   Will French MD    Department, Room/Bed   [facility] 4 [place], 425/1       Discharge Date       Discharge Disposition       Discharge Destination                               Attending Provider: Will French MD    Allergies: No Known Allergies    Isolation: None   Infection: None   Code Status: CPR    Ht: 193 cm (76\")   Wt: 129 kg (285 lb)    Admission Cmt: None   Principal Problem: FRANCK (acute kidney injury) (HCC) [N17.9]                 Active Insurance as of 12/20/2022     Primary Coverage     Payor Plan Insurance Group Employer/Plan Group    [place] [id number]     Payor Plan Address Payor Plan Phone Number Payor Plan Fax Number Effective Dates    [employer] [address] [phone]  10/8/2021 - None Entered    [facility] [id number]       Subscriber Name Subscriber Birth Date Member ID       ETHEL DAVIS 1970 [id number]                 Emergency Contacts      (Rel.) Home Phone Work Phone Mobile Phone    Courtney Davis (Mother) [phone] -- --            Vital Signs (last day)     Date/Time Temp Temp src Pulse Resp BP Patient Position SpO2    12/27/22 1038 -- -- 87 -- -- -- --    12/27/22 0838 96.2 (35.7) Temporal 106 18 111/73 -- 97    12/27/22 0700 -- -- 105 -- 105/72 -- 97    12/27/22 0600 -- -- 96 24 97/69 -- 94    12/27/22 0500 -- -- 103 20 106/79 -- 97    " 12/27/22 0400 96.9 (36.1) Temporal 103 21 93/69 Lying 94    12/27/22 0300 -- -- 101 22 103/69 -- 98    12/27/22 0200 -- -- 98 20 97/69 -- 95    12/27/22 0100 -- -- 98 22 99/73 -- 98    12/27/22 0000 97.6 (36.4) Temporal 97 18 90/64 Lying 97    12/26/22 2300 -- -- 97 21 90/72 -- 98    12/26/22 2200 -- -- 103 20 95/66 -- 95    12/26/22 2100 -- -- 101 20 99/73 -- 96    12/26/22 2000 97.8 (36.6) Temporal 96 20 100/65 Lying 93    12/26/22 1900 -- -- 108 20 108/81 -- 95    12/26/22 1800 -- -- 111 -- 100/61 -- 98    12/26/22 1700 -- -- 107 -- 96/66 -- 94    12/26/22 1600 98.3 (36.8) Temporal 108 -- 107/69 -- 98    12/26/22 1400 -- -- 99 -- 90/54 -- 95    12/26/22 1300 -- -- 102 -- 90/63 -- 97    12/26/22 1200 -- -- 101 -- 82/61 -- 96    12/26/22 1112 -- -- 100 -- 99/66 -- 97    12/26/22 1100 -- -- 103 -- 121/83 -- 96    12/26/22 0900 -- -- 99 -- 96/73 -- 94    12/26/22 0816 -- -- 93 -- 95/62 -- 99    12/26/22 0800 96.8 (36) Oral 93 -- 108/58 -- 93    12/26/22 0600 -- -- 95 -- 115/64 -- 91    12/26/22 0500 -- -- 100 -- 93/74 -- 98    12/26/22 0400 96.9 (36.1) Temporal 98 18 99/80 Lying 97    12/26/22 0300 -- -- 107 -- 111/78 -- 97    12/26/22 0200 -- -- 99 -- 123/79 -- 95    12/26/22 0100 96.5 (35.8) Temporal 104 18 104/75 Lying 94          Oxygen Therapy (last day)     Date/Time SpO2 Device (Oxygen Therapy) Flow (L/min) Oxygen Concentration (%) ETCO2 (mmHg)    12/27/22 0838 97 room air -- -- --    12/27/22 0825 -- room air -- -- --    12/27/22 0700 97 -- -- -- --    12/27/22 0600 94 -- -- -- --    12/27/22 0500 97 -- -- -- --    12/27/22 0400 94 room air -- -- --    12/27/22 0300 98 -- -- -- --    12/27/22 0200 95 -- -- -- --    12/27/22 0100 98 -- -- -- --    12/27/22 0000 97 room air -- -- --    12/26/22 2300 98 -- -- -- --    12/26/22 2200 95 -- -- -- --    12/26/22 2100 96 -- -- -- --    12/26/22 2000 93 room air -- -- --    12/26/22 1900 95 -- -- -- --    12/26/22 1800 98 -- -- -- --    12/26/22 1700 94 -- -- -- --     12/26/22 1600 98 -- -- -- --    12/26/22 1400 95 room air -- -- --    12/26/22 1300 97 -- -- -- --    12/26/22 1200 96 -- -- -- --    12/26/22 1112 97 -- -- -- --    12/26/22 1100 96 -- -- -- --    12/26/22 0900 94 -- -- -- --    12/26/22 0816 99 -- -- -- --    12/26/22 0800 93 room air -- -- --    12/26/22 0600 91 -- -- -- --    12/26/22 0500 98 -- -- -- --    12/26/22 0400 97 room air -- -- --    12/26/22 0300 97 -- -- -- --    12/26/22 0200 95 -- -- -- --    12/26/22 0100 94 room air -- -- --          Current Facility-Administered Medications   Medication Dose Route Frequency Provider Last Rate Last Admin   • acetaminophen (TYLENOL) tablet 1,000 mg  1,000 mg Oral Q6H PRN Sudeep Wolf MD   1,000 mg at 12/24/22 1240   • albuterol (PROVENTIL) nebulizer solution 0.083% 2.5 mg/3mL  2.5 mg Nebulization Q4H PRN Will French MD       • atorvastatin (LIPITOR) tablet 10 mg  10 mg Oral Daily Will French MD   10 mg at 12/26/22 0800   • calcium carbonate (TUMS) chewable tablet 500 mg (200 mg elemental)  2 tablet Oral TID PRN Will French MD   2 tablet at 12/24/22 1444   • carvedilol (COREG) tablet 6.25 mg  6.25 mg Oral BID With Meals Efrain Mas MD       • droperidol (INAPSINE) injection 1.25 mg  1.25 mg Intravenous Q6H PRN Sudeep Wolf MD   1.25 mg at 12/22/22 1302   • furosemide (LASIX) injection 40 mg  40 mg Intravenous BID Efrian Mas MD       • HYDROcodone-acetaminophen (NORCO) 7.5-325 MG per tablet 1 tablet  1 tablet Oral Q6H PRN Will French MD   1 tablet at 12/26/22 1847   • losartan (COZAAR) half tablet 12.5 mg  12.5 mg Oral Q24H Will French MD       • morphine injection 2 mg  2 mg Intravenous Q2H PRN Will French MD       • morphine injection 4 mg  4 mg Intravenous Once Will French MD       • ondansetron (ZOFRAN) injection 4 mg  4 mg Intravenous Q6H PRN Sudeep Wolf MD   4 mg at 12/27/22 0157   • pantoprazole  "(PROTONIX) injection 40 mg  40 mg Intravenous Q AM Trevor Alonso MD   40 mg at 22 0630   • piperacillin-tazobactam (ZOSYN) 4.5 g/100 mL 0.9% NS IVPB (mbp)  4.5 g Intravenous Q8H Sudeep Wolf MD   4.5 g at 22 0514   • simethicone (MYLICON) chewable tablet 80 mg  80 mg Oral 4x Daily PRN Delonte Caputo MD   80 mg at 22 0025   • sodium chloride 0.9 % flush 10 mL  10 mL Intravenous Q12H Will French MD   10 mL at 22 2103   • sodium chloride 0.9 % flush 10 mL  10 mL Intravenous PRN Will French MD       • sodium chloride 0.9 % infusion 40 mL  40 mL Intravenous PRN Will French MD            Physician Progress Notes (last 48 hours)      Efrain Mas MD at 22 1434              [facility]  [address] [place] [address]. [address]  T - [phone]  F - [phone]     Progress Note          PATIENT  DEMOGRAPHICS   PATIENT NAME: Matti Hernandez Bravo                      PHYSICIAN: Efrain Mas MD  : 1970  MRN: [id number]   LOS: 4 days    Patient Care Team:  Shonna Ng APRN as PCP - General (Family Medicine)  Subjective    SUBJECTIVE   Transferred to ICU due to low blood pressure. C/o pain on his side. No chest pain or SOB.        Objective    OBJECTIVE   Vital Signs  Temp:  [96.5 °F (35.8 °C)-96.9 °F (36.1 °C)] 96.8 °F (36 °C)  Heart Rate:  [] 102  Resp:  [18-20] 18  BP: ()/(41-83) 90/63    Flowsheet Rows    Flowsheet Row First Filed Value   Admission Height 193 cm (76\") Documented at 2022 1700   Admission Weight 127 kg (280 lb) Documented at 2022 1700           I/O last 3 completed shifts:  In: 120 [P.O.:120]  Out: 1075 [Urine:1075]    PHYSICAL EXAM    Physical Exam  Vitals and nursing note reviewed.   Constitutional:       Appearance: Normal appearance.   Cardiovascular:      Rate and Rhythm: Normal rate and regular rhythm.      Heart sounds: Normal heart sounds. No murmur heard.    No " friction rub. No gallop.   Pulmonary:      Effort: No respiratory distress.      Breath sounds: Normal breath sounds. No stridor. No wheezing, rhonchi or rales.   Musculoskeletal:      Right lower leg: Edema present.      Left lower leg: Edema present.   Skin:     General: Skin is warm and dry.   Neurological:      Mental Status: He is alert.         RESULTS   Results Review:    Results from last 7 days   Lab Units 12/26/22  0314 12/25/22  0604 12/24/22  0607 12/21/22  0547 12/20/22  1733   SODIUM mmol/L 130* 132* 132*   < > 138   POTASSIUM mmol/L 3.5 3.7 3.7   < > 2.9*   CHLORIDE mmol/L 89* 91* 92*   < > 97*   CO2 mmol/L 26.0 27.0 26.0   < > 25.0   BUN mg/dL 67* 61* 61*   < > 40*   CREATININE mg/dL 2.28* 2.16* 2.14*   < > 1.72*   CALCIUM mg/dL 9.1 9.0 9.5   < > 9.3   BILIRUBIN mg/dL  --   --   --   --  2.2*   ALK PHOS U/L  --   --   --   --  63   ALT (SGPT) U/L  --   --   --   --  11   AST (SGOT) U/L  --   --   --   --  15   GLUCOSE mg/dL 118* 139* 119*   < > 145*    < > = values in this interval not displayed.       Estimated Creatinine Clearance: 56.4 mL/min (A) (by C-G formula based on SCr of 2.28 mg/dL (H)).    Results from last 7 days   Lab Units 12/23/22  0427 12/21/22  1612 12/20/22  1733   MAGNESIUM mg/dL 2.1 2.1 1.9             Results from last 7 days   Lab Units 12/26/22  0314 12/25/22  0604 12/24/22  0607 12/23/22  0427 12/22/22  0201   WBC 10*3/mm3 5.85 4.62 4.66 5.23 5.44   HEMOGLOBIN g/dL 14.1 14.1 14.2 13.9 14.4   PLATELETS 10*3/mm3 120* 113* 112* 121* 110*               Imaging Results (Last 24 Hours)     ** No results found for the last 24 hours. **           MEDICATIONS    atorvastatin, 10 mg, Oral, Daily  carvedilol, 12.5 mg, Oral, BID With Meals  furosemide, 40 mg, Intravenous, BID  losartan, 12.5 mg, Oral, Q24H  Morphine, 4 mg, Intravenous, Once  pantoprazole, 40 mg, Intravenous, Q AM  piperacillin-tazobactam, 4.5 g, Intravenous, Q8H  sodium chloride, 10 mL, Intravenous, Q12H            Assessment & Plan   ASSESSMENT / PLAN      FRANCK (acute kidney injury) (HCC)    1.  Acute Kidney Failure on CKD 3: Baseline creatinine 1.4-1.7 mg/dl  - Etiology of FRANCK likely contrast induced.    - Urinalysis showed trace protein and negative blood, 3-5 RBCs, 0-2 WBCs.  Urine sodium less than 20.  Urine protein creatinine ratio 235 mg.  - Creatinine is overall stable at 2.28 mg/dL.  Keep Lasix 40 mg IV BID. Start fluid restriction of 1500 ml a day.   - Maintain hemodynamics.  Monitor I's and O's.  Avoid nephrotoxins like NSAIDs and IV contrast     2.  Shortness of breath:  - Underwent CT with contrast which was nondiagnostic, no marked fluid overload on exam. No pulmonary edema on xray. SOB etiology is unclear at present.      3.  Chronic systolic CHF /AICD/ non ischemic cardiomyopathy     4.  Prediabetes     5.  Hypertension:  - Blood pressure is borderline low. Decrease carvedilol to 6.25 mg twice day.      6.  KHANH:  - Supposed to be using CPAP at home     7. Acute cholecystitis:  - denies marked abdominal pain and also has some nausea and vomiting x 1. Denies acid reflux. Continue protonix. Patient started on Zoysn. Will observe renal function on zosyn           This document has been electronically signed by Efrain Mas MD on December 26, 2022 14:34 CST           Electronically signed by Efrain Mas MD at 12/26/22 1437     Will French MD at 12/26/22 1220              [facility] Medicine Services  INPATIENT PROGRESS NOTE    Length of Stay: 4  Date of Admission: 12/20/2022  Primary Care Physician: Shonna Ng APRN    Subjective   Chief Complaint: Shortness of breath  HPI: Having some abdominal pain.  Shortness of breath seems about baseline.    As of today, 12/26/22  Review of Systems   Constitutional: Positive for activity change and fatigue. Negative for appetite change, chills and fever.   Respiratory: Positive for shortness  of breath. Negative for chest tightness.    Cardiovascular: Negative for chest pain, palpitations and leg swelling.   Gastrointestinal: Positive for abdominal pain and nausea. Negative for constipation, diarrhea and vomiting.   Skin: Negative for wound.   Neurological: Negative for dizziness, weakness, light-headedness, numbness and headaches.        All pertinent negatives and positives are as above. All other systems have been reviewed and are negative unless otherwise stated.    Objective    Temp:  [96.3 °F (35.7 °C)-96.9 °F (36.1 °C)] 96.8 °F (36 °C)  Heart Rate:  [] 101  Resp:  [18-20] 18  BP: ()/(41-83) 82/61    AM-PAC 6 Clicks Score (PT): 22 (12/26/22 0100)    As of today, 12/26/22  Physical Exam  Vitals reviewed.   Constitutional:       Appearance: He is well-developed.   HENT:      Head: Normocephalic and atraumatic.   Eyes:      Pupils: Pupils are equal, round, and reactive to light.   Cardiovascular:      Rate and Rhythm: Normal rate. Rhythm irregularly irregular.      Heart sounds: Normal heart sounds. No murmur heard.    No friction rub. No gallop.   Pulmonary:      Effort: Pulmonary effort is normal. No respiratory distress.      Breath sounds: Normal breath sounds. No wheezing or rales.   Chest:      Chest wall: No tenderness.   Abdominal:      General: Bowel sounds are normal. There is no distension.      Palpations: Abdomen is soft.      Tenderness: There is no abdominal tenderness.   Musculoskeletal:      Cervical back: Normal range of motion and neck supple.   Psychiatric:         Behavior: Behavior normal.           Results Review:  I have reviewed the labs, radiology results, and diagnostic studies.    Laboratory Data:   Results from last 7 days   Lab Units 12/26/22  0314 12/25/22  0604 12/24/22  0607 12/21/22  0547 12/20/22  1733   SODIUM mmol/L 130* 132* 132*   < > 138   POTASSIUM mmol/L 3.5 3.7 3.7   < > 2.9*   CHLORIDE mmol/L 89* 91* 92*   < > 97*   CO2 mmol/L 26.0 27.0 26.0   <  > 25.0   BUN mg/dL 67* 61* 61*   < > 40*   CREATININE mg/dL 2.28* 2.16* 2.14*   < > 1.72*   GLUCOSE mg/dL 118* 139* 119*   < > 145*   CALCIUM mg/dL 9.1 9.0 9.5   < > 9.3   BILIRUBIN mg/dL  --   --   --   --  2.2*   ALK PHOS U/L  --   --   --   --  63   ALT (SGPT) U/L  --   --   --   --  11   AST (SGOT) U/L  --   --   --   --  15   ANION GAP mmol/L 15.0 14.0 14.0   < > 16.0*    < > = values in this interval not displayed.     Estimated Creatinine Clearance: 56.4 mL/min (A) (by C-G formula based on SCr of 2.28 mg/dL (H)).  Results from last 7 days   Lab Units 12/23/22  0427 12/21/22  1612 12/20/22  1733   MAGNESIUM mg/dL 2.1 2.1 1.9         Results from last 7 days   Lab Units 12/26/22  0314 12/25/22  0604 12/24/22  0607 12/23/22  0427 12/22/22  0201   WBC 10*3/mm3 5.85 4.62 4.66 5.23 5.44   HEMOGLOBIN g/dL 14.1 14.1 14.2 13.9 14.4   HEMATOCRIT % 42.4 43.4 44.0 43.3 44.9   PLATELETS 10*3/mm3 120* 113* 112* 121* 110*           Culture Data:   No results found for: BLOODCX  No results found for: URINECX  No results found for: RESPCX  No results found for: WOUNDCX  No results found for: STOOLCX  No components found for: BODYFLD    Radiology Data:   Imaging Results (Last 24 Hours)     ** No results found for the last 24 hours. **          I have reviewed the patient's current medications.     Assessment/Plan     Active Hospital Problems    Diagnosis    • **FRANCK (acute kidney injury) ([facility])        Plan:    1.  Shortness of breath: May be slightly better.  CT scan for pulmonary embolism was nondiagnostic.  VQ scan low probability.  2.  Volume depletion: Continue to hydrate gently.  Appreciate nephrology help.  3.  Chronic systolic congestive heart failure: Appears euvolemic..  Continue home medications.    Eliquis currently on hold in case of surgery.  4.  History of hyperglycemia: No diagnosis of diabetes.  Will place on sliding scale insulin for now.  Currently well controlled.  5.  Obstructive sleep apnea: Patient states  its been over 2 years since he has had a CPAP.  He is scheduled to go  his CPAP on 12/29.  6.  Acute kidney injury: Renal function is some worse today.  Superimposed upon chronic kidney disease.  Appreciate nephrology help.  7.  Possible cholecystitis:  Medical management for now.  No leukocytosis.  Continue Zosyn.  HIDA scan pending.  Clear liquid diet.  Will need surgical consultation when HIDA scan resulted.  Eliquis discontinued 12/25.  If HIDA scan is negative would consider reinitiation of Eliquis soon.  8.  DVT prophylaxis: SCDs.        The patient was evaluated during the global COVID-19 pandemic, and the diagnosis was suspected/considered upon their initial presentation.  Evaluation, treatment, and testing were consistent with current guidelines for patients who present with complaints or symptoms that may be related to COVID-19.    I confirmed that the patient's Advance Care Plan is present, code status is documented, or surrogate decision maker is listed in the patient's medical record.       Discharge Planning: I expect patient to be discharged to home in 3-4 days.        This document has been electronically signed by Will French MD on December 26, 2022 12:20 CST        Electronically signed by Will French MD at 12/26/22 1224     Will French MD at 12/25/22 1315              [facility] Medicine Services  INPATIENT PROGRESS NOTE    Length of Stay: 3  Date of Admission: 12/20/2022  Primary Care Physician: Shonna Ng APRN    Subjective   Chief Complaint: Shortness of breath  HPI: Patient is doing okay.  He states that he intermittently will have abdominal pain or shortness of breath.  Some nausea.    As of today, 12/25/22  Review of Systems   Constitutional: Positive for activity change and fatigue. Negative for appetite change, chills and fever.   Respiratory: Positive for shortness of breath. Negative for chest tightness.     Cardiovascular: Negative for chest pain, palpitations and leg swelling.   Gastrointestinal: Positive for abdominal pain and nausea. Negative for constipation, diarrhea and vomiting.   Skin: Negative for wound.   Neurological: Negative for dizziness, weakness, light-headedness, numbness and headaches.        All pertinent negatives and positives are as above. All other systems have been reviewed and are negative unless otherwise stated.    Objective    Temp:  [95.4 °F (35.2 °C)-97.3 °F (36.3 °C)] 96 °F (35.6 °C)  Heart Rate:  [] 98  Resp:  [18-20] 20  BP: ()/(62-80) 95/71    AM-PAC 6 Clicks Score (PT): 20 (12/25/22 1301)    As of today, 12/25/22  Physical Exam  Vitals reviewed.   Constitutional:       Appearance: He is well-developed.   HENT:      Head: Normocephalic and atraumatic.   Eyes:      Pupils: Pupils are equal, round, and reactive to light.   Cardiovascular:      Rate and Rhythm: Normal rate. Rhythm irregularly irregular.      Heart sounds: Normal heart sounds. No murmur heard.    No friction rub. No gallop.   Pulmonary:      Effort: Pulmonary effort is normal. No respiratory distress.      Breath sounds: Normal breath sounds. No wheezing or rales.   Chest:      Chest wall: No tenderness.   Abdominal:      General: Bowel sounds are normal. There is no distension.      Palpations: Abdomen is soft.      Tenderness: There is no abdominal tenderness.   Musculoskeletal:      Cervical back: Normal range of motion and neck supple.   Psychiatric:         Behavior: Behavior normal.           Results Review:  I have reviewed the labs, radiology results, and diagnostic studies.    Laboratory Data:   Results from last 7 days   Lab Units 12/25/22  0604 12/24/22  0607 12/23/22  0427 12/21/22  0547 12/20/22  1733   SODIUM mmol/L 132* 132* 133*   < > 138   POTASSIUM mmol/L 3.7 3.7 3.7   < > 2.9*   CHLORIDE mmol/L 91* 92* 92*   < > 97*   CO2 mmol/L 27.0 26.0 24.0   < > 25.0   BUN mg/dL 61* 61* 58*   < > 40*    CREATININE mg/dL 2.16* 2.14* 2.26*   < > 1.72*   GLUCOSE mg/dL 139* 119* 120*   < > 145*   CALCIUM mg/dL 9.0 9.5 9.4   < > 9.3   BILIRUBIN mg/dL  --   --   --   --  2.2*   ALK PHOS U/L  --   --   --   --  63   ALT (SGPT) U/L  --   --   --   --  11   AST (SGOT) U/L  --   --   --   --  15   ANION GAP mmol/L 14.0 14.0 17.0*   < > 16.0*    < > = values in this interval not displayed.     Estimated Creatinine Clearance: 59.5 mL/min (A) (by C-G formula based on SCr of 2.16 mg/dL (H)).  Results from last 7 days   Lab Units 12/23/22  0427 12/21/22  1612 12/20/22  1733   MAGNESIUM mg/dL 2.1 2.1 1.9         Results from last 7 days   Lab Units 12/25/22  0604 12/24/22  0607 12/23/22  0427 12/22/22  0201 12/21/22  0547   WBC 10*3/mm3 4.62 4.66 5.23 5.44 5.15   HEMOGLOBIN g/dL 14.1 14.2 13.9 14.4 14.7   HEMATOCRIT % 43.4 44.0 43.3 44.9 44.3   PLATELETS 10*3/mm3 113* 112* 121* 110* 121*           Culture Data:   No results found for: BLOODCX  No results found for: URINECX  No results found for: RESPCX  No results found for: WOUNDCX  No results found for: STOOLCX  No components found for: BODYFLD    Radiology Data:   Imaging Results (Last 24 Hours)     ** No results found for the last 24 hours. **          I have reviewed the patient's current medications.     Assessment/Plan     Active Hospital Problems    Diagnosis    • **FRANCK (acute kidney injury) (HCC)        Plan:    1.  Shortness of breath: May be slightly better.  CT scan for pulmonary embolism was nondiagnostic.  VQ scan low probability.  2.  Volume depletion: Continue to hydrate gently.  Appreciate nephrology help.  3.  Chronic systolic congestive heart failure: Minimally hypovolemic currently.  Continue home medications.  Continue anticoagulation for risk of thrombus.  4.  History of hyperglycemia: No diagnosis of diabetes.  Will place on sliding scale insulin for now.  5.  Obstructive sleep apnea: Patient states its been over 2 years since he has had a CPAP.  He is  scheduled to go  his CPAP on .  6.  Acute kidney injury: Renal function is some better today.  Superimposed upon chronic kidney disease.  Appreciate nephrology help.  7.  Possible cholecystitis:  Medical management for now.  No leukocytosis.  Continue Zosyn.  Get HIDA scan.  Clear liquid diet.  Will need surgical consultation when more stable.  We will discontinue Eliquis.  If HIDA scan is negative would consider reinitiation of Eliquis soon.  8.  DVT prophylaxis: Eliquis.        The patient was evaluated during the global COVID-19 pandemic, and the diagnosis was suspected/considered upon their initial presentation.  Evaluation, treatment, and testing were consistent with current guidelines for patients who present with complaints or symptoms that may be related to COVID-19.    I confirmed that the patient's Advance Care Plan is present, code status is documented, or surrogate decision maker is listed in the patient's medical record.       Discharge Planning: I expect patient to be discharged to home in 3-4 days.        This document has been electronically signed by Will French MD on 2022 13:15 CST        Electronically signed by Will French MD at 22 1317     Efrain Mas MD at 22 1220              [facility]  [address] [place] [address]. [address]  T - [phone]  F - [phone]     Progress Note          PATIENT  DEMOGRAPHICS   PATIENT NAME: Matti Bravo                      PHYSICIAN: Efrain Mas MD  : 1970  MRN: [id number]   LOS: 3 days    Patient Care Team:  Shonna Ng APRN as PCP - General (Family Medicine)  Subjective    SUBJECTIVE   No acute events overnight. Denied any complaints. No chest pain or SOB.        Objective    OBJECTIVE   Vital Signs  Temp:  [95.4 °F (35.2 °C)-97.3 °F (36.3 °C)] 96 °F (35.6 °C)  Heart Rate:  [] 98  Resp:  [18-20] 20  BP: ()/(62-80)  "95/71    Flowsheet Rows    Flowsheet Row First Filed Value   Admission Height 193 cm (76\") Documented at 12/20/2022 1700   Admission Weight 127 kg (280 lb) Documented at 12/20/2022 1700           I/O last 3 completed shifts:  In: 480 [P.O.:480]  Out: 775 [Urine:775]    PHYSICAL EXAM    Physical Exam  Vitals and nursing note reviewed.   Constitutional:       Appearance: Normal appearance.   Cardiovascular:      Rate and Rhythm: Normal rate and regular rhythm.      Heart sounds: Normal heart sounds. No murmur heard.    No friction rub. No gallop.   Pulmonary:      Effort: No respiratory distress.      Breath sounds: Normal breath sounds. No stridor. No wheezing, rhonchi or rales.   Musculoskeletal:      Right lower leg: Edema present.      Left lower leg: Edema present.   Skin:     General: Skin is warm and dry.   Neurological:      Mental Status: He is alert.         RESULTS   Results Review:    Results from last 7 days   Lab Units 12/25/22  0604 12/24/22  0607 12/23/22  0427 12/21/22  0547 12/20/22  1733   SODIUM mmol/L 132* 132* 133*   < > 138   POTASSIUM mmol/L 3.7 3.7 3.7   < > 2.9*   CHLORIDE mmol/L 91* 92* 92*   < > 97*   CO2 mmol/L 27.0 26.0 24.0   < > 25.0   BUN mg/dL 61* 61* 58*   < > 40*   CREATININE mg/dL 2.16* 2.14* 2.26*   < > 1.72*   CALCIUM mg/dL 9.0 9.5 9.4   < > 9.3   BILIRUBIN mg/dL  --   --   --   --  2.2*   ALK PHOS U/L  --   --   --   --  63   ALT (SGPT) U/L  --   --   --   --  11   AST (SGOT) U/L  --   --   --   --  15   GLUCOSE mg/dL 139* 119* 120*   < > 145*    < > = values in this interval not displayed.       Estimated Creatinine Clearance: 59.5 mL/min (A) (by C-G formula based on SCr of 2.16 mg/dL (H)).    Results from last 7 days   Lab Units 12/23/22  0427 12/21/22  1612 12/20/22  1733   MAGNESIUM mg/dL 2.1 2.1 1.9             Results from last 7 days   Lab Units 12/25/22  0604 12/24/22  0607 12/23/22  0427 12/22/22  0201 12/21/22  0547   WBC 10*3/mm3 4.62 4.66 5.23 5.44 5.15   HEMOGLOBIN " g/dL 14.1 14.2 13.9 14.4 14.7   PLATELETS 10*3/mm3 113* 112* 121* 110* 121*               Imaging Results (Last 24 Hours)     ** No results found for the last 24 hours. **           MEDICATIONS    apixaban, 5 mg, Oral, BID  atorvastatin, 10 mg, Oral, Daily  carvedilol, 12.5 mg, Oral, BID With Meals  losartan, 12.5 mg, Oral, Q24H  Morphine, 4 mg, Intravenous, Once  pantoprazole, 40 mg, Intravenous, Q AM  piperacillin-tazobactam, 4.5 g, Intravenous, Q8H  sodium chloride, 10 mL, Intravenous, Q12H      DOBUTamine, 2-20 mcg/kg/min  norepinephrine, 0.02-0.3 mcg/kg/min, Last Rate: Stopped (12/21/22 0639)        Assessment & Plan   ASSESSMENT / PLAN      FRANCK (acute kidney injury) (HCC)    1.  Acute Kidney Failure on CKD 3: Baseline creatinine 1.4-1.7 mg/dl  - Etiology of FRANCK likely contrast induced.    - Urinalysis showed trace protein and negative blood, 3-5 RBCs, 0-2 WBCs.  Urine sodium less than 20.  Urine protein creatinine ratio 235 mg.  - Creatinine is overall stable at 2.16 mg/dL.  Off IV fluids. Start Lasix 40 mg IV BID.   - Maintain hemodynamics.  Monitor I's and O's.  Avoid nephrotoxins like NSAIDs and IV contrast     2.  Shortness of breath:  - Underwent CT with contrast which was nondiagnostic, no marked fluid overload on exam. No pulmonary edema on xray. SOB etiology is unclear at present.      3.  Chronic systolic CHF /AICD/ non ischemic cardiomyopathy     4.  Prediabetes     5.  Hypertension:  - Blood pressure is borderline low.      6.  KHANH:  - Supposed to be using CPAP at home     7. Acute cholecystitis:  - denies marked abdominal pain and also has some nausea and vomiting x 1. Denies acid reflux. Continue protonix. Patient started on Zoysn. Will observe renal function on zosyn           This document has been electronically signed by Efrain Mas MD on December 25, 2022 12:20 CST           Electronically signed by Efrain Mas MD at 12/25/22 1220       Medical Student Notes (last 24  hours)  Notes from 12/26/22 1049 through 12/27/22 1049   No notes of this type exist for this encounter.

## 2022-12-27 NOTE — PLAN OF CARE
Problem: Adult Inpatient Plan of Care  Goal: Plan of Care Review  Outcome: Ongoing, Progressing  Flowsheets  Taken 12/26/2022 1639 by Tracy Powell RN  Progress: no change  Taken 12/26/2022 0547 by Libra Zamorano RN  Plan of Care Reviewed With: patient  Outcome Evaluation: transfer from medical floor this shift. patient's blood pressure remains stable, no pressors have been initiated. UOP adequate. all basic needs met at this time. will continue to monitor.  Goal: Patient-Specific Goal (Individualized)  Outcome: Ongoing, Progressing  Goal: Absence of Hospital-Acquired Illness or Injury  Outcome: Ongoing, Progressing  Intervention: Identify and Manage Fall Risk  Recent Flowsheet Documentation  Taken 12/26/2022 2300 by Emerald Sanchez RN  Safety Promotion/Fall Prevention: safety round/check completed  Taken 12/26/2022 2200 by Emerald Sanchez RN  Safety Promotion/Fall Prevention: safety round/check completed  Taken 12/26/2022 2100 by Emerald Sanchez RN  Safety Promotion/Fall Prevention: safety round/check completed  Taken 12/26/2022 2000 by Emerald Sanchez RN  Safety Promotion/Fall Prevention: safety round/check completed  Taken 12/26/2022 1900 by Emerald Sanchez RN  Safety Promotion/Fall Prevention: safety round/check completed  Intervention: Prevent Skin Injury  Recent Flowsheet Documentation  Taken 12/26/2022 2000 by Emerald Sanchez RN  Body Position: position changed independently  Skin Protection: adhesive use limited  Intervention: Prevent and Manage VTE (Venous Thromboembolism) Risk  Recent Flowsheet Documentation  Taken 12/26/2022 2300 by Emerald Sanchez RN  Activity Management: (up to the commode) --  Taken 12/26/2022 2000 by Emerald Sanchez RN  Activity Management: activity adjusted per tolerance  VTE Prevention/Management: other (see comments)  Range of Motion: active ROM (range of motion) encouraged  Intervention: Prevent Infection  Recent Flowsheet Documentation  Taken 12/26/2022 2000 by Emerald Sanchez  RN  Infection Prevention: rest/sleep promoted  Goal: Optimal Comfort and Wellbeing  Outcome: Ongoing, Progressing  Intervention: Provide Person-Centered Care  Recent Flowsheet Documentation  Taken 12/26/2022 2000 by Emerald Sanchez RN  Trust Relationship/Rapport: care explained  Goal: Readiness for Transition of Care  Outcome: Ongoing, Progressing     Problem: Dysrhythmia  Goal: Normalized Cardiac Rhythm  Outcome: Ongoing, Progressing  Intervention: Monitor and Manage Cardiac Rhythm Effect  Recent Flowsheet Documentation  Taken 12/26/2022 2000 by Emerald Sanchez RN  VTE Prevention/Management: other (see comments)     Problem: Adjustment to Illness (Sepsis/Septic Shock)  Goal: Optimal Coping  Outcome: Ongoing, Progressing  Intervention: Optimize Psychosocial Adjustment to Illness  Recent Flowsheet Documentation  Taken 12/26/2022 2000 by Emerald Sanchez RN  Supportive Measures:   active listening utilized   self-care encouraged   positive reinforcement provided  Family/Support System Care:   caregiver stress acknowledged   self-care encouraged     Problem: Bleeding (Sepsis/Septic Shock)  Goal: Absence of Bleeding  Outcome: Ongoing, Progressing  Intervention: Monitor and Manage Bleeding  Recent Flowsheet Documentation  Taken 12/26/2022 2300 by Emerald Sanchez RN  Bleeding Precautions: blood pressure closely monitored  Taken 12/26/2022 2000 by Emerald Sanchez RN  Bleeding Precautions: blood pressure closely monitored     Problem: Glycemic Control Impaired (Sepsis/Septic Shock)  Goal: Blood Glucose Level Within Desired Range  Outcome: Ongoing, Progressing  Intervention: Optimize Glycemic Control  Recent Flowsheet Documentation  Taken 12/26/2022 2000 by Emerald Sanchez RN  Glycemic Management: blood glucose monitored     Problem: Infection Progression (Sepsis/Septic Shock)  Goal: Absence of Infection Signs and Symptoms  Outcome: Ongoing, Progressing  Intervention: Initiate Sepsis Management  Recent Flowsheet Documentation  Taken  12/26/2022 2300 by Emerald Sanchez RN  Infection Management: aseptic technique maintained  Taken 12/26/2022 2000 by Emerald Sanchez RN  Infection Management: aseptic technique maintained  Infection Prevention: rest/sleep promoted  Intervention: Promote Recovery  Recent Flowsheet Documentation  Taken 12/26/2022 2300 by Emerald Sanchez RN  Activity Management: (up to the commode) --  Taken 12/26/2022 2000 by Emerald Sanchez RN  Activity Management: activity adjusted per tolerance  Sleep/Rest Enhancement:   awakenings minimized   room darkened     Problem: Nutrition Impaired (Sepsis/Septic Shock)  Goal: Optimal Nutrition Intake  Outcome: Ongoing, Progressing  Intervention: Promote and Optimize Nutrition Delivery  Recent Flowsheet Documentation  Taken 12/26/2022 2000 by Emerald Sanchez RN  Nutrition Support Management: weight trending reviewed     Problem: Electrolyte Imbalance  Goal: Electrolyte Balance  Outcome: Ongoing, Progressing     Problem: Fluid and Electrolyte Imbalance (Acute Kidney Injury/Impairment)  Goal: Fluid and Electrolyte Balance  Outcome: Ongoing, Progressing     Problem: Oral Intake Inadequate (Acute Kidney Injury/Impairment)  Goal: Optimal Nutrition Intake  Outcome: Ongoing, Progressing     Problem: Renal Function Impairment (Acute Kidney Injury/Impairment)  Goal: Effective Renal Function  Outcome: Ongoing, Progressing  Intervention: Monitor and Support Renal Function  Recent Flowsheet Documentation  Taken 12/26/2022 2000 by Emerald Sanchez RN  Medication Review/Management: medications reviewed     Problem: Asthma Comorbidity  Goal: Maintenance of Asthma Control  Outcome: Ongoing, Progressing  Intervention: Maintain Asthma Symptom Control  Recent Flowsheet Documentation  Taken 12/26/2022 2000 by Emerald Sanchez RN  Medication Review/Management: medications reviewed     Problem: Diabetes Comorbidity  Goal: Blood Glucose Level Within Targeted Range  Outcome: Ongoing, Progressing  Intervention: Monitor and Manage  Glycemia  Recent Flowsheet Documentation  Taken 12/26/2022 2000 by Emerald Sanchez RN  Glycemic Management: blood glucose monitored     Problem: Heart Failure Comorbidity  Goal: Maintenance of Heart Failure Symptom Control  Outcome: Ongoing, Progressing  Intervention: Maintain Heart Failure-Management  Recent Flowsheet Documentation  Taken 12/26/2022 2000 by Emerald Sanchez RN  Medication Review/Management: medications reviewed     Problem: Hypertension Comorbidity  Goal: Blood Pressure in Desired Range  Outcome: Ongoing, Progressing  Intervention: Maintain Blood Pressure Management  Recent Flowsheet Documentation  Taken 12/26/2022 2000 by Emerald Sanchez RN  Medication Review/Management: medications reviewed     Problem: Obstructive Sleep Apnea Risk or Actual Comorbidity Management  Goal: Unobstructed Breathing During Sleep  Outcome: Ongoing, Progressing     Problem: Skin Injury Risk Increased  Goal: Skin Health and Integrity  Outcome: Ongoing, Progressing  Intervention: Optimize Skin Protection  Recent Flowsheet Documentation  Taken 12/26/2022 2000 by Emerald Sanchez RN  Pressure Reduction Techniques: weight shift assistance provided  Head of Bed (HOB) Positioning: HOB at 30-45 degrees  Pressure Reduction Devices: specialty bed utilized  Skin Protection: adhesive use limited   Goal Outcome Evaluation:

## 2022-12-28 LAB
ANION GAP SERPL CALCULATED.3IONS-SCNC: 21 MMOL/L (ref 5–15)
BASOPHILS # BLD AUTO: 0.04 10*3/MM3 (ref 0–0.2)
BASOPHILS NFR BLD AUTO: 0.9 % (ref 0–1.5)
BUN SERPL-MCNC: 80 MG/DL (ref 6–20)
BUN/CREAT SERPL: 30.9 (ref 7–25)
CALCIUM SPEC-SCNC: 9.2 MG/DL (ref 8.6–10.5)
CHLORIDE SERPL-SCNC: 88 MMOL/L (ref 98–107)
CO2 SERPL-SCNC: 23 MMOL/L (ref 22–29)
CREAT SERPL-MCNC: 2.59 MG/DL (ref 0.76–1.27)
DEPRECATED RDW RBC AUTO: 49.2 FL (ref 37–54)
EGFRCR SERPLBLD CKD-EPI 2021: 29.1 ML/MIN/1.73
EOSINOPHIL # BLD AUTO: 0.03 10*3/MM3 (ref 0–0.4)
EOSINOPHIL NFR BLD AUTO: 0.7 % (ref 0.3–6.2)
ERYTHROCYTE [DISTWIDTH] IN BLOOD BY AUTOMATED COUNT: 15 % (ref 12.3–15.4)
GLUCOSE SERPL-MCNC: 148 MG/DL (ref 65–99)
HCT VFR BLD AUTO: 45.8 % (ref 37.5–51)
HGB BLD-MCNC: 14.7 G/DL (ref 13–17.7)
IMM GRANULOCYTES # BLD AUTO: 0.01 10*3/MM3 (ref 0–0.05)
IMM GRANULOCYTES NFR BLD AUTO: 0.2 % (ref 0–0.5)
LYMPHOCYTES # BLD AUTO: 1.04 10*3/MM3 (ref 0.7–3.1)
LYMPHOCYTES NFR BLD AUTO: 22.8 % (ref 19.6–45.3)
MCH RBC QN AUTO: 29.2 PG (ref 26.6–33)
MCHC RBC AUTO-ENTMCNC: 32.1 G/DL (ref 31.5–35.7)
MCV RBC AUTO: 91.1 FL (ref 79–97)
MONOCYTES # BLD AUTO: 0.93 10*3/MM3 (ref 0.1–0.9)
MONOCYTES NFR BLD AUTO: 20.4 % (ref 5–12)
NEUTROPHILS NFR BLD AUTO: 2.52 10*3/MM3 (ref 1.7–7)
NEUTROPHILS NFR BLD AUTO: 55 % (ref 42.7–76)
NRBC BLD AUTO-RTO: 0.9 /100 WBC (ref 0–0.2)
PLATELET # BLD AUTO: 130 10*3/MM3 (ref 140–450)
POTASSIUM SERPL-SCNC: 3.5 MMOL/L (ref 3.5–5.2)
RBC # BLD AUTO: 5.03 10*6/MM3 (ref 4.14–5.8)
SODIUM SERPL-SCNC: 132 MMOL/L (ref 136–145)
WBC NRBC COR # BLD: 4.57 10*3/MM3 (ref 3.4–10.8)

## 2022-12-28 PROCEDURE — 85025 COMPLETE CBC W/AUTO DIFF WBC: CPT | Performed by: HOSPITALIST

## 2022-12-28 PROCEDURE — 80048 BASIC METABOLIC PNL TOTAL CA: CPT | Performed by: HOSPITALIST

## 2022-12-28 PROCEDURE — 99222 1ST HOSP IP/OBS MODERATE 55: CPT | Performed by: INTERNAL MEDICINE

## 2022-12-28 PROCEDURE — 25010000002 FUROSEMIDE PER 20 MG: Performed by: INTERNAL MEDICINE

## 2022-12-28 PROCEDURE — 25010000002 PIPERACILLIN SOD-TAZOBACTAM PER 1 G

## 2022-12-28 PROCEDURE — 25010000002 ONDANSETRON PER 1 MG

## 2022-12-28 RX ADMIN — PIPERACILLIN AND TAZOBACTAM 4.5 G: 4; .5 INJECTION, POWDER, LYOPHILIZED, FOR SOLUTION INTRAVENOUS; PARENTERAL at 13:56

## 2022-12-28 RX ADMIN — Medication 10 ML: at 21:00

## 2022-12-28 RX ADMIN — Medication 10 ML: at 10:06

## 2022-12-28 RX ADMIN — CARVEDILOL 6.25 MG: 6.25 TABLET, FILM COATED ORAL at 17:00

## 2022-12-28 RX ADMIN — ONDANSETRON 4 MG: 2 INJECTION INTRAMUSCULAR; INTRAVENOUS at 05:32

## 2022-12-28 RX ADMIN — FUROSEMIDE 40 MG: 10 INJECTION, SOLUTION INTRAVENOUS at 21:29

## 2022-12-28 RX ADMIN — ATORVASTATIN CALCIUM 10 MG: 10 TABLET, FILM COATED ORAL at 10:06

## 2022-12-28 RX ADMIN — PIPERACILLIN AND TAZOBACTAM 4.5 G: 4; .5 INJECTION, POWDER, LYOPHILIZED, FOR SOLUTION INTRAVENOUS; PARENTERAL at 05:11

## 2022-12-28 RX ADMIN — PANTOPRAZOLE SODIUM 40 MG: 40 INJECTION, POWDER, FOR SOLUTION INTRAVENOUS at 05:12

## 2022-12-28 RX ADMIN — FUROSEMIDE 40 MG: 10 INJECTION, SOLUTION INTRAVENOUS at 10:05

## 2022-12-28 RX ADMIN — Medication 10 ML: at 05:12

## 2022-12-28 RX ADMIN — ONDANSETRON 4 MG: 2 INJECTION INTRAMUSCULAR; INTRAVENOUS at 17:08

## 2022-12-28 RX ADMIN — HYDROCODONE BITARTRATE AND ACETAMINOPHEN 1 TABLET: 7.5; 325 TABLET ORAL at 17:08

## 2022-12-28 RX ADMIN — HYDROCODONE BITARTRATE AND ACETAMINOPHEN 1 TABLET: 7.5; 325 TABLET ORAL at 05:36

## 2022-12-28 RX ADMIN — PIPERACILLIN AND TAZOBACTAM 4.5 G: 4; .5 INJECTION, POWDER, LYOPHILIZED, FOR SOLUTION INTRAVENOUS; PARENTERAL at 21:29

## 2022-12-28 NOTE — CONSULTS
Adult Nutrition  Assessment/PES    Patient Name:  Matti Bravo  YOB: 1970  MRN: [id number]  Admit Date:  12/20/2022    Assessment Date:  12/28/2022    Comments:  Pt remains on clear liquids.  He continues to have abd pain and N/V.  Taking liquids with intake 45% average for the last 5 documented meals.  He is s/p HIDA scan--No evidence of Acute Cholecystitis.  Surgery notes symptoms are not consistent with a biliary etiology.  SOA has improved.  I am concerned about his prolonged NPO/Clear liquid diet status.  Spoke with ns staff and they are consulting GI.  ALT and Tbili remain elevated.  Bun and Creat elevated but improved.  His wt is up from 12/23 Standing Scale 285#--->294# standing scale current wt today.   2+ Edema recorded.   Will monitor POC and po intake/tolerance.        Reason for Assessment     Row Name 12/28/22 1507          Reason for Assessment    Reason For Assessment follow-up protocol                Nutrition/Diet History     Row Name 12/28/22 1503          Nutrition/Diet History    Typical Intake (Food/Fluid/EN/PN) Pt c/o pain.  he reports that he cannot take even liquids.  FAmily present and they report that pt is taking taking minimal po.  They told him to try some fruit and see how he did with it.  He is having BMs.                Labs/Tests/Procedures/Meds     Row Name 12/28/22 4410          Labs/Procedures/Meds    Lab Results Reviewed reviewed, pertinent     Lab Results Comments Na 132; Gluc 148; bun 80; Creat 2.59; ; Tbili 3.4        Diagnostic Tests/Procedures    Diagnostic Test/Procedure Reviewed reviewed, pertinent     Diagnostic Test/Procedures Comments consults for --Cardiac; Surgery; nephrology; HIDA scan        Medications    Pertinent Medications Reviewed reviewed, pertinent     Pertinent Medications Comments Coreg; lasix; Zosyn                Physical Findings     Row Name 12/28/22 3968          Physical Findings    Additional Documentation Fluid  Accumulation (Group)                  Nutrition Prescription Ordered     Row Name 12/28/22 1515          Nutrition Prescription PO    Current PO Diet Clear Liquid                Evaluation of Received Nutrient/Fluid Intake     Row Name 12/28/22 1515          PO Evaluation    Number of Days PO Intake Evaluated Insufficient Data     Number of Meals 5     % PO Intake 45% average                   Problem/Interventions:   Problem 1     Row Name 12/28/22 1516          Nutrition Diagnoses Problem 1    Problem 1 Altered Nutrition Related to Labs     Etiology (related to) Medical Diagnosis;Medication Interaction     Metabolic/ICU Hyponatremia;Elevated LFTs;Hyperbilirubinemia     Renal FRANCK     Food/Medication Interaction Diuretic     Signs/Symptoms (evidenced by) Biochemical     Specific Labs Noted BUN;Creatinine;Total bilirubin;ALT                  Problem 3     Row Name 12/28/22 1517          Nutrition Diagnoses Problem 3    Problem 3 Inadequate Intake/Infusion     Inadequate Intake Type Oral     Macronutrient Kcal;Protein     Micronutrient Vitamin;Mineral     Etiology (related to) Factors Affecting Nutrition     Reported GI Symptoms Abdominal Pain;N & V     Signs/Symptoms (evidenced by) PO Intake;Clear Lquid Diet;NPO     Number of days NPO/Clears Greater than 5                  Intervention Goal     Row Name 12/28/22 1519          Intervention Goal    General Reduce/improve symptoms     PO Tolerate PO;Increase intake;Advance diet;Meet estimated needs     Weight No significant weight loss  fluid loss is appropriate                Nutrition Intervention     Row Name 12/28/22 1520          Nutrition Intervention    RD/Tech Action Follow Tx progress;Care plan reviewd;Encourage intake                  Education/Evaluation     Row Name 12/28/22 1522          Education    Education Will Instruct as appropriate        Monitor/Evaluation    Monitor Per protocol;I&O;Pertinent labs;PO intake;Weight;Skin status;Symptoms     Education  Follow-up Reinforce PRN                 Electronically signed by:  Maria Guadalupe Hernández RD  12/28/22 15:23 CST

## 2022-12-28 NOTE — PLAN OF CARE
Goal Outcome Evaluation:  Plan of Care Reviewed With: caregiver, patient        Progress: no change  Outcome Evaluation: Nutrition F/U:  Pt remains on clear liquids.  he continues to have N/V and abd pain with.  Creat now stable.  Surgery notes symptoms are not consistent with Cholecystitis on HIDA scan.  concerned about prolonged NPO/Clear liquid status.

## 2022-12-28 NOTE — PLAN OF CARE
Goal Outcome Evaluation:  Plan of Care Reviewed With: patient        Progress: improving  Outcome Evaluation: Pt sitting up in chair, no complaints of pain at this time, no skin injuries, no falls, continues with IV ATBS, no complaints of burning with urination, GI consulted

## 2022-12-28 NOTE — CONSULTS
SUBJECTIVE:   12/28/2022    Name: Matti Bravo  DOD: 1970    REASON FOR CONSULT: Elevated liver enzymes    Chief Complaint:     Chief Complaint   Patient presents with   • Shortness of Breath       Subjective     Patient is 52 y.o. male with past medical history of asthma, chronic systolic heart failure, diabetes mellitus, hyperlipidemia, hypertension, obesity, peripheral vascular disease, sleep apnea presents with worsening dyspnea associated with fatigue, nausea and abdominal pain.  Patient is complaining of left lower quadrant and left upper quadrant abdominal pain on intermittent basis associated with hiccups.  He has nausea and denied vomiting, diarrhea, constipation, rectal bleeding or weight loss.  CT abdomen pelvis last week was consistent.  There is severe cardiomegaly, hepatomegaly, distended gallbladder, degenerative joint disease of the spine, atherosclerosis, bibasilar pulmonary edema and pacemaker in place.  HIDA scan today was consistent with 9% gallbladder ejection fraction and no evidence of biliary obstruction.  Seen by surgery and was recommended to have cholecystectomy on elective basis.  He was noted to have elevated bilirubin, AST and ALT with normal alkaline phosphatase and LFTs were normal except for elevated bilirubin upon admission.  WBC normal at 4.57.     ROS/HISTORY/ CURRENT MEDICATIONS/OBJECTIVE/VS/PE:   Review of Systems:   Review of Systems   Constitutional: Negative for chills, fatigue, fever and unexpected weight change.   HENT: Negative for congestion, ear discharge, hearing loss, nosebleeds and sore throat.    Eyes: Negative for pain, discharge and redness.   Respiratory: Positive for shortness of breath. Negative for cough, chest tightness and wheezing.    Cardiovascular: Negative for chest pain and palpitations.   Gastrointestinal: Positive for abdominal pain and nausea. Negative for abdominal distention, blood in stool, constipation, diarrhea and vomiting.    Endocrine: Negative for cold intolerance, polydipsia, polyphagia and polyuria.   Genitourinary: Negative for dysuria, flank pain, frequency, hematuria and urgency.   Musculoskeletal: Negative for arthralgias, back pain, joint swelling and myalgias.   Skin: Negative for color change, pallor and rash.   Neurological: Negative for tremors, seizures, syncope, weakness and headaches.   Hematological: Negative for adenopathy. Does not bruise/bleed easily.   Psychiatric/Behavioral: Negative for behavioral problems, confusion, dysphoric mood, hallucinations and suicidal ideas. The patient is not nervous/anxious.        History:     Past Medical History:   Diagnosis Date   • Asthma    • Chronic systolic heart failure (HCC)    • Diabetes mellitus (HCC)    • Hyperlipidemia    • Hypertension    • Obesity    • Peripheral vascular disease (HCC)    • Sleep apnea      Past Surgical History:   Procedure Laterality Date   • CARDIAC CATHETERIZATION N/A 12/08/2021   • CARDIAC DEFIBRILLATOR PLACEMENT     • VARICOSE VEIN SURGERY Right 04/05/2019     Family History   Problem Relation Age of Onset   • Diabetes Mother    • Hypertension Father      Social History     Tobacco Use   • Smoking status: Former   • Smokeless tobacco: Never   Vaping Use   • Vaping Use: Never used   Substance Use Topics   • Alcohol use: No   • Drug use: No     Medications Prior to Admission   Medication Sig Dispense Refill Last Dose   • albuterol sulfate  (90 Base) MCG/ACT inhaler Inhale 2 puffs Every 4 (Four) Hours As Needed for Wheezing. 1 inhaler 3    • apixaban (ELIQUIS) 5 MG tablet tablet Take 1 tablet by mouth 2 (Two) Times a Day. 60 tablet 3    • bumetanide (BUMEX) 1 MG tablet Take 1 tablet by mouth 2 (Two) Times a Day. 60 tablet 2    • carvedilol (COREG) 12.5 MG tablet Take 1 tablet by mouth 2 (Two) Times a Day With Meals for 30 days. (Patient taking differently: Take 12.5 mg by mouth 2 (Two) Times a Day With Meals. Pt states he has some meds and is  still taking this.) 60 tablet 3    • losartan (COZAAR) 25 MG tablet Take 0.5 tablets by mouth Daily for 30 days. 15 tablet 3    • lovastatin (ALTOPREV) 20 MG 24 hr tablet Take 20 mg by mouth.      • metOLazone (ZAROXOLYN) 2.5 MG tablet Take 1 tablet by mouth 3 (Three) Times a Week. 15 tablet 3    • potassium chloride 10 MEQ CR tablet Take 2 tablets by mouth Daily. 30 tablet 1    • vitamin D (ERGOCALCIFEROL) 1.25 MG (37761 UT) capsule capsule Take 50,000 Units by mouth 1 (One) Time Per Week.        Allergies:  Patient has no known allergies.    I have reviewed the patient's medical history, surgical history and family history in the available medical record system.     Current Medications:     Current Facility-Administered Medications   Medication Dose Route Frequency Provider Last Rate Last Admin   • acetaminophen (TYLENOL) tablet 1,000 mg  1,000 mg Oral Q6H PRN Sudeep Wolf MD   1,000 mg at 12/24/22 1240   • albuterol (PROVENTIL) nebulizer solution 0.083% 2.5 mg/3mL  2.5 mg Nebulization Q4H PRN Will French MD       • atorvastatin (LIPITOR) tablet 10 mg  10 mg Oral Daily Will French MD   10 mg at 12/28/22 1006   • calcium carbonate (TUMS) chewable tablet 500 mg (200 mg elemental)  2 tablet Oral TID PRN Will French MD   2 tablet at 12/24/22 1444   • carvedilol (COREG) tablet 6.25 mg  6.25 mg Oral BID With Meals Efrain Mas MD       • droperidol (INAPSINE) injection 1.25 mg  1.25 mg Intravenous Q6H PRN Sudeep Wolf MD   1.25 mg at 12/22/22 1302   • furosemide (LASIX) injection 40 mg  40 mg Intravenous BID Efrain Mas MD   40 mg at 12/28/22 1005   • HYDROcodone-acetaminophen (NORCO) 7.5-325 MG per tablet 1 tablet  1 tablet Oral Q6H PRN Will French MD   1 tablet at 12/28/22 0536   • losartan (COZAAR) half tablet 12.5 mg  12.5 mg Oral Q24H Will French MD       • morphine injection 2 mg  2 mg Intravenous Q2H PRN Will French MD       •  morphine injection 4 mg  4 mg Intravenous Once Will French MD       • ondansetron (ZOFRAN) injection 4 mg  4 mg Intravenous Q6H PRN Sudeep Wolf MD   4 mg at 12/28/22 0532   • pantoprazole (PROTONIX) injection 40 mg  40 mg Intravenous Q AM Trevor Alonso MD   40 mg at 12/28/22 0512   • piperacillin-tazobactam (ZOSYN) 4.5 g/100 mL 0.9% NS IVPB (mbp)  4.5 g Intravenous Q8H Sudeep Wolf MD   4.5 g at 12/28/22 1356   • potassium chloride 10 mEq in 100 mL IVPB  10 mEq Intravenous Q1H PRN Will French MD       • simethicone (MYLICON) chewable tablet 80 mg  80 mg Oral 4x Daily PRN Delonte Caputo MD   80 mg at 12/25/22 0025   • sodium chloride 0.9 % flush 10 mL  10 mL Intravenous Q12H Will French MD   10 mL at 12/28/22 1006   • sodium chloride 0.9 % flush 10 mL  10 mL Intravenous PRN Will French MD   10 mL at 12/28/22 0512   • sodium chloride 0.9 % infusion 40 mL  40 mL Intravenous PRN Will French MD           Objective     Physical Exam:   Heart Rate:  [] 72  Resp:  [18] 18  BP: ()/(58-80) 118/72    Physical Exam:  General Appearance:    Alert, cooperative, in no acute distress   Head:    Normocephalic, without obvious abnormality, atraumatic   Eyes:            Lids and lashes normal, conjunctivae and sclerae normal, no   icterus, no pallor, corneas clear, PERRLA   Ears:    Ears appear intact with no abnormalities noted   Throat:   No oral lesions, no thrush, oral mucosa moist   Neck:   No adenopathy, supple, trachea midline, no thyromegaly, no     carotid bruit, no JVD   Back:     No kyphosis present, no scoliosis present, no skin lesions,       erythema or scars, no tenderness to percussion or                   palpation,   range of motion normal   Lungs:     Clear to auscultation,respirations regular, even and                   unlabored    Heart:    Regular rhythm and normal rate, normal S1 and S2, no            murmur, no gallop, no rub, no click    Breast Exam:    Deferred   Abdomen:     Normal bowel sounds, no masses, no organomegaly, soft        nontender, nondistended, no guarding, no rebound                 tenderness   Genitalia:    Deferred   Extremities:   Moves all extremities well, no edema, no cyanosis, no              redness   Pulses:   Pulses palpable and equal bilaterally   Skin:   No bleeding, bruising or rash   Lymph nodes:   No palpable adenopathy   Neurologic:   Cranial nerves 2 - 12 grossly intact, sensation intact, DTR        present and equal bilaterally      Results Review:     Lab Results   Component Value Date    WBC 4.57 12/28/2022    WBC 4.86 12/27/2022    WBC 5.85 12/26/2022    HGB 14.7 12/28/2022    HGB 14.3 12/27/2022    HGB 14.1 12/26/2022    HCT 45.8 12/28/2022    HCT 43.8 12/27/2022    HCT 42.4 12/26/2022     (L) 12/28/2022     (L) 12/27/2022     (L) 12/26/2022     Results from last 7 days   Lab Units 12/27/22  1836   ALK PHOS U/L 52   ALT (SGPT) U/L 422*   AST (SGOT) U/L 258*     Results from last 7 days   Lab Units 12/27/22  1836   BILIRUBIN mg/dL 3.4*   ALK PHOS U/L 52     No results found for: LIPASE  Lab Results   Component Value Date    INR 2.50 (H) 12/04/2022    INR 2.19 (H) 12/04/2022    INR 1.81 (H) 09/23/2022         Radiology Review:  Imaging Results (Last 72 Hours)     Procedure Component Value Units Date/Time    NM HIDA SCAN WITHOUT PHARMACOLOGICAL INTERVENTION [[id number]] Collected: 12/27/22 0958     Updated: 12/27/22 1354    Narrative:      EXAM:  NUCLEAR MEDICINE HEPATOBILIARY SCAN WITH CCK    HISTORY:Cholecystitis, N17.9 Acute kidney failure, unspecified  R06.02 Shortness of breath E87.6 Hypokalemia R79.89 Other  specified abnormal findings of blood chemistry Z74.09 Other  reduced mobility Z78.9 Other specified health status Z74.09 Other  reduced mobility    COMPARISON:    The patient received 6.3 mCi of Tc-99m Choletec IV.   Radioactivity is demonstrated within the gallbladder beginning  at  45minutes following injection of the radiopharmaceutical.   Radioactivity is demonstrated within the small bowel beginning at  75 minutes following injection of the radiopharmaceutical.    Beginning at 75 minutes following injection of the  radiopharmaceutical, the patient received a fatty meal challenge  of 8 Oz of Boost plus.  This resulted in a gallbladder ejection  fraction of 9% with normal range being greater than 30%.   Technologist notes that the patient denied any symptoms with the  Kinevac infusion.       Impression:      Unremarkable hepatobiliary scan.  Suboptimal ejection fraction of gallbladder at 9%, with a fatty  meal challenge.    Electronically signed by:  Pedro Day MD  12/27/2022 1:52 PM CST  Workstation: [id number]          I reviewed the patient's new clinical results.    I reviewed the patient's new imaging results and agree with the interpretation.     ASSESSMENT/PLAN:   ASSESSMENT: 1.  Left-sided abdominal pain and atherosclerosis need to rule out colitis.  2.  Elevated liver enzymes, likely reactive with chronic passive congestion of the liver.  Could also be due to hepatitis, medications and low blood pressure.  No evidence of biliary obstruction upon HIDA.  3.  Biliary dyskinesia  4.  Hiccups  PLAN: 1.  Add Thorazine 25 mg p.o. 3 times daily for hiccups.  2.  Continue diuresis and no added salt diet  3.  Obtain hepatitis panel, iron panel, copper panel and autoimmune panel  4.  Follow LFTs closely and avoid hypotension hepatotoxic medications  5.  Continue PPI and add Bentyl  6.  EGD and colonoscopy if abdominal pain and nausea persists  The risks, benefits, and alternatives of this procedure have been discussed with the patient or the responsible party. The patient understands and agrees to proceed.         Bea Pierce MD  12/28/22  15:17 CST

## 2022-12-29 LAB
ALBUMIN SERPL-MCNC: 3.8 G/DL (ref 3.5–5.2)
ALP SERPL-CCNC: 59 U/L (ref 39–117)
ALT SERPL W P-5'-P-CCNC: 432 U/L (ref 1–41)
ANION GAP SERPL CALCULATED.3IONS-SCNC: 17 MMOL/L (ref 5–15)
ANISOCYTOSIS BLD QL: ABNORMAL
AST SERPL-CCNC: 240 U/L (ref 1–40)
BASOPHILS # BLD MANUAL: 0.13 10*3/MM3 (ref 0–0.2)
BASOPHILS NFR BLD MANUAL: 3 % (ref 0–1.5)
BILIRUB CONJ SERPL-MCNC: 1.7 MG/DL (ref 0–0.3)
BILIRUB INDIRECT SERPL-MCNC: 1.8 MG/DL
BILIRUB SERPL-MCNC: 3.5 MG/DL (ref 0–1.2)
BUN SERPL-MCNC: 79 MG/DL (ref 6–20)
BUN/CREAT SERPL: 34.5 (ref 7–25)
CALCIUM SPEC-SCNC: 9 MG/DL (ref 8.6–10.5)
CHLORIDE SERPL-SCNC: 88 MMOL/L (ref 98–107)
CO2 SERPL-SCNC: 22 MMOL/L (ref 22–29)
CREAT SERPL-MCNC: 2.29 MG/DL (ref 0.76–1.27)
DEPRECATED RDW RBC AUTO: 46.5 FL (ref 37–54)
EGFRCR SERPLBLD CKD-EPI 2021: 33.7 ML/MIN/1.73
EOSINOPHIL # BLD MANUAL: 0.04 10*3/MM3 (ref 0–0.4)
EOSINOPHIL NFR BLD MANUAL: 1 % (ref 0.3–6.2)
ERYTHROCYTE [DISTWIDTH] IN BLOOD BY AUTOMATED COUNT: 14.7 % (ref 12.3–15.4)
GLUCOSE SERPL-MCNC: 127 MG/DL (ref 65–99)
HCT VFR BLD AUTO: 43.3 % (ref 37.5–51)
HGB BLD-MCNC: 14.3 G/DL (ref 13–17.7)
LYMPHOCYTES # BLD MANUAL: 1.06 10*3/MM3 (ref 0.7–3.1)
LYMPHOCYTES NFR BLD MANUAL: 20 % (ref 5–12)
MCH RBC QN AUTO: 29.2 PG (ref 26.6–33)
MCHC RBC AUTO-ENTMCNC: 33 G/DL (ref 31.5–35.7)
MCV RBC AUTO: 88.4 FL (ref 79–97)
MONOCYTES # BLD: 0.85 10*3/MM3 (ref 0.1–0.9)
NEUTROPHILS # BLD AUTO: 2.16 10*3/MM3 (ref 1.7–7)
NEUTROPHILS NFR BLD MANUAL: 51 % (ref 42.7–76)
OVALOCYTES BLD QL SMEAR: ABNORMAL
PLATELET # BLD AUTO: 119 10*3/MM3 (ref 140–450)
PMV BLD AUTO: 14 FL (ref 6–12)
POTASSIUM SERPL-SCNC: 3.5 MMOL/L (ref 3.5–5.2)
PROT SERPL-MCNC: 7.3 G/DL (ref 6–8.5)
RBC # BLD AUTO: 4.9 10*6/MM3 (ref 4.14–5.8)
SMALL PLATELETS BLD QL SMEAR: ABNORMAL
SODIUM SERPL-SCNC: 127 MMOL/L (ref 136–145)
TARGETS BLD QL SMEAR: ABNORMAL
VARIANT LYMPHS NFR BLD MANUAL: 25 % (ref 19.6–45.3)
WBC MORPH BLD: NORMAL
WBC NRBC COR # BLD: 4.23 10*3/MM3 (ref 3.4–10.8)

## 2022-12-29 PROCEDURE — 25010000002 FUROSEMIDE PER 20 MG: Performed by: NURSE PRACTITIONER

## 2022-12-29 PROCEDURE — 25010000002 FUROSEMIDE PER 20 MG: Performed by: INTERNAL MEDICINE

## 2022-12-29 PROCEDURE — 80048 BASIC METABOLIC PNL TOTAL CA: CPT | Performed by: HOSPITALIST

## 2022-12-29 PROCEDURE — 85007 BL SMEAR W/DIFF WBC COUNT: CPT | Performed by: HOSPITALIST

## 2022-12-29 PROCEDURE — 25010000002 ENOXAPARIN PER 10 MG: Performed by: HOSPITALIST

## 2022-12-29 PROCEDURE — 80076 HEPATIC FUNCTION PANEL: CPT | Performed by: INTERNAL MEDICINE

## 2022-12-29 PROCEDURE — 85025 COMPLETE CBC W/AUTO DIFF WBC: CPT | Performed by: HOSPITALIST

## 2022-12-29 PROCEDURE — 99232 SBSQ HOSP IP/OBS MODERATE 35: CPT | Performed by: INTERNAL MEDICINE

## 2022-12-29 RX ORDER — ENOXAPARIN SODIUM 150 MG/ML
1 INJECTION SUBCUTANEOUS EVERY 12 HOURS SCHEDULED
Status: DISCONTINUED | OUTPATIENT
Start: 2022-12-29 | End: 2023-01-04

## 2022-12-29 RX ORDER — FUROSEMIDE 10 MG/ML
40 INJECTION INTRAMUSCULAR; INTRAVENOUS EVERY 8 HOURS
Status: DISCONTINUED | OUTPATIENT
Start: 2022-12-29 | End: 2022-12-30

## 2022-12-29 RX ADMIN — ENOXAPARIN SODIUM 130 MG: 150 INJECTION SUBCUTANEOUS at 11:53

## 2022-12-29 RX ADMIN — HYDROCODONE BITARTRATE AND ACETAMINOPHEN 1 TABLET: 7.5; 325 TABLET ORAL at 18:50

## 2022-12-29 RX ADMIN — Medication 10 ML: at 20:20

## 2022-12-29 RX ADMIN — FUROSEMIDE 40 MG: 10 INJECTION, SOLUTION INTRAVENOUS at 09:13

## 2022-12-29 RX ADMIN — Medication 10 ML: at 09:14

## 2022-12-29 RX ADMIN — ATORVASTATIN CALCIUM 10 MG: 10 TABLET, FILM COATED ORAL at 09:13

## 2022-12-29 RX ADMIN — HYDROCODONE BITARTRATE AND ACETAMINOPHEN 1 TABLET: 7.5; 325 TABLET ORAL at 00:55

## 2022-12-29 RX ADMIN — ENOXAPARIN SODIUM 130 MG: 150 INJECTION SUBCUTANEOUS at 20:20

## 2022-12-29 RX ADMIN — FUROSEMIDE 40 MG: 10 INJECTION, SOLUTION INTRAMUSCULAR; INTRAVENOUS at 17:49

## 2022-12-29 RX ADMIN — PANTOPRAZOLE SODIUM 40 MG: 40 INJECTION, POWDER, FOR SOLUTION INTRAVENOUS at 05:35

## 2022-12-29 NOTE — PROGRESS NOTES
[facility] Medicine Services  INPATIENT PROGRESS NOTE    Length of Stay: 7  Date of Admission: 12/20/2022  Primary Care Physician: Shonna Ng APRN    Subjective   Chief Complaint: Shortness of breath  HPI:   Patient seen and examined.  12/30/2022.  Patient continues to improve.  His hiccups have improved pain is improving.  We will continue current treatment and monitor.  He has no nausea or vomiting.  No fevers or chills.  No headache no chest pain no shortness of breath.      As of today, 12/29/22  Review of Systems   Constitutional: Positive for activity change and fatigue. Negative for chills and fever.        States he is feeling a little bit better hiccups are improved.   HENT: Negative.    Eyes: Negative.    Respiratory: Positive for shortness of breath. Negative for chest tightness.    Cardiovascular: Negative.    Gastrointestinal: Positive for abdominal pain and nausea. Negative for constipation, diarrhea and vomiting.   Endocrine: Negative.    Musculoskeletal: Negative.    Skin: Negative.  Negative for wound.   Neurological: Positive for weakness.   Hematological: Negative.    Psychiatric/Behavioral: Negative.         All pertinent negatives and positives are as above. All other systems have been reviewed and are negative unless otherwise stated.    Objective    Temp:  [96.1 °F (35.6 °C)-96.7 °F (35.9 °C)] 96.7 °F (35.9 °C)  Heart Rate:  [] 115  Resp:  [18] 18  BP: ()/(64-80) 124/70    AM-PAC 6 Clicks Score (PT): 24 (12/29/22 0845)    As of today, 12/29/22  Physical Exam  Vitals and nursing note reviewed.   Constitutional:       Appearance: He is well-developed. He is ill-appearing.      Comments: Less abdominal pain and discomfort.  Nontender nondistended.   HENT:      Head: Normocephalic and atraumatic.      Right Ear: External ear normal.      Left Ear: External ear normal.      Nose: Nose normal.      Mouth/Throat:      Mouth:  Mucous membranes are dry.      Pharynx: Oropharynx is clear.   Eyes:      Conjunctiva/sclera: Conjunctivae normal.      Pupils: Pupils are equal, round, and reactive to light.   Cardiovascular:      Rate and Rhythm: Normal rate. Rhythm irregularly irregular.      Heart sounds: Normal heart sounds. No murmur heard.    No friction rub. No gallop.   Pulmonary:      Effort: Pulmonary effort is normal. No respiratory distress.      Breath sounds: Normal breath sounds. No wheezing or rales.   Chest:      Chest wall: No tenderness.   Abdominal:      General: Bowel sounds are normal. There is no distension.      Palpations: Abdomen is soft.      Tenderness: There is no abdominal tenderness.   Musculoskeletal:         General: Normal range of motion.      Cervical back: Normal range of motion and neck supple.   Skin:     General: Skin is warm and dry.      Coloration: Skin is not jaundiced.   Neurological:      General: No focal deficit present.      Mental Status: He is alert and oriented to person, place, and time.   Psychiatric:         Mood and Affect: Mood normal.         Behavior: Behavior normal.           Results Review:  I have reviewed the labs, radiology results, and diagnostic studies.    Laboratory Data:   Results from last 7 days   Lab Units 12/29/22 0622 12/28/22  0505 12/27/22  1836   SODIUM mmol/L 127* 132* 130*   POTASSIUM mmol/L 3.5 3.5 3.5   CHLORIDE mmol/L 88* 88* 90*   CO2 mmol/L 22.0 23.0 25.0   BUN mg/dL 79* 80* 73*   CREATININE mg/dL 2.29* 2.59* 2.39*   GLUCOSE mg/dL 127* 148* 143*   CALCIUM mg/dL 9.0 9.2 9.0   BILIRUBIN mg/dL 3.5*  --  3.4*   ALK PHOS U/L 59  --  52   ALT (SGPT) U/L 432*  --  422*   AST (SGOT) U/L 240*  --  258*   ANION GAP mmol/L 17.0* 21.0* 15.0     Estimated Creatinine Clearance: 57.2 mL/min (A) (by C-G formula based on SCr of 2.29 mg/dL (H)).  Results from last 7 days   Lab Units 12/23/22  0427   MAGNESIUM mg/dL 2.1         Results from last 7 days   Lab Units 12/29/22 0622  12/28/22  0505 12/27/22  0433 12/26/22  0314 12/25/22  0604   WBC 10*3/mm3 4.23 4.57 4.86 5.85 4.62   HEMOGLOBIN g/dL 14.3 14.7 14.3 14.1 14.1   HEMATOCRIT % 43.3 45.8 43.8 42.4 43.4   PLATELETS 10*3/mm3 119* 130* 119* 120* 113*           Culture Data:   No results found for: BLOODCX  No results found for: URINECX  No results found for: RESPCX  No results found for: WOUNDCX  No results found for: STOOLCX  No components found for: BODYFLD    Radiology Data:   Imaging Results (Last 24 Hours)     ** No results found for the last 24 hours. **          I have reviewed the patient's current medications.     Assessment/Plan     Active Hospital Problems    Diagnosis    • **FRANCK (acute kidney injury) ([facility])        Plan:    1.  Shortness of breath: May be slightly better.  CT scan for pulmonary embolism was nondiagnostic.  VQ scan low probability.  Currently stable improved.    2.  Volume depletion: Continue to hydrate gently.  Appreciate nephrology help.  Evelina stable.    3.  Chronic systolic congestive heart failure: Appears euvolemic..  Continue home medications.    Eliquis currently on hold in case of surgery.  Patient has been placed on Lovenox due to atrial fibrillation.  This is a chronic issue.  Rate is controlled currently.    4.  History of hyperglycemia: No diagnosis of diabetes.    Continue sliding scale    5.  Obstructive sleep apnea: Patient states its been over 2 years since he has had a CPAP.  He is scheduled to go  his CPAP on 12/29.    6.  Acute kidney injury: Renal function is unchanged from yesterday.  Superimposed upon chronic kidney disease.  Appreciate nephrology help.    7.  Possible cholecystitis:  Medical management for now.  No leukocytosis.  Continue Zosyn.  HIDA scan completed.  Clear liquid diet.  Advance diet as tolerated will need surgical consultation when HIDA scan resulted.  Eliquis discontinued 12/25.  If HIDA scan is negative would consider reinitiation of Eliquis soon.  Dr. Pierce  assistance appreciated.  We will continue to monitor.  Patient does show slight improvement with the addition of Thorazine.  Possibly recommending EGD and colonoscopy if abdominal pain and nausea persist.    8.  DVT prophylaxis: SCDs.    CODE STATUS full code        The patient was evaluated during the global COVID-19 pandemic, and the diagnosis was suspected/considered upon their initial presentation.  Evaluation, treatment, and testing were consistent with current guidelines for patients who present with complaints or symptoms that may be related to COVID-19.    I confirmed that the patient's Advance Care Plan is present, code status is documented, or surrogate decision maker is listed in the patient's medical record.       Discharge Planning: I expect patient to be discharged to home in 3-4 days.    This document has been electronically signed by Moshe Aleman DO on December 29, 2022 17:21 CST

## 2022-12-29 NOTE — PLAN OF CARE
Goal Outcome Evaluation:   Patient is tolerating treatment well. He still has significant edema in BLE. His mother is in the room with him tonight. He gave himeself a shower this morning. He refuses bed alarms. A&O x4. Monitoring.         Problem: Adult Inpatient Plan of Care  Goal: Plan of Care Review  Outcome: Ongoing, Progressing  Goal: Patient-Specific Goal (Individualized)  Outcome: Ongoing, Progressing  Goal: Absence of Hospital-Acquired Illness or Injury  Outcome: Ongoing, Progressing  Intervention: Identify and Manage Fall Risk  Recent Flowsheet Documentation  Taken 12/28/2022 2200 by Nikki Sanchez RN  Safety Promotion/Fall Prevention:   room organization consistent   safety round/check completed  Intervention: Prevent Skin Injury  Recent Flowsheet Documentation  Taken 12/28/2022 2200 by Nikki Sanchez RN  Body Position: position changed independently  Skin Protection: adhesive use limited  Intervention: Prevent and Manage VTE (Venous Thromboembolism) Risk  Recent Flowsheet Documentation  Taken 12/28/2022 2200 by Nikki Sanchez RN  Activity Management: activity adjusted per tolerance  VTE Prevention/Management: (blood thiner) other (see comments)  Range of Motion: active ROM (range of motion) encouraged  Goal: Optimal Comfort and Wellbeing  Outcome: Ongoing, Progressing  Intervention: Provide Person-Centered Care  Recent Flowsheet Documentation  Taken 12/28/2022 2200 by Nikki Sanchez RN  Trust Relationship/Rapport: care explained  Goal: Readiness for Transition of Care  Outcome: Ongoing, Progressing     Problem: Dysrhythmia  Goal: Normalized Cardiac Rhythm  Outcome: Ongoing, Progressing  Intervention: Monitor and Manage Cardiac Rhythm Effect  Recent Flowsheet Documentation  Taken 12/28/2022 2200 by Nikki Sanchez RN  VTE Prevention/Management: (blood thiner) other (see comments)     Problem: Adjustment to Illness (Sepsis/Septic Shock)  Goal: Optimal Coping  Outcome: Ongoing,  Progressing  Intervention: Optimize Psychosocial Adjustment to Illness  Recent Flowsheet Documentation  Taken 12/28/2022 2200 by Nikki Sanchez RN  Supportive Measures:   active listening utilized   relaxation techniques promoted  Family/Support System Care:   presence promoted   support provided     Problem: Bleeding (Sepsis/Septic Shock)  Goal: Absence of Bleeding  Outcome: Ongoing, Progressing  Intervention: Monitor and Manage Bleeding  Recent Flowsheet Documentation  Taken 12/28/2022 2200 by Nikki Sanchez RN  Bleeding Precautions: blood pressure closely monitored     Problem: Glycemic Control Impaired (Sepsis/Septic Shock)  Goal: Blood Glucose Level Within Desired Range  Outcome: Ongoing, Progressing     Problem: Infection Progression (Sepsis/Septic Shock)  Goal: Absence of Infection Signs and Symptoms  Outcome: Ongoing, Progressing  Intervention: Initiate Sepsis Management  Recent Flowsheet Documentation  Taken 12/28/2022 2200 by Nikki Sanchez RN  Infection Management: aseptic technique maintained  Intervention: Promote Recovery  Recent Flowsheet Documentation  Taken 12/28/2022 2200 by Nikki Sanchez RN  Activity Management: activity adjusted per tolerance  Sleep/Rest Enhancement:   awakenings minimized   family presence promoted   noise level reduced  Intervention: Promote Stabilization  Recent Flowsheet Documentation  Taken 12/28/2022 2200 by Nikki Sanchez RN  Fluid/Electrolyte Management: fluids provided     Problem: Nutrition Impaired (Sepsis/Septic Shock)  Goal: Optimal Nutrition Intake  Outcome: Ongoing, Progressing     Problem: Electrolyte Imbalance  Goal: Electrolyte Balance  Outcome: Ongoing, Progressing  Intervention: Monitor and Manage Electrolyte Imbalance  Recent Flowsheet Documentation  Taken 12/28/2022 2200 by Nikki Sanchez RN  Fluid/Electrolyte Management: fluids provided     Problem: Fluid and Electrolyte Imbalance (Acute Kidney Injury/Impairment)  Goal: Fluid  and Electrolyte Balance  Outcome: Ongoing, Progressing  Intervention: Monitor and Manage Fluid and Electrolyte Balance  Recent Flowsheet Documentation  Taken 12/28/2022 2200 by Nikki Sanchez RN  Fluid/Electrolyte Management: fluids provided     Problem: Oral Intake Inadequate (Acute Kidney Injury/Impairment)  Goal: Optimal Nutrition Intake  Outcome: Ongoing, Progressing     Problem: Renal Function Impairment (Acute Kidney Injury/Impairment)  Goal: Effective Renal Function  Outcome: Ongoing, Progressing     Problem: Asthma Comorbidity  Goal: Maintenance of Asthma Control  Outcome: Ongoing, Progressing     Problem: Diabetes Comorbidity  Goal: Blood Glucose Level Within Targeted Range  Outcome: Ongoing, Progressing     Problem: Heart Failure Comorbidity  Goal: Maintenance of Heart Failure Symptom Control  Outcome: Ongoing, Progressing     Problem: Hypertension Comorbidity  Goal: Blood Pressure in Desired Range  Outcome: Ongoing, Progressing     Problem: Obstructive Sleep Apnea Risk or Actual Comorbidity Management  Goal: Unobstructed Breathing During Sleep  Outcome: Ongoing, Progressing     Problem: Skin Injury Risk Increased  Goal: Skin Health and Integrity  Outcome: Ongoing, Progressing  Intervention: Optimize Skin Protection  Recent Flowsheet Documentation  Taken 12/28/2022 2200 by Nikki Sanchez RN  Pressure Reduction Techniques: frequent weight shift encouraged  Head of Bed (HOB) Positioning: HOB elevated  Pressure Reduction Devices: specialty bed utilized  Skin Protection: adhesive use limited     Problem: Fall Injury Risk  Goal: Absence of Fall and Fall-Related Injury  Outcome: Ongoing, Progressing  Intervention: Promote Injury-Free Environment  Recent Flowsheet Documentation  Taken 12/28/2022 2200 by Nikki Sanchez RN  Safety Promotion/Fall Prevention:   room organization consistent   safety round/check completed

## 2022-12-29 NOTE — PROGRESS NOTES
"    [facility]  [address] [place] [address]. [address]  T - [phone]  F - [phone]     Progress Note          PATIENT  DEMOGRAPHICS   PATIENT NAME: Matti Bravo                      PHYSICIAN: SHELL Hodges  : 1970  MRN: [id number]   LOS: 7 days    Patient Care Team:  Shonna Ng APRN as PCP - General (Family Medicine)  Subjective   SUBJECTIVE   No new complaints        Objective   OBJECTIVE   Vital Signs  Temp:  [96.1 °F (35.6 °C)-96.7 °F (35.9 °C)] 96.7 °F (35.9 °C)  Heart Rate:  [] 115  Resp:  [18] 18  BP: ()/(64-80) 124/70    Flowsheet Rows    Flowsheet Row First Filed Value   Admission Height 193 cm (76\") Documented at 2022 1700   Admission Weight 127 kg (280 lb) Documented at 2022 1700           I/O last 3 completed shifts:  In: 840 [P.O.:240; IV Piggyback:600]  Out: 1700 [Urine:1700]    PHYSICAL EXAM    Physical Exam  Vitals and nursing note reviewed.   Constitutional:       Appearance: Normal appearance.   Cardiovascular:      Rate and Rhythm: Normal rate and regular rhythm.      Heart sounds: Normal heart sounds. No murmur heard.    No friction rub. No gallop.   Pulmonary:      Effort: No respiratory distress.      Breath sounds: Normal breath sounds. No stridor. No wheezing, rhonchi or rales.   Musculoskeletal:      Right lower leg: Edema present.      Left lower leg: Edema present.   Skin:     General: Skin is warm and dry.   Neurological:      Mental Status: He is alert.         RESULTS   Results Review:    Results from last 7 days   Lab Units 22  0622 22  0505 22  1836   SODIUM mmol/L 127* 132* 130*   POTASSIUM mmol/L 3.5 3.5 3.5   CHLORIDE mmol/L 88* 88* 90*   CO2 mmol/L 22.0 23.0 25.0   BUN mg/dL 79* 80* 73*   CREATININE mg/dL 2.29* 2.59* 2.39*   CALCIUM mg/dL 9.0 9.2 9.0   BILIRUBIN mg/dL  --   --  3.4*   ALK PHOS U/L  --   --  52   ALT (SGPT) U/L  --   --  422*   AST (SGOT) U/L  --   --  258*   GLUCOSE " mg/dL 127* 148* 143*       Estimated Creatinine Clearance: 57.2 mL/min (A) (by C-G formula based on SCr of 2.29 mg/dL (H)).    Results from last 7 days   Lab Units 12/23/22  0427   MAGNESIUM mg/dL 2.1             Results from last 7 days   Lab Units 12/29/22  0622 12/28/22  0505 12/27/22  0433 12/26/22  0314 12/25/22  0604   WBC 10*3/mm3 4.23 4.57 4.86 5.85 4.62   HEMOGLOBIN g/dL 14.3 14.7 14.3 14.1 14.1   PLATELETS 10*3/mm3 119* 130* 119* 120* 113*               Imaging Results (Last 24 Hours)     ** No results found for the last 24 hours. **           MEDICATIONS    atorvastatin, 10 mg, Oral, Daily  carvedilol, 6.25 mg, Oral, BID With Meals  enoxaparin, 1 mg/kg, Subcutaneous, Q12H  furosemide, 40 mg, Intravenous, BID  losartan, 12.5 mg, Oral, Q24H  Morphine, 4 mg, Intravenous, Once  pantoprazole, 40 mg, Intravenous, Q AM  sodium chloride, 10 mL, Intravenous, Q12H           Assessment & Plan   ASSESSMENT / PLAN      FRANCK (acute kidney injury) (HCC)    1.  Acute Kidney Failure on CKD 3: Baseline creatinine 1.4-1.7 mg/dl  - Etiology of FRANCK likely contrast induced.    - Urinalysis showed trace protein and negative blood, 3-5 RBCs, 0-2 WBCs.  Urine sodium less than 20.  Urine protein creatinine ratio 235 mg.  - Creatinine is overall stable at 2.29 mg/dL.  Keep Lasix 40 mg IV and changed to 3 times daily. Keep fluid restriction of 1500 ml a day.   - Maintain hemodynamics.  Monitor I's and O's.  Avoid nephrotoxins like NSAIDs and IV contrast     2.  Shortness of breath:  - Underwent CT with contrast which was nondiagnostic, no marked fluid overload on exam. No pulmonary edema on xray. SOB etiology is unclear at present.      3.  Chronic systolic CHF /AICD/ non ischemic cardiomyopathy     4.  Prediabetes     5.  Hypertension:  - Blood pressure is borderline low. Decreased carvedilol to 6.25 mg twice day.      6.  KHANH:  - Supposed to be using CPAP at home     7. Acute cholecystitis:  - denies marked abdominal pain and also  has some nausea and vomiting x 1. Denies acid reflux. Continue protonix.       Copied text in this note has been reviewed and is accurate as of 12/29/2022           This document has been electronically signed by SHELL Hodges on December 29, 2022 13:25 CST

## 2022-12-29 NOTE — PLAN OF CARE
Goal Outcome Evaluation:  Plan of Care Reviewed With: patient        Progress: no change  Outcome Evaluation: Pt sitting on edge of bed with mother at bedside, no falls noted, no buring with urination,

## 2022-12-29 NOTE — NURSING NOTE
Pt heart rate on dynamap 200s signee called telemetry to get reading and pt is in AFIB Dr. Aleman made aware with new order to start Lovenox

## 2022-12-29 NOTE — PROGRESS NOTES
[facility] Medicine Services  INPATIENT PROGRESS NOTE    Length of Stay: 6  Date of Admission: 12/20/2022  Primary Care Physician: Shonna Ng APRN    Subjective   Chief Complaint: Shortness of breath  HPI:   Patient seen and examined.  12/28/2022.  The patient is complaining of abdominal pain and hiccups.  Has been seen by GI.  It appears as though the patient probably has some biliary dyskinesia.  Dr. Pierce has seen the patient and her consult is appreciated.  Thorazine has been added for hiccups.  We will follow response to treatment.    As of today, 12/28/22  Review of Systems   Constitutional: Positive for activity change and fatigue. Negative for chills and fever.   HENT: Negative.    Eyes: Negative.    Respiratory: Positive for shortness of breath. Negative for chest tightness.    Cardiovascular: Negative.    Gastrointestinal: Positive for abdominal pain and nausea. Negative for constipation, diarrhea and vomiting.   Endocrine: Negative.    Musculoskeletal: Negative.    Skin: Negative.  Negative for wound.   Neurological: Positive for weakness.   Hematological: Negative.    Psychiatric/Behavioral: Negative.         All pertinent negatives and positives are as above. All other systems have been reviewed and are negative unless otherwise stated.    Objective    Heart Rate:  [] 94  Resp:  [18] 18  BP: ()/(58-80) 118/72    AM-PAC 6 Clicks Score (PT): 24 (12/28/22 0800)    As of today, 12/28/22  Physical Exam  Vitals reviewed.   Constitutional:       Appearance: He is well-developed.   HENT:      Head: Normocephalic and atraumatic.   Eyes:      Pupils: Pupils are equal, round, and reactive to light.   Cardiovascular:      Rate and Rhythm: Normal rate. Rhythm irregularly irregular.      Heart sounds: Normal heart sounds. No murmur heard.    No friction rub. No gallop.   Pulmonary:      Effort: Pulmonary effort is normal. No respiratory distress.       Breath sounds: Normal breath sounds. No wheezing or rales.   Chest:      Chest wall: No tenderness.   Abdominal:      General: Bowel sounds are normal. There is no distension.      Palpations: Abdomen is soft.      Tenderness: There is no abdominal tenderness.   Musculoskeletal:      Cervical back: Normal range of motion and neck supple.   Psychiatric:         Behavior: Behavior normal.           Results Review:  I have reviewed the labs, radiology results, and diagnostic studies.    Laboratory Data:   Results from last 7 days   Lab Units 12/28/22  0505 12/27/22  1836 12/27/22  0433   SODIUM mmol/L 132* 130* 129*   POTASSIUM mmol/L 3.5 3.5 3.4*   CHLORIDE mmol/L 88* 90* 88*   CO2 mmol/L 23.0 25.0 26.0   BUN mg/dL 80* 73* 71*   CREATININE mg/dL 2.59* 2.39* 2.28*   GLUCOSE mg/dL 148* 143* 123*   CALCIUM mg/dL 9.2 9.0 9.2   BILIRUBIN mg/dL  --  3.4*  --    ALK PHOS U/L  --  52  --    ALT (SGPT) U/L  --  422*  --    AST (SGOT) U/L  --  258*  --    ANION GAP mmol/L 21.0* 15.0 15.0     Estimated Creatinine Clearance: 50.6 mL/min (A) (by C-G formula based on SCr of 2.59 mg/dL (H)).  Results from last 7 days   Lab Units 12/23/22  0427   MAGNESIUM mg/dL 2.1         Results from last 7 days   Lab Units 12/28/22  0505 12/27/22  0433 12/26/22  0314 12/25/22  0604 12/24/22  0607   WBC 10*3/mm3 4.57 4.86 5.85 4.62 4.66   HEMOGLOBIN g/dL 14.7 14.3 14.1 14.1 14.2   HEMATOCRIT % 45.8 43.8 42.4 43.4 44.0   PLATELETS 10*3/mm3 130* 119* 120* 113* 112*           Culture Data:   No results found for: BLOODCX  No results found for: URINECX  No results found for: RESPCX  No results found for: WOUNDCX  No results found for: STOOLCX  No components found for: BODYFLD    Radiology Data:   Imaging Results (Last 24 Hours)       ** No results found for the last 24 hours. **            I have reviewed the patient's current medications.     Assessment/Plan     Active Hospital Problems    Diagnosis     **FRANCK (acute kidney injury) (HCC)         Plan:    1.  Shortness of breath: May be slightly better.  CT scan for pulmonary embolism was nondiagnostic.  VQ scan low probability.      2.  Volume depletion: Continue to hydrate gently.  Appreciate nephrology help.    3.  Chronic systolic congestive heart failure: Appears euvolemic..  Continue home medications.    Eliquis currently on hold in case of surgery.    4.  History of hyperglycemia: No diagnosis of diabetes.    Continue sliding scale    5.  Obstructive sleep apnea: Patient states its been over 2 years since he has had a CPAP.  He is scheduled to go  his CPAP on 12/29.    6.  Acute kidney injury: Renal function is unchanged from yesterday.  Superimposed upon chronic kidney disease.  Appreciate nephrology help.    7.  Possible cholecystitis:  Medical management for now.  No leukocytosis.  Continue Zosyn.  HIDA scan completed.  Clear liquid diet.  Advance diet as tolerated will need surgical consultation when HIDA scan resulted.  Eliquis discontinued 12/25.  If HIDA scan is negative would consider reinitiation of Eliquis soon.  Dr. Pierce assistance appreciated.    8.  DVT prophylaxis: SCDs.    CODE STATUS full code        The patient was evaluated during the global COVID-19 pandemic, and the diagnosis was suspected/considered upon their initial presentation.  Evaluation, treatment, and testing were consistent with current guidelines for patients who present with complaints or symptoms that may be related to COVID-19.    I confirmed that the patient's Advance Care Plan is present, code status is documented, or surrogate decision maker is listed in the patient's medical record.       Discharge Planning: I expect patient to be discharged to home in 3-4 days.    This document has been electronically signed by Moshe Aleman DO on December 28, 2022 19:32 CST

## 2022-12-30 PROBLEM — E43 SEVERE MALNUTRITION (HCC): Status: ACTIVE | Noted: 2022-12-30

## 2022-12-30 LAB
ALBUMIN SERPL-MCNC: 3.5 G/DL (ref 3.5–5.2)
ALP SERPL-CCNC: 64 U/L (ref 39–117)
ALT SERPL W P-5'-P-CCNC: 443 U/L (ref 1–41)
ANION GAP SERPL CALCULATED.3IONS-SCNC: 17 MMOL/L (ref 5–15)
AST SERPL-CCNC: 277 U/L (ref 1–40)
BASOPHILS # BLD AUTO: 0.02 10*3/MM3 (ref 0–0.2)
BASOPHILS NFR BLD AUTO: 0.5 % (ref 0–1.5)
BILIRUB CONJ SERPL-MCNC: 2 MG/DL (ref 0–0.3)
BILIRUB INDIRECT SERPL-MCNC: 2 MG/DL
BILIRUB SERPL-MCNC: 4 MG/DL (ref 0–1.2)
BUN SERPL-MCNC: 83 MG/DL (ref 6–20)
BUN/CREAT SERPL: 30 (ref 7–25)
CALCIUM SPEC-SCNC: 9 MG/DL (ref 8.6–10.5)
CHLORIDE SERPL-SCNC: 85 MMOL/L (ref 98–107)
CO2 SERPL-SCNC: 24 MMOL/L (ref 22–29)
CREAT SERPL-MCNC: 2.77 MG/DL (ref 0.76–1.27)
DEPRECATED RDW RBC AUTO: 47.6 FL (ref 37–54)
EGFRCR SERPLBLD CKD-EPI 2021: 26.8 ML/MIN/1.73
EOSINOPHIL # BLD AUTO: 0.05 10*3/MM3 (ref 0–0.4)
EOSINOPHIL NFR BLD AUTO: 1.3 % (ref 0.3–6.2)
ERYTHROCYTE [DISTWIDTH] IN BLOOD BY AUTOMATED COUNT: 15.1 % (ref 12.3–15.4)
GLUCOSE SERPL-MCNC: 111 MG/DL (ref 65–99)
HCT VFR BLD AUTO: 42.7 % (ref 37.5–51)
HGB BLD-MCNC: 14.3 G/DL (ref 13–17.7)
IMM GRANULOCYTES # BLD AUTO: 0.02 10*3/MM3 (ref 0–0.05)
IMM GRANULOCYTES NFR BLD AUTO: 0.5 % (ref 0–0.5)
LYMPHOCYTES # BLD AUTO: 0.98 10*3/MM3 (ref 0.7–3.1)
LYMPHOCYTES NFR BLD AUTO: 25.3 % (ref 19.6–45.3)
MCH RBC QN AUTO: 29.4 PG (ref 26.6–33)
MCHC RBC AUTO-ENTMCNC: 33.5 G/DL (ref 31.5–35.7)
MCV RBC AUTO: 87.9 FL (ref 79–97)
MONOCYTES # BLD AUTO: 0.83 10*3/MM3 (ref 0.1–0.9)
MONOCYTES NFR BLD AUTO: 21.4 % (ref 5–12)
NEUTROPHILS NFR BLD AUTO: 1.98 10*3/MM3 (ref 1.7–7)
NEUTROPHILS NFR BLD AUTO: 51 % (ref 42.7–76)
NRBC BLD AUTO-RTO: 3.4 /100 WBC (ref 0–0.2)
PLATELET # BLD AUTO: 132 10*3/MM3 (ref 140–450)
PMV BLD AUTO: 13.9 FL (ref 6–12)
POTASSIUM SERPL-SCNC: 3.6 MMOL/L (ref 3.5–5.2)
PROT SERPL-MCNC: 7.1 G/DL (ref 6–8.5)
RBC # BLD AUTO: 4.86 10*6/MM3 (ref 4.14–5.8)
SODIUM SERPL-SCNC: 126 MMOL/L (ref 136–145)
WBC NRBC COR # BLD: 3.88 10*3/MM3 (ref 3.4–10.8)
WHOLE BLOOD HOLD SPECIMEN: NORMAL

## 2022-12-30 PROCEDURE — 80076 HEPATIC FUNCTION PANEL: CPT | Performed by: INTERNAL MEDICINE

## 2022-12-30 PROCEDURE — 25010000002 FUROSEMIDE PER 20 MG: Performed by: NURSE PRACTITIONER

## 2022-12-30 PROCEDURE — 99232 SBSQ HOSP IP/OBS MODERATE 35: CPT | Performed by: INTERNAL MEDICINE

## 2022-12-30 PROCEDURE — 25010000002 ENOXAPARIN PER 10 MG: Performed by: HOSPITALIST

## 2022-12-30 PROCEDURE — 25010000002 ONDANSETRON PER 1 MG

## 2022-12-30 PROCEDURE — 93005 ELECTROCARDIOGRAM TRACING: CPT | Performed by: NURSE PRACTITIONER

## 2022-12-30 PROCEDURE — 85025 COMPLETE CBC W/AUTO DIFF WBC: CPT | Performed by: HOSPITALIST

## 2022-12-30 PROCEDURE — 93010 ELECTROCARDIOGRAM REPORT: CPT | Performed by: INTERNAL MEDICINE

## 2022-12-30 PROCEDURE — 80048 BASIC METABOLIC PNL TOTAL CA: CPT | Performed by: HOSPITALIST

## 2022-12-30 RX ADMIN — ATORVASTATIN CALCIUM 10 MG: 10 TABLET, FILM COATED ORAL at 09:44

## 2022-12-30 RX ADMIN — HYDROCODONE BITARTRATE AND ACETAMINOPHEN 1 TABLET: 7.5; 325 TABLET ORAL at 10:04

## 2022-12-30 RX ADMIN — ENOXAPARIN SODIUM 130 MG: 150 INJECTION SUBCUTANEOUS at 21:24

## 2022-12-30 RX ADMIN — HYDROCODONE BITARTRATE AND ACETAMINOPHEN 1 TABLET: 7.5; 325 TABLET ORAL at 01:45

## 2022-12-30 RX ADMIN — ENOXAPARIN SODIUM 130 MG: 150 INJECTION SUBCUTANEOUS at 09:43

## 2022-12-30 RX ADMIN — ONDANSETRON 4 MG: 2 INJECTION INTRAMUSCULAR; INTRAVENOUS at 10:06

## 2022-12-30 RX ADMIN — PANTOPRAZOLE SODIUM 40 MG: 40 INJECTION, POWDER, FOR SOLUTION INTRAVENOUS at 05:45

## 2022-12-30 RX ADMIN — ONDANSETRON 4 MG: 2 INJECTION INTRAMUSCULAR; INTRAVENOUS at 18:56

## 2022-12-30 RX ADMIN — FUROSEMIDE 40 MG: 10 INJECTION, SOLUTION INTRAMUSCULAR; INTRAVENOUS at 11:00

## 2022-12-30 RX ADMIN — FUROSEMIDE 40 MG: 10 INJECTION, SOLUTION INTRAMUSCULAR; INTRAVENOUS at 01:59

## 2022-12-30 RX ADMIN — HYDROCODONE BITARTRATE AND ACETAMINOPHEN 1 TABLET: 7.5; 325 TABLET ORAL at 17:36

## 2022-12-30 RX ADMIN — Medication 10 ML: at 01:45

## 2022-12-30 RX ADMIN — Medication 10 ML: at 21:25

## 2022-12-30 RX ADMIN — CARVEDILOL 6.25 MG: 6.25 TABLET, FILM COATED ORAL at 17:09

## 2022-12-30 RX ADMIN — FUROSEMIDE 10 MG/HR: 10 INJECTION, SOLUTION INTRAMUSCULAR; INTRAVENOUS at 15:37

## 2022-12-30 NOTE — PLAN OF CARE
Goal Outcome Evaluation:      Vss, no distress, no complaints other than some nausea x 1.

## 2022-12-30 NOTE — PLAN OF CARE
Goal Outcome Evaluation:  Plan of Care Reviewed With: caregiver, patient        Progress: no change  Outcome Evaluation: Nutrition F/U:  pt continues on Clear liquids.  Gi consulted.  Noted that pt most likley has biliary dyskinesia with elevated ALT and Tbili trending up.  He remains on IV lasix for CHF.  Meets criteria for severe acute malnutrition based on inadequate energy intake and Edema.  discussed my concerns with MD.

## 2022-12-30 NOTE — PROGRESS NOTES
"    [facility]  [address] [place] [address]. [address]  T - [phone]  F - [phone]     Progress Note          PATIENT  DEMOGRAPHICS   PATIENT NAME: Matti Bravo                      PHYSICIAN: SHELL Hodges  : 1970  MRN: [id number]   LOS: 8 days    Patient Care Team:  Shonna Ng APRN as PCP - General (Family Medicine)  Subjective   SUBJECTIVE   No new complaints        Objective   OBJECTIVE   Vital Signs  Temp:  [96.7 °F (35.9 °C)-97.8 °F (36.6 °C)] 97.8 °F (36.6 °C)  Heart Rate:  [] 134  Resp:  [18-19] 18  BP: (100-120)/(50-62) 118/50    Flowsheet Rows    Flowsheet Row First Filed Value   Admission Height 193 cm (76\") Documented at 2022 1700   Admission Weight 127 kg (280 lb) Documented at 2022 1700           I/O last 3 completed shifts:  In: 840 [P.O.:840]  Out: 2450 [Urine:2450]    PHYSICAL EXAM    Physical Exam  Vitals and nursing note reviewed.   Constitutional:       Appearance: Normal appearance.   Cardiovascular:      Rate and Rhythm: Normal rate and regular rhythm.      Heart sounds: Normal heart sounds. No murmur heard.    No friction rub. No gallop.   Pulmonary:      Effort: No respiratory distress.      Breath sounds: Normal breath sounds. No stridor. No wheezing, rhonchi or rales.   Musculoskeletal:      Right lower leg: Edema present.      Left lower leg: Edema present.   Skin:     General: Skin is warm and dry.   Neurological:      Mental Status: He is alert.         RESULTS   Results Review:    Results from last 7 days   Lab Units 22  0806 22  0622 22  0505 22  1836   SODIUM mmol/L 126* 127* 132* 130*   POTASSIUM mmol/L 3.6 3.5 3.5 3.5   CHLORIDE mmol/L 85* 88* 88* 90*   CO2 mmol/L 24.0 22.0 23.0 25.0   BUN mg/dL 83* 79* 80* 73*   CREATININE mg/dL 2.77* 2.29* 2.59* 2.39*   CALCIUM mg/dL 9.0 9.0 9.2 9.0   BILIRUBIN mg/dL 4.0* 3.5*  --  3.4*   ALK PHOS U/L 64 59  --  52   ALT (SGPT) U/L 443* 432*  --  422* "   AST (SGOT) U/L 277* 240*  --  258*   GLUCOSE mg/dL 111* 127* 148* 143*       Estimated Creatinine Clearance: 47.3 mL/min (A) (by C-G formula based on SCr of 2.77 mg/dL (H)).                Results from last 7 days   Lab Units 12/30/22  0640 12/29/22  0622 12/28/22  0505 12/27/22  0433 12/26/22  0314   WBC 10*3/mm3 3.88 4.23 4.57 4.86 5.85   HEMOGLOBIN g/dL 14.3 14.3 14.7 14.3 14.1   PLATELETS 10*3/mm3 132* 119* 130* 119* 120*               Imaging Results (Last 24 Hours)     ** No results found for the last 24 hours. **           MEDICATIONS    atorvastatin, 10 mg, Oral, Daily  carvedilol, 6.25 mg, Oral, BID With Meals  enoxaparin, 1 mg/kg, Subcutaneous, Q12H  furosemide, 40 mg, Intravenous, Q8H  losartan, 12.5 mg, Oral, Q24H  Morphine, 4 mg, Intravenous, Once  pantoprazole, 40 mg, Intravenous, Q AM  sodium chloride, 10 mL, Intravenous, Q12H           Assessment & Plan   ASSESSMENT / PLAN      FRANCK (acute kidney injury) (HCC)    1.  Acute Kidney Failure on CKD 3: Baseline creatinine 1.4-1.7 mg/dl  - Etiology of FRANCK likely contrast induced.    - Urinalysis showed trace protein and negative blood, 3-5 RBCs, 0-2 WBCs.  Urine sodium less than 20.  Urine protein creatinine ratio 235 mg.  - Creatinine is slightly worse at 2.77 mg/dL.  Change Lasix to Lasix drip at 10 mg/h.. Keep fluid restriction of 1500 ml a day.   - Maintain hemodynamics.  Monitor I's and O's.  Avoid nephrotoxins like NSAIDs and IV contrast     2.  Shortness of breath:  - Underwent CT with contrast which was nondiagnostic, no marked fluid overload on exam. No pulmonary edema on xray. SOB etiology is unclear at present.      3.  Chronic systolic CHF /AICD/ non ischemic cardiomyopathy     4.  Prediabetes     5.  Hypertension:  - Blood pressure is borderline low. Decreased carvedilol to 6.25 mg twice day.      6.  KHANH:  - Supposed to be using CPAP at home     7. Acute cholecystitis:  - denies marked abdominal pain and also has some nausea and vomiting x  1. Denies acid reflux. Continue protonix.       Copied text in this note has been reviewed and is accurate as of 12/30/2022           This document has been electronically signed by SHELL Hodges on December 30, 2022 14:36 CST

## 2022-12-30 NOTE — PROGRESS NOTES
SUBJECTIVE:   12/29/2022  Chief Complaint:     Subjective      Patient feels some better today.  Has continued symptoms of dyspnea.  Abdominal pain is improving.  Denies nausea or vomiting.  Had a bowel movement.  Hiccups are improving.    alk phos 59 and bilirubin 3.5.  WBC normal at 4.23.  Hemoglobin is 14.3.    History:  Past Medical History:   Diagnosis Date   • Asthma    • Chronic systolic heart failure (HCC)    • Diabetes mellitus (HCC)    • Hyperlipidemia    • Hypertension    • Obesity    • Peripheral vascular disease (HCC)    • Sleep apnea      Past Surgical History:   Procedure Laterality Date   • CARDIAC CATHETERIZATION N/A 12/08/2021   • CARDIAC DEFIBRILLATOR PLACEMENT     • VARICOSE VEIN SURGERY Right 04/05/2019     Family History   Problem Relation Age of Onset   • Diabetes Mother    • Hypertension Father      Social History     Tobacco Use   • Smoking status: Former   • Smokeless tobacco: Never   Vaping Use   • Vaping Use: Never used   Substance Use Topics   • Alcohol use: No   • Drug use: No     Medications Prior to Admission   Medication Sig Dispense Refill Last Dose   • albuterol sulfate  (90 Base) MCG/ACT inhaler Inhale 2 puffs Every 4 (Four) Hours As Needed for Wheezing. 1 inhaler 3    • apixaban (ELIQUIS) 5 MG tablet tablet Take 1 tablet by mouth 2 (Two) Times a Day. 60 tablet 3    • bumetanide (BUMEX) 1 MG tablet Take 1 tablet by mouth 2 (Two) Times a Day. 60 tablet 2    • carvedilol (COREG) 12.5 MG tablet Take 1 tablet by mouth 2 (Two) Times a Day With Meals for 30 days. (Patient taking differently: Take 12.5 mg by mouth 2 (Two) Times a Day With Meals. Pt states he has some meds and is still taking this.) 60 tablet 3    • losartan (COZAAR) 25 MG tablet Take 0.5 tablets by mouth Daily for 30 days. 15 tablet 3    • lovastatin (ALTOPREV) 20 MG 24 hr tablet Take 20 mg by mouth.      • metOLazone (ZAROXOLYN) 2.5 MG tablet Take 1 tablet by mouth 3 (Three) Times a Week. 15  tablet 3    • potassium chloride 10 MEQ CR tablet Take 2 tablets by mouth Daily. 30 tablet 1    • vitamin D (ERGOCALCIFEROL) 1.25 MG (62385 UT) capsule capsule Take 50,000 Units by mouth 1 (One) Time Per Week.        Allergies:  Patient has no known allergies.     CURRENT MEDICATIONS/OBJECTIVE/VS/PE:     Current Medications:     Current Facility-Administered Medications   Medication Dose Route Frequency Provider Last Rate Last Admin   • acetaminophen (TYLENOL) tablet 1,000 mg  1,000 mg Oral Q6H PRN Sudeep Wolf MD   1,000 mg at 12/24/22 1240   • albuterol (PROVENTIL) nebulizer solution 0.083% 2.5 mg/3mL  2.5 mg Nebulization Q4H PRN Will French MD       • atorvastatin (LIPITOR) tablet 10 mg  10 mg Oral Daily Will French MD   10 mg at 12/29/22 0913   • calcium carbonate (TUMS) chewable tablet 500 mg (200 mg elemental)  2 tablet Oral TID PRN Will French MD   2 tablet at 12/24/22 1444   • carvedilol (COREG) tablet 6.25 mg  6.25 mg Oral BID With Meals Efrain Mas MD   6.25 mg at 12/28/22 1700   • droperidol (INAPSINE) injection 1.25 mg  1.25 mg Intravenous Q6H PRN Sudeep Wolf MD   1.25 mg at 12/22/22 1302   • Enoxaparin Sodium (LOVENOX) syringe 130 mg  1 mg/kg Subcutaneous Q12H Moshe Aleman DO   130 mg at 12/29/22 2020   • furosemide (LASIX) injection 40 mg  40 mg Intravenous Q8H Deacon Montaño APRN   40 mg at 12/29/22 1749   • HYDROcodone-acetaminophen (NORCO) 7.5-325 MG per tablet 1 tablet  1 tablet Oral Q6H PRN Will French MD   1 tablet at 12/29/22 1850   • losartan (COZAAR) half tablet 12.5 mg  12.5 mg Oral Q24H Will French MD       • morphine injection 2 mg  2 mg Intravenous Q2H PRN Will French MD       • morphine injection 4 mg  4 mg Intravenous Once Will French MD       • ondansetron (ZOFRAN) injection 4 mg  4 mg Intravenous Q6H PRN Sudeep Wolf MD   4 mg at 12/28/22 1708   • pantoprazole (PROTONIX) injection 40 mg   40 mg Intravenous Q AM Trevor Alonso MD   40 mg at 12/29/22 0535   • potassium chloride 10 mEq in 100 mL IVPB  10 mEq Intravenous Q1H PRN Will French MD       • simethicone (MYLICON) chewable tablet 80 mg  80 mg Oral 4x Daily PRN Delonte Caputo MD   80 mg at 12/25/22 0025   • sodium chloride 0.9 % flush 10 mL  10 mL Intravenous Q12H Will French MD   10 mL at 12/29/22 2020   • sodium chloride 0.9 % flush 10 mL  10 mL Intravenous PRN Will French MD   10 mL at 12/28/22 0512   • sodium chloride 0.9 % infusion 40 mL  40 mL Intravenous PRN Will French MD           Objective     Review of Systems:   Review of Systems   Constitutional: Negative for chills, fatigue, fever and unexpected weight change.   HENT: Negative for congestion, ear discharge, hearing loss, nosebleeds and sore throat.    Eyes: Negative for pain, discharge and redness.   Respiratory: Positive for shortness of breath. Negative for cough, chest tightness and wheezing.    Cardiovascular: Negative for chest pain and palpitations.   Gastrointestinal: Positive for abdominal pain. Negative for abdominal distention, blood in stool, constipation, diarrhea, nausea and vomiting.   Endocrine: Negative for cold intolerance, polydipsia, polyphagia and polyuria.   Genitourinary: Negative for dysuria, flank pain, frequency, hematuria and urgency.   Musculoskeletal: Negative for arthralgias, back pain, joint swelling and myalgias.   Skin: Negative for color change, pallor and rash.   Neurological: Negative for tremors, seizures, syncope, weakness and headaches.   Hematological: Negative for adenopathy. Does not bruise/bleed easily.   Psychiatric/Behavioral: Negative for behavioral problems, confusion, dysphoric mood, hallucinations and suicidal ideas. The patient is not nervous/anxious.        Physical Exam:   Temp:  [96.7 °F (35.9 °C)] 96.7 °F (35.9 °C)  Heart Rate:  [] 84  Resp:  [18-19] 19  BP: ()/(62-80) 100/62     Physical  Exam:  General Appearance:    Alert, cooperative, in no acute distress   Head:    Normocephalic, without obvious abnormality, atraumatic   Eyes:            Lids and lashes normal, conjunctivae and sclerae normal, no   icterus, no pallor, corneas clear, PERRLA   Ears:    Ears appear intact with no abnormalities noted   Throat:   No oral lesions, no thrush, oral mucosa moist   Neck:   No adenopathy, supple, trachea midline, no thyromegaly, no     carotid bruit, no JVD   Back:     No kyphosis present, no scoliosis present, no skin lesions,       erythema or scars, no tenderness to percussion or                   palpation,   range of motion normal   Lungs:     Clear to auscultation,respirations regular, even and                   unlabored    Heart:    Regular rhythm and normal rate, normal S1 and S2, no            murmur, no gallop, no rub, no click   Breast Exam:    Deferred   Abdomen:     Normal bowel sounds, no masses, no organomegaly, soft        nontender, nondistended, no guarding, no rebound                 tenderness   Genitalia:    Deferred   Extremities:   Moves all extremities well, no edema, no cyanosis, no              redness   Pulses:   Pulses palpable and equal bilaterally   Skin:   No bleeding, bruising or rash   Lymph nodes:   No palpable adenopathy   Neurologic:   Cranial nerves 2 - 12 grossly intact, sensation intact, DTR        present and equal bilaterally      Results Review:     Lab Results (last 24 hours)     Procedure Component Value Units Date/Time    Hepatic Function Panel [[id number]]  (Abnormal) Collected: 12/29/22 0622    Specimen: Blood Updated: 12/29/22 1533     Total Protein 7.3 g/dL      Albumin 3.8 g/dL      ALT (SGPT) 432 U/L      AST (SGOT) 240 U/L      Alkaline Phosphatase 59 U/L      Total Bilirubin 3.5 mg/dL      Bilirubin, Direct 1.7 mg/dL      Bilirubin, Indirect 1.8 mg/dL     Manual Differential [[id number]]  (Abnormal) Collected: 12/29/22 0622    Specimen: Blood Updated:  12/29/22 0816     Neutrophil % 51.0 %      Lymphocyte % 25.0 %      Monocyte % 20.0 %      Eosinophil % 1.0 %      Basophil % 3.0 %      Neutrophils Absolute 2.16 10*3/mm3      Lymphocytes Absolute 1.06 10*3/mm3      Monocytes Absolute 0.85 10*3/mm3      Eosinophils Absolute 0.04 10*3/mm3      Basophils Absolute 0.13 10*3/mm3      Anisocytosis Slight/1+     Ovalocytes Slight/1+     Target Cells --     Comment: Few target cells observed        WBC Morphology Normal     Platelet Estimate Decreased    Basic Metabolic Panel [[id number]]  (Abnormal) Collected: 12/29/22 0622    Specimen: Blood Updated: 12/29/22 0722     Glucose 127 mg/dL      BUN 79 mg/dL      Creatinine 2.29 mg/dL      Sodium 127 mmol/L      Potassium 3.5 mmol/L      Chloride 88 mmol/L      CO2 22.0 mmol/L      Calcium 9.0 mg/dL      BUN/Creatinine Ratio 34.5     Anion Gap 17.0 mmol/L      eGFR 33.7 mL/min/1.73      Comment: National Kidney Foundation and American Society of Nephrology (ASN) Task Force recommended calculation based on the Chronic Kidney Disease Epidemiology Collaboration (CKD-EPI) equation refit without adjustment for race.       Narrative:      GFR Normal >60  Chronic Kidney Disease <60  Kidney Failure <15      CBC & Differential [[id number]]  (Abnormal) Collected: 12/29/22 0622    Specimen: Blood Updated: 12/29/22 0708    Narrative:      The following orders were created for panel order CBC & Differential.  Procedure                               Abnormality         Status                     ---------                               -----------         ------                     CBC Auto Differential[[id number]]        Abnormal            Final result               Scan Slide[[id number]]                                                                    Please view results for these tests on the individual orders.    CBC Auto Differential [[id number]]  (Abnormal) Collected: 12/29/22 0622    Specimen: Blood Updated: 12/29/22 0708     WBC 4.23  10*3/mm3      RBC 4.90 10*6/mm3      Hemoglobin 14.3 g/dL      Hematocrit 43.3 %      MCV 88.4 fL      MCH 29.2 pg      MCHC 33.0 g/dL      RDW 14.7 %      RDW-SD 46.5 fl      MPV 14.0 fL      Platelets 119 10*3/mm3            I reviewed the patient's new clinical results.  I reviewed the patient's new imaging results and agree with the interpretation.     ASSESSMENT/PLAN:   ASSESSMENT: 1.  Left-sided abdominal pain, improving.  Repeat elevated liver enzymes, likely multifactorial.  3.  Biliary dyskinesia  4.  Hiccups, improving.    PLAN: 1.  Follow LFTs closely and avoid hypotension hepatotoxic medication  2.  Continue diuresis and no added salt diet  3.  Continue PPI and Bentyl  The risks, benefits, and alternatives of this procedure have been discussed with the patient or the responsible party- the patient understands and agrees to proceed.         Bea Pierce MD  12/29/22  22:47 CST

## 2022-12-30 NOTE — PLAN OF CARE
Problem: Adult Inpatient Plan of Care  Goal: Plan of Care Review  Outcome: Ongoing, Progressing  Flowsheets (Taken 12/30/2022 0451)  Progress: no change  Plan of Care Reviewed With: patient  Outcome Evaluation: Patient continues to complain of pain in abdomen. Complained of feeling like I can't pee. Bladder scan at 180cc. Patient used urinal 800 out. Patient is independent and can make needs known to staff. Will follow.   Goal Outcome Evaluation:  Plan of Care Reviewed With: patient        Progress: no change  Outcome Evaluation: Patient continues to complain of pain in abdomen. Complained of feeling like I can't pee. Bladder scan at 180cc. Patient used urinal 800 out. Patient is independent and can make needs known to staff. Will follow.

## 2022-12-30 NOTE — PAYOR COMM NOTE
"    Brittaney Carter RN [facility]  [phone]       Phone  [phone]       Fax  Cont., Stay Review      Ethel Davis (52 y.o. Male)     Date of Birth   1970    Social Security Number       Address   [address] [address]    Home Phone   [phone]    MRN   [id number]       [employer] day   [employer]    Marital Status                               Admission Date   12/20/22    Admission Type   Emergency    Admitting Provider   Will French MD    Attending Provider   Will French MD    Department, Room/Bed   [facility] [address] [place], 425/1       Discharge Date       Discharge Disposition       Discharge Destination                               Attending Provider: Will French MD    Allergies: No Known Allergies    Isolation: None   Infection: None   Code Status: CPR    Ht: 193 cm (76\")   Wt: 134 kg (295 lb 12.8 oz)    Admission Cmt: None   Principal Problem: FRANCK (acute kidney injury) (HCC) [N17.9]                 Active Insurance as of 12/20/2022     Primary Coverage     Payor Plan Insurance Group Employer/Plan Group    [place] [id number]     Payor Plan Address Payor Plan Phone Number Payor Plan Fax Number Effective Dates    [employer] [address] [address] [phone]  10/8/2021 - None Entered    [facility] [id number]       Subscriber Name Subscriber Birth Date Member ID       ETHEL DAVIS 1970 [id number]                 Emergency Contacts      (Rel.) Home Phone Work Phone Mobile Phone    DavisCourtney sahu (Mother) [phone] -- --    LawrenceMary Anne (Sister) -- -- [phone]            Vital Signs (last day)     Date/Time Temp Temp src Pulse Resp BP Patient Position SpO2    12/30/22 0934 -- -- 108 -- -- -- --    12/30/22 0346 96.7 (35.9) Temporal 51 18 120/58 Lying 97    12/30/22 0145 -- -- 105 -- -- -- --    12/29/22 2300 -- -- -- -- 110/60 " Sitting --    12/29/22 2227 -- -- -- -- 100/62 Lying --    12/29/22 2100 -- -- 84 19 100/62 Lying --    12/29/22 1650 -- -- 102 -- -- -- --    12/29/22 1245 -- -- -- -- 124/70 -- --    12/29/22 0824 -- -- 115 -- -- -- --    12/29/22 0700 -- -- 108 18 112/80 Sitting 98    12/29/22 0308 96.7 (35.9) Temporal 97 18 122/68 Lying 96    12/29/22 0209 -- -- 108 -- -- -- --          Oxygen Therapy (last day)     Date/Time SpO2 Device (Oxygen Therapy) Flow (L/min) Oxygen Concentration (%) ETCO2 (mmHg)    12/30/22 0346 97 room air -- -- --    12/29/22 1940 -- room air -- -- --    12/29/22 0700 98 room air -- -- --    12/29/22 0308 96 room air -- -- --          Current Facility-Administered Medications   Medication Dose Route Frequency Provider Last Rate Last Admin   • acetaminophen (TYLENOL) tablet 1,000 mg  1,000 mg Oral Q6H PRN Sudeep Wolf MD   1,000 mg at 12/24/22 1240   • albuterol (PROVENTIL) nebulizer solution 0.083% 2.5 mg/3mL  2.5 mg Nebulization Q4H PRN Will French MD       • atorvastatin (LIPITOR) tablet 10 mg  10 mg Oral Daily Will French MD   10 mg at 12/30/22 0944   • calcium carbonate (TUMS) chewable tablet 500 mg (200 mg elemental)  2 tablet Oral TID PRN Will French MD   2 tablet at 12/24/22 1444   • carvedilol (COREG) tablet 6.25 mg  6.25 mg Oral BID With Meals Efrain Mas MD   6.25 mg at 12/28/22 1700   • droperidol (INAPSINE) injection 1.25 mg  1.25 mg Intravenous Q6H PRN Sudeep Wolf MD   1.25 mg at 12/22/22 1302   • Enoxaparin Sodium (LOVENOX) syringe 130 mg  1 mg/kg Subcutaneous Q12H Moshe Aleman DO   130 mg at 12/30/22 0943   • furosemide (LASIX) injection 40 mg  40 mg Intravenous Q8H Deacon Montaño APRN   40 mg at 12/30/22 0159   • HYDROcodone-acetaminophen (NORCO) 7.5-325 MG per tablet 1 tablet  1 tablet Oral Q6H PRN Will French MD   1 tablet at 12/30/22 1004   • losartan (COZAAR) half tablet 12.5 mg  12.5 mg Oral Q24H Will French  MD CHRISTAL       • morphine injection 2 mg  2 mg Intravenous Q2H PRN Will French MD       • morphine injection 4 mg  4 mg Intravenous Once Will French MD       • ondansetron (ZOFRAN) injection 4 mg  4 mg Intravenous Q6H PRN Sudeep Wolf MD   4 mg at 12/30/22 1006   • pantoprazole (PROTONIX) injection 40 mg  40 mg Intravenous Q AM Trevor Alonso MD   40 mg at 12/30/22 0545   • potassium chloride 10 mEq in 100 mL IVPB  10 mEq Intravenous Q1H PRN Will French MD       • simethicone (MYLICON) chewable tablet 80 mg  80 mg Oral 4x Daily PRN Delonte Caputo MD   80 mg at 12/25/22 0025   • sodium chloride 0.9 % flush 10 mL  10 mL Intravenous Q12H Will French MD   10 mL at 12/29/22 2020   • sodium chloride 0.9 % flush 10 mL  10 mL Intravenous PRN Will French MD   10 mL at 12/30/22 0145   • sodium chloride 0.9 % infusion 40 mL  40 mL Intravenous PRN Will French MD            Physician Progress Notes (last 48 hours)      Bea Pierce MD at 12/29/22 2247                  SUBJECTIVE:   12/29/2022  Chief Complaint:     Subjective       Patient feels some better today.  Has continued symptoms of dyspnea.  Abdominal pain is improving.  Denies nausea or vomiting.  Had a bowel movement.  Hiccups are improving.    alk phos 59 and bilirubin 3.5.  WBC normal at 4.23.  Hemoglobin is 14.3.    History:  Past Medical History:   Diagnosis Date   • Asthma    • Chronic systolic heart failure (HCC)    • Diabetes mellitus (HCC)    • Hyperlipidemia    • Hypertension    • Obesity    • Peripheral vascular disease (HCC)    • Sleep apnea      Past Surgical History:   Procedure Laterality Date   • CARDIAC CATHETERIZATION N/A 12/08/2021   • CARDIAC DEFIBRILLATOR PLACEMENT     • VARICOSE VEIN SURGERY Right 04/05/2019     Family History   Problem Relation Age of Onset   • Diabetes Mother    • Hypertension Father      Social History     Tobacco Use   • Smoking status: Former   • Smokeless  tobacco: Never   Vaping Use   • Vaping Use: Never used   Substance Use Topics   • Alcohol use: No   • Drug use: No     Medications Prior to Admission   Medication Sig Dispense Refill Last Dose   • albuterol sulfate  (90 Base) MCG/ACT inhaler Inhale 2 puffs Every 4 (Four) Hours As Needed for Wheezing. 1 inhaler 3    • apixaban (ELIQUIS) 5 MG tablet tablet Take 1 tablet by mouth 2 (Two) Times a Day. 60 tablet 3    • bumetanide (BUMEX) 1 MG tablet Take 1 tablet by mouth 2 (Two) Times a Day. 60 tablet 2    • carvedilol (COREG) 12.5 MG tablet Take 1 tablet by mouth 2 (Two) Times a Day With Meals for 30 days. (Patient taking differently: Take 12.5 mg by mouth 2 (Two) Times a Day With Meals. Pt states he has some meds and is still taking this.) 60 tablet 3    • losartan (COZAAR) 25 MG tablet Take 0.5 tablets by mouth Daily for 30 days. 15 tablet 3    • lovastatin (ALTOPREV) 20 MG 24 hr tablet Take 20 mg by mouth.      • metOLazone (ZAROXOLYN) 2.5 MG tablet Take 1 tablet by mouth 3 (Three) Times a Week. 15 tablet 3    • potassium chloride 10 MEQ CR tablet Take 2 tablets by mouth Daily. 30 tablet 1    • vitamin D (ERGOCALCIFEROL) 1.25 MG (68964 UT) capsule capsule Take 50,000 Units by mouth 1 (One) Time Per Week.        Allergies:  Patient has no known allergies.     CURRENT MEDICATIONS/OBJECTIVE/VS/PE:     Current Medications:     Current Facility-Administered Medications   Medication Dose Route Frequency Provider Last Rate Last Admin   • acetaminophen (TYLENOL) tablet 1,000 mg  1,000 mg Oral Q6H PRN Sudeep Wolf MD   1,000 mg at 12/24/22 1240   • albuterol (PROVENTIL) nebulizer solution 0.083% 2.5 mg/3mL  2.5 mg Nebulization Q4H PRN Will French MD       • atorvastatin (LIPITOR) tablet 10 mg  10 mg Oral Daily Will French MD   10 mg at 12/29/22 0913   • calcium carbonate (TUMS) chewable tablet 500 mg (200 mg elemental)  2 tablet Oral TID PRN Will French MD   2 tablet at 12/24/22 1444   •  carvedilol (COREG) tablet 6.25 mg  6.25 mg Oral BID With Meals Efrain Mas MD   6.25 mg at 12/28/22 1700   • droperidol (INAPSINE) injection 1.25 mg  1.25 mg Intravenous Q6H PRN Sudeep Wolf MD   1.25 mg at 12/22/22 1302   • Enoxaparin Sodium (LOVENOX) syringe 130 mg  1 mg/kg Subcutaneous Q12H Moshe Aleman DO   130 mg at 12/29/22 2020   • furosemide (LASIX) injection 40 mg  40 mg Intravenous Q8H Deacon Montaño APRN   40 mg at 12/29/22 1749   • HYDROcodone-acetaminophen (NORCO) 7.5-325 MG per tablet 1 tablet  1 tablet Oral Q6H PRN Will French MD   1 tablet at 12/29/22 1850   • losartan (COZAAR) half tablet 12.5 mg  12.5 mg Oral Q24H Will French MD       • morphine injection 2 mg  2 mg Intravenous Q2H PRN Will French MD       • morphine injection 4 mg  4 mg Intravenous Once Will French MD       • ondansetron (ZOFRAN) injection 4 mg  4 mg Intravenous Q6H PRN Sudeep Wolf MD   4 mg at 12/28/22 1708   • pantoprazole (PROTONIX) injection 40 mg  40 mg Intravenous Q AM Trevor Alonso MD   40 mg at 12/29/22 0535   • potassium chloride 10 mEq in 100 mL IVPB  10 mEq Intravenous Q1H PRN Will French MD       • simethicone (MYLICON) chewable tablet 80 mg  80 mg Oral 4x Daily PRN Delonte Caputo MD   80 mg at 12/25/22 0025   • sodium chloride 0.9 % flush 10 mL  10 mL Intravenous Q12H Will French MD   10 mL at 12/29/22 2020   • sodium chloride 0.9 % flush 10 mL  10 mL Intravenous PRN Will French MD   10 mL at 12/28/22 0512   • sodium chloride 0.9 % infusion 40 mL  40 mL Intravenous PRN Will French MD           Objective      Review of Systems:   Review of Systems   Constitutional: Negative for chills, fatigue, fever and unexpected weight change.   HENT: Negative for congestion, ear discharge, hearing loss, nosebleeds and sore throat.    Eyes: Negative for pain, discharge and redness.   Respiratory: Positive for shortness of breath.  Negative for cough, chest tightness and wheezing.    Cardiovascular: Negative for chest pain and palpitations.   Gastrointestinal: Positive for abdominal pain. Negative for abdominal distention, blood in stool, constipation, diarrhea, nausea and vomiting.   Endocrine: Negative for cold intolerance, polydipsia, polyphagia and polyuria.   Genitourinary: Negative for dysuria, flank pain, frequency, hematuria and urgency.   Musculoskeletal: Negative for arthralgias, back pain, joint swelling and myalgias.   Skin: Negative for color change, pallor and rash.   Neurological: Negative for tremors, seizures, syncope, weakness and headaches.   Hematological: Negative for adenopathy. Does not bruise/bleed easily.   Psychiatric/Behavioral: Negative for behavioral problems, confusion, dysphoric mood, hallucinations and suicidal ideas. The patient is not nervous/anxious.        Physical Exam:   Temp:  [96.7 °F (35.9 °C)] 96.7 °F (35.9 °C)  Heart Rate:  [] 84  Resp:  [18-19] 19  BP: ()/(62-80) 100/62     Physical Exam:  General Appearance:    Alert, cooperative, in no acute distress   Head:    Normocephalic, without obvious abnormality, atraumatic   Eyes:            Lids and lashes normal, conjunctivae and sclerae normal, no   icterus, no pallor, corneas clear, PERRLA   Ears:    Ears appear intact with no abnormalities noted   Throat:   No oral lesions, no thrush, oral mucosa moist   Neck:   No adenopathy, supple, trachea midline, no thyromegaly, no     carotid bruit, no JVD   Back:     No kyphosis present, no scoliosis present, no skin lesions,       erythema or scars, no tenderness to percussion or                   palpation,   range of motion normal   Lungs:     Clear to auscultation,respirations regular, even and                   unlabored    Heart:    Regular rhythm and normal rate, normal S1 and S2, no            murmur, no gallop, no rub, no click   Breast Exam:    Deferred   Abdomen:     Normal bowel sounds,  no masses, no organomegaly, soft        nontender, nondistended, no guarding, no rebound                 tenderness   Genitalia:    Deferred   Extremities:   Moves all extremities well, no edema, no cyanosis, no              redness   Pulses:   Pulses palpable and equal bilaterally   Skin:   No bleeding, bruising or rash   Lymph nodes:   No palpable adenopathy   Neurologic:   Cranial nerves 2 - 12 grossly intact, sensation intact, DTR        present and equal bilaterally      Results Review:     Lab Results (last 24 hours)     Procedure Component Value Units Date/Time    Hepatic Function Panel [[id number]]  (Abnormal) Collected: 12/29/22 0622    Specimen: Blood Updated: 12/29/22 1533     Total Protein 7.3 g/dL      Albumin 3.8 g/dL      ALT (SGPT) 432 U/L      AST (SGOT) 240 U/L      Alkaline Phosphatase 59 U/L      Total Bilirubin 3.5 mg/dL      Bilirubin, Direct 1.7 mg/dL      Bilirubin, Indirect 1.8 mg/dL     Manual Differential [[id number]]  (Abnormal) Collected: 12/29/22 0622    Specimen: Blood Updated: 12/29/22 0816     Neutrophil % 51.0 %      Lymphocyte % 25.0 %      Monocyte % 20.0 %      Eosinophil % 1.0 %      Basophil % 3.0 %      Neutrophils Absolute 2.16 10*3/mm3      Lymphocytes Absolute 1.06 10*3/mm3      Monocytes Absolute 0.85 10*3/mm3      Eosinophils Absolute 0.04 10*3/mm3      Basophils Absolute 0.13 10*3/mm3      Anisocytosis Slight/1+     Ovalocytes Slight/1+     Target Cells --     Comment: Few target cells observed        WBC Morphology Normal     Platelet Estimate Decreased    Basic Metabolic Panel [[id number]]  (Abnormal) Collected: 12/29/22 0622    Specimen: Blood Updated: 12/29/22 0722     Glucose 127 mg/dL      BUN 79 mg/dL      Creatinine 2.29 mg/dL      Sodium 127 mmol/L      Potassium 3.5 mmol/L      Chloride 88 mmol/L      CO2 22.0 mmol/L      Calcium 9.0 mg/dL      BUN/Creatinine Ratio 34.5     Anion Gap 17.0 mmol/L      eGFR 33.7 mL/min/1.73      Comment: National Kidney Foundation  and American Society of Nephrology (ASN) Task Force recommended calculation based on the Chronic Kidney Disease Epidemiology Collaboration (CKD-EPI) equation refit without adjustment for race.       Narrative:      GFR Normal >60  Chronic Kidney Disease <60  Kidney Failure <15      CBC & Differential [[id number]]  (Abnormal) Collected: 12/29/22 0622    Specimen: Blood Updated: 12/29/22 0708    Narrative:      The following orders were created for panel order CBC & Differential.  Procedure                               Abnormality         Status                     ---------                               -----------         ------                     CBC Auto Differential[[id number]]        Abnormal            Final result               Scan Slide[[id number]]                                                                    Please view results for these tests on the individual orders.    CBC Auto Differential [[id number]]  (Abnormal) Collected: 12/29/22 0622    Specimen: Blood Updated: 12/29/22 0708     WBC 4.23 10*3/mm3      RBC 4.90 10*6/mm3      Hemoglobin 14.3 g/dL      Hematocrit 43.3 %      MCV 88.4 fL      MCH 29.2 pg      MCHC 33.0 g/dL      RDW 14.7 %      RDW-SD 46.5 fl      MPV 14.0 fL      Platelets 119 10*3/mm3            I reviewed the patient's new clinical results.  I reviewed the patient's new imaging results and agree with the interpretation.     ASSESSMENT/PLAN:   ASSESSMENT: 1.  Left-sided abdominal pain, improving.  Repeat elevated liver enzymes, likely multifactorial.  3.  Biliary dyskinesia  4.  Hiccups, improving.    PLAN: 1.  Follow LFTs closely and avoid hypotension hepatotoxic medication  2.  Continue diuresis and no added salt diet  3.  Continue PPI and Bentyl  The risks, benefits, and alternatives of this procedure have been discussed with the patient or the responsible party- the patient understands and agrees to proceed.         Bea Pierce MD  12/29/22  22:47 CST           Electronically signed by Bea Pierce MD at 12/29/22 4407     Moshe Aleman DO at 12/29/22 7439              [facility]  INPATIENT PROGRESS NOTE    Length of Stay: 7  Date of Admission: 12/20/2022  Primary Care Physician: Shonna Ng APRN    Subjective   Chief Complaint: Shortness of breath  HPI:   Patient seen and examined.  12/28/2022.  The patient is complaining of abdominal pain and hiccups.  Has been seen by GI.  It appears as though the patient probably has some biliary dyskinesia.  Dr. Pierce has seen the patient and her consult is appreciated.  Thorazine has been added for hiccups.  We will follow response to treatment.    As of today, 12/29/22  Review of Systems   Constitutional: Positive for activity change and fatigue. Negative for chills and fever.        States he is feeling a little bit better hiccups are improved.   HENT: Negative.    Eyes: Negative.    Respiratory: Positive for shortness of breath. Negative for chest tightness.    Cardiovascular: Negative.    Gastrointestinal: Positive for abdominal pain and nausea. Negative for constipation, diarrhea and vomiting.   Endocrine: Negative.    Musculoskeletal: Negative.    Skin: Negative.  Negative for wound.   Neurological: Positive for weakness.   Hematological: Negative.    Psychiatric/Behavioral: Negative.         All pertinent negatives and positives are as above. All other systems have been reviewed and are negative unless otherwise stated.    Objective    Temp:  [96.1 °F (35.6 °C)-96.7 °F (35.9 °C)] 96.7 °F (35.9 °C)  Heart Rate:  [] 115  Resp:  [18] 18  BP: ()/(64-80) 124/70    AM-PAC 6 Clicks Score (PT): 24 (12/29/22 0845)    As of today, 12/29/22  Physical Exam  Vitals and nursing note reviewed.   Constitutional:       Appearance: He is well-developed. He is ill-appearing.      Comments: Less abdominal pain and discomfort.  Nontender  nondistended.   HENT:      Head: Normocephalic and atraumatic.      Right Ear: External ear normal.      Left Ear: External ear normal.      Nose: Nose normal.      Mouth/Throat:      Mouth: Mucous membranes are dry.      Pharynx: Oropharynx is clear.   Eyes:      Conjunctiva/sclera: Conjunctivae normal.      Pupils: Pupils are equal, round, and reactive to light.   Cardiovascular:      Rate and Rhythm: Normal rate. Rhythm irregularly irregular.      Heart sounds: Normal heart sounds. No murmur heard.    No friction rub. No gallop.   Pulmonary:      Effort: Pulmonary effort is normal. No respiratory distress.      Breath sounds: Normal breath sounds. No wheezing or rales.   Chest:      Chest wall: No tenderness.   Abdominal:      General: Bowel sounds are normal. There is no distension.      Palpations: Abdomen is soft.      Tenderness: There is no abdominal tenderness.   Musculoskeletal:         General: Normal range of motion.      Cervical back: Normal range of motion and neck supple.   Skin:     General: Skin is warm and dry.      Coloration: Skin is not jaundiced.   Neurological:      General: No focal deficit present.      Mental Status: He is alert and oriented to person, place, and time.   Psychiatric:         Mood and Affect: Mood normal.         Behavior: Behavior normal.           Results Review:  I have reviewed the labs, radiology results, and diagnostic studies.    Laboratory Data:   Results from last 7 days   Lab Units 12/29/22  0622 12/28/22  0505 12/27/22  1836   SODIUM mmol/L 127* 132* 130*   POTASSIUM mmol/L 3.5 3.5 3.5   CHLORIDE mmol/L 88* 88* 90*   CO2 mmol/L 22.0 23.0 25.0   BUN mg/dL 79* 80* 73*   CREATININE mg/dL 2.29* 2.59* 2.39*   GLUCOSE mg/dL 127* 148* 143*   CALCIUM mg/dL 9.0 9.2 9.0   BILIRUBIN mg/dL 3.5*  --  3.4*   ALK PHOS U/L 59  --  52   ALT (SGPT) U/L 432*  --  422*   AST (SGOT) U/L 240*  --  258*   ANION GAP mmol/L 17.0* 21.0* 15.0     Estimated Creatinine Clearance: 57.2 mL/min  (A) (by C-G formula based on SCr of 2.29 mg/dL (H)).  Results from last 7 days   Lab Units 12/23/22  0427   MAGNESIUM mg/dL 2.1         Results from last 7 days   Lab Units 12/29/22  0622 12/28/22  0505 12/27/22  0433 12/26/22  0314 12/25/22  0604   WBC 10*3/mm3 4.23 4.57 4.86 5.85 4.62   HEMOGLOBIN g/dL 14.3 14.7 14.3 14.1 14.1   HEMATOCRIT % 43.3 45.8 43.8 42.4 43.4   PLATELETS 10*3/mm3 119* 130* 119* 120* 113*           Culture Data:   No results found for: BLOODCX  No results found for: URINECX  No results found for: RESPCX  No results found for: WOUNDCX  No results found for: STOOLCX  No components found for: BODYFLD    Radiology Data:   Imaging Results (Last 24 Hours)     ** No results found for the last 24 hours. **          I have reviewed the patient's current medications.     Assessment/Plan     Active Hospital Problems    Diagnosis    • **RFANCK (acute kidney injury) ([facility])        Plan:    1.  Shortness of breath: May be slightly better.  CT scan for pulmonary embolism was nondiagnostic.  VQ scan low probability.  Currently stable improved.    2.  Volume depletion: Continue to hydrate gently.  Appreciate nephrology help.  Evelina stable.    3.  Chronic systolic congestive heart failure: Appears euvolemic..  Continue home medications.    Eliquis currently on hold in case of surgery.  Patient has been placed on Lovenox due to atrial fibrillation.  This is a chronic issue.  Rate is controlled currently.    4.  History of hyperglycemia: No diagnosis of diabetes.    Continue sliding scale    5.  Obstructive sleep apnea: Patient states its been over 2 years since he has had a CPAP.  He is scheduled to go  his CPAP on 12/29.    6.  Acute kidney injury: Renal function is unchanged from yesterday.  Superimposed upon chronic kidney disease.  Appreciate nephrology help.    7.  Possible cholecystitis:  Medical management for now.  No leukocytosis.  Continue Zosyn.  HIDA scan completed.  Clear liquid diet.  Advance  diet as tolerated will need surgical consultation when HIDA scan resulted.  Eliquis discontinued .  If HIDA scan is negative would consider reinitiation of Eliquis soon.  Dr. Pierce assistance appreciated.  We will continue to monitor.  Patient does show slight improvement with the addition of Thorazine.  Possibly recommending EGD and colonoscopy if abdominal pain and nausea persist.    8.  DVT prophylaxis: SCDs.    CODE STATUS full code        The patient was evaluated during the global COVID-19 pandemic, and the diagnosis was suspected/considered upon their initial presentation.  Evaluation, treatment, and testing were consistent with current guidelines for patients who present with complaints or symptoms that may be related to COVID-19.    I confirmed that the patient's Advance Care Plan is present, code status is documented, or surrogate decision maker is listed in the patient's medical record.       Discharge Planning: I expect patient to be discharged to home in 3-4 days.    This document has been electronically signed by Moshe Aleman DO on 2022 17:21 CST        Electronically signed by Moshe Aleman DO at 22 1723     Deacon Montaño APRN at 22 1325     Attestation signed by Karen Mcfarland MD at 22    I have reviewed the case with SHELL Carlisle I have also examined and seen  the patient. I agree with the assessment and plan as documented in the note.      This document has been electronically signed by Karen Mcfarland MD on 2022 20:07 [facility]                              [facility]  [address] [place] [address]. [address]  T - [phone]  F - [phone]     Progress Note          PATIENT  DEMOGRAPHICS   PATIENT NAME: Matti Bravo                      PHYSICIAN: SHELL Hodges  : 1970  MRN: [id number]   LOS: 7 days    Patient Care Team:  Shonna Ng APRN as PCP - General (Family  "Medicine)  Subjective    SUBJECTIVE   No new complaints        Objective    OBJECTIVE   Vital Signs  Temp:  [96.1 °F (35.6 °C)-96.7 °F (35.9 °C)] 96.7 °F (35.9 °C)  Heart Rate:  [] 115  Resp:  [18] 18  BP: ()/(64-80) 124/70    Flowsheet Rows    Flowsheet Row First Filed Value   Admission Height 193 cm (76\") Documented at 12/20/2022 1700   Admission Weight 127 kg (280 lb) Documented at 12/20/2022 1700           I/O last 3 completed shifts:  In: 840 [P.O.:240; IV Piggyback:600]  Out: 1700 [Urine:1700]    PHYSICAL EXAM    Physical Exam  Vitals and nursing note reviewed.   Constitutional:       Appearance: Normal appearance.   Cardiovascular:      Rate and Rhythm: Normal rate and regular rhythm.      Heart sounds: Normal heart sounds. No murmur heard.    No friction rub. No gallop.   Pulmonary:      Effort: No respiratory distress.      Breath sounds: Normal breath sounds. No stridor. No wheezing, rhonchi or rales.   Musculoskeletal:      Right lower leg: Edema present.      Left lower leg: Edema present.   Skin:     General: Skin is warm and dry.   Neurological:      Mental Status: He is alert.         RESULTS   Results Review:    Results from last 7 days   Lab Units 12/29/22  0622 12/28/22  0505 12/27/22  1836   SODIUM mmol/L 127* 132* 130*   POTASSIUM mmol/L 3.5 3.5 3.5   CHLORIDE mmol/L 88* 88* 90*   CO2 mmol/L 22.0 23.0 25.0   BUN mg/dL 79* 80* 73*   CREATININE mg/dL 2.29* 2.59* 2.39*   CALCIUM mg/dL 9.0 9.2 9.0   BILIRUBIN mg/dL  --   --  3.4*   ALK PHOS U/L  --   --  52   ALT (SGPT) U/L  --   --  422*   AST (SGOT) U/L  --   --  258*   GLUCOSE mg/dL 127* 148* 143*       Estimated Creatinine Clearance: 57.2 mL/min (A) (by C-G formula based on SCr of 2.29 mg/dL (H)).    Results from last 7 days   Lab Units 12/23/22  0427   MAGNESIUM mg/dL 2.1             Results from last 7 days   Lab Units 12/29/22  0622 12/28/22  0505 12/27/22  0433 12/26/22  0314 12/25/22  0604   WBC 10*3/mm3 4.23 4.57 4.86 5.85 4.62 "   HEMOGLOBIN g/dL 14.3 14.7 14.3 14.1 14.1   PLATELETS 10*3/mm3 119* 130* 119* 120* 113*               Imaging Results (Last 24 Hours)     ** No results found for the last 24 hours. **           MEDICATIONS    atorvastatin, 10 mg, Oral, Daily  carvedilol, 6.25 mg, Oral, BID With Meals  enoxaparin, 1 mg/kg, Subcutaneous, Q12H  furosemide, 40 mg, Intravenous, BID  losartan, 12.5 mg, Oral, Q24H  Morphine, 4 mg, Intravenous, Once  pantoprazole, 40 mg, Intravenous, Q AM  sodium chloride, 10 mL, Intravenous, Q12H           Assessment & Plan   ASSESSMENT / PLAN      FRANCK (acute kidney injury) (HCC)    1.  Acute Kidney Failure on CKD 3: Baseline creatinine 1.4-1.7 mg/dl  - Etiology of FRANCK likely contrast induced.    - Urinalysis showed trace protein and negative blood, 3-5 RBCs, 0-2 WBCs.  Urine sodium less than 20.  Urine protein creatinine ratio 235 mg.  - Creatinine is overall stable at 2.29 mg/dL.  Keep Lasix 40 mg IV and changed to 3 times daily. Keep fluid restriction of 1500 ml a day.   - Maintain hemodynamics.  Monitor I's and O's.  Avoid nephrotoxins like NSAIDs and IV contrast     2.  Shortness of breath:  - Underwent CT with contrast which was nondiagnostic, no marked fluid overload on exam. No pulmonary edema on xray. SOB etiology is unclear at present.      3.  Chronic systolic CHF /AICD/ non ischemic cardiomyopathy     4.  Prediabetes     5.  Hypertension:  - Blood pressure is borderline low. Decreased carvedilol to 6.25 mg twice day.      6.  KHANH:  - Supposed to be using CPAP at home     7. Acute cholecystitis:  - denies marked abdominal pain and also has some nausea and vomiting x 1. Denies acid reflux. Continue protonix.       Copied text in this note has been reviewed and is accurate as of 12/29/2022           This document has been electronically signed by SHELL Hodges on December 29, 2022 13:25 CST           Electronically signed by Karen Mcfarland MD at 12/29/22 2008     Moshe Aleman DO at  12/28/22 1932              [facility] Medicine Services  INPATIENT PROGRESS NOTE    Length of Stay: 6  Date of Admission: 12/20/2022  Primary Care Physician: Shonna Ng APRN    Subjective   Chief Complaint: Shortness of breath  HPI:   Patient seen and examined.  12/28/2022.  The patient is complaining of abdominal pain and hiccups.  Has been seen by GI.  It appears as though the patient probably has some biliary dyskinesia.  Dr. Pierce has seen the patient and her consult is appreciated.  Thorazine has been added for hiccups.  We will follow response to treatment.    As of today, 12/28/22  Review of Systems   Constitutional: Positive for activity change and fatigue. Negative for chills and fever.   HENT: Negative.    Eyes: Negative.    Respiratory: Positive for shortness of breath. Negative for chest tightness.    Cardiovascular: Negative.    Gastrointestinal: Positive for abdominal pain and nausea. Negative for constipation, diarrhea and vomiting.   Endocrine: Negative.    Musculoskeletal: Negative.    Skin: Negative.  Negative for wound.   Neurological: Positive for weakness.   Hematological: Negative.    Psychiatric/Behavioral: Negative.         All pertinent negatives and positives are as above. All other systems have been reviewed and are negative unless otherwise stated.    Objective    Heart Rate:  [] 94  Resp:  [18] 18  BP: ()/(58-80) 118/72    AM-PAC 6 Clicks Score (PT): 24 (12/28/22 0800)    As of today, 12/28/22  Physical Exam  Vitals reviewed.   Constitutional:       Appearance: He is well-developed.   HENT:      Head: Normocephalic and atraumatic.   Eyes:      Pupils: Pupils are equal, round, and reactive to light.   Cardiovascular:      Rate and Rhythm: Normal rate. Rhythm irregularly irregular.      Heart sounds: Normal heart sounds. No murmur heard.    No friction rub. No gallop.   Pulmonary:      Effort: Pulmonary effort is normal.  No respiratory distress.      Breath sounds: Normal breath sounds. No wheezing or rales.   Chest:      Chest wall: No tenderness.   Abdominal:      General: Bowel sounds are normal. There is no distension.      Palpations: Abdomen is soft.      Tenderness: There is no abdominal tenderness.   Musculoskeletal:      Cervical back: Normal range of motion and neck supple.   Psychiatric:         Behavior: Behavior normal.           Results Review:  I have reviewed the labs, radiology results, and diagnostic studies.    Laboratory Data:   Results from last 7 days   Lab Units 12/28/22  0505 12/27/22  1836 12/27/22  0433   SODIUM mmol/L 132* 130* 129*   POTASSIUM mmol/L 3.5 3.5 3.4*   CHLORIDE mmol/L 88* 90* 88*   CO2 mmol/L 23.0 25.0 26.0   BUN mg/dL 80* 73* 71*   CREATININE mg/dL 2.59* 2.39* 2.28*   GLUCOSE mg/dL 148* 143* 123*   CALCIUM mg/dL 9.2 9.0 9.2   BILIRUBIN mg/dL  --  3.4*  --    ALK PHOS U/L  --  52  --    ALT (SGPT) U/L  --  422*  --    AST (SGOT) U/L  --  258*  --    ANION GAP mmol/L 21.0* 15.0 15.0     Estimated Creatinine Clearance: 50.6 mL/min (A) (by C-G formula based on SCr of 2.59 mg/dL (H)).  Results from last 7 days   Lab Units 12/23/22  0427   MAGNESIUM mg/dL 2.1         Results from last 7 days   Lab Units 12/28/22  0505 12/27/22  0433 12/26/22  0314 12/25/22  0604 12/24/22  0607   WBC 10*3/mm3 4.57 4.86 5.85 4.62 4.66   HEMOGLOBIN g/dL 14.7 14.3 14.1 14.1 14.2   HEMATOCRIT % 45.8 43.8 42.4 43.4 44.0   PLATELETS 10*3/mm3 130* 119* 120* 113* 112*           Culture Data:   No results found for: BLOODCX  No results found for: URINECX  No results found for: RESPCX  No results found for: WOUNDCX  No results found for: STOOLCX  No components found for: BODYFLD    Radiology Data:   Imaging Results (Last 24 Hours)       ** No results found for the last 24 hours. **            I have reviewed the patient's current medications.     Assessment/Plan     Active Hospital Problems    Diagnosis    • **FRANCK (acute  kidney injury) ([facility])        Plan:    1.  Shortness of breath: May be slightly better.  CT scan for pulmonary embolism was nondiagnostic.  VQ scan low probability.      2.  Volume depletion: Continue to hydrate gently.  Appreciate nephrology help.    3.  Chronic systolic congestive heart failure: Appears euvolemic..  Continue home medications.    Eliquis currently on hold in case of surgery.    4.  History of hyperglycemia: No diagnosis of diabetes.    Continue sliding scale    5.  Obstructive sleep apnea: Patient states its been over 2 years since he has had a CPAP.  He is scheduled to go  his CPAP on 12/29.    6.  Acute kidney injury: Renal function is unchanged from yesterday.  Superimposed upon chronic kidney disease.  Appreciate nephrology help.    7.  Possible cholecystitis:  Medical management for now.  No leukocytosis.  Continue Zosyn.  HIDA scan completed.  Clear liquid diet.  Advance diet as tolerated will need surgical consultation when HIDA scan resulted.  Eliquis discontinued 12/25.  If HIDA scan is negative would consider reinitiation of Eliquis soon.  Dr. Pierce assistance appreciated.    8.  DVT prophylaxis: SCDs.    CODE STATUS full code        The patient was evaluated during the global COVID-19 pandemic, and the diagnosis was suspected/considered upon their initial presentation.  Evaluation, treatment, and testing were consistent with current guidelines for patients who present with complaints or symptoms that may be related to COVID-19.    I confirmed that the patient's Advance Care Plan is present, code status is documented, or surrogate decision maker is listed in the patient's medical record.       Discharge Planning: I expect patient to be discharged to home in 3-4 days.    This document has been electronically signed by Moshe Aleman DO on December 28, 2022 19:32 CST        Electronically signed by Moshe Aleman DO at 12/28/22 2102       Medical Student Notes (last 24  hours)  Notes from 12/29/22 1041 through 12/30/22 1041   No notes of this type exist for this encounter.         Consult Notes (last 24 hours)  Notes from 12/29/22 1041 through 12/30/22 1041   No notes of this type exist for this encounter.

## 2022-12-30 NOTE — PROGRESS NOTES
Adult Nutrition  Assessment/PES    Patient Name:  Matti Bravo  YOB: 1970  MRN: [id number]  Admit Date:  12/20/2022    Assessment Date:  12/30/2022    Comments:  Pt remains on a Clear liquid diet.  He is still having some N/V and abd pain although improving with medication prescribed by GI.  dxed with probable Biliary Dyskinesia with elevated ALT and TBili continuing to trend up.  Receiving IV Lasix for CHF with low EF.   Pt meets ASPEN criteria for Acute malnutrition based on his inadequate energy intake since admit and Edema of 2+.  WT is up since his admit despite diuresis and inadequate energy intake.  Let MD know of my concerns.  Prolonged Inadequate energy intake.  However, I am unsure of what we can do due to current variables.    Will monitor POC and po intake/diet advancement        Nutrition/Diet History     Row Name 12/30/22 144          Nutrition/Diet History    Typical Intake (Food/Fluid/EN/PN) Pt said that his foot is so swoolen he can hardly walk.  He is taking pain meds for Nausea and abd pain.  Nsg reprots taht                Labs/Tests/Procedures/Meds     Row Name 12/30/22 8202          Labs/Procedures/Meds    Lab Results Reviewed reviewed, pertinent     Lab Results Comments Na 126; Gluc 111; Bun 83; Creat 2.77; Platelets 132; ; Tbili 4.0        Diagnostic Tests/Procedures    Diagnostic Test/Procedure Reviewed reviewed, pertinent     Diagnostic Test/Procedures Comments Gi consult; Nephrology; IV Lasix        Medications    Pertinent Medications Reviewed reviewed, pertinent     Pertinent Medications Comments Coreg; Cozar; Lasix IV; Protonix                Physical Findings     Row Name 12/30/22 4086          Physical Findings    Additional Documentation Fluid Accumulation (Group)        Fluid Accumulation    Exam Agreement consented     Foot/Ankle, Left (Edema) 2-->mild     Foot/Ankle, Right (Edema) 2-->mild                  Nutrition Prescription Ordered     Row  Name 12/30/22 1451          Nutrition Prescription PO    Current PO Diet Clear Liquid     Fluid Consistency Thin                Evaluation of Received Nutrient/Fluid Intake     Row Name 12/30/22 1451          PO Evaluation    Number of Meals 4     % PO Intake 45%--CLEAR LIQUIDS + FRUIT PER MD                Malnutrition Severity Assessment     Row Name 12/30/22 1441          Malnutrition Severity Assessment    Malnutrition Type Acute Disease or Injury - Related Malnutrition        Insufficient Energy Intake     Insufficient Energy Intake Findings Severe     Insufficient Energy Intake  < or equal to 50% of est. energy requirement for > or equal to 5d)        Fluid Accumulation (Edema)    Fluid Accumulation  Moderate equals 2+ pitting edema        Criteria Met (Must meet criteria for severity in at least 2 of these categories: M Wasting, Fat Loss, Fluid, Secondary Signs, Wt. Status, Intake)    Patient meets criteria for  Severe Malnutrition                 Problem/Interventions:   Problem 1     Row Name 12/30/22 1453          Nutrition Diagnoses Problem 1    Problem 1 Altered Nutrition Related to Labs     Etiology (related to) Medical Diagnosis;Medication Interaction     Metabolic/ICU Hyponatremia;Elevated LFTs;Hyperbilirubinemia     Renal FRANCK     Food/Medication Interaction Diuretic     Signs/Symptoms (evidenced by) Biochemical     Specific Labs Noted BUN;Creatinine;Total bilirubin;ALT                  Problem 3     Row Name 12/30/22 1453          Nutrition Diagnoses Problem 3    Problem 3 Inadequate Intake/Infusion     Inadequate Intake Type Oral     Macronutrient Kcal;Protein     Micronutrient Vitamin;Mineral     Etiology (related to) Factors Affecting Nutrition     Reported GI Symptoms Abdominal Pain;N & V     Signs/Symptoms (evidenced by) PO Intake;Clear Lquid Diet;NPO     Number of days NPO/Clears Greater than 7                Problem 4     Row Name 12/30/22 1454          Nutrition Diagnoses Problem 4    Problem  4 Malnutrition   Acute     Etiology (related to) Factors Affecting Nutrition     Reported GI Symptoms Abdominal Pain;N & V     Other Inadequate energy intake since hospital admit--12/20/2022 along with 2+Edema     Signs/Symptoms (evidenced by) Clear Liquid Diet;NPO     Number of days NPO/Clears Greater than 7     Other Comment 2+ Edema                Intervention Goal     Row Name 12/30/22 1456          Intervention Goal    General Reduce/improve symptoms     Weight No significant weight loss                Nutrition Intervention     Row Name 12/30/22 1457          Nutrition Intervention    RD/Tech Action Follow Tx progress;Care plan reviewd                  Education/Evaluation     Row Name 12/30/22 1457          Education    Education Will Instruct as appropriate        Monitor/Evaluation    Monitor Per protocol;I&O;PO intake;Pertinent labs;Weight;Skin status;Symptoms     Education Follow-up Reinforce PRN                 Electronically signed by:  Maria Guadalupe Hernández RD  12/30/22 14:59 CST

## 2022-12-31 LAB
ANION GAP SERPL CALCULATED.3IONS-SCNC: 16 MMOL/L (ref 5–15)
BASOPHILS # BLD AUTO: 0.04 10*3/MM3 (ref 0–0.2)
BASOPHILS NFR BLD AUTO: 0.9 % (ref 0–1.5)
BUN SERPL-MCNC: 88 MG/DL (ref 6–20)
BUN/CREAT SERPL: 27.4 (ref 7–25)
CALCIUM SPEC-SCNC: 9.1 MG/DL (ref 8.6–10.5)
CHLORIDE SERPL-SCNC: 88 MMOL/L (ref 98–107)
CO2 SERPL-SCNC: 25 MMOL/L (ref 22–29)
CREAT SERPL-MCNC: 3.21 MG/DL (ref 0.76–1.27)
DEPRECATED RDW RBC AUTO: 47.4 FL (ref 37–54)
EGFRCR SERPLBLD CKD-EPI 2021: 22.5 ML/MIN/1.73
EOSINOPHIL # BLD AUTO: 0.07 10*3/MM3 (ref 0–0.4)
EOSINOPHIL NFR BLD AUTO: 1.6 % (ref 0.3–6.2)
ERYTHROCYTE [DISTWIDTH] IN BLOOD BY AUTOMATED COUNT: 14.9 % (ref 12.3–15.4)
GLUCOSE SERPL-MCNC: 120 MG/DL (ref 65–99)
HCT VFR BLD AUTO: 43.5 % (ref 37.5–51)
HGB BLD-MCNC: 14.6 G/DL (ref 13–17.7)
IMM GRANULOCYTES # BLD AUTO: 0.03 10*3/MM3 (ref 0–0.05)
IMM GRANULOCYTES NFR BLD AUTO: 0.7 % (ref 0–0.5)
LYMPHOCYTES # BLD AUTO: 1.14 10*3/MM3 (ref 0.7–3.1)
LYMPHOCYTES NFR BLD AUTO: 25.4 % (ref 19.6–45.3)
MCH RBC QN AUTO: 29.4 PG (ref 26.6–33)
MCHC RBC AUTO-ENTMCNC: 33.6 G/DL (ref 31.5–35.7)
MCV RBC AUTO: 87.7 FL (ref 79–97)
MONOCYTES # BLD AUTO: 0.96 10*3/MM3 (ref 0.1–0.9)
MONOCYTES NFR BLD AUTO: 21.4 % (ref 5–12)
NEUTROPHILS NFR BLD AUTO: 2.25 10*3/MM3 (ref 1.7–7)
NEUTROPHILS NFR BLD AUTO: 50 % (ref 42.7–76)
NRBC BLD AUTO-RTO: 4 /100 WBC (ref 0–0.2)
PLATELET # BLD AUTO: 122 10*3/MM3 (ref 140–450)
PMV BLD AUTO: 13.3 FL (ref 6–12)
POTASSIUM SERPL-SCNC: 3.9 MMOL/L (ref 3.5–5.2)
QT INTERVAL: 414 MS
QT INTERVAL: 450 MS
QTC INTERVAL: 498 MS
QTC INTERVAL: 516 MS
RBC # BLD AUTO: 4.96 10*6/MM3 (ref 4.14–5.8)
SODIUM SERPL-SCNC: 129 MMOL/L (ref 136–145)
WBC NRBC COR # BLD: 4.49 10*3/MM3 (ref 3.4–10.8)

## 2022-12-31 PROCEDURE — 80048 BASIC METABOLIC PNL TOTAL CA: CPT | Performed by: HOSPITALIST

## 2022-12-31 PROCEDURE — 25010000002 ONDANSETRON PER 1 MG

## 2022-12-31 PROCEDURE — 25010000002 ENOXAPARIN PER 10 MG: Performed by: HOSPITALIST

## 2022-12-31 PROCEDURE — 85025 COMPLETE CBC W/AUTO DIFF WBC: CPT | Performed by: HOSPITALIST

## 2022-12-31 PROCEDURE — 25010000002 FUROSEMIDE PER 20 MG: Performed by: NURSE PRACTITIONER

## 2022-12-31 RX ADMIN — ONDANSETRON 4 MG: 2 INJECTION INTRAMUSCULAR; INTRAVENOUS at 06:53

## 2022-12-31 RX ADMIN — HYDROCODONE BITARTRATE AND ACETAMINOPHEN 1 TABLET: 7.5; 325 TABLET ORAL at 13:08

## 2022-12-31 RX ADMIN — CARVEDILOL 6.25 MG: 6.25 TABLET, FILM COATED ORAL at 09:12

## 2022-12-31 RX ADMIN — Medication 12.5 MG: at 09:12

## 2022-12-31 RX ADMIN — FUROSEMIDE 10 MG/HR: 10 INJECTION, SOLUTION INTRAMUSCULAR; INTRAVENOUS at 01:01

## 2022-12-31 RX ADMIN — HYDROCODONE BITARTRATE AND ACETAMINOPHEN 1 TABLET: 7.5; 325 TABLET ORAL at 06:53

## 2022-12-31 RX ADMIN — ATORVASTATIN CALCIUM 10 MG: 10 TABLET, FILM COATED ORAL at 09:12

## 2022-12-31 RX ADMIN — FUROSEMIDE 10 MG/HR: 10 INJECTION, SOLUTION INTRAMUSCULAR; INTRAVENOUS at 13:08

## 2022-12-31 RX ADMIN — ONDANSETRON 4 MG: 2 INJECTION INTRAMUSCULAR; INTRAVENOUS at 01:01

## 2022-12-31 RX ADMIN — ONDANSETRON 4 MG: 2 INJECTION INTRAMUSCULAR; INTRAVENOUS at 13:08

## 2022-12-31 RX ADMIN — PANTOPRAZOLE SODIUM 40 MG: 40 INJECTION, POWDER, FOR SOLUTION INTRAVENOUS at 05:20

## 2022-12-31 RX ADMIN — ENOXAPARIN SODIUM 130 MG: 150 INJECTION SUBCUTANEOUS at 20:50

## 2022-12-31 RX ADMIN — CARVEDILOL 6.25 MG: 6.25 TABLET, FILM COATED ORAL at 17:48

## 2022-12-31 RX ADMIN — HYDROCODONE BITARTRATE AND ACETAMINOPHEN 1 TABLET: 7.5; 325 TABLET ORAL at 01:02

## 2022-12-31 NOTE — PROGRESS NOTES
"    [facility]  [address] [place] [address]. [address]  T - [phone]  F - [phone]     Progress Note          PATIENT  DEMOGRAPHICS   PATIENT NAME: Matti Bravo                      PHYSICIAN: Karen Mcfarland MD  : 1970  MRN: [id number]   LOS: 9 days    Patient Care Team:  Shonna Ng APRN as PCP - General (Family Medicine)  Subjective   SUBJECTIVE   No new complaints        Objective   OBJECTIVE   Vital Signs  Temp:  [97.7 °F (36.5 °C)-98.4 °F (36.9 °C)] 97.7 °F (36.5 °C)  Heart Rate:  [] 87  Resp:  [18] 18  BP: (110-130)/(50-80) 130/64    Flowsheet Rows    Flowsheet Row First Filed Value   Admission Height 193 cm (76\") Documented at 2022 1700   Admission Weight 127 kg (280 lb) Documented at 2022 1700           I/O last 3 completed shifts:  In: 240 [P.O.:240]  Out:  [Urine:2000]    PHYSICAL EXAM    Physical Exam  Vitals and nursing note reviewed.   Constitutional:       Appearance: Normal appearance.   Cardiovascular:      Rate and Rhythm: Normal rate and regular rhythm.      Heart sounds: Normal heart sounds. No murmur heard.    No friction rub. No gallop.   Pulmonary:      Effort: No respiratory distress.      Breath sounds: Normal breath sounds. No stridor. No wheezing, rhonchi or rales.   Musculoskeletal:      Right lower leg: Edema present.      Left lower leg: Edema present.   Skin:     General: Skin is warm and dry.   Neurological:      Mental Status: He is alert.         RESULTS   Results Review:    Results from last 7 days   Lab Units 22  0608 22  0806 22  0622 22  0505 22  1836   SODIUM mmol/L 129* 126* 127*   < > 130*   POTASSIUM mmol/L 3.9 3.6 3.5   < > 3.5   CHLORIDE mmol/L 88* 85* 88*   < > 90*   CO2 mmol/L 25.0 24.0 22.0   < > 25.0   BUN mg/dL 88* 83* 79*   < > 73*   CREATININE mg/dL 3.21* 2.77* 2.29*   < > 2.39*   CALCIUM mg/dL 9.1 9.0 9.0   < > 9.0   BILIRUBIN mg/dL  --  4.0* 3.5*  --  3.4*   ALK " PHOS U/L  --  64 59  --  52   ALT (SGPT) U/L  --  443* 432*  --  422*   AST (SGOT) U/L  --  277* 240*  --  258*   GLUCOSE mg/dL 120* 111* 127*   < > 143*    < > = values in this interval not displayed.       Estimated Creatinine Clearance: 41.2 mL/min (A) (by C-G formula based on SCr of 3.21 mg/dL (H)).                Results from last 7 days   Lab Units 12/31/22  0608 12/30/22  0640 12/29/22  0622 12/28/22  0505 12/27/22  0433   WBC 10*3/mm3 4.49 3.88 4.23 4.57 4.86   HEMOGLOBIN g/dL 14.6 14.3 14.3 14.7 14.3   PLATELETS 10*3/mm3 122* 132* 119* 130* 119*               Imaging Results (Last 24 Hours)     ** No results found for the last 24 hours. **           MEDICATIONS    atorvastatin, 10 mg, Oral, Daily  carvedilol, 6.25 mg, Oral, BID With Meals  enoxaparin, 1 mg/kg, Subcutaneous, Q12H  [START ON 1/1/2023] furosemide, 120 mg, Intravenous, Daily  losartan, 12.5 mg, Oral, Q24H  Morphine, 4 mg, Intravenous, Once  pantoprazole, 40 mg, Intravenous, Q AM  sodium chloride, 10 mL, Intravenous, Q12H           Assessment & Plan   ASSESSMENT / PLAN      FRANCK (acute kidney injury) (HCC)    Severe malnutrition (HCC)    1.  Acute Kidney Failure on CKD 3: Baseline creatinine 1.4-1.7 mg/dl  - Etiology of FRANCK likely contrast induced.    - Urinalysis showed trace protein and negative blood, 3-5 RBCs, 0-2 WBCs.  Urine sodium less than 20.  Urine protein creatinine ratio 235 mg.  - Creatinine is slightly worse at 2.77 mg/dL.    Keep fluid restriction of 1500 ml a day.   - Maintain hemodynamics.  Monitor I's and O's.  Avoid nephrotoxins like NSAIDs and IV contrast    Doing a diuretic holiday resatrat lasix 120 mg from 1/1/2023     2.  Shortness of breath:  - Underwent CT with contrast which was nondiagnostic, no marked fluid overload on exam. No pulmonary edema on xray. SOB etiology is unclear at present.      3.  Chronic systolic CHF /AICD/ non ischemic cardiomyopathy     4.  Prediabetes     5.  Hypertension:  - Blood pressure is  borderline low. Decreased carvedilol to 6.25 mg twice day.      6.  KHANH:  - Supposed to be using CPAP at home     7. Acute cholecystitis:  - denies marked abdominal pain and also has some nausea and vomiting x 1. Denies acid reflux. Continue protonix.       Copied text in this note has been reviewed and is accurate as of 12/30/2022           This document has been electronically signed by Karen Mcfarland MD on December 31, 2022 13:12 CST

## 2022-12-31 NOTE — PROGRESS NOTES
[facility] Medicine Services  INPATIENT PROGRESS NOTE    Length of Stay: 8  Date of Admission: 12/20/2022  Primary Care Physician: Shonna Ng APRN    Subjective   Chief Complaint: Shortness of breath  HPI:   Patient seen and examined.  12/30/2022.  The patient states he is starting to feel much better.  He has less abdominal pain less hiccups.  No nausea vomiting.  We will continue current treatment and monitor.  Has been seen by gastroenterology.  Has been placed on Bentyl with good results.      As of today, 12/30/22  Review of Systems   Constitutional: Positive for activity change and fatigue. Negative for chills and fever.        The patient states the abdominal pain is improving.  Hiccups have improved.   HENT: Negative.    Eyes: Negative.    Respiratory: Positive for shortness of breath. Negative for chest tightness.    Cardiovascular: Negative.    Gastrointestinal: Positive for abdominal pain and nausea. Negative for constipation, diarrhea and vomiting.   Endocrine: Negative.    Musculoskeletal: Negative.    Skin: Negative.  Negative for wound.   Neurological: Positive for weakness.   Hematological: Negative.    Psychiatric/Behavioral: Negative.         All pertinent negatives and positives are as above. All other systems have been reviewed and are negative unless otherwise stated.    Objective    Temp:  [96.7 °F (35.9 °C)-97.8 °F (36.6 °C)] 97.8 °F (36.6 °C)  Heart Rate:  [] 101  Resp:  [18-19] 18  BP: (100-120)/(50-62) 118/50    AM-PAC 6 Clicks Score (PT): 22 (12/30/22 1200)    As of today, 12/30/22  Physical Exam  Vitals and nursing note reviewed.   Constitutional:       Appearance: He is well-developed. He is ill-appearing.      Comments: Less abdominal pain and discomfort.  Nontender nondistended.   HENT:      Head: Normocephalic and atraumatic.      Right Ear: External ear normal.      Left Ear: External ear normal.      Nose: Nose normal.       Mouth/Throat:      Mouth: Mucous membranes are dry.      Pharynx: Oropharynx is clear.   Eyes:      Conjunctiva/sclera: Conjunctivae normal.      Pupils: Pupils are equal, round, and reactive to light.   Cardiovascular:      Rate and Rhythm: Normal rate. Rhythm irregularly irregular.      Heart sounds: Normal heart sounds. No murmur heard.    No friction rub. No gallop.   Pulmonary:      Effort: Pulmonary effort is normal. No respiratory distress.      Breath sounds: Normal breath sounds. No wheezing or rales.      Comments: Decreased breath sounds at the bases otherwise clear  Chest:      Chest wall: No tenderness.   Abdominal:      General: Bowel sounds are normal. There is no distension.      Palpations: Abdomen is soft.      Tenderness: There is no abdominal tenderness.      Comments: Soft nontender nondistended normoactive bowel sounds   Musculoskeletal:         General: Normal range of motion.      Cervical back: Normal range of motion and neck supple.   Skin:     General: Skin is warm and dry.      Coloration: Skin is not jaundiced.   Neurological:      General: No focal deficit present.      Mental Status: He is alert and oriented to person, place, and time.   Psychiatric:         Mood and Affect: Mood normal.         Behavior: Behavior normal.           Results Review:  I have reviewed the labs, radiology results, and diagnostic studies.    Laboratory Data:   Results from last 7 days   Lab Units 12/30/22  0806 12/29/22  0622 12/28/22  0505 12/27/22  1836   SODIUM mmol/L 126* 127* 132* 130*   POTASSIUM mmol/L 3.6 3.5 3.5 3.5   CHLORIDE mmol/L 85* 88* 88* 90*   CO2 mmol/L 24.0 22.0 23.0 25.0   BUN mg/dL 83* 79* 80* 73*   CREATININE mg/dL 2.77* 2.29* 2.59* 2.39*   GLUCOSE mg/dL 111* 127* 148* 143*   CALCIUM mg/dL 9.0 9.0 9.2 9.0   BILIRUBIN mg/dL 4.0* 3.5*  --  3.4*   ALK PHOS U/L 64 59  --  52   ALT (SGPT) U/L 443* 432*  --  422*   AST (SGOT) U/L 277* 240*  --  258*   ANION GAP mmol/L 17.0* 17.0* 21.0*  15.0     Estimated Creatinine Clearance: 47.3 mL/min (A) (by C-G formula based on SCr of 2.77 mg/dL (H)).          Results from last 7 days   Lab Units 12/30/22  0640 12/29/22  0622 12/28/22  0505 12/27/22  0433 12/26/22  0314   WBC 10*3/mm3 3.88 4.23 4.57 4.86 5.85   HEMOGLOBIN g/dL 14.3 14.3 14.7 14.3 14.1   HEMATOCRIT % 42.7 43.3 45.8 43.8 42.4   PLATELETS 10*3/mm3 132* 119* 130* 119* 120*           Culture Data:   No results found for: BLOODCX  No results found for: URINECX  No results found for: RESPCX  No results found for: WOUNDCX  No results found for: STOOLCX  No components found for: BODYFLD    Radiology Data:   Imaging Results (Last 24 Hours)     ** No results found for the last 24 hours. **          I have reviewed the patient's current medications.     Assessment/Plan     Active Hospital Problems    Diagnosis    • **FRANCK (acute kidney injury) (Formerly [facility])        Plan:    1.  Shortness of breath: May be slightly better.  CT scan for pulmonary embolism was nondiagnostic.  VQ scan low probability.  Currently stable improved.    2.  Volume depletion: Continue to hydrate gently.  Appreciate nephrology help.  Evelina stable.    3.  Chronic systolic congestive heart failure: Appears euvolemic..  Continue home medications.    Eliquis currently on hold in case of surgery.  Patient has been placed on Lovenox due to atrial fibrillation.  This is a chronic issue.  Rate is controlled currently.    4.  History of hyperglycemia: No diagnosis of diabetes.    Continue sliding scale    5.  Obstructive sleep apnea: Patient states its been over 2 years since he has had a CPAP.  He is scheduled to go  his CPAP on 12/29.    6.  Acute kidney injury: Renal function is unchanged from yesterday.  Superimposed upon chronic kidney disease.  Appreciate nephrology help.    7.  Possible cholecystitis:  Medical management for now.  No leukocytosis.  Continue Zosyn.  HIDA scan completed.  Clear liquid diet.  Advance diet as tolerated will  need surgical consultation when HIDA scan resulted.  Eliquis discontinued 12/25.  If HIDA scan is negative would consider reinitiation of Eliquis soon.  Dr. Pierce assistance appreciated.  We will continue to monitor.  Patient does show slight improvement with the addition of Thorazine.  Possibly recommending EGD and colonoscopy if abdominal pain and nausea persist.  Placed on Bentyl improving.  Abdominal pain is less intense.  We will continue current medications and monitor.    8.  DVT prophylaxis: SCDs.    CODE STATUS full code    Remained stable currently anticipate discharge soon.  Possibly 24 to 48 hours if he continues to progress has he has been so far.    The patient was evaluated during the global COVID-19 pandemic, and the diagnosis was suspected/considered upon their initial presentation.  Evaluation, treatment, and testing were consistent with current guidelines for patients who present with complaints or symptoms that may be related to COVID-19.    I confirmed that the patient's Advance Care Plan is present, code status is documented, or surrogate decision maker is listed in the patient's medical record.       Discharge Planning: I expect patient to be discharged to home in 3-4 days.    This document has been electronically signed by Moshe Aleman DO on December 30, 2022 18:08 CST

## 2022-12-31 NOTE — PROGRESS NOTES
Nutrition Services    Patient Name:  Matti Bravo  YOB: 1970  MRN: [id number]  Admit Date:  12/20/2022    Kitchen asked RDN staff to see pt r/t diet order with gluten sensitive.  Pt reports NKFA.  Did state he is wanting something sour to eat/drink.  Visitor present in room.  Did suggest pt may suck on a lemon or try sonic for a drink that has a more sour flavor.  Pt is limited on what he can have at this time, but MD allowing pt to have fruit on clear liquid diet.  RDN staff will follow hospital course and available for questions as needed.     Electronically signed by:  Madeline Lock  12/31/22 10:32 CST

## 2022-12-31 NOTE — PROGRESS NOTES
SUBJECTIVE:   12/31/2022  Chief Complaint:     Subjective      Patient feels better today.  Abdominal pain is improving.  Nausea is improving as well.  Denied vomiting.  Tolerating diet.  Hiccups have improved with intermittent symptoms with food intake.  LFTs are stable.    History:  Past Medical History:   Diagnosis Date   • Asthma    • Chronic systolic heart failure (HCC)    • Diabetes mellitus (HCC)    • Hyperlipidemia    • Hypertension    • Obesity    • Peripheral vascular disease (HCC)    • Sleep apnea      Past Surgical History:   Procedure Laterality Date   • CARDIAC CATHETERIZATION N/A 12/08/2021   • CARDIAC DEFIBRILLATOR PLACEMENT     • VARICOSE VEIN SURGERY Right 04/05/2019     Family History   Problem Relation Age of Onset   • Diabetes Mother    • Hypertension Father      Social History     Tobacco Use   • Smoking status: Former   • Smokeless tobacco: Never   Vaping Use   • Vaping Use: Never used   Substance Use Topics   • Alcohol use: No   • Drug use: No     Medications Prior to Admission   Medication Sig Dispense Refill Last Dose   • albuterol sulfate  (90 Base) MCG/ACT inhaler Inhale 2 puffs Every 4 (Four) Hours As Needed for Wheezing. 1 inhaler 3    • apixaban (ELIQUIS) 5 MG tablet tablet Take 1 tablet by mouth 2 (Two) Times a Day. 60 tablet 3    • bumetanide (BUMEX) 1 MG tablet Take 1 tablet by mouth 2 (Two) Times a Day. 60 tablet 2    • carvedilol (COREG) 12.5 MG tablet Take 1 tablet by mouth 2 (Two) Times a Day With Meals for 30 days. (Patient taking differently: Take 12.5 mg by mouth 2 (Two) Times a Day With Meals. Pt states he has some meds and is still taking this.) 60 tablet 3    • losartan (COZAAR) 25 MG tablet Take 0.5 tablets by mouth Daily for 30 days. 15 tablet 3    • lovastatin (ALTOPREV) 20 MG 24 hr tablet Take 20 mg by mouth.      • metOLazone (ZAROXOLYN) 2.5 MG tablet Take 1 tablet by mouth 3 (Three) Times a Week. 15 tablet 3    • potassium chloride 10 MEQ CR tablet  Take 2 tablets by mouth Daily. 30 tablet 1    • vitamin D (ERGOCALCIFEROL) 1.25 MG (02864 UT) capsule capsule Take 50,000 Units by mouth 1 (One) Time Per Week.        Allergies:  Patient has no known allergies.     CURRENT MEDICATIONS/OBJECTIVE/VS/PE:     Current Medications:     Current Facility-Administered Medications   Medication Dose Route Frequency Provider Last Rate Last Admin   • acetaminophen (TYLENOL) tablet 1,000 mg  1,000 mg Oral Q6H PRN Sudeep Wolf MD   1,000 mg at 12/24/22 1240   • albuterol (PROVENTIL) nebulizer solution 0.083% 2.5 mg/3mL  2.5 mg Nebulization Q4H PRN Will French MD       • atorvastatin (LIPITOR) tablet 10 mg  10 mg Oral Daily Will French MD   10 mg at 12/31/22 0912   • calcium carbonate (TUMS) chewable tablet 500 mg (200 mg elemental)  2 tablet Oral TID PRN Will French MD   2 tablet at 12/24/22 1444   • carvedilol (COREG) tablet 6.25 mg  6.25 mg Oral BID With Meals Efrain Mas MD   6.25 mg at 12/31/22 0912   • droperidol (INAPSINE) injection 1.25 mg  1.25 mg Intravenous Q6H PRN Sudeep Wolf MD   1.25 mg at 12/22/22 1302   • Enoxaparin Sodium (LOVENOX) syringe 130 mg  1 mg/kg Subcutaneous Q12H Moshe Aleman DO   130 mg at 12/30/22 2124   • furosemide (LASIX) 100 mg in sodium chloride 0.9 % 100 mL infusion  10 mg/hr Intravenous Continuous Deacon Montaño APRN 10 mL/hr at 12/31/22 0101 10 mg/hr at 12/31/22 0101   • HYDROcodone-acetaminophen (NORCO) 7.5-325 MG per tablet 1 tablet  1 tablet Oral Q6H PRN Will French MD   1 tablet at 12/31/22 0653   • losartan (COZAAR) half tablet 12.5 mg  12.5 mg Oral Q24H Will French MD   12.5 mg at 12/31/22 0912   • morphine injection 2 mg  2 mg Intravenous Q2H PRN Will French MD       • morphine injection 4 mg  4 mg Intravenous Once Will French MD       • ondansetron (ZOFRAN) injection 4 mg  4 mg Intravenous Q6H PRN Sudeep Wolf MD   4 mg at 12/31/22 0653    • pantoprazole (PROTONIX) injection 40 mg  40 mg Intravenous Q AM Trevor Alonso MD   40 mg at 12/31/22 0520   • potassium chloride 10 mEq in 100 mL IVPB  10 mEq Intravenous Q1H PRN Will French MD       • simethicone (MYLICON) chewable tablet 80 mg  80 mg Oral 4x Daily PRN Delonte Caputo MD   80 mg at 12/25/22 0025   • sodium chloride 0.9 % flush 10 mL  10 mL Intravenous Q12H Will French MD   10 mL at 12/30/22 2125   • sodium chloride 0.9 % flush 10 mL  10 mL Intravenous PRN Will French MD   10 mL at 12/30/22 0145   • sodium chloride 0.9 % infusion 40 mL  40 mL Intravenous PRN Will French MD           Objective     Review of Systems:   Review of Systems   Constitutional: Negative for chills, fatigue, fever and unexpected weight change.   HENT: Negative for congestion, ear discharge, hearing loss, nosebleeds and sore throat.    Eyes: Negative for pain, discharge and redness.   Respiratory: Positive for shortness of breath. Negative for cough, chest tightness and wheezing.    Cardiovascular: Negative for chest pain and palpitations.   Gastrointestinal: Positive for abdominal pain and nausea. Negative for abdominal distention, blood in stool, constipation, diarrhea and vomiting.   Endocrine: Negative for cold intolerance, polydipsia, polyphagia and polyuria.   Genitourinary: Negative for dysuria, flank pain, frequency, hematuria and urgency.   Musculoskeletal: Negative for arthralgias, back pain, joint swelling and myalgias.   Skin: Negative for color change, pallor and rash.   Neurological: Negative for tremors, seizures, syncope, weakness and headaches.   Hematological: Negative for adenopathy. Does not bruise/bleed easily.   Psychiatric/Behavioral: Negative for behavioral problems, confusion, dysphoric mood, hallucinations and suicidal ideas. The patient is not nervous/anxious.        Physical Exam:   Temp:  [97.7 °F (36.5 °C)-98.4 °F (36.9 °C)] 97.7 °F (36.5 °C)  Heart Rate:  []  71  Resp:  [18] 18  BP: (110-118)/(50-80) 110/80     Physical Exam:  General Appearance:    Alert, cooperative, in no acute distress   Head:    Normocephalic, without obvious abnormality, atraumatic   Eyes:            Lids and lashes normal, conjunctivae and sclerae normal, no   icterus, no pallor, corneas clear, PERRLA   Ears:    Ears appear intact with no abnormalities noted   Throat:   No oral lesions, no thrush, oral mucosa moist   Neck:   No adenopathy, supple, trachea midline, no thyromegaly, no     carotid bruit, no JVD   Back:     No kyphosis present, no scoliosis present, no skin lesions,       erythema or scars, no tenderness to percussion or                   palpation,   range of motion normal   Lungs:     Clear to auscultation,respirations regular, even and                   unlabored    Heart:    Regular rhythm and normal rate, normal S1 and S2, no            murmur, no gallop, no rub, no click   Breast Exam:    Deferred   Abdomen:     Normal bowel sounds, no masses, no organomegaly, soft        nontender, nondistended, no guarding, no rebound                 tenderness   Genitalia:    Deferred   Extremities:   Moves all extremities well, no edema, no cyanosis, no              redness   Pulses:   Pulses palpable and equal bilaterally   Skin:   No bleeding, bruising or rash   Lymph nodes:   No palpable adenopathy   Neurologic:   Cranial nerves 2 - 12 grossly intact, sensation intact, DTR        present and equal bilaterally      Results Review:     Lab Results (last 24 hours)     Procedure Component Value Units Date/Time    Basic Metabolic Panel [[id number]]  (Abnormal) Collected: 12/31/22 0608    Specimen: Blood Updated: 12/31/22 0700     Glucose 120 mg/dL      BUN 88 mg/dL      Creatinine 3.21 mg/dL      Sodium 129 mmol/L      Potassium 3.9 mmol/L      Chloride 88 mmol/L      CO2 25.0 mmol/L      Calcium 9.1 mg/dL      BUN/Creatinine Ratio 27.4     Anion Gap 16.0 mmol/L      eGFR 22.5 mL/min/1.73       Comment: National Kidney Foundation and American Society of Nephrology (ASN) Task Force recommended calculation based on the Chronic Kidney Disease Epidemiology Collaboration (CKD-EPI) equation refit without adjustment for race.       Narrative:      GFR Normal >60  Chronic Kidney Disease <60  Kidney Failure <15      CBC & Differential [[id number]]  (Abnormal) Collected: 12/31/22 0608    Specimen: Blood Updated: 12/31/22 0641    Narrative:      The following orders were created for panel order CBC & Differential.  Procedure                               Abnormality         Status                     ---------                               -----------         ------                     CBC Auto Differential[[id number]]        Abnormal            Final result               Scan Slide[[id number]]                                                                    Please view results for these tests on the individual orders.    CBC Auto Differential [[id number]]  (Abnormal) Collected: 12/31/22 0608    Specimen: Blood Updated: 12/31/22 0641     WBC 4.49 10*3/mm3      RBC 4.96 10*6/mm3      Hemoglobin 14.6 g/dL      Hematocrit 43.5 %      MCV 87.7 fL      MCH 29.4 pg      MCHC 33.6 g/dL      RDW 14.9 %      RDW-SD 47.4 fl      MPV 13.3 fL      Platelets 122 10*3/mm3      Neutrophil % 50.0 %      Lymphocyte % 25.4 %      Monocyte % 21.4 %      Eosinophil % 1.6 %      Basophil % 0.9 %      Immature Grans % 0.7 %      Neutrophils, Absolute 2.25 10*3/mm3      Lymphocytes, Absolute 1.14 10*3/mm3      Monocytes, Absolute 0.96 10*3/mm3      Eosinophils, Absolute 0.07 10*3/mm3      Basophils, Absolute 0.04 10*3/mm3      Immature Grans, Absolute 0.03 10*3/mm3      nRBC 4.0 /100 WBC     Hepatic Function Panel [[id number]]  (Abnormal) Collected: 12/30/22 0806    Specimen: Blood Updated: 12/30/22 1330     Total Protein 7.1 g/dL      Albumin 3.5 g/dL      ALT (SGPT) 443 U/L      AST (SGOT) 277 U/L      Alkaline Phosphatase 64 U/L      Total  Bilirubin 4.0 mg/dL      Bilirubin, Direct 2.0 mg/dL      Bilirubin, Indirect 2.0 mg/dL     Extra Tubes [[id number]] Collected: 12/30/22 0828    Specimen: Blood, Venous Line Updated: 12/30/22 0930    Narrative:      The following orders were created for panel order Extra Tubes.  Procedure                               Abnormality         Status                     ---------                               -----------         ------                     Lavender Top[[id number]]                                     Final result                 Please view results for these tests on the individual orders.    Lavender Top [[id number]] Collected: 12/30/22 0828    Specimen: Blood Updated: 12/30/22 0930     Extra Tube hold for add-on     Comment: Auto resulted              I reviewed the patient's new clinical results.  I reviewed the patient's new imaging results and agree with the interpretation.     ASSESSMENT/PLAN:   ASSESSMENT: 1.  Left-sided abdominal pain, improving.  2.  Elevated liver enzymes, likely multifactorial.  3.  Biliary dyskinesia  4.  Hiccups    PLAN: 1.  Follow LFTs closely and avoid hypotension hepatotoxic medication  2.  Continue diuresis and no added salt diet  3.  Continue PPI and Bentyl  4.  Add chlorpromazine 25 mg p.o. 3 times daily for hiccups  The risks, benefits, and alternatives of this procedure have been discussed with the patient or the responsible party- the patient understands and agrees to proceed.         Bea Pierce MD  12/31/22  09:12 CST

## 2022-12-31 NOTE — PLAN OF CARE
Goal Outcome Evaluation:      Vss, no distress, voiding well, no emesis. No hiccups noted.

## 2022-12-31 NOTE — PROGRESS NOTES
[facility] Medicine Services  INPATIENT PROGRESS NOTE    Length of Stay: 9  Date of Admission: 12/20/2022  Primary Care Physician: Shonna Ng APRN    Subjective   Chief Complaint: Shortness of breath  HPI:   Patient seen and examined.  12/31/2022.  The patient is seen in his room he is lying down.  Family is at the bedside.  Questions are answered.  Gastroenterology's assistance is greatly appreciated.  Overall the patient does appear to be doing much better clinically.  He continues to have some right upper quadrant pain but less intense than it has been.  Patient is not having any nausea or vomiting.  No fevers or chills.  No diarrhea or constipation no urinary complaints are noted.  His vital signs are stable he is afebrile.  Have reviewed all labs with the patient in detail.    As of today, 12/31/22  Review of Systems   Constitutional: Positive for activity change and fatigue. Negative for chills and fever.        The patient states the abdominal pain is improving.  Hiccups have improved.   HENT: Negative.    Eyes: Negative.    Respiratory: Positive for shortness of breath. Negative for chest tightness.    Cardiovascular: Negative.    Gastrointestinal: Positive for abdominal pain and nausea.        Abdominal pain appears to be better.  We will continue to monitor.   Endocrine: Negative.    Genitourinary: Negative.    Musculoskeletal: Negative.    Skin: Negative.  Negative for wound.   Neurological: Positive for weakness.   Hematological: Negative.    Psychiatric/Behavioral: Negative.         All pertinent negatives and positives are as above. All other systems have been reviewed and are negative unless otherwise stated.    Objective    Temp:  [97.7 °F (36.5 °C)-98.4 °F (36.9 °C)] 97.7 °F (36.5 °C)  Heart Rate:  [] 87  Resp:  [18] 18  BP: (110-130)/(50-80) 130/64    AM-PAC 6 Clicks Score (PT): 24 (12/31/22 1116)    As of today, 12/31/22  Physical  Exam  Vitals and nursing note reviewed.   Constitutional:       Appearance: He is well-developed. He is ill-appearing.      Comments: Less abdominal pain and discomfort.  Nontender nondistended.   HENT:      Head: Normocephalic and atraumatic.      Right Ear: External ear normal.      Left Ear: External ear normal.      Nose: Nose normal.      Mouth/Throat:      Mouth: Mucous membranes are dry.      Pharynx: Oropharynx is clear.   Eyes:      Conjunctiva/sclera: Conjunctivae normal.      Pupils: Pupils are equal, round, and reactive to light.   Cardiovascular:      Rate and Rhythm: Normal rate. Rhythm irregularly irregular.      Heart sounds: Normal heart sounds. No murmur heard.    No friction rub. No gallop.   Pulmonary:      Effort: Pulmonary effort is normal. No respiratory distress.      Breath sounds: Normal breath sounds. No wheezing or rales.      Comments: Decreased breath sounds at the bases otherwise clear  Chest:      Chest wall: No tenderness.   Abdominal:      General: Bowel sounds are normal. There is no distension.      Palpations: Abdomen is soft.      Tenderness: There is no abdominal tenderness.      Comments: Soft nontender nondistended normoactive bowel sounds   Musculoskeletal:         General: Normal range of motion.      Cervical back: Normal range of motion and neck supple.   Skin:     General: Skin is warm and dry.      Coloration: Skin is not jaundiced.   Neurological:      General: No focal deficit present.      Mental Status: He is alert and oriented to person, place, and time.   Psychiatric:         Mood and Affect: Mood normal.         Behavior: Behavior normal.           Results Review:  I have reviewed the labs, radiology results, and diagnostic studies.    Laboratory Data:   Results from last 7 days   Lab Units 12/31/22  0608 12/30/22  0806 12/29/22  0622 12/28/22  0505 12/27/22  1836   SODIUM mmol/L 129* 126* 127*   < > 130*   POTASSIUM mmol/L 3.9 3.6 3.5   < > 3.5   CHLORIDE mmol/L  88* 85* 88*   < > 90*   CO2 mmol/L 25.0 24.0 22.0   < > 25.0   BUN mg/dL 88* 83* 79*   < > 73*   CREATININE mg/dL 3.21* 2.77* 2.29*   < > 2.39*   GLUCOSE mg/dL 120* 111* 127*   < > 143*   CALCIUM mg/dL 9.1 9.0 9.0   < > 9.0   BILIRUBIN mg/dL  --  4.0* 3.5*  --  3.4*   ALK PHOS U/L  --  64 59  --  52   ALT (SGPT) U/L  --  443* 432*  --  422*   AST (SGOT) U/L  --  277* 240*  --  258*   ANION GAP mmol/L 16.0* 17.0* 17.0*   < > 15.0    < > = values in this interval not displayed.     Estimated Creatinine Clearance: 41.2 mL/min (A) (by C-G formula based on SCr of 3.21 mg/dL (H)).          Results from last 7 days   Lab Units 12/31/22  0608 12/30/22  0640 12/29/22  0622 12/28/22  0505 12/27/22  0433   WBC 10*3/mm3 4.49 3.88 4.23 4.57 4.86   HEMOGLOBIN g/dL 14.6 14.3 14.3 14.7 14.3   HEMATOCRIT % 43.5 42.7 43.3 45.8 43.8   PLATELETS 10*3/mm3 122* 132* 119* 130* 119*           Culture Data:   No results found for: BLOODCX  No results found for: URINECX  No results found for: RESPCX  No results found for: WOUNDCX  No results found for: STOOLCX  No components found for: BODYFLD    Radiology Data:   Imaging Results (Last 24 Hours)     ** No results found for the last 24 hours. **          I have reviewed the patient's current medications.     Assessment/Plan     Active Hospital Problems    Diagnosis    • **FRANCK (acute kidney injury) (HCC)    • Severe malnutrition (HCC)        Plan:    1.  Shortness of breath: May be slightly better.  CT scan for pulmonary embolism was nondiagnostic.  VQ scan low probability.  Currently stable improved.    2.  Volume depletion: Continue to hydrate gently.  Appreciate nephrology help.  Evelina stable.    3.  Chronic systolic congestive heart failure: Appears euvolemic..  Continue home medications.    Eliquis currently on hold in case of surgery.  Patient has been placed on Lovenox due to atrial fibrillation.  This is a chronic issue.  Rate is controlled currently.    4.  History of hyperglycemia:  No diagnosis of diabetes.    Continue sliding scale    5.  Obstructive sleep apnea: Patient states its been over 2 years since he has had a CPAP.  He is scheduled to go  his CPAP on 12/29.    6.  Acute kidney injury: Renal function is unchanged from yesterday.  Superimposed upon chronic kidney disease.  Appreciate nephrology help.    7.  Possible cholecystitis:  Medical management for now.  No leukocytosis.  Continue Zosyn.  HIDA scan completed.  Clear liquid diet.  Eliquis discontinued 12/25.    Dr. Pierce assistance appreciated.  We will continue to monitor.  Patient does show slight improvement with the addition of Thorazine.  Possibly recommending EGD and colonoscopy if abdominal pain and nausea persist.  Placed on Bentyl improving.  Abdominal pain is less intense.  We will continue current medications and monitor.  On evaluation today the patient is improving.  He continues to improve slowly every day.  Will probably need procedures per GI.  Will be done when it is appropriate.    8.  DVT prophylaxis: SCDs.    CODE STATUS full code    Remained stable currently anticipate discharge soon.  Possibly 24 to 48 hours if he continues to progress has he has been so far.    The patient was evaluated during the global COVID-19 pandemic, and the diagnosis was suspected/considered upon their initial presentation.  Evaluation, treatment, and testing were consistent with current guidelines for patients who present with complaints or symptoms that may be related to COVID-19.    I confirmed that the patient's Advance Care Plan is present, code status is documented, or surrogate decision maker is listed in the patient's medical record.       Discharge Planning: I expect patient to be discharged to home in 3-4 days.    This document has been electronically signed by Moshe Aleman DO on December 31, 2022 13:29 CST

## 2023-01-01 LAB
ANION GAP SERPL CALCULATED.3IONS-SCNC: 13 MMOL/L (ref 5–15)
BASOPHILS # BLD AUTO: 0.01 10*3/MM3 (ref 0–0.2)
BASOPHILS NFR BLD AUTO: 0.3 % (ref 0–1.5)
BUN SERPL-MCNC: 86 MG/DL (ref 6–20)
BUN/CREAT SERPL: 31.9 (ref 7–25)
CALCIUM SPEC-SCNC: 8.5 MG/DL (ref 8.6–10.5)
CHLORIDE SERPL-SCNC: 87 MMOL/L (ref 98–107)
CO2 SERPL-SCNC: 28 MMOL/L (ref 22–29)
CREAT SERPL-MCNC: 2.7 MG/DL (ref 0.76–1.27)
DEPRECATED RDW RBC AUTO: 47 FL (ref 37–54)
EGFRCR SERPLBLD CKD-EPI 2021: 27.7 ML/MIN/1.73
EOSINOPHIL # BLD AUTO: 0.08 10*3/MM3 (ref 0–0.4)
EOSINOPHIL NFR BLD AUTO: 2.2 % (ref 0.3–6.2)
ERYTHROCYTE [DISTWIDTH] IN BLOOD BY AUTOMATED COUNT: 14.7 % (ref 12.3–15.4)
GLUCOSE SERPL-MCNC: 95 MG/DL (ref 65–99)
HCT VFR BLD AUTO: 40.8 % (ref 37.5–51)
HGB BLD-MCNC: 13.5 G/DL (ref 13–17.7)
IMM GRANULOCYTES # BLD AUTO: 0.01 10*3/MM3 (ref 0–0.05)
IMM GRANULOCYTES NFR BLD AUTO: 0.3 % (ref 0–0.5)
LYMPHOCYTES # BLD AUTO: 0.53 10*3/MM3 (ref 0.7–3.1)
LYMPHOCYTES NFR BLD AUTO: 14.7 % (ref 19.6–45.3)
MCH RBC QN AUTO: 29 PG (ref 26.6–33)
MCHC RBC AUTO-ENTMCNC: 33.1 G/DL (ref 31.5–35.7)
MCV RBC AUTO: 87.6 FL (ref 79–97)
MONOCYTES # BLD AUTO: 0.67 10*3/MM3 (ref 0.1–0.9)
MONOCYTES NFR BLD AUTO: 18.6 % (ref 5–12)
NEUTROPHILS NFR BLD AUTO: 2.3 10*3/MM3 (ref 1.7–7)
NEUTROPHILS NFR BLD AUTO: 63.9 % (ref 42.7–76)
NRBC BLD AUTO-RTO: 2.2 /100 WBC (ref 0–0.2)
PLATELET # BLD AUTO: 108 10*3/MM3 (ref 140–450)
PMV BLD AUTO: 13.7 FL (ref 6–12)
POTASSIUM SERPL-SCNC: 2.9 MMOL/L (ref 3.5–5.2)
QT INTERVAL: 358 MS
QTC INTERVAL: 461 MS
RBC # BLD AUTO: 4.66 10*6/MM3 (ref 4.14–5.8)
SODIUM SERPL-SCNC: 128 MMOL/L (ref 136–145)
WBC NRBC COR # BLD: 3.6 10*3/MM3 (ref 3.4–10.8)

## 2023-01-01 PROCEDURE — 80048 BASIC METABOLIC PNL TOTAL CA: CPT | Performed by: HOSPITALIST

## 2023-01-01 PROCEDURE — 25010000002 ENOXAPARIN PER 10 MG: Performed by: HOSPITALIST

## 2023-01-01 PROCEDURE — 25010000002 FUROSEMIDE PER 20 MG: Performed by: INTERNAL MEDICINE

## 2023-01-01 PROCEDURE — 25010000002 ONDANSETRON PER 1 MG

## 2023-01-01 PROCEDURE — 85025 COMPLETE CBC W/AUTO DIFF WBC: CPT | Performed by: HOSPITALIST

## 2023-01-01 RX ORDER — POTASSIUM CHLORIDE 750 MG/1
40 CAPSULE, EXTENDED RELEASE ORAL AS NEEDED
Status: DISCONTINUED | OUTPATIENT
Start: 2023-01-01 | End: 2023-01-11

## 2023-01-01 RX ORDER — POTASSIUM CHLORIDE 1.5 G/1.77G
40 POWDER, FOR SOLUTION ORAL AS NEEDED
Status: DISCONTINUED | OUTPATIENT
Start: 2023-01-01 | End: 2023-01-11

## 2023-01-01 RX ORDER — POTASSIUM CHLORIDE 7.45 MG/ML
10 INJECTION INTRAVENOUS
Status: DISCONTINUED | OUTPATIENT
Start: 2023-01-01 | End: 2023-01-11

## 2023-01-01 RX ADMIN — Medication 10 ML: at 08:31

## 2023-01-01 RX ADMIN — POTASSIUM CHLORIDE 40 MEQ: 750 CAPSULE, EXTENDED RELEASE ORAL at 15:57

## 2023-01-01 RX ADMIN — CARVEDILOL 6.25 MG: 6.25 TABLET, FILM COATED ORAL at 08:30

## 2023-01-01 RX ADMIN — ENOXAPARIN SODIUM 130 MG: 150 INJECTION SUBCUTANEOUS at 21:02

## 2023-01-01 RX ADMIN — FUROSEMIDE 120 MG: 10 INJECTION, SOLUTION INTRAMUSCULAR; INTRAVENOUS at 09:37

## 2023-01-01 RX ADMIN — ENOXAPARIN SODIUM 130 MG: 150 INJECTION SUBCUTANEOUS at 08:30

## 2023-01-01 RX ADMIN — ONDANSETRON 4 MG: 2 INJECTION INTRAMUSCULAR; INTRAVENOUS at 08:30

## 2023-01-01 RX ADMIN — PANTOPRAZOLE SODIUM 40 MG: 40 INJECTION, POWDER, FOR SOLUTION INTRAVENOUS at 05:34

## 2023-01-01 RX ADMIN — POTASSIUM CHLORIDE 40 MEQ: 750 CAPSULE, EXTENDED RELEASE ORAL at 11:29

## 2023-01-01 RX ADMIN — POTASSIUM CHLORIDE 40 MEQ: 750 CAPSULE, EXTENDED RELEASE ORAL at 21:02

## 2023-01-01 NOTE — PLAN OF CARE
Goal Outcome Evaluation:              Outcome Evaluation: BP running lower this shift after lasix IVPB. Potassium replaced this shift. Pt tolerating diet

## 2023-01-01 NOTE — PROGRESS NOTES
[facility] Medicine Services  INPATIENT PROGRESS NOTE    Length of Stay: 10  Date of Admission: 12/20/2022  Primary Care Physician: Shonna Ng APRN    Subjective   Chief Complaint: Shortness of breath  HPI:   Patient seen and examined.  1/1/2023.  Patient is doing much better.  More awake alert.  Pain is completely resolved.  Hiccups have improved with Thorazine.  We will continue current treatment and monitor.  The patient will have diet advanced today.  We will ask for nutritional consult and once patient is tolerating p.o. intake without any problems or concerns can be discharged home.  Patient will need home health on discharge.  Patient is in agreement with current plan.  Family is at bedside.  Questions are answered.  Vital signs are stable patient is afebrile patient has no new problems or complaints.  As of today, 01/01/23  Review of Systems   Constitutional: Positive for activity change and fatigue. Negative for chills and fever.        Abdominal pain resolved.  Hiccups resolved.  No new problems or concerns.  Shortness of breath is mainly with deconditioning.   HENT: Negative.    Eyes: Negative.    Respiratory: Positive for shortness of breath. Negative for chest tightness.    Cardiovascular: Negative.    Gastrointestinal: Positive for abdominal pain and nausea.        Abdominal pain appears to be better.  We will continue to monitor.   Endocrine: Negative.    Genitourinary: Negative.    Musculoskeletal: Negative.    Skin: Negative.  Negative for wound.   Neurological: Positive for weakness.   Hematological: Negative.    Psychiatric/Behavioral: Negative.         All pertinent negatives and positives are as above. All other systems have been reviewed and are negative unless otherwise stated.    Objective    Temp:  [97.3 °F (36.3 °C)-97.9 °F (36.6 °C)] 97.9 °F (36.6 °C)  Heart Rate:  [63-90] 86  Resp:  [18-20] 19  BP: ()/(50-82)  100/52    AM-PAC 6 Clicks Score (PT): 24 (01/01/23 0800)    As of today, 01/01/23  Physical Exam  Vitals and nursing note reviewed.   Constitutional:       Appearance: He is well-developed.      Comments: Less abdominal pain and discomfort.  Nontender nondistended.   HENT:      Head: Normocephalic and atraumatic.      Right Ear: External ear normal.      Left Ear: External ear normal.      Nose: Nose normal.      Mouth/Throat:      Mouth: Mucous membranes are dry.      Pharynx: Oropharynx is clear.   Eyes:      Conjunctiva/sclera: Conjunctivae normal.      Pupils: Pupils are equal, round, and reactive to light.   Cardiovascular:      Rate and Rhythm: Normal rate. Rhythm irregularly irregular.      Heart sounds: Normal heart sounds. No murmur heard.    No friction rub. No gallop.   Pulmonary:      Effort: Pulmonary effort is normal. No respiratory distress.      Breath sounds: Normal breath sounds. No wheezing or rales.      Comments: Decreased breath sounds at the bases otherwise clear  Chest:      Chest wall: No tenderness.   Abdominal:      General: Bowel sounds are normal. There is no distension.      Palpations: Abdomen is soft.      Tenderness: There is no abdominal tenderness.      Comments: Soft nontender nondistended normoactive bowel sounds   Musculoskeletal:         General: Normal range of motion.      Cervical back: Normal range of motion and neck supple.   Skin:     General: Skin is warm and dry.      Coloration: Skin is not jaundiced.   Neurological:      General: No focal deficit present.      Mental Status: He is alert and oriented to person, place, and time.   Psychiatric:         Mood and Affect: Mood normal.         Behavior: Behavior normal.           Results Review:  I have reviewed the labs, radiology results, and diagnostic studies.    Laboratory Data:   Results from last 7 days   Lab Units 01/01/23  0607 12/31/22  0608 12/30/22  0806 12/29/22  0622 12/28/22  0505 12/27/22  1836   SODIUM  mmol/L 128* 129* 126* 127*   < > 130*   POTASSIUM mmol/L 2.9* 3.9 3.6 3.5   < > 3.5   CHLORIDE mmol/L 87* 88* 85* 88*   < > 90*   CO2 mmol/L 28.0 25.0 24.0 22.0   < > 25.0   BUN mg/dL 86* 88* 83* 79*   < > 73*   CREATININE mg/dL 2.70* 3.21* 2.77* 2.29*   < > 2.39*   GLUCOSE mg/dL 95 120* 111* 127*   < > 143*   CALCIUM mg/dL 8.5* 9.1 9.0 9.0   < > 9.0   BILIRUBIN mg/dL  --   --  4.0* 3.5*  --  3.4*   ALK PHOS U/L  --   --  64 59  --  52   ALT (SGPT) U/L  --   --  443* 432*  --  422*   AST (SGOT) U/L  --   --  277* 240*  --  258*   ANION GAP mmol/L 13.0 16.0* 17.0* 17.0*   < > 15.0    < > = values in this interval not displayed.     Estimated Creatinine Clearance: 49 mL/min (A) (by C-G formula based on SCr of 2.7 mg/dL (H)).          Results from last 7 days   Lab Units 01/01/23  0607 12/31/22  0608 12/30/22  0640 12/29/22  0622 12/28/22  0505   WBC 10*3/mm3 3.60 4.49 3.88 4.23 4.57   HEMOGLOBIN g/dL 13.5 14.6 14.3 14.3 14.7   HEMATOCRIT % 40.8 43.5 42.7 43.3 45.8   PLATELETS 10*3/mm3 108* 122* 132* 119* 130*           Culture Data:   No results found for: BLOODCX  No results found for: URINECX  No results found for: RESPCX  No results found for: WOUNDCX  No results found for: STOOLCX  No components found for: BODYFLD    Radiology Data:   Imaging Results (Last 24 Hours)     ** No results found for the last 24 hours. **          I have reviewed the patient's current medications.     Assessment/Plan     Active Hospital Problems    Diagnosis    • **FRANCK (acute kidney injury) (HCC)    • Severe malnutrition (HCC)        Plan:    1.  Shortness of breath: May be slightly better.  CT scan for pulmonary embolism was nondiagnostic.  VQ scan low probability.  Currently stable improved.    2.  Volume depletion: Continue to hydrate gently.  Appreciate nephrology help.  Evelina stable.    3.  Chronic systolic congestive heart failure: Appears euvolemic..  Continue home medications.    Eliquis currently on hold in case of surgery.   Patient has been placed on Lovenox due to atrial fibrillation.  This is a chronic issue.  Rate is controlled currently.    4.  History of hyperglycemia: No diagnosis of diabetes.    Continue sliding scale    5.  Obstructive sleep apnea: Patient states its been over 2 years since he has had a CPAP.  He is scheduled to go  his CPAP on 12/29.    6.  Acute kidney injury: Renal function is unchanged from yesterday.  Superimposed upon chronic kidney disease.  Appreciate nephrology help.    7.  Possible cholecystitis:  Medical management for now.  No leukocytosis.  Continue Zosyn.  HIDA scan completed.  Clear liquid diet.  Eliquis discontinued 12/25.    Dr. Pierce assistance appreciated.  We will continue to monitor.  Patient does show slight improvement with the addition of Thorazine.  Possibly recommending EGD and colonoscopy if abdominal pain and nausea persist.  Placed on Bentyl improving.  Abdominal pain is less intense.  The patient is feeling much better.  We will advance diet as tolerated.  Anticipate discharge soon.  We will continue to monitor response to current treatment.    8.  DVT prophylaxis: SCDs.    CODE STATUS full code    Remained stable currently anticipate discharge soon.  Possibly 24 to 48 hours if he continues to progress has he has been so far.    The patient was evaluated during the global COVID-19 pandemic, and the diagnosis was suspected/considered upon their initial presentation.  Evaluation, treatment, and testing were consistent with current guidelines for patients who present with complaints or symptoms that may be related to COVID-19.    I confirmed that the patient's Advance Care Plan is present, code status is documented, or surrogate decision maker is listed in the patient's medical record.       Discharge Planning: I expect patient to be discharged to home in 3-4 days.    This document has been electronically signed by Moshe Aleman DO on January 1, 2023 13:16 CST

## 2023-01-01 NOTE — PROGRESS NOTES
"    [facility]  [address] [place] [address]. [id number]  T - [phone]  F - [phone]     Progress Note          PATIENT  DEMOGRAPHICS   PATIENT NAME: Matti Bravo                      PHYSICIAN: Karen Mcfarland MD  : 1970  MRN: [id number]   LOS: 10 days    Patient Care Team:  Shonna Ng APRN as PCP - General (Family Medicine)  Subjective   SUBJECTIVE   No new complaints        Objective   OBJECTIVE   Vital Signs  Temp:  [97.3 °F (36.3 °C)-97.9 °F (36.6 °C)] 97.9 °F (36.6 °C)  Heart Rate:  [63-90] 86  Resp:  [18-20] 19  BP: ()/(50-82) 100/52    Flowsheet Rows    Flowsheet Row First Filed Value   Admission Height 193 cm (76\") Documented at 2022 1700   Admission Weight 127 kg (280 lb) Documented at 2022 1700           I/O last 3 completed shifts:  In: -   Out:  [Urine:]    PHYSICAL EXAM    Physical Exam  Vitals and nursing note reviewed.   Constitutional:       Appearance: Normal appearance.   Cardiovascular:      Rate and Rhythm: Normal rate and regular rhythm.      Heart sounds: Normal heart sounds. No murmur heard.    No friction rub. No gallop.   Pulmonary:      Effort: No respiratory distress.      Breath sounds: Normal breath sounds. No stridor. No wheezing, rhonchi or rales.   Musculoskeletal:      Right lower leg: Edema present.      Left lower leg: Edema present.   Skin:     General: Skin is warm and dry.   Neurological:      Mental Status: He is alert.         RESULTS   Results Review:    Results from last 7 days   Lab Units 23  0607 22  0608 22  0806 22  0622 22  0505 22  1836   SODIUM mmol/L 128* 129* 126* 127*   < > 130*   POTASSIUM mmol/L 2.9* 3.9 3.6 3.5   < > 3.5   CHLORIDE mmol/L 87* 88* 85* 88*   < > 90*   CO2 mmol/L 28.0 25.0 24.0 22.0   < > 25.0   BUN mg/dL 86* 88* 83* 79*   < > 73*   CREATININE mg/dL 2.70* 3.21* 2.77* 2.29*   < > 2.39*   CALCIUM mg/dL 8.5* 9.1 9.0 9.0   < > 9.0   BILIRUBIN " mg/dL  --   --  4.0* 3.5*  --  3.4*   ALK PHOS U/L  --   --  64 59  --  52   ALT (SGPT) U/L  --   --  443* 432*  --  422*   AST (SGOT) U/L  --   --  277* 240*  --  258*   GLUCOSE mg/dL 95 120* 111* 127*   < > 143*    < > = values in this interval not displayed.       Estimated Creatinine Clearance: 49 mL/min (A) (by C-G formula based on SCr of 2.7 mg/dL (H)).                Results from last 7 days   Lab Units 01/01/23  0607 12/31/22  0608 12/30/22  0640 12/29/22  0622 12/28/22  0505   WBC 10*3/mm3 3.60 4.49 3.88 4.23 4.57   HEMOGLOBIN g/dL 13.5 14.6 14.3 14.3 14.7   PLATELETS 10*3/mm3 108* 122* 132* 119* 130*               Imaging Results (Last 24 Hours)     ** No results found for the last 24 hours. **           MEDICATIONS    atorvastatin, 10 mg, Oral, Daily  carvedilol, 6.25 mg, Oral, BID With Meals  enoxaparin, 1 mg/kg, Subcutaneous, Q12H  furosemide, 120 mg, Intravenous, Daily  losartan, 12.5 mg, Oral, Q24H  Morphine, 4 mg, Intravenous, Once  pantoprazole, 40 mg, Intravenous, Q AM  sodium chloride, 10 mL, Intravenous, Q12H           Assessment & Plan   ASSESSMENT / PLAN      FRANCK (acute kidney injury) (HCC)    Severe malnutrition (HCC)    1.  Acute Kidney Failure on CKD 3: Baseline creatinine 1.4-1.7 mg/dl  - Etiology of FRANCK likely contrast induced.    - Urinalysis showed trace protein and negative blood, 3-5 RBCs, 0-2 WBCs.  Urine sodium less than 20.  Urine protein creatinine ratio 235 mg.  - Creatinine is slightly worse at 2.77 mg/dL.    Keep fluid restriction of 1500 ml a day.   - Maintain hemodynamics.  Monitor I's and O's.  Avoid nephrotoxins like NSAIDs and IV contrast    Doing a diuretic holiday resatrat lasix 120 mg from 1/1/2023, cr is better at 2.7     2.  Shortness of breath:  - Underwent CT with contrast which was nondiagnostic, no marked fluid overload on exam. No pulmonary edema on xray. SOB etiology is unclear at present.      3.  Chronic systolic CHF /AICD/ non ischemic cardiomyopathy     4.   Prediabetes     5.  Hypertension:  - Blood pressure is borderline low. Decreased carvedilol to 6.25 mg twice day.      6.  KHANH:  - Supposed to be using CPAP at home     7. Acute cholecystitis:  - denies marked abdominal pain and also has some nausea and vomiting x 1. Denies acid reflux. Continue protonix.       Copied text in this note has been reviewed and is accurate as of 12/30/2022           This document has been electronically signed by Karen Mcfarland MD on January 1, 2023 13:25 CST

## 2023-01-02 LAB
ANION GAP SERPL CALCULATED.3IONS-SCNC: 12 MMOL/L (ref 5–15)
BASOPHILS # BLD AUTO: 0.01 10*3/MM3 (ref 0–0.2)
BASOPHILS NFR BLD AUTO: 0.3 % (ref 0–1.5)
BUN SERPL-MCNC: 84 MG/DL (ref 6–20)
BUN/CREAT SERPL: 28.8 (ref 7–25)
CALCIUM SPEC-SCNC: 8.4 MG/DL (ref 8.6–10.5)
CHLORIDE SERPL-SCNC: 86 MMOL/L (ref 98–107)
CO2 SERPL-SCNC: 26 MMOL/L (ref 22–29)
CREAT SERPL-MCNC: 2.92 MG/DL (ref 0.76–1.27)
DEPRECATED RDW RBC AUTO: 48 FL (ref 37–54)
EGFRCR SERPLBLD CKD-EPI 2021: 25.2 ML/MIN/1.73
EOSINOPHIL # BLD AUTO: 0.06 10*3/MM3 (ref 0–0.4)
EOSINOPHIL NFR BLD AUTO: 1.6 % (ref 0.3–6.2)
ERYTHROCYTE [DISTWIDTH] IN BLOOD BY AUTOMATED COUNT: 15.1 % (ref 12.3–15.4)
GLUCOSE SERPL-MCNC: 112 MG/DL (ref 65–99)
HCT VFR BLD AUTO: 41.4 % (ref 37.5–51)
HGB BLD-MCNC: 13.7 G/DL (ref 13–17.7)
IMM GRANULOCYTES # BLD AUTO: 0.02 10*3/MM3 (ref 0–0.05)
IMM GRANULOCYTES NFR BLD AUTO: 0.5 % (ref 0–0.5)
LYMPHOCYTES # BLD AUTO: 0.66 10*3/MM3 (ref 0.7–3.1)
LYMPHOCYTES NFR BLD AUTO: 18 % (ref 19.6–45.3)
MCH RBC QN AUTO: 28.7 PG (ref 26.6–33)
MCHC RBC AUTO-ENTMCNC: 33.1 G/DL (ref 31.5–35.7)
MCV RBC AUTO: 86.8 FL (ref 79–97)
MONOCYTES # BLD AUTO: 0.72 10*3/MM3 (ref 0.1–0.9)
MONOCYTES NFR BLD AUTO: 19.7 % (ref 5–12)
NEUTROPHILS NFR BLD AUTO: 2.19 10*3/MM3 (ref 1.7–7)
NEUTROPHILS NFR BLD AUTO: 59.9 % (ref 42.7–76)
NRBC BLD AUTO-RTO: 1.6 /100 WBC (ref 0–0.2)
PLATELET # BLD AUTO: 108 10*3/MM3 (ref 140–450)
PMV BLD AUTO: 13.4 FL (ref 6–12)
POTASSIUM SERPL-SCNC: 3.7 MMOL/L (ref 3.5–5.2)
POTASSIUM SERPL-SCNC: 3.8 MMOL/L (ref 3.5–5.2)
RBC # BLD AUTO: 4.77 10*6/MM3 (ref 4.14–5.8)
SODIUM SERPL-SCNC: 124 MMOL/L (ref 136–145)
WBC NRBC COR # BLD: 3.66 10*3/MM3 (ref 3.4–10.8)

## 2023-01-02 PROCEDURE — 25010000002 ENOXAPARIN PER 10 MG: Performed by: HOSPITALIST

## 2023-01-02 PROCEDURE — 85025 COMPLETE CBC W/AUTO DIFF WBC: CPT | Performed by: HOSPITALIST

## 2023-01-02 PROCEDURE — 80048 BASIC METABOLIC PNL TOTAL CA: CPT | Performed by: HOSPITALIST

## 2023-01-02 PROCEDURE — 25010000002 FUROSEMIDE PER 20 MG: Performed by: INTERNAL MEDICINE

## 2023-01-02 PROCEDURE — 84132 ASSAY OF SERUM POTASSIUM: CPT | Performed by: HOSPITALIST

## 2023-01-02 RX ADMIN — Medication 10 ML: at 09:47

## 2023-01-02 RX ADMIN — Medication 10 ML: at 20:32

## 2023-01-02 RX ADMIN — PANTOPRAZOLE SODIUM 40 MG: 40 INJECTION, POWDER, FOR SOLUTION INTRAVENOUS at 04:33

## 2023-01-02 RX ADMIN — CARVEDILOL 6.25 MG: 6.25 TABLET, FILM COATED ORAL at 09:45

## 2023-01-02 RX ADMIN — ATORVASTATIN CALCIUM 10 MG: 10 TABLET, FILM COATED ORAL at 20:26

## 2023-01-02 RX ADMIN — ACETAMINOPHEN 1000 MG: 500 TABLET, FILM COATED ORAL at 12:07

## 2023-01-02 RX ADMIN — FUROSEMIDE 120 MG: 10 INJECTION, SOLUTION INTRAMUSCULAR; INTRAVENOUS at 09:47

## 2023-01-02 RX ADMIN — ENOXAPARIN SODIUM 130 MG: 150 INJECTION SUBCUTANEOUS at 09:45

## 2023-01-02 RX ADMIN — ENOXAPARIN SODIUM 130 MG: 150 INJECTION SUBCUTANEOUS at 20:26

## 2023-01-02 RX ADMIN — CARVEDILOL 6.25 MG: 6.25 TABLET, FILM COATED ORAL at 18:25

## 2023-01-02 NOTE — PROGRESS NOTES
"Adult Nutrition  Assessment/PES    Patient Name:  Matti Bravo  YOB: 1970  MRN: [id number]  Admit Date:  12/20/2022    Assessment Date:  1/2/2023    Comments:  Pt has been upgraded to a Regular diet.  Improving tolerance with less Gi distress--25 supper 1/1 and 50% breakfast this am.  He still reports that his appetite is depressed.  Discussed with him the importance of increasing his oral intake with his prolonged undernutrition--NPO/Clear liquid diet.  Labs reviewed and noted--hyponatremia with na+ of 124.  ARF/CKD wioth Bun 84 and Creat 2.92--etiology of FRANCK most likely due to contrast.  He is still on diuresis due to Chronic CHF/AICD; Nonischemic Cardiomyopathy.  PreDiabetes dx--glucose in good control at this time.   Medical Management for acute cholecystitis--Gi following.  Pt is on Zosyn,Thorazine; and Bentyl.  Imrpoving Gi distress at this time.    No new LFT labs.  RD will add milk at breakfast and ice cram at lunch and supper in an effort to optimize his oral intake.  Bilateral Lower ext edema--I fear in part this is due to malnutrition with inadequate protein intake.      RD discussed the importance of dietary compliance to a low sodium diet.  Also discussed that after recovery limiting concentrated sweets due to ? Dx of Prediabetes.  He voiced understanding.  Diet copies and contact number provided.    Will monitor POC and po intake.      Reason for Assessment     Row Name 01/02/23 1416          Reason for Assessment    Reason For Assessment follow-up protocol                Nutrition/Diet History     Row Name 01/02/23 1416          Nutrition/Diet History    Typical Intake (Food/Fluid/EN/PN) Pt said that he ate some of his lunch.  He ate more at breakfast this morning.  LAst night after he ate his first solid food his stomach \"killed Me\"  No N/V at this time and his stomach feels bettr.  He would like some ice cream--btu not the fat free.                " Labs/Tests/Procedures/Meds     Row Name 01/02/23 1419          Labs/Procedures/Meds    Lab Results Reviewed reviewed, pertinent        Diagnostic Tests/Procedures    Diagnostic Test/Procedure Reviewed reviewed, pertinent     Diagnostic Test/Procedures Comments GI consult; Nephrology;        Medications    Pertinent Medications Reviewed reviewed, pertinent     Pertinent Medications Comments Coreg; Lasix; Thorazine; Bentyl; Zosyn;                Physical Findings     Row Name 01/02/23 1419          Physical Findings    Overall Physical Appearance + Edema bileaterl lower ext.                  Nutrition Prescription Ordered     Row Name 01/02/23 1420          Nutrition Prescription PO    Current PO Diet Regular     Fluid Consistency Thin     Common Modifiers GI Soft/[place];Other (comment)  Low fiber                Evaluation of Received Nutrient/Fluid Intake     Row Name 01/02/23 1421          PO Evaluation    Number of Days PO Intake Evaluated Insufficient Data     Number of Meals 2     % PO Intake 25 at supper last night; 50% this am (Po diet Adv last pm)                   Problem/Interventions:   Problem 1     Row Name 01/02/23 1422          Nutrition Diagnoses Problem 1    Problem 1 Altered Nutrition Related to Labs     Etiology (related to) Medical Diagnosis;Medication Interaction     Metabolic/ICU Hyponatremia;Elevated LFTs;Hyperbilirubinemia     Renal FRANCK     Food/Medication Interaction Diuretic     Signs/Symptoms (evidenced by) Biochemical     Specific Labs Noted BUN;Creatinine;Total bilirubin;ALT                  Problem 3     Row Name 01/02/23 1422          Nutrition Diagnoses Problem 3    Problem 3 Inadequate Intake/Infusion     Inadequate Intake Type Oral     Macronutrient Kcal;Protein     Micronutrient Vitamin;Mineral     Etiology (related to) Factors Affecting Nutrition     Appetite Improved     Reported GI Symptoms Abdominal Pain     Signs/Symptoms (evidenced by) PO Intake     Number of days NPO/Clears  Greater than 7     Percent (%) intake recorded --  25/50     Over number of meals 2                Problem 4     Row Name 01/02/23 1423          Nutrition Diagnoses Problem 4    Problem 4 Malnutrition  Acute     Etiology (related to) Factors Affecting Nutrition     Reported GI Symptoms Abdominal Pain;N & V     Other Inadequate energy intake since hospital admit--12/20/2022 along with 2+Edema     Signs/Symptoms (evidenced by) Clear Liquid Diet;NPO     Number of days NPO/Clears Greater than 7     Other Comment 2+ Edema                Intervention Goal     Row Name 01/02/23 1423          Intervention Goal    General Reduce/improve symptoms;Meet nutritional needs for age/condition     PO Tolerate PO;Increase intake;Meet estimated needs     Weight No significant weight loss  fluid loss appropriate                Nutrition Intervention     Row Name 01/02/23 1434          Nutrition Intervention    RD/Tech Action Follow Tx progress;Care plan reviewd;Encourage intake;Recommend/ordered;Advise alternate selection;Advise available snack;Interview for preference;Menu provided     Recommended/Ordered Supplement                Nutrition Prescription     Row Name 01/02/23 1434          Nutrition Prescription PO    PO Prescription Begin/change supplement     Supplement Milk;Ice Cream     Supplement Frequency Daily;2 times a day     New PO Prescription Ordered? Yes                Education/Evaluation     Row Name 01/02/23 1435          Education    Education Education topics;Advised regarding habits/behavior  Healthy EAting: Diabetes-limiting concentrated sweets     Education Topics Basic nutrition;Protein;CHF;Na+;Other (comment)     Advised Regarding Habits/Behavior Increased nutrient density;Snacks;Meal planning;Seasoning food;Food choices;Appropriate beverage;Appropriate portions;Eating pattern        Monitor/Evaluation    Monitor Per protocol;I&O;PO intake;Supplement intake;Pertinent labs;Weight;Skin status;Symptoms     Education  Follow-up Reinforce PRN                 Electronically signed by:  Maria Guadalupe Hernández RD  01/02/23 14:41 CST

## 2023-01-02 NOTE — PROGRESS NOTES
"    [facility]  [address] [place] [address]. [address]  T - [phone]  F - [phone]     Progress Note          PATIENT  DEMOGRAPHICS   PATIENT NAME: Matti Bravo                      PHYSICIAN: Karen Mcfarland MD  : 1970  MRN: [id number]   LOS: 11 days    Patient Care Team:  Shonna Ng APRN as PCP - General (Family Medicine)  Subjective   SUBJECTIVE   No new complaints        Objective   OBJECTIVE   Vital Signs  Temp:  [96.5 °F (35.8 °C)-97.2 °F (36.2 °C)] 96.5 °F (35.8 °C)  Heart Rate:  [76-91] 84  Resp:  [18-20] 20  BP: ()/(50-80) 130/80    Flowsheet Rows    Flowsheet Row First Filed Value   Admission Height 193 cm (76\") Documented at 2022 1700   Admission Weight 127 kg (280 lb) Documented at 2022 1700           I/O last 3 completed shifts:  In: 240 [P.O.:240]  Out: 1235 [Urine:1235]    PHYSICAL EXAM    Physical Exam  Vitals and nursing note reviewed.   Constitutional:       Appearance: Normal appearance.   Cardiovascular:      Rate and Rhythm: Normal rate and regular rhythm.      Heart sounds: Normal heart sounds. No murmur heard.    No friction rub. No gallop.   Pulmonary:      Effort: No respiratory distress.      Breath sounds: Normal breath sounds. No stridor. No wheezing, rhonchi or rales.   Musculoskeletal:      Right lower leg: Edema present.      Left lower leg: Edema present.   Skin:     General: Skin is warm and dry.   Neurological:      Mental Status: He is alert.         RESULTS   Results Review:    Results from last 7 days   Lab Units 23  0633 23  0058 23  0607 22  0608 22  0806 22  0622 22  0505 22  1836   SODIUM mmol/L 124*  --  128* 129* 126* 127*   < > 130*   POTASSIUM mmol/L 3.7 3.8 2.9* 3.9 3.6 3.5   < > 3.5   CHLORIDE mmol/L 86*  --  87* 88* 85* 88*   < > 90*   CO2 mmol/L 26.0  --  28.0 25.0 24.0 22.0   < > 25.0   BUN mg/dL 84*  --  86* 88* 83* 79*   < > 73*   CREATININE mg/dL " 2.92*  --  2.70* 3.21* 2.77* 2.29*   < > 2.39*   CALCIUM mg/dL 8.4*  --  8.5* 9.1 9.0 9.0   < > 9.0   BILIRUBIN mg/dL  --   --   --   --  4.0* 3.5*  --  3.4*   ALK PHOS U/L  --   --   --   --  64 59  --  52   ALT (SGPT) U/L  --   --   --   --  443* 432*  --  422*   AST (SGOT) U/L  --   --   --   --  277* 240*  --  258*   GLUCOSE mg/dL 112*  --  95 120* 111* 127*   < > 143*    < > = values in this interval not displayed.       Estimated Creatinine Clearance: 45.3 mL/min (A) (by C-G formula based on SCr of 2.92 mg/dL (H)).                Results from last 7 days   Lab Units 01/02/23  0633 01/01/23  0607 12/31/22  0608 12/30/22  0640 12/29/22  0622   WBC 10*3/mm3 3.66 3.60 4.49 3.88 4.23   HEMOGLOBIN g/dL 13.7 13.5 14.6 14.3 14.3   PLATELETS 10*3/mm3 108* 108* 122* 132* 119*               Imaging Results (Last 24 Hours)     ** No results found for the last 24 hours. **           MEDICATIONS    atorvastatin, 10 mg, Oral, Daily  carvedilol, 6.25 mg, Oral, BID With Meals  enoxaparin, 1 mg/kg, Subcutaneous, Q12H  furosemide, 120 mg, Intravenous, Daily  Morphine, 4 mg, Intravenous, Once  pantoprazole, 40 mg, Intravenous, Q AM  sodium chloride, 10 mL, Intravenous, Q12H           Assessment & Plan   ASSESSMENT / PLAN      FRANCK (acute kidney injury) (HCC)    Severe malnutrition (HCC)    1.  Acute Kidney Failure on CKD 3: Baseline creatinine 1.4-1.7 mg/dl  - Etiology of FRANCK likely contrast induced.    - Urinalysis showed trace protein and negative blood, 3-5 RBCs, 0-2 WBCs.  Urine sodium less than 20.  Urine protein creatinine ratio 235 mg.  - Creatinine is slightly worse at 2.92 mg/dL.    Keep fluid restriction of 1500 ml a day.   - Maintain hemodynamics.  Monitor I's and O's.  Avoid nephrotoxins like NSAIDs and IV contrast    Doing a diuretic holiday resatrat lasix 120 mg from 1/1/2023, cr is better at 2.7     2.  Shortness of breath:  - Underwent CT with contrast which was nondiagnostic, no marked fluid overload on exam. No  pulmonary edema on xray. SOB etiology is unclear at present.      3.  Chronic systolic CHF /AICD/ non ischemic cardiomyopathy     4.  Prediabetes     5.  Hypertension:  - Blood pressure is borderline low. Decreased carvedilol to 6.25 mg twice day.      6.  KHANH:  - Supposed to be using CPAP at home     7. Acute cholecystitis:  - denies marked abdominal pain and also has some nausea and vomiting x 1. Denies acid reflux. Continue protonix.       Copied text in this note has been reviewed and is accurate as of 1/1/2023           This document has been electronically signed by Karen Mcfarland MD on January 2, 2023 14:38 CST

## 2023-01-02 NOTE — PROGRESS NOTES
[facility] Medicine Services  INPATIENT PROGRESS NOTE    Length of Stay: 11  Date of Admission: 12/20/2022  Primary Care Physician: Shonna Ng APRN    Subjective   Chief Complaint: No new complaints.    HPI: Patient is seen for follow-up today 1/2/2023.  He is doing better, less deconditioned, tolerating his diet and voices no new complaints.  He has good urinary output and swelling both legs persist.    Review of Systems   Constitutional: Positive for activity change and fatigue. Negative for appetite change, chills, diaphoresis and fever.   HENT: Negative for trouble swallowing and voice change.    Eyes: Negative for photophobia and visual disturbance.   Respiratory: Negative for cough, choking, chest tightness, shortness of breath, wheezing and stridor.    Cardiovascular: Positive for leg swelling. Negative for chest pain and palpitations.   Gastrointestinal: Negative for abdominal distention, abdominal pain, blood in stool, constipation, diarrhea, nausea and vomiting.   Endocrine: Negative for cold intolerance, heat intolerance, polydipsia, polyphagia and polyuria.   Genitourinary: Negative for decreased urine volume, difficulty urinating, dysuria, enuresis, flank pain, frequency, hematuria and urgency.   Musculoskeletal: Negative for arthralgias, gait problem, myalgias, neck pain and neck stiffness.   Skin: Negative for pallor, rash and wound.   Neurological: Negative for dizziness, tremors, seizures, syncope, facial asymmetry, speech difficulty, weakness, light-headedness, numbness and headaches.   Hematological: Does not bruise/bleed easily.   Psychiatric/Behavioral: Negative for agitation, behavioral problems and confusion.       Objective    Temp:  [96.5 °F (35.8 °C)-97.2 °F (36.2 °C)] 96.5 °F (35.8 °C)  Heart Rate:  [76-91] 84  Resp:  [18-20] 20  BP: ()/(50-80) 130/80    AM-PAC 6 Clicks Score (PT): 24 (01/02/23 1020)    Physical Exam  Vitals and  nursing note reviewed.   Constitutional:       General: He is not in acute distress.     Appearance: He is well-developed. He is obese. He is ill-appearing. He is not diaphoretic.   HENT:      Head: Normocephalic and atraumatic.   Eyes:      General: No scleral icterus.        Right eye: No discharge.         Left eye: No discharge.      Extraocular Movements: Extraocular movements intact.      Pupils: Pupils are equal, round, and reactive to light.   Neck:      Thyroid: No thyromegaly.      Vascular: No carotid bruit or JVD.   Cardiovascular:      Rate and Rhythm: Normal rate. Rhythm irregular.      Heart sounds: Normal heart sounds. No murmur heard.    No friction rub. No gallop.   Pulmonary:      Effort: Pulmonary effort is normal. No respiratory distress.      Breath sounds: Normal breath sounds. No stridor. No wheezing or rales.   Chest:      Chest wall: No tenderness.   Abdominal:      General: Bowel sounds are normal. There is no distension.      Palpations: Abdomen is soft. There is no mass.      Tenderness: There is no abdominal tenderness. There is no right CVA tenderness, left CVA tenderness, guarding or rebound.      Hernia: No hernia is present.   Musculoskeletal:         General: No swelling, tenderness or deformity.      Cervical back: Normal range of motion and neck supple.      Right lower leg: Edema present.      Left lower leg: Edema present.   Skin:     General: Skin is warm and dry.      Coloration: Skin is not jaundiced or pale.      Findings: No bruising, erythema, lesion or rash.   Neurological:      General: No focal deficit present.      Mental Status: He is alert and oriented to person, place, and time.      Cranial Nerves: No cranial nerve deficit.      Sensory: No sensory deficit.      Motor: No weakness or abnormal muscle tone.      Coordination: Coordination normal.      Gait: Gait normal.      Deep Tendon Reflexes: Reflexes normal.   Psychiatric:         Mood and Affect: Mood normal.          Behavior: Behavior normal.         Thought Content: Thought content normal.         Judgment: Judgment normal.           Medication Review:    Current Facility-Administered Medications:   •  acetaminophen (TYLENOL) tablet 1,000 mg, 1,000 mg, Oral, Q6H PRN, Sdueep Wolf MD, 1,000 mg at 01/02/23 1207  •  albuterol (PROVENTIL) nebulizer solution 0.083% 2.5 mg/3mL, 2.5 mg, Nebulization, Q4H PRN, Will French MD  •  atorvastatin (LIPITOR) tablet 10 mg, 10 mg, Oral, Daily, Will French MD, 10 mg at 12/31/22 0912  •  calcium carbonate (TUMS) chewable tablet 500 mg (200 mg elemental), 2 tablet, Oral, TID PRN, Will French MD, 2 tablet at 12/24/22 1444  •  carvedilol (COREG) tablet 6.25 mg, 6.25 mg, Oral, BID With Meals, Efrain Mas MD, 6.25 mg at 01/02/23 0945  •  droperidol (INAPSINE) injection 1.25 mg, 1.25 mg, Intravenous, Q6H PRN, Sudeep Wolf MD, 1.25 mg at 12/22/22 1302  •  Enoxaparin Sodium (LOVENOX) syringe 130 mg, 1 mg/kg, Subcutaneous, Q12H, Moshe Aleman DO, 130 mg at 01/02/23 0945  •  furosemide (LASIX) 120 mg in sodium chloride 0.9 % 50 mL IVPB, 120 mg, Intravenous, Daily, Karen Mcfarland MD, 120 mg at 01/02/23 0947  •  morphine injection 4 mg, 4 mg, Intravenous, Once, Will French MD  •  ondansetron (ZOFRAN) injection 4 mg, 4 mg, Intravenous, Q6H PRN, Sudeep Wolf MD, 4 mg at 01/01/23 0830  •  pantoprazole (PROTONIX) injection 40 mg, 40 mg, Intravenous, Q AM, Trevor Alonso MD, 40 mg at 01/02/23 0433  •  potassium chloride (MICRO-K) CR capsule 40 mEq, 40 mEq, Oral, PRN, 40 mEq at 01/01/23 2102 **OR** potassium chloride (KLOR-CON) packet 40 mEq, 40 mEq, Oral, PRN **OR** potassium chloride 10 mEq in 100 mL IVPB, 10 mEq, Intravenous, Q1H PRN, Moshe Aleman DO  •  simethicone (MYLICON) chewable tablet 80 mg, 80 mg, Oral, 4x Daily PRN, Delonte Caputo MD, 80 mg at 12/25/22 0025  •  sodium chloride 0.9 % flush 10 mL, 10 mL,  Intravenous, Q12H, Will French MD, 10 mL at 01/02/23 0947  •  sodium chloride 0.9 % flush 10 mL, 10 mL, Intravenous, PRN, Will French MD, 10 mL at 12/30/22 0145  •  sodium chloride 0.9 % infusion 40 mL, 40 mL, Intravenous, Manolo JEAN Kevin L, MD    Results Review:  I have reviewed the labs, radiology results, and diagnostic studies.    Laboratory Data:   Results from last 7 days   Lab Units 01/02/23  0633 01/02/23  0058 01/01/23  0607 12/31/22  0608 12/30/22  0806 12/29/22  0622 12/28/22  0505 12/27/22  1836   SODIUM mmol/L 124*  --  128* 129* 126* 127*   < > 130*   POTASSIUM mmol/L 3.7 3.8 2.9* 3.9 3.6 3.5   < > 3.5   CHLORIDE mmol/L 86*  --  87* 88* 85* 88*   < > 90*   CO2 mmol/L 26.0  --  28.0 25.0 24.0 22.0   < > 25.0   BUN mg/dL 84*  --  86* 88* 83* 79*   < > 73*   CREATININE mg/dL 2.92*  --  2.70* 3.21* 2.77* 2.29*   < > 2.39*   GLUCOSE mg/dL 112*  --  95 120* 111* 127*   < > 143*   CALCIUM mg/dL 8.4*  --  8.5* 9.1 9.0 9.0   < > 9.0   BILIRUBIN mg/dL  --   --   --   --  4.0* 3.5*  --  3.4*   ALK PHOS U/L  --   --   --   --  64 59  --  52   ALT (SGPT) U/L  --   --   --   --  443* 432*  --  422*   AST (SGOT) U/L  --   --   --   --  277* 240*  --  258*   ANION GAP mmol/L 12.0  --  13.0 16.0* 17.0* 17.0*   < > 15.0    < > = values in this interval not displayed.     Estimated Creatinine Clearance: 45.3 mL/min (A) (by C-G formula based on SCr of 2.92 mg/dL (H)).          Results from last 7 days   Lab Units 01/02/23  0633 01/01/23  0607 12/31/22  0608 12/30/22  0640 12/29/22  0622   WBC 10*3/mm3 3.66 3.60 4.49 3.88 4.23   HEMOGLOBIN g/dL 13.7 13.5 14.6 14.3 14.3   HEMATOCRIT % 41.4 40.8 43.5 42.7 43.3   PLATELETS 10*3/mm3 108* 108* 122* 132* 119*           Culture Data:   No results found for: BLOODCX  No results found for: URINECX  No results found for: RESPCX  No results found for: WOUNDCX  No results found for: STOOLCX  No components found for: BODYFLD    Radiology Data:   Imaging Results (Last  24 Hours)     ** No results found for the last 24 hours. **          I have reviewed the patient's current medications.     Assessment/Plan     Hospital Problem List:  Principal Problem:    FRANCK (acute kidney injury) (HCC)  Active Problems:    Severe malnutrition (HCC)    Acute on chronic kidney disease stage III: Patient's baseline is reportedly between 1.4-1.7.  Creatinine has increased to 2.92 today.  Continue continue to monitor renal function and avoid nephrotoxins. Input by nephrologist is appreciated.    History of nonischemic cardiomyopathy/chronic systolic heart failure status post AICD placement: Patient is clinically euvolemic and not in heart failure.  Chest x-ray done on admission did not show evidence of pulmonary vascular congestion.  Continue diuretics with strict I's and O's, daily weights, salt and fluid restriction.  Consult cardiologist if the need arises.  Echocardiogram done 8/19/2022 showed an estimated ejection fraction of 15 to 20%.    Hyponatremia: Restrict free water and monitor BMP.    Chronic atrial fibrillation: Patient is in controlled ventricular response.  Continue Coreg and anticoagulation with Lovenox.  Transition back to [facility] on discharge.    Hypokalemia: Resolved.    Possible acute cholecystitis: Patient is less symptomatic and tolerating recommended diet.  HIDA scan was unremarkable.    Obstructive sleep apnea: Continue nightly CPAP.    Deconditioning: Continue PT and OT.    Continue GI and DVT prophylaxis.      Discharge Planning: In progress.    I confirmed that the patient's Advance Care Plan is present, code status is documented, or surrogate decision maker is listed in the patient's medical record.     I have utilized all available immediate resources to obtain, update, or review the patient's current medications      Ayaan Cuba MD   01/02/23   14:10 CST

## 2023-01-02 NOTE — PLAN OF CARE
Goal Outcome Evaluation:            Patient is concerned about the fluid he is carrying in his legs and hips and that the current diuretic is not working.

## 2023-01-02 NOTE — PLAN OF CARE
Goal Outcome Evaluation:  Plan of Care Reviewed With: caregiver, patient        Progress: improving  Outcome Evaluation: Nutrition F/U:  pt has been upgraded to regular diet.  Improving tolerance with less Gi distress.  Will add milk @ breakfast and ice cream at lunch and supper in an effort to provide needed protein.  Significantl Edema on bilateral lower ext.  Will monitor

## 2023-01-03 ENCOUNTER — APPOINTMENT (OUTPATIENT)
Dept: ULTRASOUND IMAGING | Facility: HOSPITAL | Age: 53
DRG: 682 | End: 2023-01-03
Payer: MEDICAID

## 2023-01-03 ENCOUNTER — APPOINTMENT (OUTPATIENT)
Dept: INTERVENTIONAL RADIOLOGY/VASCULAR | Facility: HOSPITAL | Age: 53
DRG: 682 | End: 2023-01-03
Payer: MEDICAID

## 2023-01-03 ENCOUNTER — APPOINTMENT (OUTPATIENT)
Dept: GENERAL RADIOLOGY | Facility: HOSPITAL | Age: 53
DRG: 682 | End: 2023-01-03
Payer: MEDICAID

## 2023-01-03 LAB
ANION GAP SERPL CALCULATED.3IONS-SCNC: 13 MMOL/L (ref 5–15)
ANISOCYTOSIS BLD QL: ABNORMAL
BASOPHILS # BLD AUTO: 0.01 10*3/MM3 (ref 0–0.2)
BASOPHILS # BLD MANUAL: 0.03 10*3/MM3 (ref 0–0.2)
BASOPHILS NFR BLD AUTO: 0.4 % (ref 0–1.5)
BASOPHILS NFR BLD MANUAL: 1 % (ref 0–1.5)
BUN SERPL-MCNC: 86 MG/DL (ref 6–20)
BUN/CREAT SERPL: 30.3 (ref 7–25)
CALCIUM SPEC-SCNC: 8.8 MG/DL (ref 8.6–10.5)
CHLORIDE SERPL-SCNC: 86 MMOL/L (ref 98–107)
CO2 SERPL-SCNC: 26 MMOL/L (ref 22–29)
CREAT SERPL-MCNC: 2.84 MG/DL (ref 0.76–1.27)
DEPRECATED RDW RBC AUTO: 49 FL (ref 37–54)
EGFRCR SERPLBLD CKD-EPI 2021: 26 ML/MIN/1.73
EOSINOPHIL # BLD AUTO: 0.02 10*3/MM3 (ref 0–0.4)
EOSINOPHIL NFR BLD AUTO: 0.8 % (ref 0.3–6.2)
ERYTHROCYTE [DISTWIDTH] IN BLOOD BY AUTOMATED COUNT: 15.6 % (ref 12.3–15.4)
GLUCOSE SERPL-MCNC: 120 MG/DL (ref 65–99)
HCT VFR BLD AUTO: 41.7 % (ref 37.5–51)
HGB BLD-MCNC: 13.9 G/DL (ref 13–17.7)
IMM GRANULOCYTES # BLD AUTO: 0.01 10*3/MM3 (ref 0–0.05)
IMM GRANULOCYTES NFR BLD AUTO: 0.4 % (ref 0–0.5)
LYMPHOCYTES # BLD AUTO: 0.71 10*3/MM3 (ref 0.7–3.1)
LYMPHOCYTES # BLD MANUAL: 0.73 10*3/MM3 (ref 0.7–3.1)
LYMPHOCYTES NFR BLD AUTO: 27.4 % (ref 19.6–45.3)
LYMPHOCYTES NFR BLD MANUAL: 20 % (ref 5–12)
MCH RBC QN AUTO: 28.7 PG (ref 26.6–33)
MCHC RBC AUTO-ENTMCNC: 33.3 G/DL (ref 31.5–35.7)
MCV RBC AUTO: 86.2 FL (ref 79–97)
MONOCYTES # BLD AUTO: 0.53 10*3/MM3 (ref 0.1–0.9)
MONOCYTES # BLD: 0.52 10*3/MM3 (ref 0.1–0.9)
MONOCYTES NFR BLD AUTO: 20.5 % (ref 5–12)
NEUTROPHILS # BLD AUTO: 1.32 10*3/MM3 (ref 1.7–7)
NEUTROPHILS NFR BLD AUTO: 1.31 10*3/MM3 (ref 1.7–7)
NEUTROPHILS NFR BLD AUTO: 50.5 % (ref 42.7–76)
NEUTROPHILS NFR BLD MANUAL: 51 % (ref 42.7–76)
NRBC BLD AUTO-RTO: 1.9 /100 WBC (ref 0–0.2)
OVALOCYTES BLD QL SMEAR: ABNORMAL
PLATELET # BLD AUTO: 102 10*3/MM3 (ref 140–450)
PMV BLD AUTO: ABNORMAL FL
POTASSIUM SERPL-SCNC: 4.3 MMOL/L (ref 3.5–5.2)
RBC # BLD AUTO: 4.84 10*6/MM3 (ref 4.14–5.8)
SMALL PLATELETS BLD QL SMEAR: ADEQUATE
SODIUM SERPL-SCNC: 125 MMOL/L (ref 136–145)
TARGETS BLD QL SMEAR: ABNORMAL
VARIANT LYMPHS NFR BLD MANUAL: 1 % (ref 0–5)
VARIANT LYMPHS NFR BLD MANUAL: 27 % (ref 19.6–45.3)
WBC MORPH BLD: NORMAL
WBC NRBC COR # BLD: 2.59 10*3/MM3 (ref 3.4–10.8)

## 2023-01-03 PROCEDURE — 76937 US GUIDE VASCULAR ACCESS: CPT

## 2023-01-03 PROCEDURE — 25010000002 ALBUMIN HUMAN 25% PER 50 ML: Performed by: INTERNAL MEDICINE

## 2023-01-03 PROCEDURE — 99232 SBSQ HOSP IP/OBS MODERATE 35: CPT | Performed by: INTERNAL MEDICINE

## 2023-01-03 PROCEDURE — 94760 N-INVAS EAR/PLS OXIMETRY 1: CPT

## 2023-01-03 PROCEDURE — 85025 COMPLETE CBC W/AUTO DIFF WBC: CPT | Performed by: HOSPITALIST

## 2023-01-03 PROCEDURE — 36410 VNPNXR 3YR/> PHY/QHP DX/THER: CPT

## 2023-01-03 PROCEDURE — 99222 1ST HOSP IP/OBS MODERATE 55: CPT | Performed by: INTERNAL MEDICINE

## 2023-01-03 PROCEDURE — P9047 ALBUMIN (HUMAN), 25%, 50ML: HCPCS | Performed by: INTERNAL MEDICINE

## 2023-01-03 PROCEDURE — 25010000002 ENOXAPARIN PER 10 MG: Performed by: HOSPITALIST

## 2023-01-03 PROCEDURE — 25010000002 DOPAMINE PER 40 MG: Performed by: NURSE PRACTITIONER

## 2023-01-03 PROCEDURE — 25010000002 PHENYLEPHRINE 10 MG/ML SOLUTION: Performed by: NURSE PRACTITIONER

## 2023-01-03 PROCEDURE — 25010000002 DOBUTAMINE PER 250 MG: Performed by: NURSE PRACTITIONER

## 2023-01-03 PROCEDURE — 80048 BASIC METABOLIC PNL TOTAL CA: CPT | Performed by: HOSPITALIST

## 2023-01-03 PROCEDURE — 71045 X-RAY EXAM CHEST 1 VIEW: CPT

## 2023-01-03 PROCEDURE — 94799 UNLISTED PULMONARY SVC/PX: CPT

## 2023-01-03 PROCEDURE — 85007 BL SMEAR W/DIFF WBC COUNT: CPT | Performed by: HOSPITALIST

## 2023-01-03 PROCEDURE — C1751 CATH, INF, PER/CENT/MIDLINE: HCPCS

## 2023-01-03 RX ORDER — SODIUM CHLORIDE 0.9 % (FLUSH) 0.9 %
10 SYRINGE (ML) INJECTION AS NEEDED
Status: CANCELLED | OUTPATIENT
Start: 2023-01-03

## 2023-01-03 RX ORDER — BUMETANIDE 0.25 MG/ML
1 INJECTION INTRAMUSCULAR; INTRAVENOUS ONCE
Status: COMPLETED | OUTPATIENT
Start: 2023-01-03 | End: 2023-01-03

## 2023-01-03 RX ORDER — ALBUMIN (HUMAN) 12.5 G/50ML
25 SOLUTION INTRAVENOUS 2 TIMES DAILY
Status: DISCONTINUED | OUTPATIENT
Start: 2023-01-03 | End: 2023-01-05

## 2023-01-03 RX ORDER — PANTOPRAZOLE SODIUM 40 MG/1
40 TABLET, DELAYED RELEASE ORAL
Status: DISCONTINUED | OUTPATIENT
Start: 2023-01-03 | End: 2023-01-14 | Stop reason: HOSPADM

## 2023-01-03 RX ORDER — SODIUM CHLORIDE 0.9 % (FLUSH) 0.9 %
10 SYRINGE (ML) INJECTION EVERY 12 HOURS SCHEDULED
Status: DISCONTINUED | OUTPATIENT
Start: 2023-01-03 | End: 2023-01-14 | Stop reason: HOSPADM

## 2023-01-03 RX ORDER — PHENYLEPHRINE HCL IN 0.9% NACL 0.5 MG/5ML
.5-3 SYRINGE (ML) INTRAVENOUS
Status: DISCONTINUED | OUTPATIENT
Start: 2023-01-03 | End: 2023-01-03

## 2023-01-03 RX ORDER — SODIUM CHLORIDE 0.9 % (FLUSH) 0.9 %
10 SYRINGE (ML) INJECTION EVERY 12 HOURS SCHEDULED
Status: CANCELLED | OUTPATIENT
Start: 2023-01-03

## 2023-01-03 RX ORDER — DOPAMINE HYDROCHLORIDE 160 MG/100ML
2.5 INJECTION, SOLUTION INTRAVENOUS CONTINUOUS
Status: DISCONTINUED | OUTPATIENT
Start: 2023-01-03 | End: 2023-01-05

## 2023-01-03 RX ORDER — SODIUM CHLORIDE 0.9 % (FLUSH) 0.9 %
10 SYRINGE (ML) INJECTION AS NEEDED
Status: DISCONTINUED | OUTPATIENT
Start: 2023-01-03 | End: 2023-01-14 | Stop reason: HOSPADM

## 2023-01-03 RX ORDER — SODIUM CHLORIDE 0.9 % (FLUSH) 0.9 %
20 SYRINGE (ML) INJECTION AS NEEDED
Status: CANCELLED | OUTPATIENT
Start: 2023-01-03

## 2023-01-03 RX ORDER — DOBUTAMINE HYDROCHLORIDE 100 MG/100ML
2.5 INJECTION INTRAVENOUS CONTINUOUS
Status: DISCONTINUED | OUTPATIENT
Start: 2023-01-03 | End: 2023-01-14 | Stop reason: HOSPADM

## 2023-01-03 RX ORDER — ALBUMIN (HUMAN) 12.5 G/50ML
25 SOLUTION INTRAVENOUS ONCE
Status: COMPLETED | OUTPATIENT
Start: 2023-01-03 | End: 2023-01-03

## 2023-01-03 RX ADMIN — BUMETANIDE 0.5 MG/HR: 0.25 INJECTION INTRAMUSCULAR; INTRAVENOUS at 12:04

## 2023-01-03 RX ADMIN — BUMETANIDE 1 MG: 0.25 INJECTION INTRAMUSCULAR; INTRAVENOUS at 11:29

## 2023-01-03 RX ADMIN — Medication 10 ML: at 21:00

## 2023-01-03 RX ADMIN — DOBUTAMINE HYDROCHLORIDE 2.5 MCG/KG/MIN: 100 INJECTION INTRAVENOUS at 16:34

## 2023-01-03 RX ADMIN — ALBUMIN (HUMAN) 25 G: 0.25 INJECTION, SOLUTION INTRAVENOUS at 17:31

## 2023-01-03 RX ADMIN — Medication 0.5 MCG/KG/MIN: at 12:32

## 2023-01-03 RX ADMIN — DOPAMINE HYDROCHLORIDE 2.5 MCG/KG/MIN: 160 INJECTION, SOLUTION INTRAVENOUS at 16:27

## 2023-01-03 RX ADMIN — ACETAMINOPHEN 1000 MG: 500 TABLET, FILM COATED ORAL at 20:37

## 2023-01-03 RX ADMIN — ATORVASTATIN CALCIUM 10 MG: 10 TABLET, FILM COATED ORAL at 20:37

## 2023-01-03 RX ADMIN — ENOXAPARIN SODIUM 130 MG: 150 INJECTION SUBCUTANEOUS at 08:51

## 2023-01-03 RX ADMIN — Medication 10 ML: at 08:51

## 2023-01-03 RX ADMIN — ENOXAPARIN SODIUM 130 MG: 150 INJECTION SUBCUTANEOUS at 20:37

## 2023-01-03 RX ADMIN — ALBUMIN HUMAN 25 G: 0.25 SOLUTION INTRAVENOUS at 09:36

## 2023-01-03 RX ADMIN — PANTOPRAZOLE SODIUM 40 MG: 40 INJECTION, POWDER, FOR SOLUTION INTRAVENOUS at 05:00

## 2023-01-03 NOTE — CONSULTS
"    [facility] [place] [facility] [place]    Referring Provider: Rory Portillo DO    Reason for Consultation: Hypotension    Patient Care Team:  Shonna Ng APRN as PCP - General (Family Medicine)    Chief complaint \"Edema, low blood pressure\"      Subjective .     History of present illness:     Mr. Matti Bravo is a 52-year-old male with medical history of nonischemic cardiomyopathy.  LVEF last noted to be 15 to 20%.  Also has history of diabetes, hypertension, obesity, hyperlipidemia, CKD, and venous insufficiency. Hx of AICD placed at [facility].  He was also recently evaluated at [facility] ER on 9/2/2022 for systolic heart failure, worsening shortness of breath, and worsening lower leg edema. He signed out AMA at that time.  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  Denied alcohol use. Hx of NICM with [facility] 12/8/2021 showed minimal plaquing, nonobstructive CAD. Echocardiogram at [facility] this on 8/4/2022 shows LVEF 15 to 20%, Moderate MVR, mild TVR, and PI.  Patient was recently discharged from the hospital after being treated for NSTEMI and heart failure.  Medications were adjusted.    He returned back to the hospital on 12-20-22 with shortness of breath, orthopnea, PND.  Patient was also noted to have abdominal pain.  CT scan for PE was nondiagnostic.  VQ scan showing low probability.  Patient was found to have FRANCK, volume depleted and given gentle hydration.  There was some concern for possible cholecystitis.  He was continued on Zosyn.  HIDA scan was completed.  General surgery was consulted and recommended conservative measures as EF is less than 20%.     Cardiology consult requested today due to hypotension.  At some point patient was changed to Lasix which is no longer helping with diuresis.  Bumex drip as recommended from nephrology.  As well patient appears to be in atrial fibrillation which patient does not have a prior documented history of. He is on Eliquis to prevent " LV thrombus given severe LV dysfunction.     The CVD Risk score (D'Agostino, et al., 2008) failed to calculate for the following reasons:    The patient has a prior MI, stroke, CHF, or peripheral vascular disease diagnosis      Review of Systems  Review of Systems   Constitutional: Negative for chills, fatigue, fever and unexpected weight change.   HENT: Negative for hearing loss, nosebleeds, tinnitus, trouble swallowing and voice change.    Eyes: Negative for visual disturbance.   Respiratory: Positive for shortness of breath. Negative for apnea, cough, chest tightness and wheezing.    Cardiovascular: Positive for leg swelling. Negative for chest pain and palpitations.   Gastrointestinal: Positive for abdominal distention and abdominal pain. Negative for blood in stool.   Endocrine: Negative for cold intolerance, heat intolerance, polydipsia, polyphagia and polyuria.   Genitourinary: Negative.    Musculoskeletal: Negative for arthralgias and back pain.   Skin: Negative.    Allergic/Immunologic: Negative.    Neurological: Negative for seizures, syncope, speech difficulty, numbness and headaches.   Hematological: Negative for adenopathy. Does not bruise/bleed easily.   Psychiatric/Behavioral: Negative for agitation, behavioral problems and suicidal ideas. The patient is not nervous/anxious.        History  Past Medical History:   Diagnosis Date   • Asthma    • Chronic systolic heart failure (HCC)    • Diabetes mellitus (HCC)    • Hyperlipidemia    • Hypertension    • Obesity    • Peripheral vascular disease (HCC)    • Sleep apnea    ,   Past Surgical History:   Procedure Laterality Date   • CARDIAC CATHETERIZATION N/A 12/08/2021   • CARDIAC DEFIBRILLATOR PLACEMENT     • VARICOSE VEIN SURGERY Right 04/05/2019   ,   Family History   Problem Relation Age of Onset   • Diabetes Mother    • Hypertension Father    ,   Social History     Tobacco Use   • Smoking status: Former   • Smokeless tobacco: Never   Vaping Use   •  "Vaping Use: Never used   Substance Use Topics   • Alcohol use: No   • Drug use: No   ,   Medications Prior to Admission   Medication Sig Dispense Refill Last Dose   • albuterol sulfate  (90 Base) MCG/ACT inhaler Inhale 2 puffs Every 4 (Four) Hours As Needed for Wheezing. 1 inhaler 3    • apixaban (ELIQUIS) 5 MG tablet tablet Take 1 tablet by mouth 2 (Two) Times a Day. 60 tablet 3    • bumetanide (BUMEX) 1 MG tablet Take 1 tablet by mouth 2 (Two) Times a Day. 60 tablet 2    • carvedilol (COREG) 12.5 MG tablet Take 1 tablet by mouth 2 (Two) Times a Day With Meals for 30 days. (Patient taking differently: Take 12.5 mg by mouth 2 (Two) Times a Day With Meals. Pt states he has some meds and is still taking this.) 60 tablet 3    • losartan (COZAAR) 25 MG tablet Take 0.5 tablets by mouth Daily for 30 days. 15 tablet 3    • lovastatin (ALTOPREV) 20 MG 24 hr tablet Take 20 mg by mouth.      • metOLazone (ZAROXOLYN) 2.5 MG tablet Take 1 tablet by mouth 3 (Three) Times a Week. 15 tablet 3    • potassium chloride 10 MEQ CR tablet Take 2 tablets by mouth Daily. 30 tablet 1    • vitamin D (ERGOCALCIFEROL) 1.25 MG (07789 UT) capsule capsule Take 50,000 Units by mouth 1 (One) Time Per Week.         ALLERGIES  Patient has no known allergies.    Objective     Vital Sign Min/Max for last 24 hours  Temp  Min: 95.9 °F (35.5 °C)  Max: 97.1 °F (36.2 °C)   BP  Min: 70/50  Max: 137/103   Pulse  Min: 72  Max: 91   Resp  Min: 16  Max: 22   SpO2  Min: 84 %  Max: 100 %   Flow (L/min)  Min: 2  Max: 2   Weight  Min: 140 kg (307 lb 15.7 oz)  Max: 140 kg (307 lb 15.7 oz)     Flowsheet Rows    Flowsheet Row First Filed Value   Admission Height 198.1 cm (78\") Documented at 12/04/2022 1958   Admission Weight 123 kg (271 lb 13.2 oz) Documented at 12/04/2022 1958             Physical Exam:  Physical Exam  Vitals and nursing note reviewed.   Constitutional:       General: He is not in acute distress.     Appearance: He is obese. He is " ill-appearing. He is not diaphoretic.   HENT:      Head: Normocephalic.      Right Ear: External ear normal.      Left Ear: External ear normal.   Eyes:      General: Lids are normal.      Pupils: Pupils are equal, round, and reactive to light.   Neck:      Thyroid: No thyromegaly.      Vascular: JVD present. No carotid bruit.      Trachea: No tracheal deviation.   Cardiovascular:      Rate and Rhythm: Normal rate. Rhythm irregularly irregular.      Heart sounds: Normal heart sounds. No murmur heard.    No friction rub. No gallop.   Pulmonary:      Effort: Pulmonary effort is normal. No respiratory distress.      Breath sounds: No stridor. Rales present. No wheezing.   Chest:      Chest wall: No tenderness.   Abdominal:      General: Bowel sounds are normal. There is no distension.      Palpations: Abdomen is soft.      Tenderness: There is no abdominal tenderness.   Musculoskeletal:      Right lower leg: 3+ Edema present.      Left lower leg: 3+ Edema present.   Skin:     General: Skin is warm and dry.      Capillary Refill: Capillary refill takes less than 2 seconds.      Findings: No erythema or rash.   Neurological:      Mental Status: He is alert and oriented to person, place, and time.      Cranial Nerves: No cranial nerve deficit.      Deep Tendon Reflexes: Reflexes are normal and symmetric. Reflexes normal.   Psychiatric:         Behavior: Behavior normal.         Thought Content: Thought content normal.         Judgment: Judgment normal.            Results Review:     Results from last 7 days   Lab Units 01/03/23  0622 01/02/23  0633 01/02/23  0058 01/01/23  0607 12/31/22  0608 12/30/22  0806 12/29/22  0622 12/28/22  0505 12/27/22  1836   SODIUM mmol/L 125* 124*  --  128*   < > 126* 127*   < > 130*   POTASSIUM mmol/L 4.3 3.7 3.8 2.9*   < > 3.6 3.5   < > 3.5   CHLORIDE mmol/L 86* 86*  --  87*   < > 85* 88*   < > 90*   CO2 mmol/L 26.0 26.0  --  28.0   < > 24.0 22.0   < > 25.0   BUN mg/dL 86* 84*  --  86*   <  > 83* 79*   < > 73*   CREATININE mg/dL 2.84* 2.92*  --  2.70*   < > 2.77* 2.29*   < > 2.39*   CALCIUM mg/dL 8.8 8.4*  --  8.5*   < > 9.0 9.0   < > 9.0   BILIRUBIN mg/dL  --   --   --   --   --  4.0* 3.5*  --  3.4*   ALK PHOS U/L  --   --   --   --   --  64 59  --  52   ALT (SGPT) U/L  --   --   --   --   --  443* 432*  --  422*   AST (SGOT) U/L  --   --   --   --   --  277* 240*  --  258*   GLUCOSE mg/dL 120* 112*  --  95   < > 111* 127*   < > 143*    < > = values in this interval not displayed.       Estimated Creatinine Clearance: 47 mL/min (A) (by C-G formula based on SCr of 2.84 mg/dL (H)).          Results from last 7 days   Lab Units 01/03/23  0622 01/02/23  0633 01/01/23  0607 12/31/22  0608 12/30/22  0640   WBC 10*3/mm3 2.59* 3.66 3.60 4.49 3.88   HEMOGLOBIN g/dL 13.9 13.7 13.5 14.6 14.3   PLATELETS 10*3/mm3 102* 108* 108* 122* 132*             Lab Results   Component Value Date    CKTOTAL 87 12/20/2022    CKMB 174 05/24/2022    TROPONINI 0.05 (H) 08/18/2022    TROPONINT 0.042 (C) 12/20/2022     Lab Results   Component Value Date    PROBNP 5,254.0 (H) 12/20/2022       I/O last 3 completed shifts:  In: 360 [P.O.:360]  Out: 360 [Urine:360]    Cardiographics  ECG/EMG Results (last 24 hours)     ** No results found for the last 24 hours. **        Results for orders placed during the hospital encounter of 02/04/19    Adult Transthoracic Echo Limited W/ Cont if Necessary Per Protocol    Interpretation Summary  · 3D acquisition for left ventricular ejection fraction. Live 3D and full volume to assess left ventricular ejection fraction was utilized.  · Left ventricle systolic function is severely decreased. Estimated LVEF is 10%. Left ventricle cavity severely dilated with restrictive diastolic dysfunction. Findings are consistent with dilated cardiomyopathy.  · Right ventricle is mildly dilated with mildly reduced right ventricular systolic function.  · Moderate mitral valve regurgitation is present.  · Moderate  tricuspid valve regurgitation is present. Pulmonary hypertension is present with a PA systolic pressure of 70 mmHg.  · There is mild pulmonic valve regurgitation present.  · There is a trivial pericardial effusion adjacent to the left ventricle        NM HIDA SCAN WITHOUT PHARMACOLOGICAL INTERVENTION    Result Date: 12/27/2022  Unremarkable hepatobiliary scan. Suboptimal ejection fraction of gallbladder at 9%, with a fatty meal challenge. Electronically signed by:  Pedro Day MD  12/27/2022 1:52 PM CST Workstation: [id number]    NM Lung Ventilation Perfusion    Result Date: 12/23/2022  Conclusion: Low probability for pulmonary embolism. Cardiomegaly. Some elevation right hemidiaphragm. [id number] Electronically signed by:  Mukul Klein MD  12/23/2022 4:17 PM CST Workstation: [id number]    US Guided Vascular Access    Result Date: 1/3/2023  Imaging obtained during vascular access device placement under ultrasound guidance as described above Electronically signed by:  Leeroy Alex MD  1/3/2023 12:55 PM CST Workstation: [id number]    CT Abdomen Pelvis With Contrast    Result Date: 12/21/2022  Severe cardiomegaly. Mild bibasilar pulmonary edema. Hepatomegaly. Nonspecific gallbladder distention. Consider right upper quadrant ultrasound if there is pain. Electronically signed by:  Charlee Yan MD  12/21/2022 12:29 AM CST Workstation: [id number]    US Gallbladder    Result Date: 12/21/2022  1.  Abnormal appearance of the gallbladder, as above, suspicious for acute cholecystitis. If clinically indeterminate recommend nuclear medicine hepatobiliary scan. 2. Hepatomegaly. RP core team activated 9:51 AM for results notification. Electronically signed by:  Rc Wheatley MD  12/21/2022 9:52 AM CST Workstation: [id number]    XR Chest 1 View    Result Date: 12/20/2022  CONCLUSION: Linear atelectasis medial right lung base. Left-sided pacemaker remains in place with lead projected over the right ventricle. Stable cardiomegaly. [id number]  Electronically signed by:  Mukul Klein MD  12/20/2022 5:42 PM CST Workstation: [id number]    XR Chest 1 View    Result Date: 12/4/2022  Retrocardiac opacification is suspicious for infiltrate. Large cardiac silhouette again seen. Suspect at least small left pleural effusion. Electronically signed by:  Deacon Ac MD  12/4/2022 9:02 PM CST Workstation: [id number]    XR Chest 1 View    Result Date: 12/4/2022  Stable left pacemaker. Stable cardiomegaly. Stable left lung base atelectasis, versus infiltrate with probable left pleural effusion.. Workstation: [id number]    CT Angiogram Chest    Result Date: 12/21/2022  1.  Nondiagnostic assessment of the pulmonary arteries. Minimal enhancement of the pulmonary arteries. 2.  Dilation of the left heart chambers and of the right atrium. 3.  No acute pulmonary infiltrate. Electronically signed by:  India Mayfield MD  12/21/2022 12:39 AM CST Workstation: [id number]    XR Chest PA & Lateral    Result Date: 12/7/2022  1.  Cardiomegaly without decompensation. 2.  Otherwise unremarkable chest. Electronically signed by:  Pj Cummins MD  12/7/2022 7:28 AM CST Workstation: [id number]      Intake/Output       01/02/23 0700 - 01/03/23 0659    Intake (ml) 360    Output (ml) 150    Net (ml) 210            Assessment & Plan       FRANCK (acute kidney injury) (HCC)    Severe malnutrition (HCC)    #1. Hypotension: Suspect to be due to in the setting of low output state from LV dysfunction. We will hold antihypertensives.  Start dopamine and dobutamine at 2.5mcg/kg/min and closely monitor for tachycardia with AF or transient hypotension.   -LFTs noted to be elevated from liver congestion  -, and creatinine 2.84.     #2. Acute on Chronic systolic CHF/NICM: EF 15-20%. NYHA Class IV, Stage D.  Patient appears hypervolemic with poor perfusion and hemodynamics requiring inotropic support.  -Start dobutamine, dopamine as discussed above.   -Stat CXR      BETA-BLOCKER:  carvedilol on  hold  ACE/ARB/ENTRESTO: losartan on hold  DIURETIC: Bumex gtt per Nephrology at 0.5mg/hr  SGL2I: should be considered  ALDOSTERONE ANTAGONIST: n/a  IMDUR/HYDRALAZINE: N/A  DIGOXIN: N/A  FR: 1,500  NA Restrction: 2grams  ICD: yes [employer]  8/2022  Daily weight  Strict intake/output  Continuous cardiac monitoring    #3. New onset AF: Patient does not have prior documented history.  He was on Eliquis to prevent LV thrombus.  Recommend rate control strategy and anticoagulation.  Currently he is on Lovenox. Pharmacy to dose lovenox, currently creatinine Cl still okay for 1mg/kg BID.     I discussed the patient's findings and my recommendations with patient and Dr. Aranda. Thank you for the consult. We will continue to follow.             This document has been electronically signed by SHELL Ruvalcaba on January 3, 2023 16:09 CST     Electronically signed by SHELL Ruvalcaba, 01/03/23, 3:26 PM CST.    I personally saw and examined Matti Bravo after the APRN.  I personally performed a history and physical examination of the patient.  I personally reviewed independent findings and plan of care.  I discussed management with the APRN.  I agree with the APRN's documentation.    The patient is a 52-year-old gentleman with a known history of nonischemic cardiomyopathy with severe LV systolic dysfunction.  He is s/p ICD placement.  Cardiology service was consulted to assist in the management of congestive heart failure in the setting of hypotension and FRANCK.  The patient was also found to have new onset atrial fibrillation.    Vitals:    01/04/23 0603 01/04/23 0700 01/04/23 0800 01/04/23 0900   BP: 110/92 101/68 122/66 122/74   BP Location:   Left arm    Patient Position:   Lying    Pulse: 94 96 100 105   Resp:   22    Temp:   96.9 °F (36.1 °C)    TempSrc:   Temporal    SpO2: 99% 100% 99% 98%   Weight:       Height:         Nonischemic cardiomyopathy: He is s/p ICD placement.  Cardiomyopathy  medications are on hold due to renal failure and hypotension.  Consider initiation of Bumex gtt, low-dose dopamine gtt and low-dose dobutamine gtt to assist in diuresis.    Hypotension: Exact etiology unclear.  Consider initiation of dopamine gtt and dobutamine gtt to augment LV contractility given the presence of known LV dysfunction.    New onset atrial fibrillation: Continue therapeutic dose Lovenox (renally dosed) for anticoagulation.  He was previously on therapeutic anticoagulation for prevention of LV thrombus in the setting of severe LV dysfunction.  He is in atrial fibrillation at this point and appears to be rate controlled.     Transaminitis: Likely related to hypotension.  Hold Lipitor for now.    Thanks for the consultation.  Please feel free to call us any questions.    Electronically signed by Dylon Aranda MD, 01/04/23, 10:04 AM CST.

## 2023-01-03 NOTE — PAYOR COMM NOTE
"    Brittaney Shanks RN [facility]  [phone]      Phone  [phone]       Fax  Cont. Stay Review      Ethel Davis (52 y.o. Male)     Date of Birth   1970    Social Security Number       Address   [address] [address]    Home Phone   [phone]    MRN   [id number]       Hinduism   Jainism    Marital Status                               Admission Date   12/20/22    Admission Type   Emergency    Admitting Provider   Will French MD    Attending Provider   Will French MD    Department, Room/Bed   [facility] CRITICAL CARE STEPDOWN, 20/A       Discharge Date       Discharge Disposition       Discharge Destination                               Attending Provider: Will French MD    Allergies: No Known Allergies    Isolation: None   Infection: None   Code Status: CPR    Ht: 193 cm (76\")   Wt: 138 kg (303 lb 9.6 oz)    Admission Cmt: None   Principal Problem: FRANCK (acute kidney injury) (HCC) [N17.9]                 Active Insurance as of 12/20/2022     Primary Coverage     Payor Plan Insurance Group Employer/Plan Group    [place] [id number]     Payor Plan Address Payor Plan Phone Number Payor Plan Fax Number Effective Dates    [employer] [address] [address] [phone]  10/8/2021 - None Entered    Formerly [facility] [id number]       Subscriber Name Subscriber Birth Date Member ID       ETHEL DAVIS 1970 [id number]                 Emergency Contacts      (Rel.) Home Phone Work Phone Mobile Phone    DavisCourtney sahu (Mother) [phone] -- --    LawrenceMary Anne (Sister) -- -- [phone]            Vital Signs (last day)     Date/Time Temp Temp src Pulse Resp BP Patient Position SpO2    01/03/23 1018 -- -- -- -- 70/50 -- --    01/03/23 0848 96 (35.6) Oral 90 18 70/52 Lying 97    01/03/23 0749 -- -- 72 -- -- -- --    01/03/23 0500 -- -- -- -- " 98/68 Sitting --    01/03/23 0300 -- -- 83 20 -- -- 98    01/03/23 0158 -- -- 74 -- -- -- --    01/02/23 1917 96.6 (35.9) Oral 80 20 96/53 Lying 97    01/02/23 1825 -- -- 84 -- -- -- --    01/02/23 1715 -- -- 75 -- -- -- --    01/02/23 1451 -- -- 71 20 100/69 Lying 95    01/02/23 1024 -- -- 84 20 130/80 Lying 98    01/02/23 0845 -- -- -- -- 160/70 -- --    01/02/23 0738 96.5 (35.8) -- 83 20 -- -- 96    01/02/23 0730 -- -- 91 -- -- -- --    01/02/23 0403 97.2 (36.2) Temporal 76 20 118/74 -- 96    01/02/23 0150 -- -- 84 -- -- -- --          Oxygen Therapy (last day)     Date/Time SpO2 Device (Oxygen Therapy) Flow (L/min) Oxygen Concentration (%) ETCO2 (mmHg)    01/03/23 0848 97 room air -- -- --    01/03/23 0300 98 room air -- -- --    01/02/23 1917 97 room air -- -- --    01/02/23 1451 95 room air -- -- --    01/02/23 1024 98 room air -- -- --    01/02/23 0738 96 room air -- -- --    01/02/23 0403 96 room air -- -- --          Current Facility-Administered Medications   Medication Dose Route Frequency Provider Last Rate Last Admin   • acetaminophen (TYLENOL) tablet 1,000 mg  1,000 mg Oral Q6H PRN Sudeep Wolf MD   1,000 mg at 01/02/23 1207   • albumin human 25 % IV SOLN 25 g  25 g Intravenous BID Trevor Alonso MD       • albuterol (PROVENTIL) nebulizer solution 0.083% 2.5 mg/3mL  2.5 mg Nebulization Q4H PRN Will French MD       • atorvastatin (LIPITOR) tablet 10 mg  10 mg Oral Daily Will French MD   10 mg at 01/02/23 2026   • bumetanide (BUMEX) 10 mg in sodium chloride 0.9 % 100 mL (0.1 mg/mL) infusion  0.5 mg/hr Intravenous Continuous Trevor Alonso MD       • bumetanide (BUMEX) injection 1 mg  1 mg Intravenous Once Trevor Alonso MD       • calcium carbonate (TUMS) chewable tablet 500 mg (200 mg elemental)  2 tablet Oral TID PRN Will French MD   2 tablet at 12/24/22 1444   • carvedilol (COREG) tablet 6.25 mg  6.25 mg Oral BID With Meals Efrain Mas MD   6.25 mg at  23 1825   • droperidol (INAPSINE) injection 1.25 mg  1.25 mg Intravenous Q6H PRN Sudeep Wolf MD   1.25 mg at 22 1302   • Enoxaparin Sodium (LOVENOX) syringe 130 mg  1 mg/kg Subcutaneous Q12H Moshe Aleman, DO   130 mg at 23 0851   • morphine injection 4 mg  4 mg Intravenous Once Will French MD       • ondansetron (ZOFRAN) injection 4 mg  4 mg Intravenous Q6H PRN Sudeep Wolf MD   4 mg at 23 0830   • pantoprazole (PROTONIX) EC tablet 40 mg  40 mg Oral Q AM Ayaan Cuba MD       • phenylephrine (LEYDA-SYNEPHRINE) 50 mg in 250 mL NS infusion  0.5-3 mcg/kg/min Intravenous Titrated Vania Andujar, APRN       • potassium chloride (MICRO-K) CR capsule 40 mEq  40 mEq Oral PRN Moshe Aleman DO   40 mEq at 23 2102    Or   • potassium chloride (KLOR-CON) packet 40 mEq  40 mEq Oral PRN Moshe Aleman DO        Or   • potassium chloride 10 mEq in 100 mL IVPB  10 mEq Intravenous Q1H PRN Moshe Aleman DO       • simethicone (MYLICON) chewable tablet 80 mg  80 mg Oral 4x Daily PRN Delonte Caputo MD   80 mg at 22 0025   • sodium chloride 0.9 % flush 10 mL  10 mL Intravenous Q12H Will French MD   10 mL at 23 0851   • sodium chloride 0.9 % flush 10 mL  10 mL Intravenous PRN Will French MD   10 mL at 22 0145   • sodium chloride 0.9 % infusion 40 mL  40 mL Intravenous PRN Will French MD            Physician Progress Notes (last 48 hours)      Trevor Alonso MD at 23 1050              [facility]  [address] [place] [address]. [address]  T - [phone]  F - [phone]     Progress Note          PATIENT  DEMOGRAPHICS   PATIENT NAME: Matti Sylvesterleah Bravo                      PHYSICIAN: Trevor Alonso MD  : 1970  MRN: [id number]   LOS: 12 days    Patient Care Team:  Shonna Ng APRN as PCP - General (Family Medicine)  Subjective    SUBJECTIVE   No new complaints , has  "marked LE and scrotal edema, going to ICU due to low bp       Objective    OBJECTIVE   Vital Signs  Temp:  [96 °F (35.6 °C)-96.6 °F (35.9 °C)] 96 °F (35.6 °C)  Heart Rate:  [71-90] 90  Resp:  [18-20] 18  BP: ()/(50-69) 70/50    Flowsheet Rows    Flowsheet Row First Filed Value   Admission Height 193 cm (76\") Documented at 12/20/2022 1700   Admission Weight 127 kg (280 lb) Documented at 12/20/2022 1700           I/O last 3 completed shifts:  In: 360 [P.O.:360]  Out: 360 [Urine:360]    PHYSICAL EXAM    Physical Exam  Vitals and nursing note reviewed.   Constitutional:       Appearance: Normal appearance.   Cardiovascular:      Rate and Rhythm: Normal rate and regular rhythm.      Heart sounds: Normal heart sounds. No murmur heard.    No friction rub. No gallop.   Pulmonary:      Effort: No respiratory distress.      Breath sounds: Normal breath sounds. No stridor. No wheezing, rhonchi or rales.   Musculoskeletal:      Right lower leg: Edema present.      Left lower leg: Edema present.   Skin:     General: Skin is warm and dry.   Neurological:      Mental Status: He is alert.         RESULTS   Results Review:    Results from last 7 days   Lab Units 01/03/23  0622 01/02/23  0633 01/02/23  0058 01/01/23  0607 12/31/22  0608 12/30/22  0806 12/29/22  0622 12/28/22  0505 12/27/22  1836   SODIUM mmol/L 125* 124*  --  128*   < > 126* 127*   < > 130*   POTASSIUM mmol/L 4.3 3.7 3.8 2.9*   < > 3.6 3.5   < > 3.5   CHLORIDE mmol/L 86* 86*  --  87*   < > 85* 88*   < > 90*   CO2 mmol/L 26.0 26.0  --  28.0   < > 24.0 22.0   < > 25.0   BUN mg/dL 86* 84*  --  86*   < > 83* 79*   < > 73*   CREATININE mg/dL 2.84* 2.92*  --  2.70*   < > 2.77* 2.29*   < > 2.39*   CALCIUM mg/dL 8.8 8.4*  --  8.5*   < > 9.0 9.0   < > 9.0   BILIRUBIN mg/dL  --   --   --   --   --  4.0* 3.5*  --  3.4*   ALK PHOS U/L  --   --   --   --   --  64 59  --  52   ALT (SGPT) U/L  --   --   --   --   --  443* 432*  --  422*   AST (SGOT) U/L  --   --   --   --   " --  277* 240*  --  258*   GLUCOSE mg/dL 120* 112*  --  95   < > 111* 127*   < > 143*    < > = values in this interval not displayed.       Estimated Creatinine Clearance: 46.6 mL/min (A) (by C-G formula based on SCr of 2.84 mg/dL (H)).                Results from last 7 days   Lab Units 01/03/23  0622 01/02/23  0633 01/01/23  0607 12/31/22  0608 12/30/22  0640   WBC 10*3/mm3 2.59* 3.66 3.60 4.49 3.88   HEMOGLOBIN g/dL 13.9 13.7 13.5 14.6 14.3   PLATELETS 10*3/mm3 102* 108* 108* 122* 132*               Imaging Results (Last 24 Hours)     ** No results found for the last 24 hours. **           MEDICATIONS    atorvastatin, 10 mg, Oral, Daily  carvedilol, 6.25 mg, Oral, BID With Meals  enoxaparin, 1 mg/kg, Subcutaneous, Q12H  furosemide, 120 mg, Intravenous, Daily  Morphine, 4 mg, Intravenous, Once  pantoprazole, 40 mg, Oral, Q AM  sodium chloride, 10 mL, Intravenous, Q12H      phenylephrine, 0.5-3 mcg/kg/min        Assessment & Plan   ASSESSMENT / PLAN      FRANCK (acute kidney injury) (HCC)    Severe malnutrition (HCC)    1.  Acute Kidney Failure on CKD 3: Baseline creatinine 1.4-1.7 mg/dl  - Etiology of FRANCK likely contrast induced.    - Urinalysis showed trace protein and negative blood, 3-5 RBCs, 0-2 WBCs.  Urine sodium less than 20.  Urine protein creatinine ratio 235 mg.  - Creatinine is now 2.8-2.9   Keep fluid restriction of 1500 ml a day.   - Maintain hemodynamics.  Monitor I's and O's.  Avoid nephrotoxins like NSAIDs and IV contrast  Lasix is not helping with diuresis. Will add bumex drip. May need HD/UF   Going to icu for pressor support     2.  Shortness of breath:  - Underwent CT with contrast which was nondiagnostic     3.  Chronic systolic CHF /AICD/ non ischemic cardiomyopathy     4.  Prediabetes     5.  Hypertension:  - Blood pressure is borderline low. Now on  carvedilol to 6.25 mg twice day.      6.  KHANH:  - Supposed to be using CPAP at home     7. Acute cholecystitis:  - denies marked abdominal pain and  also has some nausea and vomiting x 1. Denies acid reflux. Continue protonix.     Copied text in this note has been reviewed and is accurate as of 1/3/2023           This document has been electronically signed by Trevor Alonso MD on January 3, 2023 10:50 CST           Electronically signed by Trevor Alonso MD at 01/03/23 1102     Ayaan Cuba MD at 01/03/23 0974              [facility] Medicine Services  INPATIENT PROGRESS NOTE    Length of Stay: 12  Date of Admission: 12/20/2022  Primary Care Physician: Shonna Ng APRN    Subjective   Chief Complaint: No new complaints.    HPI: Patient is seen for follow-up today 1/3/2023.  He is doing better, less deconditioned, tolerating his diet and voices no new complaints.  He has good urinary output and swelling both legs persist.  He voices no new complaints.    Review of Systems   Constitutional: Positive for activity change and fatigue. Negative for appetite change, chills, diaphoresis and fever.   HENT: Negative for trouble swallowing and voice change.    Eyes: Negative for photophobia and visual disturbance.   Respiratory: Negative for cough, choking, chest tightness, shortness of breath, wheezing and stridor.    Cardiovascular: Positive for leg swelling. Negative for chest pain and palpitations.   Gastrointestinal: Negative for abdominal distention, abdominal pain, blood in stool, constipation, diarrhea, nausea and vomiting.   Endocrine: Negative for cold intolerance, heat intolerance, polydipsia, polyphagia and polyuria.   Genitourinary: Negative for decreased urine volume, difficulty urinating, dysuria, enuresis, flank pain, frequency, hematuria and urgency.   Musculoskeletal: Negative for arthralgias, gait problem, myalgias, neck pain and neck stiffness.   Skin: Negative for pallor, rash and wound.   Neurological: Negative for dizziness, tremors, seizures, syncope, facial asymmetry, speech difficulty, weakness,  light-headedness, numbness and headaches.   Hematological: Does not bruise/bleed easily.   Psychiatric/Behavioral: Negative for agitation, behavioral problems and confusion.       Objective    Temp:  [96 °F (35.6 °C)-96.6 °F (35.9 °C)] 96 °F (35.6 °C)  Heart Rate:  [71-90] 90  Resp:  [18-20] 18  BP: ()/(52-80) 70/52    AM-PAC 6 Clicks Score (PT): 24 (01/02/23 1917)    Physical Exam  Vitals and nursing note reviewed.   Constitutional:       General: He is not in acute distress.     Appearance: He is well-developed. He is obese. He is ill-appearing. He is not diaphoretic.   HENT:      Head: Normocephalic and atraumatic.   Eyes:      General: No scleral icterus.        Right eye: No discharge.         Left eye: No discharge.      Extraocular Movements: Extraocular movements intact.      Pupils: Pupils are equal, round, and reactive to light.   Neck:      Thyroid: No thyromegaly.      Vascular: No carotid bruit or JVD.   Cardiovascular:      Rate and Rhythm: Normal rate. Rhythm irregular.      Heart sounds: Normal heart sounds. No murmur heard.    No friction rub. No gallop.   Pulmonary:      Effort: Pulmonary effort is normal. No respiratory distress.      Breath sounds: Normal breath sounds. No stridor. No wheezing or rales.   Chest:      Chest wall: No tenderness.   Abdominal:      General: Bowel sounds are normal. There is no distension.      Palpations: Abdomen is soft. There is no mass.      Tenderness: There is no abdominal tenderness. There is no right CVA tenderness, left CVA tenderness, guarding or rebound.      Hernia: No hernia is present.   Musculoskeletal:         General: No swelling, tenderness or deformity.      Cervical back: Normal range of motion and neck supple.      Right lower leg: Edema present.      Left lower leg: Edema present.   Skin:     General: Skin is warm and dry.      Coloration: Skin is not jaundiced or pale.      Findings: No bruising, erythema, lesion or rash.   Neurological:       General: No focal deficit present.      Mental Status: He is alert and oriented to person, place, and time.      Cranial Nerves: No cranial nerve deficit.      Sensory: No sensory deficit.      Motor: No weakness or abnormal muscle tone.      Coordination: Coordination normal.      Gait: Gait normal.      Deep Tendon Reflexes: Reflexes normal.   Psychiatric:         Mood and Affect: Mood normal.         Behavior: Behavior normal.         Thought Content: Thought content normal.         Judgment: Judgment normal.           Medication Review:    Current Facility-Administered Medications:   •  acetaminophen (TYLENOL) tablet 1,000 mg, 1,000 mg, Oral, Q6H PRN, Sudeep Wolf MD, 1,000 mg at 01/02/23 1207  •  albuterol (PROVENTIL) nebulizer solution 0.083% 2.5 mg/3mL, 2.5 mg, Nebulization, Q4H PRN, Will French MD  •  atorvastatin (LIPITOR) tablet 10 mg, 10 mg, Oral, Daily, Will French MD, 10 mg at 01/02/23 2026  •  calcium carbonate (TUMS) chewable tablet 500 mg (200 mg elemental), 2 tablet, Oral, TID PRN, Will French MD, 2 tablet at 12/24/22 1444  •  carvedilol (COREG) tablet 6.25 mg, 6.25 mg, Oral, BID With Meals, Efrain Mas MD, 6.25 mg at 01/02/23 1825  •  droperidol (INAPSINE) injection 1.25 mg, 1.25 mg, Intravenous, Q6H PRN, Sudeep Wolf MD, 1.25 mg at 12/22/22 1302  •  Enoxaparin Sodium (LOVENOX) syringe 130 mg, 1 mg/kg, Subcutaneous, Q12H, Moshe Aleman DO, 130 mg at 01/03/23 0851  •  furosemide (LASIX) 120 mg in sodium chloride 0.9 % 50 mL IVPB, 120 mg, Intravenous, Daily, Karen Mcfarland MD, 120 mg at 01/02/23 0947  •  morphine injection 4 mg, 4 mg, Intravenous, Once, Will French MD  •  ondansetron (ZOFRAN) injection 4 mg, 4 mg, Intravenous, Q6H PRN, Sudeep Wolf MD, 4 mg at 01/01/23 0830  •  pantoprazole (PROTONIX) EC tablet 40 mg, 40 mg, Oral, Q AM, Ayaan Cuba MD  •  potassium chloride (MICRO-K) CR capsule 40 mEq, 40  mEq, Oral, PRN, 40 mEq at 01/01/23 2102 **OR** potassium chloride (KLOR-CON) packet 40 mEq, 40 mEq, Oral, PRN **OR** potassium chloride 10 mEq in 100 mL IVPB, 10 mEq, Intravenous, Q1H PRN, Moshe Aleman DO  •  simethicone (MYLICON) chewable tablet 80 mg, 80 mg, Oral, 4x Daily PRN, Delonte Caputo MD, 80 mg at 12/25/22 0025  •  sodium chloride 0.9 % flush 10 mL, 10 mL, Intravenous, Q12H, Will French MD, 10 mL at 01/03/23 0851  •  sodium chloride 0.9 % flush 10 mL, 10 mL, Intravenous, PRN, Will French MD, 10 mL at 12/30/22 0145  •  sodium chloride 0.9 % infusion 40 mL, 40 mL, Intravenous, PRN, Will French MD    Results Review:  I have reviewed the labs, radiology results, and diagnostic studies.    Laboratory Data:   Results from last 7 days   Lab Units 01/03/23  0622 01/02/23  0633 01/02/23  0058 01/01/23  0607 12/31/22  0608 12/30/22  0806 12/29/22  0622 12/28/22  0505 12/27/22  1836   SODIUM mmol/L 125* 124*  --  128*   < > 126* 127*   < > 130*   POTASSIUM mmol/L 4.3 3.7 3.8 2.9*   < > 3.6 3.5   < > 3.5   CHLORIDE mmol/L 86* 86*  --  87*   < > 85* 88*   < > 90*   CO2 mmol/L 26.0 26.0  --  28.0   < > 24.0 22.0   < > 25.0   BUN mg/dL 86* 84*  --  86*   < > 83* 79*   < > 73*   CREATININE mg/dL 2.84* 2.92*  --  2.70*   < > 2.77* 2.29*   < > 2.39*   GLUCOSE mg/dL 120* 112*  --  95   < > 111* 127*   < > 143*   CALCIUM mg/dL 8.8 8.4*  --  8.5*   < > 9.0 9.0   < > 9.0   BILIRUBIN mg/dL  --   --   --   --   --  4.0* 3.5*  --  3.4*   ALK PHOS U/L  --   --   --   --   --  64 59  --  52   ALT (SGPT) U/L  --   --   --   --   --  443* 432*  --  422*   AST (SGOT) U/L  --   --   --   --   --  277* 240*  --  258*   ANION GAP mmol/L 13.0 12.0  --  13.0   < > 17.0* 17.0*   < > 15.0    < > = values in this interval not displayed.     Estimated Creatinine Clearance: 46.6 mL/min (A) (by C-G formula based on SCr of 2.84 mg/dL (H)).          Results from last 7 days   Lab Units 01/03/23  0622 01/02/23  0611  01/01/23  0607 12/31/22  0608 12/30/22  0640   WBC 10*3/mm3 2.59* 3.66 3.60 4.49 3.88   HEMOGLOBIN g/dL 13.9 13.7 13.5 14.6 14.3   HEMATOCRIT % 41.7 41.4 40.8 43.5 42.7   PLATELETS 10*3/mm3 102* 108* 108* 122* 132*           Culture Data:   No results found for: BLOODCX  No results found for: URINECX  No results found for: RESPCX  No results found for: WOUNDCX  No results found for: STOOLCX  No components found for: BODYFLD    Radiology Data:   Imaging Results (Last 24 Hours)     ** No results found for the last 24 hours. **          I have reviewed the patient's current medications.     Assessment/Plan     Hospital Problem List:  Principal Problem:    FRANCK (acute kidney injury) (HCC)  Active Problems:    Severe malnutrition (HCC)    Acute on chronic kidney disease stage III: Patient's baseline is reportedly between 1.4-1.7.  Creatinine is down to 2.84 today.    Continue to monitor renal function and avoid nephrotoxins. Input by nephrologist is appreciated.    History of nonischemic cardiomyopathy/chronic systolic heart failure status post AICD placement (with hypertension): Patient is clinically euvolemic and asymptomatic despite low blood pressure. Chest x-ray done on admission did not show evidence of pulmonary vascular congestion.  Continue diuretics with strict I's and O's, daily weights, salt and fluid restriction.  We will give albumin secondary to hypotension and continue to monitor.  He may require transfer to the ICU for dobutamine infusion if the need arises.  We will consult cardiologist.   Echocardiogram done 8/19/2022 showed an estimated ejection fraction of 15 to 20%.    Hyponatremia: Restrict free water and monitor BMP.    Chronic atrial fibrillation: Patient is in controlled ventricular response.  Continue Coreg and anticoagulation with Lovenox.  Transition back to [facility] on discharge.    Hypokalemia: Resolved.    Possible acute cholecystitis: Patient is less symptomatic and tolerating recommended  "diet.  HIDA scan was unremarkable.    Obstructive sleep apnea: Continue nightly CPAP.    Deconditioning: Continue PT and OT.    Continue GI and DVT prophylaxis.    Update was given to patient's mother who was at the bedside.      Discharge Planning: In progress.    I confirmed that the patient's Advance Care Plan is present, code status is documented, or surrogate decision maker is listed in the patient's medical record.     I have utilized all available immediate resources to obtain, update, or review the patient's current medications      Ayaan Cuba MD   23   09:37 CST          Electronically signed by Ayaan Cuba MD at 23 0941     Karen Mcfarland MD at 23 1438              [facility]  [address] [place] [address]. [address]  T - [phone]  F - [phone]     Progress Note          PATIENT  DEMOGRAPHICS   PATIENT NAME: Matti Bravo                      PHYSICIAN: Karen Mcfarland MD  : 1970  MRN: [id number]   LOS: 11 days    Patient Care Team:  Shonna Ng APRN as PCP - General (Family Medicine)  Subjective    SUBJECTIVE   No new complaints        Objective    OBJECTIVE   Vital Signs  Temp:  [96.5 °F (35.8 °C)-97.2 °F (36.2 °C)] 96.5 °F (35.8 °C)  Heart Rate:  [76-91] 84  Resp:  [18-20] 20  BP: ()/(50-80) 130/80    Flowsheet Rows    Flowsheet Row First Filed Value   Admission Height 193 cm (76\") Documented at 2022 1700   Admission Weight 127 kg (280 lb) Documented at 2022 1700           I/O last 3 completed shifts:  In: 240 [P.O.:240]  Out: 1235 [Urine:1235]    PHYSICAL EXAM    Physical Exam  Vitals and nursing note reviewed.   Constitutional:       Appearance: Normal appearance.   Cardiovascular:      Rate and Rhythm: Normal rate and regular rhythm.      Heart sounds: Normal heart sounds. No murmur heard.    No friction rub. No gallop.   Pulmonary:      Effort: No respiratory distress.      Breath sounds: Normal " breath sounds. No stridor. No wheezing, rhonchi or rales.   Musculoskeletal:      Right lower leg: Edema present.      Left lower leg: Edema present.   Skin:     General: Skin is warm and dry.   Neurological:      Mental Status: He is alert.         RESULTS   Results Review:    Results from last 7 days   Lab Units 01/02/23  0633 01/02/23  0058 01/01/23  0607 12/31/22  0608 12/30/22  0806 12/29/22  0622 12/28/22  0505 12/27/22  1836   SODIUM mmol/L 124*  --  128* 129* 126* 127*   < > 130*   POTASSIUM mmol/L 3.7 3.8 2.9* 3.9 3.6 3.5   < > 3.5   CHLORIDE mmol/L 86*  --  87* 88* 85* 88*   < > 90*   CO2 mmol/L 26.0  --  28.0 25.0 24.0 22.0   < > 25.0   BUN mg/dL 84*  --  86* 88* 83* 79*   < > 73*   CREATININE mg/dL 2.92*  --  2.70* 3.21* 2.77* 2.29*   < > 2.39*   CALCIUM mg/dL 8.4*  --  8.5* 9.1 9.0 9.0   < > 9.0   BILIRUBIN mg/dL  --   --   --   --  4.0* 3.5*  --  3.4*   ALK PHOS U/L  --   --   --   --  64 59  --  52   ALT (SGPT) U/L  --   --   --   --  443* 432*  --  422*   AST (SGOT) U/L  --   --   --   --  277* 240*  --  258*   GLUCOSE mg/dL 112*  --  95 120* 111* 127*   < > 143*    < > = values in this interval not displayed.       Estimated Creatinine Clearance: 45.3 mL/min (A) (by C-G formula based on SCr of 2.92 mg/dL (H)).                Results from last 7 days   Lab Units 01/02/23  0633 01/01/23  0607 12/31/22  0608 12/30/22  0640 12/29/22 0622   WBC 10*3/mm3 3.66 3.60 4.49 3.88 4.23   HEMOGLOBIN g/dL 13.7 13.5 14.6 14.3 14.3   PLATELETS 10*3/mm3 108* 108* 122* 132* 119*               Imaging Results (Last 24 Hours)     ** No results found for the last 24 hours. **           MEDICATIONS    atorvastatin, 10 mg, Oral, Daily  carvedilol, 6.25 mg, Oral, BID With Meals  enoxaparin, 1 mg/kg, Subcutaneous, Q12H  furosemide, 120 mg, Intravenous, Daily  Morphine, 4 mg, Intravenous, Once  pantoprazole, 40 mg, Intravenous, Q AM  sodium chloride, 10 mL, Intravenous, Q12H           Assessment & Plan   ASSESSMENT / PLAN       FRANCK (acute kidney injury) (HCC)    Severe malnutrition (HCC)    1.  Acute Kidney Failure on CKD 3: Baseline creatinine 1.4-1.7 mg/dl  - Etiology of FRANCK likely contrast induced.    - Urinalysis showed trace protein and negative blood, 3-5 RBCs, 0-2 WBCs.  Urine sodium less than 20.  Urine protein creatinine ratio 235 mg.  - Creatinine is slightly worse at 2.92 mg/dL.    Keep fluid restriction of 1500 ml a day.   - Maintain hemodynamics.  Monitor I's and O's.  Avoid nephrotoxins like NSAIDs and IV contrast    Doing a diuretic holiday resatrat lasix 120 mg from 1/1/2023, cr is better at 2.7     2.  Shortness of breath:  - Underwent CT with contrast which was nondiagnostic, no marked fluid overload on exam. No pulmonary edema on xray. SOB etiology is unclear at present.      3.  Chronic systolic CHF /AICD/ non ischemic cardiomyopathy     4.  Prediabetes     5.  Hypertension:  - Blood pressure is borderline low. Decreased carvedilol to 6.25 mg twice day.      6.  KHANH:  - Supposed to be using CPAP at home     7. Acute cholecystitis:  - denies marked abdominal pain and also has some nausea and vomiting x 1. Denies acid reflux. Continue protonix.       Copied text in this note has been reviewed and is accurate as of 1/1/2023           This document has been electronically signed by Karen Mcfarland MD on January 2, 2023 14:38 CST           Electronically signed by Karen Mcfarland MD at 01/02/23 1442     CalraAyaan MD at 01/02/23 1410              [facility]  INPATIENT PROGRESS NOTE    Length of Stay: 11  Date of Admission: 12/20/2022  Primary Care Physician: Shonna Ng APRN    Subjective   Chief Complaint: No new complaints.    HPI: Patient is seen for follow-up today 1/2/2023.  He is doing better, less deconditioned, tolerating his diet and voices no new complaints.  He has good urinary output and swelling both legs persist.    Review of Systems    Constitutional: Positive for activity change and fatigue. Negative for appetite change, chills, diaphoresis and fever.   HENT: Negative for trouble swallowing and voice change.    Eyes: Negative for photophobia and visual disturbance.   Respiratory: Negative for cough, choking, chest tightness, shortness of breath, wheezing and stridor.    Cardiovascular: Positive for leg swelling. Negative for chest pain and palpitations.   Gastrointestinal: Negative for abdominal distention, abdominal pain, blood in stool, constipation, diarrhea, nausea and vomiting.   Endocrine: Negative for cold intolerance, heat intolerance, polydipsia, polyphagia and polyuria.   Genitourinary: Negative for decreased urine volume, difficulty urinating, dysuria, enuresis, flank pain, frequency, hematuria and urgency.   Musculoskeletal: Negative for arthralgias, gait problem, myalgias, neck pain and neck stiffness.   Skin: Negative for pallor, rash and wound.   Neurological: Negative for dizziness, tremors, seizures, syncope, facial asymmetry, speech difficulty, weakness, light-headedness, numbness and headaches.   Hematological: Does not bruise/bleed easily.   Psychiatric/Behavioral: Negative for agitation, behavioral problems and confusion.       Objective    Temp:  [96.5 °F (35.8 °C)-97.2 °F (36.2 °C)] 96.5 °F (35.8 °C)  Heart Rate:  [76-91] 84  Resp:  [18-20] 20  BP: ()/(50-80) 130/80    AM-PAC 6 Clicks Score (PT): 24 (01/02/23 1020)    Physical Exam  Vitals and nursing note reviewed.   Constitutional:       General: He is not in acute distress.     Appearance: He is well-developed. He is obese. He is ill-appearing. He is not diaphoretic.   HENT:      Head: Normocephalic and atraumatic.   Eyes:      General: No scleral icterus.        Right eye: No discharge.         Left eye: No discharge.      Extraocular Movements: Extraocular movements intact.      Pupils: Pupils are equal, round, and reactive to light.   Neck:      Thyroid: No  thyromegaly.      Vascular: No carotid bruit or JVD.   Cardiovascular:      Rate and Rhythm: Normal rate. Rhythm irregular.      Heart sounds: Normal heart sounds. No murmur heard.    No friction rub. No gallop.   Pulmonary:      Effort: Pulmonary effort is normal. No respiratory distress.      Breath sounds: Normal breath sounds. No stridor. No wheezing or rales.   Chest:      Chest wall: No tenderness.   Abdominal:      General: Bowel sounds are normal. There is no distension.      Palpations: Abdomen is soft. There is no mass.      Tenderness: There is no abdominal tenderness. There is no right CVA tenderness, left CVA tenderness, guarding or rebound.      Hernia: No hernia is present.   Musculoskeletal:         General: No swelling, tenderness or deformity.      Cervical back: Normal range of motion and neck supple.      Right lower leg: Edema present.      Left lower leg: Edema present.   Skin:     General: Skin is warm and dry.      Coloration: Skin is not jaundiced or pale.      Findings: No bruising, erythema, lesion or rash.   Neurological:      General: No focal deficit present.      Mental Status: He is alert and oriented to person, place, and time.      Cranial Nerves: No cranial nerve deficit.      Sensory: No sensory deficit.      Motor: No weakness or abnormal muscle tone.      Coordination: Coordination normal.      Gait: Gait normal.      Deep Tendon Reflexes: Reflexes normal.   Psychiatric:         Mood and Affect: Mood normal.         Behavior: Behavior normal.         Thought Content: Thought content normal.         Judgment: Judgment normal.           Medication Review:    Current Facility-Administered Medications:   •  acetaminophen (TYLENOL) tablet 1,000 mg, 1,000 mg, Oral, Q6H PRN, Sudeep Wolf MD, 1,000 mg at 01/02/23 1207  •  albuterol (PROVENTIL) nebulizer solution 0.083% 2.5 mg/3mL, 2.5 mg, Nebulization, Q4H PRN, Will French MD  •  atorvastatin (LIPITOR) tablet 10 mg, 10  mg, Oral, Daily, Will French MD, 10 mg at 12/31/22 0912  •  calcium carbonate (TUMS) chewable tablet 500 mg (200 mg elemental), 2 tablet, Oral, TID PRN, Will French MD, 2 tablet at 12/24/22 1444  •  carvedilol (COREG) tablet 6.25 mg, 6.25 mg, Oral, BID With Meals, Efrain Mas MD, 6.25 mg at 01/02/23 0945  •  droperidol (INAPSINE) injection 1.25 mg, 1.25 mg, Intravenous, Q6H PRN, Sudeep Wolf MD, 1.25 mg at 12/22/22 1302  •  Enoxaparin Sodium (LOVENOX) syringe 130 mg, 1 mg/kg, Subcutaneous, Q12H, Moshe Aleman DO, 130 mg at 01/02/23 0945  •  furosemide (LASIX) 120 mg in sodium chloride 0.9 % 50 mL IVPB, 120 mg, Intravenous, Daily, Karen Mcfarland MD, 120 mg at 01/02/23 0947  •  morphine injection 4 mg, 4 mg, Intravenous, Once, Will French MD  •  ondansetron (ZOFRAN) injection 4 mg, 4 mg, Intravenous, Q6H PRN, Sudeep Wolf MD, 4 mg at 01/01/23 0830  •  pantoprazole (PROTONIX) injection 40 mg, 40 mg, Intravenous, Q AM, Trevor Alonso MD, 40 mg at 01/02/23 0433  •  potassium chloride (MICRO-K) CR capsule 40 mEq, 40 mEq, Oral, PRN, 40 mEq at 01/01/23 2102 **OR** potassium chloride (KLOR-CON) packet 40 mEq, 40 mEq, Oral, PRN **OR** potassium chloride 10 mEq in 100 mL IVPB, 10 mEq, Intravenous, Q1H PRN, Moshe Aleman DO  •  simethicone (MYLICON) chewable tablet 80 mg, 80 mg, Oral, 4x Daily PRN, Delonte Caputo MD, 80 mg at 12/25/22 0025  •  sodium chloride 0.9 % flush 10 mL, 10 mL, Intravenous, Q12H, Will French MD, 10 mL at 01/02/23 0947  •  sodium chloride 0.9 % flush 10 mL, 10 mL, Intravenous, PRN, Will French MD, 10 mL at 12/30/22 0145  •  sodium chloride 0.9 % infusion 40 mL, 40 mL, Intravenous, PRN, Will French MD    Results Review:  I have reviewed the labs, radiology results, and diagnostic studies.    Laboratory Data:   Results from last 7 days   Lab Units 01/02/23  0633 01/02/23  0058 01/01/23  0607 12/31/22  0608  12/30/22  0806 12/29/22  0622 12/28/22  0505 12/27/22  1836   SODIUM mmol/L 124*  --  128* 129* 126* 127*   < > 130*   POTASSIUM mmol/L 3.7 3.8 2.9* 3.9 3.6 3.5   < > 3.5   CHLORIDE mmol/L 86*  --  87* 88* 85* 88*   < > 90*   CO2 mmol/L 26.0  --  28.0 25.0 24.0 22.0   < > 25.0   BUN mg/dL 84*  --  86* 88* 83* 79*   < > 73*   CREATININE mg/dL 2.92*  --  2.70* 3.21* 2.77* 2.29*   < > 2.39*   GLUCOSE mg/dL 112*  --  95 120* 111* 127*   < > 143*   CALCIUM mg/dL 8.4*  --  8.5* 9.1 9.0 9.0   < > 9.0   BILIRUBIN mg/dL  --   --   --   --  4.0* 3.5*  --  3.4*   ALK PHOS U/L  --   --   --   --  64 59  --  52   ALT (SGPT) U/L  --   --   --   --  443* 432*  --  422*   AST (SGOT) U/L  --   --   --   --  277* 240*  --  258*   ANION GAP mmol/L 12.0  --  13.0 16.0* 17.0* 17.0*   < > 15.0    < > = values in this interval not displayed.     Estimated Creatinine Clearance: 45.3 mL/min (A) (by C-G formula based on SCr of 2.92 mg/dL (H)).          Results from last 7 days   Lab Units 01/02/23  0633 01/01/23  0607 12/31/22  0608 12/30/22  0640 12/29/22  0622   WBC 10*3/mm3 3.66 3.60 4.49 3.88 4.23   HEMOGLOBIN g/dL 13.7 13.5 14.6 14.3 14.3   HEMATOCRIT % 41.4 40.8 43.5 42.7 43.3   PLATELETS 10*3/mm3 108* 108* 122* 132* 119*           Culture Data:   No results found for: BLOODCX  No results found for: URINECX  No results found for: RESPCX  No results found for: WOUNDCX  No results found for: STOOLCX  No components found for: BODYFLD    Radiology Data:   Imaging Results (Last 24 Hours)     ** No results found for the last 24 hours. **          I have reviewed the patient's current medications.     Assessment/Plan     Hospital Problem List:  Principal Problem:    FRANCK (acute kidney injury) (HCC)  Active Problems:    Severe malnutrition (HCC)    Acute on chronic kidney disease stage III: Patient's baseline is reportedly between 1.4-1.7.  Creatinine has increased to 2.92 today.  Continue continue to monitor renal function and avoid  nephrotoxins. Input by nephrologist is appreciated.    History of nonischemic cardiomyopathy/chronic systolic heart failure status post AICD placement: Patient is clinically euvolemic and not in heart failure.  Chest x-ray done on admission did not show evidence of pulmonary vascular congestion.  Continue diuretics with strict I's and O's, daily weights, salt and fluid restriction.  Consult cardiologist if the need arises.  Echocardiogram done 2022 showed an estimated ejection fraction of 15 to 20%.    Hyponatremia: Restrict free water and monitor BMP.    Chronic atrial fibrillation: Patient is in controlled ventricular response.  Continue Coreg and anticoagulation with Lovenox.  Transition back to [facility] on discharge.    Hypokalemia: Resolved.    Possible acute cholecystitis: Patient is less symptomatic and tolerating recommended diet.  HIDA scan was unremarkable.    Obstructive sleep apnea: Continue nightly CPAP.    Deconditioning: Continue PT and OT.    Continue GI and DVT prophylaxis.      Discharge Planning: In progress.    I confirmed that the patient's Advance Care Plan is present, code status is documented, or surrogate decision maker is listed in the patient's medical record.     I have utilized all available immediate resources to obtain, update, or review the patient's current medications      Ayaan Cuba MD   23   14:10 CST          Electronically signed by Ayaan Cuba MD at 23 [phone]     Karen Mcfarland MD at 23 [phone]              [facility]  [address] [place] [address]. [id number]  T - [phone]  F - [phone]     Progress Note          PATIENT  DEMOGRAPHICS   PATIENT NAME: Matti Hernandez Bravo                      PHYSICIAN: Karen Mcfarland MD  : 1970  MRN: [id number]   LOS: 10 days    Patient Care Team:  Shonna Ng APRN as PCP - General (Family Medicine)  Subjective    SUBJECTIVE   No new complaints        Objective   "  OBJECTIVE   Vital Signs  Temp:  [97.3 °F (36.3 °C)-97.9 °F (36.6 °C)] 97.9 °F (36.6 °C)  Heart Rate:  [63-90] 86  Resp:  [18-20] 19  BP: ()/(50-82) 100/52    Flowsheet Rows    Flowsheet Row First Filed Value   Admission Height 193 cm (76\") Documented at 12/20/2022 1700   Admission Weight 127 kg (280 lb) Documented at 12/20/2022 1700           I/O last 3 completed shifts:  In: -   Out: 2000 [Urine:2000]    PHYSICAL EXAM    Physical Exam  Vitals and nursing note reviewed.   Constitutional:       Appearance: Normal appearance.   Cardiovascular:      Rate and Rhythm: Normal rate and regular rhythm.      Heart sounds: Normal heart sounds. No murmur heard.    No friction rub. No gallop.   Pulmonary:      Effort: No respiratory distress.      Breath sounds: Normal breath sounds. No stridor. No wheezing, rhonchi or rales.   Musculoskeletal:      Right lower leg: Edema present.      Left lower leg: Edema present.   Skin:     General: Skin is warm and dry.   Neurological:      Mental Status: He is alert.         RESULTS   Results Review:    Results from last 7 days   Lab Units 01/01/23  0607 12/31/22  0608 12/30/22  0806 12/29/22  0622 12/28/22  0505 12/27/22  1836   SODIUM mmol/L 128* 129* 126* 127*   < > 130*   POTASSIUM mmol/L 2.9* 3.9 3.6 3.5   < > 3.5   CHLORIDE mmol/L 87* 88* 85* 88*   < > 90*   CO2 mmol/L 28.0 25.0 24.0 22.0   < > 25.0   BUN mg/dL 86* 88* 83* 79*   < > 73*   CREATININE mg/dL 2.70* 3.21* 2.77* 2.29*   < > 2.39*   CALCIUM mg/dL 8.5* 9.1 9.0 9.0   < > 9.0   BILIRUBIN mg/dL  --   --  4.0* 3.5*  --  3.4*   ALK PHOS U/L  --   --  64 59  --  52   ALT (SGPT) U/L  --   --  443* 432*  --  422*   AST (SGOT) U/L  --   --  277* 240*  --  258*   GLUCOSE mg/dL 95 120* 111* 127*   < > 143*    < > = values in this interval not displayed.       Estimated Creatinine Clearance: 49 mL/min (A) (by C-G formula based on SCr of 2.7 mg/dL (H)).                Results from last 7 days   Lab Units 01/01/23  0607 " 12/31/22  0608 12/30/22  0640 12/29/22  0622 12/28/22  0505   WBC 10*3/mm3 3.60 4.49 3.88 4.23 4.57   HEMOGLOBIN g/dL 13.5 14.6 14.3 14.3 14.7   PLATELETS 10*3/mm3 108* 122* 132* 119* 130*               Imaging Results (Last 24 Hours)     ** No results found for the last 24 hours. **           MEDICATIONS    atorvastatin, 10 mg, Oral, Daily  carvedilol, 6.25 mg, Oral, BID With Meals  enoxaparin, 1 mg/kg, Subcutaneous, Q12H  furosemide, 120 mg, Intravenous, Daily  losartan, 12.5 mg, Oral, Q24H  Morphine, 4 mg, Intravenous, Once  pantoprazole, 40 mg, Intravenous, Q AM  sodium chloride, 10 mL, Intravenous, Q12H           Assessment & Plan   ASSESSMENT / PLAN      FRANCK (acute kidney injury) (HCC)    Severe malnutrition (HCC)    1.  Acute Kidney Failure on CKD 3: Baseline creatinine 1.4-1.7 mg/dl  - Etiology of FRANCK likely contrast induced.    - Urinalysis showed trace protein and negative blood, 3-5 RBCs, 0-2 WBCs.  Urine sodium less than 20.  Urine protein creatinine ratio 235 mg.  - Creatinine is slightly worse at 2.77 mg/dL.    Keep fluid restriction of 1500 ml a day.   - Maintain hemodynamics.  Monitor I's and O's.  Avoid nephrotoxins like NSAIDs and IV contrast    Doing a diuretic holiday resatrat lasix 120 mg from 1/1/2023, cr is better at 2.7     2.  Shortness of breath:  - Underwent CT with contrast which was nondiagnostic, no marked fluid overload on exam. No pulmonary edema on xray. SOB etiology is unclear at present.      3.  Chronic systolic CHF /AICD/ non ischemic cardiomyopathy     4.  Prediabetes     5.  Hypertension:  - Blood pressure is borderline low. Decreased carvedilol to 6.25 mg twice day.      6.  KHANH:  - Supposed to be using CPAP at home     7. Acute cholecystitis:  - denies marked abdominal pain and also has some nausea and vomiting x 1. Denies acid reflux. Continue protonix.       Copied text in this note has been reviewed and is accurate as of 12/30/2022           This document has been  electronically signed by Karen Mcfarland MD on January 1, 2023 13:25 CST           Electronically signed by Karen Mcfarland MD at 01/01/23 1325     Moshe Aleman DO at 01/01/23 1315              [facility] Medicine Services  INPATIENT PROGRESS NOTE    Length of Stay: 10  Date of Admission: 12/20/2022  Primary Care Physician: Shonna Ng APRN    Subjective   Chief Complaint: Shortness of breath  HPI:   Patient seen and examined.  1/1/2023.  Patient is doing much better.  More awake alert.  Pain is completely resolved.  Hiccups have improved with Thorazine.  We will continue current treatment and monitor.  The patient will have diet advanced today.  We will ask for nutritional consult and once patient is tolerating p.o. intake without any problems or concerns can be discharged home.  Patient will need home health on discharge.  Patient is in agreement with current plan.  Family is at bedside.  Questions are answered.  Vital signs are stable patient is afebrile patient has no new problems or complaints.  As of today, 01/01/23  Review of Systems   Constitutional: Positive for activity change and fatigue. Negative for chills and fever.        Abdominal pain resolved.  Hiccups resolved.  No new problems or concerns.  Shortness of breath is mainly with deconditioning.   HENT: Negative.    Eyes: Negative.    Respiratory: Positive for shortness of breath. Negative for chest tightness.    Cardiovascular: Negative.    Gastrointestinal: Positive for abdominal pain and nausea.        Abdominal pain appears to be better.  We will continue to monitor.   Endocrine: Negative.    Genitourinary: Negative.    Musculoskeletal: Negative.    Skin: Negative.  Negative for wound.   Neurological: Positive for weakness.   Hematological: Negative.    Psychiatric/Behavioral: Negative.         All pertinent negatives and positives are as above. All other systems have been reviewed and  are negative unless otherwise stated.    Objective    Temp:  [97.3 °F (36.3 °C)-97.9 °F (36.6 °C)] 97.9 °F (36.6 °C)  Heart Rate:  [63-90] 86  Resp:  [18-20] 19  BP: ()/(50-82) 100/52    AM-PAC 6 Clicks Score (PT): 24 (01/01/23 0800)    As of today, 01/01/23  Physical Exam  Vitals and nursing note reviewed.   Constitutional:       Appearance: He is well-developed.      Comments: Less abdominal pain and discomfort.  Nontender nondistended.   HENT:      Head: Normocephalic and atraumatic.      Right Ear: External ear normal.      Left Ear: External ear normal.      Nose: Nose normal.      Mouth/Throat:      Mouth: Mucous membranes are dry.      Pharynx: Oropharynx is clear.   Eyes:      Conjunctiva/sclera: Conjunctivae normal.      Pupils: Pupils are equal, round, and reactive to light.   Cardiovascular:      Rate and Rhythm: Normal rate. Rhythm irregularly irregular.      Heart sounds: Normal heart sounds. No murmur heard.    No friction rub. No gallop.   Pulmonary:      Effort: Pulmonary effort is normal. No respiratory distress.      Breath sounds: Normal breath sounds. No wheezing or rales.      Comments: Decreased breath sounds at the bases otherwise clear  Chest:      Chest wall: No tenderness.   Abdominal:      General: Bowel sounds are normal. There is no distension.      Palpations: Abdomen is soft.      Tenderness: There is no abdominal tenderness.      Comments: Soft nontender nondistended normoactive bowel sounds   Musculoskeletal:         General: Normal range of motion.      Cervical back: Normal range of motion and neck supple.   Skin:     General: Skin is warm and dry.      Coloration: Skin is not jaundiced.   Neurological:      General: No focal deficit present.      Mental Status: He is alert and oriented to person, place, and time.   Psychiatric:         Mood and Affect: Mood normal.         Behavior: Behavior normal.           Results Review:  I have reviewed the labs, radiology results, and  diagnostic studies.    Laboratory Data:   Results from last 7 days   Lab Units 01/01/23  0607 12/31/22  0608 12/30/22  0806 12/29/22  0622 12/28/22  0505 12/27/22  1836   SODIUM mmol/L 128* 129* 126* 127*   < > 130*   POTASSIUM mmol/L 2.9* 3.9 3.6 3.5   < > 3.5   CHLORIDE mmol/L 87* 88* 85* 88*   < > 90*   CO2 mmol/L 28.0 25.0 24.0 22.0   < > 25.0   BUN mg/dL 86* 88* 83* 79*   < > 73*   CREATININE mg/dL 2.70* 3.21* 2.77* 2.29*   < > 2.39*   GLUCOSE mg/dL 95 120* 111* 127*   < > 143*   CALCIUM mg/dL 8.5* 9.1 9.0 9.0   < > 9.0   BILIRUBIN mg/dL  --   --  4.0* 3.5*  --  3.4*   ALK PHOS U/L  --   --  64 59  --  52   ALT (SGPT) U/L  --   --  443* 432*  --  422*   AST (SGOT) U/L  --   --  277* 240*  --  258*   ANION GAP mmol/L 13.0 16.0* 17.0* 17.0*   < > 15.0    < > = values in this interval not displayed.     Estimated Creatinine Clearance: 49 mL/min (A) (by C-G formula based on SCr of 2.7 mg/dL (H)).          Results from last 7 days   Lab Units 01/01/23  0607 12/31/22  0608 12/30/22  0640 12/29/22  0622 12/28/22  0505   WBC 10*3/mm3 3.60 4.49 3.88 4.23 4.57   HEMOGLOBIN g/dL 13.5 14.6 14.3 14.3 14.7   HEMATOCRIT % 40.8 43.5 42.7 43.3 45.8   PLATELETS 10*3/mm3 108* 122* 132* 119* 130*           Culture Data:   No results found for: BLOODCX  No results found for: URINECX  No results found for: RESPCX  No results found for: WOUNDCX  No results found for: STOOLCX  No components found for: BODYFLD    Radiology Data:   Imaging Results (Last 24 Hours)     ** No results found for the last 24 hours. **          I have reviewed the patient's current medications.     Assessment/Plan     Active Hospital Problems    Diagnosis    • **FRANCK (acute kidney injury) (HCC)    • Severe malnutrition (HCC)        Plan:    1.  Shortness of breath: May be slightly better.  CT scan for pulmonary embolism was nondiagnostic.  VQ scan low probability.  Currently stable improved.    2.  Volume depletion: Continue to hydrate gently.  Appreciate  nephrology help.  Evelina stable.    3.  Chronic systolic congestive heart failure: Appears euvolemic..  Continue home medications.    Eliquis currently on hold in case of surgery.  Patient has been placed on Lovenox due to atrial fibrillation.  This is a chronic issue.  Rate is controlled currently.    4.  History of hyperglycemia: No diagnosis of diabetes.    Continue sliding scale    5.  Obstructive sleep apnea: Patient states its been over 2 years since he has had a CPAP.  He is scheduled to go  his CPAP on 12/29.    6.  Acute kidney injury: Renal function is unchanged from yesterday.  Superimposed upon chronic kidney disease.  Appreciate nephrology help.    7.  Possible cholecystitis:  Medical management for now.  No leukocytosis.  Continue Zosyn.  HIDA scan completed.  Clear liquid diet.  Eliquis discontinued 12/25.    Dr. Pierce assistance appreciated.  We will continue to monitor.  Patient does show slight improvement with the addition of Thorazine.  Possibly recommending EGD and colonoscopy if abdominal pain and nausea persist.  Placed on Bentyl improving.  Abdominal pain is less intense.  The patient is feeling much better.  We will advance diet as tolerated.  Anticipate discharge soon.  We will continue to monitor response to current treatment.    8.  DVT prophylaxis: SCDs.    CODE STATUS full code    Remained stable currently anticipate discharge soon.  Possibly 24 to 48 hours if he continues to progress has he has been so far.    The patient was evaluated during the global COVID-19 pandemic, and the diagnosis was suspected/considered upon their initial presentation.  Evaluation, treatment, and testing were consistent with current guidelines for patients who present with complaints or symptoms that may be related to COVID-19.    I confirmed that the patient's Advance Care Plan is present, code status is documented, or surrogate decision maker is listed in the patient's medical record.        Discharge Planning: I expect patient to be discharged to home in 3-4 days.    This document has been electronically signed by Moshe Aleman DO on January 1, 2023 13:16 CST        Electronically signed by Moshe Aleman DO at 01/01/23 1319       Medical Student Notes (last 24 hours)  Notes from 01/02/23 1128 through 01/03/23 1128   No notes of this type exist for this encounter.         Consult Notes (last 24 hours)  Notes from 01/02/23 1128 through 01/03/23 1128   No notes of this type exist for this encounter.

## 2023-01-03 NOTE — PROGRESS NOTES
[facility] Medicine Services  INPATIENT PROGRESS NOTE    Length of Stay: 12  Date of Admission: 12/20/2022  Primary Care Physician: Shonna Ng APRN    Subjective   Chief Complaint: No new complaints.    HPI: Patient is seen for follow-up today 1/3/2023.  He is doing better, less deconditioned, tolerating his diet and voices no new complaints.  He has good urinary output and swelling both legs persist.  He voices no new complaints.    Review of Systems   Constitutional: Positive for activity change and fatigue. Negative for appetite change, chills, diaphoresis and fever.   HENT: Negative for trouble swallowing and voice change.    Eyes: Negative for photophobia and visual disturbance.   Respiratory: Negative for cough, choking, chest tightness, shortness of breath, wheezing and stridor.    Cardiovascular: Positive for leg swelling. Negative for chest pain and palpitations.   Gastrointestinal: Negative for abdominal distention, abdominal pain, blood in stool, constipation, diarrhea, nausea and vomiting.   Endocrine: Negative for cold intolerance, heat intolerance, polydipsia, polyphagia and polyuria.   Genitourinary: Negative for decreased urine volume, difficulty urinating, dysuria, enuresis, flank pain, frequency, hematuria and urgency.   Musculoskeletal: Negative for arthralgias, gait problem, myalgias, neck pain and neck stiffness.   Skin: Negative for pallor, rash and wound.   Neurological: Negative for dizziness, tremors, seizures, syncope, facial asymmetry, speech difficulty, weakness, light-headedness, numbness and headaches.   Hematological: Does not bruise/bleed easily.   Psychiatric/Behavioral: Negative for agitation, behavioral problems and confusion.       Objective    Temp:  [96 °F (35.6 °C)-96.6 °F (35.9 °C)] 96 °F (35.6 °C)  Heart Rate:  [71-90] 90  Resp:  [18-20] 18  BP: ()/(52-80) 70/52    AM-PAC 6 Clicks Score (PT): 24 (01/02/23  1917)    Physical Exam  Vitals and nursing note reviewed.   Constitutional:       General: He is not in acute distress.     Appearance: He is well-developed. He is obese. He is ill-appearing. He is not diaphoretic.   HENT:      Head: Normocephalic and atraumatic.   Eyes:      General: No scleral icterus.        Right eye: No discharge.         Left eye: No discharge.      Extraocular Movements: Extraocular movements intact.      Pupils: Pupils are equal, round, and reactive to light.   Neck:      Thyroid: No thyromegaly.      Vascular: No carotid bruit or JVD.   Cardiovascular:      Rate and Rhythm: Normal rate. Rhythm irregular.      Heart sounds: Normal heart sounds. No murmur heard.    No friction rub. No gallop.   Pulmonary:      Effort: Pulmonary effort is normal. No respiratory distress.      Breath sounds: Normal breath sounds. No stridor. No wheezing or rales.   Chest:      Chest wall: No tenderness.   Abdominal:      General: Bowel sounds are normal. There is no distension.      Palpations: Abdomen is soft. There is no mass.      Tenderness: There is no abdominal tenderness. There is no right CVA tenderness, left CVA tenderness, guarding or rebound.      Hernia: No hernia is present.   Musculoskeletal:         General: No swelling, tenderness or deformity.      Cervical back: Normal range of motion and neck supple.      Right lower leg: Edema present.      Left lower leg: Edema present.   Skin:     General: Skin is warm and dry.      Coloration: Skin is not jaundiced or pale.      Findings: No bruising, erythema, lesion or rash.   Neurological:      General: No focal deficit present.      Mental Status: He is alert and oriented to person, place, and time.      Cranial Nerves: No cranial nerve deficit.      Sensory: No sensory deficit.      Motor: No weakness or abnormal muscle tone.      Coordination: Coordination normal.      Gait: Gait normal.      Deep Tendon Reflexes: Reflexes normal.   Psychiatric:          Mood and Affect: Mood normal.         Behavior: Behavior normal.         Thought Content: Thought content normal.         Judgment: Judgment normal.           Medication Review:    Current Facility-Administered Medications:   •  acetaminophen (TYLENOL) tablet 1,000 mg, 1,000 mg, Oral, Q6H PRN, Sudeep Wolf MD, 1,000 mg at 01/02/23 1207  •  albuterol (PROVENTIL) nebulizer solution 0.083% 2.5 mg/3mL, 2.5 mg, Nebulization, Q4H PRN, Will French MD  •  atorvastatin (LIPITOR) tablet 10 mg, 10 mg, Oral, Daily, Will French MD, 10 mg at 01/02/23 2026  •  calcium carbonate (TUMS) chewable tablet 500 mg (200 mg elemental), 2 tablet, Oral, TID PRN, Will French MD, 2 tablet at 12/24/22 1444  •  carvedilol (COREG) tablet 6.25 mg, 6.25 mg, Oral, BID With Meals, Efrain Mas MD, 6.25 mg at 01/02/23 1825  •  droperidol (INAPSINE) injection 1.25 mg, 1.25 mg, Intravenous, Q6H PRN, Sudeep Wolf MD, 1.25 mg at 12/22/22 1302  •  Enoxaparin Sodium (LOVENOX) syringe 130 mg, 1 mg/kg, Subcutaneous, Q12H, Moshe Aleman DO, 130 mg at 01/03/23 0851  •  furosemide (LASIX) 120 mg in sodium chloride 0.9 % 50 mL IVPB, 120 mg, Intravenous, Daily, Karen Mcfarland MD, 120 mg at 01/02/23 0947  •  morphine injection 4 mg, 4 mg, Intravenous, Once, Will French MD  •  ondansetron (ZOFRAN) injection 4 mg, 4 mg, Intravenous, Q6H PRN, Sudeep Wolf MD, 4 mg at 01/01/23 0830  •  pantoprazole (PROTONIX) EC tablet 40 mg, 40 mg, Oral, Q AM, Ayaan Cuba MD  •  potassium chloride (MICRO-K) CR capsule 40 mEq, 40 mEq, Oral, PRN, 40 mEq at 01/01/23 2102 **OR** potassium chloride (KLOR-CON) packet 40 mEq, 40 mEq, Oral, PRN **OR** potassium chloride 10 mEq in 100 mL IVPB, 10 mEq, Intravenous, Q1H PRN, Moshe Aleman, DO  •  simethicone (MYLICON) chewable tablet 80 mg, 80 mg, Oral, 4x Daily PRN, Delonte Caputo MD, 80 mg at 12/25/22 0025  •  sodium chloride 0.9 % flush 10 mL,  10 mL, Intravenous, Q12H, Will French MD, 10 mL at 01/03/23 0851  •  sodium chloride 0.9 % flush 10 mL, 10 mL, Intravenous, PRN, Will French MD, 10 mL at 12/30/22 0145  •  sodium chloride 0.9 % infusion 40 mL, 40 mL, Intravenous, Manolo JEAN Kevin L, MD    Results Review:  I have reviewed the labs, radiology results, and diagnostic studies.    Laboratory Data:   Results from last 7 days   Lab Units 01/03/23  0622 01/02/23  0633 01/02/23  0058 01/01/23  0607 12/31/22  0608 12/30/22  0806 12/29/22  0622 12/28/22  0505 12/27/22  1836   SODIUM mmol/L 125* 124*  --  128*   < > 126* 127*   < > 130*   POTASSIUM mmol/L 4.3 3.7 3.8 2.9*   < > 3.6 3.5   < > 3.5   CHLORIDE mmol/L 86* 86*  --  87*   < > 85* 88*   < > 90*   CO2 mmol/L 26.0 26.0  --  28.0   < > 24.0 22.0   < > 25.0   BUN mg/dL 86* 84*  --  86*   < > 83* 79*   < > 73*   CREATININE mg/dL 2.84* 2.92*  --  2.70*   < > 2.77* 2.29*   < > 2.39*   GLUCOSE mg/dL 120* 112*  --  95   < > 111* 127*   < > 143*   CALCIUM mg/dL 8.8 8.4*  --  8.5*   < > 9.0 9.0   < > 9.0   BILIRUBIN mg/dL  --   --   --   --   --  4.0* 3.5*  --  3.4*   ALK PHOS U/L  --   --   --   --   --  64 59  --  52   ALT (SGPT) U/L  --   --   --   --   --  443* 432*  --  422*   AST (SGOT) U/L  --   --   --   --   --  277* 240*  --  258*   ANION GAP mmol/L 13.0 12.0  --  13.0   < > 17.0* 17.0*   < > 15.0    < > = values in this interval not displayed.     Estimated Creatinine Clearance: 46.6 mL/min (A) (by C-G formula based on SCr of 2.84 mg/dL (H)).          Results from last 7 days   Lab Units 01/03/23  0622 01/02/23  0633 01/01/23  0607 12/31/22  0608 12/30/22  0640   WBC 10*3/mm3 2.59* 3.66 3.60 4.49 3.88   HEMOGLOBIN g/dL 13.9 13.7 13.5 14.6 14.3   HEMATOCRIT % 41.7 41.4 40.8 43.5 42.7   PLATELETS 10*3/mm3 102* 108* 108* 122* 132*           Culture Data:   No results found for: BLOODCX  No results found for: URINECX  No results found for: RESPCX  No results found for: WOUNDCX  No results  found for: STOOLCX  No components found for: BODYFLD    Radiology Data:   Imaging Results (Last 24 Hours)     ** No results found for the last 24 hours. **          I have reviewed the patient's current medications.     Assessment/Plan     Hospital Problem List:  Principal Problem:    FRANCK (acute kidney injury) (HCC)  Active Problems:    Severe malnutrition (HCC)    Acute on chronic kidney disease stage III: Patient's baseline is reportedly between 1.4-1.7.  Creatinine is down to 2.84 today.    Continue to monitor renal function and avoid nephrotoxins. Input by nephrologist is appreciated.    History of nonischemic cardiomyopathy/chronic systolic heart failure status post AICD placement (with hypotension): Patient is clinically euvolemic and asymptomatic despite low blood pressure. Chest x-ray done on admission did not show evidence of pulmonary vascular congestion.  Continue diuretics with strict I's and O's, daily weights, salt and fluid restriction.  We will give albumin secondary to hypotension and continue to monitor.  He may require transfer to the ICU for dobutamine infusion if the need arises.  We will consult cardiologist.   Echocardiogram done 8/19/2022 showed an estimated ejection fraction of 15 to 20%.    Hyponatremia: Restrict free water and monitor BMP.    Chronic atrial fibrillation: Patient is in controlled ventricular response.  Continue Coreg and anticoagulation with Lovenox.  Transition back to [facility] on discharge.    Hypokalemia: Resolved.    Possible acute cholecystitis: Patient is less symptomatic and tolerating recommended diet.  HIDA scan was unremarkable.    Obstructive sleep apnea: Continue nightly CPAP.    Deconditioning: Continue PT and OT.    Continue GI and DVT prophylaxis.    Update was given to patient's mother who was at the bedside.      Discharge Planning: In progress.    I confirmed that the patient's Advance Care Plan is present, code status is documented, or surrogate decision  maker is listed in the patient's medical record.     I have utilized all available immediate resources to obtain, update, or review the patient's current medications      Ayaan Cuba MD   01/03/23   09:37 CST

## 2023-01-03 NOTE — PLAN OF CARE
Goal Outcome Evaluation:      Pt transferred from floor to CCU. Pt c/o back and side pain at times. Pt started on bumex gtt. Pt requiring brandon gtt for a short amount of time. Brandon d/c. Dopamine and Dobutamine gtt added. Pt had pitting edema in jigna lower extremities. Villarreal placed for urinary retention.

## 2023-01-03 NOTE — PLAN OF CARE
Goal Outcome Evaluation:            Discussed care plan goals and how to continue progressing toward the goals.

## 2023-01-03 NOTE — PROGRESS NOTES
TWO PATIENT IDENTIFIERS WERE USED. THE PATIENT WAS DRAPED WITH A FULL BODY DRAPE AND THE PATIENT'S RIGHT ARM WAS PREPPED WITH CHLORA PREP. ULTRASOUND WAS USED TO LOCALIZE THERIGHT BASILIC VEIN. SUBCUTANEOUS TISSUE AT THE CATHETER SITE WAS INFILTRATED WITH 2% LIDOCAINE. UNDER ULTRASOUND GUIDANCE, THE VEIN WAS ACCESSED WITH A 21 GAUGE  NEEDLE. AN 0.018 WIRE WAS THEN THREADED THROUGH THE NEEDLE. THE 21 GAUGE NEEDLE WAS REMOVED AND A 4 Chinese SHEATH WAS PLACED OVER THE WIRE INTO THE VEIN.THE MIDLINE CATHETER WAS TRIMMED TO 20CM. THE MIDLINE CATHETER WAS THEN PLACED OVER THE WIRE INTO THE VEIN, THE SHEATH WAS PEELED AWAY, WIRE WAS REMOVED. CATHETER WAS FLUSHED WITH NORMAL SALINE AND CATHETER TIP APPLIED. BIOPATCH PLACED. CATHETER SECURED WITH STAT LOCK AND TEGADERM. PATIENT TOLERATED PROCEDURE WELL. THIS WAS DONE AT BEDSIDE      IMPRESSION:SUCCESSFUL PLACEMENT OF DUAL LUMEN MIDLINE.           Kennedi Osborne  1/3/2023  12:23 CST

## 2023-01-03 NOTE — PROGRESS NOTES
"    [facility]  [address] [place] [address]. [id number]  T - [phone]  F - [phone]     Progress Note          PATIENT  DEMOGRAPHICS   PATIENT NAME: Matti Bravo                      PHYSICIAN: Trevor Alonso MD  : 1970  MRN: [id number]   LOS: 12 days    Patient Care Team:  Shonna Ng APRN as PCP - General (Family Medicine)  Subjective   SUBJECTIVE   No new complaints , has marked LE and scrotal edema, going to ICU due to low bp       Objective   OBJECTIVE   Vital Signs  Temp:  [96 °F (35.6 °C)-96.6 °F (35.9 °C)] 96 °F (35.6 °C)  Heart Rate:  [71-90] 90  Resp:  [18-20] 18  BP: ()/(50-69) 70/50    Flowsheet Rows    Flowsheet Row First Filed Value   Admission Height 193 cm (76\") Documented at 2022 1700   Admission Weight 127 kg (280 lb) Documented at 2022 1700           I/O last 3 completed shifts:  In: 360 [P.O.:360]  Out: 360 [Urine:360]    PHYSICAL EXAM    Physical Exam  Vitals and nursing note reviewed.   Constitutional:       Appearance: Normal appearance.   Cardiovascular:      Rate and Rhythm: Normal rate and regular rhythm.      Heart sounds: Normal heart sounds. No murmur heard.    No friction rub. No gallop.   Pulmonary:      Effort: No respiratory distress.      Breath sounds: Normal breath sounds. No stridor. No wheezing, rhonchi or rales.   Musculoskeletal:      Right lower leg: Edema present.      Left lower leg: Edema present.   Skin:     General: Skin is warm and dry.   Neurological:      Mental Status: He is alert.         RESULTS   Results Review:    Results from last 7 days   Lab Units 23  0622 23  0633 23  0058 23  0607 22  0608 22  0806 22  0622 22  0505 22  1836   SODIUM mmol/L 125* 124*  --  128*   < > 126* 127*   < > 130*   POTASSIUM mmol/L 4.3 3.7 3.8 2.9*   < > 3.6 3.5   < > 3.5   CHLORIDE mmol/L 86* 86*  --  87*   < > 85* 88*   < > 90*   CO2 mmol/L 26.0 26.0  --  28.0   < " > 24.0 22.0   < > 25.0   BUN mg/dL 86* 84*  --  86*   < > 83* 79*   < > 73*   CREATININE mg/dL 2.84* 2.92*  --  2.70*   < > 2.77* 2.29*   < > 2.39*   CALCIUM mg/dL 8.8 8.4*  --  8.5*   < > 9.0 9.0   < > 9.0   BILIRUBIN mg/dL  --   --   --   --   --  4.0* 3.5*  --  3.4*   ALK PHOS U/L  --   --   --   --   --  64 59  --  52   ALT (SGPT) U/L  --   --   --   --   --  443* 432*  --  422*   AST (SGOT) U/L  --   --   --   --   --  277* 240*  --  258*   GLUCOSE mg/dL 120* 112*  --  95   < > 111* 127*   < > 143*    < > = values in this interval not displayed.       Estimated Creatinine Clearance: 46.6 mL/min (A) (by C-G formula based on SCr of 2.84 mg/dL (H)).                Results from last 7 days   Lab Units 01/03/23  0622 01/02/23  0633 01/01/23  0607 12/31/22  0608 12/30/22  0640   WBC 10*3/mm3 2.59* 3.66 3.60 4.49 3.88   HEMOGLOBIN g/dL 13.9 13.7 13.5 14.6 14.3   PLATELETS 10*3/mm3 102* 108* 108* 122* 132*               Imaging Results (Last 24 Hours)     ** No results found for the last 24 hours. **           MEDICATIONS    atorvastatin, 10 mg, Oral, Daily  carvedilol, 6.25 mg, Oral, BID With Meals  enoxaparin, 1 mg/kg, Subcutaneous, Q12H  furosemide, 120 mg, Intravenous, Daily  Morphine, 4 mg, Intravenous, Once  pantoprazole, 40 mg, Oral, Q AM  sodium chloride, 10 mL, Intravenous, Q12H      phenylephrine, 0.5-3 mcg/kg/min        Assessment & Plan   ASSESSMENT / PLAN      FRANCK (acute kidney injury) (HCC)    Severe malnutrition (HCC)    1.  Acute Kidney Failure on CKD 3: Baseline creatinine 1.4-1.7 mg/dl  - Etiology of FRANCK likely contrast induced.    - Urinalysis showed trace protein and negative blood, 3-5 RBCs, 0-2 WBCs.  Urine sodium less than 20.  Urine protein creatinine ratio 235 mg.  - Creatinine is now 2.8-2.9   Keep fluid restriction of 1500 ml a day.   - Maintain hemodynamics.  Monitor I's and O's.  Avoid nephrotoxins like NSAIDs and IV contrast  Lasix is not helping with diuresis. Will add bumex drip. May  need HD/UF   Going to icu for pressor support     2.  Shortness of breath:  - Underwent CT with contrast which was nondiagnostic     3.  Chronic systolic CHF /AICD/ non ischemic cardiomyopathy     4.  Prediabetes     5.  Hypertension:  - Blood pressure is borderline low. Now on  carvedilol to 6.25 mg twice day.      6.  KHANH:  - Supposed to be using CPAP at home     7. Acute cholecystitis:  - denies marked abdominal pain and also has some nausea and vomiting x 1. Denies acid reflux. Continue protonix.     Copied text in this note has been reviewed and is accurate as of 1/3/2023           This document has been electronically signed by Trevor Alonso MD on January 3, 2023 10:50 CST

## 2023-01-04 LAB
ALBUMIN SERPL-MCNC: 3.7 G/DL (ref 3.5–5.2)
ALP SERPL-CCNC: 122 U/L (ref 39–117)
ALT SERPL W P-5'-P-CCNC: 206 U/L (ref 1–41)
ANION GAP SERPL CALCULATED.3IONS-SCNC: 11 MMOL/L (ref 5–15)
AST SERPL-CCNC: 98 U/L (ref 1–40)
BASOPHILS # BLD AUTO: 0.01 10*3/MM3 (ref 0–0.2)
BASOPHILS NFR BLD AUTO: 0.3 % (ref 0–1.5)
BILIRUB CONJ SERPL-MCNC: 2.8 MG/DL (ref 0–0.3)
BILIRUB INDIRECT SERPL-MCNC: 2.3 MG/DL
BILIRUB SERPL-MCNC: 5.1 MG/DL (ref 0–1.2)
BUN SERPL-MCNC: 71 MG/DL (ref 6–20)
BUN/CREAT SERPL: 29.8 (ref 7–25)
CALCIUM SPEC-SCNC: 8.6 MG/DL (ref 8.6–10.5)
CHLORIDE SERPL-SCNC: 88 MMOL/L (ref 98–107)
CO2 SERPL-SCNC: 29 MMOL/L (ref 22–29)
CREAT SERPL-MCNC: 2.38 MG/DL (ref 0.76–1.27)
DEPRECATED RDW RBC AUTO: 49.3 FL (ref 37–54)
EGFRCR SERPLBLD CKD-EPI 2021: 32.2 ML/MIN/1.73
EOSINOPHIL # BLD AUTO: 0.04 10*3/MM3 (ref 0–0.4)
EOSINOPHIL NFR BLD AUTO: 1.2 % (ref 0.3–6.2)
ERYTHROCYTE [DISTWIDTH] IN BLOOD BY AUTOMATED COUNT: 15.5 % (ref 12.3–15.4)
GLUCOSE SERPL-MCNC: 103 MG/DL (ref 65–99)
HCT VFR BLD AUTO: 39 % (ref 37.5–51)
HGB BLD-MCNC: 13 G/DL (ref 13–17.7)
IMM GRANULOCYTES # BLD AUTO: 0.02 10*3/MM3 (ref 0–0.05)
IMM GRANULOCYTES NFR BLD AUTO: 0.6 % (ref 0–0.5)
LYMPHOCYTES # BLD AUTO: 0.76 10*3/MM3 (ref 0.7–3.1)
LYMPHOCYTES NFR BLD AUTO: 23.5 % (ref 19.6–45.3)
MAGNESIUM SERPL-MCNC: 2.4 MG/DL (ref 1.6–2.6)
MCH RBC QN AUTO: 28.8 PG (ref 26.6–33)
MCHC RBC AUTO-ENTMCNC: 33.3 G/DL (ref 31.5–35.7)
MCV RBC AUTO: 86.5 FL (ref 79–97)
MONOCYTES # BLD AUTO: 0.85 10*3/MM3 (ref 0.1–0.9)
MONOCYTES NFR BLD AUTO: 26.2 % (ref 5–12)
NEUTROPHILS NFR BLD AUTO: 1.56 10*3/MM3 (ref 1.7–7)
NEUTROPHILS NFR BLD AUTO: 48.2 % (ref 42.7–76)
NRBC BLD AUTO-RTO: 0.9 /100 WBC (ref 0–0.2)
PLATELET # BLD AUTO: 101 10*3/MM3 (ref 140–450)
PMV BLD AUTO: 12.7 FL (ref 6–12)
POTASSIUM SERPL-SCNC: 3.5 MMOL/L (ref 3.5–5.2)
POTASSIUM SERPL-SCNC: 3.9 MMOL/L (ref 3.5–5.2)
PROT SERPL-MCNC: 7 G/DL (ref 6–8.5)
RBC # BLD AUTO: 4.51 10*6/MM3 (ref 4.14–5.8)
SODIUM SERPL-SCNC: 128 MMOL/L (ref 136–145)
WBC NRBC COR # BLD: 3.24 10*3/MM3 (ref 3.4–10.8)

## 2023-01-04 PROCEDURE — 99232 SBSQ HOSP IP/OBS MODERATE 35: CPT | Performed by: INTERNAL MEDICINE

## 2023-01-04 PROCEDURE — 84132 ASSAY OF SERUM POTASSIUM: CPT | Performed by: INTERNAL MEDICINE

## 2023-01-04 PROCEDURE — 25010000002 DOBUTAMINE PER 250 MG: Performed by: NURSE PRACTITIONER

## 2023-01-04 PROCEDURE — P9047 ALBUMIN (HUMAN), 25%, 50ML: HCPCS | Performed by: INTERNAL MEDICINE

## 2023-01-04 PROCEDURE — 80048 BASIC METABOLIC PNL TOTAL CA: CPT | Performed by: HOSPITALIST

## 2023-01-04 PROCEDURE — 25010000002 DOPAMINE PER 40 MG: Performed by: NURSE PRACTITIONER

## 2023-01-04 PROCEDURE — 80076 HEPATIC FUNCTION PANEL: CPT | Performed by: INTERNAL MEDICINE

## 2023-01-04 PROCEDURE — 25010000002 ENOXAPARIN PER 10 MG: Performed by: HOSPITALIST

## 2023-01-04 PROCEDURE — 25010000002 ALBUMIN HUMAN 25% PER 50 ML: Performed by: INTERNAL MEDICINE

## 2023-01-04 PROCEDURE — 85025 COMPLETE CBC W/AUTO DIFF WBC: CPT | Performed by: HOSPITALIST

## 2023-01-04 PROCEDURE — 83735 ASSAY OF MAGNESIUM: CPT | Performed by: HOSPITALIST

## 2023-01-04 RX ORDER — ENOXAPARIN SODIUM 150 MG/ML
1 INJECTION SUBCUTANEOUS EVERY 12 HOURS SCHEDULED
Status: DISCONTINUED | OUTPATIENT
Start: 2023-01-04 | End: 2023-01-06

## 2023-01-04 RX ADMIN — ALBUMIN (HUMAN) 25 G: 0.25 INJECTION, SOLUTION INTRAVENOUS at 21:06

## 2023-01-04 RX ADMIN — PANTOPRAZOLE SODIUM 40 MG: 40 TABLET, DELAYED RELEASE ORAL at 05:38

## 2023-01-04 RX ADMIN — DOPAMINE HYDROCHLORIDE 2.5 MCG/KG/MIN: 160 INJECTION, SOLUTION INTRAVENOUS at 09:58

## 2023-01-04 RX ADMIN — BUMETANIDE 0.5 MG/HR: 0.25 INJECTION INTRAMUSCULAR; INTRAVENOUS at 01:55

## 2023-01-04 RX ADMIN — Medication 10 ML: at 21:18

## 2023-01-04 RX ADMIN — ALBUMIN (HUMAN) 25 G: 0.25 INJECTION, SOLUTION INTRAVENOUS at 08:05

## 2023-01-04 RX ADMIN — DOBUTAMINE HYDROCHLORIDE 2.5 MCG/KG/MIN: 100 INJECTION INTRAVENOUS at 01:56

## 2023-01-04 RX ADMIN — DOBUTAMINE HYDROCHLORIDE 2.5 MCG/KG/MIN: 100 INJECTION INTRAVENOUS at 17:18

## 2023-01-04 RX ADMIN — ENOXAPARIN SODIUM 130 MG: 150 INJECTION SUBCUTANEOUS at 08:05

## 2023-01-04 RX ADMIN — POTASSIUM CHLORIDE 40 MEQ: 750 CAPSULE, EXTENDED RELEASE ORAL at 15:57

## 2023-01-04 RX ADMIN — BUMETANIDE 0.5 MG/HR: 0.25 INJECTION INTRAMUSCULAR; INTRAVENOUS at 21:01

## 2023-01-04 RX ADMIN — ENOXAPARIN SODIUM 140 MG: 150 INJECTION SUBCUTANEOUS at 21:06

## 2023-01-04 RX ADMIN — POTASSIUM CHLORIDE 40 MEQ: 750 CAPSULE, EXTENDED RELEASE ORAL at 10:59

## 2023-01-04 NOTE — PLAN OF CARE
Goal Outcome Evaluation:  Plan of Care Reviewed With: caregiver, patient        Progress: improving  Outcome Evaluation: Nutrition F/U:  Pt transferred back to CCU due to hypotension.  Fluid overaload started on Bumex drip.  His GI symptoms are much improved and he is eating more and tolerating po diet.  Requesting diet modification to include more foods.  Will adjust diet and continue to monitor po and Gi symprotms.

## 2023-01-04 NOTE — PROGRESS NOTES
SUBJECTIVE:   1/4/2023  Chief Complaint:     Subjective      Patient is complaining of fatigue today.  Denied nausea or vomiting.  Abdominal pain is improving.  Tolerating diet.  dyspnea is improving.  In ICU for hypotension.  Hemoglobin is 13.0.  History:  Past Medical History:   Diagnosis Date   • Asthma    • Chronic systolic heart failure (HCC)    • Diabetes mellitus (HCC)    • Hyperlipidemia    • Hypertension    • Obesity    • Peripheral vascular disease (HCC)    • Sleep apnea      Past Surgical History:   Procedure Laterality Date   • CARDIAC CATHETERIZATION N/A 12/08/2021   • CARDIAC DEFIBRILLATOR PLACEMENT     • VARICOSE VEIN SURGERY Right 04/05/2019     Family History   Problem Relation Age of Onset   • Diabetes Mother    • Hypertension Father      Social History     Tobacco Use   • Smoking status: Former   • Smokeless tobacco: Never   Vaping Use   • Vaping Use: Never used   Substance Use Topics   • Alcohol use: No   • Drug use: No     Medications Prior to Admission   Medication Sig Dispense Refill Last Dose   • albuterol sulfate  (90 Base) MCG/ACT inhaler Inhale 2 puffs Every 4 (Four) Hours As Needed for Wheezing. 1 inhaler 3    • apixaban (ELIQUIS) 5 MG tablet tablet Take 1 tablet by mouth 2 (Two) Times a Day. 60 tablet 3    • bumetanide (BUMEX) 1 MG tablet Take 1 tablet by mouth 2 (Two) Times a Day. 60 tablet 2    • carvedilol (COREG) 12.5 MG tablet Take 1 tablet by mouth 2 (Two) Times a Day With Meals for 30 days. (Patient taking differently: Take 12.5 mg by mouth 2 (Two) Times a Day With Meals. Pt states he has some meds and is still taking this.) 60 tablet 3    • losartan (COZAAR) 25 MG tablet Take 0.5 tablets by mouth Daily for 30 days. 15 tablet 3    • lovastatin (ALTOPREV) 20 MG 24 hr tablet Take 20 mg by mouth.      • metOLazone (ZAROXOLYN) 2.5 MG tablet Take 1 tablet by mouth 3 (Three) Times a Week. 15 tablet 3    • potassium chloride 10 MEQ CR tablet Take 2 tablets by mouth Daily.  30 tablet 1    • vitamin D (ERGOCALCIFEROL) 1.25 MG (70365 UT) capsule capsule Take 50,000 Units by mouth 1 (One) Time Per Week.        Allergies:  Patient has no known allergies.     CURRENT MEDICATIONS/OBJECTIVE/VS/PE:     Current Medications:     Current Facility-Administered Medications   Medication Dose Route Frequency Provider Last Rate Last Admin   • acetaminophen (TYLENOL) tablet 1,000 mg  1,000 mg Oral Q6H PRN Sudeep Wolf MD   1,000 mg at 01/03/23 2037   • albumin human 25 % IV SOLN 25 g  25 g Intravenous BID Trevor Alonso MD   25 g at 01/03/23 1731   • albuterol (PROVENTIL) nebulizer solution 0.083% 2.5 mg/3mL  2.5 mg Nebulization Q4H PRN Will French MD       • atorvastatin (LIPITOR) tablet 10 mg  10 mg Oral Daily Will French MD   10 mg at 01/03/23 2037   • bumetanide (BUMEX) 10 mg in sodium chloride 0.9 % 100 mL (0.1 mg/mL) infusion  0.5 mg/hr Intravenous Continuous Trevor Alonso MD 5 mL/hr at 01/04/23 0155 0.5 mg/hr at 01/04/23 0155   • calcium carbonate (TUMS) chewable tablet 500 mg (200 mg elemental)  2 tablet Oral TID PRN Will French MD   2 tablet at 12/24/22 1444   • DOBUTamine (DOBUTREX) 1 mg/mL infusion  2.5 mcg/kg/min Intravenous Continuous Vania Andujar APRN 21 mL/hr at 01/04/23 0156 2.5 mcg/kg/min at 01/04/23 0156   • DOPamine 400 mg in 250 mL D5W infusion  2.5 mcg/kg/min Intravenous Continuous Vania Andujar APRN 13.13 mL/hr at 01/03/23 1627 2.5 mcg/kg/min at 01/03/23 1627   • droperidol (INAPSINE) injection 1.25 mg  1.25 mg Intravenous Q6H PRN Sudeep Wolf MD   1.25 mg at 12/22/22 1302   • Enoxaparin Sodium (LOVENOX) syringe 130 mg  1 mg/kg Subcutaneous Q12H Moshe Aleman DO   130 mg at 01/03/23 2037   • morphine injection 4 mg  4 mg Intravenous Once Will French MD       • ondansetron (ZOFRAN) injection 4 mg  4 mg Intravenous Q6H PRN Sudeep Wolf MD   4 mg at 01/01/23 0830   • pantoprazole (PROTONIX) EC tablet 40 mg   40 mg Oral Q AM Ayaan Cuba MD   40 mg at 01/04/23 0538   • Pharmacy to Dose enoxaparin (LOVENOX)   Does not apply Continuous PRN Vania Andujar APRN       • potassium chloride (MICRO-K) CR capsule 40 mEq  40 mEq Oral PRN Moshe Aleman R, DO   40 mEq at 01/01/23 2102    Or   • potassium chloride (KLOR-CON) packet 40 mEq  40 mEq Oral PRN Moshe Aleman R, DO        Or   • potassium chloride 10 mEq in 100 mL IVPB  10 mEq Intravenous Q1H PRN Moshe Aleman R, DO       • simethicone (MYLICON) chewable tablet 80 mg  80 mg Oral 4x Daily PRN Delonte Caputo MD   80 mg at 12/25/22 0025   • sodium chloride 0.9 % flush 10 mL  10 mL Intravenous Q12H Will French MD   10 mL at 01/03/23 2100   • sodium chloride 0.9 % flush 10 mL  10 mL Intravenous PRN Will French MD   10 mL at 12/30/22 0145   • sodium chloride 0.9 % flush 10 mL  10 mL Intravenous Q12H Ayaan Cuba MD   10 mL at 01/03/23 2100   • sodium chloride 0.9 % flush 10 mL  10 mL Intravenous Q12H Ayaan Cuba MD   10 mL at 01/03/23 2100   • sodium chloride 0.9 % flush 10 mL  10 mL Intravenous PRN Ayaan Cuba MD       • sodium chloride 0.9 % infusion 40 mL  40 mL Intravenous PRN Will French MD           Objective     Review of Systems:   Review of Systems   Constitutional: Positive for fatigue. Negative for chills, fever and unexpected weight change.   HENT: Negative for congestion, ear discharge, hearing loss, nosebleeds and sore throat.    Eyes: Negative for pain, discharge and redness.   Respiratory: Positive for shortness of breath. Negative for cough, chest tightness and wheezing.    Cardiovascular: Negative for chest pain and palpitations.   Gastrointestinal: Negative for abdominal distention, abdominal pain, blood in stool, constipation, diarrhea, nausea and vomiting.   Endocrine: Negative for cold intolerance, polydipsia, polyphagia and polyuria.   Genitourinary: Negative for dysuria, flank pain,  frequency, hematuria and urgency.   Musculoskeletal: Negative for arthralgias, back pain, joint swelling and myalgias.   Skin: Negative for color change, pallor and rash.   Neurological: Negative for tremors, seizures, syncope, weakness and headaches.   Hematological: Negative for adenopathy. Does not bruise/bleed easily.   Psychiatric/Behavioral: Negative for behavioral problems, confusion, dysphoric mood, hallucinations and suicidal ideas. The patient is not nervous/anxious.        Physical Exam:   Temp:  [95.9 °F (35.5 °C)-97.1 °F (36.2 °C)] 96.8 °F (36 °C)  Heart Rate:  [] 94  Resp:  [16-22] 20  BP: ()/() 110/92     Physical Exam:  General Appearance:    Alert, cooperative, in no acute distress   Head:    Normocephalic, without obvious abnormality, atraumatic   Eyes:            Lids and lashes normal, conjunctivae and sclerae normal, no   icterus, no pallor, corneas clear, PERRLA   Ears:    Ears appear intact with no abnormalities noted   Throat:   No oral lesions, no thrush, oral mucosa moist   Neck:   No adenopathy, supple, trachea midline, no thyromegaly, no     carotid bruit, no JVD   Back:     No kyphosis present, no scoliosis present, no skin lesions,       erythema or scars, no tenderness to percussion or                   palpation,   range of motion normal   Lungs:     Clear to auscultation,respirations regular, even and                   unlabored    Heart:    Regular rhythm and normal rate, normal S1 and S2, no            murmur, no gallop, no rub, no click   Breast Exam:    Deferred   Abdomen:     Normal bowel sounds, no masses, no organomegaly, soft        nontender, nondistended, no guarding, no rebound                 tenderness   Genitalia:    Deferred   Extremities:   Moves all extremities well, no edema, no cyanosis, no              redness   Pulses:   Pulses palpable and equal bilaterally   Skin:   No bleeding, bruising or rash   Lymph nodes:   No palpable adenopathy    Neurologic:   Cranial nerves 2 - 12 grossly intact, sensation intact, DTR        present and equal bilaterally      Results Review:     Lab Results (last 24 hours)     Procedure Component Value Units Date/Time    Basic Metabolic Panel [[id number]]  (Abnormal) Collected: 01/04/23 0623    Specimen: Blood Updated: 01/04/23 0720     Glucose 103 mg/dL      BUN 71 mg/dL      Creatinine 2.38 mg/dL      Sodium 128 mmol/L      Potassium 3.5 mmol/L      Chloride 88 mmol/L      CO2 29.0 mmol/L      Calcium 8.6 mg/dL      BUN/Creatinine Ratio 29.8     Anion Gap 11.0 mmol/L      eGFR 32.2 mL/min/1.73      Comment: National Kidney Foundation and American Society of Nephrology (ASN) Task Force recommended calculation based on the Chronic Kidney Disease Epidemiology Collaboration (CKD-EPI) equation refit without adjustment for race.       Narrative:      GFR Normal >60  Chronic Kidney Disease <60  Kidney Failure <15      Magnesium [[id number]]  (Normal) Collected: 01/04/23 0623    Specimen: Blood Updated: 01/04/23 0715     Magnesium 2.4 mg/dL     CBC & Differential [[id number]]  (Abnormal) Collected: 01/04/23 0623    Specimen: Blood Updated: 01/04/23 0645    Narrative:      The following orders were created for panel order CBC & Differential.  Procedure                               Abnormality         Status                     ---------                               -----------         ------                     CBC Auto Differential[[id number]]        Abnormal            Final result               Scan Slide[[id number]]                                                                    Please view results for these tests on the individual orders.    CBC Auto Differential [[id number]]  (Abnormal) Collected: 01/04/23 0623    Specimen: Blood Updated: 01/04/23 0644     WBC 3.24 10*3/mm3      RBC 4.51 10*6/mm3      Hemoglobin 13.0 g/dL      Hematocrit 39.0 %      MCV 86.5 fL      MCH 28.8 pg      MCHC 33.3 g/dL      RDW 15.5 %       RDW-SD 49.3 fl      MPV 12.7 fL      Platelets 101 10*3/mm3      Neutrophil % 48.2 %      Lymphocyte % 23.5 %      Monocyte % 26.2 %      Eosinophil % 1.2 %      Basophil % 0.3 %      Immature Grans % 0.6 %      Neutrophils, Absolute 1.56 10*3/mm3      Lymphocytes, Absolute 0.76 10*3/mm3      Monocytes, Absolute 0.85 10*3/mm3      Eosinophils, Absolute 0.04 10*3/mm3      Basophils, Absolute 0.01 10*3/mm3      Immature Grans, Absolute 0.02 10*3/mm3      nRBC 0.9 /100 WBC     Manual Differential [[id number]]  (Abnormal) Collected: 01/03/23 0622    Specimen: Blood Updated: 01/03/23 0750     Neutrophil % 51.0 %      Lymphocyte % 27.0 %      Monocyte % 20.0 %      Basophil % 1.0 %      Atypical Lymphocyte % 1.0 %      Neutrophils Absolute 1.32 10*3/mm3      Lymphocytes Absolute 0.73 10*3/mm3      Monocytes Absolute 0.52 10*3/mm3      Basophils Absolute 0.03 10*3/mm3      Anisocytosis Slight/1+     Ovalocytes Slight/1+     Target Cells --     Comment: Few target cells observed        WBC Morphology Normal     Platelet Estimate Adequate           I reviewed the patient's new clinical results.  I reviewed the patient's new imaging results and agree with the interpretation.     ASSESSMENT/PLAN:   ASSESSMENT: 1.  Left-sided abdominal pain, improving.  2.  Hypotension, likely due to low ejection fraction.  3.  Renal insufficiency  4.  Elevated liver enzymes  PLAN: 1.  Continue current supportive care  2.  Repeat LFTs in a.m.  The risks, benefits, and alternatives of this procedure have been discussed with the patient or the responsible party- the patient understands and agrees to proceed.         Bea Pierce MD  01/04/23  07:33 CST

## 2023-01-04 NOTE — PROGRESS NOTES
[facility]  INPATIENT PROGRESS NOTE    Length of Stay: 13  Date of Admission: 12/20/2022  Primary Care Physician: Shonna Ng APRN    Subjective   Chief Complaint: No new complaints.    HPI: Patient is seen for follow-up today 1/4/2023.  He was transferred to the ICU yesterday secondary to hypotension and currently on dopamine and dobutamine infusions.  His blood pressure has since improved. He is is doing better, less deconditioned, tolerating his diet and voices no new complaints.  He has good urinary output and swelling both legs persist but much improved.  He voices no new complaints.    Review of Systems   Constitutional: Positive for activity change and fatigue. Negative for appetite change, chills, diaphoresis and fever.   HENT: Negative for trouble swallowing and voice change.    Eyes: Negative for photophobia and visual disturbance.   Respiratory: Negative for cough, choking, chest tightness, shortness of breath, wheezing and stridor.    Cardiovascular: Positive for leg swelling. Negative for chest pain and palpitations.   Gastrointestinal: Negative for abdominal distention, abdominal pain, blood in stool, constipation, diarrhea, nausea and vomiting.   Endocrine: Negative for cold intolerance, heat intolerance, polydipsia, polyphagia and polyuria.   Genitourinary: Negative for decreased urine volume, difficulty urinating, dysuria, enuresis, flank pain, frequency, hematuria and urgency.   Musculoskeletal: Negative for arthralgias, gait problem, myalgias, neck pain and neck stiffness.   Skin: Negative for pallor, rash and wound.   Neurological: Negative for dizziness, tremors, seizures, syncope, facial asymmetry, speech difficulty, weakness, light-headedness, numbness and headaches.   Hematological: Does not bruise/bleed easily.   Psychiatric/Behavioral: Negative for agitation, behavioral problems and confusion.       Objective    Temp:  [95.9  °F (35.5 °C)-97.1 °F (36.2 °C)] 96.9 °F (36.1 °C)  Heart Rate:  [] 105  Resp:  [16-22] 22  BP: ()/() 122/11 BP:122/66    AM-PAC 6 Clicks Score (PT): 22 (01/04/23 0800)    Physical Exam  Vitals and nursing note reviewed.   Constitutional:       General: He is not in acute distress.     Appearance: He is well-developed. He is obese. He is ill-appearing. He is not diaphoretic.   HENT:      Head: Normocephalic and atraumatic.   Eyes:      General: No scleral icterus.        Right eye: No discharge.         Left eye: No discharge.      Extraocular Movements: Extraocular movements intact.      Pupils: Pupils are equal, round, and reactive to light.   Neck:      Thyroid: No thyromegaly.      Vascular: No carotid bruit or JVD.   Cardiovascular:      Rate and Rhythm: Normal rate. Rhythm irregular.      Heart sounds: Normal heart sounds. No murmur heard.    No friction rub. No gallop.   Pulmonary:      Effort: Pulmonary effort is normal. No respiratory distress.      Breath sounds: Normal breath sounds. No stridor. No wheezing or rales.   Chest:      Chest wall: No tenderness.   Abdominal:      General: Bowel sounds are normal. There is no distension.      Palpations: Abdomen is soft. There is no mass.      Tenderness: There is no abdominal tenderness. There is no right CVA tenderness, left CVA tenderness, guarding or rebound.      Hernia: No hernia is present.   Musculoskeletal:         General: No swelling, tenderness or deformity.      Cervical back: Normal range of motion and neck supple.      Right lower leg: Edema present.      Left lower leg: Edema present.   Skin:     General: Skin is warm and dry.      Coloration: Skin is not jaundiced or pale.      Findings: No bruising, erythema, lesion or rash.   Neurological:      General: No focal deficit present.      Mental Status: He is alert and oriented to person, place, and time.      Cranial Nerves: No cranial nerve deficit.      Sensory: No sensory  deficit.      Motor: No weakness or abnormal muscle tone.      Coordination: Coordination normal.      Gait: Gait normal.      Deep Tendon Reflexes: Reflexes normal.   Psychiatric:         Mood and Affect: Mood normal.         Behavior: Behavior normal.         Thought Content: Thought content normal.         Judgment: Judgment normal.           Medication Review:    Current Facility-Administered Medications:   •  acetaminophen (TYLENOL) tablet 1,000 mg, 1,000 mg, Oral, Q6H PRN, Sudeep Wolf MD, 1,000 mg at 01/03/23 2037  •  albumin human 25 % IV SOLN 25 g, 25 g, Intravenous, BID, Trevor Alonso MD, 25 g at 01/04/23 0805  •  albuterol (PROVENTIL) nebulizer solution 0.083% 2.5 mg/3mL, 2.5 mg, Nebulization, Q4H PRN, Will French MD  •  atorvastatin (LIPITOR) tablet 10 mg, 10 mg, Oral, Daily, Will French MD, 10 mg at 01/03/23 2037  •  bumetanide (BUMEX) 10 mg in sodium chloride 0.9 % 100 mL (0.1 mg/mL) infusion, 0.5 mg/hr, Intravenous, Continuous, Trevor Alonso MD, Last Rate: 5 mL/hr at 01/04/23 0155, 0.5 mg/hr at 01/04/23 0155  •  calcium carbonate (TUMS) chewable tablet 500 mg (200 mg elemental), 2 tablet, Oral, TID PRN, Will French MD, 2 tablet at 12/24/22 1444  •  DOBUTamine (DOBUTREX) 1 mg/mL infusion, 2.5 mcg/kg/min, Intravenous, Continuous, Vania Andujar APRN, Last Rate: 21 mL/hr at 01/04/23 0156, 2.5 mcg/kg/min at 01/04/23 0156  •  DOPamine 400 mg in 250 mL D5W infusion, 2.5 mcg/kg/min, Intravenous, Continuous, Vania Andujar APRN, Last Rate: 13.13 mL/hr at 01/03/23 1627, 2.5 mcg/kg/min at 01/03/23 1627  •  droperidol (INAPSINE) injection 1.25 mg, 1.25 mg, Intravenous, Q6H PRN, Sudeep Wolf MD, 1.25 mg at 12/22/22 1302  •  Enoxaparin Sodium (LOVENOX) syringe 130 mg, 1 mg/kg, Subcutaneous, Q12H, Moshe Aleman DO, 130 mg at 01/04/23 0805  •  morphine injection 4 mg, 4 mg, Intravenous, Once, Will French MD  •  ondansetron (ZOFRAN) injection 4 mg, 4  mg, Intravenous, Q6H PRN, Sudeep Wolf MD, 4 mg at 01/01/23 0830  •  pantoprazole (PROTONIX) EC tablet 40 mg, 40 mg, Oral, Q AM, Ayaan Cuba MD, 40 mg at 01/04/23 0538  •  Pharmacy to Dose enoxaparin (LOVENOX), , Does not apply, Continuous PRN, Vania Andujar, APRN  •  potassium chloride (MICRO-K) CR capsule 40 mEq, 40 mEq, Oral, PRN, 40 mEq at 01/01/23 2102 **OR** potassium chloride (KLOR-CON) packet 40 mEq, 40 mEq, Oral, PRN **OR** potassium chloride 10 mEq in 100 mL IVPB, 10 mEq, Intravenous, Q1H PRN, Moshe Aleman DO  •  simethicone (MYLICON) chewable tablet 80 mg, 80 mg, Oral, 4x Daily PRN, Delonte Caputo MD, 80 mg at 12/25/22 0025  •  sodium chloride 0.9 % flush 10 mL, 10 mL, Intravenous, Q12H, Will French MD, 10 mL at 01/03/23 2100  •  sodium chloride 0.9 % flush 10 mL, 10 mL, Intravenous, PRN, Will French MD, 10 mL at 12/30/22 0145  •  sodium chloride 0.9 % flush 10 mL, 10 mL, Intravenous, Q12H, Ayaan Cuba MD, 10 mL at 01/03/23 2100  •  sodium chloride 0.9 % flush 10 mL, 10 mL, Intravenous, Q12H, Ayaan Cuba MD, 10 mL at 01/03/23 2100  •  sodium chloride 0.9 % flush 10 mL, 10 mL, Intravenous, PRN, Ayaan Cuba MD  •  sodium chloride 0.9 % infusion 40 mL, 40 mL, Intravenous, PRN, Will French MD    Results Review:  I have reviewed the labs, radiology results, and diagnostic studies.    Laboratory Data:   Results from last 7 days   Lab Units 01/04/23  0623 01/03/23  0622 01/02/23  0633 12/31/22  0608 12/30/22  0806 12/29/22  0622   SODIUM mmol/L 128* 125* 124*   < > 126* 127*   POTASSIUM mmol/L 3.5 4.3 3.7   < > 3.6 3.5   CHLORIDE mmol/L 88* 86* 86*   < > 85* 88*   CO2 mmol/L 29.0 26.0 26.0   < > 24.0 22.0   BUN mg/dL 71* 86* 84*   < > 83* 79*   CREATININE mg/dL 2.38* 2.84* 2.92*   < > 2.77* 2.29*   GLUCOSE mg/dL 103* 120* 112*   < > 111* 127*   CALCIUM mg/dL 8.6 8.8 8.4*   < > 9.0 9.0   BILIRUBIN mg/dL 5.1*  --   --   --  4.0* 3.5*    ALK PHOS U/L 122*  --   --   --  64 59   ALT (SGPT) U/L 206*  --   --   --  443* 432*   AST (SGOT) U/L 98*  --   --   --  277* 240*   ANION GAP mmol/L 11.0 13.0 12.0   < > 17.0* 17.0*    < > = values in this interval not displayed.     Estimated Creatinine Clearance: 56.1 mL/min (A) (by C-G formula based on SCr of 2.38 mg/dL (H)).  Results from last 7 days   Lab Units 01/04/23  0623   MAGNESIUM mg/dL 2.4         Results from last 7 days   Lab Units 01/04/23  0623 01/03/23  0622 01/02/23  0633 01/01/23  0607 12/31/22  0608   WBC 10*3/mm3 3.24* 2.59* 3.66 3.60 4.49   HEMOGLOBIN g/dL 13.0 13.9 13.7 13.5 14.6   HEMATOCRIT % 39.0 41.7 41.4 40.8 43.5   PLATELETS 10*3/mm3 101* 102* 108* 108* 122*           Culture Data:   No results found for: BLOODCX  No results found for: URINECX  No results found for: RESPCX  No results found for: WOUNDCX  No results found for: STOOLCX  No components found for: BODYFLD    Radiology Data:   Imaging Results (Last 24 Hours)     Procedure Component Value Units Date/Time    XR Chest 1 View [[id number]] Collected: 01/03/23 1611     Updated: 01/03/23 1638    Narrative:        PORTABLE CHEST    HISTORY: Shortness of breath. Acute kidney failure.    Portable AP upright film of the chest was obtained at 4:19 PM.  COMPARISON: December 20, 2022    FINDINGS:   EKG leads.  Left-sided pacemaker remains in place with lead projected over  the right ventricle.  Stable cardiomegaly.  Cannot exclude atelectasis or infiltrate retrocardiac region left  lower lobe.  Cannot exclude small left pleural effusion.  The pulmonary vasculature is not increased.  No pneumothorax.  No acute osseous abnormality.      Impression:      CONCLUSION:  Left-sided pacemaker remains in place with lead projected over  the right ventricle.  Stable cardiomegaly.  Cannot exclude atelectasis or infiltrate retrocardiac region left  lower lobe.  Cannot exclude small left pleural effusion.    [id number]    Electronically signed by:  Mukul  Ernie NAJERA  1/3/2023 4:35 PM CST  Workstation: [id number]    US Guided Vascular Access [[id number]] Collected: 01/03/23 1215     Updated: 01/03/23 1257    Narrative:      PROCEDURE: Ultrasound Guidance Vascular Access    HISTORY: access, N17.9 Acute kidney failure, unspecified R06.02  Shortness of breath E87.6 Hypokalemia R79.89 Other specified  abnormal findings of blood chemistry Z74.09 Other reduced  mobility Z78.9 Other specified health status Z74.09 Other reduced  mobility    FINDINGS:  Realtime ultrasound imaging was utilized to visualize needle  entry into the patent right basilic vein during midline catheter   placement and a permanent image was stored for permanent  recording and reporting.       Impression:      Imaging obtained during vascular access device placement under  ultrasound guidance as described above    Electronically signed by:  Leeroy Alex MD  1/3/2023 12:55 PM CST  Workstation: [id number]    IR Insert Midline Without Port Pump 5 Plus [[id number]] Resulted: 01/03/23 1223     Updated: 01/03/23 1223    Narrative:      This procedure was auto-finalized with no dictation required.          I have reviewed the patient's current medications.     Assessment/Plan     Hospital Problem List:  Principal Problem:    FRANCK (acute kidney injury) (HCC)  Active Problems:    Severe malnutrition (HCC)    Acute on chronic kidney disease stage III: Patient's baseline is reportedly between 1.4-1.7.  Creatinine is down to 2.34 today.    Continue to monitor renal function and avoid nephrotoxins. Input by nephrologist is appreciated.    History of nonischemic cardiomyopathy/chronic systolic heart failure status post AICD placement (with hypotension): Patient appears clinically euvolemic and chest x-ray done yesterday did not show any evidence of pulmonary vascular congestion.  Continue Bumex, dobutamine and dopamine infusions,  with strict I's and O's, daily weights, salt and fluid restriction.  Input by cardiologist  is appreciated.  Echocardiogram done 8/19/2022 showed an estimated ejection fraction of 15 to 20%.  Elevated LFTs are reactive and will be monitored.    Hyponatremia: Improving.  Restrict free water and monitor BMP.    Chronic atrial fibrillation: Patient is in controlled ventricular response.  Continue Coreg and anticoagulation with Lovenox.  Transition back to [facility] on discharge.    Hypokalemia: Resolved.    Possible acute cholecystitis: Patient is less symptomatic and tolerating recommended diet.  HIDA scan was unremarkable.    Obstructive sleep apnea: Continue nightly CPAP.    Deconditioning: Continue PT and OT.    Continue GI and DVT prophylaxis.    Discharge Planning: In progress.    I confirmed that the patient's Advance Care Plan is present, code status is documented, or surrogate decision maker is listed in the patient's medical record.     I have utilized all available immediate resources to obtain, update, or review the patient's current medications.    35 minutes of critical care time was spent in the assessment and formulation of treatment plan for this patient exclusive of billable procedures.      Ayaan Cuba MD   01/04/23   09:53 CST

## 2023-01-04 NOTE — PROGRESS NOTES
"Adult Nutrition  Assessment/PES    Patient Name:  Matti Bravo  YOB: 1970  MRN: [id number]  Admit Date:  12/20/2022    Assessment Date:  1/4/2023    Comments:  Pt transferred to CCU due to hypotension.  He was on [place] for a short time but now off.  Started on Bumex; Dobutmine and Dopamine for fluid overload.  Per staff they have already removed 5-6L.  He is in much better spirits and reports that his stomach is \"100% better.\"  He denies any Nausea, Vomiting or Abd Pain.   At this time Hypotension is being managed with Dopamine and Dobutamine--he is being monitored for TAchycardia; LFT noted to be elevated from Liver congestion; A/C CHF--on Bumex; New on set of AFib; and concern for possible cholecystitis--pt has been on Zosyn and had a HIDA scan. General surgery has recommended conservative measures as EF is <20%.    Pt reports that his appetite is \"so much better\" and he is requesting a more liberalized diet.  Intake is ~67% average.  Nsg feels this is appropriate and will let me know if MD does not agree.  RD did send MD a note regarding diet change.  Cautioned pt on the need to limit high fat foods and to limit foods that may cause distress.    Will continue to monitor POC and po intake and tolerance.       Reason for Assessment     Row Name 01/04/23 1210          Reason for Assessment    Reason For Assessment follow-up protocol                Nutrition/Diet History     Row Name 01/04/23 1210          Nutrition/Diet History    Typical Intake (Food/Fluid/EN/PN) Pt in much better spirits.  He said that he is feeling \"100% better\"  No gi distress--no N/V or abd pain.  He is wanting to a more liberlized diet.  Nsg said that they are fine with this and will monitor                Labs/Tests/Procedures/Meds     Row Name 01/04/23 1212          Labs/Procedures/Meds    Lab Results Reviewed reviewed, pertinent     Lab Results Comments Na 124; Gluc 103; Bun 71; Creat 2.38; Platelets 101        " Diagnostic Tests/Procedures    Diagnostic Test/Procedure Reviewed reviewed, pertinent     Diagnostic Test/Procedures Comments Transfer back to CCU; Diuresis        Medications    Pertinent Medications Reviewed reviewed, pertinent     Pertinent Medications Comments Bumex IV; IV Alb; Protonix; Dobutamine; Dobutrex                Physical Findings     Row Name 01/04/23 1214          Physical Findings    Overall Physical Appearance Edema bilateral lower ext                  Nutrition Prescription Ordered     Row Name 01/04/23 1214          Nutrition Prescription PO    Current PO Diet Regular     Fluid Consistency Thin     Common Modifiers GI Soft/Forest;Other (comment)  low fiber                Evaluation of Received Nutrient/Fluid Intake     Row Name 01/04/23 1215          PO Evaluation    Number of Meals 3     % PO Intake 50/100/50                   Problem/Interventions:   Problem 1     Row Name 01/04/23 1215          Nutrition Diagnoses Problem 1    Problem 1 Altered Nutrition Related to Labs     Etiology (related to) Medical Diagnosis;Medication Interaction     Metabolic/ICU Hyponatremia;Elevated LFTs;Hyperbilirubinemia     Renal FRANCK     Food/Medication Interaction Diuretic     Signs/Symptoms (evidenced by) Biochemical     Specific Labs Noted BUN;Creatinine;Total bilirubin;ALT                  Problem 3     Row Name 01/04/23 1216          Nutrition Diagnoses Problem 3    Problem 3 Inadequate Intake/Infusion     Inadequate Intake Type Oral     Macronutrient Kcal;Protein     Micronutrient Vitamin;Mineral     Etiology (related to) Factors Affecting Nutrition     Appetite Improved     Signs/Symptoms (evidenced by) PO Intake     Percent (%) intake recorded 67 %     Over number of meals 3                  Intervention Goal     Row Name 01/04/23 1216          Intervention Goal    General Meet nutritional needs for age/condition;Reduce/improve symptoms     PO Tolerate PO;Meet estimated needs;Increase intake     Weight No  significant weight loss  Fluid loss is appropriate                Nutrition Intervention     Row Name 01/04/23 1217          Nutrition Intervention    RD/Tech Action Follow Tx progress;Care plan reviewd;Advise alternate selection;Advise available snack;Interview for preference;Menu provided;Encourage intake                Nutrition Prescription     Row Name 01/04/23 1217          Nutrition Prescription PO    PO Prescription Begin/change diet     Begin/Change Diet to Regular     Fluid Consistency Thin     Common Modifiers Cardiac        Nutrition Prescription EN    New EN Prescription Ordered? Yes                Education/Evaluation     Row Name 01/04/23 1218          Education    Education Education topics;Advised regarding habits/behavior     Education Topics Basic nutrition;Na+;Other (comment)  Limit high fat foods and foods that may cause nausea     Advised Regarding Habits/Behavior Food choices;Appropriate beverage;Appropriate portions;Eating pattern        Monitor/Evaluation    Monitor Per protocol;I&O;PO intake;Pertinent labs;Supplement intake;Weight;Skin status;Symptoms     Education Follow-up Reinforce PRN                 Electronically signed by:  Maria Guadalupe Hernández RD  01/04/23 12:23 CST

## 2023-01-04 NOTE — PROGRESS NOTES
"[facility] [place] Progress Note   LOS: 13 days   Patient Care Team:  Shonna Ng APRN as PCP - General (Family Medicine)    Chief Complaint:    Chief Complaint   Patient presents with   • Shortness of Breath        Subjective     Interval History:   Patient Denies: shortness of air and edema  Patient Complaints: chest pain, palpitations, dizziness and syncope  History taken from: patient chart    No acute events overnight. Great improvement overnight with inotropes and Bumex gtt. We will continue current regimen today.     Objective     Vital Sign Min/Max for last 24 hours  Temp  Min: 95.9 °F (35.5 °C)  Max: 97.1 °F (36.2 °C)   BP  Min: 70/50  Max: 144/82   Pulse  Min: 73  Max: 105   Resp  Min: 16  Max: 22   SpO2  Min: 84 %  Max: 100 %   Flow (L/min)  Min: 2  Max: 2   Weight  Min: 140 kg (307 lb 15.7 oz)  Max: 140 kg (307 lb 15.7 oz)     Flowsheet Rows    Flowsheet Row First Filed Value   Admission Height 193 cm (76\") Documented at 12/20/2022 1700   Admission Weight 127 kg (280 lb) Documented at 12/20/2022 1700            12/31/22  0403 01/02/23  0403 01/03/23  1135   Weight: (!) 137 kg (302 lb) (!) 138 kg (303 lb 9.6 oz) (!) 140 kg (307 lb 15.7 oz)       Physical Exam:  Physical Exam  Vitals and nursing note reviewed.   Constitutional:       General: He is not in acute distress.     Appearance: He is obese. He is not diaphoretic.   HENT:      Head: Normocephalic.      Right Ear: External ear normal.      Left Ear: External ear normal.   Eyes:      General: Lids are normal.      Pupils: Pupils are equal, round, and reactive to light.   Neck:      Thyroid: No thyromegaly.      Vascular: JVD present. No carotid bruit.      Trachea: No tracheal deviation.   Cardiovascular:      Rate and Rhythm: Tachycardia present. Rhythm irregularly irregular.      Heart sounds: Normal heart sounds. No murmur heard.    No friction rub. No gallop.   Pulmonary:      Effort: Pulmonary effort is normal. No respiratory distress.      " Breath sounds: No stridor. Rales present. No wheezing.   Chest:      Chest wall: No tenderness.   Abdominal:      General: Bowel sounds are normal. There is no distension.      Palpations: Abdomen is soft.      Tenderness: There is no abdominal tenderness.   Musculoskeletal:      Right lower le+ Edema present.      Left lower le+ Edema present.   Skin:     General: Skin is warm and dry.      Findings: No erythema or rash.   Neurological:      Mental Status: He is alert and oriented to person, place, and time.      Cranial Nerves: No cranial nerve deficit.      Deep Tendon Reflexes: Reflexes are normal and symmetric. Reflexes normal.   Psychiatric:         Behavior: Behavior normal.         Thought Content: Thought content normal.         Judgment: Judgment normal.          Results Review:     Results from last 7 days   Lab Units 23  0623  0623  0633 22  0608 22  0806 22  06   SODIUM mmol/L 128* 125* 124*   < > 126* 127*   POTASSIUM mmol/L 3.5 4.3 3.7   < > 3.6 3.5   CHLORIDE mmol/L 88* 86* 86*   < > 85* 88*   CO2 mmol/L 29.0 26.0 26.0   < > 24.0 22.0   BUN mg/dL 71* 86* 84*   < > 83* 79*   CREATININE mg/dL 2.38* 2.84* 2.92*   < > 2.77* 2.29*   CALCIUM mg/dL 8.6 8.8 8.4*   < > 9.0 9.0   BILIRUBIN mg/dL 5.1*  --   --   --  4.0* 3.5*   ALK PHOS U/L 122*  --   --   --  64 59   ALT (SGPT) U/L 206*  --   --   --  443* 432*   AST (SGOT) U/L 98*  --   --   --  277* 240*   GLUCOSE mg/dL 103* 120* 112*   < > 111* 127*    < > = values in this interval not displayed.       Estimated Creatinine Clearance: 56.1 mL/min (A) (by C-G formula based on SCr of 2.38 mg/dL (H)).    Results from last 7 days   Lab Units 23   MAGNESIUM mg/dL 2.4             Results from last 7 days   Lab Units 23  0623 23  0622 23  0633 23  0607 22  0608   WBC 10*3/mm3 3.24* 2.59* 3.66 3.60 4.49   HEMOGLOBIN g/dL 13.0 13.9 13.7 13.5 14.6   PLATELETS 10*3/mm3 101*  102* 108* 108* 122*           Lab Results   Component Value Date    PROBNP 5,254.0 (H) 12/20/2022       I/O last 3 completed shifts:  In: 376.3 [P.O.:360; I.V.:16.3]  Out: 5500 [Urine:5500]    Cardiographics:  ECG/EMG Results (last 24 hours)     Procedure Component Value Units Date/Time    SCANNED - TELEMETRY   [[id number]] Resulted: 12/20/22     Updated: 01/03/23 1506    SCANNED - TELEMETRY   [[id number]] Resulted: 12/20/22     Updated: 01/03/23 1530    SCANNED - TELEMETRY   [[id number]] Resulted: 12/20/22     Updated: 01/03/23 1948    SCANNED - TELEMETRY   [[id number]] Resulted: 12/20/22     Updated: 01/03/23 1948    SCANNED - TELEMETRY   [[id number]] Resulted: 12/20/22     Updated: 01/03/23 1948    SCANNED EKG [[id number]] Resulted: 12/20/22     Updated: 01/03/23 2019    SCANNED EKG [[id number]] Resulted: 12/20/22     Updated: 01/03/23 2019        Results for orders placed during the hospital encounter of 02/04/19    Adult Transthoracic Echo Limited W/ Cont if Necessary Per Protocol    Interpretation Summary  · 3D acquisition for left ventricular ejection fraction. Live 3D and full volume to assess left ventricular ejection fraction was utilized.  · Left ventricle systolic function is severely decreased. Estimated LVEF is 10%. Left ventricle cavity severely dilated with restrictive diastolic dysfunction. Findings are consistent with dilated cardiomyopathy.  · Right ventricle is mildly dilated with mildly reduced right ventricular systolic function.  · Moderate mitral valve regurgitation is present.  · Moderate tricuspid valve regurgitation is present. Pulmonary hypertension is present with a PA systolic pressure of 70 mmHg.  · There is mild pulmonic valve regurgitation present.  · There is a trivial pericardial effusion adjacent to the left ventricle      Imaging Results (Most Recent)     Procedure Component Value Units Date/Time    XR Chest 1 View [[id number]] Collected: 01/03/23 1611     Updated: 01/03/23 1638     Narrative:        PORTABLE CHEST    HISTORY: Shortness of breath. Acute kidney failure.    Portable AP upright film of the chest was obtained at 4:19 PM.  COMPARISON: December 20, 2022    FINDINGS:   EKG leads.  Left-sided pacemaker remains in place with lead projected over  the right ventricle.  Stable cardiomegaly.  Cannot exclude atelectasis or infiltrate retrocardiac region left  lower lobe.  Cannot exclude small left pleural effusion.  The pulmonary vasculature is not increased.  No pneumothorax.  No acute osseous abnormality.      Impression:      CONCLUSION:  Left-sided pacemaker remains in place with lead projected over  the right ventricle.  Stable cardiomegaly.  Cannot exclude atelectasis or infiltrate retrocardiac region left  lower lobe.  Cannot exclude small left pleural effusion.    [id number]    Electronically signed by:  Mukul Klein MD  1/3/2023 4:35 PM CST  Workstation: [id number]    US Guided Vascular Access [[id number]] Collected: 01/03/23 1215     Updated: 01/03/23 1257    Narrative:      PROCEDURE: Ultrasound Guidance Vascular Access    HISTORY: access, N17.9 Acute kidney failure, unspecified R06.02  Shortness of breath E87.6 Hypokalemia R79.89 Other specified  abnormal findings of blood chemistry Z74.09 Other reduced  mobility Z78.9 Other specified health status Z74.09 Other reduced  mobility    FINDINGS:  Realtime ultrasound imaging was utilized to visualize needle  entry into the patent right basilic vein during midline catheter   placement and a permanent image was stored for permanent  recording and reporting.       Impression:      Imaging obtained during vascular access device placement under  ultrasound guidance as described above    Electronically signed by:  Leeroy Alex MD  1/3/2023 12:55 PM CST  Workstation: [id number]    IR Insert Midline Without Port Pump 5 Plus [[id number]] Resulted: 01/03/23 1223     Updated: 01/03/23 1223    Narrative:      This procedure was auto-finalized with no  dictation required.    NM HIDA SCAN WITHOUT PHARMACOLOGICAL INTERVENTION [[id number]] Collected: 12/27/22 0958     Updated: 12/27/22 1354    Narrative:      EXAM:  NUCLEAR MEDICINE HEPATOBILIARY SCAN WITH CCK    HISTORY:Cholecystitis, N17.9 Acute kidney failure, unspecified  R06.02 Shortness of breath E87.6 Hypokalemia R79.89 Other  specified abnormal findings of blood chemistry Z74.09 Other  reduced mobility Z78.9 Other specified health status Z74.09 Other  reduced mobility    COMPARISON:    The patient received 6.3 mCi of Tc-99m Choletec IV.   Radioactivity is demonstrated within the gallbladder beginning at  45minutes following injection of the radiopharmaceutical.   Radioactivity is demonstrated within the small bowel beginning at  75 minutes following injection of the radiopharmaceutical.    Beginning at 75 minutes following injection of the  radiopharmaceutical, the patient received a fatty meal challenge  of 8 Oz of Boost plus.  This resulted in a gallbladder ejection  fraction of 9% with normal range being greater than 30%.   Technologist notes that the patient denied any symptoms with the  Kinevac infusion.       Impression:      Unremarkable hepatobiliary scan.  Suboptimal ejection fraction of gallbladder at 9%, with a fatty  meal challenge.    Electronically signed by:  Pedro Day MD  12/27/2022 1:52 PM [facility]  Workstation: [id number]    NM Lung Ventilation Perfusion [[id number]] Collected: 12/23/22 1158     Updated: 12/23/22 1619    Narrative:      Nuclear medicine ventilation perfusion lung scan    History: Shortness of breath. Pulmonary embolism suspected.    Correlation: Portable chest 5:22 PM December 20, 2022    Following the inhalation of 28 mCi of aerosolized technetium 99m  DTPA, multiple ventilation images obtained.    Following the intravenous administration of 6.0 millicuries of  technetium 99m MAA, multiple perfusion images obtained.    FINDINGS:   Inhomogeneous distribution of radionuclide, more  pronounced on  the ventilation images.  No mismatched peripheral perfusion defects to indicate pulmonary  embolism.  Cardiomegaly.  Some elevation right hemidiaphragm.      Impression:      Conclusion:  Low probability for pulmonary embolism.  Cardiomegaly.  Some elevation right hemidiaphragm.    [id number]    Electronically signed by:  Mukul Klein MD  12/23/2022 4:17 PM  CST Workstation: [id number]    US Gallbladder [[id number]] Collected: 12/21/22 0906     Updated: 12/21/22 0954    Narrative:        COMPARISON:     12/20/2022    INDICATION:     Pain and vomiting. Abnormal CT    FINDINGS: Liver is enlarged measuring 19.5 cm craniocaudal. There  is no intrahepatic or extrahepatic biliary dilatation.  The  extrahepatic bile duct measures 0.4 cm which is within normal  limits.     The gallbladder is abnormal in appearance. Wall thickening with  intramural edema. Layering sludge and stones.     The right kidney measures 12.0 x 5.1 x 6.3 cm in length.  There  is no hydronephrosis.     The visualized pancreas appears normal size configuration and  echotexture.     There is no ascites.       Impression:      1.  Abnormal appearance of the gallbladder, as above, suspicious  for acute cholecystitis. If clinically indeterminate recommend  nuclear medicine hepatobiliary scan.  2. Hepatomegaly.        RP core team activated 9:51 AM for results notification.    Electronically signed by:  Rc Wheatley MD  12/21/2022 9:52 AM CST  Workstation: [id number]    CT Angiogram Chest [[id number]] Collected: 12/20/22 2359     Updated: 12/21/22 0041    Narrative:      EXAM:  CT Angiography Chest With Intravenous Contrast    CLINICAL HISTORY:  Pulmonary embolism (PE) suspected, unknown  D-dimer, N17.9 Acute kidney failure, unspecified R06.02 Shortness  of breath E87.6 Hypokalemia R79.89 Other specified abnormal  findings of blood chemistry    TECHNIQUE:  Axial computed tomographic angiography images of the  chest with intravenous contrast.  Sagittal and  coronal  reformatted images were created and reviewed.  This CT exam was  performed using one or more of the following dose reduction  techniques:  automated exposure control, adjustment of the mA  and/or kV according to patient size, and/or use of iterative  reconstruction technique.  MIP reconstructed images were created  and reviewed.    COMPARISON:  No relevant prior studies available.    FINDINGS:    Pulmonary arteries:  Nondilated pulmonary arteries.  Nondiagnostic assessment of the pulmonary arteries for filling  defect due to minimal enhancement of the pulmonary arteries. Most  of the intravenously injected contrast contrast within the right  upper extremity and superior vena cava at the time of image  acquisition.    Aorta: No aortic aneurysm.    Lungs:  No acute pulmonary infiltrate.  No mass.    Pleural space:  No pleural effusion or pneumothorax.    Heart:  Dilation of the bilateral atria and the left ventricle.   No pericardial effusion. Left chest ICD, with lead in the right  ventricular apex.    Bones/joints:  No acute fracture.  No dislocation.    Soft tissues: Normal.    Lymph nodes:  No enlarged lymph nodes.      Impression:      1.  Nondiagnostic assessment of the pulmonary arteries. Minimal  enhancement of the pulmonary arteries.   2.  Dilation of the left heart chambers and of the right atrium.  3.  No acute pulmonary infiltrate.    Electronically signed by:  India Mayfield MD  12/21/2022 12:39  AM CST Workstation: [id number]    CT Abdomen Pelvis With Contrast [[id number]] Collected: 12/20/22 2359     Updated: 12/21/22 0032    Narrative:      PROCEDURE: CT ABDOMEN PELVIS W CONTRAST    CLINICAL HISTORY: 52 years  Male  Abdominal pain, acute,  nonlocalized, N17.9 Acute kidney failure, unspecified R06.02  Shortness of breath E87.6 Hypokalemia R79.89 Other specified  abnormal findings of blood chemistry    TECHNIQUE: Contiguous axial images obtained through the abdomen  and pelvis following  intravenous contrast administration. Coronal  and sagittal reformatted images provided.    This CT exam was performed according to our departmental  dose-optimization program, which includes one or more of the  following dose reduction techniques: automated exposure control,  adjustment of the mA and/or kV according to patient size, and/or  use of iterative reconstruction technique.    COMPARISON: No prior exams provided for comparison.     FINDINGS:    Severe cardiomegaly with pacemaker wire in place. Mild bibasilar  pulmonary edema.    Hepatomegaly without focal lesion. Gallbladder distention without  definite biliary dilatation.    The pancreas, spleen, adrenal glands, kidneys, and urinary  bladder are normal.    There is no bowel inflammation, obstruction, free intraperitoneal  air, or ascites. The appendix is normal.    Chronic degenerative changes present throughout the spine.    Atherosclerosis without abdominal aortic aneurysm or dissection      Impression:        Severe cardiomegaly. Mild bibasilar pulmonary edema.    Hepatomegaly. Nonspecific gallbladder distention. Consider right  upper quadrant ultrasound if there is pain.    Electronically signed by:  Charlee Yan MD  12/21/2022 12:29 AM  CST Workstation: [employer] Chest 1 View [[id number]] Collected: 12/20/22 1722     Updated: 12/20/22 1744    Narrative:        PORTABLE CHEST    HISTORY: Shortness of air    Portable AP upright film of the chest was obtained at 5:22 PM.  COMPARISON: December 7, 2022    FINDINGS:   Linear atelectasis medial right lung base.  Left-sided pacemaker remains in place with lead projected over  the right ventricle.  Stable cardiomegaly.  The pulmonary vasculature is not increased.  No pleural effusion.  No pneumothorax.  No acute osseous abnormality.  Dextroscoliosis thoracic spine.  Old left rib fractures.      Impression:      CONCLUSION:  Linear atelectasis medial right lung base.  Left-sided pacemaker remains in place  with lead projected over  the right ventricle.  Stable cardiomegaly.    [id number]    Electronically signed by:  Mukul Klein MD  12/20/2022 5:42 PM  CST Workstation: [id number]          NM HIDA SCAN WITHOUT PHARMACOLOGICAL INTERVENTION    Result Date: 12/27/2022  Unremarkable hepatobiliary scan. Suboptimal ejection fraction of gallbladder at 9%, with a fatty meal challenge. Electronically signed by:  Pedro Day MD  12/27/2022 1:52 PM CST Workstation: [id number]    NM Lung Ventilation Perfusion    Result Date: 12/23/2022  Conclusion: Low probability for pulmonary embolism. Cardiomegaly. Some elevation right hemidiaphragm. [id number] Electronically signed by:  Mukul Klein MD  12/23/2022 4:17 PM CST Workstation: [id number]    US Guided Vascular Access    Result Date: 1/3/2023  Imaging obtained during vascular access device placement under ultrasound guidance as described above Electronically signed by:  Leeroy Alex MD  1/3/2023 12:55 PM CST Workstation: [id number]    CT Abdomen Pelvis With Contrast    Result Date: 12/21/2022  Severe cardiomegaly. Mild bibasilar pulmonary edema. Hepatomegaly. Nonspecific gallbladder distention. Consider right upper quadrant ultrasound if there is pain. Electronically signed by:  Charlee Yan MD  12/21/2022 12:29 AM CST Workstation: [id number]    US Gallbladder    Result Date: 12/21/2022  1.  Abnormal appearance of the gallbladder, as above, suspicious for acute cholecystitis. If clinically indeterminate recommend nuclear medicine hepatobiliary scan. 2. Hepatomegaly. RP core team activated 9:51 AM for results notification. Electronically signed by:  Rc Wheatley MD  12/21/2022 9:52 AM CST Workstation: [id number]    XR Chest 1 View    Result Date: 1/3/2023  CONCLUSION: Left-sided pacemaker remains in place with lead projected over the right ventricle. Stable cardiomegaly. Cannot exclude atelectasis or infiltrate retrocardiac region left lower lobe. Cannot exclude small left pleural effusion. [id number]  Electronically signed by:  Mukul Klein MD  1/3/2023 4:35 PM CST Workstation: [id number]    XR Chest 1 View    Result Date: 12/20/2022  CONCLUSION: Linear atelectasis medial right lung base. Left-sided pacemaker remains in place with lead projected over the right ventricle. Stable cardiomegaly. [id number] Electronically signed by:  Mukul Klein MD  12/20/2022 5:42 PM CST Workstation: [id number]    XR Chest 1 View    Result Date: 12/4/2022  Retrocardiac opacification is suspicious for infiltrate. Large cardiac silhouette again seen. Suspect at least small left pleural effusion. Electronically signed by:  Deacon Ac MD  12/4/2022 9:02 PM CST Workstation: [id number]    XR Chest 1 View    Result Date: 12/4/2022  Stable left pacemaker. Stable cardiomegaly. Stable left lung base atelectasis, versus infiltrate with probable left pleural effusion.. Workstation: [id number]    CT Angiogram Chest    Result Date: 12/21/2022  1.  Nondiagnostic assessment of the pulmonary arteries. Minimal enhancement of the pulmonary arteries. 2.  Dilation of the left heart chambers and of the right atrium. 3.  No acute pulmonary infiltrate. Electronically signed by:  India Mayfield MD  12/21/2022 12:39 AM CST Workstation: [id number]    XR Chest PA & Lateral    Result Date: 12/7/2022  1.  Cardiomegaly without decompensation. 2.  Otherwise unremarkable chest. Electronically signed by:  Pj Cummins MD  12/7/2022 7:28 AM CST Workstation: [id number]      Medication Review:     Current Facility-Administered Medications:   •  acetaminophen (TYLENOL) tablet 1,000 mg, 1,000 mg, Oral, Q6H PRN, Sudeep Wolf MD, 1,000 mg at 01/03/23 2037  •  albumin human 25 % IV SOLN 25 g, 25 g, Intravenous, BID, Trevor Alonso MD, 25 g at 01/04/23 0805  •  albuterol (PROVENTIL) nebulizer solution 0.083% 2.5 mg/3mL, 2.5 mg, Nebulization, Q4H PRN, Will French MD  •  atorvastatin (LIPITOR) tablet 10 mg, 10 mg, Oral, Daily, Will French MD, 10 mg at  01/03/23 2037  •  bumetanide (BUMEX) 10 mg in sodium chloride 0.9 % 100 mL (0.1 mg/mL) infusion, 0.5 mg/hr, Intravenous, Continuous, Trevor Alonso MD, Last Rate: 5 mL/hr at 01/04/23 0155, 0.5 mg/hr at 01/04/23 0155  •  calcium carbonate (TUMS) chewable tablet 500 mg (200 mg elemental), 2 tablet, Oral, TID PRN, Will French MD, 2 tablet at 12/24/22 1444  •  DOBUTamine (DOBUTREX) 1 mg/mL infusion, 2.5 mcg/kg/min, Intravenous, Continuous, Vania Andujar APRN, Last Rate: 21 mL/hr at 01/04/23 0156, 2.5 mcg/kg/min at 01/04/23 0156  •  DOPamine 400 mg in 250 mL D5W infusion, 2.5 mcg/kg/min, Intravenous, Continuous, Vania Andujar APRN, Last Rate: 13.13 mL/hr at 01/04/23 0958, 2.5 mcg/kg/min at 01/04/23 0958  •  droperidol (INAPSINE) injection 1.25 mg, 1.25 mg, Intravenous, Q6H PRN, Sudeep Wolf MD, 1.25 mg at 12/22/22 1302  •  Enoxaparin Sodium (LOVENOX) syringe 130 mg, 1 mg/kg, Subcutaneous, Q12H, Moshe Aleman DO, 130 mg at 01/04/23 0805  •  morphine injection 4 mg, 4 mg, Intravenous, Once, Will French MD  •  ondansetron (ZOFRAN) injection 4 mg, 4 mg, Intravenous, Q6H PRN, Sudeep Wolf MD, 4 mg at 01/01/23 0830  •  pantoprazole (PROTONIX) EC tablet 40 mg, 40 mg, Oral, Q AM, Ayaan Cuba MD, 40 mg at 01/04/23 0538  •  Pharmacy to Dose enoxaparin (LOVENOX), , Does not apply, Continuous PRN, Pratima, Vania L, APRN  •  potassium chloride (MICRO-K) CR capsule 40 mEq, 40 mEq, Oral, PRN, 40 mEq at 01/01/23 2102 **OR** potassium chloride (KLOR-CON) packet 40 mEq, 40 mEq, Oral, PRN **OR** potassium chloride 10 mEq in 100 mL IVPB, 10 mEq, Intravenous, Q1H PRN, Moshe Aleman, DO  •  simethicone (MYLICON) chewable tablet 80 mg, 80 mg, Oral, 4x Daily PRN, Delonte Caputo MD, 80 mg at 12/25/22 0025  •  sodium chloride 0.9 % flush 10 mL, 10 mL, Intravenous, Q12H, Will French MD, 10 mL at 01/03/23 2100  •  sodium chloride 0.9 % flush 10 mL, 10 mL, Intravenous, PRN,  Will French MD, 10 mL at 12/30/22 0145  •  sodium chloride 0.9 % flush 10 mL, 10 mL, Intravenous, Q12H, Ayaan Cuba MD, 10 mL at 01/03/23 2100  •  sodium chloride 0.9 % flush 10 mL, 10 mL, Intravenous, Q12H, Ayaan Cuba MD, 10 mL at 01/03/23 2100  •  sodium chloride 0.9 % flush 10 mL, 10 mL, Intravenous, PRN, Ayaan Cuba MD  •  sodium chloride 0.9 % infusion 40 mL, 40 mL, Intravenous, PRN, Will French MD    Assessment & Plan       FRANCK (acute kidney injury) (HCC)    Severe malnutrition (HCC)    #1. Hypotension: Suspect to be due to in the setting of low output state from LV dysfunction. We will hold antihypertensives.  Responding well and continue dopamine and dobutamine at 2.5mcg/kg/min     -LFTs and kidney function improving.     #2. Acute on Chronic systolic CHF/NICM: EF 15-20%. NYHA Class IV, Stage D.  Patient appears hypervolemic with poor perfusion and hemodynamics requiring inotropic support.  -Continue dobutamine, dopamine as discussed above.     BETA-BLOCKER:  carvedilol on hold  ACE/ARB/ENTRESTO: losartan on hold  DIURETIC: Bumex gtt per Nephrology at 0.5mg/hr  SGL2I: should be considered  ALDOSTERONE ANTAGONIST: n/a  IMDUR/HYDRALAZINE: N/A  DIGOXIN: N/A  FR: 1,500  NA Restrction: 2grams  ICD: yes [place] [employer]  8/2022  Daily weight  Strict intake/output  Continuous cardiac monitoring     #3. New onset AF: Patient does not have prior documented history.  He was on Eliquis to prevent LV thrombus.  Recommend rate control strategy and anticoagulation.  Currently he is on Lovenox. Pharmacy to dose lovenox, currently creatinine Cl still okay for 1mg/kg BID. We will restart beta-blocker therapy when condition allows.       Plan for disposition: Continue CCU. We will continue to follow.           This document has been electronically signed by SHLEL Ruvalcaba on January 4, 2023 10:11 CST     Electronically signed by SHELL Ruvalcaba, 01/04/23, 10:11 AM  CST.    I personally saw and examined Matti Bravo after the APRN.  I personally performed a history and physical examination of the patient.  I personally reviewed independent findings and plan of care.  I discussed management with the APRN.  I agree with the APRN's documentation.    No acute overnight events.  The patient responded very well to Bumex, dopamine and dobutamine infusions.  His blood pressure has improved.  Urine output was 5.5 L yesterday.  He reported improvement in his breathing and leg swelling today.  He denied any chest discomfort.  He appears to be in rate controlled atrial fibrillation on telemetry.    Vitals:    01/04/23 0700 01/04/23 0800 01/04/23 0900 01/04/23 1000   BP: 101/68 122/66 122/74 108/63   BP Location:  Left arm     Patient Position:  Lying     Pulse: 96 100 105 89   Resp:  22     Temp:  96.9 °F (36.1 °C)     TempSrc:  Temporal     SpO2: 100% 99% 98% 100%   Weight:       Height:         On exam, he appeared comfortable lying in bed.  He was on nasal cannula oxygen supplementation.  Cardiovascular auscultation revealed irregularly irregular rhythm.  There was bilateral pitting lower extremity edema.    Nonischemic cardiomyopathy: He is s/p ICD placement.  Cardiomyopathy medications are on hold due to renal failure and hypotension.  Due to excellent response, continue Bumex gtt, low-dose dopamine gtt and low-dose dobutamine gtt  today to assist in diuresis.     Hypotension: Exact etiology unclear.  Continue dopamine gtt and dobutamine gtt today.     New onset atrial fibrillation: Continue therapeutic dose Lovenox (renally dosed) for anticoagulation.  He was previously on therapeutic anticoagulation for prevention of LV thrombus in the setting of severe LV dysfunction.  He is in atrial fibrillation at this point and appears to be rate controlled.      Transaminitis: Likely related to hypotension.  Hold Lipitor for now.    FRANCK on CKD: Nephrology following.    Please feel  free to call us any questions.    Electronically signed by Dylon Aranda MD, 01/04/23, 11:23 AM CST.

## 2023-01-04 NOTE — PLAN OF CARE
Problem: Adult Inpatient Plan of Care  Goal: Plan of Care Review  Outcome: Ongoing, Progressing  Goal: Patient-Specific Goal (Individualized)  Outcome: Ongoing, Progressing  Goal: Absence of Hospital-Acquired Illness or Injury  Outcome: Ongoing, Progressing  Intervention: Identify and Manage Fall Risk  Description: Perform standard risk assessment on admission using a validated tool or comprehensive approach appropriate to the patient; reassess fall risk frequently, with change in status or transfer to another level of care.  Communicate fall injury risk to interprofessional healthcare team.  Determine need for increased observation, equipment and environmental modification, such as low bed, signage and supportive, nonskid footwear.  Adjust safety measures to individual developmental age, stage and identified risk factors.  Reinforce the importance of safety and physical activity with patient and family.  Perform regular intentional rounding to assess need for position change, pain assessment and personal needs, including assistance with toileting.  Recent Flowsheet Documentation  Taken 1/4/2023 0200 by Geraldine Blackman RN  Safety Promotion/Fall Prevention: safety round/check completed  Taken 1/4/2023 0000 by Geraldine Blackman RN  Safety Promotion/Fall Prevention: safety round/check completed  Taken 1/3/2023 2200 by Geraldine Blackman RN  Safety Promotion/Fall Prevention: safety round/check completed  Taken 1/3/2023 2000 by Geraldine Blackman RN  Safety Promotion/Fall Prevention: safety round/check completed  Intervention: Prevent Skin Injury  Description: Perform a screening for skin injury risk, such as pressure or moisture associated skin damage on admission and at regular intervals throughout hospital stay.  Keep all areas of skin (especially folds) clean and dry.  Maintain adequate skin hydration.  Relieve and redistribute pressure and protect bony prominences; implement measures based on patient-specific risk  factors.  Match turning and repositioning schedule to clinical condition.  Encourage weight shift frequently; assist with reposition if unable to complete independently.  Float heels off bed; avoid pressure on the Achilles tendon.  Keep skin free from extended contact with medical devices.  Encourage functional activity and mobility, as early as tolerated.  Use aids (e.g., slide boards, mechanical lift) during transfer.  Recent Flowsheet Documentation  Taken 1/4/2023 0200 by Geraldine Blackman RN  Body Position: position changed independently  Taken 1/4/2023 0000 by Geraldine Blackman RN  Body Position: position changed independently  Taken 1/3/2023 2200 by Geraldine Blackman RN  Body Position: position changed independently  Taken 1/3/2023 2000 by Geraldine Blackman RN  Body Position: position changed independently  Skin Protection:   adhesive use limited   tubing/devices free from skin contact   transparent dressing maintained   skin-to-skin areas padded  Intervention: Prevent and Manage VTE (Venous Thromboembolism) Risk  Description: Assess for VTE (venous thromboembolism) risk.  Encourage and assist with early ambulation.  Initiate and maintain compression or other therapy, as indicated, based on identified risk in accordance with organizational protocol and provider order.  Encourage both active and passive leg exercises while in bed, if unable to ambulate.  Recent Flowsheet Documentation  Taken 1/3/2023 2000 by Geraldine Blackman RN  Activity Management: bedrest  VTE Prevention/Management: (lovenox) other (see comments)  Range of Motion: active ROM (range of motion) encouraged  Intervention: Prevent Infection  Description: Maintain skin and mucous membrane integrity; promote hand, oral and pulmonary hygiene.  Optimize fluid balance, nutrition, sleep and glycemic control to maximize infection resistance.  Identify potential sources of infection early to prevent or mitigate progression of infection (e.g., wound, lines,  devices).  Evaluate ongoing need for invasive devices; remove promptly when no longer indicated.  Recent Flowsheet Documentation  Taken 1/3/2023 2000 by Geraldine Blackman RN  Infection Prevention:   cohorting utilized   environmental surveillance performed   equipment surfaces disinfected   hand hygiene promoted   personal protective equipment utilized   rest/sleep promoted   single patient room provided   visitors restricted/screened  Goal: Optimal Comfort and Wellbeing  Outcome: Ongoing, Progressing  Intervention: Monitor Pain and Promote Comfort  Description: Assess pain level, treatment efficacy and patient response at regular intervals using a consistent pain scale.  Consider the presence and impact of preexisting chronic pain.  Encourage patient and caregiver involvement in pain assessment, interventions and safety measures.  Recent Flowsheet Documentation  Taken 1/3/2023 2000 by Geraldine Blackman RN  Pain Management Interventions: (bed extender)   position adjusted   pillow support provided  Intervention: Provide Person-Centered Care  Description: Use a family-focused approach to care.  Develop trust and rapport by proactively providing information, encouraging questions, addressing concerns and offering reassurance.  Acknowledge emotional response to hospitalization.  Recognize and utilize personal coping strategies.  Honor spiritual and cultural preferences.  Recent Flowsheet Documentation  Taken 1/3/2023 2000 by Geraldine Blackman RN  Trust Relationship/Rapport:   care explained   choices provided   emotional support provided   empathic listening provided   questions answered   questions encouraged   reassurance provided   thoughts/feelings acknowledged  Goal: Readiness for Transition of Care  Outcome: Ongoing, Progressing     Problem: Dysrhythmia  Goal: Normalized Cardiac Rhythm  Outcome: Ongoing, Progressing  Intervention: Monitor and Manage Cardiac Rhythm Effect  Description: Monitor electrocardiogram closely for  rate and rhythm changes; evaluate response to treatment.  Recognize elevated risk for VTE (venous thromboembolism); implement prophylaxis.  Anticipate administration of pharmacologic therapy to prevent or manage dysrhythmia, such as an antidysrhythmic agent, electrolyte replacement or binding agent.  If hypotensive, lower head of bed to enhance cerebral blood flow.  Provide oxygen therapy judiciously to avoid hyperoxemia; adjust to achieve oxygenation goal.  Promote comfort to prevent dysrhythmia triggered by increased sympathetic tone; consider pharmacologic and nonpharmacologic strategies (e.g., calm environment, rest, stress-reduction).  Anticipate need for additional therapy, such as cardioversion or cardiac rhythm management device, to improve perfusion and hemodynamic stability.  Initiate emergency protocol if life-threatening rhythm disturbance occurs.  Acknowledge fear and anxiety; encourage questions; provide support and information.  Recent Flowsheet Documentation  Taken 1/3/2023 2000 by Geraldine Blackman RN  VTE Prevention/Management: (lovenox) other (see comments)     Problem: Adjustment to Illness (Sepsis/Septic Shock)  Goal: Optimal Coping  Outcome: Ongoing, Progressing  Intervention: Optimize Psychosocial Adjustment to Illness  Description: Acknowledge, normalize, validate intensity and complexity of patient and support system response to situation.  Provide opportunity for expression of thoughts, feelings and concerns; respond with compassion and reassurance.  Decrease stress and anxiety by providing information about patient’s status and treatment.  Facilitate support system presence and participation in care; consider providing a diary in intensive care situation.  Support coping by recognizing current coping strategies; provide aid in developing new strategies.  Acknowledge and normalize difficulty in managing life-long lifestyle changes and expectations.  Assess and monitor for signs and symptoms of  psychologic distress, anxiety and depression.  Consider palliative care consult for goals of care conversation, if the condition is worsening despite treatment.  Recent Flowsheet Documentation  Taken 1/3/2023 2000 by Geraldine Blackman RN  Supportive Measures: active listening utilized  Family/Support System Care: support provided     Problem: Bleeding (Sepsis/Septic Shock)  Goal: Absence of Bleeding  Outcome: Ongoing, Progressing     Problem: Glycemic Control Impaired (Sepsis/Septic Shock)  Goal: Blood Glucose Level Within Desired Range  Outcome: Ongoing, Progressing     Problem: Infection Progression (Sepsis/Septic Shock)  Goal: Absence of Infection Signs and Symptoms  Outcome: Ongoing, Progressing  Intervention: Initiate Sepsis Management  Description: Provide fluid therapy, such as crystalloid or albumin, to increase intravascular volume, organ perfusion and oxygen delivery.  Provide respiratory support, such as oxygen therapy, noninvasive or invasive positive pressure ventilation, to achieve oxygenation and ventilation goal; avoid hyperoxemia.  Obtain cultures prior to initiating antimicrobial therapy when possible. Do not delay for laboratory results in the presence of high suspicion or clinical indicators.  Administer intravenous broad-spectrum antimicrobial therapy promptly.  Implement hemodynamic monitoring to guide intravascular support based on individual targeted parameters.  Determine and address underlying source of infection aggressively; implement transmission-based precautions and isolation, as indicated.  Recent Flowsheet Documentation  Taken 1/3/2023 2000 by Geraldine Blackman, RN  Infection Prevention:   cohorting utilized   environmental surveillance performed   equipment surfaces disinfected   hand hygiene promoted   personal protective equipment utilized   rest/sleep promoted   single patient room provided   visitors restricted/screened  Intervention: Promote Recovery  Description: Encourage pulmonary  hygiene, such as cough-enhancement and airway-clearance techniques, that may include use of incentive spirometry, deep breathing and cough.  Encourage early rehabilitation and physical activity to optimize functional ability and activity tolerance, as well as minimize delirium.  Promote energy conservation; minimize oxygen demand and consumption by adjusting environment, decreasing stimulation, maintaining normothermia and treating pain.  Optimize fluid balance, nutrition intake, sleep and glycemic control to maintain tissue perfusion and enhance immune response.  Recent Flowsheet Documentation  Taken 1/3/2023 2000 by Geraldine Blackman RN  Activity Management: bedrest     Problem: Nutrition Impaired (Sepsis/Septic Shock)  Goal: Optimal Nutrition Intake  Outcome: Ongoing, Progressing     Problem: Electrolyte Imbalance  Goal: Electrolyte Balance  Outcome: Ongoing, Progressing     Problem: Fluid and Electrolyte Imbalance (Acute Kidney Injury/Impairment)  Goal: Fluid and Electrolyte Balance  Outcome: Ongoing, Progressing     Problem: Oral Intake Inadequate (Acute Kidney Injury/Impairment)  Goal: Optimal Nutrition Intake  Outcome: Ongoing, Progressing     Problem: Renal Function Impairment (Acute Kidney Injury/Impairment)  Goal: Effective Renal Function  Outcome: Ongoing, Progressing  Intervention: Monitor and Support Renal Function  Description: Identify and address cause (e.g., perfusion, hypovolemia, infection, toxic substance, inflammation).  Classify risk, considering exposures and susceptibilities; implement preventative strategies.  Evaluate current medications [e.g., NSAID (nonsteroidal anti-inflammatory drug), aminoglycoside, iodinated contrast agent, ACE (angiotensin-converting enzyme) inhibitor] for renal excretion or toxicity; adjust as needed to preserve renal function.  Consider pharmacologic therapy (e.g., sodium bicarbonate, allopurinol) to correct acid-base imbalance and decrease uric acid.  Monitor and  maintain blood pressure within desired range with fluid management and pharmacologic therapy (e.g., diuretic, antihypertensive, vasopressor).  Anticipate plasmapheresis and immunosuppressive pharmacologic therapy for vasculitis cause of renal injury.  Use skin care emollient to decrease pruritus; discourage scratching.  Anticipate renal replacement therapy (e.g., continuous, intermittent) with need for removal of waste products and fluid.  Recent Flowsheet Documentation  Taken 1/4/2023 0000 by Geraldine Blackman RN  Medication Review/Management: medications reviewed  Taken 1/3/2023 2200 by Geraldine Blackman RN  Medication Review/Management: medications reviewed  Taken 1/3/2023 2000 by Geraldine Blackman RN  Medication Review/Management: medications reviewed     Problem: Asthma Comorbidity  Goal: Maintenance of Asthma Control  Outcome: Ongoing, Progressing  Intervention: Maintain Asthma Symptom Control  Description: Evaluate adherence to self-management (asthma action plan), such as medication, symptom-control, trigger-avoidance and self-monitoring.  Advocate for continuation of home regimen, including medication, method of delivery, schedule and symptom monitoring; acknowledge preferred modality and routine.  Minimize exposure to potential triggers, such as perfume, cleaning chemicals and all types of smoke.  Assess for proper use of inhaled medication and delivery technique; assist or reinstruct if needed.  Evaluate effectiveness of coping skills; encourage expression of feelings, expectations and concerns related to disease management and quality of life; reinforce education to enhance management plan and wellbeing.  Recent Flowsheet Documentation  Taken 1/4/2023 0000 by Geraldine Blackman RN  Medication Review/Management: medications reviewed  Taken 1/3/2023 2200 by Geraldine Blackman RN  Medication Review/Management: medications reviewed  Taken 1/3/2023 2000 by Geraldine Blackman RN  Medication Review/Management: medications  reviewed     Problem: Diabetes Comorbidity  Goal: Blood Glucose Level Within Targeted Range  Outcome: Ongoing, Progressing     Problem: Heart Failure Comorbidity  Goal: Maintenance of Heart Failure Symptom Control  Outcome: Ongoing, Progressing  Intervention: Maintain Heart Failure-Management  Description: Evaluate adherence to home heart failure self-care regimen (e.g., medication, fluid balance, sodium intake, daily weight, physical activity, telemonitoring, support).  Advocate continuation of home medication and schedule.  Consider pharmacologic therapy administration time and effects (e.g., avoid giving diuretic prior to bedtime or nitrates on empty stomach).  Monitor response to pharmacologic therapy, including weight fluctuations, blood pressure and electrolyte levels.  Monitor for signs and symptoms of anxiety and depression, including severity and duration; if present, provide psychosocial support.  Consider need for heart failure clinic or palliative care consult.  Recent Flowsheet Documentation  Taken 1/4/2023 0000 by Geraldine Blackman RN  Medication Review/Management: medications reviewed  Taken 1/3/2023 2200 by Geraldine Blackman RN  Medication Review/Management: medications reviewed  Taken 1/3/2023 2000 by Geraldine Blackman RN  Medication Review/Management: medications reviewed     Problem: Hypertension Comorbidity  Goal: Blood Pressure in Desired Range  Outcome: Ongoing, Progressing  Intervention: Maintain Blood Pressure Management  Description: Evaluate adherence to home antihypertensive regimen (e.g., exercise and activity, diet modification, medication).  Provide scheduled antihypertensive medication; consider administration time and effects (e.g., avoid giving diuretic prior to bedtime).  Monitor response to antihypertensive medication therapy (e.g., blood pressure, electrolyte levels, medication effects).  Minimize risk of orthostatic hypotension; encourage caution with position changes, particularly if  elderly.  Recent Flowsheet Documentation  Taken 1/4/2023 0000 by Geraldine Blackman RN  Medication Review/Management: medications reviewed  Taken 1/3/2023 2200 by Geraldine Blackman RN  Medication Review/Management: medications reviewed  Taken 1/3/2023 2000 by Geraldine Blackman RN  Medication Review/Management: medications reviewed     Problem: Obstructive Sleep Apnea Risk or Actual Comorbidity Management  Goal: Unobstructed Breathing During Sleep  Outcome: Ongoing, Progressing     Problem: Skin Injury Risk Increased  Goal: Skin Health and Integrity  Outcome: Ongoing, Progressing  Intervention: Optimize Skin Protection  Description: Perform a full pressure injury risk assessment, as indicated by screening, upon admission to care unit.  Reassess skin (injury risk, full inspection) frequently (e.g., scheduled interval, with change in condition) to provide optimal early detection and prevention.  Maintain adequate tissue perfusion (e.g., encourage fluid balance; avoid crossing legs, constrictive clothing or devices) to promote tissue oxygenation.  Maintain head of bed at lowest degree of elevation tolerated, considering medical condition and other restrictions.  Avoid positioning onto an area that remains reddened.  Minimize incontinence and moisture (e.g., toileting schedule; moisture-wicking pad, diaper or incontinence collection device; skin moisture barrier).  Cleanse skin promptly and gently when soiled utilizing a pH-balanced cleanser.  Relieve and redistribute pressure (e.g., scheduled position changes, weight shifts, use of support surface, medical device repositioning, protective dressing application, use of positioning device, microclimate control, use of pressure-injury-monitor  Encourage increased activity, such as sitting in a chair at the bedside or early mobilization, when able to tolerate.  Recent Flowsheet Documentation  Taken 1/4/2023 0000 by Geraldine Blackman, RN  Head of Bed (HOB) Positioning: HOB at 30  degrees  Taken 1/3/2023 2200 by Geraldine Blackman RN  Head of Bed (HOB) Positioning: HOB elevated  Taken 1/3/2023 2000 by Geraldine Blackman RN  Pressure Reduction Techniques: frequent weight shift encouraged  Head of Bed (HOB) Positioning: HOB elevated  Pressure Reduction Devices: specialty bed utilized  Skin Protection:   adhesive use limited   tubing/devices free from skin contact   transparent dressing maintained   skin-to-skin areas padded     Problem: Fall Injury Risk  Goal: Absence of Fall and Fall-Related Injury  Outcome: Ongoing, Progressing  Intervention: Identify and Manage Contributors  Description: Develop a fall prevention plan with the patient and caregiver/family.  Provide reorientation, appropriate sensory stimulation and routines with changes in mental status to decrease risk of fall.  Promote use of personal vision and auditory aids.  Assess assistance level required for safe and effective self-care; provide support as needed, such as toileting, mobilization. For age 65 and older, implement timed toileting with assistance.  Encourage physical activity, such as performance of mobility and self-care at highest level of patient ability, multicomponent exercise program and provision of appropriate assistive devices.  If fall occurs, assess the severity of injury; implement fall injury protocol. Determine the cause and revise fall injury prevention plan.  Regularly review medication contribution to fall risk; adjust medication administration times to minimize risk of falling.  Consider risk related to polypharmacy and age.  Balance adequate pain management with potential for oversedation.  Recent Flowsheet Documentation  Taken 1/4/2023 0000 by Geraldine Blackman RN  Medication Review/Management: medications reviewed  Self-Care Promotion: independence encouraged  Taken 1/3/2023 2200 by Geraldine Blackman RN  Medication Review/Management: medications reviewed  Taken 1/3/2023 2000 by Geraldine Blackman, RN  Medication  Review/Management: medications reviewed  Self-Care Promotion: independence encouraged  Intervention: Promote Injury-Free Environment  Description: Provide a safe, barrier-free environment that encourages independent activity.  Keep care area uncluttered and well-lighted.  Determine need for increased observation or monitoring.  Avoid use of devices that minimize mobility, such as restraints or indwelling urinary catheter.  Recent Flowsheet Documentation  Taken 1/4/2023 0200 by Geraldine Blackman, RN  Safety Promotion/Fall Prevention: safety round/check completed  Taken 1/4/2023 0000 by Geraldine Blackman, RN  Safety Promotion/Fall Prevention: safety round/check completed  Taken 1/3/2023 2200 by Geraldine Blackman, RN  Safety Promotion/Fall Prevention: safety round/check completed  Taken 1/3/2023 2000 by Geraldine Blackman, RN  Safety Promotion/Fall Prevention: safety round/check completed   Goal Outcome Evaluation:

## 2023-01-04 NOTE — PROGRESS NOTES
"    [facility]  [address] [place] [address]. [address]  T - [phone]  F - [phone]     Progress Note          PATIENT  DEMOGRAPHICS   PATIENT NAME: Matti Bravo                      PHYSICIAN: Trevor Alonso MD  : 1970  MRN: [id number]   LOS: 13 days    Patient Care Team:  Shonna Ng APRN as PCP - General (Family Medicine)  Subjective   SUBJECTIVE   Now has oneal. Bladder scan showed 400cc urine - oneal is placed and has 600ccUO       Objective   OBJECTIVE   Vital Signs  Temp:  [95.9 °F (35.5 °C)-97.1 °F (36.2 °C)] 96.9 °F (36.1 °C)  Heart Rate:  [] 97  Resp:  [16-22] 22  BP: ()/() 108/63    Flowsheet Rows    Flowsheet Row First Filed Value   Admission Height 193 cm (76\") Documented at 2022 1700   Admission Weight 127 kg (280 lb) Documented at 2022 1700           I/O last 3 completed shifts:  In: 376.3 [P.O.:360; I.V.:16.3]  Out: 5500 [Urine:5500]    PHYSICAL EXAM    Physical Exam  Vitals and nursing note reviewed.   Constitutional:       Appearance: Normal appearance.   Cardiovascular:      Rate and Rhythm: Normal rate and regular rhythm.      Heart sounds: Normal heart sounds. No murmur heard.    No friction rub. No gallop.   Pulmonary:      Effort: No respiratory distress.      Breath sounds: Normal breath sounds. No stridor. No wheezing, rhonchi or rales.   Musculoskeletal:      Right lower leg: Edema present.      Left lower leg: Edema present.   Skin:     General: Skin is warm and dry.   Neurological:      Mental Status: He is alert.         RESULTS   Results Review:    Results from last 7 days   Lab Units 23  0623  0622 23  0633 22  0608 22  0806 22  0622   SODIUM mmol/L 128* 125* 124*   < > 126* 127*   POTASSIUM mmol/L 3.5 4.3 3.7   < > 3.6 3.5   CHLORIDE mmol/L 88* 86* 86*   < > 85* 88*   CO2 mmol/L 29.0 26.0 26.0   < > 24.0 22.0   BUN mg/dL 71* 86* 84*   < > 83* 79*   CREATININE mg/dL " 2.38* 2.84* 2.92*   < > 2.77* 2.29*   CALCIUM mg/dL 8.6 8.8 8.4*   < > 9.0 9.0   BILIRUBIN mg/dL 5.1*  --   --   --  4.0* 3.5*   ALK PHOS U/L 122*  --   --   --  64 59   ALT (SGPT) U/L 206*  --   --   --  443* 432*   AST (SGOT) U/L 98*  --   --   --  277* 240*   GLUCOSE mg/dL 103* 120* 112*   < > 111* 127*    < > = values in this interval not displayed.       Estimated Creatinine Clearance: 56.1 mL/min (A) (by C-G formula based on SCr of 2.38 mg/dL (H)).    Results from last 7 days   Lab Units 01/04/23  0623   MAGNESIUM mg/dL 2.4             Results from last 7 days   Lab Units 01/04/23  0623 01/03/23  0622 01/02/23  0633 01/01/23  0607 12/31/22  0608   WBC 10*3/mm3 3.24* 2.59* 3.66 3.60 4.49   HEMOGLOBIN g/dL 13.0 13.9 13.7 13.5 14.6   PLATELETS 10*3/mm3 101* 102* 108* 108* 122*               Imaging Results (Last 24 Hours)     Procedure Component Value Units Date/Time    XR Chest 1 View [[id number]] Collected: 01/03/23 1611     Updated: 01/03/23 1638    Narrative:        PORTABLE CHEST    HISTORY: Shortness of breath. Acute kidney failure.    Portable AP upright film of the chest was obtained at 4:19 PM.  COMPARISON: December 20, 2022    FINDINGS:   EKG leads.  Left-sided pacemaker remains in place with lead projected over  the right ventricle.  Stable cardiomegaly.  Cannot exclude atelectasis or infiltrate retrocardiac region left  lower lobe.  Cannot exclude small left pleural effusion.  The pulmonary vasculature is not increased.  No pneumothorax.  No acute osseous abnormality.      Impression:      CONCLUSION:  Left-sided pacemaker remains in place with lead projected over  the right ventricle.  Stable cardiomegaly.  Cannot exclude atelectasis or infiltrate retrocardiac region left  lower lobe.  Cannot exclude small left pleural effusion.    [id number]    Electronically signed by:  Mukul Klein MD  1/3/2023 4:35 PM CST  Workstation: [id number]    US Guided Vascular Access [[id number]] Collected: 01/03/23 1215      Updated: 01/03/23 1257    Narrative:      PROCEDURE: Ultrasound Guidance Vascular Access    HISTORY: access, N17.9 Acute kidney failure, unspecified R06.02  Shortness of breath E87.6 Hypokalemia R79.89 Other specified  abnormal findings of blood chemistry Z74.09 Other reduced  mobility Z78.9 Other specified health status Z74.09 Other reduced  mobility    FINDINGS:  Realtime ultrasound imaging was utilized to visualize needle  entry into the patent right basilic vein during midline catheter   placement and a permanent image was stored for permanent  recording and reporting.       Impression:      Imaging obtained during vascular access device placement under  ultrasound guidance as described above    Electronically signed by:  Leeroy Alex MD  1/3/2023 12:55 PM CST  Workstation: [id number]    IR Insert Midline Without Port Pump 5 Plus [[id number]] Resulted: 01/03/23 1223     Updated: 01/03/23 1223    Narrative:      This procedure was auto-finalized with no dictation required.           MEDICATIONS    albumin human, 25 g, Intravenous, BID  enoxaparin, 1 mg/kg, Subcutaneous, Q12H  Morphine, 4 mg, Intravenous, Once  pantoprazole, 40 mg, Oral, Q AM  sodium chloride, 10 mL, Intravenous, Q12H  sodium chloride, 10 mL, Intravenous, Q12H  sodium chloride, 10 mL, Intravenous, Q12H      bumetanide, 0.5 mg/hr, Last Rate: 0.5 mg/hr (01/04/23 0155)  DOBUTamine, 2.5 mcg/kg/min, Last Rate: 2.5 mcg/kg/min (01/04/23 0156)  DOPamine, 2.5 mcg/kg/min, Last Rate: 2.5 mcg/kg/min (01/04/23 0958)  Pharmacy to Dose enoxaparin (LOVENOX),         Assessment & Plan   ASSESSMENT / PLAN      FRANCK (acute kidney injury) (HCC)    Severe malnutrition (HCC)    1.  Acute Kidney Failure on CKD 3: Baseline creatinine 1.4-1.7 mg/dl  - Etiology of FRANCK likely contrast induced.    - Urinalysis showed trace protein and negative blood, 3-5 RBCs, 0-2 WBCs.  Urine sodium less than 20.  Urine protein creatinine ratio 235 mg.  - Creatinine was 2.8-2.9   Keep  fluid restriction of 1500 ml a day.   Lasix is not helping with diuresis and due to marked anasarca we have added bumex drip. Good diuretic response with bumex. Keep the bumex drip for now. Check mg. k is stable. Observe bicarb and bun for contraction alkalosis      2.  Shortness of breath:  - Underwent CT with contrast which was nondiagnostic     3.  Chronic systolic CHF /AICD/ non ischemic cardiomyopathy     4.  Prediabetes     5.  Hypertension:  - Blood pressure is borderline low. Now on  carvedilol to 6.25 mg twice day.      6.  KHANH:  - Supposed to be using CPAP at home     7. Acute cholecystitis:  - denies marked abdominal pain and also has some nausea and vomiting x 1. Denies acid reflux. Continue protonix.     Copied text in this note has been reviewed and is accurate as of 1/4/2023           This document has been electronically signed by Trevor Alonso MD on January 4, 2023 10:46 CST

## 2023-01-05 LAB
ALBUMIN SERPL-MCNC: 3.9 G/DL (ref 3.5–5.2)
ALBUMIN/GLOB SERPL: 1.3 G/DL
ALP SERPL-CCNC: 111 U/L (ref 39–117)
ALT SERPL W P-5'-P-CCNC: 160 U/L (ref 1–41)
ANION GAP SERPL CALCULATED.3IONS-SCNC: 11 MMOL/L (ref 5–15)
AST SERPL-CCNC: 63 U/L (ref 1–40)
BASOPHILS # BLD AUTO: 0.01 10*3/MM3 (ref 0–0.2)
BASOPHILS NFR BLD AUTO: 0.2 % (ref 0–1.5)
BILIRUB SERPL-MCNC: 5.5 MG/DL (ref 0–1.2)
BUN SERPL-MCNC: 51 MG/DL (ref 6–20)
BUN/CREAT SERPL: 29.3 (ref 7–25)
CALCIUM SPEC-SCNC: 8.7 MG/DL (ref 8.6–10.5)
CHLORIDE SERPL-SCNC: 92 MMOL/L (ref 98–107)
CO2 SERPL-SCNC: 29 MMOL/L (ref 22–29)
CREAT SERPL-MCNC: 1.74 MG/DL (ref 0.76–1.27)
DEPRECATED RDW RBC AUTO: 51.5 FL (ref 37–54)
EGFRCR SERPLBLD CKD-EPI 2021: 46.9 ML/MIN/1.73
EOSINOPHIL # BLD AUTO: 0.03 10*3/MM3 (ref 0–0.4)
EOSINOPHIL NFR BLD AUTO: 0.6 % (ref 0.3–6.2)
ERYTHROCYTE [DISTWIDTH] IN BLOOD BY AUTOMATED COUNT: 15.9 % (ref 12.3–15.4)
GLOBULIN UR ELPH-MCNC: 3 GM/DL
GLUCOSE SERPL-MCNC: 140 MG/DL (ref 65–99)
HCT VFR BLD AUTO: 39.7 % (ref 37.5–51)
HGB BLD-MCNC: 13 G/DL (ref 13–17.7)
IMM GRANULOCYTES # BLD AUTO: 0.02 10*3/MM3 (ref 0–0.05)
IMM GRANULOCYTES NFR BLD AUTO: 0.4 % (ref 0–0.5)
LYMPHOCYTES # BLD AUTO: 0.79 10*3/MM3 (ref 0.7–3.1)
LYMPHOCYTES NFR BLD AUTO: 17 % (ref 19.6–45.3)
MAGNESIUM SERPL-MCNC: 2 MG/DL (ref 1.6–2.6)
MCH RBC QN AUTO: 28.8 PG (ref 26.6–33)
MCHC RBC AUTO-ENTMCNC: 32.7 G/DL (ref 31.5–35.7)
MCV RBC AUTO: 87.8 FL (ref 79–97)
MONOCYTES # BLD AUTO: 0.88 10*3/MM3 (ref 0.1–0.9)
MONOCYTES NFR BLD AUTO: 19 % (ref 5–12)
NEUTROPHILS NFR BLD AUTO: 2.91 10*3/MM3 (ref 1.7–7)
NEUTROPHILS NFR BLD AUTO: 62.8 % (ref 42.7–76)
NRBC BLD AUTO-RTO: 0.6 /100 WBC (ref 0–0.2)
PLATELET # BLD AUTO: 91 10*3/MM3 (ref 140–450)
PMV BLD AUTO: 13 FL (ref 6–12)
POTASSIUM SERPL-SCNC: 4.1 MMOL/L (ref 3.5–5.2)
PROT SERPL-MCNC: 6.9 G/DL (ref 6–8.5)
RBC # BLD AUTO: 4.52 10*6/MM3 (ref 4.14–5.8)
SODIUM SERPL-SCNC: 132 MMOL/L (ref 136–145)
WBC NRBC COR # BLD: 4.64 10*3/MM3 (ref 3.4–10.8)

## 2023-01-05 PROCEDURE — 97162 PT EVAL MOD COMPLEX 30 MIN: CPT

## 2023-01-05 PROCEDURE — 25010000002 DOPAMINE PER 40 MG: Performed by: NURSE PRACTITIONER

## 2023-01-05 PROCEDURE — 99232 SBSQ HOSP IP/OBS MODERATE 35: CPT | Performed by: INTERNAL MEDICINE

## 2023-01-05 PROCEDURE — 25010000002 ENOXAPARIN PER 10 MG: Performed by: HOSPITALIST

## 2023-01-05 PROCEDURE — 85025 COMPLETE CBC W/AUTO DIFF WBC: CPT | Performed by: HOSPITALIST

## 2023-01-05 PROCEDURE — 83735 ASSAY OF MAGNESIUM: CPT | Performed by: INTERNAL MEDICINE

## 2023-01-05 PROCEDURE — 25010000002 DOBUTAMINE PER 250 MG: Performed by: NURSE PRACTITIONER

## 2023-01-05 PROCEDURE — 25010000002 METOCLOPRAMIDE PER 10 MG: Performed by: INTERNAL MEDICINE

## 2023-01-05 PROCEDURE — 25010000002 ALBUMIN HUMAN 25% PER 50 ML: Performed by: INTERNAL MEDICINE

## 2023-01-05 PROCEDURE — P9047 ALBUMIN (HUMAN), 25%, 50ML: HCPCS | Performed by: INTERNAL MEDICINE

## 2023-01-05 PROCEDURE — 80053 COMPREHEN METABOLIC PANEL: CPT | Performed by: INTERNAL MEDICINE

## 2023-01-05 PROCEDURE — 97168 OT RE-EVAL EST PLAN CARE: CPT

## 2023-01-05 RX ORDER — LANOLIN ALCOHOL/MO/W.PET/CERES
3 CREAM (GRAM) TOPICAL NIGHTLY PRN
Status: DISCONTINUED | OUTPATIENT
Start: 2023-01-05 | End: 2023-01-14 | Stop reason: HOSPADM

## 2023-01-05 RX ORDER — METOCLOPRAMIDE HYDROCHLORIDE 5 MG/ML
10 INJECTION INTRAMUSCULAR; INTRAVENOUS ONCE
Status: COMPLETED | OUTPATIENT
Start: 2023-01-05 | End: 2023-01-05

## 2023-01-05 RX ADMIN — PANTOPRAZOLE SODIUM 40 MG: 40 TABLET, DELAYED RELEASE ORAL at 06:02

## 2023-01-05 RX ADMIN — ENOXAPARIN SODIUM 140 MG: 150 INJECTION SUBCUTANEOUS at 08:24

## 2023-01-05 RX ADMIN — DOBUTAMINE HYDROCHLORIDE 2.5 MCG/KG/MIN: 100 INJECTION INTRAVENOUS at 16:18

## 2023-01-05 RX ADMIN — METOCLOPRAMIDE 10 MG: 5 INJECTION, SOLUTION INTRAMUSCULAR; INTRAVENOUS at 10:41

## 2023-01-05 RX ADMIN — ALBUMIN (HUMAN) 25 G: 0.25 INJECTION, SOLUTION INTRAVENOUS at 10:41

## 2023-01-05 RX ADMIN — ACETAMINOPHEN 1000 MG: 500 TABLET, FILM COATED ORAL at 00:15

## 2023-01-05 RX ADMIN — Medication 3 MG: at 22:20

## 2023-01-05 RX ADMIN — CALCIUM CARBONATE (ANTACID) CHEW TAB 500 MG 2 TABLET: 500 CHEW TAB at 00:15

## 2023-01-05 RX ADMIN — ENOXAPARIN SODIUM 140 MG: 150 INJECTION SUBCUTANEOUS at 20:11

## 2023-01-05 RX ADMIN — DOPAMINE HYDROCHLORIDE 2.5 MCG/KG/MIN: 160 INJECTION, SOLUTION INTRAVENOUS at 04:02

## 2023-01-05 RX ADMIN — DOBUTAMINE HYDROCHLORIDE 2.5 MCG/KG/MIN: 100 INJECTION INTRAVENOUS at 04:02

## 2023-01-05 NOTE — PLAN OF CARE
Goal Outcome Evaluation:  Plan of Care Reviewed With: patient           Outcome Evaluation: OT eval compelted as co-eval with PT. Pt supine in bed upon arrival. HR at 117. Pt agreeable to therapy. Reported he was (I) with all ADLs and IADLs prior to getting sick. Pt SBA for sup to sit. HR increase to 133 with mobility. Pt CGA for sit to stand. Reported he had been using the BSC in his room but did not want to at this time. Pt stood for about 8 minutes completing weight shifting and marching activity. Pt reported Mod fatigue. Pt required Min A x1 for LLE for sit to sup. Pt left supine in bed with LEs elevated and all needs in reach. Pt would benefit from I/P OT services for increased endurance, activity tolerance, (I), and safety with ADLs and mobility. Goals established.

## 2023-01-05 NOTE — PROGRESS NOTES
SUBJECTIVE:   1/4/2023  Chief Complaint:     Subjective      Patient feels better today.  Abdominal pain is improving.  Denies nausea or vomiting.  Had a bowel movement today.  Tolerating diet.  LFTs are improving.  Bilirubin remains elevated.  Blood pressure is improving.    History:  Past Medical History:   Diagnosis Date   • Asthma    • Chronic systolic heart failure (HCC)    • Diabetes mellitus (HCC)    • Hyperlipidemia    • Hypertension    • Obesity    • Peripheral vascular disease (HCC)    • Sleep apnea      Past Surgical History:   Procedure Laterality Date   • CARDIAC CATHETERIZATION N/A 12/08/2021   • CARDIAC DEFIBRILLATOR PLACEMENT     • VARICOSE VEIN SURGERY Right 04/05/2019     Family History   Problem Relation Age of Onset   • Diabetes Mother    • Hypertension Father      Social History     Tobacco Use   • Smoking status: Former   • Smokeless tobacco: Never   Vaping Use   • Vaping Use: Never used   Substance Use Topics   • Alcohol use: No   • Drug use: No     Medications Prior to Admission   Medication Sig Dispense Refill Last Dose   • albuterol sulfate  (90 Base) MCG/ACT inhaler Inhale 2 puffs Every 4 (Four) Hours As Needed for Wheezing. 1 inhaler 3    • apixaban (ELIQUIS) 5 MG tablet tablet Take 1 tablet by mouth 2 (Two) Times a Day. 60 tablet 3    • bumetanide (BUMEX) 1 MG tablet Take 1 tablet by mouth 2 (Two) Times a Day. 60 tablet 2    • carvedilol (COREG) 12.5 MG tablet Take 1 tablet by mouth 2 (Two) Times a Day With Meals for 30 days. (Patient taking differently: Take 12.5 mg by mouth 2 (Two) Times a Day With Meals. Pt states he has some meds and is still taking this.) 60 tablet 3    • losartan (COZAAR) 25 MG tablet Take 0.5 tablets by mouth Daily for 30 days. 15 tablet 3    • lovastatin (ALTOPREV) 20 MG 24 hr tablet Take 20 mg by mouth.      • metOLazone (ZAROXOLYN) 2.5 MG tablet Take 1 tablet by mouth 3 (Three) Times a Week. 15 tablet 3    • potassium chloride 10 MEQ CR tablet  Take 2 tablets by mouth Daily. 30 tablet 1    • vitamin D (ERGOCALCIFEROL) 1.25 MG (85749 UT) capsule capsule Take 50,000 Units by mouth 1 (One) Time Per Week.        Allergies:  Patient has no known allergies.     CURRENT MEDICATIONS/OBJECTIVE/VS/PE:     Current Medications:     Current Facility-Administered Medications   Medication Dose Route Frequency Provider Last Rate Last Admin   • acetaminophen (TYLENOL) tablet 1,000 mg  1,000 mg Oral Q6H PRN Sudeep Wolf MD   1,000 mg at 01/03/23 2037   • albumin human 25 % IV SOLN 25 g  25 g Intravenous BID Trevor Alonso MD   25 g at 01/04/23 2106   • albuterol (PROVENTIL) nebulizer solution 0.083% 2.5 mg/3mL  2.5 mg Nebulization Q4H PRN Will French MD       • bumetanide (BUMEX) 10 mg in sodium chloride 0.9 % 100 mL (0.1 mg/mL) infusion  0.5 mg/hr Intravenous Continuous Trevor Alonso MD 5 mL/hr at 01/04/23 2101 0.5 mg/hr at 01/04/23 2101   • calcium carbonate (TUMS) chewable tablet 500 mg (200 mg elemental)  2 tablet Oral TID PRN Will French MD   2 tablet at 12/24/22 1444   • DOBUTamine (DOBUTREX) 1 mg/mL infusion  2.5 mcg/kg/min Intravenous Continuous Vania Andujar APRN 21 mL/hr at 01/04/23 1718 2.5 mcg/kg/min at 01/04/23 1718   • DOPamine 400 mg in 250 mL D5W infusion  2.5 mcg/kg/min Intravenous Continuous Vania Andujar APRN 13.13 mL/hr at 01/04/23 0958 2.5 mcg/kg/min at 01/04/23 0958   • droperidol (INAPSINE) injection 1.25 mg  1.25 mg Intravenous Q6H PRN Sudeep Wolf MD   1.25 mg at 12/22/22 1302   • Enoxaparin Sodium (LOVENOX) syringe 140 mg  1 mg/kg Subcutaneous Q12H Moshe Aleman DO   140 mg at 01/04/23 2106   • morphine injection 4 mg  4 mg Intravenous Once Will French MD       • ondansetron (ZOFRAN) injection 4 mg  4 mg Intravenous Q6H PRN Sudeep Wolf MD   4 mg at 01/01/23 0830   • pantoprazole (PROTONIX) EC tablet 40 mg  40 mg Oral Q AM Ayaan Cuba MD   40 mg at 01/04/23 0538   •  Pharmacy to Dose enoxaparin (LOVENOX)   Does not apply Continuous PRN Vania Andujar APRN       • potassium chloride (MICRO-K) CR capsule 40 mEq  40 mEq Oral PRN AshJeanneo R, DO   40 mEq at 01/04/23 1557    Or   • potassium chloride (KLOR-CON) packet 40 mEq  40 mEq Oral PRN Ash, Moshe R, DO        Or   • potassium chloride 10 mEq in 100 mL IVPB  10 mEq Intravenous Q1H PRN Moshe Aleman R, DO       • simethicone (MYLICON) chewable tablet 80 mg  80 mg Oral 4x Daily PRN Delonte Caputo MD   80 mg at 12/25/22 0025   • sodium chloride 0.9 % flush 10 mL  10 mL Intravenous Q12H Will French MD   10 mL at 01/04/23 2118   • sodium chloride 0.9 % flush 10 mL  10 mL Intravenous PRN Will French MD   10 mL at 12/30/22 0145   • sodium chloride 0.9 % flush 10 mL  10 mL Intravenous Q12H Ayaan Cuba MD   10 mL at 01/04/23 2118   • sodium chloride 0.9 % flush 10 mL  10 mL Intravenous Q12H Ayaan Cuba MD   10 mL at 01/04/23 2118   • sodium chloride 0.9 % flush 10 mL  10 mL Intravenous PRN Ayaan Cuba MD       • sodium chloride 0.9 % infusion 40 mL  40 mL Intravenous PRN Will French MD           Objective     Review of Systems:   Review of Systems   Constitutional: Positive for fatigue. Negative for chills, fever and unexpected weight change.   HENT: Negative for congestion, ear discharge, hearing loss, nosebleeds and sore throat.    Eyes: Negative for pain, discharge and redness.   Respiratory: Positive for shortness of breath. Negative for cough, chest tightness and wheezing.    Cardiovascular: Negative for chest pain and palpitations.   Gastrointestinal: Positive for abdominal pain. Negative for abdominal distention, blood in stool, constipation, diarrhea, nausea and vomiting.   Endocrine: Negative for cold intolerance, polydipsia, polyphagia and polyuria.   Genitourinary: Negative for dysuria, flank pain, frequency, hematuria and urgency.   Musculoskeletal:  Negative for arthralgias, back pain, joint swelling and myalgias.   Skin: Negative for color change, pallor and rash.   Neurological: Negative for tremors, seizures, syncope, weakness and headaches.   Hematological: Negative for adenopathy. Does not bruise/bleed easily.   Psychiatric/Behavioral: Negative for behavioral problems, confusion, dysphoric mood, hallucinations and suicidal ideas. The patient is not nervous/anxious.        Physical Exam:   Temp:  [96.8 °F (36 °C)-97.8 °F (36.6 °C)] 97.8 °F (36.6 °C)  Heart Rate:  [] 105  Resp:  [20-22] 22  BP: ()/(56-92) 102/59     Physical Exam:  General Appearance:    Alert, cooperative, in no acute distress   Head:    Normocephalic, without obvious abnormality, atraumatic   Eyes:            Lids and lashes normal, conjunctivae and sclerae normal, no   icterus, no pallor, corneas clear, PERRLA   Ears:    Ears appear intact with no abnormalities noted   Throat:   No oral lesions, no thrush, oral mucosa moist   Neck:   No adenopathy, supple, trachea midline, no thyromegaly, no     carotid bruit, no JVD   Back:     No kyphosis present, no scoliosis present, no skin lesions,       erythema or scars, no tenderness to percussion or                   palpation,   range of motion normal   Lungs:     Clear to auscultation,respirations regular, even and                   unlabored    Heart:    Regular rhythm and normal rate, normal S1 and S2, no            murmur, no gallop, no rub, no click   Breast Exam:    Deferred   Abdomen:     Normal bowel sounds, no masses, no organomegaly, soft        nontender, nondistended, no guarding, no rebound                 tenderness   Genitalia:    Deferred   Extremities:   Moves all extremities well, no edema, no cyanosis, no              redness   Pulses:   Pulses palpable and equal bilaterally   Skin:   No bleeding, bruising or rash   Lymph nodes:   No palpable adenopathy   Neurologic:   Cranial nerves 2 - 12 grossly intact,  sensation intact, DTR        present and equal bilaterally      Results Review:     Lab Results (last 24 hours)     Procedure Component Value Units Date/Time    Potassium [[id number]]  (Normal) Collected: 01/04/23 2038    Specimen: Blood Updated: 01/04/23 2101     Potassium 3.9 mmol/L     Hepatic Function Panel [[id number]]  (Abnormal) Collected: 01/04/23 0623    Specimen: Blood Updated: 01/04/23 0852     Total Protein 7.0 g/dL      Albumin 3.7 g/dL      ALT (SGPT) 206 U/L      AST (SGOT) 98 U/L      Alkaline Phosphatase 122 U/L      Total Bilirubin 5.1 mg/dL      Bilirubin, Direct 2.8 mg/dL      Bilirubin, Indirect 2.3 mg/dL     Basic Metabolic Panel [[id number]]  (Abnormal) Collected: 01/04/23 0623    Specimen: Blood Updated: 01/04/23 0720     Glucose 103 mg/dL      BUN 71 mg/dL      Creatinine 2.38 mg/dL      Sodium 128 mmol/L      Potassium 3.5 mmol/L      Chloride 88 mmol/L      CO2 29.0 mmol/L      Calcium 8.6 mg/dL      BUN/Creatinine Ratio 29.8     Anion Gap 11.0 mmol/L      eGFR 32.2 mL/min/1.73      Comment: National Kidney Foundation and American Society of Nephrology (ASN) Task Force recommended calculation based on the Chronic Kidney Disease Epidemiology Collaboration (CKD-EPI) equation refit without adjustment for race.       Narrative:      GFR Normal >60  Chronic Kidney Disease <60  Kidney Failure <15      Magnesium [[id number]]  (Normal) Collected: 01/04/23 0623    Specimen: Blood Updated: 01/04/23 0715     Magnesium 2.4 mg/dL     CBC & Differential [[id number]]  (Abnormal) Collected: 01/04/23 0623    Specimen: Blood Updated: 01/04/23 0645    Narrative:      The following orders were created for panel order CBC & Differential.  Procedure                               Abnormality         Status                     ---------                               -----------         ------                     CBC Auto Differential[[id number]]        Abnormal            Final result               Scan  Slide[[id number]]                                                                    Please view results for these tests on the individual orders.    CBC Auto Differential [[id number]]  (Abnormal) Collected: 01/04/23 0623    Specimen: Blood Updated: 01/04/23 0644     WBC 3.24 10*3/mm3      RBC 4.51 10*6/mm3      Hemoglobin 13.0 g/dL      Hematocrit 39.0 %      MCV 86.5 fL      MCH 28.8 pg      MCHC 33.3 g/dL      RDW 15.5 %      RDW-SD 49.3 fl      MPV 12.7 fL      Platelets 101 10*3/mm3      Neutrophil % 48.2 %      Lymphocyte % 23.5 %      Monocyte % 26.2 %      Eosinophil % 1.2 %      Basophil % 0.3 %      Immature Grans % 0.6 %      Neutrophils, Absolute 1.56 10*3/mm3      Lymphocytes, Absolute 0.76 10*3/mm3      Monocytes, Absolute 0.85 10*3/mm3      Eosinophils, Absolute 0.04 10*3/mm3      Basophils, Absolute 0.01 10*3/mm3      Immature Grans, Absolute 0.02 10*3/mm3      nRBC 0.9 /100 WBC            I reviewed the patient's new clinical results.  I reviewed the patient's new imaging results and agree with the interpretation.     ASSESSMENT/PLAN:   ASSESSMENT: 1.  Elevated liver enzymes, improving.  Likely due to congestive hepatopathy.  2.  Hypotension, improving.  3.  Abdominal pain, improving.  4.  Congestive heart failure  PLAN: 1.  Continue current supportive care  2.  Continue no added salt diet and diuresis  3.  Follow LFTs closely until normalizes  The risks, benefits, and alternatives of this procedure have been discussed with the patient or the responsible party- the patient understands and agrees to proceed.         Bea Pierce MD  01/04/23  21:20 CST

## 2023-01-05 NOTE — PLAN OF CARE
Goal Outcome Evaluation:  Plan of Care Reviewed With: patient         Patient has done well this shift. Continues on the Dopamine and Dobutamine drips as well as Bumex. Patient continues to diurese well. VSS.

## 2023-01-05 NOTE — THERAPY RE-EVALUATION
Patient Name: Matti Bravo  : 1970    MRN: [id number]                              Today's Date: 2023       Admit Date: 2022    Visit Dx:     ICD-10-CM ICD-9-CM   1. FRANCK (acute kidney injury) (HCC)  N17.9 584.9   2. Shortness of breath  R06.02 786.05   3. Hypokalemia  E87.6 276.8   4. Elevated brain natriuretic peptide (BNP) level  R79.89 790.99   5. Impaired mobility and ADLs  Z74.09 V49.89    Z78.9    6. Impaired functional mobility and activity tolerance  Z74.09 V49.89     Patient Active Problem List   Diagnosis   • Chronic systolic heart failure (HCC)   • Essential hypertension   • Mixed hyperlipidemia   • Obstructive sleep apnea   • Morbid obesity (HCC)   • Restrictive lung disease secondary to obesity   • Multiple lung nodules on CT   • Diabetes mellitus (HCC)   • Varicose veins of both lower extremities with pain   • Venous insufficiency (chronic) (peripheral)   • Surgical aftercare, circulatory system   • Chest pain   • Acute on chronic congestive heart failure (HCC)   • Abnormal nuclear stress test   • Acute congestive heart failure (HCC)   • Obesity (BMI 30-39.9)   • Hypotension   • Acute congestive heart failure, unspecified heart failure type (HCC)   • Shortness of breath   • NICM (nonischemic cardiomyopathy) (HCC)   • Pulmonary hypertension (HCC)   • CHF exacerbation (HCC)   • Heart failure (HCC)   • FRANCK (acute kidney injury) (HCC)   • Severe malnutrition (HCC)     Past Medical History:   Diagnosis Date   • Asthma    • Chronic systolic heart failure (HCC)    • Diabetes mellitus (HCC)    • Hyperlipidemia    • Hypertension    • Obesity    • Peripheral vascular disease (HCC)    • Sleep apnea      Past Surgical History:   Procedure Laterality Date   • CARDIAC CATHETERIZATION N/A 2021   • CARDIAC DEFIBRILLATOR PLACEMENT     • VARICOSE VEIN SURGERY Right 2019      General Information     Row Name 23 0956          OT Time and Intention    Document Type  re-evaluation  -CM     Mode of Treatment co-treatment;occupational therapy;physical therapy  -CM     Row Name 01/05/23 0956          General Information    Patient Profile Reviewed yes  -CM     Prior Level of Function independent:;all household mobility;community mobility;gait;ADL's;driving  -CM     Existing Precautions/Restrictions fall  -CM     Barriers to Rehab medically complex  -CM     Row Name 01/05/23 0956          Living Environment    People in Home parent(s)  -CM     Row Name 01/05/23 0956          Home Main Entrance    Number of Stairs, Main Entrance five  -CM     Stair Railings, Main Entrance railing on right side (ascending)  -CM     Row Name 01/05/23 0956          Stairs Within Home, Primary    Stairs, Within Home, Primary walk-in shower, no DME  -CM     Row Name 01/05/23 0956          Cognition    Orientation Status (Cognition) oriented x 4  -CM     Row Name 01/05/23 0956          Safety Issues, Functional Mobility    Safety Issues Affecting Function (Mobility) safety precaution awareness;safety precautions follow-through/compliance;insight into deficits/self-awareness;ability to follow commands;at risk behavior observed  -CM     Impairments Affecting Function (Mobility) endurance/activity tolerance;shortness of breath;strength  -CM           User Key  (r) = Recorded By, (t) = Taken By, (c) = Cosigned By    Initials Name Provider Type    CM Ruth Ribera OT Occupational Therapist                 Mobility/ADL's     Row Name 01/05/23 0956          Bed Mobility    Bed Mobility supine-sit;sit-supine  -CM     Supine-Sit [place] (Bed Mobility) standby assist  -CM     Sit-Supine [place] (Bed Mobility) minimum assist (75% patient effort);1 person assist  -CM     Assistive Device (Bed Mobility) bed rails;head of bed elevated  -CM     Row Name 01/05/23 0956          Transfers    Transfers sit-stand transfer;stand-sit transfer  -CM     Row Name 01/05/23 0956          Sit-Stand Transfer    Sit-Stand  [place] (Transfers) contact guard;1 person assist  -CM     [facility] Name 01/05/23 0956          Stand-Sit Transfer    Stand-Sit [place] (Transfers) contact guard;1 person assist  -CM           User Key  (r) = Recorded By, (t) = Taken By, (c) = Cosigned By    Initials Name Provider Type    Ruth Rubalcava OT Occupational Therapist               Obj/Interventions     Row Name 01/05/23 0956          Sensory Assessment (Somatosensory)    Sensory Assessment (Somatosensory) UE sensation intact  -CM     [facility] Name 01/05/23 0956          Range of Motion Comprehensive    General Range of Motion bilateral upper extremity ROM WFL  -CM     Row Name 01/05/23 0956          Strength Comprehensive (MMT)    Comment, General Manual Muscle Testing (MMT) Assessment BUE 4/5 grossly  -CM           User Key  (r) = Recorded By, (t) = Taken By, (c) = Cosigned By    Initials Name Provider Type    Ruth Rubalcava, OT Occupational Therapist               Goals/Plan     [facility] Name 01/05/23 1604          Bathing Goal 1 (OT)    Activity/Device (Bathing Goal 1, OT) lower body bathing  -CM     [place] Level/Cues Needed (Bathing Goal 1, OT) modified independence  -CM     Time Frame (Bathing Goal 1, OT) long term goal (LTG);by discharge  -CM     Progress/Outcomes (Bathing Goal 1, OT) goal not met  -CM     [facility] Name 01/05/23 1604          Strength Goal 1 (OT)    Strength Goal 1 (OT) Pt will tolerate at least 25 minutes UE strengthening exercises with only min fatigue  for increased endurance and strength required for ADLs and mobility.  -CM     Time Frame (Strength Goal 1, OT) long term goal (LTG);by discharge  -CM     Progress/Outcome (Strength Goal 1, OT) goal not met  -CM     [facility] Name 01/05/23 1604          Problem Specific Goal 1 (OT)    Problem Specific Goal 1 (OT) Pt will tolerate at least 20 minutes OOB ADLs and therrapeutic activities for increased endurance, safety, and (I) with ADLs and mobility.  -CM     Time Frame (Problem Specific  Goal 1, OT) long term goal (LTG);by discharge  -CM     Progress/Outcome (Problem Specific Goal 1, OT) goal not met  -CM     Row Name 01/05/23 7769          Therapy Assessment/Plan (OT)    Planned Therapy Interventions (OT) activity tolerance training;BADL retraining;adaptive equipment training;cognitive/visual perception retraining;manual therapy/joint mobilization;IADL retraining;functional balance retraining;edema control/reduction;neuromuscular control/coordination retraining;occupation/activity based interventions;ROM/therapeutic exercise;patient/caregiver education/training;passive ROM/stretching;strengthening exercise;transfer/mobility retraining  -           User Key  (r) = Recorded By, (t) = Taken By, (c) = Cosigned By    Initials Name Provider Type    CM Ruth Ribera OT Occupational Therapist               Clinical Impression     Row Name 01/05/23 0937          Pain Assessment    Pretreatment Pain Rating 0/10 - no pain  -CM     Posttreatment Pain Rating 0/10 - no pain  -CM     Row Name 01/05/23 4929          Plan of Care Review    Plan of Care Reviewed With patient  -CM     Outcome Evaluation OT eval compelted as co-eval with PT. Pt supine in bed upon arrival. HR at 117. Pt agreeable to therapy. Reported he was (I) with all ADLs and IADLs prior to getting sick. Pt SBA for sup to sit. HR increase to 133 with mobility. Pt CGA for sit to stand. Reported he had been using the BSC in his room but did not want to at this time. Pt stood for about 8 minutes completing weight shifting and marching activity. Pt reported Mod fatigue. Pt required Min A x1 for LLE for sit to sup. Pt left supine in bed with LEs elevated and all needs in reach. Pt would benefit from I/P OT services for increased endurance, activity tolerance, (I), and safety with ADLs and mobility. Goals established.  -CM     Row Name 01/05/23 3869          Therapy Assessment/Plan (OT)    Patient/Family Therapy Goal Statement (OT) return home  -CM      Rehab Potential (OT) good, to achieve stated therapy goals  -CM     Criteria for Skilled Therapeutic Interventions Met (OT) yes;meets criteria  -CM     Therapy Frequency (OT) other (see comments)  5-7 d/wk  -CM     Predicted Duration of Therapy Intervention (OT) until d/c or all goals met  -CM     Row Name 01/05/23 0956          Vital Signs    Pre Systolic BP Rehab 110  -CM     Pre Treatment Diastolic BP 76  -CM     Post Systolic BP Rehab 115  -CM     Post Treatment Diastolic BP 85  -CM     Pretreatment Heart Rate (beats/min) 117  -CM     Intratreatment Heart Rate (beats/min) 133  -CM     Posttreatment Heart Rate (beats/min) 99  -CM     Pre SpO2 (%) 99  -CM     O2 Delivery Pre Treatment nasal cannula  2LPM  -CM     Post SpO2 (%) 99  -CM     O2 Delivery Post Treatment nasal cannula  -CM     Pre Patient Position Supine  -CM     Intra Patient Position Standing  -CM     Post Patient Position Supine  -CM     Row Name 01/05/23 0956          Positioning and Restraints    Pre-Treatment Position in bed  -CM     Post Treatment Position bed  -CM     In Bed notified nsg;supine;call light within reach;encouraged to call for assist;legs elevated;patient within staff view  -CM           User Key  (r) = Recorded By, (t) = Taken By, (c) = Cosigned By    Initials Name Provider Type    Ruth Rubalcava OT Occupational Therapist               Outcome Measures     Row Name 01/05/23 0956          How much help from another is currently needed...    Putting on and taking off regular lower body clothing? 4  -CM     Bathing (including washing, rinsing, and drying) 3  -CM     Toileting (which includes using toilet bed pan or urinal) 4  -CM     Putting on and taking off regular upper body clothing 4  -CM     Taking care of personal grooming (such as brushing teeth) 4  -CM     Eating meals 4  -CM     AM-PAC 6 Clicks Score (OT) 23  -CM     Row Name 01/05/23 0956          Functional Assessment    Outcome Measure Options AM-PAC 6 Clicks Daily  Activity (OT)  -CM           User Key  (r) = Recorded By, (t) = Taken By, (c) = Cosigned By    Initials Name Provider Type    Ruth Rubalcava OT Occupational Therapist                Occupational Therapy Education     Title: PT OT SLP Therapies (In Progress)     Topic: Occupational Therapy (In Progress)     Point: ADL training (Done)     Description:   Instruct learner(s) on proper safety adaptation and remediation techniques during self care or transfers.   Instruct in proper use of assistive devices.              Learning Progress Summary           Patient Acceptance, E,TB, VU by CM at 1/5/2023 1351    Comment: OT POC, Role of OT    Acceptance, E,TB, VU by SA at 12/25/2022 1313    Comment: OT POC, Role of OT, transfer training                   Point: Home exercise program (Not Started)     Description:   Instruct learner(s) on appropriate technique for monitoring, assisting and/or progressing therapeutic exercises/activities.              Learner Progress:  Not documented in this visit.          Point: Precautions (Done)     Description:   Instruct learner(s) on prescribed precautions during self-care and functional transfers.              Learning Progress Summary           Patient Acceptance, E, VU by AB at 1/3/2023 0607    Acceptance, E,TB, VU by SA at 12/25/2022 1313    Comment: OT POC, Role of OT, transfer training   Family Acceptance, E, VU by AB at 1/3/2023 0607                   Point: Body mechanics (Done)     Description:   Instruct learner(s) on proper positioning and spine alignment during self-care, functional mobility activities and/or exercises.              Learning Progress Summary           Patient Acceptance, E, VU by AB at 1/3/2023 0607    Acceptance, E,TB, VU by SA at 12/25/2022 1313    Comment: OT POC, Role of OT, transfer training   Family Acceptance, E, VU by AB at 1/3/2023 0607                               User Key     Initials Effective Dates Name Provider Type [facility]    SA  08/11/22 -  Cheryl Ahumada, OT Occupational Therapist OT    AB 11/07/22 -  Anna Gomez LPN Licensed Nurse Nurse     11/18/22 -  Ruth Ribera OT Occupational Therapist OT              OT Recommendation and Plan  Planned Therapy Interventions (OT): activity tolerance training, BADL retraining, adaptive equipment training, cognitive/visual perception retraining, manual therapy/joint mobilization, IADL retraining, functional balance retraining, edema control/reduction, neuromuscular control/coordination retraining, occupation/activity based interventions, ROM/therapeutic exercise, patient/caregiver education/training, passive ROM/stretching, strengthening exercise, transfer/mobility retraining  Therapy Frequency (OT): other (see comments) (5-7 d/wk)  Plan of Care Review  Plan of Care Reviewed With: patient  Outcome Evaluation: OT pat compelted as co-eval with PT. Pt supine in bed upon arrival. HR at 117. Pt agreeable to therapy. Reported he was (I) with all ADLs and IADLs prior to getting sick. Pt SBA for sup to sit. HR increase to 133 with mobility. Pt CGA for sit to stand. Reported he had been using the BSC in his room but did not want to at this time. Pt stood for about 8 minutes completing weight shifting and marching activity. Pt reported Mod fatigue. Pt required Min A x1 for LLE for sit to sup. Pt left supine in bed with LEs elevated and all needs in reach. Pt would benefit from I/P OT services for increased endurance, activity tolerance, (I), and safety with ADLs and mobility. Goals established.     Time Calculation:    Time Calculation- OT     Row Name 01/05/23 1349             Time Calculation- OT    OT Start Time 0956  -CM      OT Stop Time 1040  -CM      OT Time Calculation (min) 44 min  -CM      OT Received On 01/05/23  -CM      OT Goal Re-Cert Due Date 01/18/23  -CM         Untimed Charges    OT Eval/Re-eval Minutes 44  -CM         Total Minutes    Untimed Charges Total Minutes 44  -CM       Total  Minutes 44  -CM            User Key  (r) = Recorded By, (t) = Taken By, (c) = Cosigned By    Initials Name Provider Type    Ruth Rubalcava OT Occupational Therapist              Therapy Charges for Today     Code Description Service Date Service Provider Modifiers Qty    60230718943 HC OT RE-EVAL 2 1/5/2023 Ruth Ribera OT GO 1    [id number]  OT THER SUPP EA 15 MIN 1/5/2023 Ruth Ribera OT GO 1               Ruth Ribera OT  1/5/2023

## 2023-01-05 NOTE — PROGRESS NOTES
[facility] Medicine Services  INPATIENT PROGRESS NOTE    Length of Stay: 14  Date of Admission: 12/20/2022  Primary Care Physician: Shonna Ng APRN    Subjective   Chief Complaint: No new complaints.    HPI: Patient is seen for follow-up today 1/5/2023.  He was transferred to the ICU on 1/3/2023 secondary to hypotension and started on dopamine and dobutamine infusions.  His blood pressure has since improved and dopamine infusion was discontinued this morning. He is doing better, less deconditioned, tolerating his diet and voices no new complaints.  He has good urinary output and swelling both legs persist but much improved.  He voices no new complaints.    Review of Systems   Constitutional: Positive for activity change and fatigue. Negative for appetite change, chills, diaphoresis and fever.   HENT: Negative for trouble swallowing and voice change.    Eyes: Negative for photophobia and visual disturbance.   Respiratory: Negative for cough, choking, chest tightness, shortness of breath, wheezing and stridor.    Cardiovascular: Positive for leg swelling. Negative for chest pain and palpitations.   Gastrointestinal: Negative for abdominal distention, abdominal pain, blood in stool, constipation, diarrhea, nausea and vomiting.   Endocrine: Negative for cold intolerance, heat intolerance, polydipsia, polyphagia and polyuria.   Genitourinary: Negative for decreased urine volume, difficulty urinating, dysuria, enuresis, flank pain, frequency, hematuria and urgency.   Musculoskeletal: Negative for arthralgias, gait problem, myalgias, neck pain and neck stiffness.   Skin: Negative for pallor, rash and wound.   Neurological: Negative for dizziness, tremors, seizures, syncope, facial asymmetry, speech difficulty, weakness, light-headedness, numbness and headaches.   Hematological: Does not bruise/bleed easily.   Psychiatric/Behavioral: Negative for agitation, behavioral  problems and confusion.       Objective    Temp:  [97 °F (36.1 °C)-97.8 °F (36.6 °C)] 97 °F (36.1 °C)  Heart Rate:  [] 105  Resp:  [19-22] 19  BP: ()/(54-94) 108/79   AM-PAC 6 Clicks Score (PT): 22 (01/04/23 0800)    Physical Exam  Vitals and nursing note reviewed.   Constitutional:       General: He is not in acute distress.     Appearance: He is well-developed. He is obese. He is ill-appearing. He is not diaphoretic.   HENT:      Head: Normocephalic and atraumatic.   Eyes:      General: No scleral icterus.        Right eye: No discharge.         Left eye: No discharge.      Extraocular Movements: Extraocular movements intact.      Pupils: Pupils are equal, round, and reactive to light.   Neck:      Thyroid: No thyromegaly.      Vascular: No carotid bruit or JVD.   Cardiovascular:      Rate and Rhythm: Normal rate. Rhythm irregular.      Heart sounds: Normal heart sounds. No murmur heard.    No friction rub. No gallop.   Pulmonary:      Effort: Pulmonary effort is normal. No respiratory distress.      Breath sounds: Normal breath sounds. No stridor. No wheezing or rales.   Chest:      Chest wall: No tenderness.   Abdominal:      General: Bowel sounds are normal. There is no distension.      Palpations: Abdomen is soft. There is no mass.      Tenderness: There is no abdominal tenderness. There is no right CVA tenderness, left CVA tenderness, guarding or rebound.      Hernia: No hernia is present.   Musculoskeletal:         General: No swelling, tenderness or deformity.      Cervical back: Normal range of motion and neck supple.      Right lower leg: Edema present.      Left lower leg: Edema present.   Skin:     General: Skin is warm and dry.      Coloration: Skin is not jaundiced or pale.      Findings: No bruising, erythema, lesion or rash.   Neurological:      General: No focal deficit present.      Mental Status: He is alert and oriented to person, place, and time.      Cranial Nerves: No cranial  nerve deficit.      Sensory: No sensory deficit.      Motor: No weakness or abnormal muscle tone.      Coordination: Coordination normal.      Gait: Gait normal.      Deep Tendon Reflexes: Reflexes normal.   Psychiatric:         Mood and Affect: Mood normal.         Behavior: Behavior normal.         Thought Content: Thought content normal.         Judgment: Judgment normal.           Medication Review:    Current Facility-Administered Medications:   •  acetaminophen (TYLENOL) tablet 1,000 mg, 1,000 mg, Oral, Q6H PRN, Sudeep Wolf MD, 1,000 mg at 01/05/23 0015  •  albumin human 25 % IV SOLN 25 g, 25 g, Intravenous, BID, Trevor Alonso MD, 25 g at 01/05/23 1041  •  albuterol (PROVENTIL) nebulizer solution 0.083% 2.5 mg/3mL, 2.5 mg, Nebulization, Q4H PRN, Will French MD  •  bumetanide (BUMEX) 10 mg in sodium chloride 0.9 % 100 mL (0.1 mg/mL) infusion, 0.5 mg/hr, Intravenous, Continuous, Trevor Alonso MD, Last Rate: 5 mL/hr at 01/05/23 0700, 0.5 mg/hr at 01/05/23 0700  •  calcium carbonate (TUMS) chewable tablet 500 mg (200 mg elemental), 2 tablet, Oral, TID PRN, Will French MD, 2 tablet at 01/05/23 0015  •  DOBUTamine (DOBUTREX) 1 mg/mL infusion, 2.5 mcg/kg/min, Intravenous, Continuous, Vania Andujar APRN, Last Rate: 21 mL/hr at 01/05/23 0700, 2.5 mcg/kg/min at 01/05/23 0700  •  DOPamine 400 mg in 250 mL D5W infusion, 2.5 mcg/kg/min, Intravenous, Continuous, Vania Andujar APRN, Stopped at 01/05/23 0930  •  droperidol (INAPSINE) injection 1.25 mg, 1.25 mg, Intravenous, Q6H PRN, Sudeep Wolf MD, 1.25 mg at 12/22/22 1302  •  Enoxaparin Sodium (LOVENOX) syringe 140 mg, 1 mg/kg, Subcutaneous, Q12H, Moshe Aleman DO, 140 mg at 01/05/23 0824  •  morphine injection 4 mg, 4 mg, Intravenous, Once, Will French MD  •  ondansetron (ZOFRAN) injection 4 mg, 4 mg, Intravenous, Q6H PRN, Sudeep Wolf MD, 4 mg at 01/01/23 0830  •  pantoprazole (PROTONIX) EC tablet 40  mg, 40 mg, Oral, Q AM, Ayaan Cuba MD, 40 mg at 01/05/23 0602  •  Pharmacy to Dose enoxaparin (LOVENOX), , Does not apply, Continuous PRN, Vania Andujar, APRN  •  potassium chloride (MICRO-K) CR capsule 40 mEq, 40 mEq, Oral, PRN, 40 mEq at 01/04/23 1557 **OR** potassium chloride (KLOR-CON) packet 40 mEq, 40 mEq, Oral, PRN **OR** potassium chloride 10 mEq in 100 mL IVPB, 10 mEq, Intravenous, Q1H PRN, Moshe Aleman, DO  •  simethicone (MYLICON) chewable tablet 80 mg, 80 mg, Oral, 4x Daily PRN, Delonte Caputo MD, 80 mg at 12/25/22 0025  •  sodium chloride 0.9 % flush 10 mL, 10 mL, Intravenous, Q12H, Will French MD, 10 mL at 01/04/23 2118  •  sodium chloride 0.9 % flush 10 mL, 10 mL, Intravenous, PRN, Will French MD, 10 mL at 12/30/22 0145  •  sodium chloride 0.9 % flush 10 mL, 10 mL, Intravenous, Q12H, Ayaan Cuba MD, 10 mL at 01/04/23 2118  •  sodium chloride 0.9 % flush 10 mL, 10 mL, Intravenous, Q12H, Ayaan Cuba MD, 10 mL at 01/04/23 2118  •  sodium chloride 0.9 % flush 10 mL, 10 mL, Intravenous, PRN, Ayaan Cuba MD  •  sodium chloride 0.9 % infusion 40 mL, 40 mL, Intravenous, PRN, Will French MD    Results Review:  I have reviewed the labs, radiology results, and diagnostic studies.    Laboratory Data:   Results from last 7 days   Lab Units 01/05/23  0554 01/04/23 2038 01/04/23  0623 01/03/23  0622 12/31/22  0608 12/30/22  0806   SODIUM mmol/L 132*  --  128* 125*   < > 126*   POTASSIUM mmol/L 4.1 3.9 3.5 4.3   < > 3.6   CHLORIDE mmol/L 92*  --  88* 86*   < > 85*   CO2 mmol/L 29.0  --  29.0 26.0   < > 24.0   BUN mg/dL 51*  --  71* 86*   < > 83*   CREATININE mg/dL 1.74*  --  2.38* 2.84*   < > 2.77*   GLUCOSE mg/dL 140*  --  103* 120*   < > 111*   CALCIUM mg/dL 8.7  --  8.6 8.8   < > 9.0   BILIRUBIN mg/dL 5.5*  --  5.1*  --   --  4.0*   ALK PHOS U/L 111  --  122*  --   --  64   ALT (SGPT) U/L 160*  --  206*  --   --  443*   AST (SGOT) U/L 63*  --  98*   --   --  277*   ANION GAP mmol/L 11.0  --  11.0 13.0   < > 17.0*    < > = values in this interval not displayed.     Estimated Creatinine Clearance: 75.3 mL/min (A) (by C-G formula based on SCr of 1.74 mg/dL (H)).  Results from last 7 days   Lab Units 01/05/23  0554 01/04/23  0623   MAGNESIUM mg/dL 2.0 2.4         Results from last 7 days   Lab Units 01/05/23  0554 01/04/23  0623 01/03/23  0622 01/02/23  0633 01/01/23  0607   WBC 10*3/mm3 4.64 3.24* 2.59* 3.66 3.60   HEMOGLOBIN g/dL 13.0 13.0 13.9 13.7 13.5   HEMATOCRIT % 39.7 39.0 41.7 41.4 40.8   PLATELETS 10*3/mm3 91* 101* 102* 108* 108*           Culture Data:   No results found for: BLOODCX  No results found for: URINECX  No results found for: RESPCX  No results found for: WOUNDCX  No results found for: STOOLCX  No components found for: BODYFLD    Radiology Data:   Imaging Results (Last 24 Hours)     ** No results found for the last 24 hours. **          I have reviewed the patient's current medications.     Assessment/Plan     Hospital Problem List:  Principal Problem:    FRANCK (acute kidney injury) (HCC)  Active Problems:    Severe malnutrition (HCC)    Acute on chronic kidney disease stage III (likely cardiorenal): Patient's baseline is reportedly between 1.4-1.7.  Creatinine is down to 1.74 today which is within patient's baseline.     Continue Bumex infusion and continue to monitor renal function. Input by nephrologist is appreciated.    History of nonischemic cardiomyopathy/chronic systolic heart failure status post AICD placement (with hypotension): Patient appears clinically euvolemic and chest x-ray done on 1/3/2023 did not show any evidence of pulmonary vascular congestion.  He has lost 11 pounds in the last 24 hours.  Continue Bumex and dobutamine infusions,  with strict I's and O's, daily weights, salt and fluid restriction.  Input by cardiologist is appreciated.  Echocardiogram done 8/19/2022 showed an estimated ejection fraction of 15 to  20%.  Elevated LFTs are reactive, improving and will be monitored.    Hyponatremia: Much improved. Restrict free water and monitor BMP.    Chronic atrial fibrillation: Patient is in controlled ventricular response.  Continue Coreg and anticoagulation with Lovenox.  Transition back to [facility] on discharge.    Hypokalemia: Resolved.    Possible acute cholecystitis: Patient is less symptomatic and tolerating recommended diet.  HIDA scan was unremarkable.    Obstructive sleep apnea: Continue nightly CPAP.    Deconditioning: Continue PT and OT.    Continue GI and DVT prophylaxis.    Discharge Planning: In progress.    I confirmed that the patient's Advance Care Plan is present, code status is documented, or surrogate decision maker is listed in the patient's medical record.     I have utilized all available immediate resources to obtain, update, or review the patient's current medications.    35 minutes of critical care time was spent in the assessment and formulation of treatment plan for this patient exclusive of billable procedures.      Ayaan Cuba MD   01/05/23   10:56 CST

## 2023-01-05 NOTE — PROGRESS NOTES
"[facility] Progress Note   LOS: 14 days   Patient Care Team:  Shonna Ng APRN as PCP - General (Family Medicine)    Chief Complaint:    Chief Complaint   Patient presents with   • Shortness of Breath        Subjective     Interval History:   Patient Denies: shortness of air and edema  Patient Complaints: chest pain, palpitations, dizziness and syncope  History taken from: patient chart    No acute events overnight. Great improvement overnight with inotropes and Bumex gtt. We will stop Dopamine today and continue Dobutamine and Bumex gtt. Patient needs further diuresis today.     Objective     Vital Sign Min/Max for last 24 hours  Temp  Min: 97 °F (36.1 °C)  Max: 97.2 °F (36.2 °C)   BP  Min: 73/57  Max: 128/70   Pulse  Min: 98  Max: 149   Resp  Min: 18  Max: 22   SpO2  Min: 90 %  Max: 100 %   Flow (L/min)  Min: 2  Max: 2   Weight  Min: 134 kg (296 lb)  Max: 134 kg (296 lb)     Flowsheet Rows    Flowsheet Row First Filed Value   Admission Height 193 cm (76\") Documented at 12/20/2022 1700   Admission Weight 127 kg (280 lb) Documented at 12/20/2022 1700            01/02/23  0403 01/03/23  1135 01/05/23  1018   Weight: (!) 138 kg (303 lb 9.6 oz) (!) 140 kg (307 lb 15.7 oz) 134 kg (296 lb)       Physical Exam:  Physical Exam  Vitals and nursing note reviewed.   Constitutional:       General: He is not in acute distress.     Appearance: He is obese. He is not diaphoretic.   HENT:      Head: Normocephalic.      Right Ear: External ear normal.      Left Ear: External ear normal.   Eyes:      General: Lids are normal.      Pupils: Pupils are equal, round, and reactive to light.   Neck:      Thyroid: No thyromegaly.      Vascular: JVD present. No carotid bruit.      Trachea: No tracheal deviation.   Cardiovascular:      Rate and Rhythm: Tachycardia present. Rhythm irregularly irregular.      Heart sounds: Normal heart sounds. No murmur heard.    No friction rub. No gallop.   Pulmonary:      Effort: Pulmonary effort " is normal. No respiratory distress.      Breath sounds: No stridor. Rales present. No wheezing.   Chest:      Chest wall: No tenderness.   Abdominal:      General: Bowel sounds are normal. There is no distension.      Palpations: Abdomen is soft.      Tenderness: There is no abdominal tenderness.   Musculoskeletal:      Right lower le+ Edema present.      Left lower le+ Edema present.   Skin:     General: Skin is warm and dry.      Findings: No erythema or rash.   Neurological:      Mental Status: He is alert and oriented to person, place, and time.      Cranial Nerves: No cranial nerve deficit.      Deep Tendon Reflexes: Reflexes are normal and symmetric. Reflexes normal.   Psychiatric:         Behavior: Behavior normal.         Thought Content: Thought content normal.         Judgment: Judgment normal.          Results Review:     Results from last 7 days   Lab Units 23  0622 22  0608 22  0806   SODIUM mmol/L 132*  --  128* 125*   < > 126*   POTASSIUM mmol/L 4.1 3.9 3.5 4.3   < > 3.6   CHLORIDE mmol/L 92*  --  88* 86*   < > 85*   CO2 mmol/L 29.0  --  29.0 26.0   < > 24.0   BUN mg/dL 51*  --  71* 86*   < > 83*   CREATININE mg/dL 1.74*  --  2.38* 2.84*   < > 2.77*   CALCIUM mg/dL 8.7  --  8.6 8.8   < > 9.0   BILIRUBIN mg/dL 5.5*  --  5.1*  --   --  4.0*   ALK PHOS U/L 111  --  122*  --   --  64   ALT (SGPT) U/L 160*  --  206*  --   --  443*   AST (SGOT) U/L 63*  --  98*  --   --  277*   GLUCOSE mg/dL 140*  --  103* 120*   < > 111*    < > = values in this interval not displayed.       Estimated Creatinine Clearance: 75.3 mL/min (A) (by C-G formula based on SCr of 1.74 mg/dL (H)).    Results from last 7 days   Lab Units 23   MAGNESIUM mg/dL 2.0 2.4             Results from last 7 days   Lab Units 23  0554 23  0623 23  0622 23  0633 23  0607   WBC 10*3/mm3 4.64 3.24* 2.59* 3.66 3.60    HEMOGLOBIN g/dL 13.0 13.0 13.9 13.7 13.5   PLATELETS 10*3/mm3 91* 101* 102* 108* 108*           Lab Results   Component Value Date    PROBNP 5,254.0 (H) 12/20/2022       I/O last 3 completed shifts:  In: 649.6 [P.O.:240; I.V.:355.5; IV Piggyback:54.1]  Out: 9800 [Urine:9800]    Cardiographics:  ECG/EMG Results (last 24 hours)     Procedure Component Value Units Date/Time    SCANNED - TELEMETRY   [[id number]] Resulted: 12/20/22     Updated: 01/03/23 1506    SCANNED - TELEMETRY   [[id number]] Resulted: 12/20/22     Updated: 01/03/23 1530    SCANNED - TELEMETRY   [[id number]] Resulted: 12/20/22     Updated: 01/03/23 1948    SCANNED - TELEMETRY   [[id number]] Resulted: 12/20/22     Updated: 01/03/23 1948    SCANNED - TELEMETRY   [[id number]] Resulted: 12/20/22     Updated: 01/03/23 1948    SCANNED EKG [[id number]] Resulted: 12/20/22     Updated: 01/03/23 2019    SCANNED EKG [[id number]] Resulted: 12/20/22     Updated: 01/03/23 2019        Results for orders placed during the hospital encounter of 02/04/19    Adult Transthoracic Echo Limited W/ Cont if Necessary Per Protocol    Interpretation Summary  · 3D acquisition for left ventricular ejection fraction. Live 3D and full volume to assess left ventricular ejection fraction was utilized.  · Left ventricle systolic function is severely decreased. Estimated LVEF is 10%. Left ventricle cavity severely dilated with restrictive diastolic dysfunction. Findings are consistent with dilated cardiomyopathy.  · Right ventricle is mildly dilated with mildly reduced right ventricular systolic function.  · Moderate mitral valve regurgitation is present.  · Moderate tricuspid valve regurgitation is present. Pulmonary hypertension is present with a PA systolic pressure of 70 mmHg.  · There is mild pulmonic valve regurgitation present.  · There is a trivial pericardial effusion adjacent to the left ventricle      Imaging Results (Most Recent)     Procedure Component Value Units Date/Time     XR Chest 1 View [[id number]] Collected: 01/03/23 1611     Updated: 01/03/23 1638    Narrative:        PORTABLE CHEST    HISTORY: Shortness of breath. Acute kidney failure.    Portable AP upright film of the chest was obtained at 4:19 PM.  COMPARISON: December 20, 2022    FINDINGS:   EKG leads.  Left-sided pacemaker remains in place with lead projected over  the right ventricle.  Stable cardiomegaly.  Cannot exclude atelectasis or infiltrate retrocardiac region left  lower lobe.  Cannot exclude small left pleural effusion.  The pulmonary vasculature is not increased.  No pneumothorax.  No acute osseous abnormality.      Impression:      CONCLUSION:  Left-sided pacemaker remains in place with lead projected over  the right ventricle.  Stable cardiomegaly.  Cannot exclude atelectasis or infiltrate retrocardiac region left  lower lobe.  Cannot exclude small left pleural effusion.    [id number]    Electronically signed by:  Mukul Klein MD  1/3/2023 4:35 PM CST  Workstation: [id number]    US Guided Vascular Access [[id number]] Collected: 01/03/23 1215     Updated: 01/03/23 1257    Narrative:      PROCEDURE: Ultrasound Guidance Vascular Access    HISTORY: access, N17.9 Acute kidney failure, unspecified R06.02  Shortness of breath E87.6 Hypokalemia R79.89 Other specified  abnormal findings of blood chemistry Z74.09 Other reduced  mobility Z78.9 Other specified health status Z74.09 Other reduced  mobility    FINDINGS:  Realtime ultrasound imaging was utilized to visualize needle  entry into the patent right basilic vein during midline catheter   placement and a permanent image was stored for permanent  recording and reporting.       Impression:      Imaging obtained during vascular access device placement under  ultrasound guidance as described above    Electronically signed by:  Leeroy Alex MD  1/3/2023 12:55 PM CST  Workstation: [id number]    IR Insert Midline Without Port Pump 5 Plus [[id number]] Resulted: 01/03/23 1223      Updated: 01/03/23 1223    Narrative:      This procedure was auto-finalized with no dictation required.    NM HIDA SCAN WITHOUT PHARMACOLOGICAL INTERVENTION [[id number]] Collected: 12/27/22 0958     Updated: 12/27/22 1354    Narrative:      EXAM:  NUCLEAR MEDICINE HEPATOBILIARY SCAN WITH CCK    HISTORY:Cholecystitis, N17.9 Acute kidney failure, unspecified  R06.02 Shortness of breath E87.6 Hypokalemia R79.89 Other  specified abnormal findings of blood chemistry Z74.09 Other  reduced mobility Z78.9 Other specified health status Z74.09 Other  reduced mobility    COMPARISON:    The patient received 6.3 mCi of Tc-99m Choletec IV.   Radioactivity is demonstrated within the gallbladder beginning at  45minutes following injection of the radiopharmaceutical.   Radioactivity is demonstrated within the small bowel beginning at  75 minutes following injection of the radiopharmaceutical.    Beginning at 75 minutes following injection of the  radiopharmaceutical, the patient received a fatty meal challenge  of 8 Oz of Boost plus.  This resulted in a gallbladder ejection  fraction of 9% with normal range being greater than 30%.   Technologist notes that the patient denied any symptoms with the  Kinevac infusion.       Impression:      Unremarkable hepatobiliary scan.  Suboptimal ejection fraction of gallbladder at 9%, with a fatty  meal challenge.    Electronically signed by:  Pedro Day MD  12/27/2022 1:52 PM [facility]  Workstation: [id number]    NM Lung Ventilation Perfusion [[id number]] Collected: 12/23/22 1158     Updated: 12/23/22 1619    Narrative:      Nuclear medicine ventilation perfusion lung scan    History: Shortness of breath. Pulmonary embolism suspected.    Correlation: Portable chest 5:22 PM December 20, 2022    Following the inhalation of 28 mCi of aerosolized technetium 99m  DTPA, multiple ventilation images obtained.    Following the intravenous administration of 6.0 millicuries of  technetium 99m MAA, multiple perfusion  images obtained.    FINDINGS:   Inhomogeneous distribution of radionuclide, more pronounced on  the ventilation images.  No mismatched peripheral perfusion defects to indicate pulmonary  embolism.  Cardiomegaly.  Some elevation right hemidiaphragm.      Impression:      Conclusion:  Low probability for pulmonary embolism.  Cardiomegaly.  Some elevation right hemidiaphragm.    [id number]    Electronically signed by:  Mukul Klein MD  12/23/2022 4:17 PM  CST Workstation: [id number]    US Gallbladder [[id number]] Collected: 12/21/22 0906     Updated: 12/21/22 0954    Narrative:        COMPARISON:     12/20/2022    INDICATION:     Pain and vomiting. Abnormal CT    FINDINGS: Liver is enlarged measuring 19.5 cm craniocaudal. There  is no intrahepatic or extrahepatic biliary dilatation.  The  extrahepatic bile duct measures 0.4 cm which is within normal  limits.     The gallbladder is abnormal in appearance. Wall thickening with  intramural edema. Layering sludge and stones.     The right kidney measures 12.0 x 5.1 x 6.3 cm in length.  There  is no hydronephrosis.     The visualized pancreas appears normal size configuration and  echotexture.     There is no ascites.       Impression:      1.  Abnormal appearance of the gallbladder, as above, suspicious  for acute cholecystitis. If clinically indeterminate recommend  nuclear medicine hepatobiliary scan.  2. Hepatomegaly.        RP core team activated 9:51 AM for results notification.    Electronically signed by:  Rc Wheatley MD  12/21/2022 9:52 AM CST  Workstation: [id number]    CT Angiogram Chest [[id number]] Collected: 12/20/22 2359     Updated: 12/21/22 0041    Narrative:      EXAM:  CT Angiography Chest With Intravenous Contrast    CLINICAL HISTORY:  Pulmonary embolism (PE) suspected, unknown  D-dimer, N17.9 Acute kidney failure, unspecified R06.02 Shortness  of breath E87.6 Hypokalemia R79.89 Other specified abnormal  findings of blood chemistry    TECHNIQUE:  Axial computed  tomographic angiography images of the  chest with intravenous contrast.  Sagittal and coronal  reformatted images were created and reviewed.  This CT exam was  performed using one or more of the following dose reduction  techniques:  automated exposure control, adjustment of the mA  and/or kV according to patient size, and/or use of iterative  reconstruction technique.  MIP reconstructed images were created  and reviewed.    COMPARISON:  No relevant prior studies available.    FINDINGS:    Pulmonary arteries:  Nondilated pulmonary arteries.  Nondiagnostic assessment of the pulmonary arteries for filling  defect due to minimal enhancement of the pulmonary arteries. Most  of the intravenously injected contrast contrast within the right  upper extremity and superior vena cava at the time of image  acquisition.    Aorta: No aortic aneurysm.    Lungs:  No acute pulmonary infiltrate.  No mass.    Pleural space:  No pleural effusion or pneumothorax.    Heart:  Dilation of the bilateral atria and the left ventricle.   No pericardial effusion. Left chest ICD, with lead in the right  ventricular apex.    Bones/joints:  No acute fracture.  No dislocation.    Soft tissues: Normal.    Lymph nodes:  No enlarged lymph nodes.      Impression:      1.  Nondiagnostic assessment of the pulmonary arteries. Minimal  enhancement of the pulmonary arteries.   2.  Dilation of the left heart chambers and of the right atrium.  3.  No acute pulmonary infiltrate.    Electronically signed by:  India Mayfield MD  12/21/2022 12:39  AM CST Workstation: [id number]    CT Abdomen Pelvis With Contrast [[id number]] Collected: 12/20/22 2359     Updated: 12/21/22 0032    Narrative:      PROCEDURE: CT ABDOMEN PELVIS W CONTRAST    CLINICAL HISTORY: 52 years  Male  Abdominal pain, acute,  nonlocalized, N17.9 Acute kidney failure, unspecified R06.02  Shortness of breath E87.6 Hypokalemia R79.89 Other specified  abnormal findings of blood  chemistry    TECHNIQUE: Contiguous axial images obtained through the abdomen  and pelvis following intravenous contrast administration. Coronal  and sagittal reformatted images provided.    This CT exam was performed according to our departmental  dose-optimization program, which includes one or more of the  following dose reduction techniques: automated exposure control,  adjustment of the mA and/or kV according to patient size, and/or  use of iterative reconstruction technique.    COMPARISON: No prior exams provided for comparison.     FINDINGS:    Severe cardiomegaly with pacemaker wire in place. Mild bibasilar  pulmonary edema.    Hepatomegaly without focal lesion. Gallbladder distention without  definite biliary dilatation.    The pancreas, spleen, adrenal glands, kidneys, and urinary  bladder are normal.    There is no bowel inflammation, obstruction, free intraperitoneal  air, or ascites. The appendix is normal.    Chronic degenerative changes present throughout the spine.    Atherosclerosis without abdominal aortic aneurysm or dissection      Impression:        Severe cardiomegaly. Mild bibasilar pulmonary edema.    Hepatomegaly. Nonspecific gallbladder distention. Consider right  upper quadrant ultrasound if there is pain.    Electronically signed by:  Charlee Yan MD  12/21/2022 12:29 AM  CST Workstation: [id number]    XR Chest 1 View [[id number]] Collected: 12/20/22 1722     Updated: 12/20/22 1744    Narrative:        PORTABLE CHEST    HISTORY: Shortness of air    Portable AP upright film of the chest was obtained at 5:22 PM.  COMPARISON: December 7, 2022    FINDINGS:   Linear atelectasis medial right lung base.  Left-sided pacemaker remains in place with lead projected over  the right ventricle.  Stable cardiomegaly.  The pulmonary vasculature is not increased.  No pleural effusion.  No pneumothorax.  No acute osseous abnormality.  Dextroscoliosis thoracic spine.  Old left rib fractures.      Impression:       CONCLUSION:  Linear atelectasis medial right lung base.  Left-sided pacemaker remains in place with lead projected over  the right ventricle.  Stable cardiomegaly.    [id number]    Electronically signed by:  Mukul Klein MD  12/20/2022 5:42 PM  CST Workstation: [id number]          NM HIDA SCAN WITHOUT PHARMACOLOGICAL INTERVENTION    Result Date: 12/27/2022  Unremarkable hepatobiliary scan. Suboptimal ejection fraction of gallbladder at 9%, with a fatty meal challenge. Electronically signed by:  Pedro Day MD  12/27/2022 1:52 PM CST Workstation: [id number]    NM Lung Ventilation Perfusion    Result Date: 12/23/2022  Conclusion: Low probability for pulmonary embolism. Cardiomegaly. Some elevation right hemidiaphragm. [id number] Electronically signed by:  Mukul Klein MD  12/23/2022 4:17 PM CST Workstation: [id number]    US Guided Vascular Access    Result Date: 1/3/2023  Imaging obtained during vascular access device placement under ultrasound guidance as described above Electronically signed by:  Leeroy Alex MD  1/3/2023 12:55 PM CST Workstation: [id number]    CT Abdomen Pelvis With Contrast    Result Date: 12/21/2022  Severe cardiomegaly. Mild bibasilar pulmonary edema. Hepatomegaly. Nonspecific gallbladder distention. Consider right upper quadrant ultrasound if there is pain. Electronically signed by:  Charlee Yan MD  12/21/2022 12:29 AM CST Workstation: [id number]    US Gallbladder    Result Date: 12/21/2022  1.  Abnormal appearance of the gallbladder, as above, suspicious for acute cholecystitis. If clinically indeterminate recommend nuclear medicine hepatobiliary scan. 2. Hepatomegaly. RP core team activated 9:51 AM for results notification. Electronically signed by:  Rc Wheatley MD  12/21/2022 9:52 AM CST Workstation: [id number]    XR Chest 1 View    Result Date: 1/3/2023  CONCLUSION: Left-sided pacemaker remains in place with lead projected over the right ventricle. Stable cardiomegaly. Cannot exclude atelectasis or  infiltrate retrocardiac region left lower lobe. Cannot exclude small left pleural effusion. [id number] Electronically signed by:  Mukul Klein MD  1/3/2023 4:35 PM CST Workstation: [id number]    XR Chest 1 View    Result Date: 12/20/2022  CONCLUSION: Linear atelectasis medial right lung base. Left-sided pacemaker remains in place with lead projected over the right ventricle. Stable cardiomegaly. [id number] Electronically signed by:  Mukul Klein MD  12/20/2022 5:42 PM CST Workstation: [id number]    CT Angiogram Chest    Result Date: 12/21/2022  1.  Nondiagnostic assessment of the pulmonary arteries. Minimal enhancement of the pulmonary arteries. 2.  Dilation of the left heart chambers and of the right atrium. 3.  No acute pulmonary infiltrate. Electronically signed by:  India Mayfield MD  12/21/2022 12:39 AM CST Workstation: [id number]    XR Chest PA & Lateral    Result Date: 12/7/2022  1.  Cardiomegaly without decompensation. 2.  Otherwise unremarkable chest. Electronically signed by:  Pj Cummins MD  12/7/2022 7:28 AM CST Workstation: [id number]      Medication Review:     Current Facility-Administered Medications:   •  acetaminophen (TYLENOL) tablet 1,000 mg, 1,000 mg, Oral, Q6H PRN, Sudeep Wolf MD, 1,000 mg at 01/05/23 0015  •  albuterol (PROVENTIL) nebulizer solution 0.083% 2.5 mg/3mL, 2.5 mg, Nebulization, Q4H PRN, Will French MD  •  bumetanide (BUMEX) 10 mg in sodium chloride 0.9 % 100 mL (0.1 mg/mL) infusion, 0.25 mg/hr, Intravenous, Continuous, Trevor Alonso MD, Last Rate: 2.5 mL/hr at 01/05/23 1147, 0.25 mg/hr at 01/05/23 1147  •  calcium carbonate (TUMS) chewable tablet 500 mg (200 mg elemental), 2 tablet, Oral, TID PRN, Will French MD, 2 tablet at 01/05/23 0015  •  DOBUTamine (DOBUTREX) 1 mg/mL infusion, 2.5 mcg/kg/min, Intravenous, Continuous, Vania Andujar APRN, Last Rate: 21 mL/hr at 01/05/23 0700, 2.5 mcg/kg/min at 01/05/23 0700  •  droperidol (INAPSINE) injection 1.25 mg, 1.25  mg, Intravenous, Q6H PRN, Sudeep Wolf MD, 1.25 mg at 12/22/22 1302  •  Enoxaparin Sodium (LOVENOX) syringe 140 mg, 1 mg/kg, Subcutaneous, Q12H, Moshe Aleman DO, 140 mg at 01/05/23 0824  •  morphine injection 4 mg, 4 mg, Intravenous, Once, Will French MD  •  ondansetron (ZOFRAN) injection 4 mg, 4 mg, Intravenous, Q6H PRN, Sudeep Wolf MD, 4 mg at 01/01/23 0830  •  pantoprazole (PROTONIX) EC tablet 40 mg, 40 mg, Oral, Q AM, Ayaan Cuba MD, 40 mg at 01/05/23 0602  •  Pharmacy to Dose enoxaparin (LOVENOX), , Does not apply, Continuous PRN, Vania Andujar, APRN  •  potassium chloride (MICRO-K) CR capsule 40 mEq, 40 mEq, Oral, PRN, 40 mEq at 01/04/23 1557 **OR** potassium chloride (KLOR-CON) packet 40 mEq, 40 mEq, Oral, PRN **OR** potassium chloride 10 mEq in 100 mL IVPB, 10 mEq, Intravenous, Q1H PRN, Moshe Aleman DO  •  simethicone (MYLICON) chewable tablet 80 mg, 80 mg, Oral, 4x Daily PRN, Delonte Caputo MD, 80 mg at 12/25/22 0025  •  sodium chloride 0.9 % flush 10 mL, 10 mL, Intravenous, Q12H, Will French MD, 10 mL at 01/04/23 2118  •  sodium chloride 0.9 % flush 10 mL, 10 mL, Intravenous, PRN, Will French MD, 10 mL at 12/30/22 0145  •  sodium chloride 0.9 % flush 10 mL, 10 mL, Intravenous, Q12H, Ayaan Cuba MD, 10 mL at 01/04/23 2118  •  sodium chloride 0.9 % flush 10 mL, 10 mL, Intravenous, Q12H, Ayaan Cuba MD, 10 mL at 01/04/23 2118  •  sodium chloride 0.9 % flush 10 mL, 10 mL, Intravenous, PRN, Ayaan Cuba MD  •  sodium chloride 0.9 % infusion 40 mL, 40 mL, Intravenous, PRN, Will French MD    Assessment & Plan       FRANCK (acute kidney injury) (HCC)    Severe malnutrition (HCC)    #1. Hypotension: Suspect to be due to in the setting of low output state from LV dysfunction. We will hold antihypertensives.  Responding well with inotropes.  -LFTs and kidney function improving.     #2. Acute on Chronic systolic  CHF/NICM: EF 15-20%. NYHA Class IV, Stage D.  Patient appears hypervolemic with poor perfusion and hemodynamics requiring inotropic support.  -Continue dobutamine. Stop dopamine . Good urine output and weight loss noted  BETA-BLOCKER:  carvedilol on hold  ACE/ARB/ENTRESTO: losartan on hold  DIURETIC: Bumex gtt per Nephrology  SGL2I: should be considered  ALDOSTERONE ANTAGONIST: n/a  IMDUR/HYDRALAZINE: N/A  DIGOXIN: N/A  FR: 1,500  NA Restrction: 2grams  ICD: yes [employer]  8/2022  Daily weight  Strict intake/output  Continuous cardiac monitoring     #3. New onset AF: Patient does not have prior documented history.  He was on Eliquis to prevent LV thrombus.  Recommend rate control strategy and anticoagulation.  Currently he is on Lovenox. Pharmacy to dose lovenox, currently creatinine Cl still okay for 1mg/kg BID. We will restart beta-blocker therapy when condition allows.   -Stop IV dopamine which should help the heart rate slowed down some.  Continue dobutamine low rate.     Plan for disposition: Continue CCU. We will continue to follow.           This document has been electronically signed by SHELL Ruvalcaba on January 5, 2023 13:00 CST   Electronically signed by SHELL Ruvalcaba, 01/05/23, 1:00 PM CST.    I personally saw and examined Matti Bravo after the APRN.  I personally performed a history and physical examination of the patient.  I personally reviewed independent findings and plan of care.  I discussed management with the APRN.  I agree with the APRN's documentation.    No acute overnight events.  His urine output remains excellent.  It was 5.5 L yesterday.  Symptomatically, he continues to improve.  He remains somewhat tired though.  He remains in atrial fibrillation on telemetry.  Ventricular rates are reasonable.    Vitals:    01/05/23 1400 01/05/23 1430 01/05/23 1500 01/05/23 1530   BP: 96/70 103/77 105/61 121/64   BP Location:       Patient Position:       Pulse: 105  106 106 105   Resp:       Temp:       TempSrc:       SpO2: 98% 96% 97% 96%   Weight:       Height:         Nonischemic cardiomyopathy: He is s/p ICD placement.  Cardiomyopathy medications continue to be on hold for now.  Due to excellent response, continue Bumex gtt and low-dose dobutamine gtt  today to assist in diuresis.  Discontinue dopamine gtt.     Hypotension: Improved significantly with inotropes.  Discontinue dopamine gtt.  Consider weaning dobutamine gtt as tolerated.     New onset atrial fibrillation: Continue therapeutic dose Lovenox (renally dosed) for anticoagulation.  He was previously on therapeutic anticoagulation for prevention of LV thrombus in the setting of severe LV dysfunction.  He is in atrial fibrillation at this point and ventricular rates are slightly on the faster side.    Transaminitis: Likely related to hypotension.  Improving.  Continue to hold Lipitor for now.     FRANCK on CKD:  Improving.  Nephrology following.    Please feel free to call us with any questions.    Electronically signed by Dylon Aranda MD, 01/05/23, 4:21 PM CST.

## 2023-01-05 NOTE — PLAN OF CARE
Problem: Adult Inpatient Plan of Care  Goal: Plan of Care Review  Outcome: Ongoing, Progressing  Goal: Patient-Specific Goal (Individualized)  Outcome: Ongoing, Progressing  Goal: Absence of Hospital-Acquired Illness or Injury  Outcome: Ongoing, Progressing  Intervention: Identify and Manage Fall Risk  Description: Perform standard risk assessment on admission using a validated tool or comprehensive approach appropriate to the patient; reassess fall risk frequently, with change in status or transfer to another level of care.  Communicate fall injury risk to interprofessional healthcare team.  Determine need for increased observation, equipment and environmental modification, such as low bed, signage and supportive, nonskid footwear.  Adjust safety measures to individual developmental age, stage and identified risk factors.  Reinforce the importance of safety and physical activity with patient and family.  Perform regular intentional rounding to assess need for position change, pain assessment and personal needs, including assistance with toileting.  Recent Flowsheet Documentation  Taken 1/5/2023 0000 by Geraldine Blackman, RN  Safety Promotion/Fall Prevention: safety round/check completed  Taken 1/4/2023 2200 by Geraldine Blackman RN  Safety Promotion/Fall Prevention: safety round/check completed  Taken 1/4/2023 2000 by Geraldine Blackman, RN  Safety Promotion/Fall Prevention: safety round/check completed  Intervention: Prevent Skin Injury  Description: Perform a screening for skin injury risk, such as pressure or moisture associated skin damage on admission and at regular intervals throughout hospital stay.  Keep all areas of skin (especially folds) clean and dry.  Maintain adequate skin hydration.  Relieve and redistribute pressure and protect bony prominences; implement measures based on patient-specific risk factors.  Match turning and repositioning schedule to clinical condition.  Encourage weight shift frequently; assist  with reposition if unable to complete independently.  Float heels off bed; avoid pressure on the Achilles tendon.  Keep skin free from extended contact with medical devices.  Encourage functional activity and mobility, as early as tolerated.  Use aids (e.g., slide boards, mechanical lift) during transfer.  Recent Flowsheet Documentation  Taken 1/5/2023 0000 by Geraldine Blackman RN  Body Position:   position changed independently   dangle, side of bed  Taken 1/4/2023 2200 by Geraldine Blackman RN  Body Position: position changed independently  Taken 1/4/2023 2000 by Geraldine Blackman RN  Body Position: position changed independently  Skin Protection:   adhesive use limited   tubing/devices free from skin contact   transparent dressing maintained   skin-to-skin areas padded   skin-to-device areas padded  Intervention: Prevent and Manage VTE (Venous Thromboembolism) Risk  Description: Assess for VTE (venous thromboembolism) risk.  Encourage and assist with early ambulation.  Initiate and maintain compression or other therapy, as indicated, based on identified risk in accordance with organizational protocol and provider order.  Encourage both active and passive leg exercises while in bed, if unable to ambulate.  Recent Flowsheet Documentation  Taken 1/4/2023 2000 by Geraldine Blackman RN  Activity Management: activity adjusted per tolerance  VTE Prevention/Management: (lovenox) other (see comments)  Range of Motion: active ROM (range of motion) encouraged  Intervention: Prevent Infection  Description: Maintain skin and mucous membrane integrity; promote hand, oral and pulmonary hygiene.  Optimize fluid balance, nutrition, sleep and glycemic control to maximize infection resistance.  Identify potential sources of infection early to prevent or mitigate progression of infection (e.g., wound, lines, devices).  Evaluate ongoing need for invasive devices; remove promptly when no longer indicated.  Recent Flowsheet Documentation  Taken  1/4/2023 2000 by Geraldine Blackman, RN  Infection Prevention:   cohorting utilized   environmental surveillance performed   equipment surfaces disinfected   hand hygiene promoted   personal protective equipment utilized   rest/sleep promoted   single patient room provided   visitors restricted/screened  Goal: Optimal Comfort and Wellbeing  Outcome: Ongoing, Progressing  Goal: Readiness for Transition of Care  Outcome: Ongoing, Progressing     Problem: Dysrhythmia  Goal: Normalized Cardiac Rhythm  Outcome: Ongoing, Progressing  Intervention: Monitor and Manage Cardiac Rhythm Effect  Description: Monitor electrocardiogram closely for rate and rhythm changes; evaluate response to treatment.  Recognize elevated risk for VTE (venous thromboembolism); implement prophylaxis.  Anticipate administration of pharmacologic therapy to prevent or manage dysrhythmia, such as an antidysrhythmic agent, electrolyte replacement or binding agent.  If hypotensive, lower head of bed to enhance cerebral blood flow.  Provide oxygen therapy judiciously to avoid hyperoxemia; adjust to achieve oxygenation goal.  Promote comfort to prevent dysrhythmia triggered by increased sympathetic tone; consider pharmacologic and nonpharmacologic strategies (e.g., calm environment, rest, stress-reduction).  Anticipate need for additional therapy, such as cardioversion or cardiac rhythm management device, to improve perfusion and hemodynamic stability.  Initiate emergency protocol if life-threatening rhythm disturbance occurs.  Acknowledge fear and anxiety; encourage questions; provide support and information.  Recent Flowsheet Documentation  Taken 1/4/2023 2000 by Geraldine Blackman RN  VTE Prevention/Management: (lovenox) other (see comments)     Problem: Adjustment to Illness (Sepsis/Septic Shock)  Goal: Optimal Coping  Outcome: Ongoing, Progressing  Intervention: Optimize Psychosocial Adjustment to Illness  Description: Acknowledge, normalize, validate  intensity and complexity of patient and support system response to situation.  Provide opportunity for expression of thoughts, feelings and concerns; respond with compassion and reassurance.  Decrease stress and anxiety by providing information about patient’s status and treatment.  Facilitate support system presence and participation in care; consider providing a diary in intensive care situation.  Support coping by recognizing current coping strategies; provide aid in developing new strategies.  Acknowledge and normalize difficulty in managing life-long lifestyle changes and expectations.  Assess and monitor for signs and symptoms of psychologic distress, anxiety and depression.  Consider palliative care consult for goals of care conversation, if the condition is worsening despite treatment.  Recent Flowsheet Documentation  Taken 1/4/2023 2000 by Geraldine Blackman RN  Supportive Measures: active listening utilized     Problem: Bleeding (Sepsis/Septic Shock)  Goal: Absence of Bleeding  Outcome: Ongoing, Progressing  Intervention: Monitor and Manage Bleeding  Description: Maintain bleeding precautions; provide safe environment and gentle care activities, such as positioning, oral and skin care.  Avoid invasive procedures and medication that increase risk of bleeding; monitor for signs of bleeding frequently.  Anticipate need for fluid volume replacement (e.g., intravenous fluid, blood products); use weight-based calculations.  Consider adjunctive supportive therapy, such as platelet infusion or heparin.  Provide protective hemostasis by applying direct pressure to a visible bleeding site.  Recent Flowsheet Documentation  Taken 1/4/2023 2000 by Geraldine Blackman RN  Bleeding Precautions: blood pressure closely monitored     Problem: Glycemic Control Impaired (Sepsis/Septic Shock)  Goal: Blood Glucose Level Within Desired Range  Outcome: Ongoing, Progressing     Problem: Infection Progression (Sepsis/Septic Shock)  Goal:  Absence of Infection Signs and Symptoms  Outcome: Ongoing, Progressing  Intervention: Initiate Sepsis Management  Description: Provide fluid therapy, such as crystalloid or albumin, to increase intravascular volume, organ perfusion and oxygen delivery.  Provide respiratory support, such as oxygen therapy, noninvasive or invasive positive pressure ventilation, to achieve oxygenation and ventilation goal; avoid hyperoxemia.  Obtain cultures prior to initiating antimicrobial therapy when possible. Do not delay for laboratory results in the presence of high suspicion or clinical indicators.  Administer intravenous broad-spectrum antimicrobial therapy promptly.  Implement hemodynamic monitoring to guide intravascular support based on individual targeted parameters.  Determine and address underlying source of infection aggressively; implement transmission-based precautions and isolation, as indicated.  Recent Flowsheet Documentation  Taken 1/4/2023 2000 by Geraldine Blackman RN  Infection Prevention:   cohorting utilized   environmental surveillance performed   equipment surfaces disinfected   hand hygiene promoted   personal protective equipment utilized   rest/sleep promoted   single patient room provided   visitors restricted/screened  Intervention: Promote Recovery  Description: Encourage pulmonary hygiene, such as cough-enhancement and airway-clearance techniques, that may include use of incentive spirometry, deep breathing and cough.  Encourage early rehabilitation and physical activity to optimize functional ability and activity tolerance, as well as minimize delirium.  Promote energy conservation; minimize oxygen demand and consumption by adjusting environment, decreasing stimulation, maintaining normothermia and treating pain.  Optimize fluid balance, nutrition intake, sleep and glycemic control to maintain tissue perfusion and enhance immune response.  Recent Flowsheet Documentation  Taken 1/4/2023 2000 by Hiral  Geraldine RN  Activity Management: activity adjusted per tolerance     Problem: Nutrition Impaired (Sepsis/Septic Shock)  Goal: Optimal Nutrition Intake  Outcome: Ongoing, Progressing     Problem: Electrolyte Imbalance  Goal: Electrolyte Balance  Outcome: Ongoing, Progressing     Problem: Fluid and Electrolyte Imbalance (Acute Kidney Injury/Impairment)  Goal: Fluid and Electrolyte Balance  Outcome: Ongoing, Progressing     Problem: Oral Intake Inadequate (Acute Kidney Injury/Impairment)  Goal: Optimal Nutrition Intake  Outcome: Ongoing, Progressing     Problem: Renal Function Impairment (Acute Kidney Injury/Impairment)  Goal: Effective Renal Function  Outcome: Ongoing, Progressing  Intervention: Monitor and Support Renal Function  Description: Identify and address cause (e.g., perfusion, hypovolemia, infection, toxic substance, inflammation).  Classify risk, considering exposures and susceptibilities; implement preventative strategies.  Evaluate current medications [e.g., NSAID (nonsteroidal anti-inflammatory drug), aminoglycoside, iodinated contrast agent, ACE (angiotensin-converting enzyme) inhibitor] for renal excretion or toxicity; adjust as needed to preserve renal function.  Consider pharmacologic therapy (e.g., sodium bicarbonate, allopurinol) to correct acid-base imbalance and decrease uric acid.  Monitor and maintain blood pressure within desired range with fluid management and pharmacologic therapy (e.g., diuretic, antihypertensive, vasopressor).  Anticipate plasmapheresis and immunosuppressive pharmacologic therapy for vasculitis cause of renal injury.  Use skin care emollient to decrease pruritus; discourage scratching.  Anticipate renal replacement therapy (e.g., continuous, intermittent) with need for removal of waste products and fluid.  Recent Flowsheet Documentation  Taken 1/4/2023 2000 by Geraldine Blackman, RN  Medication Review/Management: medications reviewed     Problem: Asthma Comorbidity  Goal:  Maintenance of Asthma Control  Outcome: Ongoing, Progressing  Intervention: Maintain Asthma Symptom Control  Description: Evaluate adherence to self-management (asthma action plan), such as medication, symptom-control, trigger-avoidance and self-monitoring.  Advocate for continuation of home regimen, including medication, method of delivery, schedule and symptom monitoring; acknowledge preferred modality and routine.  Minimize exposure to potential triggers, such as perfume, cleaning chemicals and all types of smoke.  Assess for proper use of inhaled medication and delivery technique; assist or reinstruct if needed.  Evaluate effectiveness of coping skills; encourage expression of feelings, expectations and concerns related to disease management and quality of life; reinforce education to enhance management plan and wellbeing.  Recent Flowsheet Documentation  Taken 1/4/2023 2000 by Geraldine Blackman RN  Medication Review/Management: medications reviewed     Problem: Diabetes Comorbidity  Goal: Blood Glucose Level Within Targeted Range  Outcome: Ongoing, Progressing     Problem: Heart Failure Comorbidity  Goal: Maintenance of Heart Failure Symptom Control  Outcome: Ongoing, Progressing  Intervention: Maintain Heart Failure-Management  Description: Evaluate adherence to home heart failure self-care regimen (e.g., medication, fluid balance, sodium intake, daily weight, physical activity, telemonitoring, support).  Advocate continuation of home medication and schedule.  Consider pharmacologic therapy administration time and effects (e.g., avoid giving diuretic prior to bedtime or nitrates on empty stomach).  Monitor response to pharmacologic therapy, including weight fluctuations, blood pressure and electrolyte levels.  Monitor for signs and symptoms of anxiety and depression, including severity and duration; if present, provide psychosocial support.  Consider need for heart failure clinic or palliative care  consult.  Recent Flowsheet Documentation  Taken 1/4/2023 2000 by Geraldine Blackman RN  Medication Review/Management: medications reviewed     Problem: Hypertension Comorbidity  Goal: Blood Pressure in Desired Range  Outcome: Ongoing, Progressing  Intervention: Maintain Blood Pressure Management  Description: Evaluate adherence to home antihypertensive regimen (e.g., exercise and activity, diet modification, medication).  Provide scheduled antihypertensive medication; consider administration time and effects (e.g., avoid giving diuretic prior to bedtime).  Monitor response to antihypertensive medication therapy (e.g., blood pressure, electrolyte levels, medication effects).  Minimize risk of orthostatic hypotension; encourage caution with position changes, particularly if elderly.  Recent Flowsheet Documentation  Taken 1/4/2023 2000 by Geraldine Blackman RN  Medication Review/Management: medications reviewed     Problem: Obstructive Sleep Apnea Risk or Actual Comorbidity Management  Goal: Unobstructed Breathing During Sleep  Outcome: Ongoing, Progressing     Problem: Skin Injury Risk Increased  Goal: Skin Health and Integrity  Outcome: Ongoing, Progressing  Intervention: Optimize Skin Protection  Description: Perform a full pressure injury risk assessment, as indicated by screening, upon admission to care unit.  Reassess skin (injury risk, full inspection) frequently (e.g., scheduled interval, with change in condition) to provide optimal early detection and prevention.  Maintain adequate tissue perfusion (e.g., encourage fluid balance; avoid crossing legs, constrictive clothing or devices) to promote tissue oxygenation.  Maintain head of bed at lowest degree of elevation tolerated, considering medical condition and other restrictions.  Avoid positioning onto an area that remains reddened.  Minimize incontinence and moisture (e.g., toileting schedule; moisture-wicking pad, diaper or incontinence collection device; skin  moisture barrier).  Cleanse skin promptly and gently when soiled utilizing a pH-balanced cleanser.  Relieve and redistribute pressure (e.g., scheduled position changes, weight shifts, use of support surface, medical device repositioning, protective dressing application, use of positioning device, microclimate control, use of pressure-injury-monitor  Encourage increased activity, such as sitting in a chair at the bedside or early mobilization, when able to tolerate.  Recent Flowsheet Documentation  Taken 1/5/2023 0000 by Geraldine Blackman, RN  Head of Bed (HOB) Positioning: HOB at 30 degrees  Taken 1/4/2023 2000 by Geraldine Blackman, RN  Pressure Reduction Techniques: frequent weight shift encouraged  Head of Bed (HOB) Positioning: HOB at 30 degrees  Pressure Reduction Devices: specialty bed utilized  Skin Protection:   adhesive use limited   tubing/devices free from skin contact   transparent dressing maintained   skin-to-skin areas padded   skin-to-device areas padded     Problem: Fall Injury Risk  Goal: Absence of Fall and Fall-Related Injury  Outcome: Ongoing, Progressing  Intervention: Identify and Manage Contributors  Description: Develop a fall prevention plan with the patient and caregiver/family.  Provide reorientation, appropriate sensory stimulation and routines with changes in mental status to decrease risk of fall.  Promote use of personal vision and auditory aids.  Assess assistance level required for safe and effective self-care; provide support as needed, such as toileting, mobilization. For age 65 and older, implement timed toileting with assistance.  Encourage physical activity, such as performance of mobility and self-care at highest level of patient ability, multicomponent exercise program and provision of appropriate assistive devices.  If fall occurs, assess the severity of injury; implement fall injury protocol. Determine the cause and revise fall injury prevention plan.  Regularly review medication  contribution to fall risk; adjust medication administration times to minimize risk of falling.  Consider risk related to polypharmacy and age.  Balance adequate pain management with potential for oversedation.  Recent Flowsheet Documentation  Taken 1/4/2023 2000 by Geraldine Blackman RN  Medication Review/Management: medications reviewed  Self-Care Promotion: independence encouraged  Intervention: Promote Injury-Free Environment  Description: Provide a safe, barrier-free environment that encourages independent activity.  Keep care area uncluttered and well-lighted.  Determine need for increased observation or monitoring.  Avoid use of devices that minimize mobility, such as restraints or indwelling urinary catheter.  Recent Flowsheet Documentation  Taken 1/5/2023 0000 by Geraldine Blackman, RN  Safety Promotion/Fall Prevention: safety round/check completed  Taken 1/4/2023 2200 by Geraldine Blackman RN  Safety Promotion/Fall Prevention: safety round/check completed  Taken 1/4/2023 2000 by Geraldine Blackman RN  Safety Promotion/Fall Prevention: safety round/check completed   Goal Outcome Evaluation:

## 2023-01-05 NOTE — PROGRESS NOTES
Nutrition Services    Patient Name:  Matti Bravo  YOB: 1970  MRN: [id number]  Admit Date:  12/20/2022  Pt tolerating diet liberalization.  He denies any Abd pain, Nausea or vomiting but he is once again having chronic hiccups.  .  He is still on IV Bumex and has had 8L removed in the last 48 hours.  Elevated liver enzymes improved but TBili continues to trend up and is now 5.5.  Bun and Creat trending down.  CHF requiring aggressive diuresis.  Hypotension improving.  RD discussed with pt the need for low sodium restrictions and to limit foods that are high in fat or that may cause some Gi distress.  He voiced understanding.    PO intake 50/75/100%    Labs: Na 132; Gluc 140; Bun 51; Creat 1.74; ; Tbili 5.5  Meds: IV Alb; REglan; Protonix; Bumex; Dobutamine; Dopamine    RD will continue to monitor POC and po intake.     Electronically signed by:  Maria Guadalupe Hernández RD  01/05/23 15:41 CST

## 2023-01-05 NOTE — PROGRESS NOTES
"  [facility]  [address] [place] [address]. [address]  T - [phone]  F - [phone]     Progress Note          PATIENT  DEMOGRAPHICS   PATIENT NAME: Matti Bravo                      PHYSICIAN: Denise Aguirre MD  : 1970  MRN: [id number]   LOS: 14 days    Patient Care Team:  Shonna Ng APRN as PCP - General (Family Medicine)  Subjective   SUBJECTIVE   Patient reports that swelling has gone down. UO in the last 24 hours is about 5.5 L         Objective   OBJECTIVE   Vital Signs  Temp:  [97 °F (36.1 °C)-97.8 °F (36.6 °C)] 97 °F (36.1 °C)  Heart Rate:  [] 129  Resp:  [20-22] 20  BP: ()/(54-94) 105/72    Flowsheet Rows    Flowsheet Row First Filed Value   Admission Height 193 cm (76\") Documented at 2022 1700   Admission Weight 127 kg (280 lb) Documented at 2022 1700           I/O last 3 completed shifts:  In: 649.6 [P.O.:240; I.V.:355.5; IV Piggyback:54.1]  Out: 9800 [Urine:9800]    PHYSICAL EXAM    Physical Exam  Vitals reviewed.   Constitutional:       General: He is not in acute distress.     Appearance: Normal appearance. He is not ill-appearing.   HENT:      Head: Normocephalic and atraumatic.   Cardiovascular:      Rate and Rhythm: Tachycardia present. Rhythm irregular.   Pulmonary:      Effort: Pulmonary effort is normal. No respiratory distress.      Breath sounds: Normal breath sounds.   Musculoskeletal:      Right lower leg: Edema present.      Left lower leg: Edema present.   Neurological:      Mental Status: He is alert.         RESULTS   Results Review:    Results from last 7 days   Lab Units 23  0554 23  0623 23  0622 22  0608 22  0806   SODIUM mmol/L 132*  --  128* 125*   < > 126*   POTASSIUM mmol/L 4.1 3.9 3.5 4.3   < > 3.6   CHLORIDE mmol/L 92*  --  88* 86*   < > 85*   CO2 mmol/L 29.0  --  29.0 26.0   < > 24.0   BUN mg/dL 51*  --  71* 86*   < > 83*   CREATININE mg/dL 1.74*  --  2.38* " 2.84*   < > 2.77*   CALCIUM mg/dL 8.7  --  8.6 8.8   < > 9.0   BILIRUBIN mg/dL 5.5*  --  5.1*  --   --  4.0*   ALK PHOS U/L 111  --  122*  --   --  64   ALT (SGPT) U/L 160*  --  206*  --   --  443*   AST (SGOT) U/L 63*  --  98*  --   --  277*   GLUCOSE mg/dL 140*  --  103* 120*   < > 111*    < > = values in this interval not displayed.       Estimated Creatinine Clearance: 76.7 mL/min (A) (by C-G formula based on SCr of 1.74 mg/dL (H)).    Results from last 7 days   Lab Units 01/05/23  0554 01/04/23  0623   MAGNESIUM mg/dL 2.0 2.4             Results from last 7 days   Lab Units 01/05/23  0554 01/04/23  0623 01/03/23  0622 01/02/23  0633 01/01/23  0607   WBC 10*3/mm3 4.64 3.24* 2.59* 3.66 3.60   HEMOGLOBIN g/dL 13.0 13.0 13.9 13.7 13.5   PLATELETS 10*3/mm3 91* 101* 102* 108* 108*               Imaging Results (Last 24 Hours)     ** No results found for the last 24 hours. **           MEDICATIONS    albumin human, 25 g, Intravenous, BID  enoxaparin, 1 mg/kg, Subcutaneous, Q12H  metoclopramide, 10 mg, Intravenous, Once  Morphine, 4 mg, Intravenous, Once  pantoprazole, 40 mg, Oral, Q AM  sodium chloride, 10 mL, Intravenous, Q12H  sodium chloride, 10 mL, Intravenous, Q12H  sodium chloride, 10 mL, Intravenous, Q12H      bumetanide, 0.5 mg/hr, Last Rate: 0.5 mg/hr (01/04/23 2101)  DOBUTamine, 2.5 mcg/kg/min, Last Rate: 2.5 mcg/kg/min (01/05/23 0402)  DOPamine, 2.5 mcg/kg/min, Last Rate: 2.5 mcg/kg/min (01/05/23 0402)  Pharmacy to Dose enoxaparin (LOVENOX),         Assessment & Plan   ASSESSMENT / PLAN      FRANCK (acute kidney injury) (HCC)    Severe malnutrition (HCC)    1.  Acute Kidney Failure on CKD 3: Baseline creatinine 1.4-1.7 mg/dl  - Etiology of FRANCK likely contrast induced.    - Urinalysis showed trace protein and negative blood, 3-5 RBCs, 0-2 WBCs.  Urine sodium less than 20.  Urine protein creatinine ratio 235 mg.  - Creatinine peak at 2.9. currently 1.74   - Keep fluid restriction of 1500 ml a day.   - Lasix was  not helping with diuresis and due to marked anasarca  bumex drip was added. Good diuretic response with bumex. Keep the bumex drip for now. Mg and k are in range. Observe bicarb and bun for contraction alkalosis   - Weight is down o 296 lbs from 307 bs     2.  Shortness of breath:  - Underwent CT with contrast which was nondiagnostic     3.  Chronic systolic CHF /AICD/ non ischemic cardiomyopathy     4.  Prediabetes     5.  Hypertension:  - Blood pressure is borderline low. Continue to monitor BP     6.  KHANH:  - Supposed to be using CPAP at home     7. Acute cholecystitis:  - Denies marked abdominal pain and also has some nausea and vomiting x 1. Denies acid reflux. Continue protonix.     8. New onset Afib:  - On Lovenox therapeutic dosage. Managed by cardiology.           Signature        Denise Aguirre. MD PGY2  [facility]  [address] [place] [address]  Office: [phone]    This document has been electronically signed by Denise Aguirre MD on January 5, 2023 09:43 CST     I have reviewed the case with the resident. I have also examined and seen  the patient.  I agree with the assessment and plan as documented in the resident's note.         This document has been electronically signed by Trevor Alonso MD on January 5, 2023 11:14 CST

## 2023-01-06 LAB
ALBUMIN SERPL-MCNC: 4.1 G/DL (ref 3.5–5.2)
ALBUMIN/GLOB SERPL: 1.2 G/DL
ALP SERPL-CCNC: 103 U/L (ref 39–117)
ALT SERPL W P-5'-P-CCNC: 137 U/L (ref 1–41)
ANION GAP SERPL CALCULATED.3IONS-SCNC: 15 MMOL/L (ref 5–15)
AST SERPL-CCNC: 49 U/L (ref 1–40)
BASOPHILS # BLD AUTO: 0.02 10*3/MM3 (ref 0–0.2)
BASOPHILS NFR BLD AUTO: 0.5 % (ref 0–1.5)
BILIRUB SERPL-MCNC: 6.4 MG/DL (ref 0–1.2)
BUN SERPL-MCNC: 48 MG/DL (ref 6–20)
BUN/CREAT SERPL: 27 (ref 7–25)
CALCIUM SPEC-SCNC: 9.1 MG/DL (ref 8.6–10.5)
CHLORIDE SERPL-SCNC: 91 MMOL/L (ref 98–107)
CO2 SERPL-SCNC: 26 MMOL/L (ref 22–29)
CREAT SERPL-MCNC: 1.78 MG/DL (ref 0.76–1.27)
DEPRECATED RDW RBC AUTO: 52.4 FL (ref 37–54)
EGFRCR SERPLBLD CKD-EPI 2021: 45.6 ML/MIN/1.73
EOSINOPHIL # BLD AUTO: 0.05 10*3/MM3 (ref 0–0.4)
EOSINOPHIL NFR BLD AUTO: 1.2 % (ref 0.3–6.2)
ERYTHROCYTE [DISTWIDTH] IN BLOOD BY AUTOMATED COUNT: 16.3 % (ref 12.3–15.4)
GLOBULIN UR ELPH-MCNC: 3.3 GM/DL
GLUCOSE SERPL-MCNC: 112 MG/DL (ref 65–99)
HCT VFR BLD AUTO: 39.9 % (ref 37.5–51)
HGB BLD-MCNC: 13.1 G/DL (ref 13–17.7)
IMM GRANULOCYTES # BLD AUTO: 0.02 10*3/MM3 (ref 0–0.05)
IMM GRANULOCYTES NFR BLD AUTO: 0.5 % (ref 0–0.5)
LYMPHOCYTES # BLD AUTO: 1.18 10*3/MM3 (ref 0.7–3.1)
LYMPHOCYTES NFR BLD AUTO: 27.4 % (ref 19.6–45.3)
MCH RBC QN AUTO: 28.9 PG (ref 26.6–33)
MCHC RBC AUTO-ENTMCNC: 32.8 G/DL (ref 31.5–35.7)
MCV RBC AUTO: 88.1 FL (ref 79–97)
MONOCYTES # BLD AUTO: 0.69 10*3/MM3 (ref 0.1–0.9)
MONOCYTES NFR BLD AUTO: 16 % (ref 5–12)
NEUTROPHILS NFR BLD AUTO: 2.34 10*3/MM3 (ref 1.7–7)
NEUTROPHILS NFR BLD AUTO: 54.4 % (ref 42.7–76)
NRBC BLD AUTO-RTO: 0.5 /100 WBC (ref 0–0.2)
PLATELET # BLD AUTO: 83 10*3/MM3 (ref 140–450)
PMV BLD AUTO: 12.9 FL (ref 6–12)
POTASSIUM SERPL-SCNC: 4.3 MMOL/L (ref 3.5–5.2)
PROT SERPL-MCNC: 7.4 G/DL (ref 6–8.5)
RBC # BLD AUTO: 4.53 10*6/MM3 (ref 4.14–5.8)
SODIUM SERPL-SCNC: 132 MMOL/L (ref 136–145)
WBC NRBC COR # BLD: 4.3 10*3/MM3 (ref 3.4–10.8)

## 2023-01-06 PROCEDURE — 25010000002 DOBUTAMINE PER 250 MG: Performed by: NURSE PRACTITIONER

## 2023-01-06 PROCEDURE — 99232 SBSQ HOSP IP/OBS MODERATE 35: CPT | Performed by: INTERNAL MEDICINE

## 2023-01-06 PROCEDURE — 80053 COMPREHEN METABOLIC PANEL: CPT | Performed by: INTERNAL MEDICINE

## 2023-01-06 PROCEDURE — 85025 COMPLETE CBC W/AUTO DIFF WBC: CPT | Performed by: HOSPITALIST

## 2023-01-06 RX ADMIN — APIXABAN 5 MG: 5 TABLET, FILM COATED ORAL at 21:58

## 2023-01-06 RX ADMIN — PANTOPRAZOLE SODIUM 40 MG: 40 TABLET, DELAYED RELEASE ORAL at 06:13

## 2023-01-06 RX ADMIN — BUMETANIDE 0.25 MG/HR: 0.25 INJECTION INTRAMUSCULAR; INTRAVENOUS at 01:54

## 2023-01-06 RX ADMIN — DOBUTAMINE HYDROCHLORIDE 2.5 MCG/KG/MIN: 100 INJECTION INTRAVENOUS at 23:41

## 2023-01-06 RX ADMIN — DOBUTAMINE HYDROCHLORIDE 2.5 MCG/KG/MIN: 100 INJECTION INTRAVENOUS at 08:57

## 2023-01-06 NOTE — SIGNIFICANT NOTE
01/06/23 1350   OTHER   Discipline physical therapy assistant   Rehab Time/Intention   Session Not Performed patient/family declined treatment     Pt in recliner trying to get some sleep.

## 2023-01-06 NOTE — THERAPY EVALUATION
Patient Name: Matti Bravo  : 1970    MRN: [id number]                              Today's Date: 2023     PT Evaluation  Admit Date: 2022    Visit Dx:     ICD-10-CM ICD-9-CM   1. FRANCK (acute kidney injury) (HCC)  N17.9 584.9   2. Shortness of breath  R06.02 786.05   3. Hypokalemia  E87.6 276.8   4. Elevated brain natriuretic peptide (BNP) level  R79.89 790.99   5. Impaired mobility and ADLs  Z74.09 V49.89    Z78.9    6. Impaired functional mobility and activity tolerance  Z74.09 V49.89   7. Impaired functional mobility, balance, gait, and endurance  Z74.09 V49.89     Patient Active Problem List   Diagnosis   • Chronic systolic heart failure (HCC)   • Essential hypertension   • Mixed hyperlipidemia   • Obstructive sleep apnea   • Morbid obesity (HCC)   • Restrictive lung disease secondary to obesity   • Multiple lung nodules on CT   • Diabetes mellitus (HCC)   • Varicose veins of both lower extremities with pain   • Venous insufficiency (chronic) (peripheral)   • Surgical aftercare, circulatory system   • Chest pain   • Acute on chronic congestive heart failure (HCC)   • Abnormal nuclear stress test   • Acute congestive heart failure (HCC)   • Obesity (BMI 30-39.9)   • Hypotension   • Acute congestive heart failure, unspecified heart failure type (HCC)   • Shortness of breath   • NICM (nonischemic cardiomyopathy) (HCC)   • Pulmonary hypertension (HCC)   • CHF exacerbation (HCC)   • Heart failure (HCC)   • FRANCK (acute kidney injury) (HCC)   • Severe malnutrition (HCC)     Past Medical History:   Diagnosis Date   • Asthma    • Chronic systolic heart failure (HCC)    • Diabetes mellitus (HCC)    • Hyperlipidemia    • Hypertension    • Obesity    • Peripheral vascular disease (HCC)    • Sleep apnea      Past Surgical History:   Procedure Laterality Date   • CARDIAC CATHETERIZATION N/A 2021   • CARDIAC DEFIBRILLATOR PLACEMENT     • VARICOSE VEIN SURGERY Right 2019      General  Information     Row Name 01/05/23 0958          Physical Therapy Time and Intention    Document Type evaluation  -GB     Mode of Treatment co-treatment;occupational therapy;physical therapy  -GB     Row Name 01/05/23 0958          General Information    Patient Profile Reviewed yes  -GB     Prior Level of Function independent:;community mobility;all household mobility;driving  -GB     Existing Precautions/Restrictions fall;oxygen therapy device and L/min   watch ex brandon; can only do short distance due to low EF  -GB     Barriers to Rehab medically complex  -GB     Row Name 01/05/23 0958          Living Environment    People in Home parent(s)  -GB     Row Name 01/05/23 0958          Home Main Entrance    Number of Stairs, Main Entrance five  -GB     Stair Railings, Main Entrance railing on right side (ascending)  -GB     Row Name 01/05/23 0958          Cognition    Orientation Status (Cognition) oriented x 4  -GB     Row Name 01/05/23 0958          Safety Issues, Functional Mobility    Impairments Affecting Function (Mobility) endurance/activity tolerance;shortness of breath;strength  -GB           User Key  (r) = Recorded By, (t) = Taken By, (c) = Cosigned By    Initials Name Provider Type    Naila Johnson PT Physical Therapist               Mobility     Row Name 01/05/23 0958          Bed Mobility    Bed Mobility supine-sit;sit-supine  -GB     Supine-Sit [place] (Bed Mobility) standby assist  -GB     Sit-Supine [place] (Bed Mobility) minimum assist (75% patient effort);1 person assist;moderate assist (50% patient effort)  -GB     Assistive Device (Bed Mobility) bed rails;head of bed elevated  -GB     Comment, (Bed Mobility) pt wanted assist of PT to lift LEs up to bed on sit to supine  -GB     Row Name 01/05/23 0958          Sit-Stand Transfer    Sit-Stand [place] (Transfers) contact guard;1 person assist  -GB     Row Name 01/05/23 0958          Gait/Stairs (Locomotion)    Distance in  Feet (Gait) 8 ft; 4 ft;  -GB           User Key  (r) = Recorded By, (t) = Taken By, (c) = Cosigned By    Initials Name Provider Type    Naila Johnson PT Physical Therapist               Obj/Interventions     Row Name 01/05/23 2002          Range of Motion Comprehensive    General Range of Motion bilateral lower extremity ROM WFL  -GB     Row Name 01/05/23 2002          Strength Comprehensive (MMT)    Comment, General Manual Muscle Testing (MMT) Assessment jigna LEs grossly 4+-5/5 hip knee ankle flx/ext  -     Row Name 01/05/23 2002          Balance    Balance Assessment sitting static balance;sit to stand dynamic balance  -GB     Static Sitting Balance independent  -GB     Position, Sitting Balance unsupported  -GB     Sit to Stand Dynamic Balance independent  -GB     Row Name 01/05/23 2002          Sensory Assessment (Somatosensory)    Sensory Assessment (Somatosensory) LE sensation intact  -GB           User Key  (r) = Recorded By, (t) = Taken By, (c) = Cosigned By    Initials Name Provider Type    Naila Johnson PT Physical Therapist               Goals/Plan     Row Name 01/05/23 1958          Bed Mobility Goal 1 (PT)    Activity/Assistive Device (Bed Mobility Goal 1, PT) sit to supine;supine to sit  -GB     [place] Level/Cues Needed (Bed Mobility Goal 1, PT) independent  -GB     Time Frame (Bed Mobility Goal 1, PT) by discharge  -GB     Progress/Outcomes (Bed Mobility Goal 1, PT) goal partially met  -     Row Name 01/05/23 1958          Transfer Goal 1 (PT)    Activity/Assistive Device (Transfer Goal 1, PT) sit-to-stand/stand-to-sit;bed-to-chair/chair-to-bed  -GB     [place] Level/Cues Needed (Transfer Goal 1, PT) independent  -GB     Time Frame (Transfer Goal 1, PT) by discharge  -GB     Progress/Outcome (Transfer Goal 1, PT) goal partially met  -     Row Name 01/05/23 1958          Gait Training Goal 1 (PT)    Activity/Assistive Device (Gait Training Goal 1, PT)  gait (walking locomotion)  -GB     [place] Level (Gait Training Goal 1, PT) independent  -GB     Distance (Gait Training Goal 1, PT) 80 ft trips w/ VSS and no SOA:  -GB     Time Frame (Gait Training Goal 1, PT) by discharge  -GB     Progress/Outcome (Gait Training Goal 1, PT) goal not met  -GB     Row Name 01/05/23 1958          Stairs Goal 1 (PT)    Activity/Assistive Device (Stairs Goal 1, PT) stairs, all skills;ascending stairs;descending stairs  -GB     [place] Level/Cues Needed (Stairs Goal 1, PT) modified independence  -GB     Number of Stairs (Stairs Goal 1, PT) 5  -GB     Time Frame (Stairs Goal 1, PT) by discharge  -GB     Progress/Outcome (Stairs Goal 1, PT) goal not met  -GB     Row Name 01/05/23 1958          Therapy Assessment/Plan (PT)    Planned Therapy Interventions (PT) balance training;bed mobility training;gait training;home exercise program;motor coordination training;neuromuscular re-education;patient/family education;strengthening;ROM (range of motion);stair training  -GB           User Key  (r) = Recorded By, (t) = Taken By, (c) = Cosigned By    Initials Name Provider Type    GB Naila Ayala, PT Physical Therapist               Clinical Impression     Row Name 01/05/23 0905          Pain    Pretreatment Pain Rating 0/10 - no pain  -GB     Posttreatment Pain Rating 0/10 - no pain  -GB     Pain Intervention(s) Repositioned  -GB     Row Name 01/05/23 1298          Plan of Care Review    Plan of Care Reviewed With patient  -GB     Outcome Evaluation PT Eval completed as co eval w/ OT. Pt pleasant & agreeable and tho had been up prior, he stayed up for eval and education. He is bed mobility indep w/ trunk but needed assist of PT to get LEs up in bed due to straining against weight of LEs. He moves sit<>.stand, side step and transfer to/from bed to alt surface s/ SBA-CGA . He brandon upright for extent of session w/out c/o but did have increased HR w/ standing and transfers. He  was counselled we will need to use short duration multiple reps of activity according to his ex brandon. He verbalized understanding and interest in improving his health. He will be followed for progression as brandon; Goal is home w  parent at d/c.  -GB     Row Name 01/05/23 0958          Therapy Assessment/Plan (PT)    Rehab Potential (PT) good, to achieve stated therapy goals  -GB     Criteria for Skilled Interventions Met (PT) yes  -GB     Therapy Frequency (PT) other (see comments)  5-7 d/w  -GB     Row Name 01/05/23 0958          Vital Signs    Pre Systolic BP Rehab 110  -GB     Pre Treatment Diastolic BP 76  -GB     Post Systolic BP Rehab 115  -GB     Post Treatment Diastolic BP 85  -GB     Pretreatment Heart Rate (beats/min) 117  -GB     Intratreatment Heart Rate (beats/min) 133  -GB     Posttreatment Heart Rate (beats/min) 99  -GB     Pre SpO2 (%) 99  -GB     O2 Delivery Pre Treatment nasal cannula  2LPM  -GB     Post SpO2 (%) 99  -GB     O2 Delivery Post Treatment nasal cannula  -GB     Pre Patient Position Supine  -GB     Intra Patient Position Standing  -GB     Post Patient Position Supine  -GB     Row Name 01/05/23 0958          Positioning and Restraints    Pre-Treatment Position in bed  -GB     Post Treatment Position bed  -GB     In Bed notified nsg;supine;patient within staff view;call light within reach;encouraged to call for assist;legs elevated  -GB           User Key  (r) = Recorded By, (t) = Taken By, (c) = Cosigned By    Initials Name Provider Type    Naila Johnson, PT Physical Therapist               Outcome Measures     Row Name 01/05/23 2003          How much help from another person do you currently need...    Turning from your back to your side while in flat bed without using bedrails? 4  -GB     Moving from lying on back to sitting on the side of a flat bed without bedrails? 4  -GB     Moving to and from a bed to a chair (including a wheelchair)? 4  -GB     Standing up from a  chair using your arms (e.g., wheelchair, bedside chair)? 4  -GB     Climbing 3-5 steps with a railing? 3  -GB     To walk in hospital room? 3  -GB     AM-PAC 6 Clicks Score (PT) 22  -GB     Highest level of mobility 7 --> Walked 25 feet or more  -GB     Row Name 01/05/23 0956          Functional Assessment    Outcome Measure Options AM-PAC 6 Clicks Daily Activity (OT)  -CM           User Key  (r) = Recorded By, (t) = Taken By, (c) = Cosigned By    Initials Name Provider Type    Naila Johnson E, PT Physical Therapist    Ruth Rubalcava OT Occupational Therapist                             Physical Therapy Education     Title: PT OT SLP Therapies (In Progress)     Topic: Physical Therapy (Done)     Point: Mobility training (Done)     Learning Progress Summary           Patient Acceptance, E,TB, VU,NR by LR at 12/25/2022 1312    Comment: Educated on PT POC and goals.   Mother Acceptance, E,TB, VU,NR by LR at 12/25/2022 1312    Comment: Educated on PT POC and goals.                   Point: Home exercise program (Done)     Learning Progress Summary           Patient Acceptance, E,TB, VU,NR by LR at 12/25/2022 1312    Comment: Educated on PT POC and goals.   Mother Acceptance, E,TB, VU,NR by LR at 12/25/2022 1312    Comment: Educated on PT POC and goals.                   Point: Body mechanics (Done)     Learning Progress Summary           Patient Acceptance, E,TB, VU,NR by LR at 12/25/2022 1312    Comment: Educated on PT POC and goals.   Mother Acceptance, E,TB, VU,NR by LR at 12/25/2022 1312    Comment: Educated on PT POC and goals.                   Point: Precautions (Done)     Learning Progress Summary           Patient Acceptance, E,TB, VU,NR by LR at 12/25/2022 1312    Comment: Educated on PT POC and goals.   Mother Acceptance, E,TB, VU,NR by LR at 12/25/2022 1312    Comment: Educated on PT POC and goals.                               User Key     Initials Effective Dates Name Provider Type  Discipline    LR 06/16/21 -  Sterling Bowman Physical Therapist PT              PT Recommendation and Plan  Planned Therapy Interventions (PT): balance training, bed mobility training, gait training, home exercise program, motor coordination training, neuromuscular re-education, patient/family education, strengthening, ROM (range of motion), stair training  Plan of Care Reviewed With: patient  Outcome Evaluation: PT Eval completed as co eval w/ OT. Pt pleasant & agreeable and tho had been up prior, he stayed up for eval and education. He is bed mobility indep w/ trunk but needed assist of PT to get LEs up in bed due to straining against weight of LEs. He moves sit<>.stand, side step and transfer to/from bed to alt surface s/ SBA-CGA . He brandon upright for extent of session w/out c/o but did have increased HR w/ standing and transfers. He was counselled we will need to use short duration multiple reps of activity according to his ex brandon. He verbalized understanding and interest in improving his health. He will be followed for progression as brandon; Goal is home w  parent at d/c.     Time Calculation:    PT Charges     Row Name 01/05/23 0958             Time Calculation    Start Time 0958  -GB      Stop Time 1039  -GB      Time Calculation (min) 41 min  -GB      PT Received On 01/05/23  -GB      PT Goal Re-Cert Due Date 01/18/23  -GB            User Key  (r) = Recorded By, (t) = Taken By, (c) = Cosigned By    Initials Name Provider Type    GB Naila Ayala, PT Physical Therapist              Therapy Charges for Today     Code Description Service Date Service Provider Modifiers Qty    98984664212 HC PT EVAL MOD COMPLEXITY 3 1/5/2023 Naila Ayala, PT GP 1    26112839521 HC PT THER SUPP EA 15 MIN 1/5/2023 Naila Ayala, PT GP 1          PT G-Codes  Outcome Measure Options: AM-PAC 6 Clicks Daily Activity (OT)  AM-PAC 6 Clicks Score (PT): 22  AM-PAC 6 Clicks Score (OT): 23  Tinetti Total  Score: 28  PT Discharge Summary  Anticipated Discharge Disposition (PT): home with assist, home with outpatient therapy services, home with home health    Naila Ayala, PT  1/5/2023

## 2023-01-06 NOTE — PLAN OF CARE
Goal Outcome Evaluation:  Plan of Care Reviewed With: patient           Outcome Evaluation: PT Eval completed as co eval w/ OT. Pt pleasant & agreeable and tho had been up prior, he stayed up for eval and education. He is bed mobility indep w/ trunk but needed assist of PT to get LEs up in bed due to straining against weight of LEs. He moves sit<>.stand, side step and transfer to/from bed to alt surface s/ SBA-CGA . He brandon upright for extent of session w/out c/o but did have increased HR w/ standing and transfers. He was counselled we will need to use short duration multiple reps of activity according to his ex brandon. He verbalized understanding and interest in improving his health. He will be followed for progression as brandon; Goal is home w  parent at d/c.

## 2023-01-06 NOTE — PROGRESS NOTES
"[facility] Progress Note   LOS: 15 days   Patient Care Team:  Shonna Ng APRN as PCP - General (Family Medicine)    Chief Complaint:    Chief Complaint   Patient presents with   • Shortness of Breath        Subjective     Interval History:   Patient Denies: chest pain and palpitations  Patient Complaints: chest pain and edema  History taken from: patient chart    No acute events overnight.  Patient remains hypervolemic today Bumex drip increased.  He will remain on dobutamine low rate.     Objective     Vital Sign Min/Max for last 24 hours  Temp  Min: 97.1 °F (36.2 °C)  Max: 97.7 °F (36.5 °C)   BP  Min: 84/55  Max: 149/90   Pulse  Min: 98  Max: 125   Resp  Min: 16  Max: 19   SpO2  Min: 96 %  Max: 100 %   Flow (L/min)  Min: 2  Max: 2   Weight  Min: 135 kg (297 lb 8 oz)  Max: 135 kg (297 lb 8 oz)     Flowsheet Rows    Flowsheet Row First Filed Value   Admission Height 193 cm (76\") Documented at 12/20/2022 1700   Admission Weight 127 kg (280 lb) Documented at 12/20/2022 1700            01/03/23  1135 01/05/23  1018 01/06/23  0600   Weight: (!) 140 kg (307 lb 15.7 oz) 134 kg (296 lb) 135 kg (297 lb 8 oz)       Physical Exam:  Physical Exam  Vitals and nursing note reviewed.   Constitutional:       General: He is not in acute distress.     Appearance: He is obese. He is not diaphoretic.   HENT:      Head: Normocephalic.      Right Ear: External ear normal.      Left Ear: External ear normal.   Eyes:      General: Lids are normal.      Pupils: Pupils are equal, round, and reactive to light.   Neck:      Thyroid: No thyromegaly.      Vascular: JVD present. No carotid bruit.      Trachea: No tracheal deviation.   Cardiovascular:      Rate and Rhythm: Tachycardia present. Rhythm irregularly irregular.      Heart sounds: Normal heart sounds. No murmur heard.    No friction rub. No gallop.   Pulmonary:      Effort: Pulmonary effort is normal. No respiratory distress.      Breath sounds: No stridor. Rales present. " No wheezing.   Chest:      Chest wall: No tenderness.   Abdominal:      General: Bowel sounds are normal. There is no distension.      Palpations: Abdomen is soft.      Tenderness: There is no abdominal tenderness.   Musculoskeletal:      Right lower le+ Edema present.      Left lower le+ Edema present.   Skin:     General: Skin is warm and dry.      Findings: No erythema or rash.   Neurological:      Mental Status: He is alert and oriented to person, place, and time.      Cranial Nerves: No cranial nerve deficit.      Deep Tendon Reflexes: Reflexes are normal and symmetric. Reflexes normal.   Psychiatric:         Behavior: Behavior normal.         Thought Content: Thought content normal.         Judgment: Judgment normal.          Results Review:     Results from last 7 days   Lab Units 23  0546 23  0554 23   SODIUM mmol/L 132* 132*  --  128*   POTASSIUM mmol/L 4.3 4.1 3.9 3.5   CHLORIDE mmol/L 91* 92*  --  88*   CO2 mmol/L 26.0 29.0  --  29.0   BUN mg/dL 48* 51*  --  71*   CREATININE mg/dL 1.78* 1.74*  --  2.38*   CALCIUM mg/dL 9.1 8.7  --  8.6   BILIRUBIN mg/dL 6.4* 5.5*  --  5.1*   ALK PHOS U/L 103 111  --  122*   ALT (SGPT) U/L 137* 160*  --  206*   AST (SGOT) U/L 49* 63*  --  98*   GLUCOSE mg/dL 112* 140*  --  103*       Estimated Creatinine Clearance: 73.6 mL/min (A) (by C-G formula based on SCr of 1.78 mg/dL (H)).    Results from last 7 days   Lab Units 23  0554 23   MAGNESIUM mg/dL 2.0 2.4             Results from last 7 days   Lab Units 23  0546 23  0554 23   WBC 10*3/mm3 4.30 4.64 3.24* 2.59* 3.66   HEMOGLOBIN g/dL 13.1 13.0 13.0 13.9 13.7   PLATELETS 10*3/mm3 83* 91* 101* 102* 108*           Lab Results   Component Value Date    PROBNP 5,254.0 (H) 2022       I/O last 3 completed shifts:  In: 1322 [P.O.:940; I.V.:382]  Out: 3725 [Urine:3725]    Cardiographics:  ECG/EMG Results (last  24 hours)     Procedure Component Value Units Date/Time    SCANNED - TELEMETRY   [[id number]] Resulted: 12/20/22     Updated: 01/03/23 1506    SCANNED - TELEMETRY   [[id number]] Resulted: 12/20/22     Updated: 01/03/23 1530    SCANNED - TELEMETRY   [[id number]] Resulted: 12/20/22     Updated: 01/03/23 1948    SCANNED - TELEMETRY   [[id number]] Resulted: 12/20/22     Updated: 01/03/23 1948    SCANNED - TELEMETRY   [[id number]] Resulted: 12/20/22     Updated: 01/03/23 1948    SCANNED EKG [[id number]] Resulted: 12/20/22     Updated: 01/03/23 2019    SCANNED EKG [[id number]] Resulted: 12/20/22     Updated: 01/03/23 2019        Results for orders placed during the hospital encounter of 02/04/19    Adult Transthoracic Echo Limited W/ Cont if Necessary Per Protocol    Interpretation Summary  · 3D acquisition for left ventricular ejection fraction. Live 3D and full volume to assess left ventricular ejection fraction was utilized.  · Left ventricle systolic function is severely decreased. Estimated LVEF is 10%. Left ventricle cavity severely dilated with restrictive diastolic dysfunction. Findings are consistent with dilated cardiomyopathy.  · Right ventricle is mildly dilated with mildly reduced right ventricular systolic function.  · Moderate mitral valve regurgitation is present.  · Moderate tricuspid valve regurgitation is present. Pulmonary hypertension is present with a PA systolic pressure of 70 mmHg.  · There is mild pulmonic valve regurgitation present.  · There is a trivial pericardial effusion adjacent to the left ventricle      Imaging Results (Most Recent)     Procedure Component Value Units Date/Time    XR Chest 1 View [[id number]] Collected: 01/03/23 1611     Updated: 01/03/23 1638    Narrative:        PORTABLE CHEST    HISTORY: Shortness of breath. Acute kidney failure.    Portable AP upright film of the chest was obtained at 4:19 PM.  COMPARISON: December 20, 2022    FINDINGS:   EKG leads.  Left-sided pacemaker  remains in place with lead projected over  the right ventricle.  Stable cardiomegaly.  Cannot exclude atelectasis or infiltrate retrocardiac region left  lower lobe.  Cannot exclude small left pleural effusion.  The pulmonary vasculature is not increased.  No pneumothorax.  No acute osseous abnormality.      Impression:      CONCLUSION:  Left-sided pacemaker remains in place with lead projected over  the right ventricle.  Stable cardiomegaly.  Cannot exclude atelectasis or infiltrate retrocardiac region left  lower lobe.  Cannot exclude small left pleural effusion.    [id number]    Electronically signed by:  Mukul Klein MD  1/3/2023 4:35 PM CST  Workstation: [id number]    US Guided Vascular Access [[id number]] Collected: 01/03/23 1215     Updated: 01/03/23 1257    Narrative:      PROCEDURE: Ultrasound Guidance Vascular Access    HISTORY: access, N17.9 Acute kidney failure, unspecified R06.02  Shortness of breath E87.6 Hypokalemia R79.89 Other specified  abnormal findings of blood chemistry Z74.09 Other reduced  mobility Z78.9 Other specified health status Z74.09 Other reduced  mobility    FINDINGS:  Realtime ultrasound imaging was utilized to visualize needle  entry into the patent right basilic vein during midline catheter   placement and a permanent image was stored for permanent  recording and reporting.       Impression:      Imaging obtained during vascular access device placement under  ultrasound guidance as described above    Electronically signed by:  Leeroy Alex MD  1/3/2023 12:55 PM CST  Workstation: [id number]    IR Insert Midline Without Port Pump 5 Plus [[id number]] Resulted: 01/03/23 1223     Updated: 01/03/23 1223    Narrative:      This procedure was auto-finalized with no dictation required.    NM HIDA SCAN WITHOUT PHARMACOLOGICAL INTERVENTION [[id number]] Collected: 12/27/22 0958     Updated: 12/27/22 1354    Narrative:      EXAM:  NUCLEAR MEDICINE HEPATOBILIARY SCAN WITH CCK    HISTORY:Cholecystitis,  N17.9 Acute kidney failure, unspecified  R06.02 Shortness of breath E87.6 Hypokalemia R79.89 Other  specified abnormal findings of blood chemistry Z74.09 Other  reduced mobility Z78.9 Other specified health status Z74.09 Other  reduced mobility    COMPARISON:    The patient received 6.3 mCi of Tc-99m Choletec IV.   Radioactivity is demonstrated within the gallbladder beginning at  45minutes following injection of the radiopharmaceutical.   Radioactivity is demonstrated within the small bowel beginning at  75 minutes following injection of the radiopharmaceutical.    Beginning at 75 minutes following injection of the  radiopharmaceutical, the patient received a fatty meal challenge  of 8 Oz of Boost plus.  This resulted in a gallbladder ejection  fraction of 9% with normal range being greater than 30%.   Technologist notes that the patient denied any symptoms with the  Kinevac infusion.       Impression:      Unremarkable hepatobiliary scan.  Suboptimal ejection fraction of gallbladder at 9%, with a fatty  meal challenge.    Electronically signed by:  Pedro Day MD  12/27/2022 1:52 PM [facility]  Workstation: [id number]    NM Lung Ventilation Perfusion [[id number]] Collected: 12/23/22 1158     Updated: 12/23/22 1619    Narrative:      Nuclear medicine ventilation perfusion lung scan    History: Shortness of breath. Pulmonary embolism suspected.    Correlation: Portable chest 5:22 PM December 20, 2022    Following the inhalation of 28 mCi of aerosolized technetium 99m  DTPA, multiple ventilation images obtained.    Following the intravenous administration of 6.0 millicuries of  technetium 99m MAA, multiple perfusion images obtained.    FINDINGS:   Inhomogeneous distribution of radionuclide, more pronounced on  the ventilation images.  No mismatched peripheral perfusion defects to indicate pulmonary  embolism.  Cardiomegaly.  Some elevation right hemidiaphragm.      Impression:      Conclusion:  Low probability for pulmonary  embolism.  Cardiomegaly.  Some elevation right hemidiaphragm.    [id number]    Electronically signed by:  Mukul Klein MD  12/23/2022 4:17 PM  CST Workstation: [id number]    US Gallbladder [[id number]] Collected: 12/21/22 0906     Updated: 12/21/22 0954    Narrative:        COMPARISON:     12/20/2022    INDICATION:     Pain and vomiting. Abnormal CT    FINDINGS: Liver is enlarged measuring 19.5 cm craniocaudal. There  is no intrahepatic or extrahepatic biliary dilatation.  The  extrahepatic bile duct measures 0.4 cm which is within normal  limits.     The gallbladder is abnormal in appearance. Wall thickening with  intramural edema. Layering sludge and stones.     The right kidney measures 12.0 x 5.1 x 6.3 cm in length.  There  is no hydronephrosis.     The visualized pancreas appears normal size configuration and  echotexture.     There is no ascites.       Impression:      1.  Abnormal appearance of the gallbladder, as above, suspicious  for acute cholecystitis. If clinically indeterminate recommend  nuclear medicine hepatobiliary scan.  2. Hepatomegaly.        RP core team activated 9:51 AM for results notification.    Electronically signed by:  Rc Wheatley MD  12/21/2022 9:52 AM CST  Workstation: [id number]    CT Angiogram Chest [[id number]] Collected: 12/20/22 2359     Updated: 12/21/22 0041    Narrative:      EXAM:  CT Angiography Chest With Intravenous Contrast    CLINICAL HISTORY:  Pulmonary embolism (PE) suspected, unknown  D-dimer, N17.9 Acute kidney failure, unspecified R06.02 Shortness  of breath E87.6 Hypokalemia R79.89 Other specified abnormal  findings of blood chemistry    TECHNIQUE:  Axial computed tomographic angiography images of the  chest with intravenous contrast.  Sagittal and coronal  reformatted images were created and reviewed.  This CT exam was  performed using one or more of the following dose reduction  techniques:  automated exposure control, adjustment of the mA  and/or kV according to patient  size, and/or use of iterative  reconstruction technique.  MIP reconstructed images were created  and reviewed.    COMPARISON:  No relevant prior studies available.    FINDINGS:    Pulmonary arteries:  Nondilated pulmonary arteries.  Nondiagnostic assessment of the pulmonary arteries for filling  defect due to minimal enhancement of the pulmonary arteries. Most  of the intravenously injected contrast contrast within the right  upper extremity and superior vena cava at the time of image  acquisition.    Aorta: No aortic aneurysm.    Lungs:  No acute pulmonary infiltrate.  No mass.    Pleural space:  No pleural effusion or pneumothorax.    Heart:  Dilation of the bilateral atria and the left ventricle.   No pericardial effusion. Left chest ICD, with lead in the right  ventricular apex.    Bones/joints:  No acute fracture.  No dislocation.    Soft tissues: Normal.    Lymph nodes:  No enlarged lymph nodes.      Impression:      1.  Nondiagnostic assessment of the pulmonary arteries. Minimal  enhancement of the pulmonary arteries.   2.  Dilation of the left heart chambers and of the right atrium.  3.  No acute pulmonary infiltrate.    Electronically signed by:  India Mayfield MD  12/21/2022 12:39  AM CST Workstation: [id number]    CT Abdomen Pelvis With Contrast [[id number]] Collected: 12/20/22 2359     Updated: 12/21/22 0032    Narrative:      PROCEDURE: CT ABDOMEN PELVIS W CONTRAST    CLINICAL HISTORY: 52 years  Male  Abdominal pain, acute,  nonlocalized, N17.9 Acute kidney failure, unspecified R06.02  Shortness of breath E87.6 Hypokalemia R79.89 Other specified  abnormal findings of blood chemistry    TECHNIQUE: Contiguous axial images obtained through the abdomen  and pelvis following intravenous contrast administration. Coronal  and sagittal reformatted images provided.    This CT exam was performed according to our departmental  dose-optimization program, which includes one or more of the  following dose reduction  techniques: automated exposure control,  adjustment of the mA and/or kV according to patient size, and/or  use of iterative reconstruction technique.    COMPARISON: No prior exams provided for comparison.     FINDINGS:    Severe cardiomegaly with pacemaker wire in place. Mild bibasilar  pulmonary edema.    Hepatomegaly without focal lesion. Gallbladder distention without  definite biliary dilatation.    The pancreas, spleen, adrenal glands, kidneys, and urinary  bladder are normal.    There is no bowel inflammation, obstruction, free intraperitoneal  air, or ascites. The appendix is normal.    Chronic degenerative changes present throughout the spine.    Atherosclerosis without abdominal aortic aneurysm or dissection      Impression:        Severe cardiomegaly. Mild bibasilar pulmonary edema.    Hepatomegaly. Nonspecific gallbladder distention. Consider right  upper quadrant ultrasound if there is pain.    Electronically signed by:  Charlee Yan MD  12/21/2022 12:29 AM  CST Workstation: [id number]    XR Chest 1 View [[id number]] Collected: 12/20/22 1722     Updated: 12/20/22 1744    Narrative:        PORTABLE CHEST    HISTORY: Shortness of air    Portable AP upright film of the chest was obtained at 5:22 PM.  COMPARISON: December 7, 2022    FINDINGS:   Linear atelectasis medial right lung base.  Left-sided pacemaker remains in place with lead projected over  the right ventricle.  Stable cardiomegaly.  The pulmonary vasculature is not increased.  No pleural effusion.  No pneumothorax.  No acute osseous abnormality.  Dextroscoliosis thoracic spine.  Old left rib fractures.      Impression:      CONCLUSION:  Linear atelectasis medial right lung base.  Left-sided pacemaker remains in place with lead projected over  the right ventricle.  Stable cardiomegaly.    [id number]    Electronically signed by:  Mukul Klein MD  12/20/2022 5:42 PM  CST Workstation: [id number]          NM HIDA SCAN WITHOUT PHARMACOLOGICAL  INTERVENTION    Result Date: 12/27/2022  Unremarkable hepatobiliary scan. Suboptimal ejection fraction of gallbladder at 9%, with a fatty meal challenge. Electronically signed by:  Pedro Day MD  12/27/2022 1:52 PM CST Workstation: [id number]    NM Lung Ventilation Perfusion    Result Date: 12/23/2022  Conclusion: Low probability for pulmonary embolism. Cardiomegaly. Some elevation right hemidiaphragm. [id number] Electronically signed by:  Mukul Klein MD  12/23/2022 4:17 PM CST Workstation: [id number]    US Guided Vascular Access    Result Date: 1/3/2023  Imaging obtained during vascular access device placement under ultrasound guidance as described above Electronically signed by:  Leeroy Alex MD  1/3/2023 12:55 PM CST Workstation: [id number]    CT Abdomen Pelvis With Contrast    Result Date: 12/21/2022  Severe cardiomegaly. Mild bibasilar pulmonary edema. Hepatomegaly. Nonspecific gallbladder distention. Consider right upper quadrant ultrasound if there is pain. Electronically signed by:  Charlee Yan MD  12/21/2022 12:29 AM CST Workstation: [id number]    US Gallbladder    Result Date: 12/21/2022  1.  Abnormal appearance of the gallbladder, as above, suspicious for acute cholecystitis. If clinically indeterminate recommend nuclear medicine hepatobiliary scan. 2. Hepatomegaly. RP core team activated 9:51 AM for results notification. Electronically signed by:  Rc Wheatley MD  12/21/2022 9:52 AM CST Workstation: [id number]    XR Chest 1 View    Result Date: 1/3/2023  CONCLUSION: Left-sided pacemaker remains in place with lead projected over the right ventricle. Stable cardiomegaly. Cannot exclude atelectasis or infiltrate retrocardiac region left lower lobe. Cannot exclude small left pleural effusion. [id number] Electronically signed by:  Mukul Klein MD  1/3/2023 4:35 PM CST Workstation: [id number]    XR Chest 1 View    Result Date: 12/20/2022  CONCLUSION: Linear atelectasis medial right lung base. Left-sided pacemaker remains in  place with lead projected over the right ventricle. Stable cardiomegaly. [id number] Electronically signed by:  Mukul Klein MD  12/20/2022 5:42 PM CST Workstation: [id number]    CT Angiogram Chest    Result Date: 12/21/2022  1.  Nondiagnostic assessment of the pulmonary arteries. Minimal enhancement of the pulmonary arteries. 2.  Dilation of the left heart chambers and of the right atrium. 3.  No acute pulmonary infiltrate. Electronically signed by:  India Mayfield MD  12/21/2022 12:39 AM CST Workstation: [id number]    XR Chest PA & Lateral    Result Date: 12/7/2022  1.  Cardiomegaly without decompensation. 2.  Otherwise unremarkable chest. Electronically signed by:  Pj Cummins MD  12/7/2022 7:28 AM CST Workstation: [id number]      Medication Review:     Current Facility-Administered Medications:   •  acetaminophen (TYLENOL) tablet 1,000 mg, 1,000 mg, Oral, Q6H PRN, Sudeep Wolf MD, 1,000 mg at 01/05/23 0015  •  albuterol (PROVENTIL) nebulizer solution 0.083% 2.5 mg/3mL, 2.5 mg, Nebulization, Q4H PRN, Will French MD  •  apixaban (ELIQUIS) tablet 5 mg, 5 mg, Oral, Q12H, Vania Andujar APRN  •  bumetanide (BUMEX) 10 mg in sodium chloride 0.9 % 100 mL (0.1 mg/mL) infusion, 0.5 mg/hr, Intravenous, Continuous, Trevor Alonso MD, Last Rate: 5 mL/hr at 01/06/23 1035, 0.5 mg/hr at 01/06/23 1035  •  calcium carbonate (TUMS) chewable tablet 500 mg (200 mg elemental), 2 tablet, Oral, TID PRN, Will French MD, 2 tablet at 01/05/23 0015  •  DOBUTamine (DOBUTREX) 1 mg/mL infusion, 2.5 mcg/kg/min, Intravenous, Continuous, Vania Andujar APRN, Last Rate: 21 mL/hr at 01/06/23 0857, 2.5 mcg/kg/min at 01/06/23 0857  •  droperidol (INAPSINE) injection 1.25 mg, 1.25 mg, Intravenous, Q6H PRN, Sudeep Wolf MD, 1.25 mg at 12/22/22 1302  •  melatonin tablet 3 mg, 3 mg, Oral, Nightly PRN, Behroozi, Saeid, MD, 3 mg at 01/05/23 2220  •  morphine injection 4 mg, 4 mg, Intravenous, Once, Will French  MD CHRISTAL  •  ondansetron (ZOFRAN) injection 4 mg, 4 mg, Intravenous, Q6H PRN, Sudeep Wolf MD, 4 mg at 01/01/23 0830  •  pantoprazole (PROTONIX) EC tablet 40 mg, 40 mg, Oral, Q AM, Ayaan Cuba MD, 40 mg at 01/06/23 0613  •  Pharmacy to Dose enoxaparin (LOVENOX), , Does not apply, Continuous PRN, Vania Andujar APRN  •  potassium chloride (MICRO-K) CR capsule 40 mEq, 40 mEq, Oral, PRN, 40 mEq at 01/04/23 1557 **OR** potassium chloride (KLOR-CON) packet 40 mEq, 40 mEq, Oral, PRN **OR** potassium chloride 10 mEq in 100 mL IVPB, 10 mEq, Intravenous, Q1H PRN, Moshe Aleman DO  •  simethicone (MYLICON) chewable tablet 80 mg, 80 mg, Oral, 4x Daily PRN, Delonte Caputo MD, 80 mg at 12/25/22 0025  •  sodium chloride 0.9 % flush 10 mL, 10 mL, Intravenous, Q12H, Will French MD, 10 mL at 01/04/23 2118  •  sodium chloride 0.9 % flush 10 mL, 10 mL, Intravenous, PRN, Will French MD, 10 mL at 12/30/22 0145  •  sodium chloride 0.9 % flush 10 mL, 10 mL, Intravenous, Q12H, Ayaan Cuba MD, 10 mL at 01/04/23 2118  •  sodium chloride 0.9 % flush 10 mL, 10 mL, Intravenous, Q12H, Ayaan Cuba MD, 10 mL at 01/04/23 2118  •  sodium chloride 0.9 % flush 10 mL, 10 mL, Intravenous, PRN, Ayaan Cuba MD  •  sodium chloride 0.9 % infusion 40 mL, 40 mL, Intravenous, PRN, Will French MD    Assessment & Plan       FRANCK (acute kidney injury) (HCC)    Severe malnutrition (HCC)    #1. Hypotension: Suspect to be due to in the setting of low output state from LV dysfunction. We will hold antihypertensives.  Responding well with inotropes.   -LFTs and kidney function improving.     #2. Acute on Chronic systolic CHF/NICM: EF 15-20%. NYHA Class IV, Stage D.  Patient appears hypervolemic with poor perfusion and hemodynamics requiring inotropic support.  -Continue dobutamine @ 2.5mcg/kg/min  BETA-BLOCKER:  carvedilol on hold  ACE/ARB/ENTRESTO: losartan on hold  DIURETIC: Bumex gtt increased  to 0.5mg/hr. Needs aggressive diuresis.   SGL2I: should be considered  ALDOSTERONE ANTAGONIST: n/a  IMDUR/HYDRALAZINE: N/A  DIGOXIN: N/A  FR: 1,500  NA Restrction: 2grams  ICD: yes [employer]  8/2022  Daily weight  Strict intake/output  Continuous cardiac monitoring     #3. New onset AF: Patient does not have prior documented history.  He was on Eliquis to prevent LV thrombus.  Recommend rate control strategy and anticoagulation.   -Stop Lovenox and restart Eliquis 5mg BID   -We will restart beta-blocker therapy when condition allows.   -Stop IV dopamine which should help the heart rate slowed down some.  Continue dobutamine low rate.     Once patient is adequately diuresed and respiratory status has improved he may require cardioversion.    Plan for disposition: Continue CCU. We will continue to follow. Dr. Cabrera to provide weekend coverage.             This document has been electronically signed by SHELL Ruvalcaba on January 6, 2023 12:59 CST   Electronically signed by SHELL Ruvalcaba, 01/06/23, 1:00 PM CST.    I personally saw and examined Matti Bravo after the APRN.  I personally performed a history and physical examination of the patient.  I personally reviewed independent findings and plan of care.  I discussed management with the APRN.  I agree with the APRN's documentation.    No acute overnight events.  He continues to feel better.  He was comfortably lying relatively flat in a reclining chair at the time of my evaluation.  His urine output decreased yesterday after decreasing the rate of Bumex gtt.  He continues to be on dobutamine gtt.  Telemetry was reviewed and shows atrial fibrillation with ventricular rates in 110s-120s bpm range.    Vitals:    01/06/23 1200 01/06/23 1334 01/06/23 1421 01/06/23 1500   BP: 102/59 (!) 146/104 90/59 119/67   BP Location: Left arm      Patient Position: Lying      Pulse: (!) 126 116 110 103   Resp: 16      Temp: 98 °F (36.7 °C)       TempSrc: Oral      SpO2: 97% 98% 99% 100%   Weight:       Height:         Nonischemic cardiomyopathy: He is s/p ICD placement.  Cardiomyopathy medications continue to be on hold for now.  Due to excellent response, continue Bumex gtt (rate was increased again today) and low-dose dobutamine gtt  today to assist in diuresis.    Consider discontinuing dobutamine gtt tomorrow.     Hypotension: Largely resolved with inotropes.  Dopamine gtt has been weaned off.  Consider weaning dobutamine gtt as tolerated.     New onset atrial fibrillation: Transition to oral Eliquis today.  He is in atrial fibrillation at this point and ventricular rates are slightly on the faster side.  Serum TSH and free T4 levels were normal in December 2022.  Consider weaning dobutamine gtt as tolerated.     Transaminitis: Likely related to hypotension.  It continues to improve. Continue to hold Lipitor for now and restart when AST/ALT levels normalize.     FRANCK on CKD:  Improving.  Nephrology following.    Dr. Cabrera will provide cardiology coverage over the weekend.    Electronically signed by Dylon Aranda MD, 01/06/23, 4:04 PM CST.

## 2023-01-06 NOTE — PROGRESS NOTES
[facility] Medicine Services  INPATIENT PROGRESS NOTE    Length of Stay: 15  Date of Admission: 12/20/2022  Primary Care Physician: Shonna Ng APRN    Subjective   Chief Complaint: No new complaints.    HPI: Patient is seen for follow-up today 1/6/2023.  He was transferred to the ICU on 1/3/2023 secondary to hypotension and started on dopamine and dobutamine infusions.  His blood pressure has since improved and dopamine infusion has been  discontinued. He is doing better, less deconditioned, tolerating his diet and voices no new complaints.  He has good urinary output and swelling both legs persist but much improved.  He voices no new complaints.    Review of Systems   Constitutional: Positive for activity change and fatigue. Negative for appetite change, chills, diaphoresis and fever.   HENT: Negative for trouble swallowing and voice change.    Eyes: Negative for photophobia and visual disturbance.   Respiratory: Negative for cough, choking, chest tightness, shortness of breath, wheezing and stridor.    Cardiovascular: Positive for leg swelling. Negative for chest pain and palpitations.   Gastrointestinal: Negative for abdominal distention, abdominal pain, blood in stool, constipation, diarrhea, nausea and vomiting.   Endocrine: Negative for cold intolerance, heat intolerance, polydipsia, polyphagia and polyuria.   Genitourinary: Negative for decreased urine volume, difficulty urinating, dysuria, enuresis, flank pain, frequency, hematuria and urgency.   Musculoskeletal: Negative for arthralgias, gait problem, myalgias, neck pain and neck stiffness.   Skin: Negative for pallor, rash and wound.   Neurological: Negative for dizziness, tremors, seizures, syncope, facial asymmetry, speech difficulty, weakness, light-headedness, numbness and headaches.   Hematological: Does not bruise/bleed easily.   Psychiatric/Behavioral: Negative for agitation, behavioral problems and  confusion.       Objective    Temp:  [97.1 °F (36.2 °C)-97.7 °F (36.5 °C)] 97.7 °F (36.5 °C)  Heart Rate:  [] 112  Resp:  [16-19] 18  BP: ()/(52-94) 98/68   AM-PAC 6 Clicks Score (PT): 18 (01/06/23 0800)    Physical Exam  Vitals and nursing note reviewed.   Constitutional:       General: He is not in acute distress.     Appearance: He is well-developed. He is obese. He is ill-appearing. He is not diaphoretic.   HENT:      Head: Normocephalic and atraumatic.   Eyes:      General: No scleral icterus.        Right eye: No discharge.         Left eye: No discharge.      Extraocular Movements: Extraocular movements intact.      Pupils: Pupils are equal, round, and reactive to light.   Neck:      Thyroid: No thyromegaly.      Vascular: No carotid bruit or JVD.   Cardiovascular:      Rate and Rhythm: Tachycardia present. Rhythm irregular.      Heart sounds: Normal heart sounds. No murmur heard.    No friction rub. No gallop.   Pulmonary:      Effort: Pulmonary effort is normal. No respiratory distress.      Breath sounds: Normal breath sounds. No stridor. No wheezing or rales.   Chest:      Chest wall: No tenderness.   Abdominal:      General: Bowel sounds are normal. There is no distension.      Palpations: Abdomen is soft. There is no mass.      Tenderness: There is no abdominal tenderness. There is no right CVA tenderness, left CVA tenderness, guarding or rebound.      Hernia: No hernia is present.   Musculoskeletal:         General: No swelling, tenderness or deformity.      Cervical back: Normal range of motion and neck supple.      Right lower leg: Edema present.      Left lower leg: Edema present.   Skin:     General: Skin is warm and dry.      Coloration: Skin is not jaundiced or pale.      Findings: No bruising, erythema, lesion or rash.   Neurological:      General: No focal deficit present.      Mental Status: He is alert and oriented to person, place, and time.      Cranial Nerves: No cranial nerve  deficit.      Sensory: No sensory deficit.      Motor: No weakness or abnormal muscle tone.      Coordination: Coordination normal.      Gait: Gait normal.      Deep Tendon Reflexes: Reflexes normal.   Psychiatric:         Mood and Affect: Mood normal.         Behavior: Behavior normal.         Thought Content: Thought content normal.         Judgment: Judgment normal.           Medication Review:    Current Facility-Administered Medications:   •  acetaminophen (TYLENOL) tablet 1,000 mg, 1,000 mg, Oral, Q6H PRN, Sudeep Wolf MD, 1,000 mg at 01/05/23 0015  •  albuterol (PROVENTIL) nebulizer solution 0.083% 2.5 mg/3mL, 2.5 mg, Nebulization, Q4H PRN, Will French MD  •  bumetanide (BUMEX) 10 mg in sodium chloride 0.9 % 100 mL (0.1 mg/mL) infusion, 0.5 mg/hr, Intravenous, Continuous, Trevor Alonso MD, Last Rate: 5 mL/hr at 01/06/23 1035, 0.5 mg/hr at 01/06/23 1035  •  calcium carbonate (TUMS) chewable tablet 500 mg (200 mg elemental), 2 tablet, Oral, TID PRN, Will French MD, 2 tablet at 01/05/23 0015  •  DOBUTamine (DOBUTREX) 1 mg/mL infusion, 2.5 mcg/kg/min, Intravenous, Continuous, Vania Andujar APRN, Last Rate: 21 mL/hr at 01/06/23 0857, 2.5 mcg/kg/min at 01/06/23 0857  •  droperidol (INAPSINE) injection 1.25 mg, 1.25 mg, Intravenous, Q6H PRN, Sudeep Wolf MD, 1.25 mg at 12/22/22 1302  •  Enoxaparin Sodium (LOVENOX) syringe 140 mg, 1 mg/kg, Subcutaneous, Q12H, Moshe Aleman DO, 140 mg at 01/05/23 2011  •  melatonin tablet 3 mg, 3 mg, Oral, Nightly PRN, Behroozi, Saeid, MD, 3 mg at 01/05/23 2220  •  morphine injection 4 mg, 4 mg, Intravenous, Once, Will French MD  •  ondansetron (ZOFRAN) injection 4 mg, 4 mg, Intravenous, Q6H PRN, Sudeep Wolf MD, 4 mg at 01/01/23 0830  •  pantoprazole (PROTONIX) EC tablet 40 mg, 40 mg, Oral, Q AM, Ayaan Cuba MD, 40 mg at 01/06/23 0613  •  Pharmacy to Dose enoxaparin (LOVENOX), , Does not apply, Continuous PRN,  Vania Andujar, APRN  •  potassium chloride (MICRO-K) CR capsule 40 mEq, 40 mEq, Oral, PRN, 40 mEq at 01/04/23 1557 **OR** potassium chloride (KLOR-CON) packet 40 mEq, 40 mEq, Oral, PRN **OR** potassium chloride 10 mEq in 100 mL IVPB, 10 mEq, Intravenous, Q1H PRN, Moshe Aleman DO  •  simethicone (MYLICON) chewable tablet 80 mg, 80 mg, Oral, 4x Daily PRN, Delonte Caputo MD, 80 mg at 12/25/22 0025  •  sodium chloride 0.9 % flush 10 mL, 10 mL, Intravenous, Q12H, Will French MD, 10 mL at 01/04/23 2118  •  sodium chloride 0.9 % flush 10 mL, 10 mL, Intravenous, PRN, Will French MD, 10 mL at 12/30/22 0145  •  sodium chloride 0.9 % flush 10 mL, 10 mL, Intravenous, Q12H, Ayaan Cuba MD, 10 mL at 01/04/23 2118  •  sodium chloride 0.9 % flush 10 mL, 10 mL, Intravenous, Q12H, Ayaan Cuba MD, 10 mL at 01/04/23 2118  •  sodium chloride 0.9 % flush 10 mL, 10 mL, Intravenous, PRN, Ayaan Cuba MD  •  sodium chloride 0.9 % infusion 40 mL, 40 mL, Intravenous, PRN, Will French MD    Results Review:  I have reviewed the labs, radiology results, and diagnostic studies.    Laboratory Data:   Results from last 7 days   Lab Units 01/06/23  0546 01/05/23  0554 01/04/23 2038 01/04/23  0623   SODIUM mmol/L 132* 132*  --  128*   POTASSIUM mmol/L 4.3 4.1 3.9 3.5   CHLORIDE mmol/L 91* 92*  --  88*   CO2 mmol/L 26.0 29.0  --  29.0   BUN mg/dL 48* 51*  --  71*   CREATININE mg/dL 1.78* 1.74*  --  2.38*   GLUCOSE mg/dL 112* 140*  --  103*   CALCIUM mg/dL 9.1 8.7  --  8.6   BILIRUBIN mg/dL 6.4* 5.5*  --  5.1*   ALK PHOS U/L 103 111  --  122*   ALT (SGPT) U/L 137* 160*  --  206*   AST (SGOT) U/L 49* 63*  --  98*   ANION GAP mmol/L 15.0 11.0  --  11.0     Estimated Creatinine Clearance: 73.6 mL/min (A) (by C-G formula based on SCr of 1.78 mg/dL (H)).  Results from last 7 days   Lab Units 01/05/23  0554 01/04/23  0623   MAGNESIUM mg/dL 2.0 2.4         Results from last 7 days   Lab Units  01/06/23  0546 01/05/23  0554 01/04/23  0623 01/03/23  0622 01/02/23  0633   WBC 10*3/mm3 4.30 4.64 3.24* 2.59* 3.66   HEMOGLOBIN g/dL 13.1 13.0 13.0 13.9 13.7   HEMATOCRIT % 39.9 39.7 39.0 41.7 41.4   PLATELETS 10*3/mm3 83* 91* 101* 102* 108*           Culture Data:   No results found for: BLOODCX  No results found for: URINECX  No results found for: RESPCX  No results found for: WOUNDCX  No results found for: STOOLCX  No components found for: BODYFLD    Radiology Data:   Imaging Results (Last 24 Hours)     ** No results found for the last 24 hours. **          I have reviewed the patient's current medications.     Assessment/Plan     Hospital Problem List:  Principal Problem:    FRANCK (acute kidney injury) (HCC)  Active Problems:    Severe malnutrition (HCC)    Acute on chronic kidney disease stage III (likely cardiorenal): Patient's baseline is reportedly between 1.4-1.7.  Creatinine is down to 1.78 today.     Continue Bumex infusion and continue to monitor renal function. Input by nephrologist is appreciated.    History of nonischemic cardiomyopathy/chronic systolic heart failure status post AICD placement (with hypotension): Patient appears clinically euvolemic and chest x-ray done on 1/3/2023 did not show any evidence of pulmonary vascular congestion.  He has lost 12 pounds in the last 48 hours.  Continue Bumex and dobutamine infusions,  with strict I's and O's, daily weights, salt and fluid restriction.  Input by cardiologist is appreciated.  Echocardiogram done 8/19/2022 showed an estimated ejection fraction of 15 to 20%.  Elevated LFTs are reactive, improving and will be monitored.    Hyponatremia (likely hypervolemic): Much improved.  Continue diuretics, restrict free water and monitor BMP.    Chronic atrial fibrillation: Patient is in uncontrolled ventricular response.  Coreg was discontinued secondary to borderline blood pressure and may need to be restarted by the cardiologist.  Continue anticoagulation  with Lovenox.  Transition back to [facility] on discharge.  Continue to monitor CBC secondary to thrombocytopenia.    Hypokalemia: Resolved.    Possible acute cholecystitis: Patient is less symptomatic and tolerating recommended diet.  HIDA scan was unremarkable.    Obstructive sleep apnea: Continue nightly CPAP.    Deconditioning: Continue PT and OT.    Continue GI and DVT prophylaxis.    Discharge Planning: In progress.    I confirmed that the patient's Advance Care Plan is present, code status is documented, or surrogate decision maker is listed in the patient's medical record.     I have utilized all available immediate resources to obtain, update, or review the patient's current medications.    35 minutes of critical care time was spent in the assessment and formulation of treatment plan for this patient exclusive of billable procedures.      Ayaan Cuba MD   01/06/23   12:07 CST

## 2023-01-06 NOTE — PROGRESS NOTES
"  [facility]  [address] [place] [address]. [address]  T - [phone]  F - [phone]     Progress Note          PATIENT  DEMOGRAPHICS   PATIENT NAME: Matti Bravo                      PHYSICIAN: Trevor Alonso MD  : 1970  MRN: [id number]   LOS: 15 days    Patient Care Team:  Shonna Ng APRN as PCP - General (Family Medicine)  Subjective   SUBJECTIVE   Patient reports that swelling has gone down. UO in the last 24 hours is about 3.7 L         Objective   OBJECTIVE   Vital Signs  Temp:  [97.1 °F (36.2 °C)-97.7 °F (36.5 °C)] 97.7 °F (36.5 °C)  Heart Rate:  [] 112  Resp:  [16-19] 18  BP: ()/(52-94) 98/68    Flowsheet Rows    Flowsheet Row First Filed Value   Admission Height 193 cm (76\") Documented at 2022 1700   Admission Weight 127 kg (280 lb) Documented at 2022 1700           I/O last 3 completed shifts:  In: 1322 [P.O.:940; I.V.:382]  Out: 3725 [Urine:3725]    PHYSICAL EXAM    Physical Exam  Vitals reviewed.   Constitutional:       General: He is not in acute distress.     Appearance: Normal appearance. He is not ill-appearing.   HENT:      Head: Normocephalic and atraumatic.   Cardiovascular:      Rate and Rhythm: Tachycardia present. Rhythm irregular.   Pulmonary:      Effort: Pulmonary effort is normal. No respiratory distress.      Breath sounds: Normal breath sounds.   Musculoskeletal:      Right lower leg: Edema present.      Left lower leg: Edema present.   Neurological:      Mental Status: He is alert.         RESULTS   Results Review:    Results from last 7 days   Lab Units 23  0546 23  0554 23  06   SODIUM mmol/L 132* 132*  --  128*   POTASSIUM mmol/L 4.3 4.1 3.9 3.5   CHLORIDE mmol/L 91* 92*  --  88*   CO2 mmol/L 26.0 29.0  --  29.0   BUN mg/dL 48* 51*  --  71*   CREATININE mg/dL 1.78* 1.74*  --  2.38*   CALCIUM mg/dL 9.1 8.7  --  8.6   BILIRUBIN mg/dL 6.4* 5.5*  --  5.1*   ALK PHOS U/L 103 111  --  " 122*   ALT (SGPT) U/L 137* 160*  --  206*   AST (SGOT) U/L 49* 63*  --  98*   GLUCOSE mg/dL 112* 140*  --  103*       Estimated Creatinine Clearance: 73.6 mL/min (A) (by C-G formula based on SCr of 1.78 mg/dL (H)).    Results from last 7 days   Lab Units 01/05/23  0554 01/04/23  0623   MAGNESIUM mg/dL 2.0 2.4             Results from last 7 days   Lab Units 01/06/23  0546 01/05/23  0554 01/04/23  0623 01/03/23  0622 01/02/23  0633   WBC 10*3/mm3 4.30 4.64 3.24* 2.59* 3.66   HEMOGLOBIN g/dL 13.1 13.0 13.0 13.9 13.7   PLATELETS 10*3/mm3 83* 91* 101* 102* 108*               Imaging Results (Last 24 Hours)     ** No results found for the last 24 hours. **           MEDICATIONS    enoxaparin, 1 mg/kg, Subcutaneous, Q12H  Morphine, 4 mg, Intravenous, Once  pantoprazole, 40 mg, Oral, Q AM  sodium chloride, 10 mL, Intravenous, Q12H  sodium chloride, 10 mL, Intravenous, Q12H  sodium chloride, 10 mL, Intravenous, Q12H      bumetanide, 0.25 mg/hr, Last Rate: 0.25 mg/hr (01/06/23 0154)  DOBUTamine, 2.5 mcg/kg/min, Last Rate: 2.5 mcg/kg/min (01/06/23 0857)  Pharmacy to Dose enoxaparin (LOVENOX),         Assessment & Plan   ASSESSMENT / PLAN      FRANCK (acute kidney injury) (HCC)    Severe malnutrition (HCC)    1.  Acute Kidney Failure on CKD 3: Baseline creatinine 1.4-1.7 mg/dl  - Etiology of FRANCK likely contrast induced.    - Urinalysis showed trace protein and negative blood, 3-5 RBCs, 0-2 WBCs.  Urine sodium less than 20.  Urine protein creatinine ratio 235 mg.  - Creatinine peak at 2.9. currently 1.74   - Keep fluid restriction of 1500 ml a day.   - Lasix was not helping with diuresis and due to marked anasarca  bumex drip was added. Good diuretic response with bumex. bumex was lowered yesterday and weight didn't change. Agree to increase bumex. Keep oneal while on bumex drip      2.  Shortness of breath:  - Underwent CT with contrast which was nondiagnostic     3.  Chronic systolic CHF /AICD/ non ischemic cardiomyopathy     4.   Prediabetes     5.  Hypertension:  - Blood pressure is borderline low. Continue to monitor BP     6.  KHANH:  - Supposed to be using CPAP at home     7. Acute cholecystitis:  - Denies marked abdominal pain and also has some nausea and vomiting x 1. Denies acid reflux. Continue protonix.     8. New onset Afib:  - On Lovenox therapeutic dosage. Managed by cardiology.                This document has been electronically signed by Trevor Alonso MD on January 6, 2023 10:30 CST

## 2023-01-06 NOTE — SIGNIFICANT NOTE
01/06/23 1140   OTHER   Discipline occupational therapist   Rehab Time/Intention   Session Not Performed patient/family declined treatment  (Attempt 1- patient declined OT tx secondary not enough sleep; attempt 2 14:40 patient declined, reports not feeling well. Requests f/u tomorrow. Will f/u as able.)

## 2023-01-07 LAB
ALBUMIN SERPL-MCNC: 4.2 G/DL (ref 3.5–5.2)
ALBUMIN/GLOB SERPL: 1.2 G/DL
ALP SERPL-CCNC: 98 U/L (ref 39–117)
ALT SERPL W P-5'-P-CCNC: 119 U/L (ref 1–41)
ANION GAP SERPL CALCULATED.3IONS-SCNC: 16 MMOL/L (ref 5–15)
AST SERPL-CCNC: 40 U/L (ref 1–40)
BASOPHILS # BLD AUTO: 0.03 10*3/MM3 (ref 0–0.2)
BASOPHILS NFR BLD AUTO: 0.6 % (ref 0–1.5)
BILIRUB SERPL-MCNC: 5.8 MG/DL (ref 0–1.2)
BUN SERPL-MCNC: 48 MG/DL (ref 6–20)
BUN/CREAT SERPL: 25.7 (ref 7–25)
CALCIUM SPEC-SCNC: 9.5 MG/DL (ref 8.6–10.5)
CHLORIDE SERPL-SCNC: 89 MMOL/L (ref 98–107)
CO2 SERPL-SCNC: 25 MMOL/L (ref 22–29)
CREAT SERPL-MCNC: 1.87 MG/DL (ref 0.76–1.27)
DEPRECATED RDW RBC AUTO: 57.1 FL (ref 37–54)
EGFRCR SERPLBLD CKD-EPI 2021: 43 ML/MIN/1.73
EOSINOPHIL # BLD AUTO: 0.05 10*3/MM3 (ref 0–0.4)
EOSINOPHIL NFR BLD AUTO: 1 % (ref 0.3–6.2)
ERYTHROCYTE [DISTWIDTH] IN BLOOD BY AUTOMATED COUNT: 17 % (ref 12.3–15.4)
GLOBULIN UR ELPH-MCNC: 3.4 GM/DL
GLUCOSE SERPL-MCNC: 124 MG/DL (ref 65–99)
HCT VFR BLD AUTO: 43.4 % (ref 37.5–51)
HGB BLD-MCNC: 13.4 G/DL (ref 13–17.7)
IMM GRANULOCYTES # BLD AUTO: 0.02 10*3/MM3 (ref 0–0.05)
IMM GRANULOCYTES NFR BLD AUTO: 0.4 % (ref 0–0.5)
LYMPHOCYTES # BLD AUTO: 1.4 10*3/MM3 (ref 0.7–3.1)
LYMPHOCYTES NFR BLD AUTO: 28.1 % (ref 19.6–45.3)
MAGNESIUM SERPL-MCNC: 2 MG/DL (ref 1.6–2.6)
MCH RBC QN AUTO: 28.3 PG (ref 26.6–33)
MCHC RBC AUTO-ENTMCNC: 30.9 G/DL (ref 31.5–35.7)
MCV RBC AUTO: 91.6 FL (ref 79–97)
MONOCYTES # BLD AUTO: 0.77 10*3/MM3 (ref 0.1–0.9)
MONOCYTES NFR BLD AUTO: 15.4 % (ref 5–12)
NEUTROPHILS NFR BLD AUTO: 2.72 10*3/MM3 (ref 1.7–7)
NEUTROPHILS NFR BLD AUTO: 54.5 % (ref 42.7–76)
NRBC BLD AUTO-RTO: 0 /100 WBC (ref 0–0.2)
PLATELET # BLD AUTO: 87 10*3/MM3 (ref 140–450)
PMV BLD AUTO: 13.4 FL (ref 6–12)
POTASSIUM SERPL-SCNC: 4.6 MMOL/L (ref 3.5–5.2)
PROT SERPL-MCNC: 7.6 G/DL (ref 6–8.5)
RBC # BLD AUTO: 4.74 10*6/MM3 (ref 4.14–5.8)
SODIUM SERPL-SCNC: 130 MMOL/L (ref 136–145)
WBC NRBC COR # BLD: 4.99 10*3/MM3 (ref 3.4–10.8)

## 2023-01-07 PROCEDURE — 80053 COMPREHEN METABOLIC PANEL: CPT | Performed by: INTERNAL MEDICINE

## 2023-01-07 PROCEDURE — 83735 ASSAY OF MAGNESIUM: CPT | Performed by: INTERNAL MEDICINE

## 2023-01-07 PROCEDURE — 99232 SBSQ HOSP IP/OBS MODERATE 35: CPT | Performed by: INTERNAL MEDICINE

## 2023-01-07 PROCEDURE — 25010000002 DOBUTAMINE PER 250 MG: Performed by: NURSE PRACTITIONER

## 2023-01-07 PROCEDURE — 85025 COMPLETE CBC W/AUTO DIFF WBC: CPT | Performed by: HOSPITALIST

## 2023-01-07 RX ORDER — METOLAZONE 5 MG/1
5 TABLET ORAL ONCE
Status: COMPLETED | OUTPATIENT
Start: 2023-01-07 | End: 2023-01-07

## 2023-01-07 RX ADMIN — APIXABAN 5 MG: 5 TABLET, FILM COATED ORAL at 09:26

## 2023-01-07 RX ADMIN — PANTOPRAZOLE SODIUM 40 MG: 40 TABLET, DELAYED RELEASE ORAL at 05:04

## 2023-01-07 RX ADMIN — METOLAZONE 5 MG: 5 TABLET ORAL at 18:25

## 2023-01-07 RX ADMIN — DOBUTAMINE HYDROCHLORIDE 2.5 MCG/KG/MIN: 100 INJECTION INTRAVENOUS at 14:34

## 2023-01-07 RX ADMIN — BUMETANIDE 0.5 MG/HR: 0.25 INJECTION INTRAMUSCULAR; INTRAVENOUS at 07:00

## 2023-01-07 NOTE — PROGRESS NOTES
LOS: 16 days   Patient Care Team:  Shonna Ng APRN as PCP - General (Family Medicine)    Chief Complaint: Patient examined chart reviewed patient is a wheelchair had mild shortness of breath evaluated with Dr. Aranda on diuretics blood pressure is getting better had episode of hypotension on January 3, 2023 requiring transfer to intensive care unit    Patient denies orthopnea PND or chest pain  Review of Systems:     Constitution: Positive for activity change    HENT:  Denies any headache, hearing impairment.    Eyes:  Denies any blurring of vision     Cardiovascular:  As per history of present illness.     Respiratory: Positive for shortness of     Gastrointestinal:  No nausea, vomiting, or melena.    Extremity: Positive for legs swelling  Objective     Current Facility-Administered Medications   Medication Dose Route Frequency Provider Last Rate Last Admin   • acetaminophen (TYLENOL) tablet 1,000 mg  1,000 mg Oral Q6H PRN Sudeep Wolf MD   1,000 mg at 01/05/23 0015   • albuterol (PROVENTIL) nebulizer solution 0.083% 2.5 mg/3mL  2.5 mg Nebulization Q4H PRN Will French MD       • apixaban (ELIQUIS) tablet 5 mg  5 mg Oral Q12H Vania Andujar APRN   5 mg at 01/07/23 0926   • bumetanide (BUMEX) 10 mg in sodium chloride 0.9 % 100 mL (0.1 mg/mL) infusion  0.5 mg/hr Intravenous Continuous Trevor Alonso MD 5 mL/hr at 01/07/23 0700 0.5 mg/hr at 01/07/23 0700   • calcium carbonate (TUMS) chewable tablet 500 mg (200 mg elemental)  2 tablet Oral TID PRN Will French MD   2 tablet at 01/05/23 0015   • DOBUTamine (DOBUTREX) 1 mg/mL infusion  2.5 mcg/kg/min Intravenous Continuous Vania Andujar APRN 21 mL/hr at 01/06/23 2341 2.5 mcg/kg/min at 01/06/23 2341   • droperidol (INAPSINE) injection 1.25 mg  1.25 mg Intravenous Q6H PRN Sudeep Wolf MD   1.25 mg at 12/22/22 1302   • melatonin tablet 3 mg  3 mg Oral Nightly PRN Behroozi, Saeid, MD   3 mg at 01/05/23 2220   • morphine  "injection 4 mg  4 mg Intravenous Once Will French MD       • ondansetron (ZOFRAN) injection 4 mg  4 mg Intravenous Q6H PRN Sudeep Wolf MD   4 mg at 01/01/23 0830   • pantoprazole (PROTONIX) EC tablet 40 mg  40 mg Oral Q AM Ayaan Cuba MD   40 mg at 01/07/23 0504   • potassium chloride (MICRO-K) CR capsule 40 mEq  40 mEq Oral PRN Jeanne Alemano R, DO   40 mEq at 01/04/23 1557    Or   • potassium chloride (KLOR-CON) packet 40 mEq  40 mEq Oral PRN Jeanne Alemano R, DO        Or   • potassium chloride 10 mEq in 100 mL IVPB  10 mEq Intravenous Q1H PRN Moshe Aleman R, DO       • simethicone (MYLICON) chewable tablet 80 mg  80 mg Oral 4x Daily PRN Delonte Caputo MD   80 mg at 12/25/22 0025   • sodium chloride 0.9 % flush 10 mL  10 mL Intravenous Q12H Will French MD   10 mL at 01/04/23 2118   • sodium chloride 0.9 % flush 10 mL  10 mL Intravenous PRN Will French MD   10 mL at 12/30/22 0145   • sodium chloride 0.9 % flush 10 mL  10 mL Intravenous Q12H Ayaan Cuba MD   10 mL at 01/04/23 2118   • sodium chloride 0.9 % flush 10 mL  10 mL Intravenous Q12H Ayaan Cuba MD   10 mL at 01/04/23 2118   • sodium chloride 0.9 % flush 10 mL  10 mL Intravenous PRN Ayaan Cuba MD       • sodium chloride 0.9 % infusion 40 mL  40 mL Intravenous PRN Will French MD           Vital Sign Min/Max for last 24 hours  Temp  Min: 96.3 °F (35.7 °C)  Max: 98.1 °F (36.7 °C)   BP  Min: 90/59  Max: 157/101   Pulse  Min: 99  Max: 126   Resp  Min: 16  Max: 18   SpO2  Min: 89 %  Max: 100 %   No data recorded   Weight  Min: 136 kg (300 lb)  Max: 136 kg (300 lb)     Flowsheet Rows    Flowsheet Row First Filed Value   Admission Height 193 cm (76\") Documented at 12/20/2022 1700   Admission Weight 127 kg (280 lb) Documented at 12/20/2022 1700            Intake/Output Summary (Last 24 hours) at 1/7/2023 1055  Last data filed at 1/7/2023 0900  Gross per 24 hour   Intake 1619 ml "   Output 1310 ml   Net 309 ml       Physical Exam:  Constitutional patient is cooperative.  Chest: Positive bilateral decreased air entry.  Cardiovascular system: Tachycardia no pericardial rub  Abdomen: Soft, no tenderness, bowel sounds present  CNS: Alert, oriented to place and time.  No motor or sensory deficit.  Cranial nerves intact.  Musculoskeletal: No deformity of the back or spine.  Extremities: Positive edema     Results Review:   I reviewed the patient's new clinical results.  Lab Results   Component Value Date    WBC 4.99 01/07/2023    HGB 13.4 01/07/2023    HCT 43.4 01/07/2023    MCV 91.6 01/07/2023    PLT 87 (L) 01/07/2023     Lab Results   Component Value Date    GLUCOSE 124 (H) 01/07/2023    BUN 48 (H) 01/07/2023    CREATININE 1.87 (H) 01/07/2023    EGFRIFAFRI 91 12/08/2021    BCR 25.7 (H) 01/07/2023    CO2 25.0 01/07/2023    CALCIUM 9.5 01/07/2023    ALBUMIN 4.2 01/07/2023    AST 40 01/07/2023     (H) 01/07/2023     Lab Results   Component Value Date    TSH 1.110 12/20/2022     Lab Results   Component Value Date    INR 2.50 (H) 12/04/2022    INR 2.19 (H) 12/04/2022    INR 1.81 (H) 09/23/2022    PROTIME 27.1 (H) 12/04/2022    PROTIME 21.0 (H) 12/04/2022    PROTIME 21.0 (H) 09/23/2022     Lab Results   Component Value Date    PTT 33.7 03/08/2019       EKG:     Telemetry:    Ejection Fraction  Lab Results   Component Value Date    EF 19 12/02/2021       Echo EF Estimated  Lab Results   Component Value Date    ECHOEFEST 10 02/06/2019         Present on Admission:  • FRANCK (acute kidney injury) (HCC)  • Severe malnutrition (HCC)    Assessment & Plan   Acute on chronic congestive heart failure with nonischemic cardiomyopathy s/p ICD  Off beta-blocker due to hemodynamic issues  Hold ACE and ARB for similar reason  Patient with history of hypervolemic at the time of evaluation continue diuretic managed by nephrology  Fluid restriction to 1500 cc    Atrial fibrillation  Continue oral  anticoagulation            This document has been electronically signed by Mirna Cabrera MD on January 7, 2023 11:01 CST        Mirna Cabrera MD  01/07/23  10:55 CST      Part of this note may be an electronic transcription/translation of spoken language to printed text using the Dragon Dictation system.

## 2023-01-07 NOTE — PROGRESS NOTES
"  [facility]  [address] [place] [address]. [address]  T - [phone]  F - [phone]     Progress Note          PATIENT  DEMOGRAPHICS   PATIENT NAME: Matti Bravo                      PHYSICIAN: Trevor Alonso MD  : 1970  MRN: [id number]   LOS: 16 days    Patient Care Team:  Shonna Ng APRN as PCP - General (Family Medicine)  Subjective   SUBJECTIVE   UO lower than before. Wt has now increased some         Objective   OBJECTIVE   Vital Signs  Temp:  [96.3 °F (35.7 °C)-98.1 °F (36.7 °C)] 96.5 °F (35.8 °C)  Heart Rate:  [] 125  Resp:  [18] 18  BP: ()/() 162/74    Flowsheet Rows    Flowsheet Row First Filed Value   Admission Height 193 cm (76\") Documented at 2022 1700   Admission Weight 127 kg (280 lb) Documented at 2022 1700           I/O last 3 completed shifts:  In: 1919 [P.O.:1090; I.V.:717; IV Piggyback:112]  Out:  [Urine:2155]    PHYSICAL EXAM    Physical Exam  Vitals reviewed.   Constitutional:       General: He is not in acute distress.     Appearance: Normal appearance. He is not ill-appearing.   HENT:      Head: Normocephalic and atraumatic.   Cardiovascular:      Rate and Rhythm: Tachycardia present. Rhythm irregular.   Pulmonary:      Effort: Pulmonary effort is normal. No respiratory distress.      Breath sounds: Normal breath sounds.   Musculoskeletal:      Right lower leg: Edema present.      Left lower leg: Edema present.   Neurological:      Mental Status: He is alert.         RESULTS   Results Review:    Results from last 7 days   Lab Units 23  0536 23  0546 23  0554   SODIUM mmol/L 130* 132* 132*   POTASSIUM mmol/L 4.6 4.3 4.1   CHLORIDE mmol/L 89* 91* 92*   CO2 mmol/L 25.0 26.0 29.0   BUN mg/dL 48* 48* 51*   CREATININE mg/dL 1.87* 1.78* 1.74*   CALCIUM mg/dL 9.5 9.1 8.7   BILIRUBIN mg/dL 5.8* 6.4* 5.5*   ALK PHOS U/L 98 103 111   ALT (SGPT) U/L 119* 137* 160*   AST (SGOT) U/L 40 49* 63*   GLUCOSE mg/dL " 124* 112* 140*       Estimated Creatinine Clearance: 70.1 mL/min (A) (by C-G formula based on SCr of 1.87 mg/dL (H)).    Results from last 7 days   Lab Units 01/07/23  0536 01/05/23  0554 01/04/23  0623   MAGNESIUM mg/dL 2.0 2.0 2.4             Results from last 7 days   Lab Units 01/07/23  0536 01/06/23  0546 01/05/23  0554 01/04/23  0623 01/03/23  0622   WBC 10*3/mm3 4.99 4.30 4.64 3.24* 2.59*   HEMOGLOBIN g/dL 13.4 13.1 13.0 13.0 13.9   PLATELETS 10*3/mm3 87* 83* 91* 101* 102*               Imaging Results (Last 24 Hours)     ** No results found for the last 24 hours. **           MEDICATIONS    apixaban, 5 mg, Oral, Q12H  Morphine, 4 mg, Intravenous, Once  pantoprazole, 40 mg, Oral, Q AM  sodium chloride, 10 mL, Intravenous, Q12H  sodium chloride, 10 mL, Intravenous, Q12H  sodium chloride, 10 mL, Intravenous, Q12H      bumetanide, 0.5 mg/hr, Last Rate: 0.5 mg/hr (01/07/23 0700)  DOBUTamine, 2.5 mcg/kg/min, Last Rate: 2.5 mcg/kg/min (01/07/23 1434)        Assessment & Plan   ASSESSMENT / PLAN      FRANCK (acute kidney injury) (HCC)    Severe malnutrition (HCC)    1.  Acute Kidney Failure on CKD 3: Baseline creatinine 1.4-1.7 mg/dl  - Etiology of FRANCK likely contrast induced.    - Urinalysis showed trace protein and negative blood, 3-5 RBCs, 0-2 WBCs.  Urine sodium less than 20.  Urine protein creatinine ratio 235 mg.  - Creatinine peak at 2.9. currently 1.74   - Keep fluid restriction of 1500 ml a day.   - Lasix was not helping with diuresis and due to marked anasarca  bumex drip was added. initial good response and now decrease response. Add metolazone x 1. Keep oneal while on bumex drip . Dc oneal      2.  Shortness of breath:  - Underwent CT with contrast which was nondiagnostic     3.  Chronic systolic CHF /AICD/ non ischemic cardiomyopathy EF 15-20%     4.  Prediabetes     5.  Hypertension:  - Blood pressure is borderline low. Continue to monitor BP     6.  KHANH:  - Supposed to be using CPAP at home     7. Acute  cholecystitis:  - Denies marked abdominal pain and also has some nausea and vomiting x 1. Denies acid reflux. Continue protonix.     8. New onset Afib:  - On eliquis now              This document has been electronically signed by Trevor Alonso MD on January 7, 2023 14:47 CST

## 2023-01-07 NOTE — PLAN OF CARE
Goal Outcome Evaluation:  Plan of Care Reviewed With: patient        Progress: no change  Outcome Evaluation: Vss with prevalent rhythm of sinus tach and UOP adequate.  Pt up 2+ lbs from yesterday in standing weight and is aggravated that he continues to require a oneal and IV medications.  He has mentioned numberous times that he should just go AMA and be able to manage his symptoms at home. Will continue to monitor.

## 2023-01-07 NOTE — PROGRESS NOTES
SUBJECTIVE:   1/10/2023  Chief Complaint:     Subjective      Patient feels better today.  Abdominal pain is improving.  Tolerating diet.  Denies nausea or vomiting.  Bowel movements are regular.  LFTs are improving.    History:  Past Medical History:   Diagnosis Date   • Asthma    • Chronic systolic heart failure (HCC)    • Diabetes mellitus (HCC)    • Hyperlipidemia    • Hypertension    • Obesity    • Peripheral vascular disease (HCC)    • Sleep apnea      Past Surgical History:   Procedure Laterality Date   • CARDIAC CATHETERIZATION N/A 12/08/2021   • CARDIAC DEFIBRILLATOR PLACEMENT     • VARICOSE VEIN SURGERY Right 04/05/2019     Family History   Problem Relation Age of Onset   • Diabetes Mother    • Hypertension Father      Social History     Tobacco Use   • Smoking status: Former   • Smokeless tobacco: Never   Vaping Use   • Vaping Use: Never used   Substance Use Topics   • Alcohol use: No   • Drug use: No     Medications Prior to Admission   Medication Sig Dispense Refill Last Dose   • albuterol sulfate  (90 Base) MCG/ACT inhaler Inhale 2 puffs Every 4 (Four) Hours As Needed for Wheezing. 1 inhaler 3    • apixaban (ELIQUIS) 5 MG tablet tablet Take 1 tablet by mouth 2 (Two) Times a Day. 60 tablet 3    • bumetanide (BUMEX) 1 MG tablet Take 1 tablet by mouth 2 (Two) Times a Day. 60 tablet 2    • carvedilol (COREG) 12.5 MG tablet Take 1 tablet by mouth 2 (Two) Times a Day With Meals for 30 days. (Patient taking differently: Take 12.5 mg by mouth 2 (Two) Times a Day With Meals. Pt states he has some meds and is still taking this.) 60 tablet 3    • losartan (COZAAR) 25 MG tablet Take 0.5 tablets by mouth Daily for 30 days. 15 tablet 3    • lovastatin (ALTOPREV) 20 MG 24 hr tablet Take 20 mg by mouth.      • metOLazone (ZAROXOLYN) 2.5 MG tablet Take 1 tablet by mouth 3 (Three) Times a Week. 15 tablet 3    • potassium chloride 10 MEQ CR tablet Take 2 tablets by mouth Daily. 30 tablet 1    • vitamin D  (ERGOCALCIFEROL) 1.25 MG (28164 UT) capsule capsule Take 50,000 Units by mouth 1 (One) Time Per Week.        Allergies:  Patient has no known allergies.     CURRENT MEDICATIONS/OBJECTIVE/VS/PE:     Current Medications:     Current Facility-Administered Medications   Medication Dose Route Frequency Provider Last Rate Last Admin   • acetaminophen (TYLENOL) tablet 1,000 mg  1,000 mg Oral Q6H PRN Sudeep Wolf MD   1,000 mg at 01/10/23 0121   • albuterol (PROVENTIL) nebulizer solution 0.083% 2.5 mg/3mL  2.5 mg Nebulization Q4H PRN Will French MD       • apixaban (ELIQUIS) tablet 5 mg  5 mg Oral Q12H Vania Andujar APRN   5 mg at 01/09/23 2025   • bumetanide (BUMEX) 10 mg in sodium chloride 0.9 % 100 mL (0.1 mg/mL) infusion  1 mg/hr Intravenous Continuous Trevor Alonso MD 10 mL/hr at 01/10/23 0020 1 mg/hr at 01/10/23 0020   • calcium carbonate (TUMS) chewable tablet 500 mg (200 mg elemental)  2 tablet Oral TID PRN Will French MD   2 tablet at 01/05/23 0015   • DOBUTamine (DOBUTREX) 1 mg/mL infusion  2.5 mcg/kg/min Intravenous Continuous Vania Andujar APRN 21 mL/hr at 01/10/23 0020 2.5 mcg/kg/min at 01/10/23 0020   • droperidol (INAPSINE) injection 1.25 mg  1.25 mg Intravenous Q6H PRN Sudeep Wolf MD   1.25 mg at 12/22/22 1302   • melatonin tablet 3 mg  3 mg Oral Nightly PRN Behroozi, Saeid, MD   3 mg at 01/05/23 2220   • morphine injection 4 mg  4 mg Intravenous Once Will French MD       • ondansetron (ZOFRAN) injection 4 mg  4 mg Intravenous Q6H PRN Sudeep Wolf MD   4 mg at 01/01/23 0830   • pantoprazole (PROTONIX) EC tablet 40 mg  40 mg Oral Q AM Ayaan Cuba MD   40 mg at 01/10/23 0513   • potassium chloride (MICRO-K) CR capsule 40 mEq  40 mEq Oral PRN Moshe Aleman DO   40 mEq at 01/04/23 1557    Or   • potassium chloride (KLOR-CON) packet 40 mEq  40 mEq Oral PRN Moshe Aleman DO        Or   • potassium chloride 10 mEq in 100 mL IVPB   10 mEq Intravenous Q1H PRN Moshe Aleman, DO       • simethicone (MYLICON) chewable tablet 80 mg  80 mg Oral 4x Daily PRN Delonte Caputo MD   80 mg at 12/25/22 0025   • sodium chloride 0.9 % flush 10 mL  10 mL Intravenous Q12H Will French MD   10 mL at 01/09/23 0905   • sodium chloride 0.9 % flush 10 mL  10 mL Intravenous PRN Will French MD   10 mL at 12/30/22 0145   • sodium chloride 0.9 % flush 10 mL  10 mL Intravenous Q12H Ayaan Cuba MD   10 mL at 01/09/23 0904   • sodium chloride 0.9 % flush 10 mL  10 mL Intravenous Q12H Ayaan Cuba MD   10 mL at 01/09/23 0904   • sodium chloride 0.9 % flush 10 mL  10 mL Intravenous PRN Ayaan Cuba MD       • sodium chloride 0.9 % infusion 40 mL  40 mL Intravenous PRN Will French MD           Objective     Review of Systems:   Review of Systems   Constitutional: Negative for chills, fatigue, fever and unexpected weight change.   HENT: Negative for congestion, ear discharge, hearing loss, nosebleeds and sore throat.    Eyes: Negative for pain, discharge and redness.   Respiratory: Positive for shortness of breath. Negative for cough, chest tightness and wheezing.    Cardiovascular: Negative for chest pain and palpitations.   Gastrointestinal: Negative for abdominal distention, abdominal pain, blood in stool, constipation, diarrhea, nausea and vomiting.   Endocrine: Negative for cold intolerance, polydipsia, polyphagia and polyuria.   Genitourinary: Negative for dysuria, flank pain, frequency, hematuria and urgency.   Musculoskeletal: Negative for arthralgias, back pain, joint swelling and myalgias.   Skin: Negative for color change, pallor and rash.   Neurological: Negative for tremors, seizures, syncope, weakness and headaches.   Hematological: Negative for adenopathy. Does not bruise/bleed easily.   Psychiatric/Behavioral: Negative for behavioral problems, confusion, dysphoric mood, hallucinations and suicidal ideas. The  patient is not nervous/anxious.        Physical Exam:   Temp:  [97.5 °F (36.4 °C)-98.2 °F (36.8 °C)] 97.5 °F (36.4 °C)  Heart Rate:  [] 68  Resp:  [18] 18  BP: ()/() 98/72     Physical Exam:  General Appearance:    Alert, cooperative, in no acute distress   Head:    Normocephalic, without obvious abnormality, atraumatic   Eyes:            Lids and lashes normal, conjunctivae and sclerae normal, no   icterus, no pallor, corneas clear, PERRLA   Ears:    Ears appear intact with no abnormalities noted   Throat:   No oral lesions, no thrush, oral mucosa moist   Neck:   No adenopathy, supple, trachea midline, no thyromegaly, no     carotid bruit, no JVD   Back:     No kyphosis present, no scoliosis present, no skin lesions,       erythema or scars, no tenderness to percussion or                   palpation,   range of motion normal   Lungs:     Clear to auscultation,respirations regular, even and                   unlabored    Heart:    Regular rhythm and normal rate, normal S1 and S2, no            murmur, no gallop, no rub, no click   Breast Exam:    Deferred   Abdomen:     Normal bowel sounds, no masses, no organomegaly, soft        nontender, nondistended, no guarding, no rebound                 tenderness   Genitalia:    Deferred   Extremities:   Moves all extremities well, no edema, no cyanosis, no              redness   Pulses:   Pulses palpable and equal bilaterally   Skin:   No bleeding, bruising or rash   Lymph nodes:   No palpable adenopathy   Neurologic:   Cranial nerves 2 - 12 grossly intact, sensation intact, DTR        present and equal bilaterally      Results Review:     Lab Results (last 24 hours)     Procedure Component Value Units Date/Time    Magnesium [[id number]] Collected: 01/10/23 0657    Specimen: Blood Updated: 01/10/23 0723    CBC & Differential [[id number]]  (Abnormal) Collected: 01/10/23 0616    Specimen: Blood Updated: 01/10/23 0642    Narrative:      The following orders  were created for panel order CBC & Differential.  Procedure                               Abnormality         Status                     ---------                               -----------         ------                     CBC Auto Differential[[id number]]        Abnormal            Final result               Scan Slide[[id number]]                                                                    Please view results for these tests on the individual orders.    CBC Auto Differential [[id number]]  (Abnormal) Collected: 01/10/23 0616    Specimen: Blood Updated: 01/10/23 0642     WBC 4.05 10*3/mm3      RBC 4.41 10*6/mm3      Hemoglobin 13.1 g/dL      Hematocrit 37.7 %      MCV 85.5 fL      MCH 29.7 pg      MCHC 34.7 g/dL      RDW 16.8 %      RDW-SD 50.7 fl      MPV 13.4 fL      Platelets 88 10*3/mm3      Neutrophil % 54.8 %      Lymphocyte % 31.6 %      Monocyte % 11.4 %      Eosinophil % 1.0 %      Basophil % 0.7 %      Immature Grans % 0.5 %      Neutrophils, Absolute 2.22 10*3/mm3      Lymphocytes, Absolute 1.28 10*3/mm3      Monocytes, Absolute 0.46 10*3/mm3      Eosinophils, Absolute 0.04 10*3/mm3      Basophils, Absolute 0.03 10*3/mm3      Immature Grans, Absolute 0.02 10*3/mm3      nRBC 0.0 /100 WBC            I reviewed the patient's new clinical results.  I reviewed the patient's new imaging results and agree with the interpretation.     ASSESSMENT/PLAN:   ASSESSMENT: 1.  Elevated liver enzymes, likely due to congestive hepatopathy.  Improving.  2.  Abdominal pain, well controlled.  3.  Dyspnea, improving.  4.  Hypotension, improving.    PLAN: 1.  Continue no added salt diet and diuresis  2.  Follow LFTs closely  3.  Repeat LFTs in a.m.  4.  Continue Bentyl  The risks, benefits, and alternatives of this procedure have been discussed with the patient or the responsible party- the patient understands and agrees to proceed.         Bea Pierce MD  01/10/23  07:27 CST

## 2023-01-07 NOTE — PROGRESS NOTES
[facility] Medicine Services  INPATIENT PROGRESS NOTE    Length of Stay: 16  Date of Admission: 12/20/2022  Primary Care Physician: Shonna Ng APRN    Subjective   Chief Complaint: No new complaints.    HPI: Patient is seen for follow-up today 1/7/2023.  He was transferred to the ICU on 1/3/2023 secondary to hypotension and started on dopamine and dobutamine infusions.  His blood pressure has since improved and dopamine infusion has been  discontinued. He remains on dobutamine infusion, doing better, less deconditioned, tolerating his diet and voices no new complaints.  He has good urinary output and swelling both legs persist but slowly improving.  He voices no new complaints.    Review of Systems   Constitutional: Positive for activity change and fatigue. Negative for appetite change, chills, diaphoresis and fever.   HENT: Negative for trouble swallowing and voice change.    Eyes: Negative for photophobia and visual disturbance.   Respiratory: Negative for cough, choking, chest tightness, shortness of breath, wheezing and stridor.    Cardiovascular: Positive for leg swelling. Negative for chest pain and palpitations.   Gastrointestinal: Negative for abdominal distention, abdominal pain, blood in stool, constipation, diarrhea, nausea and vomiting.   Endocrine: Negative for cold intolerance, heat intolerance, polydipsia, polyphagia and polyuria.   Genitourinary: Negative for decreased urine volume, difficulty urinating, dysuria, enuresis, flank pain, frequency, hematuria and urgency.   Musculoskeletal: Negative for arthralgias, gait problem, myalgias, neck pain and neck stiffness.   Skin: Negative for pallor, rash and wound.   Neurological: Negative for dizziness, tremors, seizures, syncope, facial asymmetry, speech difficulty, weakness, light-headedness, numbness and headaches.   Hematological: Does not bruise/bleed easily.   Psychiatric/Behavioral: Negative for  agitation, behavioral problems and confusion.       Objective    Temp:  [96.3 °F (35.7 °C)-98.1 °F (36.7 °C)] 96.3 °F (35.7 °C)  Heart Rate:  [] 118  Resp:  [16-18] 18  BP: ()/() 104/73   AM-PAC 6 Clicks Score (PT): 18 (01/06/23 0800)    Physical Exam  Vitals and nursing note reviewed.   Constitutional:       General: He is not in acute distress.     Appearance: He is well-developed. He is obese. He is ill-appearing. He is not diaphoretic.   HENT:      Head: Normocephalic and atraumatic.   Eyes:      General: No scleral icterus.        Right eye: No discharge.         Left eye: No discharge.      Extraocular Movements: Extraocular movements intact.      Pupils: Pupils are equal, round, and reactive to light.   Neck:      Thyroid: No thyromegaly.      Vascular: No carotid bruit or JVD.   Cardiovascular:      Rate and Rhythm: Tachycardia present. Rhythm irregular.      Heart sounds: Normal heart sounds. No murmur heard.    No friction rub. No gallop.   Pulmonary:      Effort: Pulmonary effort is normal. No respiratory distress.      Breath sounds: Normal breath sounds. No stridor. No wheezing or rales.   Chest:      Chest wall: No tenderness.   Abdominal:      General: Bowel sounds are normal. There is no distension.      Palpations: Abdomen is soft. There is no mass.      Tenderness: There is no abdominal tenderness. There is no right CVA tenderness, left CVA tenderness, guarding or rebound.      Hernia: No hernia is present.   Musculoskeletal:         General: No swelling, tenderness or deformity.      Cervical back: Normal range of motion and neck supple.      Right lower leg: Edema present.      Left lower leg: Edema present.   Skin:     General: Skin is warm and dry.      Coloration: Skin is not jaundiced or pale.      Findings: No bruising, erythema, lesion or rash.   Neurological:      General: No focal deficit present.      Mental Status: He is alert and oriented to person, place, and time.       Cranial Nerves: No cranial nerve deficit.      Sensory: No sensory deficit.      Motor: No weakness or abnormal muscle tone.      Coordination: Coordination normal.      Gait: Gait normal.      Deep Tendon Reflexes: Reflexes normal.   Psychiatric:         Mood and Affect: Mood normal.         Behavior: Behavior normal.         Thought Content: Thought content normal.         Judgment: Judgment normal.           Medication Review:    Current Facility-Administered Medications:   •  acetaminophen (TYLENOL) tablet 1,000 mg, 1,000 mg, Oral, Q6H PRN, Sudeep Wolf MD, 1,000 mg at 01/05/23 0015  •  albuterol (PROVENTIL) nebulizer solution 0.083% 2.5 mg/3mL, 2.5 mg, Nebulization, Q4H PRN, Will French MD  •  apixaban (ELIQUIS) tablet 5 mg, 5 mg, Oral, Q12H, Vania Andujar APRN, 5 mg at 01/07/23 0926  •  bumetanide (BUMEX) 10 mg in sodium chloride 0.9 % 100 mL (0.1 mg/mL) infusion, 0.5 mg/hr, Intravenous, Continuous, Trevor Alonso MD, Last Rate: 5 mL/hr at 01/07/23 0700, 0.5 mg/hr at 01/07/23 0700  •  calcium carbonate (TUMS) chewable tablet 500 mg (200 mg elemental), 2 tablet, Oral, TID PRN, Will French MD, 2 tablet at 01/05/23 0015  •  DOBUTamine (DOBUTREX) 1 mg/mL infusion, 2.5 mcg/kg/min, Intravenous, Continuous, Vania Andujar APRN, Last Rate: 21 mL/hr at 01/06/23 2341, 2.5 mcg/kg/min at 01/06/23 2341  •  droperidol (INAPSINE) injection 1.25 mg, 1.25 mg, Intravenous, Q6H PRN, Sudeep Wolf MD, 1.25 mg at 12/22/22 1302  •  melatonin tablet 3 mg, 3 mg, Oral, Nightly PRN, Behroozi, Saeid, MD, 3 mg at 01/05/23 2220  •  morphine injection 4 mg, 4 mg, Intravenous, Once, Will French MD  •  ondansetron (ZOFRAN) injection 4 mg, 4 mg, Intravenous, Q6H PRN, Sudeep Wolf MD, 4 mg at 01/01/23 0830  •  pantoprazole (PROTONIX) EC tablet 40 mg, 40 mg, Oral, Q AM, Ayaan Cuba MD, 40 mg at 01/07/23 0504  •  potassium chloride (MICRO-K) CR capsule 40 mEq, 40 mEq,  Oral, PRN, 40 mEq at 01/04/23 1557 **OR** potassium chloride (KLOR-CON) packet 40 mEq, 40 mEq, Oral, PRN **OR** potassium chloride 10 mEq in 100 mL IVPB, 10 mEq, Intravenous, Q1H PRN, Moshe Aleman DO  •  simethicone (MYLICON) chewable tablet 80 mg, 80 mg, Oral, 4x Daily PRN, Delonte Caputo MD, 80 mg at 12/25/22 0025  •  sodium chloride 0.9 % flush 10 mL, 10 mL, Intravenous, Q12H, Will French MD, 10 mL at 01/04/23 2118  •  sodium chloride 0.9 % flush 10 mL, 10 mL, Intravenous, PRN, Will French MD, 10 mL at 12/30/22 0145  •  sodium chloride 0.9 % flush 10 mL, 10 mL, Intravenous, Q12H, Ayaan Cuba MD, 10 mL at 01/04/23 2118  •  sodium chloride 0.9 % flush 10 mL, 10 mL, Intravenous, Q12H, Ayaan Cuba MD, 10 mL at 01/04/23 2118  •  sodium chloride 0.9 % flush 10 mL, 10 mL, Intravenous, PRN, Ayaan Cuba MD  •  sodium chloride 0.9 % infusion 40 mL, 40 mL, Intravenous, PRN, Will French MD    Results Review:  I have reviewed the labs, radiology results, and diagnostic studies.    Laboratory Data:   Results from last 7 days   Lab Units 01/07/23  0536 01/06/23  0546 01/05/23  0554   SODIUM mmol/L 130* 132* 132*   POTASSIUM mmol/L 4.6 4.3 4.1   CHLORIDE mmol/L 89* 91* 92*   CO2 mmol/L 25.0 26.0 29.0   BUN mg/dL 48* 48* 51*   CREATININE mg/dL 1.87* 1.78* 1.74*   GLUCOSE mg/dL 124* 112* 140*   CALCIUM mg/dL 9.5 9.1 8.7   BILIRUBIN mg/dL 5.8* 6.4* 5.5*   ALK PHOS U/L 98 103 111   ALT (SGPT) U/L 119* 137* 160*   AST (SGOT) U/L 40 49* 63*   ANION GAP mmol/L 16.0* 15.0 11.0     Estimated Creatinine Clearance: 70.1 mL/min (A) (by C-G formula based on SCr of 1.87 mg/dL (H)).  Results from last 7 days   Lab Units 01/07/23  0536 01/05/23  0554 01/04/23  0623   MAGNESIUM mg/dL 2.0 2.0 2.4         Results from last 7 days   Lab Units 01/07/23  0536 01/06/23  0546 01/05/23  0554 01/04/23  0623 01/03/23  0622   WBC 10*3/mm3 4.99 4.30 4.64 3.24* 2.59*   HEMOGLOBIN g/dL 13.4 13.1 13.0 13.0  13.9   HEMATOCRIT % 43.4 39.9 39.7 39.0 41.7   PLATELETS 10*3/mm3 87* 83* 91* 101* 102*           Culture Data:   No results found for: BLOODCX  No results found for: URINECX  No results found for: RESPCX  No results found for: WOUNDCX  No results found for: STOOLCX  No components found for: BODYFLD    Radiology Data:   Imaging Results (Last 24 Hours)     ** No results found for the last 24 hours. **          I have reviewed the patient's current medications.     Assessment/Plan     Hospital Problem List:  Principal Problem:    FRANCK (acute kidney injury) (HCC)  Active Problems:    Severe malnutrition (HCC)    Acute on chronic kidney disease stage III (likely cardiorenal): Patient's baseline is reportedly between 1.4-1.7.  Creatinine is 1.87 today.     Continue Bumex infusion and continue to monitor renal function. Input by nephrologist is appreciated.    History of nonischemic cardiomyopathy/chronic systolic heart failure status post AICD placement (with hypotension): Patient appears clinically euvolemic and chest x-ray done on 1/3/2023 did not show any evidence of pulmonary vascular congestion. Continue Bumex and dobutamine infusions,  with strict I's and O's, daily weights, salt and fluid restriction.  Input by cardiologist is appreciated.  Echocardiogram done 8/19/2022 showed an estimated ejection fraction of 15 to 20%.  Elevated LFTs are reactive, much improved and will be monitored.    Hyponatremia (likely hypervolemic): Much improved.  Continue diuretics, restrict free water and monitor BMP.    Chronic atrial fibrillation: Patient is in uncontrolled ventricular response.  Coreg is on hold for now secondary to borderline blood pressure.  Continue anticoagulation with Eliquis.  Lovenox has been discontinued secondary to thrombocytopenia and elective cardioversion is being contemplated with the patient by the cardiologist.  Input by cardiologist is appreciated.     Hypokalemia: Resolved.    Possible acute  cholecystitis: Patient is less symptomatic and tolerating recommended diet.  HIDA scan was unremarkable.    Obstructive sleep apnea: Continue nightly CPAP.    Deconditioning: Continue PT and OT.    Continue GI and DVT prophylaxis.    Discharge Planning: In progress.    I confirmed that the patient's Advance Care Plan is present, code status is documented, or surrogate decision maker is listed in the patient's medical record.     I have utilized all available immediate resources to obtain, update, or review the patient's current medications.    35 minutes of critical care time was spent in the assessment and formulation of treatment plan for this patient exclusive of billable procedures.      Ayaan Cuba MD   01/07/23   10:29 CST

## 2023-01-08 LAB
ALBUMIN SERPL-MCNC: 3.9 G/DL (ref 3.5–5.2)
ALBUMIN/GLOB SERPL: 1.2 G/DL
ALP SERPL-CCNC: 89 U/L (ref 39–117)
ALT SERPL W P-5'-P-CCNC: 96 U/L (ref 1–41)
ANION GAP SERPL CALCULATED.3IONS-SCNC: 16 MMOL/L (ref 5–15)
AST SERPL-CCNC: 35 U/L (ref 1–40)
BASOPHILS # BLD AUTO: 0.02 10*3/MM3 (ref 0–0.2)
BASOPHILS NFR BLD AUTO: 0.4 % (ref 0–1.5)
BILIRUB SERPL-MCNC: 4.8 MG/DL (ref 0–1.2)
BUN SERPL-MCNC: 54 MG/DL (ref 6–20)
BUN/CREAT SERPL: 29.8 (ref 7–25)
CALCIUM SPEC-SCNC: 9.2 MG/DL (ref 8.6–10.5)
CHLORIDE SERPL-SCNC: 90 MMOL/L (ref 98–107)
CO2 SERPL-SCNC: 23 MMOL/L (ref 22–29)
CREAT SERPL-MCNC: 1.81 MG/DL (ref 0.76–1.27)
DEPRECATED RDW RBC AUTO: 50.5 FL (ref 37–54)
EGFRCR SERPLBLD CKD-EPI 2021: 44.7 ML/MIN/1.73
EOSINOPHIL # BLD AUTO: 0.03 10*3/MM3 (ref 0–0.4)
EOSINOPHIL NFR BLD AUTO: 0.6 % (ref 0.3–6.2)
ERYTHROCYTE [DISTWIDTH] IN BLOOD BY AUTOMATED COUNT: 16.5 % (ref 12.3–15.4)
GLOBULIN UR ELPH-MCNC: 3.2 GM/DL
GLUCOSE SERPL-MCNC: 144 MG/DL (ref 65–99)
HCT VFR BLD AUTO: 37.1 % (ref 37.5–51)
HGB BLD-MCNC: 12.8 G/DL (ref 13–17.7)
IMM GRANULOCYTES # BLD AUTO: 0.02 10*3/MM3 (ref 0–0.05)
IMM GRANULOCYTES NFR BLD AUTO: 0.4 % (ref 0–0.5)
LARGE PLATELETS: NORMAL
LYMPHOCYTES # BLD AUTO: 1.38 10*3/MM3 (ref 0.7–3.1)
LYMPHOCYTES NFR BLD AUTO: 27.9 % (ref 19.6–45.3)
MCH RBC QN AUTO: 29.5 PG (ref 26.6–33)
MCHC RBC AUTO-ENTMCNC: 34.5 G/DL (ref 31.5–35.7)
MCV RBC AUTO: 85.5 FL (ref 79–97)
MONOCYTES # BLD AUTO: 0.54 10*3/MM3 (ref 0.1–0.9)
MONOCYTES NFR BLD AUTO: 10.9 % (ref 5–12)
NEUTROPHILS NFR BLD AUTO: 2.96 10*3/MM3 (ref 1.7–7)
NEUTROPHILS NFR BLD AUTO: 59.8 % (ref 42.7–76)
NRBC BLD AUTO-RTO: 0.4 /100 WBC (ref 0–0.2)
PLATELET # BLD AUTO: 91 10*3/MM3 (ref 140–450)
PMV BLD AUTO: 12.7 FL (ref 6–12)
POTASSIUM SERPL-SCNC: 4.3 MMOL/L (ref 3.5–5.2)
PROT SERPL-MCNC: 7.1 G/DL (ref 6–8.5)
RBC # BLD AUTO: 4.34 10*6/MM3 (ref 4.14–5.8)
SMALL PLATELETS BLD QL SMEAR: ADEQUATE
SODIUM SERPL-SCNC: 129 MMOL/L (ref 136–145)
TARGETS BLD QL SMEAR: NORMAL
WBC MORPH BLD: NORMAL
WBC NRBC COR # BLD: 4.95 10*3/MM3 (ref 3.4–10.8)

## 2023-01-08 PROCEDURE — 85007 BL SMEAR W/DIFF WBC COUNT: CPT | Performed by: HOSPITALIST

## 2023-01-08 PROCEDURE — 80053 COMPREHEN METABOLIC PANEL: CPT | Performed by: INTERNAL MEDICINE

## 2023-01-08 PROCEDURE — 97530 THERAPEUTIC ACTIVITIES: CPT

## 2023-01-08 PROCEDURE — 97110 THERAPEUTIC EXERCISES: CPT

## 2023-01-08 PROCEDURE — 25010000002 DOBUTAMINE PER 250 MG: Performed by: NURSE PRACTITIONER

## 2023-01-08 PROCEDURE — 85025 COMPLETE CBC W/AUTO DIFF WBC: CPT | Performed by: HOSPITALIST

## 2023-01-08 RX ORDER — SPIRONOLACTONE 25 MG/1
25 TABLET ORAL ONCE
Status: COMPLETED | OUTPATIENT
Start: 2023-01-08 | End: 2023-01-08

## 2023-01-08 RX ADMIN — DOBUTAMINE HYDROCHLORIDE 2.5 MCG/KG/MIN: 100 INJECTION INTRAVENOUS at 03:03

## 2023-01-08 RX ADMIN — APIXABAN 5 MG: 5 TABLET, FILM COATED ORAL at 08:44

## 2023-01-08 RX ADMIN — SPIRONOLACTONE 25 MG: 25 TABLET ORAL at 12:33

## 2023-01-08 RX ADMIN — DOBUTAMINE HYDROCHLORIDE 2.5 MCG/KG/MIN: 100 INJECTION INTRAVENOUS at 17:13

## 2023-01-08 RX ADMIN — BUMETANIDE 1 MG/HR: 0.25 INJECTION INTRAMUSCULAR; INTRAVENOUS at 18:14

## 2023-01-08 RX ADMIN — BUMETANIDE 0.5 MG/HR: 0.25 INJECTION INTRAMUSCULAR; INTRAVENOUS at 03:03

## 2023-01-08 RX ADMIN — Medication 10 ML: at 20:42

## 2023-01-08 RX ADMIN — PANTOPRAZOLE SODIUM 40 MG: 40 TABLET, DELAYED RELEASE ORAL at 05:47

## 2023-01-08 RX ADMIN — APIXABAN 5 MG: 5 TABLET, FILM COATED ORAL at 20:42

## 2023-01-08 NOTE — PLAN OF CARE
Problem: Adult Inpatient Plan of Care  Goal: Plan of Care Review  Outcome: Ongoing, Progressing  Goal: Patient-Specific Goal (Individualized)  Outcome: Ongoing, Progressing  Goal: Absence of Hospital-Acquired Illness or Injury  Outcome: Ongoing, Progressing  Intervention: Identify and Manage Fall Risk  Recent Flowsheet Documentation  Taken 1/8/2023 0600 by Emerald Sanchez RN  Safety Promotion/Fall Prevention: safety round/check completed  Taken 1/8/2023 0500 by Emerald Sanchez RN  Safety Promotion/Fall Prevention: safety round/check completed  Taken 1/8/2023 0400 by Emerald Sanchez RN  Safety Promotion/Fall Prevention: safety round/check completed  Taken 1/8/2023 0300 by Emerald Sanchez RN  Safety Promotion/Fall Prevention: safety round/check completed  Taken 1/8/2023 0200 by Emerald Sanchez RN  Safety Promotion/Fall Prevention: safety round/check completed  Taken 1/8/2023 0100 by Emerald Sanchez RN  Safety Promotion/Fall Prevention: safety round/check completed  Taken 1/8/2023 0000 by Emerald Sanchez RN  Safety Promotion/Fall Prevention: safety round/check completed  Taken 1/7/2023 2300 by Emerald Sanchez RN  Safety Promotion/Fall Prevention: safety round/check completed  Taken 1/7/2023 2200 by Emerald Sanchez RN  Safety Promotion/Fall Prevention: safety round/check completed  Taken 1/7/2023 2100 by Emerald Sanchez RN  Safety Promotion/Fall Prevention: safety round/check completed  Taken 1/7/2023 2000 by Emerald Sanchez RN  Safety Promotion/Fall Prevention: safety round/check completed  Taken 1/7/2023 1900 by Emerald Sanchez RN  Safety Promotion/Fall Prevention: safety round/check completed  Intervention: Prevent Skin Injury  Recent Flowsheet Documentation  Taken 1/8/2023 0600 by Emearld Sanchez RN  Body Position: position changed independently  Taken 1/8/2023 0500 by Emerald Sanchez RN  Body Position: position changed independently  Taken 1/8/2023 0400 by Emerald Sanchez RN  Body Position: position changed independently  Taken 1/8/2023 0300  by Emerald Sanchez RN  Body Position: position changed independently  Taken 1/8/2023 0200 by Emerald Sanchez RN  Body Position: position changed independently  Taken 1/8/2023 0100 by Emerald Sanchez RN  Body Position: position changed independently  Taken 1/8/2023 0000 by Emerald Sanchez RN  Body Position: position changed independently  Taken 1/7/2023 2300 by Emerald Sanchez RN  Body Position: (Pt. transfer and sleeping in bed) position changed independently  Taken 1/7/2023 2200 by Emerald Sanchez RN  Body Position: position changed independently  Taken 1/7/2023 2100 by Emerald Sanchez RN  Body Position: position changed independently  Taken 1/7/2023 2000 by Emerald Sanchez RN  Body Position: position changed independently  Skin Protection: tubing/devices free from skin contact  Taken 1/7/2023 1900 by Emerald Sanchez RN  Body Position: position changed independently  Intervention: Prevent and Manage VTE (Venous Thromboembolism) Risk  Recent Flowsheet Documentation  Taken 1/7/2023 2000 by Emerald Sanchez RN  Activity Management:   activity encouraged   ambulated in room   standing at bedside  Range of Motion: active ROM (range of motion) encouraged  Intervention: Prevent Infection  Recent Flowsheet Documentation  Taken 1/7/2023 2000 by Emerald Sanchez RN  Infection Prevention:   hand hygiene promoted   rest/sleep promoted  Goal: Optimal Comfort and Wellbeing  Outcome: Ongoing, Progressing  Intervention: Monitor Pain and Promote Comfort  Recent Flowsheet Documentation  Taken 1/7/2023 2000 by Emerald Sanchez RN  Pain Management Interventions:   care clustered   quiet environment facilitated   position adjusted  Intervention: Provide Person-Centered Care  Recent Flowsheet Documentation  Taken 1/8/2023 0000 by Emerald Sanchez RN  Trust Relationship/Rapport: care explained  Taken 1/7/2023 2000 by Emerald Sanchez RN  Trust Relationship/Rapport: care explained  Goal: Readiness for Transition of Care  Outcome: Ongoing, Progressing     Problem:  Dysrhythmia  Goal: Normalized Cardiac Rhythm  Outcome: Ongoing, Progressing  Intervention: Monitor and Manage Cardiac Rhythm Effect  Recent Flowsheet Documentation  Taken 1/7/2023 2000 by Emerald Sanchez RN  Stabilization Measures: legs elevated     Problem: Fluid and Electrolyte Imbalance (Acute Kidney Injury/Impairment)  Goal: Fluid and Electrolyte Balance  Outcome: Ongoing, Progressing  Intervention: Monitor and Manage Fluid and Electrolyte Balance  Recent Flowsheet Documentation  Taken 1/8/2023 0000 by Emerald Sanchez RN  Fluid/Electrolyte Management: fluids restricted  Taken 1/7/2023 2000 by Emerald Sanchez RN  Fluid/Electrolyte Management: fluids restricted     Problem: Oral Intake Inadequate (Acute Kidney Injury/Impairment)  Goal: Optimal Nutrition Intake  Outcome: Ongoing, Progressing     Problem: Renal Function Impairment (Acute Kidney Injury/Impairment)  Goal: Effective Renal Function  Outcome: Ongoing, Progressing  Intervention: Monitor and Support Renal Function  Recent Flowsheet Documentation  Taken 1/7/2023 2000 by Emerald Sanchez RN  Stabilization Measures: legs elevated  Medication Review/Management: medications reviewed     Problem: Heart Failure Comorbidity  Goal: Maintenance of Heart Failure Symptom Control  Outcome: Ongoing, Progressing  Intervention: Maintain Heart Failure-Management  Recent Flowsheet Documentation  Taken 1/7/2023 2000 by Emerald Sanchez RN  Medication Review/Management: medications reviewed     Problem: Obstructive Sleep Apnea Risk or Actual Comorbidity Management  Goal: Unobstructed Breathing During Sleep  Outcome: Ongoing, Progressing     Problem: Skin Injury Risk Increased  Goal: Skin Health and Integrity  Outcome: Ongoing, Progressing  Intervention: Optimize Skin Protection  Recent Flowsheet Documentation  Taken 1/8/2023 0000 by Emerald Sanchez RN  Pressure Reduction Devices: specialty bed utilized  Taken 1/7/2023 2300 by Emerald Sanchez RN  Head of Bed (HOB) Positioning: HOB at 30-45  degrees  Taken 1/7/2023 2000 by Emerald Sanchez RN  Pressure Reduction Techniques: frequent weight shift encouraged  Head of Bed (HOB) Positioning: HOB at 90 degrees  Pressure Reduction Devices: specialty bed utilized  Skin Protection: tubing/devices free from skin contact     Problem: Fall Injury Risk  Goal: Absence of Fall and Fall-Related Injury  Outcome: Ongoing, Progressing  Intervention: Identify and Manage Contributors  Recent Flowsheet Documentation  Taken 1/7/2023 2000 by Emerald Sanchez RN  Medication Review/Management: medications reviewed  Self-Care Promotion: independence encouraged  Intervention: Promote Injury-Free Environment  Recent Flowsheet Documentation  Taken 1/8/2023 0600 by Emerald Sanchez RN  Safety Promotion/Fall Prevention: safety round/check completed  Taken 1/8/2023 0500 by Emerald Sanchez RN  Safety Promotion/Fall Prevention: safety round/check completed  Taken 1/8/2023 0400 by Emerald Sanchez RN  Safety Promotion/Fall Prevention: safety round/check completed  Taken 1/8/2023 0300 by Emerald Sanchez RN  Safety Promotion/Fall Prevention: safety round/check completed  Taken 1/8/2023 0200 by Emerald Sanchez RN  Safety Promotion/Fall Prevention: safety round/check completed  Taken 1/8/2023 0100 by Emerald Sanchez RN  Safety Promotion/Fall Prevention: safety round/check completed  Taken 1/8/2023 0000 by Emerald Sanchez RN  Safety Promotion/Fall Prevention: safety round/check completed  Taken 1/7/2023 2300 by Emerald Sanchez RN  Safety Promotion/Fall Prevention: safety round/check completed  Taken 1/7/2023 2200 by Emerald Sanchez RN  Safety Promotion/Fall Prevention: safety round/check completed  Taken 1/7/2023 2100 by Emerald Sanchez RN  Safety Promotion/Fall Prevention: safety round/check completed  Taken 1/7/2023 2000 by Emerald Sanchez RN  Safety Promotion/Fall Prevention: safety round/check completed  Taken 1/7/2023 1900 by Emerald Sanchez RN  Safety Promotion/Fall Prevention: safety round/check completed   Goal  Outcome Evaluation:

## 2023-01-08 NOTE — PROGRESS NOTES
"  [facility]  [address] [place] [address]. [id number]  T - [phone]  F - [phone]     Progress Note          PATIENT  DEMOGRAPHICS   PATIENT NAME: Matti Bravo                      PHYSICIAN: Trevor Alonso MD  : 1970  MRN: [id number]   LOS: 17 days    Patient Care Team:  Shonna Ng APRN as PCP - General (Family Medicine)  Subjective   SUBJECTIVE   UO stable 2.5L/24 hrs  Wt still at 301 lbs       Objective   OBJECTIVE   Vital Signs  Temp:  [96.5 °F (35.8 °C)-98.2 °F (36.8 °C)] 97.6 °F (36.4 °C)  Heart Rate:  [106-128] 127  Resp:  [16-24] 18  BP: ()/(54-99) 118/79    Flowsheet Rows    Flowsheet Row First Filed Value   Admission Height 193 cm (76\") Documented at 2022 1700   Admission Weight 127 kg (280 lb) Documented at 2022 1700           I/O last 3 completed shifts:  In: 2724.8 [P.O.:1810; I.V.:745.3; IV Piggyback:169.5]  Out: 2365 [Urine:2365]    PHYSICAL EXAM    Physical Exam  Vitals reviewed.   Constitutional:       General: He is not in acute distress.     Appearance: Normal appearance. He is not ill-appearing.   HENT:      Head: Normocephalic and atraumatic.   Cardiovascular:      Rate and Rhythm: Tachycardia present. Rhythm irregular.   Pulmonary:      Effort: Pulmonary effort is normal. No respiratory distress.      Breath sounds: Normal breath sounds.   Musculoskeletal:      Right lower leg: Edema present.      Left lower leg: Edema present.   Neurological:      Mental Status: He is alert.         RESULTS   Results Review:    Results from last 7 days   Lab Units 23  0429 23  0536 23  0546   SODIUM mmol/L 129* 130* 132*   POTASSIUM mmol/L 4.3 4.6 4.3   CHLORIDE mmol/L 90* 89* 91*   CO2 mmol/L 23.0 25.0 26.0   BUN mg/dL 54* 48* 48*   CREATININE mg/dL 1.81* 1.87* 1.78*   CALCIUM mg/dL 9.2 9.5 9.1   BILIRUBIN mg/dL 4.8* 5.8* 6.4*   ALK PHOS U/L 89 98 103   ALT (SGPT) U/L 96* 119* 137*   AST (SGOT) U/L 35 40 49*   GLUCOSE mg/dL " 144* 124* 112*       Estimated Creatinine Clearance: 73.1 mL/min (A) (by C-G formula based on SCr of 1.81 mg/dL (H)).    Results from last 7 days   Lab Units 01/07/23  0536 01/05/23  0554 01/04/23  0623   MAGNESIUM mg/dL 2.0 2.0 2.4             Results from last 7 days   Lab Units 01/08/23  0429 01/07/23  0536 01/06/23  0546 01/05/23  0554 01/04/23  0623   WBC 10*3/mm3 4.95 4.99 4.30 4.64 3.24*   HEMOGLOBIN g/dL 12.8* 13.4 13.1 13.0 13.0   PLATELETS 10*3/mm3 91* 87* 83* 91* 101*               Imaging Results (Last 24 Hours)     ** No results found for the last 24 hours. **           MEDICATIONS    apixaban, 5 mg, Oral, Q12H  Morphine, 4 mg, Intravenous, Once  pantoprazole, 40 mg, Oral, Q AM  sodium chloride, 10 mL, Intravenous, Q12H  sodium chloride, 10 mL, Intravenous, Q12H  sodium chloride, 10 mL, Intravenous, Q12H      bumetanide, 0.5 mg/hr, Last Rate: 0.5 mg/hr (01/08/23 0303)  DOBUTamine, 2.5 mcg/kg/min, Last Rate: 2.5 mcg/kg/min (01/08/23 0303)        Assessment & Plan   ASSESSMENT / PLAN      FRANCK (acute kidney injury) (HCC)    Severe malnutrition (HCC)    1.  Acute Kidney Failure on CKD 3: Baseline creatinine 1.4-1.7 mg/dl  - Etiology of FRANCK likely contrast induced.    - Urinalysis showed trace protein and negative blood, 3-5 RBCs, 0-2 WBCs.  Urine sodium less than 20.  Urine protein creatinine ratio 235 mg.  - Creatinine peak at 2.9. currently 1.8  - Keep fluid restriction of 1500 ml a day.   - Lasix was not helping with diuresis and due to marked anasarca  bumex drip was added. initial good response and now decrease response. Increase bumex drip.add aldactone and observe bp. oneal is out as per patient request. Voiding without any difficulty     2.  Shortness of breath:  - Underwent CT with contrast which was nondiagnostic     3.  Chronic systolic CHF /AICD/ non ischemic cardiomyopathy EF 15-20%     4.  Prediabetes     5.  Hypertension:  - Blood pressure is borderline low. Continue to monitor BP     6.   KHANH:  - Supposed to be using CPAP at home     7. Acute cholecystitis:  - Denies marked abdominal pain and also has some nausea and vomiting x 1. Denies acid reflux. Continue protonix.     8. New onset Afib:  - On eliquis now              This document has been electronically signed by Trevor Alonso MD on January 8, 2023 10:13 CST

## 2023-01-08 NOTE — THERAPY TREATMENT NOTE
Acute Care - Physical Therapy Treatment Note  [facility]     Patient Name: Matti Bravo  : 1970  MRN: [id number]  Today's Date: 2023      Visit Dx:     ICD-10-CM ICD-9-CM   1. FRANCK (acute kidney injury) (HCC)  N17.9 584.9   2. Shortness of breath  R06.02 786.05   3. Hypokalemia  E87.6 276.8   4. Elevated brain natriuretic peptide (BNP) level  R79.89 790.99   5. Impaired mobility and ADLs  Z74.09 V49.89    Z78.9    6. Impaired functional mobility and activity tolerance  Z74.09 V49.89   7. Impaired functional mobility, balance, gait, and endurance  Z74.09 V49.89     Patient Active Problem List   Diagnosis   • Chronic systolic heart failure (HCC)   • Essential hypertension   • Mixed hyperlipidemia   • Obstructive sleep apnea   • Morbid obesity (HCC)   • Restrictive lung disease secondary to obesity   • Multiple lung nodules on CT   • Diabetes mellitus (HCC)   • Varicose veins of both lower extremities with pain   • Venous insufficiency (chronic) (peripheral)   • Surgical aftercare, circulatory system   • Chest pain   • Acute on chronic congestive heart failure (HCC)   • Abnormal nuclear stress test   • Acute congestive heart failure (HCC)   • Obesity (BMI 30-39.9)   • Hypotension   • Acute congestive heart failure, unspecified heart failure type (HCC)   • Shortness of breath   • NICM (nonischemic cardiomyopathy) (HCC)   • Pulmonary hypertension (HCC)   • CHF exacerbation (HCC)   • Heart failure (HCC)   • FRANCK (acute kidney injury) (HCC)   • Severe malnutrition (HCC)     Past Medical History:   Diagnosis Date   • Asthma    • Chronic systolic heart failure (HCC)    • Diabetes mellitus (HCC)    • Hyperlipidemia    • Hypertension    • Obesity    • Peripheral vascular disease (HCC)    • Sleep apnea      Past Surgical History:   Procedure Laterality Date   • CARDIAC CATHETERIZATION N/A 2021   • CARDIAC DEFIBRILLATOR PLACEMENT     • VARICOSE VEIN SURGERY Right 2019     PT Assessment (last  12 hours)     PT Evaluation and Treatment     [facility] Name 01/08/23 1039          Physical Therapy Time and Intention    Subjective Information no complaints  -CA     Document Type therapy note (daily note)  -CA     Mode of Treatment individual therapy;physical therapy  -[facility] Name 01/08/23 1039          General Information    Existing Precautions/Restrictions fall;oxygen therapy device and L/min  -[facility] Name 01/08/23 1039          Pain    Pretreatment Pain Rating 3/10  -CA     Posttreatment Pain Rating 2/10  -CA     Pain Location - toe  -[facility] Name 01/08/23 1039          Cognition    Orientation Status (Cognition) oriented x 4  -[facility] Name 01/08/23 1039          Bed Mobility    Comment, (Bed Mobility) Pt. sitting up in chair upon entry  -[facility] Name 01/08/23 1039          Transfers    Transfers sit-stand transfer;stand-sit transfer  -CA     Comment, (Transfers) Pt. marched in plance 10 x 2  -[facility] Name 01/08/23 1039          Sit-Stand Transfer    Sit-Stand [place] (Transfers) supervision  -[facility] Name 01/08/23 1039          Stand-Sit Transfer    Stand-Sit [place] (Transfers) supervision  -[facility] Name 01/08/23 1039          Motor Skills    Therapeutic Exercise hip;knee;ankle  -[facility] Name 01/08/23 1039          Hip (Therapeutic Exercise)    Hip (Therapeutic Exercise) AROM (active range of motion)  -CA     Hip AROM (Therapeutic Exercise) bilateral;flexion;extension;standing  -[facility] Name 01/08/23 1039          Knee (Therapeutic Exercise)    Knee (Therapeutic Exercise) AROM (active range of motion)  -CA     Knee AROM (Therapeutic Exercise) bilateral;LAQ (long arc quad)  -[facility] Name 01/08/23 1039          Ankle (Therapeutic Exercise)    Ankle (Therapeutic Exercise) AROM (active range of motion)  -CA     Ankle AROM (Therapeutic Exercise) bilateral;dorsiflexion;plantarflexion  -CA     Row Name 01/08/23 1039          Vital Signs    Pre Patient Position Sitting  -CA      Intra Patient Position Standing  -CA     Post Patient Position Sitting  -CA     Row Name 01/08/23 1039          Positioning and Restraints    Pre-Treatment Position sitting in chair/recliner  -CA     Post Treatment Position chair  -CA     In Bed sitting;call light within reach;encouraged to call for assist;with family/caregiver  -CA           User Key  (r) = Recorded By, (t) = Taken By, (c) = Cosigned By    Initials Name Provider Type    CA Ar Diamond, PTA Physical Therapist Assistant                Physical Therapy Education     Title: PT OT SLP Therapies (In Progress)     Topic: Physical Therapy (Done)     Point: Mobility training (Done)     Learning Progress Summary           Patient Acceptance, E,TB, VU,NR by LR at 12/25/2022 1312    Comment: Educated on PT POC and goals.   Mother Acceptance, E,TB, VU,NR by LR at 12/25/2022 1312    Comment: Educated on PT POC and goals.                   Point: Home exercise program (Done)     Learning Progress Summary           Patient Acceptance, E,TB, VU,NR by LR at 12/25/2022 1312    Comment: Educated on PT POC and goals.   Mother Acceptance, E,TB, VU,NR by LR at 12/25/2022 1312    Comment: Educated on PT POC and goals.                   Point: Body mechanics (Done)     Learning Progress Summary           Patient Acceptance, E,TB, VU,NR by LR at 12/25/2022 1312    Comment: Educated on PT POC and goals.   Mother Acceptance, E,TB, VU,NR by LR at 12/25/2022 1312    Comment: Educated on PT POC and goals.                   Point: Precautions (Done)     Learning Progress Summary           Patient Acceptance, E,TB, VU,NR by LR at 12/25/2022 1312    Comment: Educated on PT POC and goals.   Mother Acceptance, E,TB, VU,NR by LR at 12/25/2022 1312    Comment: Educated on PT POC and goals.                               User Key     Initials Effective Dates Name Provider Type Discipline    LR 06/16/21 -  Sterling Bowman Physical Therapist PT              PT Recommendation and  Plan     Plan of Care Reviewed With: patient  Progress: improving  Outcome Evaluation: Pt. agreeable to tx. and to work on strengthning.   Pt. was spv. for transfers and performed standing marches and then performed seated LE therex in chair this tx. 15x1.  No new goals met this tx.       Time Calculation:    PT Charges     Row Name 01/08/23 1428             Time Calculation    Start Time 1039  -CA      Stop Time 1102  -CA      Time Calculation (min) 23 min  -CA      PT Received On 01/08/23  -CA         Time Calculation- PT    Total Timed Code Minutes- PT 23 minute(s)  -CA            User Key  (r) = Recorded By, (t) = Taken By, (c) = Cosigned By    Initials Name Provider Type    Ar Roman PTA Physical Therapist Assistant              Therapy Charges for Today     Code Description Service Date Service Provider Modifiers Qty    47413364686 HC PT THERAPEUTIC ACT EA 15 MIN 1/8/2023 Ar Diamond PTA GP 1    65972951083 HC PT THER PROC EA 15 MIN 1/8/2023 Ar Diamond, OLYA GP 1          PT G-Codes  Outcome Measure Options: AM-PAC 6 Clicks Daily Activity (OT)  AM-PAC 6 Clicks Score (PT): 18  AM-PAC 6 Clicks Score (OT): 23  Tinetti Total Score: 28    Ar Diamond PTA  1/8/2023

## 2023-01-08 NOTE — PLAN OF CARE
Goal Outcome Evaluation:  Plan of Care Reviewed With: patient        Progress: improving  Outcome Evaluation: Pt. agreeable to tx. and to work on strengthning.   Pt. was spv. for transfers and performed standing marches and then performed seated LE therex in chair this tx. 15x1.  No new goals met this tx.

## 2023-01-08 NOTE — PLAN OF CARE
Goal Outcome Evaluation:  Plan of Care Reviewed With: patient        Progress: no change  Outcome Evaluation: vss with Afib, bladder training to d/c oneal, done well with intake today.

## 2023-01-08 NOTE — SIGNIFICANT NOTE
01/08/23 1138   OTHER   Discipline occupational therapy assistant   Rehab Time/Intention   Session Not Performed patient/family declined, not feeling well  (Pt up in chair, asleep, decline tx 2/2 reports not sleeping night before and wants to sleep.)

## 2023-01-08 NOTE — PROGRESS NOTES
[facility] Medicine Services  INPATIENT PROGRESS NOTE    Length of Stay: 17  Date of Admission: 12/20/2022  Primary Care Physician: Shonna Ng APRN    Subjective   Chief Complaint: No new complaints.    HPI: Patient is seen for follow-up today 1/8/2023.  He was transferred to the ICU on 1/3/2023 secondary to hypotension and started on dopamine and dobutamine infusions.  His blood pressure has since improved and dopamine infusion has been  discontinued. He remains on dobutamine infusion, doing better, less deconditioned, tolerating his diet and voices no new complaints.  He has good urinary output and swelling both legs persist but slowly improving.  He voices no new complaints and is maintaining O2 saturation in the upper 90s on room air.    Review of Systems   Constitutional: Positive for activity change and fatigue. Negative for appetite change, chills, diaphoresis and fever.   HENT: Negative for trouble swallowing and voice change.    Eyes: Negative for photophobia and visual disturbance.   Respiratory: Negative for cough, choking, chest tightness, shortness of breath, wheezing and stridor.    Cardiovascular: Positive for leg swelling. Negative for chest pain and palpitations.   Gastrointestinal: Negative for abdominal distention, abdominal pain, blood in stool, constipation, diarrhea, nausea and vomiting.   Endocrine: Negative for cold intolerance, heat intolerance, polydipsia, polyphagia and polyuria.   Genitourinary: Negative for decreased urine volume, difficulty urinating, dysuria, enuresis, flank pain, frequency, hematuria and urgency.   Musculoskeletal: Negative for arthralgias, gait problem, myalgias, neck pain and neck stiffness.   Skin: Negative for pallor, rash and wound.   Neurological: Negative for dizziness, tremors, seizures, syncope, facial asymmetry, speech difficulty, weakness, light-headedness, numbness and headaches.   Hematological: Does not  bruise/bleed easily.   Psychiatric/Behavioral: Negative for agitation, behavioral problems and confusion.       Objective    Temp:  [96.5 °F (35.8 °C)-98.2 °F (36.8 °C)] 97.6 °F (36.4 °C)  Heart Rate:  [106-128] 127  Resp:  [16-24] 18  BP: ()/(54-99) 118/79   AM-PAC 6 Clicks Score (PT): 18 (01/07/23 2000)    Physical Exam  Vitals and nursing note reviewed.   Constitutional:       General: He is not in acute distress.     Appearance: He is well-developed. He is obese. He is ill-appearing. He is not diaphoretic.   HENT:      Head: Normocephalic and atraumatic.   Eyes:      General: No scleral icterus.        Right eye: No discharge.         Left eye: No discharge.      Extraocular Movements: Extraocular movements intact.      Pupils: Pupils are equal, round, and reactive to light.   Neck:      Thyroid: No thyromegaly.      Vascular: No carotid bruit or JVD.   Cardiovascular:      Rate and Rhythm: Tachycardia present. Rhythm irregular.      Heart sounds: Normal heart sounds. No murmur heard.    No friction rub. No gallop.   Pulmonary:      Effort: Pulmonary effort is normal. No respiratory distress.      Breath sounds: Normal breath sounds. No stridor. No wheezing or rales.   Chest:      Chest wall: No tenderness.   Abdominal:      General: Bowel sounds are normal. There is no distension.      Palpations: Abdomen is soft. There is no mass.      Tenderness: There is no abdominal tenderness. There is no right CVA tenderness, left CVA tenderness, guarding or rebound.      Hernia: No hernia is present.   Musculoskeletal:         General: No swelling, tenderness or deformity.      Cervical back: Normal range of motion and neck supple.      Right lower leg: Edema present.      Left lower leg: Edema present.   Skin:     General: Skin is warm and dry.      Coloration: Skin is not jaundiced or pale.      Findings: No bruising, erythema, lesion or rash.   Neurological:      General: No focal deficit present.      Mental  Status: He is alert and oriented to person, place, and time.      Cranial Nerves: No cranial nerve deficit.      Sensory: No sensory deficit.      Motor: No weakness or abnormal muscle tone.      Coordination: Coordination normal.      Gait: Gait normal.      Deep Tendon Reflexes: Reflexes normal.   Psychiatric:         Mood and Affect: Mood normal.         Behavior: Behavior normal.         Thought Content: Thought content normal.         Judgment: Judgment normal.           Medication Review:    Current Facility-Administered Medications:   •  acetaminophen (TYLENOL) tablet 1,000 mg, 1,000 mg, Oral, Q6H PRN, Sudeep Wolf MD, 1,000 mg at 01/05/23 0015  •  albuterol (PROVENTIL) nebulizer solution 0.083% 2.5 mg/3mL, 2.5 mg, Nebulization, Q4H PRN, Will French MD  •  apixaban (ELIQUIS) tablet 5 mg, 5 mg, Oral, Q12H, Vania Andujar APRN, 5 mg at 01/08/23 0844  •  bumetanide (BUMEX) 10 mg in sodium chloride 0.9 % 100 mL (0.1 mg/mL) infusion, 0.5 mg/hr, Intravenous, Continuous, Trevor Alonso MD, Last Rate: 5 mL/hr at 01/08/23 0303, 0.5 mg/hr at 01/08/23 0303  •  calcium carbonate (TUMS) chewable tablet 500 mg (200 mg elemental), 2 tablet, Oral, TID PRN, Will French MD, 2 tablet at 01/05/23 0015  •  DOBUTamine (DOBUTREX) 1 mg/mL infusion, 2.5 mcg/kg/min, Intravenous, Continuous, Vania Andujar APRN, Last Rate: 21 mL/hr at 01/08/23 0303, 2.5 mcg/kg/min at 01/08/23 0303  •  droperidol (INAPSINE) injection 1.25 mg, 1.25 mg, Intravenous, Q6H PRN, Sudeep Wolf MD, 1.25 mg at 12/22/22 1302  •  melatonin tablet 3 mg, 3 mg, Oral, Nightly PRN, Behroozi, Saeid, MD, 3 mg at 01/05/23 2220  •  morphine injection 4 mg, 4 mg, Intravenous, Once, Will French MD  •  ondansetron (ZOFRAN) injection 4 mg, 4 mg, Intravenous, Q6H PRN, Sudeep Wolf MD, 4 mg at 01/01/23 0830  •  pantoprazole (PROTONIX) EC tablet 40 mg, 40 mg, Oral, Q AM, Ayaan Cuba MD, 40 mg at 01/08/23 0547  •   potassium chloride (MICRO-K) CR capsule 40 mEq, 40 mEq, Oral, PRN, 40 mEq at 01/04/23 1557 **OR** potassium chloride (KLOR-CON) packet 40 mEq, 40 mEq, Oral, PRN **OR** potassium chloride 10 mEq in 100 mL IVPB, 10 mEq, Intravenous, Q1H PRN, Moshe Aleman DO  •  simethicone (MYLICON) chewable tablet 80 mg, 80 mg, Oral, 4x Daily PRN, Delonte Caputo MD, 80 mg at 12/25/22 0025  •  sodium chloride 0.9 % flush 10 mL, 10 mL, Intravenous, Q12H, Will French MD, 10 mL at 01/04/23 2118  •  sodium chloride 0.9 % flush 10 mL, 10 mL, Intravenous, PRN, Will French MD, 10 mL at 12/30/22 0145  •  sodium chloride 0.9 % flush 10 mL, 10 mL, Intravenous, Q12H, Ayaan Cuba MD, 10 mL at 01/04/23 2118  •  sodium chloride 0.9 % flush 10 mL, 10 mL, Intravenous, Q12H, Ayaan Cuba MD, 10 mL at 01/04/23 2118  •  sodium chloride 0.9 % flush 10 mL, 10 mL, Intravenous, PRN, Ayaan Cuba MD  •  sodium chloride 0.9 % infusion 40 mL, 40 mL, Intravenous, PRN, Will French MD    Results Review:  I have reviewed the labs, radiology results, and diagnostic studies.    Laboratory Data:   Results from last 7 days   Lab Units 01/08/23  0429 01/07/23  0536 01/06/23  0546   SODIUM mmol/L 129* 130* 132*   POTASSIUM mmol/L 4.3 4.6 4.3   CHLORIDE mmol/L 90* 89* 91*   CO2 mmol/L 23.0 25.0 26.0   BUN mg/dL 54* 48* 48*   CREATININE mg/dL 1.81* 1.87* 1.78*   GLUCOSE mg/dL 144* 124* 112*   CALCIUM mg/dL 9.2 9.5 9.1   BILIRUBIN mg/dL 4.8* 5.8* 6.4*   ALK PHOS U/L 89 98 103   ALT (SGPT) U/L 96* 119* 137*   AST (SGOT) U/L 35 40 49*   ANION GAP mmol/L 16.0* 16.0* 15.0     Estimated Creatinine Clearance: 73.1 mL/min (A) (by C-G formula based on SCr of 1.81 mg/dL (H)).  Results from last 7 days   Lab Units 01/07/23  0536 01/05/23  0554 01/04/23  0623   MAGNESIUM mg/dL 2.0 2.0 2.4         Results from last 7 days   Lab Units 01/08/23  0429 01/07/23  0536 01/06/23  0546 01/05/23  0554 01/04/23  0623   WBC 10*3/mm3 4.95 4.99  4.30 4.64 3.24*   HEMOGLOBIN g/dL 12.8* 13.4 13.1 13.0 13.0   HEMATOCRIT % 37.1* 43.4 39.9 39.7 39.0   PLATELETS 10*3/mm3 91* 87* 83* 91* 101*           Culture Data:   No results found for: BLOODCX  No results found for: URINECX  No results found for: RESPCX  No results found for: WOUNDCX  No results found for: STOOLCX  No components found for: BODYFLD    Radiology Data:   Imaging Results (Last 24 Hours)     ** No results found for the last 24 hours. **          I have reviewed the patient's current medications.     Assessment/Plan     Hospital Problem List:  Principal Problem:    FRANCK (acute kidney injury) (HCC)  Active Problems:    Severe malnutrition (HCC)    Acute on chronic kidney disease stage III (likely cardiorenal): Patient's baseline is reportedly between 1.4-1.7.  Creatinine is 1.81 today.     Continue Bumex infusion and continue to monitor renal function. Input by nephrologist is appreciated.    History of nonischemic cardiomyopathy/chronic systolic heart failure status post AICD placement (with hypotension): Patient appears clinically euvolemic and chest x-ray done on 1/3/2023 did not show any evidence of pulmonary vascular congestion. Continue Bumex and dobutamine infusions,  with strict I's and O's, daily weights, salt and fluid restriction.  Input by cardiologist is appreciated.  Echocardiogram done 8/19/2022 showed an estimated ejection fraction of 15 to 20%.  Elevated LFTs are reactive, much improved and will be monitored.    Hyponatremia (likely hypervolemic): Continue diuretics, restrict free water and monitor BMP.    Chronic atrial fibrillation: Patient is in uncontrolled ventricular response.  Coreg is on hold for now secondary to borderline blood pressure.  Continue anticoagulation with Eliquis. Elective cardioversion is being contemplated with the patient by the cardiologist.  Input by cardiologist is appreciated.     Hypokalemia: Resolved.    Possible acute cholecystitis: Patient is less  symptomatic and tolerating recommended diet.  HIDA scan was unremarkable.    Obstructive sleep apnea: Continue nightly CPAP.    Deconditioning: Continue PT and OT.    Continue GI and DVT prophylaxis.    Discharge Planning: In progress.    I confirmed that the patient's Advance Care Plan is present, code status is documented, or surrogate decision maker is listed in the patient's medical record.     I have utilized all available immediate resources to obtain, update, or review the patient's current medications.    35 minutes of critical care time was spent in the assessment and formulation of treatment plan for this patient exclusive of billable procedures.      Ayaan Cuba MD   01/08/23   10:16 CST

## 2023-01-09 ENCOUNTER — APPOINTMENT (OUTPATIENT)
Dept: GENERAL RADIOLOGY | Facility: HOSPITAL | Age: 53
DRG: 682 | End: 2023-01-09
Payer: MEDICAID

## 2023-01-09 LAB
ALBUMIN SERPL-MCNC: 4.2 G/DL (ref 3.5–5.2)
ALBUMIN/GLOB SERPL: 1.3 G/DL
ALP SERPL-CCNC: 91 U/L (ref 39–117)
ALT SERPL W P-5'-P-CCNC: 84 U/L (ref 1–41)
ANION GAP SERPL CALCULATED.3IONS-SCNC: 16 MMOL/L (ref 5–15)
AST SERPL-CCNC: 31 U/L (ref 1–40)
BASOPHILS # BLD AUTO: 0.03 10*3/MM3 (ref 0–0.2)
BASOPHILS NFR BLD AUTO: 0.6 % (ref 0–1.5)
BILIRUB SERPL-MCNC: 5.1 MG/DL (ref 0–1.2)
BUN SERPL-MCNC: 58 MG/DL (ref 6–20)
BUN/CREAT SERPL: 27.1 (ref 7–25)
CALCIUM SPEC-SCNC: 9.7 MG/DL (ref 8.6–10.5)
CHLORIDE SERPL-SCNC: 86 MMOL/L (ref 98–107)
CO2 SERPL-SCNC: 27 MMOL/L (ref 22–29)
CREAT SERPL-MCNC: 2.14 MG/DL (ref 0.76–1.27)
DEPRECATED RDW RBC AUTO: 51.9 FL (ref 37–54)
EGFRCR SERPLBLD CKD-EPI 2021: 36.6 ML/MIN/1.73
EOSINOPHIL # BLD AUTO: 0.04 10*3/MM3 (ref 0–0.4)
EOSINOPHIL NFR BLD AUTO: 0.7 % (ref 0.3–6.2)
ERYTHROCYTE [DISTWIDTH] IN BLOOD BY AUTOMATED COUNT: 16.9 % (ref 12.3–15.4)
GLOBULIN UR ELPH-MCNC: 3.2 GM/DL
GLUCOSE SERPL-MCNC: 122 MG/DL (ref 65–99)
HCT VFR BLD AUTO: 39.2 % (ref 37.5–51)
HGB BLD-MCNC: 13.4 G/DL (ref 13–17.7)
IMM GRANULOCYTES # BLD AUTO: 0.02 10*3/MM3 (ref 0–0.05)
IMM GRANULOCYTES NFR BLD AUTO: 0.4 % (ref 0–0.5)
LYMPHOCYTES # BLD AUTO: 1.45 10*3/MM3 (ref 0.7–3.1)
LYMPHOCYTES NFR BLD AUTO: 26.6 % (ref 19.6–45.3)
MCH RBC QN AUTO: 29.6 PG (ref 26.6–33)
MCHC RBC AUTO-ENTMCNC: 34.2 G/DL (ref 31.5–35.7)
MCV RBC AUTO: 86.5 FL (ref 79–97)
MONOCYTES # BLD AUTO: 0.55 10*3/MM3 (ref 0.1–0.9)
MONOCYTES NFR BLD AUTO: 10.1 % (ref 5–12)
NEUTROPHILS NFR BLD AUTO: 3.36 10*3/MM3 (ref 1.7–7)
NEUTROPHILS NFR BLD AUTO: 61.6 % (ref 42.7–76)
NRBC BLD AUTO-RTO: 0.4 /100 WBC (ref 0–0.2)
PLATELET # BLD AUTO: 91 10*3/MM3 (ref 140–450)
PMV BLD AUTO: 13.1 FL (ref 6–12)
POTASSIUM SERPL-SCNC: 4 MMOL/L (ref 3.5–5.2)
PROT SERPL-MCNC: 7.4 G/DL (ref 6–8.5)
RBC # BLD AUTO: 4.53 10*6/MM3 (ref 4.14–5.8)
SODIUM SERPL-SCNC: 129 MMOL/L (ref 136–145)
WBC NRBC COR # BLD: 5.45 10*3/MM3 (ref 3.4–10.8)

## 2023-01-09 PROCEDURE — 97110 THERAPEUTIC EXERCISES: CPT

## 2023-01-09 PROCEDURE — 99232 SBSQ HOSP IP/OBS MODERATE 35: CPT | Performed by: INTERNAL MEDICINE

## 2023-01-09 PROCEDURE — 85025 COMPLETE CBC W/AUTO DIFF WBC: CPT | Performed by: HOSPITALIST

## 2023-01-09 PROCEDURE — 80053 COMPREHEN METABOLIC PANEL: CPT | Performed by: INTERNAL MEDICINE

## 2023-01-09 PROCEDURE — 25010000002 DOBUTAMINE PER 250 MG: Performed by: NURSE PRACTITIONER

## 2023-01-09 PROCEDURE — 97530 THERAPEUTIC ACTIVITIES: CPT

## 2023-01-09 PROCEDURE — 71045 X-RAY EXAM CHEST 1 VIEW: CPT

## 2023-01-09 RX ORDER — ACETAMINOPHEN 325 MG/1
650 TABLET ORAL EVERY 6 HOURS PRN
Status: DISCONTINUED | OUTPATIENT
Start: 2023-01-09 | End: 2023-01-09 | Stop reason: SDUPTHER

## 2023-01-09 RX ADMIN — BUMETANIDE 1 MG/HR: 0.25 INJECTION INTRAMUSCULAR; INTRAVENOUS at 05:49

## 2023-01-09 RX ADMIN — Medication 10 ML: at 09:04

## 2023-01-09 RX ADMIN — PANTOPRAZOLE SODIUM 40 MG: 40 TABLET, DELAYED RELEASE ORAL at 05:49

## 2023-01-09 RX ADMIN — APIXABAN 5 MG: 5 TABLET, FILM COATED ORAL at 20:25

## 2023-01-09 RX ADMIN — DOBUTAMINE HYDROCHLORIDE 2.5 MCG/KG/MIN: 100 INJECTION INTRAVENOUS at 05:50

## 2023-01-09 RX ADMIN — APIXABAN 5 MG: 5 TABLET, FILM COATED ORAL at 09:04

## 2023-01-09 RX ADMIN — Medication 10 ML: at 09:05

## 2023-01-09 RX ADMIN — BUMETANIDE 1 MG/HR: 0.25 INJECTION INTRAMUSCULAR; INTRAVENOUS at 17:31

## 2023-01-09 RX ADMIN — ACETAMINOPHEN 1000 MG: 500 TABLET, FILM COATED ORAL at 14:04

## 2023-01-09 NOTE — PROGRESS NOTES
[facility]  INPATIENT PROGRESS NOTE    Name: Matti Bravo  Age/Sex: 52 y.o. male  :  1970        PCP: Shonna Ng APRN    Vital Signs  Temp:  [97.2 °F (36.2 °C)-98 °F (36.7 °C)] 97.9 °F (36.6 °C)  Heart Rate:  [] 109  Resp:  [18] 18  BP: ()/(53-91) 116/70  Body mass index is 36.41 kg/m².      Subjective   No acute overnight events.  Telemetry was reviewed.  He continues to be in atrial fibrillation.  Ventricular rates are in 100s-120s bpm range.  He is on dobutamine gtt.  He has been able to lie down flat in the bed and does not appear to have any PND or orthopnea at this point.  He has not any chest discomfort.  He has considerable bilateral lower extremity edema at this point.  Urine output has been reasonable.  Villarreal catheter has been taken out.  He is on Bumex drip.    Patient Active Problem List   Diagnosis   • Chronic systolic heart failure (HCC)   • Essential hypertension   • Mixed hyperlipidemia   • Obstructive sleep apnea   • Morbid obesity (HCC)   • Restrictive lung disease secondary to obesity   • Multiple lung nodules on CT   • Diabetes mellitus (HCC)   • Varicose veins of both lower extremities with pain   • Venous insufficiency (chronic) (peripheral)   • Surgical aftercare, circulatory system   • Chest pain   • Acute on chronic congestive heart failure (HCC)   • Abnormal nuclear stress test   • Acute congestive heart failure (HCC)   • Obesity (BMI 30-39.9)   • Hypotension   • Acute congestive heart failure, unspecified heart failure type (HCC)   • Shortness of breath   • NICM (nonischemic cardiomyopathy) (HCC)   • Pulmonary hypertension (HCC)   • CHF exacerbation (HCC)   • Heart failure (HCC)   • FRANCK (acute kidney injury) (HCC)   • Severe malnutrition (HCC)       Past Medical History:   Diagnosis Date   • Asthma    • Chronic systolic heart failure (HCC)    • Diabetes mellitus (HCC)    • Hyperlipidemia    • Hypertension    • Obesity    •  Peripheral vascular disease (HCC)    • Sleep apnea        Current Facility-Administered Medications   Medication Dose Route Frequency Provider Last Rate Last Admin   • acetaminophen (TYLENOL) tablet 1,000 mg  1,000 mg Oral Q6H PRN Sudeep Wolf MD   1,000 mg at 01/05/23 0015   • albuterol (PROVENTIL) nebulizer solution 0.083% 2.5 mg/3mL  2.5 mg Nebulization Q4H PRN Will French MD       • apixaban (ELIQUIS) tablet 5 mg  5 mg Oral Q12H Vania Andujar APRN   5 mg at 01/09/23 0904   • bumetanide (BUMEX) 10 mg in sodium chloride 0.9 % 100 mL (0.1 mg/mL) infusion  1 mg/hr Intravenous Continuous Trevor Alonso MD 10 mL/hr at 01/09/23 0549 1 mg/hr at 01/09/23 0549   • calcium carbonate (TUMS) chewable tablet 500 mg (200 mg elemental)  2 tablet Oral TID PRN Will French MD   2 tablet at 01/05/23 0015   • DOBUTamine (DOBUTREX) 1 mg/mL infusion  2.5 mcg/kg/min Intravenous Continuous Vania Andujar APRN   Held at 01/09/23 1012   • droperidol (INAPSINE) injection 1.25 mg  1.25 mg Intravenous Q6H PRN Sudeep Wolf MD   1.25 mg at 12/22/22 1302   • melatonin tablet 3 mg  3 mg Oral Nightly PRN Behroozi, Saeid, MD   3 mg at 01/05/23 2220   • morphine injection 4 mg  4 mg Intravenous Once Wlil French MD       • ondansetron (ZOFRAN) injection 4 mg  4 mg Intravenous Q6H PRN Sudeep Wolf MD   4 mg at 01/01/23 0830   • pantoprazole (PROTONIX) EC tablet 40 mg  40 mg Oral Q AM Ayaan Cuba MD   40 mg at 01/09/23 0549   • potassium chloride (MICRO-K) CR capsule 40 mEq  40 mEq Oral PRN Moshe Aleman DO   40 mEq at 01/04/23 1557    Or   • potassium chloride (KLOR-CON) packet 40 mEq  40 mEq Oral PRN Moshe Aleman DO        Or   • potassium chloride 10 mEq in 100 mL IVPB  10 mEq Intravenous Q1H PRN Moshe Aleman DO       • simethicone (MYLICON) chewable tablet 80 mg  80 mg Oral 4x Daily PRN Delonte Caputo MD   80 mg at 12/25/22 0025   • sodium chloride 0.9 %  flush 10 mL  10 mL Intravenous Q12H Will French MD   10 mL at 01/09/23 0905   • sodium chloride 0.9 % flush 10 mL  10 mL Intravenous PRN Will French MD   10 mL at 12/30/22 0145   • sodium chloride 0.9 % flush 10 mL  10 mL Intravenous Q12H Ayaan Cuba MD   10 mL at 01/09/23 0904   • sodium chloride 0.9 % flush 10 mL  10 mL Intravenous Q12H Ayaan Cuba MD   10 mL at 01/09/23 0904   • sodium chloride 0.9 % flush 10 mL  10 mL Intravenous PRN Ayaan Cuba MD       • sodium chloride 0.9 % infusion 40 mL  40 mL Intravenous PRN Will French MD           Past Surgical History:   Procedure Laterality Date   • CARDIAC CATHETERIZATION N/A 12/08/2021   • CARDIAC DEFIBRILLATOR PLACEMENT     • VARICOSE VEIN SURGERY Right 04/05/2019       Social History     Socioeconomic History   • Marital status:    Tobacco Use   • Smoking status: Former   • Smokeless tobacco: Never   Vaping Use   • Vaping Use: Never used   Substance and Sexual Activity   • Alcohol use: No   • Drug use: No   • Sexual activity: Not Currently     Vitals:    01/09/23 0700 01/09/23 0800 01/09/23 0900 01/09/23 1000   BP: 95/79  (!) 89/66 116/70   BP Location:       Patient Position:       Pulse: (!) 121 120 113 109   Resp:       Temp:  97.9 °F (36.6 °C)     TempSrc:  Oral     SpO2: 94% 97% 97% 97%   Weight:       Height:         Physical Exam  Constitutional:       General: He is not in acute distress.     Appearance: He is not ill-appearing or toxic-appearing.   HENT:      Head: Normocephalic.      Right Ear: External ear normal.      Left Ear: External ear normal.      Nose: No congestion or rhinorrhea.      Mouth/Throat:      Mouth: Mucous membranes are moist.   Eyes:      General:         Right eye: No discharge.         Left eye: No discharge.   Cardiovascular:      Rate and Rhythm: Tachycardia present. Rhythm irregular.      Comments: JVP appears elevated  Pulmonary:      Effort: No respiratory distress.       Breath sounds: No wheezing.   Abdominal:      General: There is no distension.      Palpations: Abdomen is soft.      Tenderness: There is no abdominal tenderness.   Musculoskeletal:      Cervical back: Neck supple. No tenderness.      Right lower leg: Edema present.      Left lower leg: Edema present.   Skin:     General: Skin is warm.      Coloration: Skin is not jaundiced.   Neurological:      Mental Status: He is alert and oriented to person, place, and time. Mental status is at baseline.   Psychiatric:         Mood and Affect: Mood normal.         Thought Content: Thought content normal.         Judgment: Judgment normal.         Lab Results (last 24 hours)     Procedure Component Value Units Date/Time    CBC & Differential [[id number]]  (Abnormal) Collected: 01/09/23 0620    Specimen: Blood Updated: 01/09/23 0644    Narrative:      The following orders were created for panel order CBC & Differential.  Procedure                               Abnormality         Status                     ---------                               -----------         ------                     CBC Auto Differential[[id number]]        Abnormal            Final result               Scan Slide[[id number]]                                                                    Please view results for these tests on the individual orders.    Comprehensive Metabolic Panel [[id number]]  (Abnormal) Collected: 01/09/23 0620    Specimen: Blood Updated: 01/09/23 0655     Glucose 122 mg/dL      BUN 58 mg/dL      Creatinine 2.14 mg/dL      Sodium 129 mmol/L      Potassium 4.0 mmol/L      Chloride 86 mmol/L      CO2 27.0 mmol/L      Calcium 9.7 mg/dL      Total Protein 7.4 g/dL      Albumin 4.2 g/dL      ALT (SGPT) 84 U/L      AST (SGOT) 31 U/L      Alkaline Phosphatase 91 U/L      Total Bilirubin 5.1 mg/dL      Globulin 3.2 gm/dL      A/G Ratio 1.3 g/dL      BUN/Creatinine Ratio 27.1     Anion Gap 16.0 mmol/L      eGFR 36.6 mL/min/1.73      Comment:  National Kidney Foundation and American Society of Nephrology (ASN) Task Force recommended calculation based on the Chronic Kidney Disease Epidemiology Collaboration (CKD-EPI) equation refit without adjustment for race.       Narrative:      GFR Normal >60  Chronic Kidney Disease <60  Kidney Failure <15      CBC Auto Differential [[id number]]  (Abnormal) Collected: 01/09/23 0620    Specimen: Blood Updated: 01/09/23 0644     WBC 5.45 10*3/mm3      RBC 4.53 10*6/mm3      Hemoglobin 13.4 g/dL      Hematocrit 39.2 %      MCV 86.5 fL      MCH 29.6 pg      MCHC 34.2 g/dL      RDW 16.9 %      RDW-SD 51.9 fl      MPV 13.1 fL      Platelets 91 10*3/mm3      Neutrophil % 61.6 %      Lymphocyte % 26.6 %      Monocyte % 10.1 %      Eosinophil % 0.7 %      Basophil % 0.6 %      Immature Grans % 0.4 %      Neutrophils, Absolute 3.36 10*3/mm3      Lymphocytes, Absolute 1.45 10*3/mm3      Monocytes, Absolute 0.55 10*3/mm3      Eosinophils, Absolute 0.04 10*3/mm3      Basophils, Absolute 0.03 10*3/mm3      Immature Grans, Absolute 0.02 10*3/mm3      nRBC 0.4 /100 WBC             A/P    Nonischemic cardiomyopathy: He is s/p ICD placement.  Cardiomyopathy medications continue to be on hold for now.  He continues to appear fluid overloaded at this point.  Continue Bumex gtt today.  Nephrology also following.  Due to faster ventricular rates, consider holding dobutamine gtt today; this may need to be restarted if his urine output decreases or if his blood pressure falls.     Hypotension: Improved.  Dopamine gtt has been weaned off.  Consider holding dobutamine gtt today.     New onset atrial fibrillation with RVR:  Continue oral Eliquis.  He is in atrial fibrillation at this point and ventricular rates are slightly on the faster side.  Serum TSH and free T4 levels were normal in December 2022.    Will hold dobutamine gtt as detailed above.  He may need cardioversion at some point during this admission if he continues to be tachycardic  and blood pressure readings remain borderline low.     Transaminitis: Likely related to hypotension.  It continues to improve. Continue to hold Lipitor for now and restart when AST/ALT levels normalize.     FRANCK on CKD: Nephrology following.      Matti Bravo  reports that he has quit smoking. He has never used smokeless tobacco.. I have educated him on continued tobacco cessation.      We will continue to follow.  Please feel free to call us any questions.        This document has been electronically signed by Dylon Aranda MD on January 9, 2023 11:01 CST         Part of this note may be an electronic transcription/translation of spoken language to printed text using the Dragon Dictation System.

## 2023-01-09 NOTE — PLAN OF CARE
Goal Outcome Evaluation:  Plan of Care Reviewed With: patient        Progress: improving  Outcome Evaluation: vitals remain stable with treatment. patient voices his frustration with the edema in his LE's. he completes minimal LE therex this date and then requests transfer to his bed from bedside chair. he is stand by assist for sit to stand, stand by assist for minimal gait distance. and stand by assist for sit to supine in bed, able to get his LE's in the bed with his own strength. patient bridges/scoots in bed independently. self terminates treatment. MD present at end of treatment.

## 2023-01-09 NOTE — PLAN OF CARE
Goal Outcome Evaluation:  Plan of Care Reviewed With: caregiver, patient        Progress: improving  Outcome Evaluation: Nutrition F/U:  Pt continues to receive diuresis and remains on Dobutmine.  He is tolerating po diet with intake ~75% withotu any significant Gi distress.  Will monitor POC and po intake.

## 2023-01-09 NOTE — PAYOR COMM NOTE
"  Brittaney Carter RN [facility]  [phone]      Phone  [phone]      Fax  Cont. Stay REview      Ethel Davis (52 y.o. Male)     Date of Birth   1970    Social Security Number       Address   [address] [address]    Home Phone   [phone]    MRN   [id number]       Buddhist   Sabianism    Marital Status                               Admission Date   12/20/22    Admission Type   Emergency    Admitting Provider   Will French MD    Attending Provider   Will French MD    Department, Room/Bed   [facility] CRITICAL CARE STEPDOWN, 20/A       Discharge Date       Discharge Disposition       Discharge Destination                               Attending Provider: Will French MD    Allergies: No Known Allergies    Isolation: None   Infection: None   Code Status: CPR    Ht: 193 cm (75.98\")   Wt: 136 kg (299 lb)    Admission Cmt: None   Principal Problem: FRANCK (acute kidney injury) (HCC) [N17.9]                 Active Insurance as of 12/20/2022     Primary Coverage     Payor Plan Insurance Group Employer/Plan Group    [place] [id number]     Payor Plan Address Payor Plan Phone Number Payor Plan Fax Number Effective Dates    [employer] [address] [address] [phone]  10/8/2021 - None Entered    [facility] [id number]       Subscriber Name Subscriber Birth Date Member ID       ETHEL DAVIS 1970 [id number]                 Emergency Contacts      (Rel.) Home Phone Work Phone Mobile Phone    Courtney Davis (Mother) [phone] -- --    LawrenceMary Anne (Sister) -- -- [phone]            Vital Signs (last day)     Date/Time Temp Temp src Pulse Resp BP Patient Position SpO2    01/09/23 1000 -- -- 109 -- 116/70 -- 97    01/09/23 0900 -- -- 113 -- 89/66 -- 97    01/09/23 0800 97.9 (36.6) Oral 120 -- -- -- 97    01/09/23 0700 -- -- 121 -- 95/79 " -- 94    01/09/23 0630 -- -- 120 -- 95/72 -- 88    01/09/23 0600 -- -- 129 -- 143/86 -- 92    01/09/23 0530 -- -- 115 -- 109/78 -- 97    01/09/23 0501 -- -- 114 -- 112/91 -- 98    01/09/23 0446 -- -- 105 -- 110/76 -- 95    01/09/23 0400 -- -- 122 -- 109/75 -- 97    01/09/23 0331 -- -- 106 -- 101/75 -- 98    01/09/23 0305 -- -- 118 -- 108/80 -- 98    01/09/23 0230 -- -- 109 -- 101/69 -- 95    01/09/23 0200 -- -- 108 -- 100/75 -- 97    01/09/23 0130 -- -- 105 -- 98/76 -- 98    01/09/23 0100 -- -- 108 -- 114/57 -- 98    01/09/23 0030 97.4 (36.3) Temporal 108 18 84/63 Lying 97    01/08/23 2330 -- -- 101 -- 90/59 -- 97    01/08/23 2300 -- -- 104 -- 97/57 -- 96    01/08/23 2230 -- -- 113 -- 114/75 -- 98    01/08/23 2200 -- -- 112 -- 109/83 -- 99    01/08/23 2130 -- -- 113 -- 113/83 -- 98    01/08/23 2100 -- -- 103 -- 108/75 -- 96    01/08/23 2030 -- -- 105 -- -- -- 96    01/08/23 2000 97.2 (36.2) Oral 118 18 82/53 Lying 97    01/08/23 1930 -- -- 99 -- 123/68 -- 97    01/08/23 1900 -- -- 106 -- 94/61 -- 97    01/08/23 1816 -- -- 127 -- 118/89 -- 96    01/08/23 1700 -- -- 112 -- 109/82 -- 98    01/08/23 1600 97.8 (36.6) Oral 113 18 93/71 Lying 99    01/08/23 1500 -- -- 109 -- 103/79 -- 97    01/08/23 1430 -- -- 99 -- 95/68 -- 98    01/08/23 1400 -- -- -- -- -- -- 98    01/08/23 1300 -- -- 112 -- 109/72 -- 97    01/08/23 1200 98 (36.7) Oral 117 18 103/66 Lying 97    01/08/23 1100 -- -- 118 -- 128/95 -- 98    01/08/23 0900 -- -- 127 -- 118/79 -- 98    01/08/23 0830 97.6 (36.4) Oral 117 18 103/77 Lying 99    01/08/23 0700 -- -- 128 -- 97/72 -- 98    01/08/23 0600 -- -- 109 22 93/65 -- 98    01/08/23 0500 -- -- 114 24 120/99 -- 98    01/08/23 0400 98.2 (36.8) Oral 110 22 130/99 -- 97    01/08/23 0300 -- -- 115 24 102/54 -- 100    01/08/23 0200 -- -- 110 -- 93/61 -- 99    01/08/23 0100 -- -- 109 22 118/80 -- 98    01/08/23 0000 96.8 (36) Axillary 106 22 146/92 Lying 98          Oxygen Therapy (last day)     Date/Time SpO2 Device  (Oxygen Therapy) Flow (L/min) Oxygen Concentration (%) ETCO2 (mmHg)    01/09/23 1000 97 -- -- -- --    01/09/23 0900 97 -- -- -- --    01/09/23 0800 97 room air -- -- --    01/09/23 0700 94 -- -- -- --    01/09/23 0630 88 -- -- -- --    01/09/23 0600 92 -- -- -- --    01/09/23 0530 97 -- -- -- --    01/09/23 0501 98 -- -- -- --    01/09/23 0446 95 -- -- -- --    01/09/23 0400 97 -- -- -- --    01/09/23 0331 98 -- -- -- --    01/09/23 0305 98 -- -- -- --    01/09/23 0230 95 -- -- -- --    01/09/23 0200 97 -- -- -- --    01/09/23 0130 98 -- -- -- --    01/09/23 0100 98 -- -- -- --    01/09/23 0030 97 room air -- -- --    01/08/23 2330 97 -- -- -- --    01/08/23 2300 96 -- -- -- --    01/08/23 2230 98 -- -- -- --    01/08/23 2200 99 -- -- -- --    01/08/23 2130 98 -- -- -- --    01/08/23 2100 96 -- -- -- --    01/08/23 2030 96 -- -- -- --    01/08/23 2000 97 room air -- -- --    01/08/23 1930 97 -- -- -- --    01/08/23 1900 97 -- -- -- --    01/08/23 1816 96 -- -- -- --    01/08/23 1700 98 -- -- -- --    01/08/23 1600 99 -- -- -- --    01/08/23 1500 97 -- -- -- --    01/08/23 1430 98 -- -- -- --    01/08/23 1400 98 -- -- -- --    01/08/23 1300 97 -- -- -- --    01/08/23 1200 97 nasal cannula;room air -- -- --    01/08/23 1100 98 -- -- -- --    01/08/23 0900 98 -- -- -- --    01/08/23 0830 99 -- -- -- --    01/08/23 0700 98 -- -- -- --    01/08/23 0600 98 -- -- -- --    01/08/23 0500 98 -- -- -- --    01/08/23 0400 97 -- -- -- --    01/08/23 0300 100 -- -- -- --    01/08/23 0200 99 -- -- -- --    01/08/23 0100 98 -- -- -- --    01/08/23 0000 98 nasal cannula 2 -- --          Current Facility-Administered Medications   Medication Dose Route Frequency Provider Last Rate Last Admin   • acetaminophen (TYLENOL) tablet 1,000 mg  1,000 mg Oral Q6H PRN Sudeep Wolf MD   1,000 mg at 01/05/23 0015   • albuterol (PROVENTIL) nebulizer solution 0.083% 2.5 mg/3mL  2.5 mg Nebulization Q4H PRN Will French MD       •  apixaban (ELIQUIS) tablet 5 mg  5 mg Oral Q12H Vania Andujar, APRN   5 mg at 01/09/23 0904   • bumetanide (BUMEX) 10 mg in sodium chloride 0.9 % 100 mL (0.1 mg/mL) infusion  1 mg/hr Intravenous Continuous Trevor Alonso MD 10 mL/hr at 01/09/23 0549 1 mg/hr at 01/09/23 0549   • calcium carbonate (TUMS) chewable tablet 500 mg (200 mg elemental)  2 tablet Oral TID PRN Will French MD   2 tablet at 01/05/23 0015   • DOBUTamine (DOBUTREX) 1 mg/mL infusion  2.5 mcg/kg/min Intravenous Continuous Vania Andujar APRN   Held at 01/09/23 1012   • droperidol (INAPSINE) injection 1.25 mg  1.25 mg Intravenous Q6H PRN Sudeep Wolf MD   1.25 mg at 12/22/22 1302   • melatonin tablet 3 mg  3 mg Oral Nightly PRN Behroozi, Saeid, MD   3 mg at 01/05/23 2220   • morphine injection 4 mg  4 mg Intravenous Once Will French MD       • ondansetron (ZOFRAN) injection 4 mg  4 mg Intravenous Q6H PRN Sudeep Wolf MD   4 mg at 01/01/23 0830   • pantoprazole (PROTONIX) EC tablet 40 mg  40 mg Oral Q AM Ayaan Cuba MD   40 mg at 01/09/23 0549   • potassium chloride (MICRO-K) CR capsule 40 mEq  40 mEq Oral PRN Moshe Aleman, DO   40 mEq at 01/04/23 1557    Or   • potassium chloride (KLOR-CON) packet 40 mEq  40 mEq Oral PRN Moshe Aleman R, DO        Or   • potassium chloride 10 mEq in 100 mL IVPB  10 mEq Intravenous Q1H PRN Mohse Aleman R, DO       • simethicone (MYLICON) chewable tablet 80 mg  80 mg Oral 4x Daily PRN Delonte Caputo MD   80 mg at 12/25/22 0025   • sodium chloride 0.9 % flush 10 mL  10 mL Intravenous Q12H Will French MD   10 mL at 01/09/23 0905   • sodium chloride 0.9 % flush 10 mL  10 mL Intravenous PRN Will French MD   10 mL at 12/30/22 0145   • sodium chloride 0.9 % flush 10 mL  10 mL Intravenous Q12H Ayaan Cuba MD   10 mL at 01/09/23 0904   • sodium chloride 0.9 % flush 10 mL  10 mL Intravenous Q12H Ayaan Cuba MD   10 mL at 01/09/23  0904   • sodium chloride 0.9 % flush 10 mL  10 mL Intravenous PRN Ayaan Cuba MD       • sodium chloride 0.9 % infusion 40 mL  40 mL Intravenous PRN Will French MD            Physician Progress Notes (last 48 hours)      Dylon Aranda MD at 23 1101            [facility] Cardiology  INPATIENT PROGRESS NOTE    Name: Matti Bravo  Age/Sex: 52 y.o. male  :  1970        PCP: Shonna Ng APRN    Vital Signs  Temp:  [97.2 °F (36.2 °C)-98 °F (36.7 °C)] 97.9 °F (36.6 °C)  Heart Rate:  [] 109  Resp:  [18] 18  BP: ()/(53-91) 116/70  Body mass index is 36.41 kg/m².      Subjective   No acute overnight events.  Telemetry was reviewed.  He continues to be in atrial fibrillation.  Ventricular rates are in 100s-120s bpm range.  He is on dobutamine gtt.  He has been able to lie down flat in the bed and does not appear to have any PND or orthopnea at this point.  He has not any chest discomfort.  He has considerable bilateral lower extremity edema at this point.  Urine output has been reasonable.  Villarreal catheter has been taken out.  He is on Bumex drip.    Patient Active Problem List   Diagnosis   • Chronic systolic heart failure (HCC)   • Essential hypertension   • Mixed hyperlipidemia   • Obstructive sleep apnea   • Morbid obesity ([facility])   • Restrictive lung disease secondary to obesity   • Multiple lung nodules on CT   • Diabetes mellitus ([facility])   • Varicose veins of both lower extremities with pain   • Venous insufficiency (chronic) (peripheral)   • Surgical aftercare, circulatory system   • Chest pain   • Acute on chronic congestive heart failure ([facility])   • Abnormal nuclear stress test   • Acute congestive heart failure ([facility])   • Obesity (BMI 30-39.9)   • Hypotension   • Acute congestive heart failure, unspecified heart failure type ([facility])   • Shortness of breath   • NICM (nonischemic cardiomyopathy) ([facility])   • Pulmonary hypertension ([facility])   • CHF  exacerbation (HCC)   • Heart failure (HCC)   • FRANCK (acute kidney injury) (HCC)   • Severe malnutrition (HCC)       Past Medical History:   Diagnosis Date   • Asthma    • Chronic systolic heart failure (HCC)    • Diabetes mellitus (HCC)    • Hyperlipidemia    • Hypertension    • Obesity    • Peripheral vascular disease (HCC)    • Sleep apnea        Current Facility-Administered Medications   Medication Dose Route Frequency Provider Last Rate Last Admin   • acetaminophen (TYLENOL) tablet 1,000 mg  1,000 mg Oral Q6H PRN Sudeep Wolf MD   1,000 mg at 01/05/23 0015   • albuterol (PROVENTIL) nebulizer solution 0.083% 2.5 mg/3mL  2.5 mg Nebulization Q4H PRN Will French MD       • apixaban (ELIQUIS) tablet 5 mg  5 mg Oral Q12H Vania Andujar APRN   5 mg at 01/09/23 0904   • bumetanide (BUMEX) 10 mg in sodium chloride 0.9 % 100 mL (0.1 mg/mL) infusion  1 mg/hr Intravenous Continuous Trevor Alonso MD 10 mL/hr at 01/09/23 0549 1 mg/hr at 01/09/23 0549   • calcium carbonate (TUMS) chewable tablet 500 mg (200 mg elemental)  2 tablet Oral TID PRN Will French MD   2 tablet at 01/05/23 0015   • DOBUTamine (DOBUTREX) 1 mg/mL infusion  2.5 mcg/kg/min Intravenous Continuous Vania Andujar APRN   Held at 01/09/23 1012   • droperidol (INAPSINE) injection 1.25 mg  1.25 mg Intravenous Q6H PRN Sudeep Wolf MD   1.25 mg at 12/22/22 1302   • melatonin tablet 3 mg  3 mg Oral Nightly PRN Behroozi, Saeid, MD   3 mg at 01/05/23 2220   • morphine injection 4 mg  4 mg Intravenous Once Will French MD       • ondansetron (ZOFRAN) injection 4 mg  4 mg Intravenous Q6H PRN Sudeep Wolf MD   4 mg at 01/01/23 0830   • pantoprazole (PROTONIX) EC tablet 40 mg  40 mg Oral Q AM Ayaan Cuba MD   40 mg at 01/09/23 0549   • potassium chloride (MICRO-K) CR capsule 40 mEq  40 mEq Oral PRN Moshe Aleman, DO   40 mEq at 01/04/23 1557    Or   • potassium chloride (KLOR-CON) packet 40 mEq  40  mEq Oral PRN Moshe Aleman DO        Or   • potassium chloride 10 mEq in 100 mL IVPB  10 mEq Intravenous Q1H PRN Moshe Aleman DO       • simethicone (MYLICON) chewable tablet 80 mg  80 mg Oral 4x Daily PRN Delonte Caputo MD   80 mg at 12/25/22 0025   • sodium chloride 0.9 % flush 10 mL  10 mL Intravenous Q12H Will French MD   10 mL at 01/09/23 0905   • sodium chloride 0.9 % flush 10 mL  10 mL Intravenous PRN Will French MD   10 mL at 12/30/22 0145   • sodium chloride 0.9 % flush 10 mL  10 mL Intravenous Q12H Ayaan Cuba MD   10 mL at 01/09/23 0904   • sodium chloride 0.9 % flush 10 mL  10 mL Intravenous Q12H Ayaan Cuba MD   10 mL at 01/09/23 0904   • sodium chloride 0.9 % flush 10 mL  10 mL Intravenous PRN Ayaan Cuba MD       • sodium chloride 0.9 % infusion 40 mL  40 mL Intravenous PRN Will French MD           Past Surgical History:   Procedure Laterality Date   • CARDIAC CATHETERIZATION N/A 12/08/2021   • CARDIAC DEFIBRILLATOR PLACEMENT     • VARICOSE VEIN SURGERY Right 04/05/2019       Social History     Socioeconomic History   • Marital status:    Tobacco Use   • Smoking status: Former   • Smokeless tobacco: Never   Vaping Use   • Vaping Use: Never used   Substance and Sexual Activity   • Alcohol use: No   • Drug use: No   • Sexual activity: Not Currently     Vitals:    01/09/23 0700 01/09/23 0800 01/09/23 0900 01/09/23 1000   BP: 95/79  (!) 89/66 116/70   BP Location:       Patient Position:       Pulse: (!) 121 120 113 109   Resp:       Temp:  97.9 °F (36.6 °C)     TempSrc:  Oral     SpO2: 94% 97% 97% 97%   Weight:       Height:         Physical Exam  Constitutional:       General: He is not in acute distress.     Appearance: He is not ill-appearing or toxic-appearing.   HENT:      Head: Normocephalic.      Right Ear: External ear normal.      Left Ear: External ear normal.      Nose: No congestion or rhinorrhea.      Mouth/Throat:       Mouth: Mucous membranes are moist.   Eyes:      General:         Right eye: No discharge.         Left eye: No discharge.   Cardiovascular:      Rate and Rhythm: Tachycardia present. Rhythm irregular.      Comments: JVP appears elevated  Pulmonary:      Effort: No respiratory distress.      Breath sounds: No wheezing.   Abdominal:      General: There is no distension.      Palpations: Abdomen is soft.      Tenderness: There is no abdominal tenderness.   Musculoskeletal:      Cervical back: Neck supple. No tenderness.      Right lower leg: Edema present.      Left lower leg: Edema present.   Skin:     General: Skin is warm.      Coloration: Skin is not jaundiced.   Neurological:      Mental Status: He is alert and oriented to person, place, and time. Mental status is at baseline.   Psychiatric:         Mood and Affect: Mood normal.         Thought Content: Thought content normal.         Judgment: Judgment normal.         Lab Results (last 24 hours)     Procedure Component Value Units Date/Time    CBC & Differential [[id number]]  (Abnormal) Collected: 01/09/23 0620    Specimen: Blood Updated: 01/09/23 0644    Narrative:      The following orders were created for panel order CBC & Differential.  Procedure                               Abnormality         Status                     ---------                               -----------         ------                     CBC Auto Differential[[id number]]        Abnormal            Final result               Scan Slide[[id number]]                                                                    Please view results for these tests on the individual orders.    Comprehensive Metabolic Panel [[id number]]  (Abnormal) Collected: 01/09/23 0620    Specimen: Blood Updated: 01/09/23 0655     Glucose 122 mg/dL      BUN 58 mg/dL      Creatinine 2.14 mg/dL      Sodium 129 mmol/L      Potassium 4.0 mmol/L      Chloride 86 mmol/L      CO2 27.0 mmol/L      Calcium 9.7 mg/dL      Total  Protein 7.4 g/dL      Albumin 4.2 g/dL      ALT (SGPT) 84 U/L      AST (SGOT) 31 U/L      Alkaline Phosphatase 91 U/L      Total Bilirubin 5.1 mg/dL      Globulin 3.2 gm/dL      A/G Ratio 1.3 g/dL      BUN/Creatinine Ratio 27.1     Anion Gap 16.0 mmol/L      eGFR 36.6 mL/min/1.73      Comment: National Kidney Foundation and American Society of Nephrology (ASN) Task Force recommended calculation based on the Chronic Kidney Disease Epidemiology Collaboration (CKD-EPI) equation refit without adjustment for race.       Narrative:      GFR Normal >60  Chronic Kidney Disease <60  Kidney Failure <15      CBC Auto Differential [[id number]]  (Abnormal) Collected: 01/09/23 0620    Specimen: Blood Updated: 01/09/23 0644     WBC 5.45 10*3/mm3      RBC 4.53 10*6/mm3      Hemoglobin 13.4 g/dL      Hematocrit 39.2 %      MCV 86.5 fL      MCH 29.6 pg      MCHC 34.2 g/dL      RDW 16.9 %      RDW-SD 51.9 fl      MPV 13.1 fL      Platelets 91 10*3/mm3      Neutrophil % 61.6 %      Lymphocyte % 26.6 %      Monocyte % 10.1 %      Eosinophil % 0.7 %      Basophil % 0.6 %      Immature Grans % 0.4 %      Neutrophils, Absolute 3.36 10*3/mm3      Lymphocytes, Absolute 1.45 10*3/mm3      Monocytes, Absolute 0.55 10*3/mm3      Eosinophils, Absolute 0.04 10*3/mm3      Basophils, Absolute 0.03 10*3/mm3      Immature Grans, Absolute 0.02 10*3/mm3      nRBC 0.4 /100 WBC             A/P    Nonischemic cardiomyopathy: He is s/p ICD placement.  Cardiomyopathy medications continue to be on hold for now.  He continues to appear fluid overloaded at this point.  Continue Bumex gtt today.  Nephrology also following.  Due to faster ventricular rates, consider holding dobutamine gtt today; this may need to be restarted if his urine output decreases or if his blood pressure falls.     Hypotension: Improved.  Dopamine gtt has been weaned off.  Consider holding dobutamine gtt today.     New onset atrial fibrillation with RVR:  Continue oral Eliquis.  He is in  "atrial fibrillation at this point and ventricular rates are slightly on the faster side.  Serum TSH and free T4 levels were normal in 2022.    Will hold dobutamine gtt as detailed above.  He may need cardioversion at some point during this admission if he continues to be tachycardic and blood pressure readings remain borderline low.     Transaminitis: Likely related to hypotension.  It continues to improve. Continue to hold Lipitor for now and restart when AST/ALT levels normalize.     FRANCK on CKD: Nephrology following.      Matti Bravo  reports that he has quit smoking. He has never used smokeless tobacco.. I have educated him on continued tobacco cessation.      We will continue to follow.  Please feel free to call us any questions.        This document has been electronically signed by Dylon Aranda MD on 2023 11:01 CST         Part of this note may be an electronic transcription/translation of spoken language to printed text using the Dragon Dictation System.       Electronically signed by Dylon Aranda MD at 23 1111     Trevor Alonso MD at 23 0832            [facility]  [address] [place] [address]. [address]  T - [phone]  F - [phone]     Progress Note          PATIENT  DEMOGRAPHICS   PATIENT NAME: Matti Bravo                      PHYSICIAN: Denise Aguirre MD  : 1970  MRN: [id number]   LOS: 18 days    Patient Care Team:  Shonna Ng APRN as PCP - General (Family Medicine)  Subjective    SUBJECTIVE   Patient reports feeling ok. Denies SOB> Voiding ok without oneal. Good UO.         Objective    OBJECTIVE   Vital Signs  Temp:  [97.2 °F (36.2 °C)-98 °F (36.7 °C)] 97.4 °F (36.3 °C)  Heart Rate:  [] 121  Resp:  [18] 18  BP: ()/(53-95) 95/79    Flowsheet Rows    Flowsheet Row First Filed Value   Admission Height 193 cm (76\") Documented at 2022 1700   Admission Weight 127 kg (280 lb) " Documented at 12/20/2022 1700           I/O last 3 completed shifts:  In: 2384.4 [P.O.:1110; I.V.:1011.9; IV Piggyback:262.5]  Out: 3755 [Urine:3755]    PHYSICAL EXAM    Physical Exam  Vitals reviewed.   Constitutional:       Appearance: Normal appearance.   HENT:      Head: Normocephalic and atraumatic.   Cardiovascular:      Rate and Rhythm: Normal rate and regular rhythm.   Pulmonary:      Effort: Pulmonary effort is normal. No respiratory distress.      Breath sounds: Normal breath sounds.   Musculoskeletal:      Right lower leg: Edema present.      Left lower leg: Edema present.   Neurological:      Mental Status: He is alert and oriented to person, place, and time.         RESULTS   Results Review:    Results from last 7 days   Lab Units 01/09/23  0620 01/08/23 0429 01/07/23  0536   SODIUM mmol/L 129* 129* 130*   POTASSIUM mmol/L 4.0 4.3 4.6   CHLORIDE mmol/L 86* 90* 89*   CO2 mmol/L 27.0 23.0 25.0   BUN mg/dL 58* 54* 48*   CREATININE mg/dL 2.14* 1.81* 1.87*   CALCIUM mg/dL 9.7 9.2 9.5   BILIRUBIN mg/dL 5.1* 4.8* 5.8*   ALK PHOS U/L 91 89 98   ALT (SGPT) U/L 84* 96* 119*   AST (SGOT) U/L 31 35 40   GLUCOSE mg/dL 122* 144* 124*       Estimated Creatinine Clearance: 61.2 mL/min (A) (by C-G formula based on SCr of 2.14 mg/dL (H)).    Results from last 7 days   Lab Units 01/07/23  0536 01/05/23  0554 01/04/23  0623   MAGNESIUM mg/dL 2.0 2.0 2.4             Results from last 7 days   Lab Units 01/09/23  0620 01/08/23  0429 01/07/23  0536 01/06/23  0546 01/05/23  0554   WBC 10*3/mm3 5.45 4.95 4.99 4.30 4.64   HEMOGLOBIN g/dL 13.4 12.8* 13.4 13.1 13.0   PLATELETS 10*3/mm3 91* 91* 87* 83* 91*               Imaging Results (Last 24 Hours)     Procedure Component Value Units Date/Time    XR Chest 1 View [[id number]] Collected: 01/09/23 0336     Updated: 01/09/23 0424    Narrative:      EXAM: Single view chest    COMPARISON:  Chest Xray from  January 3, 2023    HISTORY: Short of breath     FINDINGS: Lungs are clear with  no lobar consolidation, failure,  large effusion or significant atelectasis.  There is extreme  cardiomegaly again seen. Pacemaker is stable.  No acute osseous  or soft tissue abnormalities.  -----------------------------------------------------    Impression:      1. Extreme cardiomegaly. Correlate for cardiomyopathy versus  pericardial effusion.  2. Clear lungs.  -----------------------------------------------------    Electronically signed by:  Augustus Morales MD  1/9/2023 4:22 AM  [facility] Workstation: [id number]           MEDICATIONS    apixaban, 5 mg, Oral, Q12H  Morphine, 4 mg, Intravenous, Once  pantoprazole, 40 mg, Oral, Q AM  sodium chloride, 10 mL, Intravenous, Q12H  sodium chloride, 10 mL, Intravenous, Q12H  sodium chloride, 10 mL, Intravenous, Q12H      bumetanide, 1 mg/hr, Last Rate: 1 mg/hr (01/09/23 0549)  DOBUTamine, 2.5 mcg/kg/min, Last Rate: 2.5 mcg/kg/min (01/09/23 0550)        Assessment & Plan   ASSESSMENT / PLAN      FRANCK (acute kidney injury) (HCC)    Severe malnutrition (HCC)    1.  Acute Kidney Failure on CKD 3: Baseline creatinine 1.4-1.7 mg/dl  - Etiology of FRANCK likely contrast induced.    - Urinalysis showed trace protein and negative blood, 3-5 RBCs, 0-2 WBCs.  Urine sodium less than 20.  Urine protein creatinine ratio 235 mg.  - Creatinine peak at 2.9. currently 2.1  - Keep fluid restriction of 1500 ml a day.   - Lasix was not helping with diuresis and due to marked anasarca  bumex drip was added. initial good response and now decrease response. keep bumex drip.at 1mg / hr and we have added aldactone and observe bp. Overall BP holding up with 2 episodes of soft BP.  - Villarreal is out as per patient request. Voiding without any difficulty. Good UO. Wt down 2 lbs. Keep same rate of bumex     2.  Shortness of breath:  - Underwent CT with contrast which was nondiagnostic     3.  Chronic systolic CHF /AICD/ non ischemic cardiomyopathy EF 15-20%     4.  Prediabetes     5.  Hypertension:  - Blood  pressure is borderline low. Continue to monitor BP     6.  KHANH:  - Supposed to be using CPAP at home     7. Acute cholecystitis:  - Has intermittent LUQ pain, and some reflux. Continue protonix.      8. New onset Afib:  - On eliquis now         Signature        Denise Aguirre. MD PGY2  [facility]  [address] [place] [address]  Office: [phone]    This document has been electronically signed by Denise Aguirre MD on January 9, 2023 09:31 CST     I have reviewed the case with the resident. I have also examined and seen  the patient.  I agree with the assessment and plan as documented in the resident's note.         This document has been electronically signed by Trevor Alonso MD on January 9, 2023 09:58 CST               Electronically signed by Trevor Alonso MD at 01/09/23 0958     Ayaan Cuba MD at 01/08/23 1015              [facility]  INPATIENT PROGRESS NOTE    Length of Stay: 17  Date of Admission: 12/20/2022  Primary Care Physician: Shonna Ng APRN    Subjective   Chief Complaint: No new complaints.    HPI: Patient is seen for follow-up today 1/8/2023.  He was transferred to the ICU on 1/3/2023 secondary to hypotension and started on dopamine and dobutamine infusions.  His blood pressure has since improved and dopamine infusion has been  discontinued. He remains on dobutamine infusion, doing better, less deconditioned, tolerating his diet and voices no new complaints.  He has good urinary output and swelling both legs persist but slowly improving.  He voices no new complaints and is maintaining O2 saturation in the upper 90s on room air.    Review of Systems   Constitutional: Positive for activity change and fatigue. Negative for appetite change, chills, diaphoresis and fever.   HENT: Negative for trouble swallowing and voice change.    Eyes: Negative for photophobia and  visual disturbance.   Respiratory: Negative for cough, choking, chest tightness, shortness of breath, wheezing and stridor.    Cardiovascular: Positive for leg swelling. Negative for chest pain and palpitations.   Gastrointestinal: Negative for abdominal distention, abdominal pain, blood in stool, constipation, diarrhea, nausea and vomiting.   Endocrine: Negative for cold intolerance, heat intolerance, polydipsia, polyphagia and polyuria.   Genitourinary: Negative for decreased urine volume, difficulty urinating, dysuria, enuresis, flank pain, frequency, hematuria and urgency.   Musculoskeletal: Negative for arthralgias, gait problem, myalgias, neck pain and neck stiffness.   Skin: Negative for pallor, rash and wound.   Neurological: Negative for dizziness, tremors, seizures, syncope, facial asymmetry, speech difficulty, weakness, light-headedness, numbness and headaches.   Hematological: Does not bruise/bleed easily.   Psychiatric/Behavioral: Negative for agitation, behavioral problems and confusion.       Objective    Temp:  [96.5 °F (35.8 °C)-98.2 °F (36.8 °C)] 97.6 °F (36.4 °C)  Heart Rate:  [106-128] 127  Resp:  [16-24] 18  BP: ()/(54-99) 118/79   AM-PAC 6 Clicks Score (PT): 18 (01/07/23 2000)    Physical Exam  Vitals and nursing note reviewed.   Constitutional:       General: He is not in acute distress.     Appearance: He is well-developed. He is obese. He is ill-appearing. He is not diaphoretic.   HENT:      Head: Normocephalic and atraumatic.   Eyes:      General: No scleral icterus.        Right eye: No discharge.         Left eye: No discharge.      Extraocular Movements: Extraocular movements intact.      Pupils: Pupils are equal, round, and reactive to light.   Neck:      Thyroid: No thyromegaly.      Vascular: No carotid bruit or JVD.   Cardiovascular:      Rate and Rhythm: Tachycardia present. Rhythm irregular.      Heart sounds: Normal heart sounds. No murmur heard.    No friction rub. No  gallop.   Pulmonary:      Effort: Pulmonary effort is normal. No respiratory distress.      Breath sounds: Normal breath sounds. No stridor. No wheezing or rales.   Chest:      Chest wall: No tenderness.   Abdominal:      General: Bowel sounds are normal. There is no distension.      Palpations: Abdomen is soft. There is no mass.      Tenderness: There is no abdominal tenderness. There is no right CVA tenderness, left CVA tenderness, guarding or rebound.      Hernia: No hernia is present.   Musculoskeletal:         General: No swelling, tenderness or deformity.      Cervical back: Normal range of motion and neck supple.      Right lower leg: Edema present.      Left lower leg: Edema present.   Skin:     General: Skin is warm and dry.      Coloration: Skin is not jaundiced or pale.      Findings: No bruising, erythema, lesion or rash.   Neurological:      General: No focal deficit present.      Mental Status: He is alert and oriented to person, place, and time.      Cranial Nerves: No cranial nerve deficit.      Sensory: No sensory deficit.      Motor: No weakness or abnormal muscle tone.      Coordination: Coordination normal.      Gait: Gait normal.      Deep Tendon Reflexes: Reflexes normal.   Psychiatric:         Mood and Affect: Mood normal.         Behavior: Behavior normal.         Thought Content: Thought content normal.         Judgment: Judgment normal.           Medication Review:    Current Facility-Administered Medications:   •  acetaminophen (TYLENOL) tablet 1,000 mg, 1,000 mg, Oral, Q6H PRN, Sudeep Wolf MD, 1,000 mg at 01/05/23 0015  •  albuterol (PROVENTIL) nebulizer solution 0.083% 2.5 mg/3mL, 2.5 mg, Nebulization, Q4H PRN, Will French MD  •  apixaban (ELIQUIS) tablet 5 mg, 5 mg, Oral, Q12H, Vania Andujar APRN, 5 mg at 01/08/23 0844  •  bumetanide (BUMEX) 10 mg in sodium chloride 0.9 % 100 mL (0.1 mg/mL) infusion, 0.5 mg/hr, Intravenous, Continuous, Trevor Alonso MD, Last  Rate: 5 mL/hr at 01/08/23 0303, 0.5 mg/hr at 01/08/23 0303  •  calcium carbonate (TUMS) chewable tablet 500 mg (200 mg elemental), 2 tablet, Oral, TID PRN, Will French MD, 2 tablet at 01/05/23 0015  •  DOBUTamine (DOBUTREX) 1 mg/mL infusion, 2.5 mcg/kg/min, Intravenous, Continuous, Vania Andujar, APRN, Last Rate: 21 mL/hr at 01/08/23 0303, 2.5 mcg/kg/min at 01/08/23 0303  •  droperidol (INAPSINE) injection 1.25 mg, 1.25 mg, Intravenous, Q6H PRN, Sudeep Wolf MD, 1.25 mg at 12/22/22 1302  •  melatonin tablet 3 mg, 3 mg, Oral, Nightly PRN, Behroozi, Saeid, MD, 3 mg at 01/05/23 2220  •  morphine injection 4 mg, 4 mg, Intravenous, Once, Will French MD  •  ondansetron (ZOFRAN) injection 4 mg, 4 mg, Intravenous, Q6H PRN, Sudeep Wolf MD, 4 mg at 01/01/23 0830  •  pantoprazole (PROTONIX) EC tablet 40 mg, 40 mg, Oral, Q AM, Ayaan Cuba MD, 40 mg at 01/08/23 0547  •  potassium chloride (MICRO-K) CR capsule 40 mEq, 40 mEq, Oral, PRN, 40 mEq at 01/04/23 1557 **OR** potassium chloride (KLOR-CON) packet 40 mEq, 40 mEq, Oral, PRN **OR** potassium chloride 10 mEq in 100 mL IVPB, 10 mEq, Intravenous, Q1H PRN, Moshe Aleman DO  •  simethicone (MYLICON) chewable tablet 80 mg, 80 mg, Oral, 4x Daily PRN, Delonte Caputo MD, 80 mg at 12/25/22 0025  •  sodium chloride 0.9 % flush 10 mL, 10 mL, Intravenous, Q12H, Will French MD, 10 mL at 01/04/23 2118  •  sodium chloride 0.9 % flush 10 mL, 10 mL, Intravenous, PRN, Will French MD, 10 mL at 12/30/22 0145  •  sodium chloride 0.9 % flush 10 mL, 10 mL, Intravenous, Q12H, Ayaan Cuba MD, 10 mL at 01/04/23 2118  •  sodium chloride 0.9 % flush 10 mL, 10 mL, Intravenous, Q12H, Ayaan Cuba MD, 10 mL at 01/04/23 2118  •  sodium chloride 0.9 % flush 10 mL, 10 mL, Intravenous, PRN, Ayaan Cuba MD  •  sodium chloride 0.9 % infusion 40 mL, 40 mL, Intravenous, PRN, Will French MD    Results Review:  I have  reviewed the labs, radiology results, and diagnostic studies.    Laboratory Data:   Results from last 7 days   Lab Units 01/08/23 0429 01/07/23  0536 01/06/23  0546   SODIUM mmol/L 129* 130* 132*   POTASSIUM mmol/L 4.3 4.6 4.3   CHLORIDE mmol/L 90* 89* 91*   CO2 mmol/L 23.0 25.0 26.0   BUN mg/dL 54* 48* 48*   CREATININE mg/dL 1.81* 1.87* 1.78*   GLUCOSE mg/dL 144* 124* 112*   CALCIUM mg/dL 9.2 9.5 9.1   BILIRUBIN mg/dL 4.8* 5.8* 6.4*   ALK PHOS U/L 89 98 103   ALT (SGPT) U/L 96* 119* 137*   AST (SGOT) U/L 35 40 49*   ANION GAP mmol/L 16.0* 16.0* 15.0     Estimated Creatinine Clearance: 73.1 mL/min (A) (by C-G formula based on SCr of 1.81 mg/dL (H)).  Results from last 7 days   Lab Units 01/07/23  0536 01/05/23  0554 01/04/23  0623   MAGNESIUM mg/dL 2.0 2.0 2.4         Results from last 7 days   Lab Units 01/08/23 0429 01/07/23  0536 01/06/23  0546 01/05/23  0554 01/04/23  0623   WBC 10*3/mm3 4.95 4.99 4.30 4.64 3.24*   HEMOGLOBIN g/dL 12.8* 13.4 13.1 13.0 13.0   HEMATOCRIT % 37.1* 43.4 39.9 39.7 39.0   PLATELETS 10*3/mm3 91* 87* 83* 91* 101*           Culture Data:   No results found for: BLOODCX  No results found for: URINECX  No results found for: RESPCX  No results found for: WOUNDCX  No results found for: STOOLCX  No components found for: BODYFLD    Radiology Data:   Imaging Results (Last 24 Hours)     ** No results found for the last 24 hours. **          I have reviewed the patient's current medications.     Assessment/Plan     Hospital Problem List:  Principal Problem:    FRANCK (acute kidney injury) (HCC)  Active Problems:    Severe malnutrition (HCC)    Acute on chronic kidney disease stage III (likely cardiorenal): Patient's baseline is reportedly between 1.4-1.7.  Creatinine is 1.81 today.     Continue Bumex infusion and continue to monitor renal function. Input by nephrologist is appreciated.    History of nonischemic cardiomyopathy/chronic systolic heart failure status post AICD placement (with  hypotension): Patient appears clinically euvolemic and chest x-ray done on 1/3/2023 did not show any evidence of pulmonary vascular congestion. Continue Bumex and dobutamine infusions,  with strict I's and O's, daily weights, salt and fluid restriction.  Input by cardiologist is appreciated.  Echocardiogram done 8/19/2022 showed an estimated ejection fraction of 15 to 20%.  Elevated LFTs are reactive, much improved and will be monitored.    Hyponatremia (likely hypervolemic): Continue diuretics, restrict free water and monitor BMP.    Chronic atrial fibrillation: Patient is in uncontrolled ventricular response.  Coreg is on hold for now secondary to borderline blood pressure.  Continue anticoagulation with Eliquis. Elective cardioversion is being contemplated with the patient by the cardiologist.  Input by cardiologist is appreciated.     Hypokalemia: Resolved.    Possible acute cholecystitis: Patient is less symptomatic and tolerating recommended diet.  HIDA scan was unremarkable.    Obstructive sleep apnea: Continue nightly CPAP.    Deconditioning: Continue PT and OT.    Continue GI and DVT prophylaxis.    Discharge Planning: In progress.    I confirmed that the patient's Advance Care Plan is present, code status is documented, or surrogate decision maker is listed in the patient's medical record.     I have utilized all available immediate resources to obtain, update, or review the patient's current medications.    35 minutes of critical care time was spent in the assessment and formulation of treatment plan for this patient exclusive of billable procedures.      Ayaan Cuba MD   01/08/23   10:16 CST          Electronically signed by Ayaan Cuba MD at 01/08/23 1018     Trevor Alonso MD at 01/08/23 1013            [facility]  [address] [place] [address]. [address]  T - [phone]  F - [phone]     Progress Note          PATIENT  DEMOGRAPHICS   PATIENT NAME: Matti Hernandez  "[place]                      PHYSICIAN: Trevor Alonso MD  : 1970  MRN: [id number]   LOS: 17 days    Patient Care Team:  Shonna Ng APRN as PCP - General (Family Medicine)  Subjective    SUBJECTIVE   UO stable 2.5L/24 hrs  Wt still at 301 lbs       Objective    OBJECTIVE   Vital Signs  Temp:  [96.5 °F (35.8 °C)-98.2 °F (36.8 °C)] 97.6 °F (36.4 °C)  Heart Rate:  [106-128] 127  Resp:  [16-24] 18  BP: ()/(54-99) 118/79    Flowsheet Rows    Flowsheet Row First Filed Value   Admission Height 193 cm (76\") Documented at 2022 1700   Admission Weight 127 kg (280 lb) Documented at 2022 1700           I/O last 3 completed shifts:  In: 2724.8 [P.O.:1810; I.V.:745.3; IV Piggyback:169.5]  Out: 2365 [Urine:2365]    PHYSICAL EXAM    Physical Exam  Vitals reviewed.   Constitutional:       General: He is not in acute distress.     Appearance: Normal appearance. He is not ill-appearing.   HENT:      Head: Normocephalic and atraumatic.   Cardiovascular:      Rate and Rhythm: Tachycardia present. Rhythm irregular.   Pulmonary:      Effort: Pulmonary effort is normal. No respiratory distress.      Breath sounds: Normal breath sounds.   Musculoskeletal:      Right lower leg: Edema present.      Left lower leg: Edema present.   Neurological:      Mental Status: He is alert.         RESULTS   Results Review:    Results from last 7 days   Lab Units 23  0429 23  0536 23  0546   SODIUM mmol/L 129* 130* 132*   POTASSIUM mmol/L 4.3 4.6 4.3   CHLORIDE mmol/L 90* 89* 91*   CO2 mmol/L 23.0 25.0 26.0   BUN mg/dL 54* 48* 48*   CREATININE mg/dL 1.81* 1.87* 1.78*   CALCIUM mg/dL 9.2 9.5 9.1   BILIRUBIN mg/dL 4.8* 5.8* 6.4*   ALK PHOS U/L 89 98 103   ALT (SGPT) U/L 96* 119* 137*   AST (SGOT) U/L 35 40 49*   GLUCOSE mg/dL 144* 124* 112*       Estimated Creatinine Clearance: 73.1 mL/min (A) (by C-G formula based on SCr of 1.81 mg/dL (H)).    Results from last 7 days   Lab Units 23  0536 23  0554 " 01/04/23  0623   MAGNESIUM mg/dL 2.0 2.0 2.4             Results from last 7 days   Lab Units 01/08/23  0429 01/07/23  0536 01/06/23  0546 01/05/23  0554 01/04/23  0623   WBC 10*3/mm3 4.95 4.99 4.30 4.64 3.24*   HEMOGLOBIN g/dL 12.8* 13.4 13.1 13.0 13.0   PLATELETS 10*3/mm3 91* 87* 83* 91* 101*               Imaging Results (Last 24 Hours)     ** No results found for the last 24 hours. **           MEDICATIONS    apixaban, 5 mg, Oral, Q12H  Morphine, 4 mg, Intravenous, Once  pantoprazole, 40 mg, Oral, Q AM  sodium chloride, 10 mL, Intravenous, Q12H  sodium chloride, 10 mL, Intravenous, Q12H  sodium chloride, 10 mL, Intravenous, Q12H      bumetanide, 0.5 mg/hr, Last Rate: 0.5 mg/hr (01/08/23 0303)  DOBUTamine, 2.5 mcg/kg/min, Last Rate: 2.5 mcg/kg/min (01/08/23 0303)        Assessment & Plan   ASSESSMENT / PLAN      FRANCK (acute kidney injury) (HCC)    Severe malnutrition (HCC)    1.  Acute Kidney Failure on CKD 3: Baseline creatinine 1.4-1.7 mg/dl  - Etiology of FRANCK likely contrast induced.    - Urinalysis showed trace protein and negative blood, 3-5 RBCs, 0-2 WBCs.  Urine sodium less than 20.  Urine protein creatinine ratio 235 mg.  - Creatinine peak at 2.9. currently 1.8  - Keep fluid restriction of 1500 ml a day.   - Lasix was not helping with diuresis and due to marked anasarca  bumex drip was added. initial good response and now decrease response. Increase bumex drip.add aldactone and observe bp. oneal is out as per patient request. Voiding without any difficulty     2.  Shortness of breath:  - Underwent CT with contrast which was nondiagnostic     3.  Chronic systolic CHF /AICD/ non ischemic cardiomyopathy EF 15-20%     4.  Prediabetes     5.  Hypertension:  - Blood pressure is borderline low. Continue to monitor BP     6.  KHANH:  - Supposed to be using CPAP at home     7. Acute cholecystitis:  - Denies marked abdominal pain and also has some nausea and vomiting x 1. Denies acid reflux. Continue protonix.     8.  "New onset Afib:  - On eliquis now              This document has been electronically signed by Trevor Alonso MD on 2023 10:13 CST                 Electronically signed by Trevor Alonso MD at 23 1059     Trevor Alonso MD at 23 1446            [facility]  [address] [place] [address]. [address]  T - [phone]  F - [phone]     Progress Note          PATIENT  DEMOGRAPHICS   PATIENT NAME: Matti Bravo                      PHYSICIAN: Trevor Alonso MD  : 1970  MRN: [id number]   LOS: 16 days    Patient Care Team:  Shonna Ng APRN as PCP - General (Family Medicine)  Subjective    SUBJECTIVE   UO lower than before. Wt has now increased some         Objective    OBJECTIVE   Vital Signs  Temp:  [96.3 °F (35.7 °C)-98.1 °F (36.7 °C)] 96.5 °F (35.8 °C)  Heart Rate:  [] 125  Resp:  [18] 18  BP: ()/() 162/74    Flowsheet Rows    Flowsheet Row First Filed Value   Admission Height 193 cm (76\") Documented at 2022 1700   Admission Weight 127 kg (280 lb) Documented at 2022 1700           I/O last 3 completed shifts:  In: 1919 [P.O.:1090; I.V.:717; IV Piggyback:112]  Out: 2155 [Urine:2155]    PHYSICAL EXAM    Physical Exam  Vitals reviewed.   Constitutional:       General: He is not in acute distress.     Appearance: Normal appearance. He is not ill-appearing.   HENT:      Head: Normocephalic and atraumatic.   Cardiovascular:      Rate and Rhythm: Tachycardia present. Rhythm irregular.   Pulmonary:      Effort: Pulmonary effort is normal. No respiratory distress.      Breath sounds: Normal breath sounds.   Musculoskeletal:      Right lower leg: Edema present.      Left lower leg: Edema present.   Neurological:      Mental Status: He is alert.         RESULTS   Results Review:    Results from last 7 days   Lab Units 23  0536 23  0546 23  0554   SODIUM mmol/L 130* 132* 132*   POTASSIUM mmol/L 4.6 4.3 4.1   CHLORIDE " mmol/L 89* 91* 92*   CO2 mmol/L 25.0 26.0 29.0   BUN mg/dL 48* 48* 51*   CREATININE mg/dL 1.87* 1.78* 1.74*   CALCIUM mg/dL 9.5 9.1 8.7   BILIRUBIN mg/dL 5.8* 6.4* 5.5*   ALK PHOS U/L 98 103 111   ALT (SGPT) U/L 119* 137* 160*   AST (SGOT) U/L 40 49* 63*   GLUCOSE mg/dL 124* 112* 140*       Estimated Creatinine Clearance: 70.1 mL/min (A) (by C-G formula based on SCr of 1.87 mg/dL (H)).    Results from last 7 days   Lab Units 01/07/23  0536 01/05/23  0554 01/04/23  0623   MAGNESIUM mg/dL 2.0 2.0 2.4             Results from last 7 days   Lab Units 01/07/23  0536 01/06/23  0546 01/05/23  0554 01/04/23  0623 01/03/23  0622   WBC 10*3/mm3 4.99 4.30 4.64 3.24* 2.59*   HEMOGLOBIN g/dL 13.4 13.1 13.0 13.0 13.9   PLATELETS 10*3/mm3 87* 83* 91* 101* 102*               Imaging Results (Last 24 Hours)     ** No results found for the last 24 hours. **           MEDICATIONS    apixaban, 5 mg, Oral, Q12H  Morphine, 4 mg, Intravenous, Once  pantoprazole, 40 mg, Oral, Q AM  sodium chloride, 10 mL, Intravenous, Q12H  sodium chloride, 10 mL, Intravenous, Q12H  sodium chloride, 10 mL, Intravenous, Q12H      bumetanide, 0.5 mg/hr, Last Rate: 0.5 mg/hr (01/07/23 0700)  DOBUTamine, 2.5 mcg/kg/min, Last Rate: 2.5 mcg/kg/min (01/07/23 1434)        Assessment & Plan   ASSESSMENT / PLAN      FRANCK (acute kidney injury) (HCC)    Severe malnutrition (HCC)    1.  Acute Kidney Failure on CKD 3: Baseline creatinine 1.4-1.7 mg/dl  - Etiology of FRANCK likely contrast induced.    - Urinalysis showed trace protein and negative blood, 3-5 RBCs, 0-2 WBCs.  Urine sodium less than 20.  Urine protein creatinine ratio 235 mg.  - Creatinine peak at 2.9. currently 1.74   - Keep fluid restriction of 1500 ml a day.   - Lasix was not helping with diuresis and due to marked anasarca  bumex drip was added. initial good response and now decrease response. Add metolazone x 1. Keep oneal while on bumex drip . Dc oneal      2.  Shortness of breath:  - Underwent CT with  contrast which was nondiagnostic     3.  Chronic systolic CHF /AICD/ non ischemic cardiomyopathy EF 15-20%     4.  Prediabetes     5.  Hypertension:  - Blood pressure is borderline low. Continue to monitor BP     6.  KHANH:  - Supposed to be using CPAP at home     7. Acute cholecystitis:  - Denies marked abdominal pain and also has some nausea and vomiting x 1. Denies acid reflux. Continue protonix.     8. New onset Afib:  - On eliquis now              This document has been electronically signed by Trevor Alonso MD on January 7, 2023 14:47 CST                 Electronically signed by Trevor Alonso MD at 01/07/23 1451       Medical Student Notes (last 24 hours)  Notes from 01/08/23 1124 through 01/09/23 1124   No notes of this type exist for this encounter.         Consult Notes (last 24 hours)  Notes from 01/08/23 1124 through 01/09/23 1124   No notes of this type exist for this encounter.

## 2023-01-09 NOTE — PLAN OF CARE
Goal Outcome Evaluation:           Progress: improving  Outcome Evaluation: VSS; pain being controlled; resting inbetween care; UOP good; all needs met at this time

## 2023-01-09 NOTE — PROGRESS NOTES
"Adult Nutrition  Assessment/PES    Patient Name:  Matti Bravo  YOB: 1970  MRN: [id number]  Admit Date:  12/20/2022    Assessment Date:  1/9/2023    Comments:  Pt continues to receive diuresis (IV BUmex) and remains on Dobutamine.  He continues to tolerate po diet with intake ~75% average with minimal GI distress.  Continues to be managed for Acute Kidney failure on CKD; Hypotension with  blood pressure overall remaining stable; Acute Cholecystitis)-tolerating po diet;   Hyponatremia with Na+ 129; Low Platelet count  (91); elevated TBili (5.1) and ALT (84).  Good UOP and wt is down 2# from yesterday.  Fluid restrictions added to diet order.  Will continue to monitor POC and po intake.           Reason for Assessment     Row Name 01/09/23 1515          Reason for Assessment    Reason For Assessment follow-up protocol                Nutrition/Diet History     Row Name 01/09/23 1515          Nutrition/Diet History    Typical Intake (Food/Fluid/EN/PN) Pt said that he feels terrible.  Said that he was SOA although his O2 sats were wnl.  He denies any significant Gi distress.  Eating \"OK\"                Labs/Tests/Procedures/Meds     Row Name 01/09/23 1516          Labs/Procedures/Meds    Lab Results Reviewed reviewed, pertinent     Lab Results Comments Na 129; Gluc 112/124/144/122; Bun 58; Creat 2.14; Platelets 91; ALT 84; Tbili 5.1;        Diagnostic Tests/Procedures    Diagnostic Test/Procedure Reviewed reviewed, pertinent     Diagnostic Test/Procedures Comments Diuresis; Dobutamine;        Medications    Pertinent Medications Reviewed reviewed, pertinent     Pertinent Medications Comments Bumex IV; Dobutamine                Physical Findings     Row Name 01/09/23 1518          Physical Findings    Overall Physical Appearance Edema bilateral lower Ext                  Nutrition Prescription Ordered     Row Name 01/09/23 1518          Nutrition Prescription PO    Current PO Diet Regular     " Fluid Consistency Thin     Supplement Milk;Ice Cream     Supplement Frequency Daily;2 times a day     Common Modifiers Cardiac;Fluid Restriction     Fluid Restriction mL per Tray 250 mL     Fluid Restriction mL per Day 1500 mL                Evaluation of Received Nutrient/Fluid Intake     Row Name 01/09/23 1520          PO Evaluation    Number of Meals 3     % PO Intake 75%                   Problem/Interventions:   Problem 1     Row Name 01/09/23 1521          Nutrition Diagnoses Problem 1    Problem 1 Altered Nutrition Related to Labs     Etiology (related to) Medical Diagnosis;Medication Interaction     Hematological Thrombocytopenia     Metabolic/ICU Hyponatremia;Elevated LFTs;Hyperbilirubinemia     Renal FRANCK     Food/Medication Interaction Diuretic     Signs/Symptoms (evidenced by) Biochemical     Specific Labs Noted BUN;Creatinine;Total bilirubin;ALT;Platelets                  Problem 3     Row Name 01/09/23 1521          Nutrition Diagnoses Problem 3    Problem 3 Inadequate Intake/Infusion     Inadequate Intake Type Oral     Macronutrient Kcal;Protein     Micronutrient Vitamin;Mineral     Etiology (related to) Factors Affecting Nutrition     Appetite Improved     Signs/Symptoms (evidenced by) PO Intake     Percent (%) intake recorded 75 %     Over number of meals 3                  Intervention Goal     Row Name 01/09/23 1522          Intervention Goal    General Meet nutritional needs for age/condition;Reduce/improve symptoms     PO Tolerate PO;Meet estimated needs;Maintain intake     Weight Appropriate weight loss                Nutrition Intervention     Row Name 01/09/23 1522          Nutrition Intervention    RD/Tech Action Follow Tx progress;Care plan reviewd;Encourage intake;Advise alternate selection;Advise available snack;Interview for preference;Menu provided;Supplement provided                  Education/Evaluation     Row Name 01/09/23 1522          Education    Education Education topics;Advised  regarding habits/behavior     Education Topics Basic nutrition;Protein;Na+;Fluid     Fluid (mL/day) 2000 mL/day     Na+ (mg/day) 1500 mg/day     Advised Regarding Habits/Behavior Increased nutrient density;Snacks;Food choices;Meal planning;Seasoning food;Use supplement        Monitor/Evaluation    Monitor Weight;Per protocol;I&O;PO intake;Supplement intake;Pertinent labs;Skin status;Symptoms     Education Follow-up Reinforce PRN                 Electronically signed by:  Maria Guadalupe Hernández RD  01/09/23 15:26 CST

## 2023-01-09 NOTE — THERAPY TREATMENT NOTE
Patient Name: Matti Bravo  : 1970    MRN: [id number]                              Today's Date: 2023     Physical Therapy Treatment Note    Admit Date: 2022    Visit Dx:     ICD-10-CM ICD-9-CM   1. FRANCK (acute kidney injury) (HCC)  N17.9 584.9   2. Shortness of breath  R06.02 786.05   3. Hypokalemia  E87.6 276.8   4. Elevated brain natriuretic peptide (BNP) level  R79.89 790.99   5. Impaired mobility and ADLs  Z74.09 V49.89    Z78.9    6. Impaired functional mobility and activity tolerance  Z74.09 V49.89   7. Impaired functional mobility, balance, gait, and endurance  Z74.09 V49.89     Patient Active Problem List   Diagnosis   • Chronic systolic heart failure (HCC)   • Essential hypertension   • Mixed hyperlipidemia   • Obstructive sleep apnea   • Morbid obesity (HCC)   • Restrictive lung disease secondary to obesity   • Multiple lung nodules on CT   • Diabetes mellitus (HCC)   • Varicose veins of both lower extremities with pain   • Venous insufficiency (chronic) (peripheral)   • Surgical aftercare, circulatory system   • Chest pain   • Acute on chronic congestive heart failure (HCC)   • Abnormal nuclear stress test   • Acute congestive heart failure (HCC)   • Obesity (BMI 30-39.9)   • Hypotension   • Acute congestive heart failure, unspecified heart failure type (HCC)   • Shortness of breath   • NICM (nonischemic cardiomyopathy) (HCC)   • Pulmonary hypertension (HCC)   • CHF exacerbation (HCC)   • Heart failure (HCC)   • FRANCK (acute kidney injury) (HCC)   • Severe malnutrition (HCC)     Past Medical History:   Diagnosis Date   • Asthma    • Chronic systolic heart failure (HCC)    • Diabetes mellitus (HCC)    • Hyperlipidemia    • Hypertension    • Obesity    • Peripheral vascular disease (HCC)    • Sleep apnea      Past Surgical History:   Procedure Laterality Date   • CARDIAC CATHETERIZATION N/A 2021   • CARDIAC DEFIBRILLATOR PLACEMENT     • VARICOSE VEIN SURGERY Right 2019       General Information     Row Name 01/09/23 0931          Physical Therapy Time and Intention    Document Type therapy note (daily note)  -     Mode of Treatment physical therapy;individual therapy  -     Row Name 01/09/23 0931          General Information    Patient Profile Reviewed yes  -     Existing Precautions/Restrictions fall;oxygen therapy device and L/min  -     Row Name 01/09/23 0931          Cognition    Orientation Status (Cognition) oriented x 4  -     Row Name 01/09/23 0931          Safety Issues, Functional Mobility    Impairments Affecting Function (Mobility) endurance/activity tolerance;shortness of breath;strength  -           User Key  (r) = Recorded By, (t) = Taken By, (c) = Cosigned By    Initials Name Provider Type     Kari Guido PTA Physical Therapist Assistant               Mobility     Row Name 01/09/23 0954          Bed Mobility    Bed Mobility scooting/bridging  -     Scooting/Bridging [place] (Bed Mobility) independent  -     Sit-Supine [place] (Bed Mobility) standby assist  -     Row Name 01/09/23 0954          Bed-Chair Transfer    Bed-Chair [place] (Transfers) standby assist  -     Row Name 01/09/23 0954          Sit-Stand Transfer    Sit-Stand [place] (Transfers) standby assist  -     Row Name 01/09/23 0954          Gait/Stairs (Locomotion)    [place] Level (Gait) standby assist  -     Assistive Device (Gait) other (see comments)  no AD  -     Distance in Feet (Gait) 4 feet  -     Comment, (Gait/Stairs) needs assistance manuevering lines and monitors  -           User Key  (r) = Recorded By, (t) = Taken By, (c) = Cosigned By    Initials Name Provider Type     Kari Guido PTA Physical Therapist Assistant               Obj/Interventions     Row Name 01/09/23 0932          Motor Skills    Therapeutic Exercise hip;knee;ankle  -     Row Name 01/09/23 0932          Knee (Therapeutic Exercise)    Knee (Therapeutic  Exercise) strengthening exercise  -     Knee Strengthening (Therapeutic Exercise) bilateral;LAQ (long arc quad);10 repetitions;5 second hold  -     Row Name 01/09/23 0932          Ankle (Therapeutic Exercise)    Ankle AROM (Therapeutic Exercise) bilateral;dorsiflexion;plantarflexion;20 repititions;sitting  -           User Key  (r) = Recorded By, (t) = Taken By, (c) = Cosigned By    Initials Name Provider Type     Kari Guido PTA Physical Therapist Assistant               Goals/Plan     Row Name 01/09/23 0954          Bed Mobility Goal 1 (PT)    Activity/Assistive Device (Bed Mobility Goal 1, PT) sit to supine;supine to sit  -     [place] Level/Cues Needed (Bed Mobility Goal 1, PT) independent  -     Time Frame (Bed Mobility Goal 1, PT) by discharge  -     Progress/Outcomes (Bed Mobility Goal 1, PT) goal partially met  -     Row Name 01/09/23 0954          Transfer Goal 1 (PT)    Activity/Assistive Device (Transfer Goal 1, PT) sit-to-stand/stand-to-sit;bed-to-chair/chair-to-bed  -     [place] Level/Cues Needed (Transfer Goal 1, PT) independent  -     Time Frame (Transfer Goal 1, PT) by discharge  -     Progress/Outcome (Transfer Goal 1, PT) goal partially met  -     Row Name 01/09/23 0954          Gait Training Goal 1 (PT)    Activity/Assistive Device (Gait Training Goal 1, PT) gait (walking locomotion)  -     [place] Level (Gait Training Goal 1, PT) independent  -     Distance (Gait Training Goal 1, PT) 80 ft trips w/ VSS and no SOA:  -     Time Frame (Gait Training Goal 1, PT) by discharge  -     Progress/Outcome (Gait Training Goal 1, PT) goal not met  -     Row Name 01/09/23 0954          Stairs Goal 1 (PT)    Activity/Assistive Device (Stairs Goal 1, PT) stairs, all skills;ascending stairs;descending stairs  -     [place] Level/Cues Needed (Stairs Goal 1, PT) modified independence  -     Number of Stairs (Stairs Goal 1, PT) 5  -MH     Time Frame  (Stairs Goal 1, PT) by discharge  -     Progress/Outcome (Stairs Goal 1, PT) goal not met  -           User Key  (r) = Recorded By, (t) = Taken By, (c) = Cosigned By    Initials Name Provider Type     Kari Guido PTA Physical Therapist Assistant               Clinical Impression     Row Name 01/09/23 0931          Pain    Pretreatment Pain Rating 0/10 - no pain  -     Posttreatment Pain Rating 0/10 - no pain  -     Row Name 01/09/23 0931          Plan of Care Review    Plan of Care Reviewed With patient  -     Progress improving  -     Outcome Evaluation vitals remain stable with treatment. patient voices his frustration with the edema in his LE's. he completes minimal LE therex this date and then requests transfer to his bed from bedside chair. he is stand by assist for sit to stand, stand by assist for minimal gait distance. and stand by assist for sit to supine in bed, able to get his LE's in the bed with his own strength. patient bridges/scoots in bed independently. self terminates treatment. MD present at end of treatment.  -     Row Name 01/09/23 0931          Therapy Assessment/Plan (PT)    Rehab Potential (PT) good, to achieve stated therapy goals  -     Criteria for Skilled Interventions Met (PT) yes  -     Row Name 01/09/23 0931          Vital Signs    Pre Systolic BP Rehab 94  -MH     Pre Treatment Diastolic BP 65  -MH     Post Systolic BP Rehab 119  -MH     Post Treatment Diastolic BP 80  -MH     Pretreatment Heart Rate (beats/min) 119  -MH     Posttreatment Heart Rate (beats/min) 118  -MH     Pre SpO2 (%) 95  -MH     O2 Delivery Pre Treatment room air  -MH     Post SpO2 (%) 99  -MH     O2 Delivery Post Treatment nasal cannula  pt puts nasal cannula on when he returns to bed  -     Pre Patient Position Sitting  -MH     Post Patient Position Supine  -     Row Name 01/09/23 0931          Positioning and Restraints    Pre-Treatment Position sitting in chair/recliner  -     Post  Treatment Position bed  -MH     In Bed notified nsg;supine;call light within reach;encouraged to call for assist;exit alarm on;patient within staff view  -           User Key  (r) = Recorded By, (t) = Taken By, (c) = Cosigned By    Initials Name Provider Type    Kari Byers PTA Physical Therapist Assistant               Outcome Measures     Row Name 01/09/23 1003          How much help from another person do you currently need...    Turning from your back to your side while in flat bed without using bedrails? 3  -MH     Moving from lying on back to sitting on the side of a flat bed without bedrails? 3  -MH     Moving to and from a bed to a chair (including a wheelchair)? 4  -MH     Standing up from a chair using your arms (e.g., wheelchair, bedside chair)? 4  -MH     Climbing 3-5 steps with a railing? 3  -MH     To walk in hospital room? 3  -MH     AM-PAC 6 Clicks Score (PT) 20  -MH     Highest level of mobility 6 --> Walked 10 steps or more  -           User Key  (r) = Recorded By, (t) = Taken By, (c) = Cosigned By    Initials Name Provider Type    Kari Byers PTA Physical Therapist Assistant                             Physical Therapy Education     Title: PT OT SLP Therapies (In Progress)     Topic: Physical Therapy (Done)     Point: Mobility training (Done)     Learning Progress Summary           Patient Acceptance, E,TB, VU,NR by LR at 12/25/2022 1312    Comment: Educated on PT POC and goals.   Mother Acceptance, E,TB, VU,NR by LR at 12/25/2022 1312    Comment: Educated on PT POC and goals.                   Point: Home exercise program (Done)     Learning Progress Summary           Patient Acceptance, E,TB, VU,NR by LR at 12/25/2022 1312    Comment: Educated on PT POC and goals.   Mother Acceptance, E,TB, VU,NR by LR at 12/25/2022 1312    Comment: Educated on PT POC and goals.                   Point: Body mechanics (Done)     Learning Progress Summary           Patient Acceptance, E,TB,  VU,NR by LR at 12/25/2022 1312    Comment: Educated on PT POC and goals.   Mother Acceptance, E,TB, VU,NR by LR at 12/25/2022 1312    Comment: Educated on PT POC and goals.                   Point: Precautions (Done)     Learning Progress Summary           Patient Acceptance, E,TB, VU,NR by LR at 12/25/2022 1312    Comment: Educated on PT POC and goals.   Mother Acceptance, E,TB, VU,NR by LR at 12/25/2022 1312    Comment: Educated on PT POC and goals.                               User Key     Initials Effective Dates Name Provider Type Discipline     06/16/21 -  Sterling Bowman Physical Therapist PT              PT Recommendation and Plan     Plan of Care Reviewed With: patient  Progress: improving  Outcome Evaluation: vitals remain stable with treatment. patient voices his frustration with the edema in his LE's. he completes minimal LE therex this date and then requests transfer to his bed from bedside chair. he is stand by assist for sit to stand, stand by assist for minimal gait distance. and stand by assist for sit to supine in bed, able to get his LE's in the bed with his own strength. patient bridges/scoots in bed independently. self terminates treatment. MD present at end of treatment.     Time Calculation:    PT Charges     Row Name 01/09/23 1004             Time Calculation    Start Time 0930  -      Stop Time 0956  -      Time Calculation (min) 26 min  -         Time Calculation- PT    Total Timed Code Minutes- PT 26 minute(s)  -MH         Timed Charges    38095 - PT Therapeutic Exercise Minutes 10  -MH      81317 - PT Therapeutic Activity Minutes 16  -MH         Total Minutes    Timed Charges Total Minutes 26  -MH       Total Minutes 26  -MH            User Key  (r) = Recorded By, (t) = Taken By, (c) = Cosigned By    Initials Name Provider Type     Kari Guido PTA Physical Therapist Assistant              Therapy Charges for Today     Code Description Service Date Service Provider Modifiers  Qty    [id number]  PT THER PROC EA 15 MIN 1/9/2023 Kari Guido PTA GP 1    20274745246 HC PT THERAPEUTIC ACT EA 15 MIN 1/9/2023 Kari Guido PTA GP 1          PT G-Codes  Outcome Measure Options: AM-PAC 6 Clicks Daily Activity (OT)  AM-PAC 6 Clicks Score (PT): 20  AM-PAC 6 Clicks Score (OT): 23  T[place]etti Total Score: 28  PT Discharge Summary  Anticipated Discharge Disposition (PT): home with assist, home with outpatient therapy services, home with home health    Kari Guido PTA  1/9/2023

## 2023-01-09 NOTE — PROGRESS NOTES
"  [facility]  [address] [place] [address]. [address]  T - [phone]  F - [phone]     Progress Note          PATIENT  DEMOGRAPHICS   PATIENT NAME: Matti Bravo                      PHYSICIAN: Denise Aguirre MD  : 1970  MRN: [id number]   LOS: 18 days    Patient Care Team:  Shonna Ng APRN as PCP - General (Family Medicine)  Subjective   SUBJECTIVE   Patient reports feeling ok. Denies SOB> Voiding ok without oneal. Good UO.         Objective   OBJECTIVE   Vital Signs  Temp:  [97.2 °F (36.2 °C)-98 °F (36.7 °C)] 97.4 °F (36.3 °C)  Heart Rate:  [] 121  Resp:  [18] 18  BP: ()/(53-95) 95/79    Flowsheet Rows    Flowsheet Row First Filed Value   Admission Height 193 cm (76\") Documented at 2022 1700   Admission Weight 127 kg (280 lb) Documented at 2022 1700           I/O last 3 completed shifts:  In: 2384.4 [P.O.:1110; I.V.:1011.9; IV Piggyback:262.5]  Out: 3755 [Urine:3755]    PHYSICAL EXAM    Physical Exam  Vitals reviewed.   Constitutional:       Appearance: Normal appearance.   HENT:      Head: Normocephalic and atraumatic.   Cardiovascular:      Rate and Rhythm: Normal rate and regular rhythm.   Pulmonary:      Effort: Pulmonary effort is normal. No respiratory distress.      Breath sounds: Normal breath sounds.   Musculoskeletal:      Right lower leg: Edema present.      Left lower leg: Edema present.   Neurological:      Mental Status: He is alert and oriented to person, place, and time.         RESULTS   Results Review:    Results from last 7 days   Lab Units 23  0620 23  0429 23  0536   SODIUM mmol/L 129* 129* 130*   POTASSIUM mmol/L 4.0 4.3 4.6   CHLORIDE mmol/L 86* 90* 89*   CO2 mmol/L 27.0 23.0 25.0   BUN mg/dL 58* 54* 48*   CREATININE mg/dL 2.14* 1.81* 1.87*   CALCIUM mg/dL 9.7 9.2 9.5   BILIRUBIN mg/dL 5.1* 4.8* 5.8*   ALK PHOS U/L 91 89 98   ALT (SGPT) U/L 84* 96* 119*   AST (SGOT) U/L 31 35 40   GLUCOSE mg/dL 122* " 144* 124*       Estimated Creatinine Clearance: 61.2 mL/min (A) (by C-G formula based on SCr of 2.14 mg/dL (H)).    Results from last 7 days   Lab Units 01/07/23  0536 01/05/23  0554 01/04/23  0623   MAGNESIUM mg/dL 2.0 2.0 2.4             Results from last 7 days   Lab Units 01/09/23  0620 01/08/23  0429 01/07/23  0536 01/06/23  0546 01/05/23  0554   WBC 10*3/mm3 5.45 4.95 4.99 4.30 4.64   HEMOGLOBIN g/dL 13.4 12.8* 13.4 13.1 13.0   PLATELETS 10*3/mm3 91* 91* 87* 83* 91*               Imaging Results (Last 24 Hours)     Procedure Component Value Units Date/Time    XR Chest 1 View [[id number]] Collected: 01/09/23 0336     Updated: 01/09/23 0424    Narrative:      EXAM: Single view chest    COMPARISON:  Chest Xray from  January 3, 2023    HISTORY: Short of breath     FINDINGS: Lungs are clear with no lobar consolidation, failure,  large effusion or significant atelectasis.  There is extreme  cardiomegaly again seen. Pacemaker is stable.  No acute osseous  or soft tissue abnormalities.  -----------------------------------------------------    Impression:      1. Extreme cardiomegaly. Correlate for cardiomyopathy versus  pericardial effusion.  2. Clear lungs.  -----------------------------------------------------    Electronically signed by:  Augustus Morales MD  1/9/2023 4:22 AM  [facility] Workstation: [id number]           MEDICATIONS    apixaban, 5 mg, Oral, Q12H  Morphine, 4 mg, Intravenous, Once  pantoprazole, 40 mg, Oral, Q AM  sodium chloride, 10 mL, Intravenous, Q12H  sodium chloride, 10 mL, Intravenous, Q12H  sodium chloride, 10 mL, Intravenous, Q12H      bumetanide, 1 mg/hr, Last Rate: 1 mg/hr (01/09/23 0549)  DOBUTamine, 2.5 mcg/kg/min, Last Rate: 2.5 mcg/kg/min (01/09/23 0550)        Assessment & Plan   ASSESSMENT / PLAN      FRANCK (acute kidney injury) (HCC)    Severe malnutrition (HCC)    1.  Acute Kidney Failure on CKD 3: Baseline creatinine 1.4-1.7 mg/dl  - Etiology of FRANCK likely contrast induced.    - Urinalysis  showed trace protein and negative blood, 3-5 RBCs, 0-2 WBCs.  Urine sodium less than 20.  Urine protein creatinine ratio 235 mg.  - Creatinine peak at 2.9. currently 2.1  - Keep fluid restriction of 1500 ml a day.   - Lasix was not helping with diuresis and due to marked anasarca  bumex drip was added. initial good response and now decrease response. keep bumex drip.at 1mg / hr and we have added aldactone and observe bp. Overall BP holding up with 2 episodes of soft BP.  - Villarreal is out as per patient request. Voiding without any difficulty. Good UO. Wt down 2 lbs. Keep same rate of bumex     2.  Shortness of breath:  - Underwent CT with contrast which was nondiagnostic     3.  Chronic systolic CHF /AICD/ non ischemic cardiomyopathy EF 15-20%     4.  Prediabetes     5.  Hypertension:  - Blood pressure is borderline low. Continue to monitor BP     6.  KHANH:  - Supposed to be using CPAP at home     7. Acute cholecystitis:  - Has intermittent LUQ pain, and some reflux. Continue protonix.      8. New onset Afib:  - On eliquis now         Signature        Denise Martinez MD PGY2  [facility]  [address] [place] [address], [place] [address]  Office: [phone]    This document has been electronically signed by Denise Aguirre MD on January 9, 2023 09:31 CST     I have reviewed the case with the resident. I have also examined and seen  the patient.  I agree with the assessment and plan as documented in the resident's note.         This document has been electronically signed by Trevor Alonso MD on January 9, 2023 09:58 CST

## 2023-01-09 NOTE — PROGRESS NOTES
[facility]  INPATIENT PROGRESS NOTE    Length of Stay: 18  Date of Admission: 12/20/2022  Primary Care Physician: Shonna Ng APRN    Subjective   Chief Complaint: No new complaints.    HPI: Patient is seen for follow-up today 1/9/2023.  He was transferred to the ICU on 1/3/2023 secondary to hypotension and started on dopamine and dobutamine infusions.  His blood pressure has since improved and dopamine infusion has been  discontinued. He is doing better, less deconditioned, tolerating his diet and voices no new complaints.  He remains on Bumex infusion, has good urinary output and swelling both legs persist but slowly improving.  He voices no new complaints, has been weaned off dobutamine infusion and is maintaining O2 saturation in the upper 90s on room air.    Review of Systems   Constitutional: Positive for activity change and fatigue. Negative for appetite change, chills, diaphoresis and fever.   HENT: Negative for trouble swallowing and voice change.    Eyes: Negative for photophobia and visual disturbance.   Respiratory: Negative for cough, choking, chest tightness, shortness of breath, wheezing and stridor.    Cardiovascular: Positive for leg swelling. Negative for chest pain and palpitations.   Gastrointestinal: Negative for abdominal distention, abdominal pain, blood in stool, constipation, diarrhea, nausea and vomiting.   Endocrine: Negative for cold intolerance, heat intolerance, polydipsia, polyphagia and polyuria.   Genitourinary: Negative for decreased urine volume, difficulty urinating, dysuria, enuresis, flank pain, frequency, hematuria and urgency.   Musculoskeletal: Negative for arthralgias, gait problem, myalgias, neck pain and neck stiffness.   Skin: Negative for pallor, rash and wound.   Neurological: Negative for dizziness, tremors, seizures, syncope, facial asymmetry, speech difficulty, weakness, light-headedness, numbness and  headaches.   Hematological: Does not bruise/bleed easily.   Psychiatric/Behavioral: Negative for agitation, behavioral problems and confusion.       Objective    Temp:  [97.2 °F (36.2 °C)-97.9 °F (36.6 °C)] 97.9 °F (36.6 °C)  Heart Rate:  [] 123  Resp:  [18] 18  BP: ()/(51-91) 113/51   AM-PAC 6 Clicks Score (PT): 20 (01/09/23 1003)    Physical Exam  Vitals and nursing note reviewed.   Constitutional:       General: He is not in acute distress.     Appearance: He is well-developed. He is obese. He is ill-appearing. He is not diaphoretic.   HENT:      Head: Normocephalic and atraumatic.   Eyes:      General: No scleral icterus.        Right eye: No discharge.         Left eye: No discharge.      Extraocular Movements: Extraocular movements intact.      Pupils: Pupils are equal, round, and reactive to light.   Neck:      Thyroid: No thyromegaly.      Vascular: No carotid bruit or JVD.   Cardiovascular:      Rate and Rhythm: Tachycardia present. Rhythm irregular.      Heart sounds: Normal heart sounds. No murmur heard.    No friction rub. No gallop.   Pulmonary:      Effort: Pulmonary effort is normal. No respiratory distress.      Breath sounds: Normal breath sounds. No stridor. No wheezing or rales.   Chest:      Chest wall: No tenderness.   Abdominal:      General: Bowel sounds are normal. There is no distension.      Palpations: Abdomen is soft. There is no mass.      Tenderness: There is no abdominal tenderness. There is no right CVA tenderness, left CVA tenderness, guarding or rebound.      Hernia: No hernia is present.   Musculoskeletal:         General: No swelling, tenderness or deformity.      Cervical back: Normal range of motion and neck supple.      Right lower leg: Edema present.      Left lower leg: Edema present.   Skin:     General: Skin is warm and dry.      Coloration: Skin is not jaundiced or pale.      Findings: No bruising, erythema, lesion or rash.   Neurological:      General: No  focal deficit present.      Mental Status: He is alert and oriented to person, place, and time.      Cranial Nerves: No cranial nerve deficit.      Sensory: No sensory deficit.      Motor: No weakness or abnormal muscle tone.      Coordination: Coordination normal.      Gait: Gait normal.      Deep Tendon Reflexes: Reflexes normal.   Psychiatric:         Mood and Affect: Mood normal.         Behavior: Behavior normal.         Thought Content: Thought content normal.         Judgment: Judgment normal.           Medication Review:    Current Facility-Administered Medications:   •  acetaminophen (TYLENOL) tablet 1,000 mg, 1,000 mg, Oral, Q6H PRN, Sudeep Wolf MD, 1,000 mg at 01/05/23 0015  •  albuterol (PROVENTIL) nebulizer solution 0.083% 2.5 mg/3mL, 2.5 mg, Nebulization, Q4H PRN, Will French MD  •  apixaban (ELIQUIS) tablet 5 mg, 5 mg, Oral, Q12H, Vania Andujar APRN, 5 mg at 01/09/23 0904  •  bumetanide (BUMEX) 10 mg in sodium chloride 0.9 % 100 mL (0.1 mg/mL) infusion, 1 mg/hr, Intravenous, Continuous, Trevor Alonso MD, Last Rate: 10 mL/hr at 01/09/23 0549, 1 mg/hr at 01/09/23 0549  •  calcium carbonate (TUMS) chewable tablet 500 mg (200 mg elemental), 2 tablet, Oral, TID PRN, Will French MD, 2 tablet at 01/05/23 0015  •  DOBUTamine (DOBUTREX) 1 mg/mL infusion, 2.5 mcg/kg/min, Intravenous, Continuous, Vania Andujar APRN, Held at 01/09/23 1012  •  droperidol (INAPSINE) injection 1.25 mg, 1.25 mg, Intravenous, Q6H PRN, Sudeep Wolf MD, 1.25 mg at 12/22/22 1302  •  melatonin tablet 3 mg, 3 mg, Oral, Nightly PRN, Behroozi, Saeid, MD, 3 mg at 01/05/23 2220  •  morphine injection 4 mg, 4 mg, Intravenous, Once, Will French MD  •  ondansetron (ZOFRAN) injection 4 mg, 4 mg, Intravenous, Q6H PRN, Sudeep Wolf MD, 4 mg at 01/01/23 0830  •  pantoprazole (PROTONIX) EC tablet 40 mg, 40 mg, Oral, Q AM, Ayaan Cuba MD, 40 mg at 01/09/23 0549  •  potassium  chloride (MICRO-K) CR capsule 40 mEq, 40 mEq, Oral, PRN, 40 mEq at 01/04/23 1557 **OR** potassium chloride (KLOR-CON) packet 40 mEq, 40 mEq, Oral, PRN **OR** potassium chloride 10 mEq in 100 mL IVPB, 10 mEq, Intravenous, Q1H PRN, Moshe Aleman DO  •  simethicone (MYLICON) chewable tablet 80 mg, 80 mg, Oral, 4x Daily PRN, Delonte Caputo MD, 80 mg at 12/25/22 0025  •  sodium chloride 0.9 % flush 10 mL, 10 mL, Intravenous, Q12H, Will French MD, 10 mL at 01/09/23 0905  •  sodium chloride 0.9 % flush 10 mL, 10 mL, Intravenous, PRN, Will French MD, 10 mL at 12/30/22 0145  •  sodium chloride 0.9 % flush 10 mL, 10 mL, Intravenous, Q12H, Ayaan Cuba MD, 10 mL at 01/09/23 0904  •  sodium chloride 0.9 % flush 10 mL, 10 mL, Intravenous, Q12H, Ayaan Cuba MD, 10 mL at 01/09/23 0904  •  sodium chloride 0.9 % flush 10 mL, 10 mL, Intravenous, PRN, Ayaan Cuba MD  •  sodium chloride 0.9 % infusion 40 mL, 40 mL, Intravenous, PRN, Will French MD    Results Review:  I have reviewed the labs, radiology results, and diagnostic studies.    Laboratory Data:   Results from last 7 days   Lab Units 01/09/23  0620 01/08/23  0429 01/07/23  0536   SODIUM mmol/L 129* 129* 130*   POTASSIUM mmol/L 4.0 4.3 4.6   CHLORIDE mmol/L 86* 90* 89*   CO2 mmol/L 27.0 23.0 25.0   BUN mg/dL 58* 54* 48*   CREATININE mg/dL 2.14* 1.81* 1.87*   GLUCOSE mg/dL 122* 144* 124*   CALCIUM mg/dL 9.7 9.2 9.5   BILIRUBIN mg/dL 5.1* 4.8* 5.8*   ALK PHOS U/L 91 89 98   ALT (SGPT) U/L 84* 96* 119*   AST (SGOT) U/L 31 35 40   ANION GAP mmol/L 16.0* 16.0* 16.0*     Estimated Creatinine Clearance: 61.2 mL/min (A) (by C-G formula based on SCr of 2.14 mg/dL (H)).  Results from last 7 days   Lab Units 01/07/23  0536 01/05/23  0554 01/04/23  0623   MAGNESIUM mg/dL 2.0 2.0 2.4         Results from last 7 days   Lab Units 01/09/23  0620 01/08/23  0429 01/07/23  0536 01/06/23  0546 01/05/23  0554   WBC 10*3/mm3 5.45 4.95 4.99 4.30  4.64   HEMOGLOBIN g/dL 13.4 12.8* 13.4 13.1 13.0   HEMATOCRIT % 39.2 37.1* 43.4 39.9 39.7   PLATELETS 10*3/mm3 91* 91* 87* 83* 91*           Culture Data:   No results found for: BLOODCX  No results found for: URINECX  No results found for: RESPCX  No results found for: WOUNDCX  No results found for: STOOLCX  No components found for: BODYFLD    Radiology Data:   Imaging Results (Last 24 Hours)     Procedure Component Value Units Date/Time    XR Chest 1 View [[id number]] Collected: 01/09/23 0336     Updated: 01/09/23 0424    Narrative:      EXAM: Single view chest    COMPARISON:  Chest Xray from  January 3, 2023    HISTORY: Short of breath     FINDINGS: Lungs are clear with no lobar consolidation, failure,  large effusion or significant atelectasis.  There is extreme  cardiomegaly again seen. Pacemaker is stable.  No acute osseous  or soft tissue abnormalities.  -----------------------------------------------------    Impression:      1. Extreme cardiomegaly. Correlate for cardiomyopathy versus  pericardial effusion.  2. Clear lungs.  -----------------------------------------------------    Electronically signed by:  Augustus Morales MD  1/9/2023 4:22 AM  CST Workstation: [id number]          I have reviewed the patient's current medications.     Assessment/Plan     Hospital Problem List:  Principal Problem:    FRANCK (acute kidney injury) (HCC)  Active Problems:    Severe malnutrition (HCC)    Acute on chronic kidney disease stage III (likely cardiorenal): Patient's baseline is reportedly between 1.4-1.7.  Creatinine is 2.14 today.     Continue Bumex infusion and continue to monitor renal function. Input by nephrologist is appreciated.    History of nonischemic cardiomyopathy/chronic systolic heart failure status post AICD placement (with hypotension): Patient appears clinically euvolemic and chest x-ray done this morning showed cardiomegaly without pulmonary vascular congestion. Continue Bumex and dobutamine infusions,   with strict I's and O's, daily weights, salt and fluid restriction.  Input by cardiologist is appreciated.  Echocardiogram done 8/19/2022 showed an estimated ejection fraction of 15 to 20%.  Elevated LFTs are reactive, much improved and will be monitored.    Hyponatremia (likely hypervolemic): Continue diuretics, restrict free water and monitor BMP.    Chronic atrial fibrillation: Patient is in uncontrolled ventricular response.  Coreg is on hold for now secondary to borderline blood pressure.  Continue anticoagulation with Eliquis. Elective cardioversion is being contemplated with the patient by the cardiologist.  Input by cardiologist is appreciated.     Hypokalemia: Resolved.    Possible acute cholecystitis: Patient is less symptomatic and tolerating recommended diet.  HIDA scan was unremarkable.    Obstructive sleep apnea: Continue nightly CPAP.    Deconditioning: Continue PT and OT.    Continue GI and DVT prophylaxis.    Discharge Planning: In progress.    I confirmed that the patient's Advance Care Plan is present, code status is documented, or surrogate decision maker is listed in the patient's medical record.     I have utilized all available immediate resources to obtain, update, or review the patient's current medications.    35 minutes of critical care time was spent in the assessment and formulation of treatment plan for this patient exclusive of billable procedures.      Ayaan Cuba MD   01/09/23   12:36 CST

## 2023-01-09 NOTE — SIGNIFICANT NOTE
01/09/23 1511   OTHER   Discipline occupational therapist   Rehab Time/Intention   Session Not Performed patient/family declined, not feeling well;other (see comments)  (Patient asleep in recliner and declining to participate in therapy)   Therapy Assessment/Plan (PT)   Criteria for Skilled Interventions Met (PT) yes

## 2023-01-10 PROBLEM — I48.19 ATRIAL FIBRILLATION, PERSISTENT: Status: ACTIVE | Noted: 2023-01-10

## 2023-01-10 LAB
ANION GAP SERPL CALCULATED.3IONS-SCNC: 19 MMOL/L (ref 5–15)
BASOPHILS # BLD AUTO: 0.03 10*3/MM3 (ref 0–0.2)
BASOPHILS NFR BLD AUTO: 0.7 % (ref 0–1.5)
BUN SERPL-MCNC: 66 MG/DL (ref 6–20)
BUN/CREAT SERPL: 31.3 (ref 7–25)
CALCIUM SPEC-SCNC: 9.7 MG/DL (ref 8.6–10.5)
CHLORIDE SERPL-SCNC: 89 MMOL/L (ref 98–107)
CO2 SERPL-SCNC: 22 MMOL/L (ref 22–29)
CREAT SERPL-MCNC: 2.11 MG/DL (ref 0.76–1.27)
DEPRECATED RDW RBC AUTO: 50.7 FL (ref 37–54)
EGFRCR SERPLBLD CKD-EPI 2021: 37.2 ML/MIN/1.73
EOSINOPHIL # BLD AUTO: 0.04 10*3/MM3 (ref 0–0.4)
EOSINOPHIL NFR BLD AUTO: 1 % (ref 0.3–6.2)
ERYTHROCYTE [DISTWIDTH] IN BLOOD BY AUTOMATED COUNT: 16.8 % (ref 12.3–15.4)
GLUCOSE SERPL-MCNC: 132 MG/DL (ref 65–99)
HCT VFR BLD AUTO: 37.7 % (ref 37.5–51)
HGB BLD-MCNC: 13.1 G/DL (ref 13–17.7)
IMM GRANULOCYTES # BLD AUTO: 0.02 10*3/MM3 (ref 0–0.05)
IMM GRANULOCYTES NFR BLD AUTO: 0.5 % (ref 0–0.5)
LYMPHOCYTES # BLD AUTO: 1.28 10*3/MM3 (ref 0.7–3.1)
LYMPHOCYTES NFR BLD AUTO: 31.6 % (ref 19.6–45.3)
MAGNESIUM SERPL-MCNC: 2 MG/DL (ref 1.6–2.6)
MCH RBC QN AUTO: 29.7 PG (ref 26.6–33)
MCHC RBC AUTO-ENTMCNC: 34.7 G/DL (ref 31.5–35.7)
MCV RBC AUTO: 85.5 FL (ref 79–97)
MONOCYTES # BLD AUTO: 0.46 10*3/MM3 (ref 0.1–0.9)
MONOCYTES NFR BLD AUTO: 11.4 % (ref 5–12)
NEUTROPHILS NFR BLD AUTO: 2.22 10*3/MM3 (ref 1.7–7)
NEUTROPHILS NFR BLD AUTO: 54.8 % (ref 42.7–76)
NRBC BLD AUTO-RTO: 0 /100 WBC (ref 0–0.2)
PLATELET # BLD AUTO: 88 10*3/MM3 (ref 140–450)
PMV BLD AUTO: 13.4 FL (ref 6–12)
POTASSIUM SERPL-SCNC: 3.6 MMOL/L (ref 3.5–5.2)
RBC # BLD AUTO: 4.41 10*6/MM3 (ref 4.14–5.8)
SODIUM SERPL-SCNC: 130 MMOL/L (ref 136–145)
WBC NRBC COR # BLD: 4.05 10*3/MM3 (ref 3.4–10.8)

## 2023-01-10 PROCEDURE — 99232 SBSQ HOSP IP/OBS MODERATE 35: CPT | Performed by: INTERNAL MEDICINE

## 2023-01-10 PROCEDURE — 25010000002 DOBUTAMINE PER 250 MG: Performed by: NURSE PRACTITIONER

## 2023-01-10 PROCEDURE — 85025 COMPLETE CBC W/AUTO DIFF WBC: CPT | Performed by: HOSPITALIST

## 2023-01-10 PROCEDURE — 80048 BASIC METABOLIC PNL TOTAL CA: CPT | Performed by: STUDENT IN AN ORGANIZED HEALTH CARE EDUCATION/TRAINING PROGRAM

## 2023-01-10 PROCEDURE — 25010000002 DOBUTAMINE PER 250 MG: Performed by: INTERNAL MEDICINE

## 2023-01-10 PROCEDURE — 83735 ASSAY OF MAGNESIUM: CPT | Performed by: INTERNAL MEDICINE

## 2023-01-10 RX ORDER — SPIRONOLACTONE 25 MG/1
25 TABLET ORAL DAILY
Status: DISCONTINUED | OUTPATIENT
Start: 2023-01-11 | End: 2023-01-14 | Stop reason: HOSPADM

## 2023-01-10 RX ADMIN — DOBUTAMINE HYDROCHLORIDE 2.5 MCG/KG/MIN: 100 INJECTION INTRAVENOUS at 00:20

## 2023-01-10 RX ADMIN — APIXABAN 5 MG: 5 TABLET, FILM COATED ORAL at 09:01

## 2023-01-10 RX ADMIN — ACETAMINOPHEN 1000 MG: 500 TABLET, FILM COATED ORAL at 01:21

## 2023-01-10 RX ADMIN — DOBUTAMINE HYDROCHLORIDE 5 MCG/KG/MIN: 100 INJECTION INTRAVENOUS at 19:26

## 2023-01-10 RX ADMIN — BUMETANIDE 1 MG/HR: 0.25 INJECTION INTRAMUSCULAR; INTRAVENOUS at 00:20

## 2023-01-10 RX ADMIN — Medication 10 ML: at 09:01

## 2023-01-10 RX ADMIN — APIXABAN 5 MG: 5 TABLET, FILM COATED ORAL at 20:58

## 2023-01-10 RX ADMIN — Medication 10 ML: at 22:23

## 2023-01-10 RX ADMIN — DOBUTAMINE HYDROCHLORIDE 5 MCG/KG/MIN: 100 INJECTION INTRAVENOUS at 11:47

## 2023-01-10 RX ADMIN — BUMETANIDE 1 MG/HR: 0.25 INJECTION INTRAMUSCULAR; INTRAVENOUS at 11:47

## 2023-01-10 RX ADMIN — PANTOPRAZOLE SODIUM 40 MG: 40 TABLET, DELAYED RELEASE ORAL at 05:13

## 2023-01-10 RX ADMIN — BUMETANIDE 1 MG/HR: 0.25 INJECTION INTRAMUSCULAR; INTRAVENOUS at 22:21

## 2023-01-10 NOTE — H&P (VIEW-ONLY)
"  [facility] Cardiology Progress Note   LOS: 19 days   Patient Care Team:  Shonna Ng APRN as PCP - General (Family Medicine)    Chief Complaint   Patient presents with   • Shortness of Breath       Subjective     Interval History:   Patient Denies: chest pain, PND, orthopnea and dizziness  Patient Complaints: edema  History taken from: patient chart RN    Patient remains in atrial fibrillation with variable rate.  Dobutamine increased to 5 mcg/kg/min due to hypotension.  Still getting Bumex 1 mg/hour infusion.  Shortness of breath improved.  He states lower leg edema improving.    Objective     Vital Sign Min/Max for last 24 hours  Temp  Min: 97.5 °F (36.4 °C)  Max: 98.2 °F (36.8 °C)   BP  Min: 87/72  Max: 142/111   Pulse  Min: 68  Max: 127   Resp  Min: 18  Max: 18   SpO2  Min: 90 %  Max: 100 %   No data recorded   Weight  Min: 135 kg (298 lb 8 oz)  Max: 135 kg (298 lb 8 oz)     Flowsheet Rows    Flowsheet Row First Filed Value   Admission Height 193 cm (76\") Documented at 12/20/2022 1700   Admission Weight 127 kg (280 lb) Documented at 12/20/2022 1700            01/08/23  0500 01/09/23  0600 01/10/23  0500   Weight: (!) 137 kg (301 lb 2.4 oz) 136 kg (299 lb) 135 kg (298 lb 8 oz)       Physical Exam:  Physical Exam  Vitals and nursing note reviewed.   Constitutional:       Appearance: He is well-groomed. He is not ill-appearing or diaphoretic.      Interventions: Nasal cannula in place.   HENT:      Head: Normocephalic and atraumatic.      Nose: Nose normal. No congestion.      Mouth/Throat:      Mouth: Mucous membranes are moist.      Pharynx: Oropharynx is clear. No oropharyngeal exudate.   Eyes:      General: Lids are normal.         Right eye: No discharge.         Left eye: No discharge.      Conjunctiva/sclera: Conjunctivae normal.   Neck:      Vascular: JVD (mild) present.      Trachea: Trachea normal.   Cardiovascular:      Rate and Rhythm: Tachycardia present. Rhythm irregular.      Heart sounds: Normal " heart sounds, S1 normal and S2 normal.     No gallop.   Pulmonary:      Effort: Pulmonary effort is normal.      Breath sounds: Normal breath sounds and air entry.   Abdominal:      General: Bowel sounds are normal. There is no distension.      Palpations: Abdomen is soft.   Musculoskeletal:      Right upper leg: Edema present.      Left upper leg: Edema present.      Right lower le+ Edema present.      Left lower le+ Edema present.      Right ankle: Swelling present.      Left ankle: Swelling present.      Right foot: Swelling present.      Left foot: Swelling present.   Skin:     General: Skin is warm and dry.      Capillary Refill: Capillary refill takes less than 2 seconds.   Neurological:      Mental Status: He is alert and oriented to person, place, and time.   Psychiatric:         Attention and Perception: Attention normal.         Mood and Affect: Mood normal.         Speech: Speech normal.          Results Reviewed by myself:     Results from last 7 days   Lab Units 23  0620 23  0429 23  0536   SODIUM mmol/L 129* 129* 130*   POTASSIUM mmol/L 4.0 4.3 4.6   CHLORIDE mmol/L 86* 90* 89*   CO2 mmol/L 27.0 23.0 25.0   BUN mg/dL 58* 54* 48*   CREATININE mg/dL 2.14* 1.81* 1.87*   CALCIUM mg/dL 9.7 9.2 9.5   BILIRUBIN mg/dL 5.1* 4.8* 5.8*   ALK PHOS U/L 91 89 98   ALT (SGPT) U/L 84* 96* 119*   AST (SGOT) U/L 31 35 40   GLUCOSE mg/dL 122* 144* 124*       Estimated Creatinine Clearance: 61.2 mL/min (A) (by C-G formula based on SCr of 2.14 mg/dL (H)).    Results from last 7 days   Lab Units 01/10/23  0657 23  0536 23  0554   MAGNESIUM mg/dL 2.0 2.0 2.0             Results from last 7 days   Lab Units 01/10/23  0616 23  0620 23  0429 23  0536 23  0546   WBC 10*3/mm3 4.05 5.45 4.95 4.99 4.30   HEMOGLOBIN g/dL 13.1 13.4 12.8* 13.4 13.1   PLATELETS 10*3/mm3 88* 91* 91* 87* 83*           Lab Results   Component Value Date    PROBNP 5,254.0 (H) 2022        I/O last 3 completed shifts:  In: 1375 [P.O.:100; I.V.:841; IV Piggyback:434]  Out: 2900 [Urine:2900]    Cardiographics:  ECG/EMG Results (last 24 hours)     Procedure Component Value Units Date/Time    SCANNED EKG [[id number]] Resulted: 12/20/22     Updated: 01/09/23 2205    SCANNED EKG [[id number]] Resulted: 12/20/22     Updated: 01/09/23 2219    SCANNED EKG [[id number]] Resulted: 12/20/22     Updated: 01/09/23 2223    SCANNED EKG [[id number]] Resulted: 12/20/22     Updated: 01/09/23 2228    SCANNED EKG [[id number]] Resulted: 12/20/22     Updated: 01/09/23 2228    SCANNED EKG [[id number]] Resulted: 12/20/22     Updated: 01/09/23 2234    SCANNED EKG [[id number]] Resulted: 12/20/22     Updated: 01/09/23 2234          Results for orders placed during the hospital encounter of 02/04/19    Adult Transthoracic Echo Limited W/ Cont if Necessary Per Protocol    Interpretation Summary  · 3D acquisition for left ventricular ejection fraction. Live 3D and full volume to assess left ventricular ejection fraction was utilized.  · Left ventricle systolic function is severely decreased. Estimated LVEF is 10%. Left ventricle cavity severely dilated with restrictive diastolic dysfunction. Findings are consistent with dilated cardiomyopathy.  · Right ventricle is mildly dilated with mildly reduced right ventricular systolic function.  · Moderate mitral valve regurgitation is present.  · Moderate tricuspid valve regurgitation is present. Pulmonary hypertension is present with a PA systolic pressure of 70 mmHg.  · There is mild pulmonic valve regurgitation present.  · There is a trivial pericardial effusion adjacent to the left ventricle      NM HIDA SCAN WITHOUT PHARMACOLOGICAL INTERVENTION    Result Date: 12/27/2022  Unremarkable hepatobiliary scan. Suboptimal ejection fraction of gallbladder at 9%, with a fatty meal challenge. Electronically signed by:  Pedro Day MD  12/27/2022 1:52 PM CST Workstation: [id number]    NM Lung  Ventilation Perfusion    Result Date: 12/23/2022  Conclusion: Low probability for pulmonary embolism. Cardiomegaly. Some elevation right hemidiaphragm. [id number] Electronically signed by:  Mukul Klein MD  12/23/2022 4:17 PM CST Workstation: [id number]    US Guided Vascular Access    Result Date: 1/3/2023  Imaging obtained during vascular access device placement under ultrasound guidance as described above Electronically signed by:  Leeroy Alex MD  1/3/2023 12:55 PM CST Workstation: [id number]    CT Abdomen Pelvis With Contrast    Result Date: 12/21/2022  Severe cardiomegaly. Mild bibasilar pulmonary edema. Hepatomegaly. Nonspecific gallbladder distention. Consider right upper quadrant ultrasound if there is pain. Electronically signed by:  Charlee Yan MD  12/21/2022 12:29 AM CST Workstation: [id number]    US Gallbladder    Result Date: 12/21/2022  1.  Abnormal appearance of the gallbladder, as above, suspicious for acute cholecystitis. If clinically indeterminate recommend nuclear medicine hepatobiliary scan. 2. Hepatomegaly. RP core team activated 9:51 AM for results notification. Electronically signed by:  Rc Wheatley MD  12/21/2022 9:52 AM CST Workstation: [id number]    XR Chest 1 View    Result Date: 1/9/2023  1. Extreme cardiomegaly. Correlate for cardiomyopathy versus pericardial effusion. 2. Clear lungs. ----------------------------------------------------- Electronically signed by:  Augustus Morales MD  1/9/2023 4:22 AM CST Workstation: [id number]    XR Chest 1 View    Result Date: 1/3/2023  CONCLUSION: Left-sided pacemaker remains in place with lead projected over the right ventricle. Stable cardiomegaly. Cannot exclude atelectasis or infiltrate retrocardiac region left lower lobe. Cannot exclude small left pleural effusion. [id number] Electronically signed by:  Mukul Klein MD  1/3/2023 4:35 PM CST Workstation: [id number]    XR Chest 1 View    Result Date: 12/20/2022  CONCLUSION: Linear atelectasis medial right lung  base. Left-sided pacemaker remains in place with lead projected over the right ventricle. Stable cardiomegaly. [id number] Electronically signed by:  Mukul Klein MD  12/20/2022 5:42 PM CST Workstation: [id number]    CT Angiogram Chest    Result Date: 12/21/2022  1.  Nondiagnostic assessment of the pulmonary arteries. Minimal enhancement of the pulmonary arteries. 2.  Dilation of the left heart chambers and of the right atrium. 3.  No acute pulmonary infiltrate. Electronically signed by:  India Mayfield MD  12/21/2022 12:39 AM CST Workstation: [id number]      Medication Review:     Current Facility-Administered Medications:   •  acetaminophen (TYLENOL) tablet 1,000 mg, 1,000 mg, Oral, Q6H PRN, Sudeep Wolf MD, 1,000 mg at 01/10/23 0121  •  albuterol (PROVENTIL) nebulizer solution 0.083% 2.5 mg/3mL, 2.5 mg, Nebulization, Q4H PRN, Will French MD  •  apixaban (ELIQUIS) tablet 5 mg, 5 mg, Oral, Q12H, Vania Andujar, APRN, 5 mg at 01/10/23 0901  •  bumetanide (BUMEX) 10 mg in sodium chloride 0.9 % 100 mL (0.1 mg/mL) infusion, 1 mg/hr, Intravenous, Continuous, Trevor Alonso MD, Last Rate: 10 mL/hr at 01/10/23 0020, 1 mg/hr at 01/10/23 0020  •  calcium carbonate (TUMS) chewable tablet 500 mg (200 mg elemental), 2 tablet, Oral, TID PRN, Will French MD, 2 tablet at 01/05/23 0015  •  DOBUTamine (DOBUTREX) 1 mg/mL infusion, 5 mcg/kg/min, Intravenous, Continuous, Dylon Aranda MD, Last Rate: 42 mL/hr at 01/10/23 0909, 5 mcg/kg/min at 01/10/23 0909  •  droperidol (INAPSINE) injection 1.25 mg, 1.25 mg, Intravenous, Q6H PRN, Sudeep Wolf MD, 1.25 mg at 12/22/22 1302  •  melatonin tablet 3 mg, 3 mg, Oral, Nightly PRN, Behroozi, Saeid, MD, 3 mg at 01/05/23 2220  •  morphine injection 4 mg, 4 mg, Intravenous, Once, Will French MD  •  ondansetron (ZOFRAN) injection 4 mg, 4 mg, Intravenous, Q6H PRN, Sudeep Wolf MD, 4 mg at 01/01/23 0830  •  pantoprazole (PROTONIX) EC tablet 40  mg, 40 mg, Oral, Q AM, Ayaan Cuba MD, 40 mg at 01/10/23 0513  •  potassium chloride (MICRO-K) CR capsule 40 mEq, 40 mEq, Oral, PRN, 40 mEq at 01/04/23 1557 **OR** potassium chloride (KLOR-CON) packet 40 mEq, 40 mEq, Oral, PRN **OR** potassium chloride 10 mEq in 100 mL IVPB, 10 mEq, Intravenous, Q1H PRN, Moshe Aleman DO  •  simethicone (MYLICON) chewable tablet 80 mg, 80 mg, Oral, 4x Daily PRN, Delonte Caputo MD, 80 mg at 12/25/22 0025  •  sodium chloride 0.9 % flush 10 mL, 10 mL, Intravenous, Q12H, Will French MD, 10 mL at 01/09/23 0905  •  sodium chloride 0.9 % flush 10 mL, 10 mL, Intravenous, PRN, Will French MD, 10 mL at 12/30/22 0145  •  sodium chloride 0.9 % flush 10 mL, 10 mL, Intravenous, Q12H, Ayaan Cuba MD, 10 mL at 01/09/23 0904  •  sodium chloride 0.9 % flush 10 mL, 10 mL, Intravenous, Q12H, Ayaan Cuba MD, 10 mL at 01/10/23 0901  •  sodium chloride 0.9 % flush 10 mL, 10 mL, Intravenous, PRN, Ayaan Cuba MD  •  sodium chloride 0.9 % infusion 40 mL, 40 mL, Intravenous, PRN, Will French MD    Patient's recent labs and results as included in the subjective data were reviewed by me. Any pertinent outside data will be included.        Assessment & Plan       FRANCK (acute kidney injury) (HCC)    Severe malnutrition (HCC)    1.  Nonischemic cardiomyopathy.  Has KHANH and noncompliant with CPAP machine at home.    NICM: EF:15-20%. NYHA Class III, Stage C Patient is currently volume overloaded.  and in a well perfused physiologic state. Hemodynamics are acceptable-atrial fibrillation with variable rate    On dobutamine infusion at 5 mcg/kg/min drip  On Bumex 1 mg/hour drip    BETA-BLOCKER: Hypotensive  ACE/ARB: CKD, hypotensive  ENTRESTO: Indicated, CKD  DIURETIC: Bumex infusion, nephrology managing, we appreciate their assistance  SGL2I: CKD  ALDOSTERONE ANTAGONIST: CKD.  IMDUR/HYDRALAZINE: Hypotension   DIGOXIN: N/A    Fluid restriction: 1500ml  Sodium  restriction:2 grams    Cardiac Rehab: Indicated  ICD: yes   ADHF: Current admission  LVAD: Not at this time, I started this discussion regarding advanced heart failure care.  If needed, he wants to go to [place] instead of  because he has family that can help.    2.  Hypotension.  Still having hypotensive episodes on dobutamine drip.  Atrial fibrillation can be causing her general blood pressure.    Heart failure medications on hold at this time except for Bumex infusion.    3. Atrial fibrillation with RVR, persistent.  Still in atrial fibrillation with varying rate 90s to 120.      Continue Eliquis    Discussed potential JESSENIA/cardioversion tomorrow with anesthesia's assistance.  We will reassess his fluid status in the morning.    N.p.o. after midnight, obtain consent for JESSENIA cardioversion  Anesthesia is aware and plan is to go at 1 PM    4. FRANCK on CKD.  Nephrology managing.    Plan for disposition: Continue current location.  We will continue to follow.      This document has been electronically signed by SHELL Garcia on January 10, 2023 10:12 CST   Electronically signed by SHELL Garcia, 01/10/23, 10:12 AM CST.    I personally saw and examined Matti Bravo after the APRN.  I personally performed a history and physical examination of the patient.  I personally reviewed independent findings and plan of care.  I discussed management with the APRN.  I agree with the APRN's documentation.    No acute overnight events.  The patient denied any shortness of breath overnight.  He has been able to lie down flat in the bed without any significant breathing difficulties.  Dobutamine gtt had to be restarted yesterday because his urine output started going down.  He denied any significant chest discomfort today.  Telemetry shows atrial fibrillation with ventricular rates in the range of 100s-110s bpm range.     Vitals:    01/10/23 1430 01/10/23 1500 01/10/23 1530 01/10/23 1600   BP: 103/74   97/73    BP Location:       Patient Position:       Pulse: 109 110 119 94   Resp:       Temp:       TempSrc:       SpO2: 98% 97% 98% 96%   Weight:       Height:         Nonischemic cardiomyopathy: He is s/p ICD placement.  Cardiomyopathy medications continue to be on hold for now.  He continues to appear fluid overloaded at this point.  Continue Bumex gtt and dobutamine gtt today.       Hypotension: Improved.  Dopamine gtt has been weaned off.  Continue dobutamine gtt.     New onset atrial fibrillation with RVR:  Continue oral Eliquis.  He is in atrial fibrillation at this point and ventricular rates are slightly on the faster side.  Serum TSH and free T4 levels were normal in December 2022.  We may need to consider cardioversion tomorrow  considering that we have not been able to wean off dobutamine gtt due to poor diuretic response and borderline low BP readings in the absence of this medication.     Transaminitis: Likely related to hypotension.  It continues to improve. Continue to hold Lipitor for now and restart when AST/ALT levels normalize.     FRANCK on CKD: Nephrology following.    Please feel free to call us any questions.    Electronically signed by Dylon Aranda MD, 01/10/23, 4:51 PM CST.

## 2023-01-10 NOTE — PLAN OF CARE
Goal Outcome Evaluation:           Progress: improving  Outcome Evaluation: VSS; pain being controlled; UOP adequate; pt tolerating all drips at this time; all needs met at this time;

## 2023-01-10 NOTE — PROGRESS NOTES
"  [facility]  [address] [place] [address]. [address]  T - [phone]  F - [phone]     Progress Note          PATIENT  DEMOGRAPHICS   PATIENT NAME: Matti Bravo                      PHYSICIAN: Trevor Alonso MD  : 1970  MRN: [id number]   LOS: 19 days    Patient Care Team:  Shonna Ng APRN as PCP - General (Family Medicine)  Subjective   SUBJECTIVE   Patient reports feeling ok. Denies SOB> Voiding ok without oneal. Good UO.         Objective   OBJECTIVE   Vital Signs  Temp:  [97.5 °F (36.4 °C)-98.2 °F (36.8 °C)] 97.5 °F (36.4 °C)  Heart Rate:  [] 108  Resp:  [18] 18  BP: ()/() 91/74    Flowsheet Rows    Flowsheet Row First Filed Value   Admission Height 193 cm (76\") Documented at 2022 1700   Admission Weight 127 kg (280 lb) Documented at 2022 1700           I/O last 3 completed shifts:  In: 1375 [P.O.:100; I.V.:841; IV Piggyback:434]  Out: 2900 [Urine:2900]    PHYSICAL EXAM    Physical Exam  Vitals reviewed.   Constitutional:       Appearance: Normal appearance.   HENT:      Head: Normocephalic and atraumatic.   Cardiovascular:      Rate and Rhythm: Normal rate and regular rhythm.   Pulmonary:      Effort: Pulmonary effort is normal. No respiratory distress.      Breath sounds: Normal breath sounds.   Musculoskeletal:      Right lower leg: Edema present.      Left lower leg: Edema present.   Neurological:      Mental Status: He is alert and oriented to person, place, and time.         RESULTS   Results Review:    Results from last 7 days   Lab Units 23  0620 23  0429 23  0536   SODIUM mmol/L 129* 129* 130*   POTASSIUM mmol/L 4.0 4.3 4.6   CHLORIDE mmol/L 86* 90* 89*   CO2 mmol/L 27.0 23.0 25.0   BUN mg/dL 58* 54* 48*   CREATININE mg/dL 2.14* 1.81* 1.87*   CALCIUM mg/dL 9.7 9.2 9.5   BILIRUBIN mg/dL 5.1* 4.8* 5.8*   ALK PHOS U/L 91 89 98   ALT (SGPT) U/L 84* 96* 119*   AST (SGOT) U/L 31 35 40   GLUCOSE mg/dL 122* 144* 124* "       Estimated Creatinine Clearance: 61.2 mL/min (A) (by C-G formula based on SCr of 2.14 mg/dL (H)).    Results from last 7 days   Lab Units 01/10/23  0657 01/07/23  0536 01/05/23  0554   MAGNESIUM mg/dL 2.0 2.0 2.0             Results from last 7 days   Lab Units 01/10/23  0616 01/09/23  0620 01/08/23  0429 01/07/23  0536 01/06/23  0546   WBC 10*3/mm3 4.05 5.45 4.95 4.99 4.30   HEMOGLOBIN g/dL 13.1 13.4 12.8* 13.4 13.1   PLATELETS 10*3/mm3 88* 91* 91* 87* 83*               Imaging Results (Last 24 Hours)     ** No results found for the last 24 hours. **           MEDICATIONS    apixaban, 5 mg, Oral, Q12H  Morphine, 4 mg, Intravenous, Once  pantoprazole, 40 mg, Oral, Q AM  sodium chloride, 10 mL, Intravenous, Q12H  sodium chloride, 10 mL, Intravenous, Q12H  sodium chloride, 10 mL, Intravenous, Q12H      bumetanide, 1 mg/hr, Last Rate: 1 mg/hr (01/10/23 0020)  DOBUTamine, 5 mcg/kg/min, Last Rate: 5 mcg/kg/min (01/10/23 0909)        Assessment & Plan   ASSESSMENT / PLAN      FRANCK (acute kidney injury) (HCC)    Severe malnutrition (HCC)    1.  Acute Kidney Failure on CKD 3: Baseline creatinine 1.4-1.7 mg/dl  - Etiology of FRANCK likely contrast induced.    - Urinalysis showed trace protein and negative blood, 3-5 RBCs, 0-2 WBCs.  Urine sodium less than 20.  Urine protein creatinine ratio 235 mg.  - Creatinine peak at 2.9. currently 2.1. new lab is pending  - Keep fluid restriction of 1500 ml a day.   - Lasix was not helping with diuresis and due to marked anasarca  bumex drip was added. initial good response and now decrease response. keep bumex drip.at 1mg / hr and we have added aldactone and his bp remained stable. wt is now 298 lbs and peak was 307 lbs    2.  Shortness of breath:  - Underwent CT with contrast which was nondiagnostic     3.  Chronic systolic CHF /AICD/ non ischemic cardiomyopathy EF 15-20%     4.  Prediabetes     5.  Hypertension:  - Blood pressure is borderline low. Continue to monitor BP     6.   KHANH:  - Supposed to be using CPAP at home     7. Acute cholecystitis:  - Has intermittent LUQ pain, and some reflux. Continue protonix.      8. New onset Afib:  - On eliquis now         Signature           This document has been electronically signed by Trevor Alonso MD on January 10, 2023 11:03 CST

## 2023-01-10 NOTE — SIGNIFICANT NOTE
01/10/23 1311   OTHER   Discipline occupational therapist   Rehab Time/Intention   Session Not Performed patient/family declined treatment   Therapy Assessment/Plan (PT)   Criteria for Skilled Interventions Met (PT) yes   Recommendation   OT - Next Appointment 01/11/23     Pt declined OT treatment. Reported he has been up a lot and does not need assistance with it.

## 2023-01-10 NOTE — PROGRESS NOTES
"  [facility] Progress Note   LOS: 19 days   Patient Care Team:  Shonna Ng APRN as PCP - General (Family Medicine)    Chief Complaint   Patient presents with   • Shortness of Breath       Subjective     Interval History:   Patient Denies: chest pain, PND, orthopnea and dizziness  Patient Complaints: edema  History taken from: patient chart RN    Patient remains in atrial fibrillation with variable rate.  Dobutamine increased to 5 mcg/kg/min due to hypotension.  Still getting Bumex 1 mg/hour infusion.  Shortness of breath improved.  He states lower leg edema improving.    Objective     Vital Sign Min/Max for last 24 hours  Temp  Min: 97.5 °F (36.4 °C)  Max: 98.2 °F (36.8 °C)   BP  Min: 87/72  Max: 142/111   Pulse  Min: 68  Max: 127   Resp  Min: 18  Max: 18   SpO2  Min: 90 %  Max: 100 %   No data recorded   Weight  Min: 135 kg (298 lb 8 oz)  Max: 135 kg (298 lb 8 oz)     Flowsheet Rows    Flowsheet Row First Filed Value   Admission Height 193 cm (76\") Documented at 12/20/2022 1700   Admission Weight 127 kg (280 lb) Documented at 12/20/2022 1700            01/08/23  0500 01/09/23  0600 01/10/23  0500   Weight: (!) 137 kg (301 lb 2.4 oz) 136 kg (299 lb) 135 kg (298 lb 8 oz)       Physical Exam:  Physical Exam  Vitals and nursing note reviewed.   Constitutional:       Appearance: He is well-groomed. He is not ill-appearing or diaphoretic.      Interventions: Nasal cannula in place.   HENT:      Head: Normocephalic and atraumatic.      Nose: Nose normal. No congestion.      Mouth/Throat:      Mouth: Mucous membranes are moist.      Pharynx: Oropharynx is clear. No oropharyngeal exudate.   Eyes:      General: Lids are normal.         Right eye: No discharge.         Left eye: No discharge.      Conjunctiva/sclera: Conjunctivae normal.   Neck:      Vascular: JVD (mild) present.      Trachea: Trachea normal.   Cardiovascular:      Rate and Rhythm: Tachycardia present. Rhythm irregular.      Heart sounds: Normal " heart sounds, S1 normal and S2 normal.     No gallop.   Pulmonary:      Effort: Pulmonary effort is normal.      Breath sounds: Normal breath sounds and air entry.   Abdominal:      General: Bowel sounds are normal. There is no distension.      Palpations: Abdomen is soft.   Musculoskeletal:      Right upper leg: Edema present.      Left upper leg: Edema present.      Right lower le+ Edema present.      Left lower le+ Edema present.      Right ankle: Swelling present.      Left ankle: Swelling present.      Right foot: Swelling present.      Left foot: Swelling present.   Skin:     General: Skin is warm and dry.      Capillary Refill: Capillary refill takes less than 2 seconds.   Neurological:      Mental Status: He is alert and oriented to person, place, and time.   Psychiatric:         Attention and Perception: Attention normal.         Mood and Affect: Mood normal.         Speech: Speech normal.          Results Reviewed by myself:     Results from last 7 days   Lab Units 23  0620 23  0429 23  0536   SODIUM mmol/L 129* 129* 130*   POTASSIUM mmol/L 4.0 4.3 4.6   CHLORIDE mmol/L 86* 90* 89*   CO2 mmol/L 27.0 23.0 25.0   BUN mg/dL 58* 54* 48*   CREATININE mg/dL 2.14* 1.81* 1.87*   CALCIUM mg/dL 9.7 9.2 9.5   BILIRUBIN mg/dL 5.1* 4.8* 5.8*   ALK PHOS U/L 91 89 98   ALT (SGPT) U/L 84* 96* 119*   AST (SGOT) U/L 31 35 40   GLUCOSE mg/dL 122* 144* 124*       Estimated Creatinine Clearance: 61.2 mL/min (A) (by C-G formula based on SCr of 2.14 mg/dL (H)).    Results from last 7 days   Lab Units 01/10/23  0657 23  0536 23  0554   MAGNESIUM mg/dL 2.0 2.0 2.0             Results from last 7 days   Lab Units 01/10/23  0616 23  0620 23  0429 23  0536 23  0546   WBC 10*3/mm3 4.05 5.45 4.95 4.99 4.30   HEMOGLOBIN g/dL 13.1 13.4 12.8* 13.4 13.1   PLATELETS 10*3/mm3 88* 91* 91* 87* 83*           Lab Results   Component Value Date    PROBNP 5,254.0 (H) 2022        I/O last 3 completed shifts:  In: 1375 [P.O.:100; I.V.:841; IV Piggyback:434]  Out: 2900 [Urine:2900]    Cardiographics:  ECG/EMG Results (last 24 hours)     Procedure Component Value Units Date/Time    SCANNED EKG [[id number]] Resulted: 12/20/22     Updated: 01/09/23 2205    SCANNED EKG [[id number]] Resulted: 12/20/22     Updated: 01/09/23 2219    SCANNED EKG [[id number]] Resulted: 12/20/22     Updated: 01/09/23 2223    SCANNED EKG [[id number]] Resulted: 12/20/22     Updated: 01/09/23 2228    SCANNED EKG [[id number]] Resulted: 12/20/22     Updated: 01/09/23 2228    SCANNED EKG [[id number]] Resulted: 12/20/22     Updated: 01/09/23 2234    SCANNED EKG [[id number]] Resulted: 12/20/22     Updated: 01/09/23 2234          Results for orders placed during the hospital encounter of 02/04/19    Adult Transthoracic Echo Limited W/ Cont if Necessary Per Protocol    Interpretation Summary  · 3D acquisition for left ventricular ejection fraction. Live 3D and full volume to assess left ventricular ejection fraction was utilized.  · Left ventricle systolic function is severely decreased. Estimated LVEF is 10%. Left ventricle cavity severely dilated with restrictive diastolic dysfunction. Findings are consistent with dilated cardiomyopathy.  · Right ventricle is mildly dilated with mildly reduced right ventricular systolic function.  · Moderate mitral valve regurgitation is present.  · Moderate tricuspid valve regurgitation is present. Pulmonary hypertension is present with a PA systolic pressure of 70 mmHg.  · There is mild pulmonic valve regurgitation present.  · There is a trivial pericardial effusion adjacent to the left ventricle      NM HIDA SCAN WITHOUT PHARMACOLOGICAL INTERVENTION    Result Date: 12/27/2022  Unremarkable hepatobiliary scan. Suboptimal ejection fraction of gallbladder at 9%, with a fatty meal challenge. Electronically signed by:  Pedro Day MD  12/27/2022 1:52 PM CST Workstation: [id number]    NM Lung  Ventilation Perfusion    Result Date: 12/23/2022  Conclusion: Low probability for pulmonary embolism. Cardiomegaly. Some elevation right hemidiaphragm. [id number] Electronically signed by:  Mukul Klein MD  12/23/2022 4:17 PM CST Workstation: [id number]    US Guided Vascular Access    Result Date: 1/3/2023  Imaging obtained during vascular access device placement under ultrasound guidance as described above Electronically signed by:  Leeroy Alex MD  1/3/2023 12:55 PM CST Workstation: [id number]    CT Abdomen Pelvis With Contrast    Result Date: 12/21/2022  Severe cardiomegaly. Mild bibasilar pulmonary edema. Hepatomegaly. Nonspecific gallbladder distention. Consider right upper quadrant ultrasound if there is pain. Electronically signed by:  Charlee Yan MD  12/21/2022 12:29 AM CST Workstation: [id number]    US Gallbladder    Result Date: 12/21/2022  1.  Abnormal appearance of the gallbladder, as above, suspicious for acute cholecystitis. If clinically indeterminate recommend nuclear medicine hepatobiliary scan. 2. Hepatomegaly. RP core team activated 9:51 AM for results notification. Electronically signed by:  Rc Wheatley MD  12/21/2022 9:52 AM CST Workstation: [id number]    XR Chest 1 View    Result Date: 1/9/2023  1. Extreme cardiomegaly. Correlate for cardiomyopathy versus pericardial effusion. 2. Clear lungs. ----------------------------------------------------- Electronically signed by:  Augustus Morales MD  1/9/2023 4:22 AM CST Workstation: [id number]    XR Chest 1 View    Result Date: 1/3/2023  CONCLUSION: Left-sided pacemaker remains in place with lead projected over the right ventricle. Stable cardiomegaly. Cannot exclude atelectasis or infiltrate retrocardiac region left lower lobe. Cannot exclude small left pleural effusion. [id number] Electronically signed by:  Mukul Klein MD  1/3/2023 4:35 PM CST Workstation: [id number]    XR Chest 1 View    Result Date: 12/20/2022  CONCLUSION: Linear atelectasis medial right lung  base. Left-sided pacemaker remains in place with lead projected over the right ventricle. Stable cardiomegaly. [id number] Electronically signed by:  Mukul Klein MD  12/20/2022 5:42 PM CST Workstation: [id number]    CT Angiogram Chest    Result Date: 12/21/2022  1.  Nondiagnostic assessment of the pulmonary arteries. Minimal enhancement of the pulmonary arteries. 2.  Dilation of the left heart chambers and of the right atrium. 3.  No acute pulmonary infiltrate. Electronically signed by:  India Mayfield MD  12/21/2022 12:39 AM CST Workstation: [id number]      Medication Review:     Current Facility-Administered Medications:   •  acetaminophen (TYLENOL) tablet 1,000 mg, 1,000 mg, Oral, Q6H PRN, Sudeep Wolf MD, 1,000 mg at 01/10/23 0121  •  albuterol (PROVENTIL) nebulizer solution 0.083% 2.5 mg/3mL, 2.5 mg, Nebulization, Q4H PRN, Will French MD  •  apixaban (ELIQUIS) tablet 5 mg, 5 mg, Oral, Q12H, Vania Andujar, APRN, 5 mg at 01/10/23 0901  •  bumetanide (BUMEX) 10 mg in sodium chloride 0.9 % 100 mL (0.1 mg/mL) infusion, 1 mg/hr, Intravenous, Continuous, Trevor Alonso MD, Last Rate: 10 mL/hr at 01/10/23 0020, 1 mg/hr at 01/10/23 0020  •  calcium carbonate (TUMS) chewable tablet 500 mg (200 mg elemental), 2 tablet, Oral, TID PRN, Will French MD, 2 tablet at 01/05/23 0015  •  DOBUTamine (DOBUTREX) 1 mg/mL infusion, 5 mcg/kg/min, Intravenous, Continuous, Dylon Aranda MD, Last Rate: 42 mL/hr at 01/10/23 0909, 5 mcg/kg/min at 01/10/23 0909  •  droperidol (INAPSINE) injection 1.25 mg, 1.25 mg, Intravenous, Q6H PRN, Sudeep Wolf MD, 1.25 mg at 12/22/22 1302  •  melatonin tablet 3 mg, 3 mg, Oral, Nightly PRN, Behroozi, Saeid, MD, 3 mg at 01/05/23 2220  •  morphine injection 4 mg, 4 mg, Intravenous, Once, Will French MD  •  ondansetron (ZOFRAN) injection 4 mg, 4 mg, Intravenous, Q6H PRN, Sudeep Wolf MD, 4 mg at 01/01/23 0830  •  pantoprazole (PROTONIX) EC tablet 40  mg, 40 mg, Oral, Q AM, Ayaan Cuba MD, 40 mg at 01/10/23 0513  •  potassium chloride (MICRO-K) CR capsule 40 mEq, 40 mEq, Oral, PRN, 40 mEq at 01/04/23 1557 **OR** potassium chloride (KLOR-CON) packet 40 mEq, 40 mEq, Oral, PRN **OR** potassium chloride 10 mEq in 100 mL IVPB, 10 mEq, Intravenous, Q1H PRN, Moshe Aleman DO  •  simethicone (MYLICON) chewable tablet 80 mg, 80 mg, Oral, 4x Daily PRN, Delonte Caputo MD, 80 mg at 12/25/22 0025  •  sodium chloride 0.9 % flush 10 mL, 10 mL, Intravenous, Q12H, Will French MD, 10 mL at 01/09/23 0905  •  sodium chloride 0.9 % flush 10 mL, 10 mL, Intravenous, PRN, Will French MD, 10 mL at 12/30/22 0145  •  sodium chloride 0.9 % flush 10 mL, 10 mL, Intravenous, Q12H, Ayaan Cuba MD, 10 mL at 01/09/23 0904  •  sodium chloride 0.9 % flush 10 mL, 10 mL, Intravenous, Q12H, Ayaan Cuba MD, 10 mL at 01/10/23 0901  •  sodium chloride 0.9 % flush 10 mL, 10 mL, Intravenous, PRN, Ayaan Cuba MD  •  sodium chloride 0.9 % infusion 40 mL, 40 mL, Intravenous, PRN, Will French MD    Patient's recent labs and results as included in the subjective data were reviewed by me. Any pertinent outside data will be included.        Assessment & Plan       FRANCK (acute kidney injury) (HCC)    Severe malnutrition (HCC)    1.  Nonischemic cardiomyopathy.  Has KHANH and noncompliant with CPAP machine at home.    NICM: EF:15-20%. NYHA Class III, Stage C Patient is currently volume overloaded.  and in a well perfused physiologic state. Hemodynamics are acceptable-atrial fibrillation with variable rate    On dobutamine infusion at 5 mcg/kg/min drip  On Bumex 1 mg/hour drip    BETA-BLOCKER: Hypotensive  ACE/ARB: CKD, hypotensive  ENTRESTO: Indicated, CKD  DIURETIC: Bumex infusion, nephrology managing, we appreciate their assistance  SGL2I: CKD  ALDOSTERONE ANTAGONIST: CKD.  IMDUR/HYDRALAZINE: Hypotension   DIGOXIN: N/A    Fluid restriction: 1500ml  Sodium  restriction:2 grams    Cardiac Rehab: Indicated  ICD: yes   ADHF: Current admission  LVAD: Not at this time, I started this discussion regarding advanced heart failure care.  If needed, he wants to go to [place] instead of  because he has family that can help.    2.  Hypotension.  Still having hypotensive episodes on dobutamine drip.  Atrial fibrillation can be causing her general blood pressure.    Heart failure medications on hold at this time except for Bumex infusion.    3. Atrial fibrillation with RVR, persistent.  Still in atrial fibrillation with varying rate 90s to 120.      Continue Eliquis    Discussed potential JESSENIA/cardioversion tomorrow with anesthesia's assistance.  We will reassess his fluid status in the morning.    N.p.o. after midnight, obtain consent for JESSENIA cardioversion  Anesthesia is aware and plan is to go at 1 PM    4. FRANCK on CKD.  Nephrology managing.    Plan for disposition: Continue current location.  We will continue to follow.      This document has been electronically signed by SHELL Garcia on January 10, 2023 10:12 CST   Electronically signed by SHELL Garcia, 01/10/23, 10:12 AM CST.    I personally saw and examined Matti Bravo after the APRN.  I personally performed a history and physical examination of the patient.  I personally reviewed independent findings and plan of care.  I discussed management with the APRN.  I agree with the APRN's documentation.    No acute overnight events.  The patient denied any shortness of breath overnight.  He has been able to lie down flat in the bed without any significant breathing difficulties.  Dobutamine gtt had to be restarted yesterday because his urine output started going down.  He denied any significant chest discomfort today.  Telemetry shows atrial fibrillation with ventricular rates in the range of 100s-110s bpm range.     Vitals:    01/10/23 1430 01/10/23 1500 01/10/23 1530 01/10/23 1600   BP: 103/74   97/73    BP Location:       Patient Position:       Pulse: 109 110 119 94   Resp:       Temp:       TempSrc:       SpO2: 98% 97% 98% 96%   Weight:       Height:         Nonischemic cardiomyopathy: He is s/p ICD placement.  Cardiomyopathy medications continue to be on hold for now.  He continues to appear fluid overloaded at this point.  Continue Bumex gtt and dobutamine gtt today.       Hypotension: Improved.  Dopamine gtt has been weaned off.  Continue dobutamine gtt.     New onset atrial fibrillation with RVR:  Continue oral Eliquis.  He is in atrial fibrillation at this point and ventricular rates are slightly on the faster side.  Serum TSH and free T4 levels were normal in December 2022.  We may need to consider cardioversion tomorrow  considering that we have not been able to wean off dobutamine gtt due to poor diuretic response and borderline low BP readings in the absence of this medication.     Transaminitis: Likely related to hypotension.  It continues to improve. Continue to hold Lipitor for now and restart when AST/ALT levels normalize.     FRANCK on CKD: Nephrology following.    Please feel free to call us any questions.    Electronically signed by Dylon Aranda MD, 01/10/23, 4:51 PM CST.

## 2023-01-10 NOTE — PLAN OF CARE
Goal Outcome Evaluation:  Plan of Care Reviewed With: patient        Progress: no change  Outcome Evaluation: Tried to  see if patient could tolerate dobutamin being turned off. Patient had zero urine output during that time dispite many trials. After dobutamin restarted, patient was able to produce urine. Patient remains in AFIB with elevated rates with activity. Patient expressed discomfort related to left flank/back. Will continue to monitor,

## 2023-01-10 NOTE — PROGRESS NOTES
SUBJECTIVE:   1/10/2023  Chief Complaint:     Subjective      Patient feels better today.  Abdominal pain has improved.  Denied nausea or vomiting.  Had a bowel movement today.  LFTs are improving.  ALT 84 bili 5.1 and LFTs are normal otherwise.  Hemoglobin is 13.1.    History:  Past Medical History:   Diagnosis Date   • Asthma    • Chronic systolic heart failure (HCC)    • Diabetes mellitus (HCC)    • Hyperlipidemia    • Hypertension    • Obesity    • Peripheral vascular disease (HCC)    • Sleep apnea      Past Surgical History:   Procedure Laterality Date   • CARDIAC CATHETERIZATION N/A 12/08/2021   • CARDIAC DEFIBRILLATOR PLACEMENT     • VARICOSE VEIN SURGERY Right 04/05/2019     Family History   Problem Relation Age of Onset   • Diabetes Mother    • Hypertension Father      Social History     Tobacco Use   • Smoking status: Former   • Smokeless tobacco: Never   Vaping Use   • Vaping Use: Never used   Substance Use Topics   • Alcohol use: No   • Drug use: No     Medications Prior to Admission   Medication Sig Dispense Refill Last Dose   • albuterol sulfate  (90 Base) MCG/ACT inhaler Inhale 2 puffs Every 4 (Four) Hours As Needed for Wheezing. 1 inhaler 3    • apixaban (ELIQUIS) 5 MG tablet tablet Take 1 tablet by mouth 2 (Two) Times a Day. 60 tablet 3    • bumetanide (BUMEX) 1 MG tablet Take 1 tablet by mouth 2 (Two) Times a Day. 60 tablet 2    • carvedilol (COREG) 12.5 MG tablet Take 1 tablet by mouth 2 (Two) Times a Day With Meals for 30 days. (Patient taking differently: Take 12.5 mg by mouth 2 (Two) Times a Day With Meals. Pt states he has some meds and is still taking this.) 60 tablet 3    • losartan (COZAAR) 25 MG tablet Take 0.5 tablets by mouth Daily for 30 days. 15 tablet 3    • lovastatin (ALTOPREV) 20 MG 24 hr tablet Take 20 mg by mouth.      • metOLazone (ZAROXOLYN) 2.5 MG tablet Take 1 tablet by mouth 3 (Three) Times a Week. 15 tablet 3    • potassium chloride 10 MEQ CR tablet Take 2  tablets by mouth Daily. 30 tablet 1    • vitamin D (ERGOCALCIFEROL) 1.25 MG (75353 UT) capsule capsule Take 50,000 Units by mouth 1 (One) Time Per Week.        Allergies:  Patient has no known allergies.     CURRENT MEDICATIONS/OBJECTIVE/VS/PE:     Current Medications:     Current Facility-Administered Medications   Medication Dose Route Frequency Provider Last Rate Last Admin   • acetaminophen (TYLENOL) tablet 1,000 mg  1,000 mg Oral Q6H PRN Sudeep Wolf MD   1,000 mg at 01/10/23 0121   • albuterol (PROVENTIL) nebulizer solution 0.083% 2.5 mg/3mL  2.5 mg Nebulization Q4H PRN Will French MD       • apixaban (ELIQUIS) tablet 5 mg  5 mg Oral Q12H Vania Andujar APRN   5 mg at 01/09/23 2025   • bumetanide (BUMEX) 10 mg in sodium chloride 0.9 % 100 mL (0.1 mg/mL) infusion  1 mg/hr Intravenous Continuous Trevor Alonso MD 10 mL/hr at 01/10/23 0020 1 mg/hr at 01/10/23 0020   • calcium carbonate (TUMS) chewable tablet 500 mg (200 mg elemental)  2 tablet Oral TID PRN Will French MD   2 tablet at 01/05/23 0015   • DOBUTamine (DOBUTREX) 1 mg/mL infusion  2.5 mcg/kg/min Intravenous Continuous Vania Andujar APRN 21 mL/hr at 01/10/23 0020 2.5 mcg/kg/min at 01/10/23 0020   • droperidol (INAPSINE) injection 1.25 mg  1.25 mg Intravenous Q6H PRN Sudeep Wolf MD   1.25 mg at 12/22/22 1302   • melatonin tablet 3 mg  3 mg Oral Nightly PRN Behroozi, Saeid, MD   3 mg at 01/05/23 2220   • morphine injection 4 mg  4 mg Intravenous Once Will French MD       • ondansetron (ZOFRAN) injection 4 mg  4 mg Intravenous Q6H PRN Sudeep Wolf MD   4 mg at 01/01/23 0830   • pantoprazole (PROTONIX) EC tablet 40 mg  40 mg Oral Q AM Echendu, Ayaan W, MD   40 mg at 01/10/23 0513   • potassium chloride (MICRO-K) CR capsule 40 mEq  40 mEq Oral PRN Moshe Aleman DO   40 mEq at 01/04/23 1557    Or   • potassium chloride (KLOR-CON) packet 40 mEq  40 mEq Oral PRN Moshe Aleman, DO         Or   • potassium chloride 10 mEq in 100 mL IVPB  10 mEq Intravenous Q1H PRN Moshe Aleman, DO       • simethicone (MYLICON) chewable tablet 80 mg  80 mg Oral 4x Daily PRN Delonte Caputo MD   80 mg at 12/25/22 0025   • sodium chloride 0.9 % flush 10 mL  10 mL Intravenous Q12H Will French MD   10 mL at 01/09/23 0905   • sodium chloride 0.9 % flush 10 mL  10 mL Intravenous PRN Will French MD   10 mL at 12/30/22 0145   • sodium chloride 0.9 % flush 10 mL  10 mL Intravenous Q12H Ayaan Cuab MD   10 mL at 01/09/23 0904   • sodium chloride 0.9 % flush 10 mL  10 mL Intravenous Q12H Ayaan Cuba MD   10 mL at 01/09/23 0904   • sodium chloride 0.9 % flush 10 mL  10 mL Intravenous PRN Ayaan Cuba MD       • sodium chloride 0.9 % infusion 40 mL  40 mL Intravenous PRN Will French MD           Objective     Review of Systems:   Review of Systems   Constitutional: Positive for fatigue. Negative for chills, fever and unexpected weight change.   HENT: Negative for congestion, ear discharge, hearing loss, nosebleeds and sore throat.    Eyes: Negative for pain, discharge and redness.   Respiratory: Negative for cough, chest tightness, shortness of breath and wheezing.    Cardiovascular: Negative for chest pain and palpitations.   Gastrointestinal: Negative for abdominal distention, abdominal pain, blood in stool, constipation, diarrhea, nausea and vomiting.   Endocrine: Negative for cold intolerance, polydipsia, polyphagia and polyuria.   Genitourinary: Negative for dysuria, flank pain, frequency, hematuria and urgency.   Musculoskeletal: Negative for arthralgias, back pain, joint swelling and myalgias.   Skin: Negative for color change, pallor and rash.   Neurological: Negative for tremors, seizures, syncope, weakness and headaches.   Hematological: Negative for adenopathy. Does not bruise/bleed easily.   Psychiatric/Behavioral: Negative for behavioral problems, confusion,  dysphoric mood, hallucinations and suicidal ideas. The patient is not nervous/anxious.        Physical Exam:   Temp:  [97.5 °F (36.4 °C)-98.2 °F (36.8 °C)] 97.5 °F (36.4 °C)  Heart Rate:  [] 68  Resp:  [18] 18  BP: ()/() 98/72     Physical Exam:  General Appearance:    Alert, cooperative, in no acute distress   Head:    Normocephalic, without obvious abnormality, atraumatic   Eyes:            Lids and lashes normal, conjunctivae and sclerae normal, no   icterus, no pallor, corneas clear, PERRLA   Ears:    Ears appear intact with no abnormalities noted   Throat:   No oral lesions, no thrush, oral mucosa moist   Neck:   No adenopathy, supple, trachea midline, no thyromegaly, no     carotid bruit, no JVD   Back:     No kyphosis present, no scoliosis present, no skin lesions,       erythema or scars, no tenderness to percussion or                   palpation,   range of motion normal   Lungs:     Clear to auscultation,respirations regular, even and                   unlabored    Heart:    Regular rhythm and normal rate, normal S1 and S2, no            murmur, no gallop, no rub, no click   Breast Exam:    Deferred   Abdomen:     Normal bowel sounds, no masses, no organomegaly, soft        nontender, nondistended, no guarding, no rebound                 tenderness   Genitalia:    Deferred   Extremities:   Moves all extremities well, no edema, no cyanosis, no              redness   Pulses:   Pulses palpable and equal bilaterally   Skin:   No bleeding, bruising or rash   Lymph nodes:   No palpable adenopathy   Neurologic:   Cranial nerves 2 - 12 grossly intact, sensation intact, DTR        present and equal bilaterally      Results Review:     Lab Results (last 24 hours)     Procedure Component Value Units Date/Time    Magnesium [[id number]] Collected: 01/10/23 0657    Specimen: Blood Updated: 01/10/23 0723    CBC & Differential [[id number]]  (Abnormal) Collected: 01/10/23 0616    Specimen: Blood Updated:  01/10/23 0642    Narrative:      The following orders were created for panel order CBC & Differential.  Procedure                               Abnormality         Status                     ---------                               -----------         ------                     CBC Auto Differential[[id number]]        Abnormal            Final result               Scan Slide[[id number]]                                                                    Please view results for these tests on the individual orders.    CBC Auto Differential [[id number]]  (Abnormal) Collected: 01/10/23 0616    Specimen: Blood Updated: 01/10/23 0642     WBC 4.05 10*3/mm3      RBC 4.41 10*6/mm3      Hemoglobin 13.1 g/dL      Hematocrit 37.7 %      MCV 85.5 fL      MCH 29.7 pg      MCHC 34.7 g/dL      RDW 16.8 %      RDW-SD 50.7 fl      MPV 13.4 fL      Platelets 88 10*3/mm3      Neutrophil % 54.8 %      Lymphocyte % 31.6 %      Monocyte % 11.4 %      Eosinophil % 1.0 %      Basophil % 0.7 %      Immature Grans % 0.5 %      Neutrophils, Absolute 2.22 10*3/mm3      Lymphocytes, Absolute 1.28 10*3/mm3      Monocytes, Absolute 0.46 10*3/mm3      Eosinophils, Absolute 0.04 10*3/mm3      Basophils, Absolute 0.03 10*3/mm3      Immature Grans, Absolute 0.02 10*3/mm3      nRBC 0.0 /100 WBC            I reviewed the patient's new clinical results.  I reviewed the patient's new imaging results and agree with the interpretation.     ASSESSMENT/PLAN:   ASSESSMENT: 1.  Left-sided abdominal pain, well controlled.  2.  Elevated liver enzymes, improving.  3.  Hiccups, resolved.  4.  Biliary dyskinesia, patient is asymptomatic currently.    PLAN: 1.  Continue diuresis and current supportive care  2.  Follow LFTs closely till normalized  3.  Continue PPI and Bentyl  The risks, benefits, and alternatives of this procedure have been discussed with the patient or the responsible party- the patient understands and agrees to proceed.         Bea Pierce,  MD  01/10/23  07:24 CST

## 2023-01-10 NOTE — PROGRESS NOTES
[facility] Medicine Services  INPATIENT PROGRESS NOTE    Length of Stay: 19  Date of Admission: 12/20/2022  Primary Care Physician: Shonna Ng APRN    Subjective   Chief Complaint: No new complaints.    HPI: Patient is seen for follow-up today 1/10/2023.  He was transferred to the ICU on 1/3/2023 secondary to hypotension and started on dopamine and dobutamine infusions.  His blood pressure has since improved and dopamine infusion has been  discontinued. He is doing better, less deconditioned, tolerating his diet and voices no new complaints.  He remains on Bumex and dobutamine infusions, has good urinary output and swelling both legs persist but slowly improving.  He voices no new complaints and is maintaining O2 saturation in the upper 90s on room air.    Review of Systems   Constitutional: Positive for activity change and fatigue. Negative for appetite change, chills, diaphoresis and fever.   HENT: Negative for trouble swallowing and voice change.    Eyes: Negative for photophobia and visual disturbance.   Respiratory: Negative for cough, choking, chest tightness, shortness of breath, wheezing and stridor.    Cardiovascular: Positive for leg swelling. Negative for chest pain and palpitations.   Gastrointestinal: Negative for abdominal distention, abdominal pain, blood in stool, constipation, diarrhea, nausea and vomiting.   Endocrine: Negative for cold intolerance, heat intolerance, polydipsia, polyphagia and polyuria.   Genitourinary: Negative for decreased urine volume, difficulty urinating, dysuria, enuresis, flank pain, frequency, hematuria and urgency.   Musculoskeletal: Negative for arthralgias, gait problem, myalgias, neck pain and neck stiffness.   Skin: Negative for pallor, rash and wound.   Neurological: Negative for dizziness, tremors, seizures, syncope, facial asymmetry, speech difficulty, weakness, light-headedness, numbness and headaches.    Hematological: Does not bruise/bleed easily.   Psychiatric/Behavioral: Negative for agitation, behavioral problems and confusion.       Objective    Temp:  [97.5 °F (36.4 °C)-98.2 °F (36.8 °C)] 97.5 °F (36.4 °C)  Heart Rate:  [] 108  Resp:  [18] 18  BP: ()/() 91/74   AM-PAC 6 Clicks Score (PT): 20 (01/09/23 1003)    Physical Exam  Vitals and nursing note reviewed.   Constitutional:       General: He is not in acute distress.     Appearance: He is well-developed. He is obese. He is ill-appearing. He is not diaphoretic.   HENT:      Head: Normocephalic and atraumatic.   Eyes:      General: No scleral icterus.        Right eye: No discharge.         Left eye: No discharge.      Extraocular Movements: Extraocular movements intact.      Pupils: Pupils are equal, round, and reactive to light.   Neck:      Thyroid: No thyromegaly.      Vascular: No carotid bruit or JVD.   Cardiovascular:      Rate and Rhythm: Tachycardia present. Rhythm irregular.      Heart sounds: Normal heart sounds. No murmur heard.    No friction rub. No gallop.   Pulmonary:      Effort: Pulmonary effort is normal. No respiratory distress.      Breath sounds: Normal breath sounds. No stridor. No wheezing or rales.   Chest:      Chest wall: No tenderness.   Abdominal:      General: Bowel sounds are normal. There is no distension.      Palpations: Abdomen is soft. There is no mass.      Tenderness: There is no abdominal tenderness. There is no right CVA tenderness, left CVA tenderness, guarding or rebound.      Hernia: No hernia is present.   Musculoskeletal:         General: No swelling, tenderness or deformity.      Cervical back: Normal range of motion and neck supple.      Right lower leg: Edema present.      Left lower leg: Edema present.   Skin:     General: Skin is warm and dry.      Coloration: Skin is not jaundiced or pale.      Findings: No bruising, erythema, lesion or rash.   Neurological:      General: No focal deficit  present.      Mental Status: He is alert and oriented to person, place, and time.      Cranial Nerves: No cranial nerve deficit.      Sensory: No sensory deficit.      Motor: No weakness or abnormal muscle tone.      Coordination: Coordination normal.      Gait: Gait normal.      Deep Tendon Reflexes: Reflexes normal.   Psychiatric:         Mood and Affect: Mood normal.         Behavior: Behavior normal.         Thought Content: Thought content normal.         Judgment: Judgment normal.           Medication Review:    Current Facility-Administered Medications:   •  acetaminophen (TYLENOL) tablet 1,000 mg, 1,000 mg, Oral, Q6H PRN, Sudeep Wolf MD, 1,000 mg at 01/10/23 0121  •  albuterol (PROVENTIL) nebulizer solution 0.083% 2.5 mg/3mL, 2.5 mg, Nebulization, Q4H PRN, Will French MD  •  apixaban (ELIQUIS) tablet 5 mg, 5 mg, Oral, Q12H, Vania Andujar, APRN, 5 mg at 01/10/23 0901  •  bumetanide (BUMEX) 10 mg in sodium chloride 0.9 % 100 mL (0.1 mg/mL) infusion, 1 mg/hr, Intravenous, Continuous, Trevor Alonso MD, Last Rate: 10 mL/hr at 01/10/23 0020, 1 mg/hr at 01/10/23 0020  •  calcium carbonate (TUMS) chewable tablet 500 mg (200 mg elemental), 2 tablet, Oral, TID PRN, Will French MD, 2 tablet at 01/05/23 0015  •  DOBUTamine (DOBUTREX) 1 mg/mL infusion, 5 mcg/kg/min, Intravenous, Continuous, Dylon Aranda MD, Last Rate: 42 mL/hr at 01/10/23 0909, 5 mcg/kg/min at 01/10/23 0909  •  droperidol (INAPSINE) injection 1.25 mg, 1.25 mg, Intravenous, Q6H PRN, Sudeep Wolf MD, 1.25 mg at 12/22/22 1302  •  melatonin tablet 3 mg, 3 mg, Oral, Nightly PRN, Behroozi, Saeid, MD, 3 mg at 01/05/23 2220  •  morphine injection 4 mg, 4 mg, Intravenous, Once, Will French MD  •  ondansetron (ZOFRAN) injection 4 mg, 4 mg, Intravenous, Q6H PRN, Sudeep Wolf MD, 4 mg at 01/01/23 0830  •  pantoprazole (PROTONIX) EC tablet 40 mg, 40 mg, Oral, Q AM, Ayaan Cuba MD, 40 mg at  01/10/23 0513  •  potassium chloride (MICRO-K) CR capsule 40 mEq, 40 mEq, Oral, PRN, 40 mEq at 01/04/23 1557 **OR** potassium chloride (KLOR-CON) packet 40 mEq, 40 mEq, Oral, PRN **OR** potassium chloride 10 mEq in 100 mL IVPB, 10 mEq, Intravenous, Q1H PRN, Moshe Aleman DO  •  simethicone (MYLICON) chewable tablet 80 mg, 80 mg, Oral, 4x Daily PRN, Delonte Caputo MD, 80 mg at 12/25/22 0025  •  sodium chloride 0.9 % flush 10 mL, 10 mL, Intravenous, Q12H, Will French MD, 10 mL at 01/09/23 0905  •  sodium chloride 0.9 % flush 10 mL, 10 mL, Intravenous, PRN, Will French MD, 10 mL at 12/30/22 0145  •  sodium chloride 0.9 % flush 10 mL, 10 mL, Intravenous, Q12H, Ayaan Cuba MD, 10 mL at 01/09/23 0904  •  sodium chloride 0.9 % flush 10 mL, 10 mL, Intravenous, Q12H, Ayaan Cuba MD, 10 mL at 01/10/23 0901  •  sodium chloride 0.9 % flush 10 mL, 10 mL, Intravenous, PRN, Ayaan Cuba MD  •  sodium chloride 0.9 % infusion 40 mL, 40 mL, Intravenous, PRN, Will French MD    Results Review:  I have reviewed the labs, radiology results, and diagnostic studies.    Laboratory Data:   Results from last 7 days   Lab Units 01/09/23  0620 01/08/23  0429 01/07/23  0536   SODIUM mmol/L 129* 129* 130*   POTASSIUM mmol/L 4.0 4.3 4.6   CHLORIDE mmol/L 86* 90* 89*   CO2 mmol/L 27.0 23.0 25.0   BUN mg/dL 58* 54* 48*   CREATININE mg/dL 2.14* 1.81* 1.87*   GLUCOSE mg/dL 122* 144* 124*   CALCIUM mg/dL 9.7 9.2 9.5   BILIRUBIN mg/dL 5.1* 4.8* 5.8*   ALK PHOS U/L 91 89 98   ALT (SGPT) U/L 84* 96* 119*   AST (SGOT) U/L 31 35 40   ANION GAP mmol/L 16.0* 16.0* 16.0*     Estimated Creatinine Clearance: 61.2 mL/min (A) (by C-G formula based on SCr of 2.14 mg/dL (H)).  Results from last 7 days   Lab Units 01/10/23  0657 01/07/23  0536 01/05/23  0554   MAGNESIUM mg/dL 2.0 2.0 2.0         Results from last 7 days   Lab Units 01/10/23  0616 01/09/23  0620 01/08/23  0429 01/07/23  0536 01/06/23  0546   WBC  10*3/mm3 4.05 5.45 4.95 4.99 4.30   HEMOGLOBIN g/dL 13.1 13.4 12.8* 13.4 13.1   HEMATOCRIT % 37.7 39.2 37.1* 43.4 39.9   PLATELETS 10*3/mm3 88* 91* 91* 87* 83*           Culture Data:   No results found for: BLOODCX  No results found for: URINECX  No results found for: RESPCX  No results found for: WOUNDCX  No results found for: STOOLCX  No components found for: BODYFLD    Radiology Data:   Imaging Results (Last 24 Hours)     ** No results found for the last 24 hours. **          I have reviewed the patient's current medications.     Assessment/Plan     Hospital Problem List:  Principal Problem:    FRANCK (acute kidney injury) (HCC)  Active Problems:    Severe malnutrition (HCC)    Acute on chronic kidney disease stage III (likely cardiorenal): Patient's baseline is reportedly between 1.4-1.7.  Creatinine is 2.14.     Continue Bumex infusion and continue to monitor renal function. Input by nephrologist is appreciated.    History of nonischemic cardiomyopathy/chronic systolic heart failure status post AICD placement (with hypotension): Patient appears clinically euvolemic and chest x-ray done 1/9/2023 showed cardiomegaly without pulmonary vascular congestion. Continue Bumex and dobutamine infusions,  with strict I's and O's, daily weights, salt and fluid restriction.  Input by cardiologist is appreciated.  Echocardiogram done 8/19/2022 showed an estimated ejection fraction of 15 to 20%.  Elevated LFTs are reactive, much improved and will be monitored.    Hyponatremia (likely hypervolemic): Continue diuretics, restrict free water and monitor BMP.    Chronic atrial fibrillation: Patient is in uncontrolled ventricular response.  Coreg is on hold for now secondary to borderline blood pressure.  Continue anticoagulation with Eliquis. Elective cardioversion is being contemplated for the patient by the cardiologist.  Input by cardiologist is appreciated.     Hypokalemia: Resolved.    Possible acute cholecystitis: Patient is  less symptomatic and tolerating recommended diet.  HIDA scan was unremarkable.    Obstructive sleep apnea: Continue nightly CPAP.    Deconditioning: Continue PT and OT.    Continue GI and DVT prophylaxis.    Discharge Planning: In progress.    I confirmed that the patient's Advance Care Plan is present, code status is documented, or surrogate decision maker is listed in the patient's medical record.     I have utilized all available immediate resources to obtain, update, or review the patient's current medications.    35 minutes of critical care time was spent in the assessment and formulation of treatment plan for this patient exclusive of billable procedures.      Ayaan Cuba MD   01/10/23   10:26 CST

## 2023-01-10 NOTE — SIGNIFICANT NOTE
"   01/10/23 1065   OTHER   Discipline physical therapy assistant   Rehab Time/Intention   Session Not Performed patient/family declined treatment  (pt has a visitor.requests clinician to come back later.when asked what a good time for pt would be pt laughs and states \"Dec 15th and if Im asleep, keep moving\")   Therapy Assessment/Plan (PT)   Criteria for Skilled Interventions Met (PT) yes       "

## 2023-01-10 NOTE — PLAN OF CARE
Problem: Adult Inpatient Plan of Care  Goal: Plan of Care Review  Outcome: Ongoing, Not Progressing  Flowsheets (Taken 1/10/2023 3554)  Progress: no change  Plan of Care Reviewed With: patient  Outcome Evaluation: vss   Goal Outcome Evaluation:  Plan of Care Reviewed With: patient        Progress: no change  Outcome Evaluation: vss

## 2023-01-10 NOTE — SIGNIFICANT NOTE
"   01/10/23 1424   OTHER   Discipline physical therapy assistant   Rehab Time/Intention   Session Not Performed patient/family declined treatment  (pt laying in bed.states that he doesn't want therapy today.is anxious about what is going to happen tomorrow. states he is \"getting his heart shocked\". his mom is present visiting with him. will check back)   Therapy Assessment/Plan (PT)   Criteria for Skilled Interventions Met (PT) yes       "

## 2023-01-11 ENCOUNTER — ANESTHESIA EVENT (OUTPATIENT)
Dept: CARDIOLOGY | Facility: HOSPITAL | Age: 53
DRG: 682 | End: 2023-01-11
Payer: MEDICAID

## 2023-01-11 ENCOUNTER — APPOINTMENT (OUTPATIENT)
Dept: CARDIOLOGY | Facility: HOSPITAL | Age: 53
DRG: 682 | End: 2023-01-11
Payer: MEDICAID

## 2023-01-11 ENCOUNTER — ANESTHESIA (OUTPATIENT)
Dept: CARDIOLOGY | Facility: HOSPITAL | Age: 53
DRG: 682 | End: 2023-01-11
Payer: MEDICAID

## 2023-01-11 LAB
ANION GAP SERPL CALCULATED.3IONS-SCNC: 15 MMOL/L (ref 5–15)
BASOPHILS # BLD AUTO: 0.01 10*3/MM3 (ref 0–0.2)
BASOPHILS NFR BLD AUTO: 0.2 % (ref 0–1.5)
BH CV ECHO MEAS - AO MAX PG: 2.19 MMHG
BH CV ECHO MEAS - AO V2 MAX: 71.6 CM/SEC
BH CV ECHO MEAS - EDV(CUBED): 808.3 ML
BH CV ECHO MEAS - ESV(CUBED): 605.2 ML
BH CV ECHO MEAS - FS: 9.2 %
BH CV ECHO MEAS - IVS/LVPW: 0.96 CM
BH CV ECHO MEAS - IVSD: 0.84 CM
BH CV ECHO MEAS - LA DIMENSION: 5.5 CM
BH CV ECHO MEAS - LV MASS(C)D: 443.9 GRAMS
BH CV ECHO MEAS - LVIDD: 9.3 CM
BH CV ECHO MEAS - LVIDS: 8.5 CM
BH CV ECHO MEAS - LVPWD: 0.88 CM
BH CV ECHO MEAS - MR MAX PG: 66.2 MMHG
BH CV ECHO MEAS - MR MAX VEL: 405.7 CM/SEC
BH CV ECHO MEAS - PA V2 MAX: 44.8 CM/SEC
BH CV ECHO MEAS - PI END-D VEL: 232.1 CM/SEC
BH CV ECHO MEAS - RAP SYSTOLE: 15 MMHG
BH CV ECHO MEAS - RVDD: 4.4 CM
BH CV ECHO MEAS - RVSP: 46.5 MMHG
BH CV ECHO MEAS - TR MAX PG: 31.5 MMHG
BH CV ECHO MEAS - TR MAX VEL: 279.5 CM/SEC
BUN SERPL-MCNC: 61 MG/DL (ref 6–20)
BUN/CREAT SERPL: 32.4 (ref 7–25)
CALCIUM SPEC-SCNC: 9.4 MG/DL (ref 8.6–10.5)
CHLORIDE SERPL-SCNC: 88 MMOL/L (ref 98–107)
CO2 SERPL-SCNC: 28 MMOL/L (ref 22–29)
CREAT SERPL-MCNC: 1.88 MG/DL (ref 0.76–1.27)
DEPRECATED RDW RBC AUTO: 51.4 FL (ref 37–54)
EGFRCR SERPLBLD CKD-EPI 2021: 42.7 ML/MIN/1.73
EOSINOPHIL # BLD AUTO: 0.05 10*3/MM3 (ref 0–0.4)
EOSINOPHIL NFR BLD AUTO: 1.2 % (ref 0.3–6.2)
ERYTHROCYTE [DISTWIDTH] IN BLOOD BY AUTOMATED COUNT: 16.8 % (ref 12.3–15.4)
GLUCOSE SERPL-MCNC: 116 MG/DL (ref 65–99)
HCT VFR BLD AUTO: 39 % (ref 37.5–51)
HGB BLD-MCNC: 12.8 G/DL (ref 13–17.7)
IMM GRANULOCYTES # BLD AUTO: 0.01 10*3/MM3 (ref 0–0.05)
IMM GRANULOCYTES NFR BLD AUTO: 0.2 % (ref 0–0.5)
LEFT ATRIUM VOLUME INDEX: 103.8 ML/M2
LV EF 2D ECHO EST: 10 %
LYMPHOCYTES # BLD AUTO: 0.99 10*3/MM3 (ref 0.7–3.1)
LYMPHOCYTES NFR BLD AUTO: 24 % (ref 19.6–45.3)
MAXIMAL PREDICTED HEART RATE: 169 BPM
MAXIMAL PREDICTED HEART RATE: 169 BPM
MCH RBC QN AUTO: 28.7 PG (ref 26.6–33)
MCHC RBC AUTO-ENTMCNC: 32.8 G/DL (ref 31.5–35.7)
MCV RBC AUTO: 87.4 FL (ref 79–97)
MONOCYTES # BLD AUTO: 0.45 10*3/MM3 (ref 0.1–0.9)
MONOCYTES NFR BLD AUTO: 10.9 % (ref 5–12)
NEUTROPHILS NFR BLD AUTO: 2.61 10*3/MM3 (ref 1.7–7)
NEUTROPHILS NFR BLD AUTO: 63.5 % (ref 42.7–76)
NRBC BLD AUTO-RTO: 0 /100 WBC (ref 0–0.2)
PLATELET # BLD AUTO: 89 10*3/MM3 (ref 140–450)
PMV BLD AUTO: 14 FL (ref 6–12)
POTASSIUM SERPL-SCNC: 2.8 MMOL/L (ref 3.5–5.2)
POTASSIUM SERPL-SCNC: 2.9 MMOL/L (ref 3.5–5.2)
QT INTERVAL: 358 MS
QT INTERVAL: 424 MS
QTC INTERVAL: 466 MS
QTC INTERVAL: 538 MS
RBC # BLD AUTO: 4.46 10*6/MM3 (ref 4.14–5.8)
SODIUM SERPL-SCNC: 131 MMOL/L (ref 136–145)
STRESS TARGET HR: 144 BPM
STRESS TARGET HR: 144 BPM
WBC NRBC COR # BLD: 4.12 10*3/MM3 (ref 3.4–10.8)

## 2023-01-11 PROCEDURE — 5A2204Z RESTORATION OF CARDIAC RHYTHM, SINGLE: ICD-10-PCS | Performed by: INTERNAL MEDICINE

## 2023-01-11 PROCEDURE — 84132 ASSAY OF SERUM POTASSIUM: CPT | Performed by: NURSE PRACTITIONER

## 2023-01-11 PROCEDURE — 93325 DOPPLER ECHO COLOR FLOW MAPG: CPT | Performed by: INTERNAL MEDICINE

## 2023-01-11 PROCEDURE — 92960 CARDIOVERSION ELECTRIC EXT: CPT | Performed by: INTERNAL MEDICINE

## 2023-01-11 PROCEDURE — 93005 ELECTROCARDIOGRAM TRACING: CPT | Performed by: INTERNAL MEDICINE

## 2023-01-11 PROCEDURE — 93325 DOPPLER ECHO COLOR FLOW MAPG: CPT

## 2023-01-11 PROCEDURE — 93308 TTE F-UP OR LMTD: CPT

## 2023-01-11 PROCEDURE — 25010000002 MIDAZOLAM PER 1 MG: Performed by: NURSE ANESTHETIST, CERTIFIED REGISTERED

## 2023-01-11 PROCEDURE — 93010 ELECTROCARDIOGRAM REPORT: CPT | Performed by: INTERNAL MEDICINE

## 2023-01-11 PROCEDURE — 25010000002 DOBUTAMINE PER 250 MG: Performed by: INTERNAL MEDICINE

## 2023-01-11 PROCEDURE — 80048 BASIC METABOLIC PNL TOTAL CA: CPT | Performed by: INTERNAL MEDICINE

## 2023-01-11 PROCEDURE — 93321 DOPPLER ECHO F-UP/LMTD STD: CPT

## 2023-01-11 PROCEDURE — 25010000002 PROPOFOL 200 MG/20ML EMULSION: Performed by: NURSE ANESTHETIST, CERTIFIED REGISTERED

## 2023-01-11 PROCEDURE — 85025 COMPLETE CBC W/AUTO DIFF WBC: CPT | Performed by: HOSPITALIST

## 2023-01-11 PROCEDURE — 93308 TTE F-UP OR LMTD: CPT | Performed by: INTERNAL MEDICINE

## 2023-01-11 PROCEDURE — 92960 CARDIOVERSION ELECTRIC EXT: CPT

## 2023-01-11 PROCEDURE — 93321 DOPPLER ECHO F-UP/LMTD STD: CPT | Performed by: INTERNAL MEDICINE

## 2023-01-11 PROCEDURE — 0 POTASSIUM CHLORIDE 10 MEQ/100ML SOLUTION: Performed by: HOSPITALIST

## 2023-01-11 PROCEDURE — 97110 THERAPEUTIC EXERCISES: CPT

## 2023-01-11 PROCEDURE — 99232 SBSQ HOSP IP/OBS MODERATE 35: CPT | Performed by: INTERNAL MEDICINE

## 2023-01-11 PROCEDURE — 97535 SELF CARE MNGMENT TRAINING: CPT

## 2023-01-11 RX ORDER — POTASSIUM CHLORIDE 750 MG/1
40 CAPSULE, EXTENDED RELEASE ORAL AS NEEDED
Status: DISCONTINUED | OUTPATIENT
Start: 2023-01-11 | End: 2023-01-14 | Stop reason: HOSPADM

## 2023-01-11 RX ORDER — PROPOFOL 10 MG/ML
INJECTION, EMULSION INTRAVENOUS AS NEEDED
Status: DISCONTINUED | OUTPATIENT
Start: 2023-01-11 | End: 2023-01-11 | Stop reason: SURG

## 2023-01-11 RX ORDER — POTASSIUM CHLORIDE 1.5 G/1.77G
40 POWDER, FOR SOLUTION ORAL AS NEEDED
Status: DISCONTINUED | OUTPATIENT
Start: 2023-01-11 | End: 2023-01-14 | Stop reason: HOSPADM

## 2023-01-11 RX ORDER — MIDAZOLAM HYDROCHLORIDE 1 MG/ML
INJECTION INTRAMUSCULAR; INTRAVENOUS AS NEEDED
Status: DISCONTINUED | OUTPATIENT
Start: 2023-01-11 | End: 2023-01-11 | Stop reason: SURG

## 2023-01-11 RX ORDER — POTASSIUM CHLORIDE 7.45 MG/ML
10 INJECTION INTRAVENOUS
Status: DISCONTINUED | OUTPATIENT
Start: 2023-01-11 | End: 2023-01-14 | Stop reason: HOSPADM

## 2023-01-11 RX ADMIN — Medication 10 ML: at 20:26

## 2023-01-11 RX ADMIN — BUMETANIDE 1 MG/HR: 0.25 INJECTION INTRAMUSCULAR; INTRAVENOUS at 08:38

## 2023-01-11 RX ADMIN — ACETAMINOPHEN 1000 MG: 500 TABLET, FILM COATED ORAL at 19:58

## 2023-01-11 RX ADMIN — BUMETANIDE 1 MG/HR: 0.25 INJECTION INTRAMUSCULAR; INTRAVENOUS at 20:25

## 2023-01-11 RX ADMIN — DOBUTAMINE HYDROCHLORIDE 5 MCG/KG/MIN: 100 INJECTION INTRAVENOUS at 08:38

## 2023-01-11 RX ADMIN — APIXABAN 5 MG: 5 TABLET, FILM COATED ORAL at 12:41

## 2023-01-11 RX ADMIN — PROPOFOL 30 MG: 10 INJECTION, EMULSION INTRAVENOUS at 13:53

## 2023-01-11 RX ADMIN — PROPOFOL 30 MG: 10 INJECTION, EMULSION INTRAVENOUS at 13:57

## 2023-01-11 RX ADMIN — POTASSIUM CHLORIDE 40 MEQ: 750 CAPSULE, EXTENDED RELEASE ORAL at 08:00

## 2023-01-11 RX ADMIN — APIXABAN 5 MG: 5 TABLET, FILM COATED ORAL at 20:25

## 2023-01-11 RX ADMIN — SODIUM CHLORIDE 100 ML: 9 INJECTION, SOLUTION INTRAVENOUS at 14:05

## 2023-01-11 RX ADMIN — POTASSIUM CHLORIDE 40 MEQ: 750 CAPSULE, EXTENDED RELEASE ORAL at 12:41

## 2023-01-11 RX ADMIN — SODIUM CHLORIDE 40 ML: 9 INJECTION, SOLUTION INTRAVENOUS at 13:40

## 2023-01-11 RX ADMIN — DOBUTAMINE HYDROCHLORIDE 5 MCG/KG/MIN: 100 INJECTION INTRAVENOUS at 02:00

## 2023-01-11 RX ADMIN — POTASSIUM CHLORIDE 10 MEQ: 7.46 INJECTION, SOLUTION INTRAVENOUS at 06:42

## 2023-01-11 RX ADMIN — POTASSIUM CHLORIDE 40 MEQ: 750 CAPSULE, EXTENDED RELEASE ORAL at 19:21

## 2023-01-11 RX ADMIN — MIDAZOLAM HYDROCHLORIDE 0.5 MG: 1 INJECTION, SOLUTION INTRAMUSCULAR; INTRAVENOUS at 13:50

## 2023-01-11 RX ADMIN — MIDAZOLAM HYDROCHLORIDE 0.5 MG: 1 INJECTION, SOLUTION INTRAMUSCULAR; INTRAVENOUS at 13:57

## 2023-01-11 NOTE — ANESTHESIA POSTPROCEDURE EVALUATION
Patient: Matti Hernandez Bravo    Procedure Summary     Date: 01/11/23 Room / Location: [facility] CATH LAB    Anesthesia Start: 1340 Anesthesia Stop: 1417    Procedure: ADULT TRANSESOPHAGEAL ECHO (JESSENIA) W/ CONT IF NECESSARY PER PROTOCOL Diagnosis:       (Arrhythmia)      (AFib)    Scheduled Providers: Dylon Aranda MD Provider: Tatyana Saldana CRNA    Anesthesia Type: general, MAC ASA Status: 4          Anesthesia Type: general, MAC    Vitals  No vitals data found for the desired time range.          Post Anesthesia Care and Evaluation    Patient location during evaluation: bedside  Patient participation: waiting for patient participation  Level of consciousness: sleepy but conscious  Pain score: 0  Pain management: adequate    Airway patency: patent  Anesthetic complications: No anesthetic complications  PONV Status: none  Cardiovascular status: acceptable  Respiratory status: acceptable  Hydration status: acceptable    Comments: HR 94  /82  RR 21  O2  100

## 2023-01-11 NOTE — PLAN OF CARE
Goal Outcome Evaluation:   Plan of care reviewed with the Patient, V/S closely monitored remains on diuretic still on Bumex drip, also on dobutamine drip. NPO midnight for JESSENIA/Cardioversion today. Urine output 3,350ml overnight, potassium level of 2.8 this morning started replacing potassium as orderred, needs assisted and monited continue with POC.

## 2023-01-11 NOTE — PROGRESS NOTES
"  [facility]  [address] [place] [address]. [address]  T - [phone]  F - [phone]     Progress Note          PATIENT  DEMOGRAPHICS   PATIENT NAME: Matti Bravo                      PHYSICIAN: Trevor Alonso MD  : 1970  MRN: [id number]   LOS: 20 days    Patient Care Team:  Shonna Ng APRN as PCP - General (Family Medicine)  Subjective   SUBJECTIVE   Patient reports feeling ok. Denies SOB> Voiding ok without oneal. Good UO and has nearly 5L UO         Objective   OBJECTIVE   Vital Signs  Temp:  [96.6 °F (35.9 °C)-98 °F (36.7 °C)] 98 °F (36.7 °C)  Heart Rate:  [] 104  Resp:  [16-20] 16  BP: ()/(55-89) 114/79    Flowsheet Rows    Flowsheet Row First Filed Value   Admission Height 193 cm (76\") Documented at 2022 1700   Admission Weight 127 kg (280 lb) Documented at 2022 1700           I/O last 3 completed shifts:  In: 2636 [P.O.:790; I.V.:1499; IV Piggyback:347]  Out: 4950 [Urine:4950]    PHYSICAL EXAM    Physical Exam  Vitals reviewed.   Constitutional:       Appearance: Normal appearance.   HENT:      Head: Normocephalic and atraumatic.   Cardiovascular:      Rate and Rhythm: Normal rate and regular rhythm.   Pulmonary:      Effort: Pulmonary effort is normal. No respiratory distress.      Breath sounds: Normal breath sounds.   Musculoskeletal:      Right lower leg: Edema present.      Left lower leg: Edema present.   Neurological:      Mental Status: He is alert and oriented to person, place, and time.         RESULTS   Results Review:    Results from last 7 days   Lab Units 23  0432 01/10/23  0657 23  0620 23  0429 23  0536   SODIUM mmol/L 131* 130* 129* 129* 130*   POTASSIUM mmol/L 2.8* 3.6 4.0 4.3 4.6   CHLORIDE mmol/L 88* 89* 86* 90* 89*   CO2 mmol/L 28.0 22.0 27.0 23.0 25.0   BUN mg/dL 61* 66* 58* 54* 48*   CREATININE mg/dL 1.88* 2.11* 2.14* 1.81* 1.87*   CALCIUM mg/dL 9.4 9.7 9.7 9.2 9.5   BILIRUBIN mg/dL  --   --  " 5.1* 4.8* 5.8*   ALK PHOS U/L  --   --  91 89 98   ALT (SGPT) U/L  --   --  84* 96* 119*   AST (SGOT) U/L  --   --  31 35 40   GLUCOSE mg/dL 116* 132* 122* 144* 124*       Estimated Creatinine Clearance: 69.7 mL/min (A) (by C-G formula based on SCr of 1.88 mg/dL (H)).    Results from last 7 days   Lab Units 01/10/23  0657 01/07/23  0536 01/05/23  0554   MAGNESIUM mg/dL 2.0 2.0 2.0             Results from last 7 days   Lab Units 01/11/23  0432 01/10/23  0616 01/09/23  0620 01/08/23  0429 01/07/23  0536   WBC 10*3/mm3 4.12 4.05 5.45 4.95 4.99   HEMOGLOBIN g/dL 12.8* 13.1 13.4 12.8* 13.4   PLATELETS 10*3/mm3 89* 88* 91* 91* 87*               Imaging Results (Last 24 Hours)     ** No results found for the last 24 hours. **           MEDICATIONS    apixaban, 5 mg, Oral, Q12H  Morphine, 4 mg, Intravenous, Once  pantoprazole, 40 mg, Oral, Q AM  sodium chloride, 10 mL, Intravenous, Q12H  sodium chloride, 10 mL, Intravenous, Q12H  sodium chloride, 10 mL, Intravenous, Q12H  spironolactone, 25 mg, Oral, Daily      bumetanide, 1 mg/hr, Last Rate: 1 mg/hr (01/11/23 0838)  DOBUTamine, 5 mcg/kg/min, Last Rate: 5 mcg/kg/min (01/11/23 0838)        Assessment & Plan   ASSESSMENT / PLAN      FRANCK (acute kidney injury) (HCC)    Severe malnutrition (HCC)    Atrial fibrillation, persistent (HCC)    1.  Acute Kidney Failure on CKD 3: Baseline creatinine 1.4-1.7 mg/dl  - Etiology of FRANCK likely contrast induced.    - Urinalysis showed trace protein and negative blood, 3-5 RBCs, 0-2 WBCs.  Urine sodium less than 20.  Urine protein creatinine ratio 235 mg.  - Creatinine peak at 2.9. currently 1.8.  - Keep fluid restriction of 1500 ml a day.   - Lasix was not helping with diuresis and due to marked anasarca  bumex drip was added. initial good response and now decrease response. keep bumex drip.at 1mg / hr and we have added aldactone and his bp remained stable. keep current regimen    We have talked about UF only dialysis in case if bumex drip  didn't help but he has maked UO in 24 hrs    2.  Shortness of breath:  - Underwent CT with contrast which was nondiagnostic     3.  Chronic systolic CHF /AICD/ non ischemic cardiomyopathy EF 15-20%. Cardioversion today      4.  Prediabetes     5.  Hypertension:  - Blood pressure is borderline low. Continue to monitor BP     6.  KHANH:  - Supposed to be using CPAP at home     7. Acute cholecystitis:  - Has intermittent LUQ pain, and some reflux. Continue protonix.      8. New onset Afib:  - On eliquis now         Signature           This document has been electronically signed by Trevor Alonso MD on January 11, 2023 09:56 CST

## 2023-01-11 NOTE — NURSING NOTE
K+ low this cristina Hurley at bedside, pt had already received 1 bag of K+ replace iv, MD ok with giving po replacement with npo status.

## 2023-01-11 NOTE — ANESTHESIA POSTPROCEDURE EVALUATION
Patient: Matti Hernandez Bravo    Procedure Summary     Date: 01/11/23 Room / Location: [facility] CATH LAB    Anesthesia Start: 1340 Anesthesia Stop: 1417    Procedure: ADULT TRANSESOPHAGEAL ECHO (JESSENIA) W/ CONT IF NECESSARY PER PROTOCOL Diagnosis:       (Arrhythmia)      (AFib)    Scheduled Providers: Dylon Aranda MD Provider: Tatyana Saldana CRNA    Anesthesia Type: general, MAC ASA Status: 4          Anesthesia Type: general, MAC    Vitals  No vitals data found for the desired time range.          Post Anesthesia Care and Evaluation    Patient location during evaluation: ICU  Patient participation: complete - patient participated  Level of consciousness: awake  Pain score: 0  Pain management: adequate    Airway patency: patent  Anesthetic complications: No anesthetic complications  PONV Status: none  Cardiovascular status: acceptable and hemodynamically stable  Respiratory status: acceptable, face mask and spontaneous ventilation  Hydration status: acceptable    Comments: --------------------            01/11/23               1300     --------------------   BP:       103/84     Pulse:   (!) 128     Resp:                Temp:                SpO2:      94%      --------------------

## 2023-01-11 NOTE — SIGNIFICANT NOTE
01/11/23 1403   OTHER   Discipline physical therapy assistant   Rehab Time/Intention   Session Not Performed patient unavailable for treatment     Waiting to go to cath lab.

## 2023-01-11 NOTE — INTERVAL H&P NOTE
H&P reviewed. The patient was examined and there are no changes to the H&P.      The patient continues to be in atrial fibrillation with ventricular rates in the 110s-120s bpm range.  His urine output response was adequate on dobutamine gtt, but this drip could not be weaned off.  His blood pressure readings continue to be borderline low.  Limited transthoracic echocardiogram was obtained today and shows severe biventricular systolic dysfunction.  Left ventricle, left atrium and right atrium appear severely dilated.  Cardioversion was recommended to the patient to attempt conversion to sinus rhythm.  It is possible that lower heart rates in the setting of sinus rhythm may allow him to be diuresed appropriately.  The patient has been on uninterrupted anticoagulation for the past several weeks/months (Eliquis for prevention of LV thrombus).  He was transitioned to therapeutic dose SC Lovenox during this hospitalization and has been transitioned back to Eliquis recently.  I discussed the risks (skin burn, stroke, clots in the lung, change to a different cardiac rhythm, slow cardiac rhythm, death), benefits (possible correction of heart rhythm) and alternatives (continued medication therapy and monitoring) of cardioversion in detail with the patient today.  After a detailed discussion of all his alternatives, he opted to proceed with cardioversion.  He was offered JESSENIA, he preferred to avoid JESSENIA since he has been on uninterrupted anticoagulation for the past several weeks/months.  We will proceed with cardioversion (with anesthesia support for sedation) today.

## 2023-01-11 NOTE — THERAPY TREATMENT NOTE
Acute Care - Occupational Therapy Treatment Note  [facility]     Patient Name: Matti Bravo  : 1970  MRN: [id number]  Today's Date: 2023             Admit Date: 2022       ICD-10-CM ICD-9-CM   1. FRANCK (acute kidney injury) (HCC)  N17.9 584.9   2. Shortness of breath  R06.02 786.05   3. Hypokalemia  E87.6 276.8   4. Elevated brain natriuretic peptide (BNP) level  R79.89 790.99   5. Impaired mobility and ADLs  Z74.09 V49.89    Z78.9    6. Impaired functional mobility and activity tolerance  Z74.09 V49.89   7. Impaired functional mobility, balance, gait, and endurance  Z74.09 V49.89   8. Atrial fibrillation, persistent (HCC)  I48.19 427.31     Patient Active Problem List   Diagnosis   • Chronic systolic heart failure (HCC)   • Essential hypertension   • Mixed hyperlipidemia   • Obstructive sleep apnea   • Morbid obesity (HCC)   • Restrictive lung disease secondary to obesity   • Multiple lung nodules on CT   • Diabetes mellitus (HCC)   • Varicose veins of both lower extremities with pain   • Venous insufficiency (chronic) (peripheral)   • Surgical aftercare, circulatory system   • Chest pain   • Acute on chronic congestive heart failure (HCC)   • Abnormal nuclear stress test   • Acute congestive heart failure (HCC)   • Obesity (BMI 30-39.9)   • Hypotension   • Acute congestive heart failure, unspecified heart failure type (HCC)   • Shortness of breath   • NICM (nonischemic cardiomyopathy) (HCC)   • Pulmonary hypertension (HCC)   • CHF exacerbation (HCC)   • Heart failure (HCC)   • FRANCK (acute kidney injury) (HCC)   • Severe malnutrition (HCC)   • Atrial fibrillation, persistent (HCC)     Past Medical History:   Diagnosis Date   • Asthma    • Chronic systolic heart failure (HCC)    • Diabetes mellitus (HCC)    • Hyperlipidemia    • Hypertension    • Obesity    • Peripheral vascular disease (HCC)    • Sleep apnea      Past Surgical History:   Procedure Laterality Date   • CARDIAC  CATHETERIZATION N/A 12/08/2021   • CARDIAC DEFIBRILLATOR PLACEMENT     • VARICOSE VEIN SURGERY Right 04/05/2019         OT ASSESSMENT FLOWSHEET (last 12 hours)     OT Evaluation and Treatment     Row Name 01/11/23 1019                   OT Time and Intention    Subjective Information complains of;pain  -RB        Document Type therapy note (daily note)  -RB        Mode of Treatment individual therapy;occupational therapy  -RB           General Information    Patient Profile Reviewed yes  -RB        Existing Precautions/Restrictions fall;oxygen therapy device and L/min  -RB           Pain Assessment    Pretreatment Pain Rating 4/10  -RB        Posttreatment Pain Rating 4/10  -RB        Pain Location - head  -RB        Pain Intervention(s) Declines  -RB           Cognition    Orientation Status (Cognition) oriented x 4  -RB           Activities of Daily Living    BADL Assessment/Intervention lower body dressing;bathing  -RB           Bathing Assessment/Intervention    [place] Level (Bathing) bathing skills;lower body;distal lower extremities/feet;set up;supervision  -RB        Position (Bathing) other (see comments);unsupported sitting  Sitting in recliner.  -RB           Lower Body Dressing Assessment/Training    [place] Level (Lower Body Dressing) lower body dressing skills;doff;don;socks;modified independence  -RB        Position (Lower Body Dressing) unsupported sitting  -RB           Safety Issues, Functional Mobility    Impairments Affecting Function (Mobility) endurance/activity tolerance;shortness of breath;strength  -RB           Motor Skills    Therapeutic Exercise shoulder  -RB           Shoulder (Therapeutic Exercise)    Shoulder (Therapeutic Exercise) strengthening exercise  -RB        Shoulder Strengthening (Therapeutic Exercise) bilateral;flexion;extension;sitting;3 lb free weight;resistance tubing;10 repetitions  -RB           Plan of Care Review    Plan of Care Reviewed With patient  -RB            Vital Signs    Pre Systolic BP Rehab 109  -RB        Pre Treatment Diastolic BP 76  -RB        Post Systolic BP Rehab 109  -RB        Post Treatment Diastolic BP 79  -RB        Pretreatment Heart Rate (beats/min) 113  -RB        Posttreatment Heart Rate (beats/min) 111  -RB        Pre SpO2 (%) 95  -RB        Post SpO2 (%) 96  -RB        O2 Delivery Post Treatment room air  -RB           Positioning and Restraints    Pre-Treatment Position sitting in chair/recliner  -RB        Post Treatment Position chair  -RB        In Bed call light within reach;with family/caregiver  -RB              User Key  (r) = Recorded By, (t) = Taken By, (c) = Cosigned By    Initials Name Effective Dates    RB Matti Alvarez OT 06/16/21 -                  Occupational Therapy Education     Title: PT OT SLP Therapies (In Progress)     Topic: Occupational Therapy (Done)     Point: ADL training (Done)     Description:   Instruct learner(s) on proper safety adaptation and remediation techniques during self care or transfers.   Instruct in proper use of assistive devices.              Learning Progress Summary           Patient Acceptance, E,TB, VU by CM at 1/5/2023 1351    Comment: OT POC, Role of OT    Acceptance, E,TB, VU by SA at 12/25/2022 1313    Comment: OT POC, Role of OT, transfer training                   Point: Home exercise program (Done)     Description:   Instruct learner(s) on appropriate technique for monitoring, assisting and/or progressing therapeutic exercises/activities.              Learning Progress Summary           Patient Acceptance, E, VU by RB at 1/11/2023 1503    Comment: Initiated B UE HEP with 3# dowel christian.                   Point: Precautions (Done)     Description:   Instruct learner(s) on prescribed precautions during self-care and functional transfers.              Learning Progress Summary           Patient Acceptance, E, VU by AB at 1/3/2023 0607    Acceptance, E,TB, VU by SA at 12/25/2022 1313     Comment: OT POC, Role of OT, transfer training   Family Acceptance, E, VU by AB at 1/3/2023 0607                   Point: Body mechanics (Done)     Description:   Instruct learner(s) on proper positioning and spine alignment during self-care, functional mobility activities and/or exercises.              Learning Progress Summary           Patient Acceptance, E, VU by AB at 1/3/2023 0607    Acceptance, E,TB, VU by  at 12/25/2022 1313    Comment: OT POC, Role of OT, transfer training   Family Acceptance, E, VU by AB at 1/3/2023 0607                               User Key     Initials Effective Dates Name Provider Type Discipline    RB 06/16/21 -  Matti Alvarez OT Occupational Therapist OT     08/11/22 -  Cheryl Ahumada OT Occupational Therapist OT    AB 11/07/22 -  Anna Gomez LPN Licensed Nurse Nurse     11/18/22 -  Ruth Ribera OT Occupational Therapist OT                  OT Recommendation and Plan     Plan of Care Review  Plan of Care Reviewed With: patient  Plan of Care Reviewed With: patient     Outcome Measures     Row Name 01/11/23 1019             How much help from another is currently needed...    Putting on and taking off regular lower body clothing? 4  -RB      Bathing (including washing, rinsing, and drying) 3  -RB      Toileting (which includes using toilet bed pan or urinal) 4  -RB      Putting on and taking off regular upper body clothing 4  -RB      Taking care of personal grooming (such as brushing teeth) 4  -RB      Eating meals 4  -RB      AM-PAC 6 Clicks Score (OT) 23  -RB            User Key  (r) = Recorded By, (t) = Taken By, (c) = Cosigned By    Initials Name Provider Type    RB Matti Alvarez, OT Occupational Therapist                Time Calculation:    Time Calculation- OT     Row Name 01/11/23 1508             Time Calculation- OT    OT Start Time 1019  -RB      OT Stop Time 1050  -RB      OT Time Calculation (min) 31 min  -RB      OT Received On 01/11/23  -RB            User  Key  (r) = Recorded By, (t) = Taken By, (c) = Cosigned By    Initials Name Provider Type    RB Matti Alvarez OT Occupational Therapist              Therapy Charges for Today     Code Description Service Date Service Provider Modifiers Qty    03227926585 HC OT SELF CARE/MGMT/TRAIN EA 15 MIN 1/11/2023 Matti Alvarez OT GO 1    59607324189 HC OT THER PROC EA 15 MIN 1/11/2023 Matti Alvarez OT GO 1               Matti Alvarez OT  1/11/2023

## 2023-01-11 NOTE — ANESTHESIA PREPROCEDURE EVALUATION
Anesthesia Evaluation     Patient summary reviewed and Nursing notes reviewed   no history of anesthetic complications:  NPO Solid Status: > 8 hours  NPO Liquid Status: > 8 hours           Airway   Mallampati: II  TM distance: >3 FB  Neck ROM: full  Possible difficult intubation  Comment: Final Airway Details  Final airway type: endotracheal airway        Successful airway: ETT  Cuffed: yes   Successful intubation technique: video laryngoscopy  Facilitating devices/methods: intubating stylet  Endotracheal tube insertion site: oral  Blade: Avila  Blade size: 3  ETT size (mm): 8.0  Cormack-Lehane Classification: grade IIa - partial view of glottis  Placement verified by: chest auscultation and capnometry   Cuff volume (mL): 7  Measured from: lips  ETT to lips (cm): 23  Number of attempts at approach: 1  Dental    (+) poor dentition    Pulmonary     breath sounds clear to auscultation  (+) asthma,shortness of breath, sleep apnea on CPAP, decreased breath sounds,   (-) not a smoker    ROS comment: Cardiomegaly with venous congestion  Cardiovascular   Exercise tolerance: poor (<4 METS)    ECG reviewed  Patient on routine beta blocker and Beta blocker given within 24 hours of surgery  Rhythm: irregular  Rate: abnormal    (+) hypertension well controlled, dysrhythmias Atrial Fib, CHF (EF 10%) , GIBBS, PVD, hyperlipidemia,   (-) valvular problems/murmurs, past MI, angina, murmur, carotid bruits, cardiac stents    ROS comment:     · 3D acquisition for left ventricular ejection fraction. Live 3D and full volume to assess left ventricular ejection fraction was utilized.  · Left ventricle systolic function is severely decreased. Estimated LVEF is 10%. Left ventricle cavity severely dilated with restrictive diastolic dysfunction. Findings are consistent with dilated cardiomyopathy.  · Right ventricle is mildly dilated with mildly reduced right ventricular systolic function.  · Moderate mitral valve regurgitation is  present.  · Moderate tricuspid valve regurgitation is present. Pulmonary hypertension is present with a PA systolic pressure of 70 mmHg.  · There is mild pulmonic valve regurgitation present.  · There is a trivial pericardial effusion adjacent to the left ventricleAtrial fibrillation with premature ventricular or aberrantly conducted complexes  Left axis deviation  Nonspecific intraventricular block  Abnormal ECG  When compared with ECG of 20-DEC-2022 17:07,  No significant change was found     Referred By:            Confirmed By: XUAN SAPP    Neuro/Psych- negative ROS  (-) seizures, TIA, CVA, headaches, psychiatric history  GI/Hepatic/Renal/Endo    (+) obesity,   diabetes mellitus type 2 well controlled,   (-) GERD, hepatitis, liver disease, no renal disease (Creatinine 1.88)    Musculoskeletal     (+) back pain,   Abdominal   (+) obese,    Substance History   (+) drug use (marijuana)  (-) alcohol use     OB/GYN negative ob/gyn ROS         Other        (-) history of cancer  ROS/Med Hx Other: K+ 2.8 Receiving supplemental K+.      Phys Exam Other: Right PICC line. Full beard.                  Anesthesia Plan    ASA 4     general and MAC   total IV anesthesia  (Avila available.)  intravenous induction     Anesthetic plan, risks, benefits, and alternatives have been provided, discussed and informed consent has been obtained with: patient.  Pre-procedure education provided  Plan discussed with CRNA.

## 2023-01-11 NOTE — PROGRESS NOTES
[facility]  INPATIENT PROGRESS NOTE    Length of Stay: 20  Date of Admission: 12/20/2022  Primary Care Physician: Shonna Ng APRN    Subjective   Chief Complaint: Shortness of breath     HPI: Patient is a 52-year-old male past medical history of chronic systolic congestive heart failure, diabetes, hyperlipidemia, hypertension, and sleep apnea.  Patient was recently discharged from the hospital after being treated for possible non-STEMI and heart failure.  Medications were adjusted, but patient states that he has been taking 2 mg of Bumex instead of 1 mg as prescribed.  He states that on discharge he felt good, but over the last 24 to 48 hours he has been feeling much more short of breath.  He describes orthopnea and paroxysmal nocturnal dyspnea.  He has exertional shortness of breath.  He does state that his dietary compliance with prescribed diet is relatively poor.     Daily Assessment  -1/11/2023 patient lower extremity swelling decreasing on IV Bumex drip.  Patient scheduled for JESSENIA/electrocardioversion of atrial fibrillation today.  Patient's family present during evaluation in CCU by Dr. Camejo.  Admits to some palpitations this AM.  -1/10/23 He was transferred to the ICU on 1/3/2023 secondary to hypotension and started on dopamine and dobutamine infusions.  His blood pressure has since improved and dopamine infusion has been  discontinued. He is doing better, less deconditioned, tolerating his diet and voices no new complaints.  He remains on Bumex and dobutamine infusions, has good urinary output and swelling both legs persist but slowly improving.  He voices no new complaints and is maintaining O2 saturation in the upper 90s on room air.    Review of Systems   Constitutional: Positive for activity change and fatigue. Negative for appetite change, chills, diaphoresis and fever.   HENT: Negative for trouble swallowing and voice change.    Eyes:  Negative for photophobia and visual disturbance.   Respiratory: Negative for cough, choking, chest tightness, shortness of breath, wheezing and stridor.    Cardiovascular: Positive for leg swelling. Negative for chest pain and palpitations.   Gastrointestinal: Negative for abdominal distention, abdominal pain, blood in stool, constipation, diarrhea, nausea and vomiting.   Endocrine: Negative for cold intolerance, heat intolerance, polydipsia, polyphagia and polyuria.   Genitourinary: Negative for decreased urine volume, difficulty urinating, dysuria, enuresis, flank pain, frequency, hematuria and urgency.   Musculoskeletal: Negative for arthralgias, gait problem, myalgias, neck pain and neck stiffness.   Skin: Negative for pallor, rash and wound.   Neurological: Negative for dizziness, tremors, seizures, syncope, facial asymmetry, speech difficulty, weakness, light-headedness, numbness and headaches.   Hematological: Does not bruise/bleed easily.   Psychiatric/Behavioral: Negative for agitation, behavioral problems and confusion.       Objective    Temp:  [96.6 °F (35.9 °C)-98 °F (36.7 °C)] 98 °F (36.7 °C)  Heart Rate:  [] 104  Resp:  [16-20] 16  BP: ()/(55-89) 114/79   AM-PAC 6 Clicks Score (PT): 20 (01/10/23 2000)     Intake/Output Summary (Last 24 hours) at 1/11/2023 1140  Last data filed at 1/11/2023 0600  Gross per 24 hour   Intake 1921 ml   Output 4150 ml   Net -2229 ml       Physical Exam  Vitals and nursing note reviewed.   Constitutional:       General: He is not in acute distress.     Appearance: He is well-developed. He is obese. He is ill-appearing. He is not diaphoretic.   HENT:      Head: Normocephalic and atraumatic.   Eyes:      General: No scleral icterus.        Right eye: No discharge.         Left eye: No discharge.      Extraocular Movements: Extraocular movements intact.      Pupils: Pupils are equal, round, and reactive to light.   Neck:      Thyroid: No thyromegaly.      Vascular:  No carotid bruit or JVD.   Cardiovascular:      Rate and Rhythm: Tachycardia present. Rhythm irregular.      Heart sounds: Normal heart sounds. No murmur heard.    No friction rub. No gallop.   Pulmonary:      Effort: Pulmonary effort is normal. No respiratory distress.      Breath sounds: Normal breath sounds. No stridor. No wheezing or rales.   Chest:      Chest wall: No tenderness.   Abdominal:      General: Bowel sounds are normal. There is no distension.      Palpations: Abdomen is soft. There is no mass.      Tenderness: There is no abdominal tenderness. There is no right CVA tenderness, left CVA tenderness, guarding or rebound.      Hernia: No hernia is present.   Musculoskeletal:         General: No swelling, tenderness or deformity.      Cervical back: Normal range of motion and neck supple.      Right lower leg: Edema present.      Left lower leg: Edema present.   Skin:     General: Skin is warm and dry.      Coloration: Skin is not jaundiced or pale.      Findings: No bruising, erythema, lesion or rash.   Neurological:      General: No focal deficit present.      Mental Status: He is alert and oriented to person, place, and time.      Cranial Nerves: No cranial nerve deficit.      Sensory: No sensory deficit.      Motor: No weakness or abnormal muscle tone.      Coordination: Coordination normal.      Gait: Gait normal.      Deep Tendon Reflexes: Reflexes normal.   Psychiatric:         Mood and Affect: Mood normal.         Behavior: Behavior normal.         Thought Content: Thought content normal.         Judgment: Judgment normal.             Results Review:  I have reviewed the labs, radiology results, and diagnostic studies.    Laboratory Data:   Results from last 7 days   Lab Units 01/11/23  0432 01/10/23  0657 01/09/23  0620 01/08/23  0429 01/07/23  0536   SODIUM mmol/L 131* 130* 129* 129* 130*   POTASSIUM mmol/L 2.8* 3.6 4.0 4.3 4.6   CHLORIDE mmol/L 88* 89* 86* 90* 89*   CO2 mmol/L 28.0 22.0 27.0  23.0 25.0   BUN mg/dL 61* 66* 58* 54* 48*   CREATININE mg/dL 1.88* 2.11* 2.14* 1.81* 1.87*   GLUCOSE mg/dL 116* 132* 122* 144* 124*   CALCIUM mg/dL 9.4 9.7 9.7 9.2 9.5   BILIRUBIN mg/dL  --   --  5.1* 4.8* 5.8*   ALK PHOS U/L  --   --  91 89 98   ALT (SGPT) U/L  --   --  84* 96* 119*   AST (SGOT) U/L  --   --  31 35 40   ANION GAP mmol/L 15.0 19.0* 16.0* 16.0* 16.0*     Estimated Creatinine Clearance: 69.7 mL/min (A) (by C-G formula based on SCr of 1.88 mg/dL (H)).  Results from last 7 days   Lab Units 01/10/23  0657 01/07/23  0536 01/05/23  0554   MAGNESIUM mg/dL 2.0 2.0 2.0         Results from last 7 days   Lab Units 01/11/23  0432 01/10/23  0616 01/09/23  0620 01/08/23  0429 01/07/23  0536   WBC 10*3/mm3 4.12 4.05 5.45 4.95 4.99   HEMOGLOBIN g/dL 12.8* 13.1 13.4 12.8* 13.4   HEMATOCRIT % 39.0 37.7 39.2 37.1* 43.4   PLATELETS 10*3/mm3 89* 88* 91* 91* 87*           Culture Data:   No results found for: BLOODCX  No results found for: URINECX  No results found for: RESPCX  No results found for: WOUNDCX  No results found for: STOOLCX  No components found for: BODYFLD    Radiology Data:   Imaging Results (Last 24 Hours)     ** No results found for the last 24 hours. **          Scheduled Meds:apixaban, 5 mg, Oral, Q12H  Morphine, 4 mg, Intravenous, Once  pantoprazole, 40 mg, Oral, Q AM  sodium chloride, 10 mL, Intravenous, Q12H  sodium chloride, 10 mL, Intravenous, Q12H  sodium chloride, 10 mL, Intravenous, Q12H  spironolactone, 25 mg, Oral, Daily      Continuous Infusions:bumetanide, 1 mg/hr, Last Rate: 1 mg/hr (01/11/23 0838)  DOBUTamine, 5 mcg/kg/min, Last Rate: 5 mcg/kg/min (01/11/23 0838)      PRN Meds:.•  acetaminophen  •  albuterol  •  calcium carbonate  •  droperidol  •  melatonin  •  ondansetron  •  potassium chloride **OR** potassium chloride **OR** potassium chloride  •  simethicone  •  sodium chloride  •  sodium chloride  •  sodium chloride    Assessment/Plan     Hospital Problem List:  Principal  Problem:    FRANCK (acute kidney injury) (HCC)  Active Problems:    Severe malnutrition (HCC)    Atrial fibrillation, persistent (HCC)    Acute on chronic kidney disease stage III (likely cardiorenal):   - Patient's baseline is reportedly between 1.4-1.7.  Creatinine is 2.14.     Continue Bumex infusion and continue to monitor renal function. Input by nephrologist is appreciated.  -1/11/2023 appreciate nephrology's expert assistance!  Patient may possibly start dialysis within next 48 hours per nephrology recommendations.    History of nonischemic cardiomyopathy/chronic systolic heart failure status post AICD placement (with hypotension):   - Patient appears clinically euvolemic and chest x-ray done 1/9/2023 showed cardiomegaly without pulmonary vascular congestion. Continue Bumex and dobutamine infusions,  with strict I's and O's, daily weights, salt and fluid restriction.  Input by cardiologist is appreciated.  Echocardiogram done 8/19/2022 showed an estimated ejection fraction of 15 to 20%.  Elevated LFTs are reactive, much improved and will be monitored.     Hyponatremia (likely hypervolemic): Continue diuretics, restrict free water and monitor BMP.    Chronic atrial fibrillation:  - Patient is in uncontrolled ventricular response.  Coreg is on hold for now secondary to borderline blood pressure.  Continue anticoagulation with Eliquis.   -1/11/2023 JESSENIA/electrocardioversion scheduled for today.    Hypokalemia: Resolved.    Possible acute cholecystitis: Patient is less symptomatic and tolerating recommended diet.  HIDA scan was unremarkable.    Obstructive sleep apnea: Continue nightly CPAP.    Deconditioning: Continue PT and OT.    Continue GI and DVT prophylaxis.    Discharge Planning:  -  In progress pending results of JESSENIA/electrocardioversion scheduled for 1/11/2023.    - Patient's family present during attending rounds interview with Dr. Camejo 1/11/2023.    I confirmed that the patient's Advance Care Plan is  present, code status is documented, or surrogate decision maker is listed in the patient's medical record.     I have utilized all available immediate resources to obtain, update, or review the patient's current medications.    33  minutes of critical care time was spent in the assessment and formulation of treatment plan for this patient exclusive of billable procedures.      Inocente Camejo MD   01/11/23   11:33 CST

## 2023-01-11 NOTE — PLAN OF CARE
Goal Outcome Evaluation:  Plan of Care Reviewed With: patient      OT tx on this date.  Pt performed B UE therex.  Pt also performed LB bath and dress with set up supervision.

## 2023-01-12 LAB
ANION GAP SERPL CALCULATED.3IONS-SCNC: 19 MMOL/L (ref 5–15)
ANISOCYTOSIS BLD QL: NORMAL
BASOPHILS # BLD AUTO: 0.02 10*3/MM3 (ref 0–0.2)
BASOPHILS NFR BLD AUTO: 0.5 % (ref 0–1.5)
BUN SERPL-MCNC: 64 MG/DL (ref 6–20)
BUN/CREAT SERPL: 25.6 (ref 7–25)
CALCIUM SPEC-SCNC: 9.5 MG/DL (ref 8.6–10.5)
CHLORIDE SERPL-SCNC: 87 MMOL/L (ref 98–107)
CO2 SERPL-SCNC: 24 MMOL/L (ref 22–29)
CREAT SERPL-MCNC: 2.5 MG/DL (ref 0.76–1.27)
DEPRECATED RDW RBC AUTO: 54.6 FL (ref 37–54)
EGFRCR SERPLBLD CKD-EPI 2021: 30.3 ML/MIN/1.73
EOSINOPHIL # BLD AUTO: 0.02 10*3/MM3 (ref 0–0.4)
EOSINOPHIL NFR BLD AUTO: 0.5 % (ref 0.3–6.2)
ERYTHROCYTE [DISTWIDTH] IN BLOOD BY AUTOMATED COUNT: 17 % (ref 12.3–15.4)
GLUCOSE SERPL-MCNC: 119 MG/DL (ref 65–99)
HCT VFR BLD AUTO: 39.4 % (ref 37.5–51)
HGB BLD-MCNC: 12.9 G/DL (ref 13–17.7)
HYPOCHROMIA BLD QL: NORMAL
IMM GRANULOCYTES # BLD AUTO: 0.03 10*3/MM3 (ref 0–0.05)
IMM GRANULOCYTES NFR BLD AUTO: 0.7 % (ref 0–0.5)
LYMPHOCYTES # BLD AUTO: 0.99 10*3/MM3 (ref 0.7–3.1)
LYMPHOCYTES NFR BLD AUTO: 23.9 % (ref 19.6–45.3)
MAGNESIUM SERPL-MCNC: 2.2 MG/DL (ref 1.6–2.6)
MCH RBC QN AUTO: 29.2 PG (ref 26.6–33)
MCHC RBC AUTO-ENTMCNC: 32.7 G/DL (ref 31.5–35.7)
MCV RBC AUTO: 89.1 FL (ref 79–97)
MONOCYTES # BLD AUTO: 0.57 10*3/MM3 (ref 0.1–0.9)
MONOCYTES NFR BLD AUTO: 13.8 % (ref 5–12)
NEUTROPHILS NFR BLD AUTO: 2.51 10*3/MM3 (ref 1.7–7)
NEUTROPHILS NFR BLD AUTO: 60.6 % (ref 42.7–76)
NRBC BLD AUTO-RTO: 0 /100 WBC (ref 0–0.2)
OVALOCYTES BLD QL SMEAR: NORMAL
PLATELET # BLD AUTO: 89 10*3/MM3 (ref 140–450)
PMV BLD AUTO: 13.5 FL (ref 6–12)
POTASSIUM SERPL-SCNC: 4 MMOL/L (ref 3.5–5.2)
POTASSIUM SERPL-SCNC: 4.1 MMOL/L (ref 3.5–5.2)
QT INTERVAL: 384 MS
QTC INTERVAL: 480 MS
RBC # BLD AUTO: 4.42 10*6/MM3 (ref 4.14–5.8)
SMALL PLATELETS BLD QL SMEAR: NORMAL
SODIUM SERPL-SCNC: 130 MMOL/L (ref 136–145)
TARGETS BLD QL SMEAR: NORMAL
WBC MORPH BLD: NORMAL
WBC NRBC COR # BLD: 4.14 10*3/MM3 (ref 3.4–10.8)

## 2023-01-12 PROCEDURE — 25010000002 DOBUTAMINE PER 250 MG: Performed by: NURSE PRACTITIONER

## 2023-01-12 PROCEDURE — 85025 COMPLETE CBC W/AUTO DIFF WBC: CPT | Performed by: HOSPITALIST

## 2023-01-12 PROCEDURE — 93010 ELECTROCARDIOGRAM REPORT: CPT | Performed by: INTERNAL MEDICINE

## 2023-01-12 PROCEDURE — 99232 SBSQ HOSP IP/OBS MODERATE 35: CPT | Performed by: INTERNAL MEDICINE

## 2023-01-12 PROCEDURE — 80048 BASIC METABOLIC PNL TOTAL CA: CPT | Performed by: INTERNAL MEDICINE

## 2023-01-12 PROCEDURE — 25010000002 DOBUTAMINE PER 250 MG: Performed by: INTERNAL MEDICINE

## 2023-01-12 PROCEDURE — 83735 ASSAY OF MAGNESIUM: CPT | Performed by: INTERNAL MEDICINE

## 2023-01-12 PROCEDURE — 85007 BL SMEAR W/DIFF WBC COUNT: CPT | Performed by: HOSPITALIST

## 2023-01-12 PROCEDURE — 93005 ELECTROCARDIOGRAM TRACING: CPT

## 2023-01-12 PROCEDURE — 84132 ASSAY OF SERUM POTASSIUM: CPT | Performed by: HOSPITALIST

## 2023-01-12 PROCEDURE — 99233 SBSQ HOSP IP/OBS HIGH 50: CPT | Performed by: INTERNAL MEDICINE

## 2023-01-12 PROCEDURE — 93005 ELECTROCARDIOGRAM TRACING: CPT | Performed by: NURSE PRACTITIONER

## 2023-01-12 RX ADMIN — DOBUTAMINE HYDROCHLORIDE 5 MCG/KG/MIN: 100 INJECTION INTRAVENOUS at 05:45

## 2023-01-12 RX ADMIN — APIXABAN 5 MG: 5 TABLET, FILM COATED ORAL at 09:50

## 2023-01-12 RX ADMIN — SPIRONOLACTONE 25 MG: 25 TABLET ORAL at 09:50

## 2023-01-12 RX ADMIN — Medication 10 ML: at 20:05

## 2023-01-12 RX ADMIN — PANTOPRAZOLE SODIUM 40 MG: 40 TABLET, DELAYED RELEASE ORAL at 05:25

## 2023-01-12 RX ADMIN — APIXABAN 5 MG: 5 TABLET, FILM COATED ORAL at 20:04

## 2023-01-12 RX ADMIN — DOBUTAMINE HYDROCHLORIDE 2.5 MCG/KG/MIN: 100 INJECTION INTRAVENOUS at 15:08

## 2023-01-12 RX ADMIN — BUMETANIDE 1 MG/HR: 0.25 INJECTION INTRAMUSCULAR; INTRAVENOUS at 06:24

## 2023-01-12 RX ADMIN — Medication 10 ML: at 09:51

## 2023-01-12 NOTE — SIGNIFICANT NOTE
Pt should head no when approached by OT for therapy this pm.   01/12/23 1415   OTHER   Discipline occupational therapy assistant   Rehab Time/Intention   Session Not Performed patient/family declined treatment

## 2023-01-12 NOTE — NURSING NOTE
Paged  Pt, more irrigular rhythm, C/O of having panic attack, HE said its a result of his anesthesia after his JESSENIA and cardioversion, but refused to take anxiety medication.Pt, has not voided even with with Bumex drip going. To replace potassium per order,continue to monitor.

## 2023-01-12 NOTE — PROGRESS NOTES
SUBJECTIVE:   1/11/2023  Chief Complaint:     Subjective      Patient has worsening dyspnea.  Has A. fib with rapid ventricular rate.  Awaiting cardioversion.  Denied abdominal pain.    History:  Past Medical History:   Diagnosis Date   • Asthma    • Chronic systolic heart failure (HCC)    • Diabetes mellitus (HCC)    • Hyperlipidemia    • Hypertension    • Obesity    • Peripheral vascular disease (HCC)    • Sleep apnea      Past Surgical History:   Procedure Laterality Date   • CARDIAC CATHETERIZATION N/A 12/08/2021   • CARDIAC DEFIBRILLATOR PLACEMENT     • VARICOSE VEIN SURGERY Right 04/05/2019     Family History   Problem Relation Age of Onset   • Diabetes Mother    • Hypertension Father      Social History     Tobacco Use   • Smoking status: Former   • Smokeless tobacco: Never   Vaping Use   • Vaping Use: Never used   Substance Use Topics   • Alcohol use: No   • Drug use: No     Medications Prior to Admission   Medication Sig Dispense Refill Last Dose   • albuterol sulfate  (90 Base) MCG/ACT inhaler Inhale 2 puffs Every 4 (Four) Hours As Needed for Wheezing. 1 inhaler 3    • apixaban (ELIQUIS) 5 MG tablet tablet Take 1 tablet by mouth 2 (Two) Times a Day. 60 tablet 3    • bumetanide (BUMEX) 1 MG tablet Take 1 tablet by mouth 2 (Two) Times a Day. 60 tablet 2    • carvedilol (COREG) 12.5 MG tablet Take 1 tablet by mouth 2 (Two) Times a Day With Meals for 30 days. (Patient taking differently: Take 12.5 mg by mouth 2 (Two) Times a Day With Meals. Pt states he has some meds and is still taking this.) 60 tablet 3    • losartan (COZAAR) 25 MG tablet Take 0.5 tablets by mouth Daily for 30 days. 15 tablet 3    • lovastatin (ALTOPREV) 20 MG 24 hr tablet Take 20 mg by mouth.      • metOLazone (ZAROXOLYN) 2.5 MG tablet Take 1 tablet by mouth 3 (Three) Times a Week. 15 tablet 3    • potassium chloride 10 MEQ CR tablet Take 2 tablets by mouth Daily. 30 tablet 1    • vitamin D (ERGOCALCIFEROL) 1.25 MG (42758 UT)  capsule capsule Take 50,000 Units by mouth 1 (One) Time Per Week.        Allergies:  Patient has no known allergies.     CURRENT MEDICATIONS/OBJECTIVE/VS/PE:     Current Medications:     Current Facility-Administered Medications   Medication Dose Route Frequency Provider Last Rate Last Admin   • acetaminophen (TYLENOL) tablet 1,000 mg  1,000 mg Oral Q6H PRN Sudeep Wolf MD   1,000 mg at 01/11/23 1958   • albuterol (PROVENTIL) nebulizer solution 0.083% 2.5 mg/3mL  2.5 mg Nebulization Q4H PRN Will French MD       • apixaban (ELIQUIS) tablet 5 mg  5 mg Oral Q12H Vania Andujar APRN   5 mg at 01/11/23 2025   • bumetanide (BUMEX) 10 mg in sodium chloride 0.9 % 100 mL (0.1 mg/mL) infusion  1 mg/hr Intravenous Continuous Trevor Alonso MD 10 mL/hr at 01/11/23 2025 1 mg/hr at 01/11/23 2025   • calcium carbonate (TUMS) chewable tablet 500 mg (200 mg elemental)  2 tablet Oral TID PRN Will French MD   2 tablet at 01/05/23 0015   • DOBUTamine (DOBUTREX) 1 mg/mL infusion  5 mcg/kg/min Intravenous Continuous Dylon Aranda MD   Held at 01/11/23 1400   • droperidol (INAPSINE) injection 1.25 mg  1.25 mg Intravenous Q6H PRN Sudeep oWlf MD   1.25 mg at 12/22/22 1302   • melatonin tablet 3 mg  3 mg Oral Nightly PRN Behroozi, Saeid, MD   3 mg at 01/05/23 2220   • morphine injection 4 mg  4 mg Intravenous Once Will French MD       • ondansetron (ZOFRAN) injection 4 mg  4 mg Intravenous Q6H PRN Sudeep Wolf MD   4 mg at 01/01/23 0830   • pantoprazole (PROTONIX) EC tablet 40 mg  40 mg Oral Q AM Ayaan Cuba MD   40 mg at 01/10/23 0513   • potassium chloride (MICRO-K) CR capsule 40 mEq  40 mEq Oral PRN Moshe Aleman DO   40 mEq at 01/11/23 1921    Or   • potassium chloride (KLOR-CON) packet 40 mEq  40 mEq Oral PRN Moshe Aleman DO       • simethicone (MYLICON) chewable tablet 80 mg  80 mg Oral 4x Daily PRN Delonte Caputo MD   80 mg at 12/25/22 0025   •  sodium chloride 0.9 % flush 10 mL  10 mL Intravenous Q12H Will French MD   10 mL at 01/09/23 0905   • sodium chloride 0.9 % flush 10 mL  10 mL Intravenous PRN Will French MD   10 mL at 12/30/22 0145   • sodium chloride 0.9 % flush 10 mL  10 mL Intravenous Q12H Ayaan Cuba MD   10 mL at 01/09/23 0904   • sodium chloride 0.9 % flush 10 mL  10 mL Intravenous Q12H Ayaan Cuba MD   10 mL at 01/11/23 2026   • sodium chloride 0.9 % flush 10 mL  10 mL Intravenous PRN Ayaan Cuba MD       • sodium chloride 0.9 % infusion 40 mL  40 mL Intravenous PRN Will French MD   100 mL at 01/11/23 1405   • spironolactone (ALDACTONE) tablet 25 mg  25 mg Oral Daily Trevor Alonso MD           Objective     Review of Systems:   Review of Systems   Constitutional: Negative for chills, fatigue, fever and unexpected weight change.   HENT: Negative for congestion, ear discharge, hearing loss, nosebleeds and sore throat.    Eyes: Negative for pain, discharge and redness.   Respiratory: Positive for shortness of breath. Negative for cough, chest tightness and wheezing.    Cardiovascular: Positive for palpitations. Negative for chest pain.   Gastrointestinal: Negative for abdominal distention, abdominal pain, blood in stool, constipation, diarrhea, nausea and vomiting.   Endocrine: Negative for cold intolerance, polydipsia, polyphagia and polyuria.   Genitourinary: Negative for dysuria, flank pain, frequency, hematuria and urgency.   Musculoskeletal: Negative for arthralgias, back pain, joint swelling and myalgias.   Skin: Negative for color change, pallor and rash.   Neurological: Negative for tremors, seizures, syncope, weakness and headaches.   Hematological: Negative for adenopathy. Does not bruise/bleed easily.   Psychiatric/Behavioral: Negative for behavioral problems, confusion, dysphoric mood, hallucinations and suicidal ideas. The patient is not nervous/anxious.        Physical Exam:   Temp:   [97 °F (36.1 °C)-98 °F (36.7 °C)] 97.2 °F (36.2 °C)  Heart Rate:  [] 104  Resp:  [16-18] 16  BP: ()/(54-90) 120/87     Physical Exam:  General Appearance:    Alert, cooperative, in no acute distress   Head:    Normocephalic, without obvious abnormality, atraumatic   Eyes:            Lids and lashes normal, conjunctivae and sclerae normal, no   icterus, no pallor, corneas clear, PERRLA   Ears:    Ears appear intact with no abnormalities noted   Throat:   No oral lesions, no thrush, oral mucosa moist   Neck:   No adenopathy, supple, trachea midline, no thyromegaly, no     carotid bruit, no JVD   Back:     No kyphosis present, no scoliosis present, no skin lesions,       erythema or scars, no tenderness to percussion or                   palpation,   range of motion normal   Lungs:     Clear to auscultation,respirations regular, even and                   unlabored    Heart:    Regular rhythm and normal rate, normal S1 and S2, no            murmur, no gallop, no rub, no click   Breast Exam:    Deferred   Abdomen:     Normal bowel sounds, no masses, no organomegaly, soft        nontender, nondistended, no guarding, no rebound                 tenderness   Genitalia:    Deferred   Extremities:   Moves all extremities well, no edema, no cyanosis, no              redness   Pulses:   Pulses palpable and equal bilaterally   Skin:   No bleeding, bruising or rash   Lymph nodes:   No palpable adenopathy   Neurologic:   Cranial nerves 2 - 12 grossly intact, sensation intact, DTR        present and equal bilaterally      Results Review:     Lab Results (last 24 hours)     Procedure Component Value Units Date/Time    Potassium [[id number]]  (Abnormal) Collected: 01/11/23 1117    Specimen: Blood Updated: 01/11/23 1153     Potassium 2.9 mmol/L     Basic Metabolic Panel [[id number]]  (Abnormal) Collected: 01/11/23 0432    Specimen: Blood Updated: 01/11/23 0532     Glucose 116 mg/dL      BUN 61 mg/dL      Creatinine 1.88  mg/dL      Sodium 131 mmol/L      Potassium 2.8 mmol/L      Chloride 88 mmol/L      CO2 28.0 mmol/L      Calcium 9.4 mg/dL      BUN/Creatinine Ratio 32.4     Anion Gap 15.0 mmol/L      eGFR 42.7 mL/min/1.73      Comment: National Kidney Foundation and American Society of Nephrology (ASN) Task Force recommended calculation based on the Chronic Kidney Disease Epidemiology Collaboration (CKD-EPI) equation refit without adjustment for race.       Narrative:      GFR Normal >60  Chronic Kidney Disease <60  Kidney Failure <15      CBC & Differential [[id number]]  (Abnormal) Collected: 01/11/23 0432    Specimen: Blood Updated: 01/11/23 0504    Narrative:      The following orders were created for panel order CBC & Differential.  Procedure                               Abnormality         Status                     ---------                               -----------         ------                     CBC Auto Differential[[id number]]        Abnormal            Final result               Scan Slide[[id number]]                                                                    Please view results for these tests on the individual orders.    CBC Auto Differential [[id number]]  (Abnormal) Collected: 01/11/23 0432    Specimen: Blood Updated: 01/11/23 0504     WBC 4.12 10*3/mm3      RBC 4.46 10*6/mm3      Hemoglobin 12.8 g/dL      Hematocrit 39.0 %      MCV 87.4 fL      MCH 28.7 pg      MCHC 32.8 g/dL      RDW 16.8 %      RDW-SD 51.4 fl      MPV 14.0 fL      Platelets 89 10*3/mm3      Neutrophil % 63.5 %      Lymphocyte % 24.0 %      Monocyte % 10.9 %      Eosinophil % 1.2 %      Basophil % 0.2 %      Immature Grans % 0.2 %      Neutrophils, Absolute 2.61 10*3/mm3      Lymphocytes, Absolute 0.99 10*3/mm3      Monocytes, Absolute 0.45 10*3/mm3      Eosinophils, Absolute 0.05 10*3/mm3      Basophils, Absolute 0.01 10*3/mm3      Immature Grans, Absolute 0.01 10*3/mm3      nRBC 0.0 /100 WBC            I reviewed the patient's new  clinical results.  I reviewed the patient's new imaging results and agree with the interpretation.     ASSESSMENT/PLAN:   ASSESSMENT: 1.  A. fib with rapid ventricular rate, awaiting cardioversion.  2.  Elevated liver enzymes, improving.  3.  Abdominal pain, resolved.  4.  Anemia, improved.  PLAN: 1.  Continue PPI and current supportive care  2.  Follow LFTs closely  3.  Continue no added salt diet and diuresis  The risks, benefits, and alternatives of this procedure have been discussed with the patient or the responsible party- the patient understands and agrees to proceed.         Bea Pierce MD  01/11/23  22:27 CST

## 2023-01-12 NOTE — PROGRESS NOTES
"  [facility]  [address] [place] [address]. [address]  T - [phone]  F - [phone]     Progress Note          PATIENT  DEMOGRAPHICS   PATIENT NAME: Matti Bravo                      PHYSICIAN: Trevor Alonso MD  : 1970  MRN: [id number]   LOS: 21 days    Patient Care Team:  Shonna Ng APRN as PCP - General (Family Medicine)  Subjective   SUBJECTIVE   Patient reports feeling ok. Events reviewed - tachycardia, low bp. Now on low dose dobutamine.        Objective   OBJECTIVE   Vital Signs  Temp:  [97 °F (36.1 °C)-97.3 °F (36.3 °C)] 97 °F (36.1 °C)  Heart Rate:  [] 97  Resp:  [16-20] 17  BP: ()/(52-98) 126/90    Flowsheet Rows    Flowsheet Row First Filed Value   Admission Height 193 cm (76\") Documented at 2022 1700   Admission Weight 127 kg (280 lb) Documented at 2022 1700           I/O last 3 completed shifts:  In: 1234 [P.O.:440; I.V.:674; IV Piggyback:120]  Out: 3450 [Urine:3450]    PHYSICAL EXAM    Physical Exam  Vitals reviewed.   Constitutional:       Appearance: Normal appearance.   HENT:      Head: Normocephalic and atraumatic.   Cardiovascular:      Rate and Rhythm: Normal rate and regular rhythm.   Pulmonary:      Effort: Pulmonary effort is normal. No respiratory distress.      Breath sounds: Normal breath sounds.   Musculoskeletal:      Right lower leg: Edema present.      Left lower leg: Edema present.   Neurological:      Mental Status: He is alert and oriented to person, place, and time.         RESULTS   Results Review:    Results from last 7 days   Lab Units 23  0419 23  0007 23  1117 23  0432 01/10/23  0657 23  0620 23  0429 23  0536   SODIUM mmol/L 130*  --   --  131* 130* 129* 129* 130*   POTASSIUM mmol/L 4.1 4.0 2.9* 2.8* 3.6 4.0 4.3 4.6   CHLORIDE mmol/L 87*  --   --  88* 89* 86* 90* 89*   CO2 mmol/L 24.0  --   --  28.0 22.0 27.0 23.0 25.0   BUN mg/dL 64*  --   --  61* 66* 58* 54* 48* "   CREATININE mg/dL 2.50*  --   --  1.88* 2.11* 2.14* 1.81* 1.87*   CALCIUM mg/dL 9.5  --   --  9.4 9.7 9.7 9.2 9.5   BILIRUBIN mg/dL  --   --   --   --   --  5.1* 4.8* 5.8*   ALK PHOS U/L  --   --   --   --   --  91 89 98   ALT (SGPT) U/L  --   --   --   --   --  84* 96* 119*   AST (SGOT) U/L  --   --   --   --   --  31 35 40   GLUCOSE mg/dL 119*  --   --  116* 132* 122* 144* 124*       Estimated Creatinine Clearance: 52.4 mL/min (A) (by C-G formula based on SCr of 2.5 mg/dL (H)).    Results from last 7 days   Lab Units 01/12/23  0419 01/10/23  0657 01/07/23  0536   MAGNESIUM mg/dL 2.2 2.0 2.0             Results from last 7 days   Lab Units 01/12/23  0419 01/11/23  0432 01/10/23  0616 01/09/23  0620 01/08/23  0429   WBC 10*3/mm3 4.14 4.12 4.05 5.45 4.95   HEMOGLOBIN g/dL 12.9* 12.8* 13.1 13.4 12.8*   PLATELETS 10*3/mm3 89* 89* 88* 91* 91*               Imaging Results (Last 24 Hours)     ** No results found for the last 24 hours. **           MEDICATIONS    apixaban, 5 mg, Oral, Q12H  Morphine, 4 mg, Intravenous, Once  pantoprazole, 40 mg, Oral, Q AM  sodium chloride, 10 mL, Intravenous, Q12H  sodium chloride, 10 mL, Intravenous, Q12H  sodium chloride, 10 mL, Intravenous, Q12H  spironolactone, 25 mg, Oral, Daily      bumetanide, 1 mg/hr, Last Rate: 1 mg/hr (01/12/23 0624)  DOBUTamine, 5 mcg/kg/min, Last Rate: 5 mcg/kg/min (01/12/23 0545)        Assessment & Plan   ASSESSMENT / PLAN      FRANCK (acute kidney injury) (HCC)    Severe malnutrition (HCC)    Atrial fibrillation, persistent (HCC)    1.  Acute Kidney Failure on CKD 3: Baseline creatinine 1.4-1.7 mg/dl  - Etiology of FRANCK likely contrast induced.    - Urinalysis showed trace protein and negative blood, 3-5 RBCs, 0-2 WBCs.  Urine sodium less than 20.  Urine protein creatinine ratio 235 mg.  - Creatinine peak at 2.9. currently worse from yesterday 1.8 to 2.5  - Keep fluid restriction of 1500 ml a day.   - Lasix was not helping with diuresis and due to marked  anasarca  bumex drip was added. initial good response and now decrease response. keep bumex drip and lower it to 0.5mg / hr because of elevated cr.   Add aldactone    We have talked about UF only dialysis in case if bumex drip didn't help but he has maked UO in 24 hrs    2.  Shortness of breath:  - Underwent CT with contrast which was nondiagnostic     3.  Chronic systolic CHF /AICD/ non ischemic cardiomyopathy EF 15-20%. Cardioversion today      4.  Prediabetes     5.  Hypertension:  - Blood pressure is borderline low. Continue to monitor BP     6.  KHANH:  - Supposed to be using CPAP at home     7. Acute cholecystitis:  - now better after antibiotics     8. New onset Afib:  - On eliquis now         Signature           This document has been electronically signed by Trevor Alonso MD on January 12, 2023 10:52 CST

## 2023-01-12 NOTE — PLAN OF CARE
Problem: Adult Inpatient Plan of Care  Goal: Plan of Care Review  Outcome: Ongoing, Progressing  Flowsheets (Taken 1/11/2023 1808 by Eddie Covington RN)  Plan of Care Reviewed With: patient  Goal: Patient-Specific Goal (Individualized)  Outcome: Ongoing, Progressing  Goal: Absence of Hospital-Acquired Illness or Injury  Outcome: Ongoing, Progressing  Intervention: Identify and Manage Fall Risk  Recent Flowsheet Documentation  Taken 1/12/2023 0100 by Emerald Sanchez RN  Safety Promotion/Fall Prevention: safety round/check completed  Taken 1/12/2023 0000 by Emerald Sanchez RN  Safety Promotion/Fall Prevention: safety round/check completed  Taken 1/11/2023 2300 by Emerald Sanchez RN  Safety Promotion/Fall Prevention: safety round/check completed  Taken 1/11/2023 2200 by Emerald Sanchez RN  Safety Promotion/Fall Prevention: safety round/check completed  Taken 1/11/2023 2100 by Emerald Sanchez RN  Safety Promotion/Fall Prevention: safety round/check completed  Taken 1/11/2023 2000 by Emerald Sanchez RN  Safety Promotion/Fall Prevention: safety round/check completed  Taken 1/11/2023 1900 by Emerald Sanchez RN  Safety Promotion/Fall Prevention: safety round/check completed  Intervention: Prevent Skin Injury  Recent Flowsheet Documentation  Taken 1/12/2023 0100 by Emerald Sanchez RN  Body Position: position changed independently  Taken 1/11/2023 2000 by Emerald Sanchez RN  Body Position: position changed independently  Skin Protection: adhesive use limited  Intervention: Prevent and Manage VTE (Venous Thromboembolism) Risk  Recent Flowsheet Documentation  Taken 1/12/2023 0000 by Emerald Sanchez RN  VTE Prevention/Management: other (see comments)  Taken 1/11/2023 2000 by Emerald Sanchez RN  Activity Management:   activity adjusted per tolerance   bedrest  VTE Prevention/Management: (Eliquis) other (see comments)  Intervention: Prevent Infection  Recent Flowsheet Documentation  Taken 1/12/2023 0000 by Emerald Sanchez RN  Infection Prevention:  rest/sleep promoted  Taken 1/11/2023 2000 by Emerald Sanchez RN  Infection Prevention:   hand hygiene promoted   rest/sleep promoted  Goal: Optimal Comfort and Wellbeing  Outcome: Ongoing, Progressing  Intervention: Monitor Pain and Promote Comfort  Recent Flowsheet Documentation  Taken 1/11/2023 2000 by Emerald Sanchez RN  Pain Management Interventions:   care clustered   quiet environment facilitated   see MAR   pillow support provided   diversional activity provided  Intervention: Provide Person-Centered Care  Recent Flowsheet Documentation  Taken 1/11/2023 2000 by Emerald Sanchez RN  Trust Relationship/Rapport: care explained  Goal: Readiness for Transition of Care  Outcome: Ongoing, Progressing     Problem: Dysrhythmia  Goal: Normalized Cardiac Rhythm  Outcome: Ongoing, Progressing  Intervention: Monitor and Manage Cardiac Rhythm Effect  Recent Flowsheet Documentation  Taken 1/12/2023 0000 by Emerald Sanchez RN  VTE Prevention/Management: other (see comments)  Taken 1/11/2023 2000 by Emerald Sanchez RN  Stabilization Measures: legs elevated  VTE Prevention/Management: (Eliquis) other (see comments)     Problem: Fluid and Electrolyte Imbalance (Acute Kidney Injury/Impairment)  Goal: Fluid and Electrolyte Balance  Outcome: Ongoing, Progressing  Intervention: Monitor and Manage Fluid and Electrolyte Balance  Recent Flowsheet Documentation  Taken 1/12/2023 0000 by Emerald Sanchez RN  Fluid/Electrolyte Management: fluids restricted  Taken 1/11/2023 2000 by Emerald Sanchez RN  Fluid/Electrolyte Management: fluids restricted     Problem: Oral Intake Inadequate (Acute Kidney Injury/Impairment)  Goal: Optimal Nutrition Intake  Outcome: Ongoing, Progressing     Problem: Renal Function Impairment (Acute Kidney Injury/Impairment)  Goal: Effective Renal Function  Outcome: Ongoing, Progressing  Intervention: Monitor and Support Renal Function  Recent Flowsheet Documentation  Taken 1/11/2023 2000 by Emerald Sanchez RN  Stabilization  Measures: legs elevated  Medication Review/Management: medications reviewed     Problem: Heart Failure Comorbidity  Goal: Maintenance of Heart Failure Symptom Control  Outcome: Ongoing, Progressing  Intervention: Maintain Heart Failure-Management  Recent Flowsheet Documentation  Taken 1/11/2023 2000 by Emerald Sanchez RN  Medication Review/Management: medications reviewed     Problem: Obstructive Sleep Apnea Risk or Actual Comorbidity Management  Goal: Unobstructed Breathing During Sleep  Outcome: Ongoing, Progressing     Problem: Skin Injury Risk Increased  Goal: Skin Health and Integrity  Outcome: Ongoing, Progressing  Intervention: Optimize Skin Protection  Recent Flowsheet Documentation  Taken 1/11/2023 2000 by Emerald Sanchez RN  Pressure Reduction Techniques: weight shift assistance provided  Head of Bed (HOB) Positioning: HOB at 30-45 degrees  Pressure Reduction Devices: pressure-redistributing mattress utilized  Skin Protection: adhesive use limited     Problem: Fall Injury Risk  Goal: Absence of Fall and Fall-Related Injury  Outcome: Ongoing, Progressing  Intervention: Identify and Manage Contributors  Recent Flowsheet Documentation  Taken 1/11/2023 2000 by Emerald Sanchez RN  Medication Review/Management: medications reviewed  Self-Care Promotion: independence encouraged  Intervention: Promote Injury-Free Environment  Recent Flowsheet Documentation  Taken 1/12/2023 0100 by Emerald Sanchez RN  Safety Promotion/Fall Prevention: safety round/check completed  Taken 1/12/2023 0000 by Emerald Sanchez RN  Safety Promotion/Fall Prevention: safety round/check completed  Taken 1/11/2023 2300 by Emerald Sanchez RN  Safety Promotion/Fall Prevention: safety round/check completed  Taken 1/11/2023 2200 by Emerald Sanchez RN  Safety Promotion/Fall Prevention: safety round/check completed  Taken 1/11/2023 2100 by Emerald Sanchez RN  Safety Promotion/Fall Prevention: safety round/check completed  Taken 1/11/2023 2000 by Emerald Sanchez  RN  Safety Promotion/Fall Prevention: safety round/check completed  Taken 1/11/2023 1900 by Emerald Sanhcez, RN  Safety Promotion/Fall Prevention: safety round/check completed   Goal Outcome Evaluation:           Progress: no change

## 2023-01-12 NOTE — NURSING NOTE
Paged  PT.BP  Down 78/54 map of  61 rechecked several times still low map below 64 with order for Pt.to restart on dobutamine drip and continue to monitor. Inform no urine output overnight with bumex drip going. last bladder scan 157ml confirm with the charge nurse Carolyn. MD made aware urine output might improve with 2 of the drips together like previous days.

## 2023-01-12 NOTE — PROGRESS NOTES
"    [facility]  INPATIENT PROGRESS NOTE    Length of Stay: 21  Date of Admission: 12/20/2022  Primary Care Physician: Shonna Ng APRN    Subjective   Chief Complaint: Shortness of breath     HPI: Patient is a 52-year-old male past medical history of chronic systolic congestive heart failure, diabetes, hyperlipidemia, hypertension, and sleep apnea.  Patient was recently discharged from the hospital after being treated for possible non-STEMI and heart failure.  Medications were adjusted, but patient states that he has been taking 2 mg of Bumex instead of 1 mg as prescribed.  He states that on discharge he felt good, but over the last 24 to 48 hours he has been feeling much more short of breath.  He describes orthopnea and paroxysmal nocturnal dyspnea.  He has exertional shortness of breath.  He does state that his dietary compliance with prescribed diet is relatively poor.     Daily Assessment  -1/12/2023 patient status post electrocardioversion yesterday with better rate control of atrial fibrillation today.  Reports that lower extremity swelling decreased with lower extremities feeling \"less tight\".  Denies chest pain or fevers overnight.  -1/11/2023 patient lower extremity swelling decreasing on IV Bumex drip.  Patient scheduled for JESSENIA/electrocardioversion of atrial fibrillation today.  Patient's family present during evaluation in CCU by Dr. Camejo.  Admits to some palpitations this AM.  -1/10/23 He was transferred to the ICU on 1/3/2023 secondary to hypotension and started on dopamine and dobutamine infusions.  His blood pressure has since improved and dopamine infusion has been  discontinued. He is doing better, less deconditioned, tolerating his diet and voices no new complaints.  He remains on Bumex and dobutamine infusions, has good urinary output and swelling both legs persist but slowly improving.  He voices no new complaints and is maintaining " O2 saturation in the upper 90s on room air.    Review of Systems   Constitutional: Positive for activity change and fatigue. Negative for appetite change, chills, diaphoresis and fever.   HENT: Negative for trouble swallowing and voice change.    Eyes: Negative for photophobia and visual disturbance.   Respiratory: Negative for cough, choking, chest tightness, shortness of breath, wheezing and stridor.    Cardiovascular: Positive for leg swelling. Negative for chest pain and palpitations.   Gastrointestinal: Negative for abdominal distention, abdominal pain, blood in stool, constipation, diarrhea, nausea and vomiting.   Endocrine: Negative for cold intolerance, heat intolerance, polydipsia, polyphagia and polyuria.   Genitourinary: Negative for decreased urine volume, difficulty urinating, dysuria, enuresis, flank pain, frequency, hematuria and urgency.   Musculoskeletal: Negative for arthralgias, gait problem, myalgias, neck pain and neck stiffness.   Skin: Negative for pallor, rash and wound.   Neurological: Negative for dizziness, tremors, seizures, syncope, facial asymmetry, speech difficulty, weakness, light-headedness, numbness and headaches.   Hematological: Does not bruise/bleed easily.   Psychiatric/Behavioral: Negative for agitation, behavioral problems and confusion.       Objective    Temp:  [97 °F (36.1 °C)-97.3 °F (36.3 °C)] 97 °F (36.1 °C)  Heart Rate:  [] 93  Resp:  [16-20] 17  BP: ()/(52-98) 109/62   AM-PAC 6 Clicks Score (PT): 21 (01/11/23 2000)     Intake/Output Summary (Last 24 hours) at 1/12/2023 1340  Last data filed at 1/12/2023 1000  Gross per 24 hour   Intake 650 ml   Output 600 ml   Net 50 ml       Physical Exam  Vitals and nursing note reviewed.   Constitutional:       General: He is not in acute distress.     Appearance: He is well-developed. He is obese. He is ill-appearing. He is not diaphoretic.   HENT:      Head: Normocephalic and atraumatic.   Eyes:      General: No  scleral icterus.        Right eye: No discharge.         Left eye: No discharge.      Extraocular Movements: Extraocular movements intact.      Pupils: Pupils are equal, round, and reactive to light.   Neck:      Thyroid: No thyromegaly.      Vascular: No carotid bruit or JVD.   Cardiovascular:      Rate and Rhythm: Tachycardia present. Rhythm irregular.      Heart sounds: Normal heart sounds. No murmur heard.    No friction rub. No gallop.   Pulmonary:      Effort: Pulmonary effort is normal. No respiratory distress.      Breath sounds: Normal breath sounds. No stridor. No wheezing or rales.   Chest:      Chest wall: No tenderness.   Abdominal:      General: Bowel sounds are normal. There is no distension.      Palpations: Abdomen is soft. There is no mass.      Tenderness: There is no abdominal tenderness. There is no right CVA tenderness, left CVA tenderness, guarding or rebound.      Hernia: No hernia is present.   Musculoskeletal:         General: No swelling, tenderness or deformity.      Cervical back: Normal range of motion and neck supple.      Right lower leg: Edema present.      Left lower leg: Edema present.   Skin:     General: Skin is warm and dry.      Coloration: Skin is not jaundiced or pale.      Findings: No bruising, erythema, lesion or rash.   Neurological:      General: No focal deficit present.      Mental Status: He is alert and oriented to person, place, and time.      Cranial Nerves: No cranial nerve deficit.      Sensory: No sensory deficit.      Motor: No weakness or abnormal muscle tone.      Coordination: Coordination normal.      Gait: Gait normal.      Deep Tendon Reflexes: Reflexes normal.   Psychiatric:         Mood and Affect: Mood normal.         Behavior: Behavior normal.         Thought Content: Thought content normal.         Judgment: Judgment normal.             Results Review:  I have reviewed the labs, radiology results, and diagnostic studies.    Laboratory Data:   Results  from last 7 days   Lab Units 01/12/23 0419 01/12/23  0007 01/11/23  1117 01/11/23  0432 01/10/23  0657 01/09/23 0620 01/08/23 0429 01/07/23  0536   SODIUM mmol/L 130*  --   --  131* 130* 129* 129* 130*   POTASSIUM mmol/L 4.1 4.0 2.9* 2.8* 3.6 4.0 4.3 4.6   CHLORIDE mmol/L 87*  --   --  88* 89* 86* 90* 89*   CO2 mmol/L 24.0  --   --  28.0 22.0 27.0 23.0 25.0   BUN mg/dL 64*  --   --  61* 66* 58* 54* 48*   CREATININE mg/dL 2.50*  --   --  1.88* 2.11* 2.14* 1.81* 1.87*   GLUCOSE mg/dL 119*  --   --  116* 132* 122* 144* 124*   CALCIUM mg/dL 9.5  --   --  9.4 9.7 9.7 9.2 9.5   BILIRUBIN mg/dL  --   --   --   --   --  5.1* 4.8* 5.8*   ALK PHOS U/L  --   --   --   --   --  91 89 98   ALT (SGPT) U/L  --   --   --   --   --  84* 96* 119*   AST (SGOT) U/L  --   --   --   --   --  31 35 40   ANION GAP mmol/L 19.0*  --   --  15.0 19.0* 16.0* 16.0* 16.0*     Estimated Creatinine Clearance: 52.4 mL/min (A) (by C-G formula based on SCr of 2.5 mg/dL (H)).  Results from last 7 days   Lab Units 01/12/23  0419 01/10/23  0657 01/07/23  0536   MAGNESIUM mg/dL 2.2 2.0 2.0         Results from last 7 days   Lab Units 01/12/23 0419 01/11/23  0432 01/10/23  0616 01/09/23 0620 01/08/23 0429   WBC 10*3/mm3 4.14 4.12 4.05 5.45 4.95   HEMOGLOBIN g/dL 12.9* 12.8* 13.1 13.4 12.8*   HEMATOCRIT % 39.4 39.0 37.7 39.2 37.1*   PLATELETS 10*3/mm3 89* 89* 88* 91* 91*           Culture Data:   No results found for: BLOODCX  No results found for: URINECX  No results found for: RESPCX  No results found for: WOUNDCX  No results found for: STOOLCX  No components found for: BODYFLD    Radiology Data:   Imaging Results (Last 24 Hours)     ** No results found for the last 24 hours. **          Scheduled Meds:apixaban, 5 mg, Oral, Q12H  Morphine, 4 mg, Intravenous, Once  pantoprazole, 40 mg, Oral, Q AM  sodium chloride, 10 mL, Intravenous, Q12H  sodium chloride, 10 mL, Intravenous, Q12H  sodium chloride, 10 mL, Intravenous, Q12H  spironolactone, 25 mg,  Oral, Daily      Continuous Infusions:bumetanide, 0.5 mg/hr, Last Rate: 0.5 mg/hr (01/12/23 1150)  DOBUTamine, 5 mcg/kg/min, Last Rate: 5 mcg/kg/min (01/12/23 5412)      PRN Meds:.•  acetaminophen  •  albuterol  •  calcium carbonate  •  droperidol  •  melatonin  •  ondansetron  •  potassium chloride **OR** potassium chloride **OR** potassium chloride  •  simethicone  •  sodium chloride  •  sodium chloride  •  sodium chloride    Assessment/Plan     Hospital Problem List:  Principal Problem:    FRANCK (acute kidney injury) (HCC)  Active Problems:    Severe malnutrition (HCC)    Atrial fibrillation, persistent (HCC)    Acute on chronic kidney disease stage III (likely cardiorenal):   - Patient's baseline is reportedly between 1.4-1.7.  Creatinine is 2.14.     Continue Bumex infusion and continue to monitor renal function. Input by nephrologist is appreciated.  -1/11/2023 appreciate nephrology's expert assistance!  Patient may possibly start dialysis within next 48 hours per nephrology recommendations if anasarca does not improve on IV Bumex.  -1/12/2023 IV Bumex rate decreased by nephrology secondary to increase in creatinine over past 24 hours.    History of nonischemic cardiomyopathy/chronic systolic heart failure status post AICD placement (with hypotension):   - Patient appears clinically euvolemic and chest x-ray done 1/9/2023 showed cardiomegaly without pulmonary vascular congestion. Continue Bumex and dobutamine infusions,  with strict I's and O's, daily weights, salt and fluid restriction.  Input by cardiologist is appreciated.  Echocardiogram done 8/19/2022 showed an estimated ejection fraction of 15 to 20%.  Elevated LFTs are reactive, much improved and will be monitored.       Hypotension secondary to HFrEF  -1/12 still requiring IV dobutamine for heart failure amelioration.  We will wean from vasopressors as tolerated.  Appreciate cardiology consult assistance.    Hyponatremia (likely hypervolemic): Continue  diuretics, restrict free water and monitor BMP.    Acute on chronic chronic atrial fibrillation with RVR:  - Patient is in uncontrolled ventricular response.  Coreg is on hold for now secondary to borderline blood pressure.  Continue anticoagulation with Eliquis.   -1/11/2023 electrocardioversion performed with patient better rate control versus yesterday's examination.    Hypokalemia: Resolved.    Possible acute cholecystitis: Patient is less symptomatic and tolerating recommended diet.  HIDA scan was unremarkable.    Obstructive sleep apnea: Continue nightly CPAP.    Deconditioning: Continue PT and OT.    Continue GI and DVT prophylaxis.    Medical Decision Making  Number and Complexity of problems: 4 major, 2 minor  Differential Diagnosis: Atrial fibrillation, acute on chronic kidney disease, hyponatremia, hypokalemia, HFrEF    Conditions and Status:        Condition is improving.     I have utilized all available immediate resources to obtain, update, or review the patient's current medications (including all prescriptions, over-the-counter products, herbals, cannabis/cannabidiol products, and vitamin/mineral/dietary (nutritional) supplements).     MDM Data  External documents reviewed: Care everywhere, up-to-date  My EKG interpretation: Reviewed patient's telemetry over past 24 hours  My imaging interpretation: See above assessment and plan  Tests considered but not ordered: None     Decision rules/scores evaluated (example [id number], Wells, etc): [id number]     Discussed with: Cardiology consultant and nursing staff     Treatment Plan  As above in assessment and plan    Care Planning  Shared decision making: Patient, patient's family, nursing staff, nephrology, cardiology, and hospitalist  Code status and discussions: Full code    Disposition  Social Determinants of Health that impact treatment or disposition: History of substance abuse  I expect the patient to be discharged unable to determine given  patient's current severity of presentation      I confirmed that the patient's Advance Care Plan is present, code status is documented, or surrogate decision maker is listed in the patient's medical record.     I have utilized all available immediate resources to obtain, update, or review the patient's current medications.    35 minutes of critical care time was spent in the assessment and formulation of treatment plan for this patient exclusive of billable procedures by Dr. Camejo on 1/12/2023        Inocente Camejo MD   01/12/23   13:40 CST

## 2023-01-12 NOTE — PROGRESS NOTES
SUBJECTIVE:   1/11/2023  Chief Complaint:     Subjective      Patient feels better today.  Denied abdominal pain.  Tolerating diet.  Had a bowel movement.  LFTs are improving.  Bilirubin is 5.1.  Hemoglobin is 13.4.  WBC normal.    History:  Past Medical History:   Diagnosis Date   • Asthma    • Chronic systolic heart failure (HCC)    • Diabetes mellitus (HCC)    • Hyperlipidemia    • Hypertension    • Obesity    • Peripheral vascular disease (HCC)    • Sleep apnea      Past Surgical History:   Procedure Laterality Date   • CARDIAC CATHETERIZATION N/A 12/08/2021   • CARDIAC DEFIBRILLATOR PLACEMENT     • VARICOSE VEIN SURGERY Right 04/05/2019     Family History   Problem Relation Age of Onset   • Diabetes Mother    • Hypertension Father      Social History     Tobacco Use   • Smoking status: Former   • Smokeless tobacco: Never   Vaping Use   • Vaping Use: Never used   Substance Use Topics   • Alcohol use: No   • Drug use: No     Medications Prior to Admission   Medication Sig Dispense Refill Last Dose   • albuterol sulfate  (90 Base) MCG/ACT inhaler Inhale 2 puffs Every 4 (Four) Hours As Needed for Wheezing. 1 inhaler 3    • apixaban (ELIQUIS) 5 MG tablet tablet Take 1 tablet by mouth 2 (Two) Times a Day. 60 tablet 3    • bumetanide (BUMEX) 1 MG tablet Take 1 tablet by mouth 2 (Two) Times a Day. 60 tablet 2    • carvedilol (COREG) 12.5 MG tablet Take 1 tablet by mouth 2 (Two) Times a Day With Meals for 30 days. (Patient taking differently: Take 12.5 mg by mouth 2 (Two) Times a Day With Meals. Pt states he has some meds and is still taking this.) 60 tablet 3    • losartan (COZAAR) 25 MG tablet Take 0.5 tablets by mouth Daily for 30 days. 15 tablet 3    • lovastatin (ALTOPREV) 20 MG 24 hr tablet Take 20 mg by mouth.      • metOLazone (ZAROXOLYN) 2.5 MG tablet Take 1 tablet by mouth 3 (Three) Times a Week. 15 tablet 3    • potassium chloride 10 MEQ CR tablet Take 2 tablets by mouth Daily. 30 tablet 1    •  vitamin D (ERGOCALCIFEROL) 1.25 MG (99593 UT) capsule capsule Take 50,000 Units by mouth 1 (One) Time Per Week.        Allergies:  Patient has no known allergies.     CURRENT MEDICATIONS/OBJECTIVE/VS/PE:     Current Medications:     Current Facility-Administered Medications   Medication Dose Route Frequency Provider Last Rate Last Admin   • acetaminophen (TYLENOL) tablet 1,000 mg  1,000 mg Oral Q6H PRN Sudeep Wolf MD   1,000 mg at 01/11/23 1958   • albuterol (PROVENTIL) nebulizer solution 0.083% 2.5 mg/3mL  2.5 mg Nebulization Q4H PRN Will French MD       • apixaban (ELIQUIS) tablet 5 mg  5 mg Oral Q12H Vania Andujar APRN   5 mg at 01/11/23 2025   • bumetanide (BUMEX) 10 mg in sodium chloride 0.9 % 100 mL (0.1 mg/mL) infusion  1 mg/hr Intravenous Continuous Trevor Alonso MD 10 mL/hr at 01/11/23 2025 1 mg/hr at 01/11/23 2025   • calcium carbonate (TUMS) chewable tablet 500 mg (200 mg elemental)  2 tablet Oral TID PRN Will French MD   2 tablet at 01/05/23 0015   • DOBUTamine (DOBUTREX) 1 mg/mL infusion  5 mcg/kg/min Intravenous Continuous Dylon Aranda MD   Held at 01/11/23 1400   • droperidol (INAPSINE) injection 1.25 mg  1.25 mg Intravenous Q6H PRN Sudeep Wolf MD   1.25 mg at 12/22/22 1302   • melatonin tablet 3 mg  3 mg Oral Nightly PRN Behroozi, Saeid, MD   3 mg at 01/05/23 2220   • morphine injection 4 mg  4 mg Intravenous Once Will French MD       • ondansetron (ZOFRAN) injection 4 mg  4 mg Intravenous Q6H PRN Sudeep Wolf MD   4 mg at 01/01/23 0830   • pantoprazole (PROTONIX) EC tablet 40 mg  40 mg Oral Q AM Ayaan Cuba MD   40 mg at 01/10/23 0513   • potassium chloride (MICRO-K) CR capsule 40 mEq  40 mEq Oral PRN Moshe Aleman R, DO   40 mEq at 01/11/23 1921    Or   • potassium chloride (KLOR-CON) packet 40 mEq  40 mEq Oral PRN Moshe Aleman R, DO       • simethicone (MYLICON) chewable tablet 80 mg  80 mg Oral 4x Daily PRN  Delonte Caputo MD   80 mg at 12/25/22 0025   • sodium chloride 0.9 % flush 10 mL  10 mL Intravenous Q12H Will French MD   10 mL at 01/09/23 0905   • sodium chloride 0.9 % flush 10 mL  10 mL Intravenous PRN Will French MD   10 mL at 12/30/22 0145   • sodium chloride 0.9 % flush 10 mL  10 mL Intravenous Q12H Ayaan Cuba MD   10 mL at 01/09/23 0904   • sodium chloride 0.9 % flush 10 mL  10 mL Intravenous Q12H Ayaan Cuba MD   10 mL at 01/11/23 2026   • sodium chloride 0.9 % flush 10 mL  10 mL Intravenous PRN Ayaan Cuba MD       • sodium chloride 0.9 % infusion 40 mL  40 mL Intravenous PRN Will French MD   100 mL at 01/11/23 1405   • spironolactone (ALDACTONE) tablet 25 mg  25 mg Oral Daily Trevor Alonso MD           Objective     Review of Systems:   Review of Systems    Physical Exam:   Temp:  [97 °F (36.1 °C)-98 °F (36.7 °C)] 97.2 °F (36.2 °C)  Heart Rate:  [] 104  Resp:  [16-18] 16  BP: ()/(54-90) 120/87     Physical Exam:  General Appearance:    Alert, cooperative, in no acute distress   Head:    Normocephalic, without obvious abnormality, atraumatic   Eyes:            Lids and lashes normal, conjunctivae and sclerae normal, no   icterus, no pallor, corneas clear, PERRLA   Ears:    Ears appear intact with no abnormalities noted   Throat:   No oral lesions, no thrush, oral mucosa moist   Neck:   No adenopathy, supple, trachea midline, no thyromegaly, no     carotid bruit, no JVD   Back:     No kyphosis present, no scoliosis present, no skin lesions,       erythema or scars, no tenderness to percussion or                   palpation,   range of motion normal   Lungs:     Clear to auscultation,respirations regular, even and                   unlabored    Heart:    Regular rhythm and normal rate, normal S1 and S2, no            murmur, no gallop, no rub, no click   Breast Exam:    Deferred   Abdomen:     Normal bowel sounds, no masses, no organomegaly, soft         nontender, nondistended, no guarding, no rebound                 tenderness   Genitalia:    Deferred   Extremities:   Moves all extremities well, no edema, no cyanosis, no              redness   Pulses:   Pulses palpable and equal bilaterally   Skin:   No bleeding, bruising or rash   Lymph nodes:   No palpable adenopathy   Neurologic:   Cranial nerves 2 - 12 grossly intact, sensation intact, DTR        present and equal bilaterally      Results Review:     Lab Results (last 24 hours)     Procedure Component Value Units Date/Time    Potassium [[id number]]  (Abnormal) Collected: 01/11/23 1117    Specimen: Blood Updated: 01/11/23 1153     Potassium 2.9 mmol/L     Basic Metabolic Panel [[id number]]  (Abnormal) Collected: 01/11/23 0432    Specimen: Blood Updated: 01/11/23 0532     Glucose 116 mg/dL      BUN 61 mg/dL      Creatinine 1.88 mg/dL      Sodium 131 mmol/L      Potassium 2.8 mmol/L      Chloride 88 mmol/L      CO2 28.0 mmol/L      Calcium 9.4 mg/dL      BUN/Creatinine Ratio 32.4     Anion Gap 15.0 mmol/L      eGFR 42.7 mL/min/1.73      Comment: National Kidney Foundation and American Society of Nephrology (ASN) Task Force recommended calculation based on the Chronic Kidney Disease Epidemiology Collaboration (CKD-EPI) equation refit without adjustment for race.       Narrative:      GFR Normal >60  Chronic Kidney Disease <60  Kidney Failure <15      CBC & Differential [[id number]]  (Abnormal) Collected: 01/11/23 0432    Specimen: Blood Updated: 01/11/23 0504    Narrative:      The following orders were created for panel order CBC & Differential.  Procedure                               Abnormality         Status                     ---------                               -----------         ------                     CBC Auto Differential[[id number]]        Abnormal            Final result               Scan Slide[[id number]]                                                                    Please view results  for these tests on the individual orders.    CBC Auto Differential [[id number]]  (Abnormal) Collected: 01/11/23 0432    Specimen: Blood Updated: 01/11/23 0504     WBC 4.12 10*3/mm3      RBC 4.46 10*6/mm3      Hemoglobin 12.8 g/dL      Hematocrit 39.0 %      MCV 87.4 fL      MCH 28.7 pg      MCHC 32.8 g/dL      RDW 16.8 %      RDW-SD 51.4 fl      MPV 14.0 fL      Platelets 89 10*3/mm3      Neutrophil % 63.5 %      Lymphocyte % 24.0 %      Monocyte % 10.9 %      Eosinophil % 1.2 %      Basophil % 0.2 %      Immature Grans % 0.2 %      Neutrophils, Absolute 2.61 10*3/mm3      Lymphocytes, Absolute 0.99 10*3/mm3      Monocytes, Absolute 0.45 10*3/mm3      Eosinophils, Absolute 0.05 10*3/mm3      Basophils, Absolute 0.01 10*3/mm3      Immature Grans, Absolute 0.01 10*3/mm3      nRBC 0.0 /100 WBC            I reviewed the patient's new clinical results.  I reviewed the patient's new imaging results and agree with the interpretation.     ASSESSMENT/PLAN:   ASSESSMENT: 1.  Elevated liver enzymes, improving.  2.  Congestive heart failure, on dialysis  3.  Anemia, improving.    PLAN: 1.  Continue PPI and current supportive care  2.  Continue no added salt diet and diuretics  3.  Follow LFTs closely  The risks, benefits, and alternatives of this procedure have been discussed with the patient or the responsible party- the patient understands and agrees to proceed.         Bea Pierce MD  01/11/23  22:25 CST

## 2023-01-12 NOTE — PLAN OF CARE
Problem: Adult Inpatient Plan of Care  Goal: Plan of Care Review  Outcome: Ongoing, Progressing  Flowsheets (Taken 1/11/2023 0829)  Progress: improving  Plan of Care Reviewed With: patient  Outcome Evaluation: vss   Goal Outcome Evaluation:  Plan of Care Reviewed With: patient        Progress: improving  Outcome Evaluation: vss

## 2023-01-12 NOTE — SIGNIFICANT NOTE
01/12/23 1552   OTHER   Discipline physical therapy assistant   Rehab Time/Intention   Session Not Performed other (see comments)     Pt asleep. Mother in room. Per nurse ''let him rest''

## 2023-01-12 NOTE — PROGRESS NOTES
"  [facility] [place] Cardiology Progress Note   LOS: 21 days   Patient Care Team:  Shonna Ng APRN as PCP - General (Family Medicine)    Chief Complaint   Patient presents with   • Shortness of Breath       Subjective     Interval History:   Patient Denies: chest pain, PND, orthopnea and dizziness  Patient Complaints: shortness of air and edema  History taken from: patient chart RN    S/P successful synchronized CV yesterday. Dobtuamine was stopped yesterday but overnight had to be restarted due to reduced urinary output.  Dobutamine at 5 mcg/kg/min  Bumex at 0.5 mg/hr. LLE the same.     Objective     Vital Sign Min/Max for last 24 hours  Temp  Min: 97 °F (36.1 °C)  Max: 97.3 °F (36.3 °C)   BP  Min: 78/54  Max: 140/61   Pulse  Min: 86  Max: 157   Resp  Min: 16  Max: 20   SpO2  Min: 88 %  Max: 100 %   Flow (L/min)  Min: 2  Max: 2   Weight  Min: 134 kg (295 lb 9.8 oz)  Max: 134 kg (295 lb 9.8 oz)     Flowsheet Rows    Flowsheet Row First Filed Value   Admission Height 193 cm (76\") Documented at 12/20/2022 1700   Admission Weight 127 kg (280 lb) Documented at 12/20/2022 1700            01/11/23  0500 01/11/23  1119 01/12/23  0300   Weight: 134 kg (294 lb 8.6 oz) 134 kg (295 lb 6.7 oz) 134 kg (295 lb 9.8 oz) (as per Pt.request to be weight)     Physical Exam:  Physical Exam  Vitals and nursing note reviewed.   Constitutional:       Appearance: He is well-groomed. He is not ill-appearing or diaphoretic.      Interventions: Nasal cannula in place.   HENT:      Head: Normocephalic and atraumatic.      Nose: Nose normal. No congestion.      Mouth/Throat:      Mouth: Mucous membranes are moist.      Pharynx: Oropharynx is clear. No oropharyngeal exudate.   Eyes:      General: Lids are normal.         Right eye: No discharge.         Left eye: No discharge.      Conjunctiva/sclera: Conjunctivae normal.   Neck:      Vascular: JVD (mild) present.      Trachea: Trachea normal.   Cardiovascular:      Rate and Rhythm: Normal rate and " regular rhythm.      Heart sounds: Normal heart sounds, S1 normal and S2 normal.     No gallop.   Pulmonary:      Effort: Pulmonary effort is normal.      Breath sounds: Normal breath sounds and air entry.   Abdominal:      General: Bowel sounds are normal. There is no distension.      Palpations: Abdomen is soft.   Musculoskeletal:      Right upper leg: Edema present.      Left upper leg: Edema present.      Right lower le+ Edema present.      Left lower le+ Edema present.      Right ankle: Swelling present.      Left ankle: Swelling present.      Right foot: Swelling present.      Left foot: Swelling present.   Skin:     General: Skin is warm and dry.      Capillary Refill: Capillary refill takes less than 2 seconds.   Neurological:      Mental Status: He is alert and oriented to person, place, and time.   Psychiatric:         Attention and Perception: Attention normal.         Mood and Affect: Mood is depressed.         Speech: Speech normal.          Results Reviewed by myself:     Results from last 7 days   Lab Units 23  0419 23  0007 23  1117 23  0432 01/10/23  0657 23  0620 23  0429 23  0536   SODIUM mmol/L 130*  --   --  131* 130* 129* 129* 130*   POTASSIUM mmol/L 4.1 4.0 2.9* 2.8* 3.6 4.0 4.3 4.6   CHLORIDE mmol/L 87*  --   --  88* 89* 86* 90* 89*   CO2 mmol/L 24.0  --   --  28.0 22.0 27.0 23.0 25.0   BUN mg/dL 64*  --   --  61* 66* 58* 54* 48*   CREATININE mg/dL 2.50*  --   --  1.88* 2.11* 2.14* 1.81* 1.87*   CALCIUM mg/dL 9.5  --   --  9.4 9.7 9.7 9.2 9.5   BILIRUBIN mg/dL  --   --   --   --   --  5.1* 4.8* 5.8*   ALK PHOS U/L  --   --   --   --   --  91 89 98   ALT (SGPT) U/L  --   --   --   --   --  84* 96* 119*   AST (SGOT) U/L  --   --   --   --   --  31 35 40   GLUCOSE mg/dL 119*  --   --  116* 132* 122* 144* 124*       Estimated Creatinine Clearance: 52.4 mL/min (A) (by C-G formula based on SCr of 2.5 mg/dL (H)).    Results from last 7 days   Lab  Units 01/12/23  0419 01/10/23  0657 01/07/23  0536   MAGNESIUM mg/dL 2.2 2.0 2.0             Results from last 7 days   Lab Units 01/12/23  0419 01/11/23  0432 01/10/23  0616 01/09/23  0620 01/08/23  0429   WBC 10*3/mm3 4.14 4.12 4.05 5.45 4.95   HEMOGLOBIN g/dL 12.9* 12.8* 13.1 13.4 12.8*   PLATELETS 10*3/mm3 89* 89* 88* 91* 91*           Lab Results   Component Value Date    PROBNP 5,254.0 (H) 12/20/2022       I/O last 3 completed shifts:  In: 1234 [P.O.:440; I.V.:674; IV Piggyback:120]  Out: 3450 [Urine:3450]    Cardiographics:  ECG/EMG Results (last 24 hours)     Procedure Component Value Units Date/Time    SCANNED EKG [[id number]] Resulted: 12/20/22     Updated: 01/09/23 2205    SCANNED EKG [[id number]] Resulted: 12/20/22     Updated: 01/09/23 2219    SCANNED EKG [[id number]] Resulted: 12/20/22     Updated: 01/09/23 2223    SCANNED EKG [[id number]] Resulted: 12/20/22     Updated: 01/09/23 2228    SCANNED EKG [[id number]] Resulted: 12/20/22     Updated: 01/09/23 2228    SCANNED EKG [[id number]] Resulted: 12/20/22     Updated: 01/09/23 2234    SCANNED EKG [[id number]] Resulted: 12/20/22     Updated: 01/09/23 2234          Results for orders placed during the hospital encounter of 12/20/22    Adult Transthoracic Echo Limited W/ Cont if Necessary Per Protocol    Interpretation Summary  •  Left ventricular systolic function is severely decreased. Estimated left ventricular EF = 10% Left ventricular ejection fraction appears to be less than 20%.  •  The left ventricular cavity is severely dilated.  •  Severely reduced right ventricular systolic function noted.  •  The right ventricular cavity is moderately dilated.  •  Left atrial volume is severely increased.  •  The right atrial cavity is severely  dilated.  •  Moderate mitral valve regurgitation is present.  Severity of mitral regurgitation may be underestimated due to eccentric nature of the jet.  •  Estimated right ventricular systolic pressure from tricuspid  regurgitation is moderately elevated (45-55 mmHg).  •  There is no evidence of pericardial effusion.      NM HIDA SCAN WITHOUT PHARMACOLOGICAL INTERVENTION    Result Date: 12/27/2022  Unremarkable hepatobiliary scan. Suboptimal ejection fraction of gallbladder at 9%, with a fatty meal challenge. Electronically signed by:  Pedro Day MD  12/27/2022 1:52 PM CST Workstation: [id number]    NM Lung Ventilation Perfusion    Result Date: 12/23/2022  Conclusion: Low probability for pulmonary embolism. Cardiomegaly. Some elevation right hemidiaphragm. [id number] Electronically signed by:  Mukul Klein MD  12/23/2022 4:17 PM CST Workstation: [id number]    US Guided Vascular Access    Result Date: 1/3/2023  Imaging obtained during vascular access device placement under ultrasound guidance as described above Electronically signed by:  Leeroy Alxe MD  1/3/2023 12:55 PM CST Workstation: [id number]    CT Abdomen Pelvis With Contrast    Result Date: 12/21/2022  Severe cardiomegaly. Mild bibasilar pulmonary edema. Hepatomegaly. Nonspecific gallbladder distention. Consider right upper quadrant ultrasound if there is pain. Electronically signed by:  Charlee Yan MD  12/21/2022 12:29 AM CST Workstation: [id number]    US Gallbladder    Result Date: 12/21/2022  1.  Abnormal appearance of the gallbladder, as above, suspicious for acute cholecystitis. If clinically indeterminate recommend nuclear medicine hepatobiliary scan. 2. Hepatomegaly. RP core team activated 9:51 AM for results notification. Electronically signed by:  Rc Wheatley MD  12/21/2022 9:52 AM CST Workstation: [id number]    XR Chest 1 View    Result Date: 1/9/2023  1. Extreme cardiomegaly. Correlate for cardiomyopathy versus pericardial effusion. 2. Clear lungs. ----------------------------------------------------- Electronically signed by:  Augustus Morales MD  1/9/2023 4:22 AM CST Workstation: [id number]    XR Chest 1 View    Result Date: 1/3/2023  CONCLUSION: Left-sided pacemaker  remains in place with lead projected over the right ventricle. Stable cardiomegaly. Cannot exclude atelectasis or infiltrate retrocardiac region left lower lobe. Cannot exclude small left pleural effusion. [id number] Electronically signed by:  Mukul Klein MD  1/3/2023 4:35 PM CST Workstation: [id number]    XR Chest 1 View    Result Date: 12/20/2022  CONCLUSION: Linear atelectasis medial right lung base. Left-sided pacemaker remains in place with lead projected over the right ventricle. Stable cardiomegaly. [id number] Electronically signed by:  Mukul Klein MD  12/20/2022 5:42 PM CST Workstation: [id number]    CT Angiogram Chest    Result Date: 12/21/2022  1.  Nondiagnostic assessment of the pulmonary arteries. Minimal enhancement of the pulmonary arteries. 2.  Dilation of the left heart chambers and of the right atrium. 3.  No acute pulmonary infiltrate. Electronically signed by:  India Mayfield MD  12/21/2022 12:39 AM CST Workstation: [id number]      Medication Review:     Current Facility-Administered Medications:   •  acetaminophen (TYLENOL) tablet 1,000 mg, 1,000 mg, Oral, Q6H PRN, Sudeep Wolf MD, 1,000 mg at 01/11/23 1958  •  albuterol (PROVENTIL) nebulizer solution 0.083% 2.5 mg/3mL, 2.5 mg, Nebulization, Q4H PRN, Will French MD  •  apixaban (ELIQUIS) tablet 5 mg, 5 mg, Oral, Q12H, Vania Andujar, APRN, 5 mg at 01/12/23 0950  •  bumetanide (BUMEX) 10 mg in sodium chloride 0.9 % 100 mL (0.1 mg/mL) infusion, 0.5 mg/hr, Intravenous, Continuous, Trevor Alonso MD, Last Rate: 5 mL/hr at 01/12/23 1150, 0.5 mg/hr at 01/12/23 1150  •  calcium carbonate (TUMS) chewable tablet 500 mg (200 mg elemental), 2 tablet, Oral, TID PRN, Will French MD, 2 tablet at 01/05/23 0015  •  DOBUTamine (DOBUTREX) 1 mg/mL infusion, 5 mcg/kg/min, Intravenous, Continuous, Dylon Aranda MD, Last Rate: 42 mL/hr at 01/12/23 0545, 5 mcg/kg/min at 01/12/23 0545  •  droperidol (INAPSINE) injection 1.25 mg, 1.25 mg,  Intravenous, Q6H PRN, Sudeep Wolf MD, 1.25 mg at 12/22/22 1302  •  melatonin tablet 3 mg, 3 mg, Oral, Nightly PRN, Behroozi, Saeid, MD, 3 mg at 01/05/23 2220  •  morphine injection 4 mg, 4 mg, Intravenous, Once, Will French MD  •  ondansetron (ZOFRAN) injection 4 mg, 4 mg, Intravenous, Q6H PRN, Sudeep Wolf MD, 4 mg at 01/01/23 0830  •  pantoprazole (PROTONIX) EC tablet 40 mg, 40 mg, Oral, Q AM, Ayaan Cuba MD, 40 mg at 01/12/23 0525  •  potassium chloride (MICRO-K) CR capsule 40 mEq, 40 mEq, Oral, PRN **OR** potassium chloride (KLOR-CON) packet 40 mEq, 40 mEq, Oral, PRN **OR** potassium chloride 10 mEq in 100 mL IVPB, 10 mEq, Intravenous, Q1H PRN, Sudeep Wolf MD  •  simethicone (MYLICON) chewable tablet 80 mg, 80 mg, Oral, 4x Daily PRN, Delonte Caputo MD, 80 mg at 12/25/22 0025  •  sodium chloride 0.9 % flush 10 mL, 10 mL, Intravenous, Q12H, Will French MD, 10 mL at 01/09/23 0905  •  sodium chloride 0.9 % flush 10 mL, 10 mL, Intravenous, PRN, Will French MD, 10 mL at 12/30/22 0145  •  sodium chloride 0.9 % flush 10 mL, 10 mL, Intravenous, Q12H, Ayaan Cuba MD, 10 mL at 01/09/23 0904  •  sodium chloride 0.9 % flush 10 mL, 10 mL, Intravenous, Q12H, Ayaan Cuba MD, 10 mL at 01/12/23 0951  •  sodium chloride 0.9 % flush 10 mL, 10 mL, Intravenous, PRN, Ayaan Cuba MD  •  sodium chloride 0.9 % infusion 40 mL, 40 mL, Intravenous, PRN, Will French MD, 100 mL at 01/11/23 1405  •  spironolactone (ALDACTONE) tablet 25 mg, 25 mg, Oral, Daily, Trevor Alonso MD, 25 mg at 01/12/23 0950    Patient's recent labs and results as included in the subjective data were reviewed by me. Any pertinent outside data will be included.      Assessment & Plan       FRANCK (acute kidney injury) (HCC)    Severe malnutrition (HCC)    Atrial fibrillation, persistent (HCC)    1.  Nonischemic cardiomyopathy. Severely reduced LVEF.     NICM: EF: 10-15%. NYHA  Class III, Stage C Patient is currently volume overloaded.  and in a well perfused physiologic state. Hemodynamics are acceptable-atrial fibrillation with variable rate    On dobutamine infusion at 5 mcg/kg/min drip.  On Bumex 0.5 mg/hour drip    BETA-BLOCKER: Hypotensive  ACE/ARB: CKD, hypotensive  ENTRESTO: Indicated, CKD  DIURETIC: Bumex infusion, nephrology managing, we appreciate their assistance  SGL2I: CKD  ALDOSTERONE ANTAGONIST: per Nephro restarting Spironolactone  IMDUR/HYDRALAZINE: Hypotension   DIGOXIN: N/A    Fluid restriction: 1500ml  Sodium restriction:2 grams    Cardiac Rehab: Indicated  ICD: yes . Single lead,   ADHF: Current admission  LVAD: Needs Advanced heart failure care. We discussed this two days ago. He is willing to go to [facility]. Dr. Harris, Intensivist has accepted, awaiting room assignment.     2.  Hypotension.  S/P CV yesterday and was off Dobutamine gtt. Has to be restarted during the night for urine output. Still requiring inotropic support.     Bumex drip decreased due to increase in Cr from 1.8 to 2.5    3. Atrial fibrillation with RVR, converted.  S/P CV. In NSR      Continue Eliquis. Eliquis was also home med due to poor LV function and primary LV thrombus     4. FRANCK on CKD.  Nephrology managing.    Plan for disposition: Recommend higher level of care for heart failure. [facility] Intensivist Dr. Harris accepting physician.       This document has been electronically signed by SHELL Garcia on January 12, 2023 12:27 CST   Electronically signed by SHELL Garcia, 01/12/23, 12:27 PM CST.    I personally saw and examined Matti Bravo after the APRN.  I personally performed a history and physical examination of the patient.  I personally reviewed independent findings and plan of care.  I discussed management with the APRN.  I agree with the APRN's documentation.    No acute overnight events.  Dobutamine gtt was restarted.  Telemetry was reviewed.  He  appears to be in sinus rhythm currently.  Urine output appears to have reduced yesterday when compared to previous days.  He has not had any significant chest discomfort today.  His dyspnea remains stable.  He continues to have considerable bilateral lower extremity edema.  He continues to have borderline low BP readings intermittently.    Vitals:    01/12/23 1300 01/12/23 1330 01/12/23 1400 01/12/23 1508   BP: 118/93 (!) 82/63 (!) 84/57 99/56   BP Location:       Patient Position:       Pulse: 87 87 87 88   Resp:       Temp:       TempSrc:       SpO2: 100% 100% 99% 99%   Weight:       Height:         Nonischemic cardiomyopathy: He is s/p ICD placement.  Severe biventricular systolic dysfunction has been noted on multiple prior echocardiograms.  Beta-blocker and ACE-I/ARNI medications have been on hold for the past several days due to FRANCK on CKD/borderline hypotension.  He continues to appear significantly fluid overloaded at this point despite responding reasonably well to bumex gtt and dobutamine gtt for the past several days.  We have not been able to wean off dobutamine gtt. Nephrology is following to assist in diuretic management.  At this point, it appears that he would benefit from further evaluation and management at an advanced heart failure center for consideration of advanced heart failure therapies.  He has been accepted for transfer to [facility] for further management.     Hypotension: Improved. Continue dobutamine gtt.     New onset atrial fibrillation with RVR:  Continue oral Eliquis.  He is s/p successful cardioversion to sinus rhythm on 1/11/2023 and has maintained sinus rhythm since then.  Serum TSH and free T4 levels were normal in December 2022.      FRANCK on CKD: Nephrology following.    Thrombocytopenia: Management per the primary medical team.    Please feel free to call us with any questions.    Electronically signed by Dylon Aranda MD, 01/12/23, 4:22 PM CST.

## 2023-01-13 LAB
ANION GAP SERPL CALCULATED.3IONS-SCNC: 15 MMOL/L (ref 5–15)
BASOPHILS # BLD AUTO: 0.03 10*3/MM3 (ref 0–0.2)
BASOPHILS NFR BLD AUTO: 0.9 % (ref 0–1.5)
BUN SERPL-MCNC: 67 MG/DL (ref 6–20)
BUN/CREAT SERPL: 28.9 (ref 7–25)
CALCIUM SPEC-SCNC: 9.6 MG/DL (ref 8.6–10.5)
CHLORIDE SERPL-SCNC: 87 MMOL/L (ref 98–107)
CO2 SERPL-SCNC: 28 MMOL/L (ref 22–29)
CREAT SERPL-MCNC: 2.32 MG/DL (ref 0.76–1.27)
DEPRECATED RDW RBC AUTO: 52.6 FL (ref 37–54)
EGFRCR SERPLBLD CKD-EPI 2021: 33.2 ML/MIN/1.73
EOSINOPHIL # BLD AUTO: 0.04 10*3/MM3 (ref 0–0.4)
EOSINOPHIL NFR BLD AUTO: 1.2 % (ref 0.3–6.2)
ERYTHROCYTE [DISTWIDTH] IN BLOOD BY AUTOMATED COUNT: 17 % (ref 12.3–15.4)
GLUCOSE SERPL-MCNC: 109 MG/DL (ref 65–99)
HCT VFR BLD AUTO: 37.1 % (ref 37.5–51)
HGB BLD-MCNC: 12.5 G/DL (ref 13–17.7)
IMM GRANULOCYTES # BLD AUTO: 0.02 10*3/MM3 (ref 0–0.05)
IMM GRANULOCYTES NFR BLD AUTO: 0.6 % (ref 0–0.5)
LYMPHOCYTES # BLD AUTO: 0.99 10*3/MM3 (ref 0.7–3.1)
LYMPHOCYTES NFR BLD AUTO: 28.5 % (ref 19.6–45.3)
MAGNESIUM SERPL-MCNC: 2.2 MG/DL (ref 1.6–2.6)
MCH RBC QN AUTO: 29.3 PG (ref 26.6–33)
MCHC RBC AUTO-ENTMCNC: 33.7 G/DL (ref 31.5–35.7)
MCV RBC AUTO: 86.9 FL (ref 79–97)
MONOCYTES # BLD AUTO: 0.54 10*3/MM3 (ref 0.1–0.9)
MONOCYTES NFR BLD AUTO: 15.6 % (ref 5–12)
NEUTROPHILS NFR BLD AUTO: 1.85 10*3/MM3 (ref 1.7–7)
NEUTROPHILS NFR BLD AUTO: 53.2 % (ref 42.7–76)
NRBC BLD AUTO-RTO: 0.6 /100 WBC (ref 0–0.2)
PLATELET # BLD AUTO: 81 10*3/MM3 (ref 140–450)
PMV BLD AUTO: ABNORMAL FL
POTASSIUM SERPL-SCNC: 3.5 MMOL/L (ref 3.5–5.2)
POTASSIUM SERPL-SCNC: 4.2 MMOL/L (ref 3.5–5.2)
RBC # BLD AUTO: 4.27 10*6/MM3 (ref 4.14–5.8)
SODIUM SERPL-SCNC: 130 MMOL/L (ref 136–145)
WBC NRBC COR # BLD: 3.47 10*3/MM3 (ref 3.4–10.8)

## 2023-01-13 PROCEDURE — 25010000002 DOBUTAMINE PER 250 MG: Performed by: NURSE PRACTITIONER

## 2023-01-13 PROCEDURE — 85025 COMPLETE CBC W/AUTO DIFF WBC: CPT | Performed by: HOSPITALIST

## 2023-01-13 PROCEDURE — 80048 BASIC METABOLIC PNL TOTAL CA: CPT | Performed by: INTERNAL MEDICINE

## 2023-01-13 PROCEDURE — 99233 SBSQ HOSP IP/OBS HIGH 50: CPT | Performed by: INTERNAL MEDICINE

## 2023-01-13 PROCEDURE — 84132 ASSAY OF SERUM POTASSIUM: CPT

## 2023-01-13 PROCEDURE — 83735 ASSAY OF MAGNESIUM: CPT | Performed by: INTERNAL MEDICINE

## 2023-01-13 RX ADMIN — Medication 10 ML: at 20:46

## 2023-01-13 RX ADMIN — POTASSIUM CHLORIDE 40 MEQ: 750 CAPSULE, EXTENDED RELEASE ORAL at 06:23

## 2023-01-13 RX ADMIN — APIXABAN 5 MG: 5 TABLET, FILM COATED ORAL at 20:36

## 2023-01-13 RX ADMIN — SPIRONOLACTONE 25 MG: 25 TABLET ORAL at 08:06

## 2023-01-13 RX ADMIN — BUMETANIDE 0.5 MG/HR: 0.25 INJECTION INTRAMUSCULAR; INTRAVENOUS at 03:25

## 2023-01-13 RX ADMIN — Medication 10 ML: at 08:08

## 2023-01-13 RX ADMIN — POTASSIUM CHLORIDE 40 MEQ: 750 CAPSULE, EXTENDED RELEASE ORAL at 10:37

## 2023-01-13 RX ADMIN — PANTOPRAZOLE SODIUM 40 MG: 40 TABLET, DELAYED RELEASE ORAL at 05:09

## 2023-01-13 RX ADMIN — DOBUTAMINE HYDROCHLORIDE 2.5 MCG/KG/MIN: 100 INJECTION INTRAVENOUS at 20:46

## 2023-01-13 RX ADMIN — BUMETANIDE 0.5 MG/HR: 0.25 INJECTION INTRAMUSCULAR; INTRAVENOUS at 20:46

## 2023-01-13 RX ADMIN — DOBUTAMINE HYDROCHLORIDE 2.5 MCG/KG/MIN: 100 INJECTION INTRAVENOUS at 06:40

## 2023-01-13 RX ADMIN — APIXABAN 5 MG: 5 TABLET, FILM COATED ORAL at 08:06

## 2023-01-13 NOTE — PROGRESS NOTES
"  [facility]  [address] [place] [address]. [address]  T - [phone]  F - [phone]     Progress Note          PATIENT  DEMOGRAPHICS   PATIENT NAME: Matti Bravo                      PHYSICIAN: Denise Aguirre MD  : 1970  MRN: [id number]   LOS: 22 days    Patient Care Team:  Shonna Ng APRN as PCP - General (Family Medicine)  Subjective   SUBJECTIVE   Patient doing ok. No acute events overnight. On dobutamine drip.   Patient has been accepted at  for transfer.          Objective   OBJECTIVE   Vital Signs  Temp:  [97.1 °F (36.2 °C)-97.8 °F (36.6 °C)] 97.1 °F (36.2 °C)  Heart Rate:  [] 100  Resp:  [16-20] 18  BP: ()/(55-96) 116/86    Flowsheet Rows    Flowsheet Row First Filed Value   Admission Height 193 cm (76\") Documented at 2022 1700   Admission Weight 127 kg (280 lb) Documented at 2022 1700           I/O last 3 completed shifts:  In: 1155.9 [P.O.:540; I.V.:557.8; IV Piggyback:58.1]  Out:  [Urine:]    PHYSICAL EXAM    Physical Exam  Vitals reviewed.   Constitutional:       General: He is not in acute distress.     Appearance: Normal appearance. He is not ill-appearing.   HENT:      Head: Normocephalic and atraumatic.   Cardiovascular:      Rate and Rhythm: Normal rate and regular rhythm.   Pulmonary:      Effort: Pulmonary effort is normal. No respiratory distress.      Breath sounds: Normal breath sounds.   Musculoskeletal:      Right lower leg: Edema present.      Left lower leg: Edema present.   Neurological:      Mental Status: He is alert and oriented to person, place, and time.         RESULTS   Results Review:    Results from last 7 days   Lab Units 23  0511 23  0419 23  0007 23  1117 23  0432 01/10/23  0657 23  0620 23  0429 23  0536   SODIUM mmol/L 130* 130*  --   --  131*   < > 129* 129* 130*   POTASSIUM mmol/L 3.5 4.1 4.0   < > 2.8*   < > 4.0 4.3 4.6   CHLORIDE mmol/L 87* 87* "  --   --  88*   < > 86* 90* 89*   CO2 mmol/L 28.0 24.0  --   --  28.0   < > 27.0 23.0 25.0   BUN mg/dL 67* 64*  --   --  61*   < > 58* 54* 48*   CREATININE mg/dL 2.32* 2.50*  --   --  1.88*   < > 2.14* 1.81* 1.87*   CALCIUM mg/dL 9.6 9.5  --   --  9.4   < > 9.7 9.2 9.5   BILIRUBIN mg/dL  --   --   --   --   --   --  5.1* 4.8* 5.8*   ALK PHOS U/L  --   --   --   --   --   --  91 89 98   ALT (SGPT) U/L  --   --   --   --   --   --  84* 96* 119*   AST (SGOT) U/L  --   --   --   --   --   --  31 35 40   GLUCOSE mg/dL 109* 119*  --   --  116*   < > 122* 144* 124*    < > = values in this interval not displayed.       Estimated Creatinine Clearance: 56.5 mL/min (A) (by C-G formula based on SCr of 2.32 mg/dL (H)).    Results from last 7 days   Lab Units 01/13/23  0511 01/12/23  0419 01/10/23  0657   MAGNESIUM mg/dL 2.2 2.2 2.0             Results from last 7 days   Lab Units 01/13/23  0511 01/12/23  0419 01/11/23  0432 01/10/23  0616 01/09/23  0620   WBC 10*3/mm3 3.47 4.14 4.12 4.05 5.45   HEMOGLOBIN g/dL 12.5* 12.9* 12.8* 13.1 13.4   PLATELETS 10*3/mm3 81* 89* 89* 88* 91*               Imaging Results (Last 24 Hours)     ** No results found for the last 24 hours. **           MEDICATIONS    apixaban, 5 mg, Oral, Q12H  Morphine, 4 mg, Intravenous, Once  pantoprazole, 40 mg, Oral, Q AM  sodium chloride, 10 mL, Intravenous, Q12H  sodium chloride, 10 mL, Intravenous, Q12H  sodium chloride, 10 mL, Intravenous, Q12H  spironolactone, 25 mg, Oral, Daily      bumetanide, 0.5 mg/hr, Last Rate: 0.5 mg/hr (01/13/23 0325)  DOBUTamine, 2.5 mcg/kg/min, Last Rate: 2.5 mcg/kg/min (01/13/23 3803)        Assessment & Plan   ASSESSMENT / PLAN      FRANCK (acute kidney injury) (HCC)    Severe malnutrition (HCC)    Atrial fibrillation, persistent (HCC)    1.  Acute Kidney Failure on CKD 3: Baseline creatinine 1.4-1.7 mg/dl  - Etiology of FRANCK likely contrast induced.    - Urinalysis showed trace protein and negative blood, 3-5 RBCs, 0-2 WBCs.   Urine sodium less than 20.  Urine protein creatinine ratio 235 mg.  - Creatinine peak at 2.9. Some improvement from yesterday from 2.5 to 2.32.  - Keep fluid restriction of 1500 ml a day.   - Lasix was not helping with diuresis and due to marked anasarca  bumex drip was added. initial good response and now decrease response. Continue bumex drip and lower it to 0.5mg / hr because of elevated cr.   - Continue aldactone    We have talked about UF only dialysis in case if bumex drip didn't help but he has marked UO in last 2-3 days. He understands he may need isolated UF at tertiary center     2.  Shortness of breath:  - Underwent CT with contrast which was nondiagnostic     3.  Chronic systolic CHF /AICD/ non ischemic cardiomyopathy EF 15-20%. -   - Cardioversion done on 1/11/23  - Patient accepted at  for further management. Waiting for bed     4.  Prediabetes     5.  Hypertension:  - Blood pressure is borderline low. On dubotamine drip. Continue to monitor BP       6.  KHANH:  - Supposed to be using CPAP at home     7. Acute cholecystitis:  - Now better after antibiotics     8. New onset Afib:  - On eliquis now  - Went cardioversion on 1/11/23           Signature        Denise Aguirre. MD PGY2  [facility]  [address] [place] [address]  Office: [phone]    This document has been electronically signed by Denise Aguirre MD on January 13, 2023 10:26 CST     I have reviewed the case with the resident. I have also examined and seen  the patient.  I agree with the assessment and plan as documented in the resident's note.         This document has been electronically signed by Trevor Alonso MD on January 13, 2023 13:47 CST

## 2023-01-13 NOTE — PAYOR COMM NOTE
"Brittaney Carter RN [facility]  [phone]      Phone  [phone]       Fax  Cont. Stay REview    Ethel Davis (52 y.o. Male)     Date of Birth   1970    Social Security Number       Address   [address] [address]    Home Phone   [phone]    MRN   [id number]       Hoahaoism   Presybeterian    Marital Status                               Admission Date   12/20/22    Admission Type   Emergency    Admitting Provider   Will French MD    Attending Provider   Will French MD    Department, Room/Bed   [facility] STEPDOWN, 20/A       Discharge Date       Discharge Disposition       Discharge Destination                               Attending Provider: Will French MD    Allergies: No Known Allergies    Isolation: None   Infection: None   Code Status: CPR    Ht: 193 cm (76\")   Wt: 135 kg (296 lb 14.4 oz)    Admission Cmt: None   Principal Problem: FRANCK (acute kidney injury) (HCC) [N17.9]                 Active Insurance as of 12/20/2022     Primary Coverage     Payor Plan Insurance Group Employer/Plan Group    [place] [id number]     Payor Plan Address Payor Plan Phone Number Payor Plan Fax Number Effective Dates    [employer] [address] [address] [phone]  10/8/2021 - None Entered    [facility] [address]       Subscriber Name Subscriber Birth Date Member ID       ETHEL DAVIS 1970 [id number]                 Emergency Contacts      (Rel.) Home Phone Work Phone Mobile Phone    DavisCourtney sahu (Mother) [phone] -- --    LawrenceMary Anne (Sister) -- -- [phone]            Vital Signs (last day)     Date/Time Temp Temp src Pulse Resp BP Patient Position SpO2    01/13/23 0900 -- -- 100 -- 116/86 -- 93    01/13/23 0806 -- -- 93 -- 111/56 -- --    01/13/23 0800 97.1 (36.2) Oral 86 18 111/56 Lying 99    01/13/23 0700 -- -- 95 " -- 94/63 -- 98    01/13/23 0600 -- -- -- -- 150/91 -- --    01/13/23 0500 -- -- 90 -- 130/56 -- 100    01/13/23 0400 97.7 (36.5) Oral 84 18 96/72 Lying 100    01/13/23 0300 -- -- 90 -- 125/59 -- 100    01/13/23 0200 -- -- 97 -- 115/69 -- 100    01/13/23 0100 -- -- 86 -- 81/61 -- 98    01/13/23 0000 97.4 (36.3) Oral 89 20 94/67 Lying 100    01/12/23 2300 -- -- 89 -- 121/58 -- 100    01/12/23 2200 -- -- 90 -- 138/96 -- 100    01/12/23 2100 -- -- 99 -- 108/65 -- 98    01/12/23 2000 97.8 (36.6) Oral 85 16 81/62 Lying 100    01/12/23 1900 -- -- 91 -- 91/59 -- 100    01/12/23 1800 -- -- 100 -- -- -- 100    01/12/23 1730 -- -- 94 -- -- -- 100    01/12/23 1700 -- -- 95 -- 88/61 -- 100    01/12/23 1630 -- -- 89 -- 96/55 -- 100    01/12/23 1530 97.5 (36.4) Axillary 98 18 112/83 Lying 100    01/12/23 1508 -- -- 88 -- 99/56 -- 99    01/12/23 1400 -- -- 87 -- 84/57 -- 99    01/12/23 1330 -- -- 87 -- 82/63 -- 100    01/12/23 1300 -- -- 87 -- 118/93 -- 100    01/12/23 1200 97.2 (36.2) Axillary 99 16 107/76 -- 98    01/12/23 1130 -- -- 93 -- 102/57 -- 99    01/12/23 1100 -- -- 93 -- 109/62 -- 98    01/12/23 1030 -- -- 90 -- 116/73 -- 98    01/12/23 1000 -- -- 90 -- 118/70 -- 100    01/12/23 0930 -- -- 92 -- -- -- 100    01/12/23 0900 -- -- 97 -- 126/90 -- 98    01/12/23 0830 -- -- 87 -- 100/67 -- 97    01/12/23 0800 97 (36.1) Axillary 86 17 117/73 -- 97    01/12/23 0730 -- -- 94 -- 122/98 -- 100    01/12/23 0725 -- -- 108 -- -- -- 99    01/12/23 0720 -- -- 94 -- -- -- 100    01/12/23 0715 -- -- 96 -- -- -- 92    01/12/23 0705 -- -- 157 -- -- -- 91    01/12/23 0700 -- -- 156 18 139/85 -- 100    01/12/23 0655 -- -- 145 -- -- -- 89    01/12/23 0630 -- -- 95 18 140/61 -- 100    01/12/23 0625 -- -- 93 18 122/52 -- 100    01/12/23 0620 -- -- -- -- -- -- 95    01/12/23 0600 -- -- 90 18 90/56 -- 98    01/12/23 0525 -- -- 88 18 82/56 -- 98    01/12/23 0505 -- -- 88 17 84/57 -- 100    01/12/23 0500 -- -- 90 17 78/54 -- 99    01/12/23 0430 --  -- 91 18 86/54 -- 98    01/12/23 0425 -- -- 92 18 93/53 -- 99    01/12/23 0400 97.3 (36.3) Temporal 93 18 93/54 -- 94    01/12/23 0300 -- -- 97 -- 98/69 -- 94    01/12/23 0215 -- -- 94 18 98/63 -- 96    01/12/23 0200 -- -- 91 -- 82/58 -- 98    01/12/23 0100 -- -- 98 18 109/62 -- 96    01/12/23 0000 97 (36.1) Temporal 93 18 86/62 Lying 95          Oxygen Therapy (last day)     Date/Time SpO2 Device (Oxygen Therapy) Flow (L/min) Oxygen Concentration (%) ETCO2 (mmHg)    01/13/23 0900 93 -- -- -- --    01/13/23 0800 99 room air -- -- --    01/13/23 0700 98 -- -- -- --    01/13/23 0500 100 -- -- -- --    01/13/23 0400 100 room air -- -- --    01/13/23 0300 100 -- -- -- --    01/13/23 0200 100 -- -- -- --    01/13/23 0100 98 -- -- -- --    01/13/23 0000 100 room air -- -- --    01/12/23 2300 100 -- -- -- --    01/12/23 2200 100 -- -- -- --    01/12/23 2100 98 -- -- -- --    01/12/23 2000 100 room air -- -- --    01/12/23 1900 100 -- -- -- --    01/12/23 1800 100 -- -- -- --    01/12/23 1730 100 -- -- -- --    01/12/23 1700 100 -- -- -- --    01/12/23 1630 100 -- -- -- --    01/12/23 1530 100 room air -- -- --    01/12/23 1508 99 -- -- -- --    01/12/23 1400 99 -- -- -- --    01/12/23 1330 100 -- -- -- --    01/12/23 1300 100 -- -- -- --    01/12/23 1200 98 room air -- -- --    01/12/23 1130 99 -- -- -- --    01/12/23 1100 98 -- -- -- --    01/12/23 1030 98 -- -- -- --    01/12/23 1000 100 -- -- -- --    01/12/23 0930 100 -- -- -- --    01/12/23 0900 98 -- -- -- --    01/12/23 0830 97 -- -- -- --    01/12/23 0800 97 room air -- -- --    01/12/23 0730 100 -- -- -- --    01/12/23 0725 99 -- -- -- --    01/12/23 0720 100 -- -- -- --    01/12/23 0715 92 -- -- -- --    01/12/23 0705 91 -- -- -- --    01/12/23 0700 100 -- -- -- --    01/12/23 0655 89 -- -- -- --    01/12/23 0630 100 -- -- -- --    01/12/23 0625 100 -- -- -- --    01/12/23 0620 95 -- -- -- --    01/12/23 0600 98 -- -- -- --    01/12/23 0525 98 -- -- -- --     01/12/23 0505 100 -- -- -- --    01/12/23 0500 99 room air -- -- --    01/12/23 0430 98 -- -- -- --    01/12/23 0425 99 -- -- -- --    01/12/23 0400 94 -- -- -- --    01/12/23 0300 94 -- -- -- --    01/12/23 0215 96 -- -- -- --    01/12/23 0200 98 -- -- -- --    01/12/23 0100 96 -- -- -- --    01/12/23 0000 95 nasal cannula 2 -- --          Current Facility-Administered Medications   Medication Dose Route Frequency Provider Last Rate Last Admin   • acetaminophen (TYLENOL) tablet 1,000 mg  1,000 mg Oral Q6H PRN Sudeep Wolf MD   1,000 mg at 01/11/23 1958   • albuterol (PROVENTIL) nebulizer solution 0.083% 2.5 mg/3mL  2.5 mg Nebulization Q4H PRN Will French MD       • apixaban (ELIQUIS) tablet 5 mg  5 mg Oral Q12H Vania Andujar APRN   5 mg at 01/13/23 0806   • bumetanide (BUMEX) 10 mg in sodium chloride 0.9 % 100 mL (0.1 mg/mL) infusion  0.5 mg/hr Intravenous Continuous Trevor Alonso MD 5 mL/hr at 01/13/23 0325 0.5 mg/hr at 01/13/23 0325   • calcium carbonate (TUMS) chewable tablet 500 mg (200 mg elemental)  2 tablet Oral TID PRN Will French MD   2 tablet at 01/05/23 0015   • DOBUTamine (DOBUTREX) 1 mg/mL infusion  2.5 mcg/kg/min Intravenous Continuous Susana Berkowitz APRN 21 mL/hr at 01/13/23 0640 2.5 mcg/kg/min at 01/13/23 0640   • droperidol (INAPSINE) injection 1.25 mg  1.25 mg Intravenous Q6H PRN Sudeep Wolf MD   1.25 mg at 12/22/22 1302   • melatonin tablet 3 mg  3 mg Oral Nightly PRN Behroozi, Saeid, MD   3 mg at 01/05/23 2220   • morphine injection 4 mg  4 mg Intravenous Once Will French MD       • ondansetron (ZOFRAN) injection 4 mg  4 mg Intravenous Q6H PRN Sudeep Wolf MD   4 mg at 01/01/23 0830   • pantoprazole (PROTONIX) EC tablet 40 mg  40 mg Oral Q AM Ayaan Cuba MD   40 mg at 01/13/23 0509   • potassium chloride (MICRO-K) CR capsule 40 mEq  40 mEq Oral PRN Sudeep Wolf MD   40 mEq at 01/13/23 0623    Or   • potassium  chloride (KLOR-CON) packet 40 mEq  40 mEq Oral PRN Sudeep Wolf MD        Or   • potassium chloride 10 mEq in 100 mL IVPB  10 mEq Intravenous Q1H PRN Sudeep Wolf MD       • simethicone (MYLICON) chewable tablet 80 mg  80 mg Oral 4x Daily PRN Delonte Caputo MD   80 mg at 12/25/22 0025   • sodium chloride 0.9 % flush 10 mL  10 mL Intravenous Q12H Will French MD   10 mL at 01/09/23 0905   • sodium chloride 0.9 % flush 10 mL  10 mL Intravenous PRN Will French MD   10 mL at 12/30/22 0145   • sodium chloride 0.9 % flush 10 mL  10 mL Intravenous Q12H Ayaan Cuba MD   10 mL at 01/09/23 0904   • sodium chloride 0.9 % flush 10 mL  10 mL Intravenous Q12H Ayaan Cuba MD   10 mL at 01/13/23 0808   • sodium chloride 0.9 % flush 10 mL  10 mL Intravenous PRN Ayaan Cuba MD       • sodium chloride 0.9 % infusion 40 mL  40 mL Intravenous PRN Will French MD   100 mL at 01/11/23 1405   • spironolactone (ALDACTONE) tablet 25 mg  25 mg Oral Daily Trevor Alonso MD   25 mg at 01/13/23 0806        Physician Progress Notes (last 24 hours)      Bea Pierce MD at 01/12/23 1345                  SUBJECTIVE:   1/13/2023  Chief Complaint:     Subjective       Patient feels better today.  Chest pain has improved.  Dyspnea is improving.  Denied abdominal pain.  Had a bowel movement.  Tolerating diet.  Heart rate in the 90s.  Blood pressure is well controlled.  Hemoglobin is 12.9.  WBC normal.    History:  Past Medical History:   Diagnosis Date   • Asthma    • Chronic systolic heart failure (HCC)    • Diabetes mellitus (HCC)    • Hyperlipidemia    • Hypertension    • Obesity    • Peripheral vascular disease (HCC)    • Sleep apnea      Past Surgical History:   Procedure Laterality Date   • CARDIAC CATHETERIZATION N/A 12/08/2021   • CARDIAC DEFIBRILLATOR PLACEMENT     • VARICOSE VEIN SURGERY Right 04/05/2019     Family History   Problem Relation Age of Onset   • Diabetes Mother     • Hypertension Father      Social History     Tobacco Use   • Smoking status: Former   • Smokeless tobacco: Never   Vaping Use   • Vaping Use: Never used   Substance Use Topics   • Alcohol use: No   • Drug use: No     Medications Prior to Admission   Medication Sig Dispense Refill Last Dose   • albuterol sulfate  (90 Base) MCG/ACT inhaler Inhale 2 puffs Every 4 (Four) Hours As Needed for Wheezing. 1 inhaler 3    • apixaban (ELIQUIS) 5 MG tablet tablet Take 1 tablet by mouth 2 (Two) Times a Day. 60 tablet 3    • bumetanide (BUMEX) 1 MG tablet Take 1 tablet by mouth 2 (Two) Times a Day. 60 tablet 2    • carvedilol (COREG) 12.5 MG tablet Take 1 tablet by mouth 2 (Two) Times a Day With Meals for 30 days. (Patient taking differently: Take 12.5 mg by mouth 2 (Two) Times a Day With Meals. Pt states he has some meds and is still taking this.) 60 tablet 3    • losartan (COZAAR) 25 MG tablet Take 0.5 tablets by mouth Daily for 30 days. 15 tablet 3    • lovastatin (ALTOPREV) 20 MG 24 hr tablet Take 20 mg by mouth.      • metOLazone (ZAROXOLYN) 2.5 MG tablet Take 1 tablet by mouth 3 (Three) Times a Week. 15 tablet 3    • potassium chloride 10 MEQ CR tablet Take 2 tablets by mouth Daily. 30 tablet 1    • vitamin D (ERGOCALCIFEROL) 1.25 MG (89908 UT) capsule capsule Take 50,000 Units by mouth 1 (One) Time Per Week.        Allergies:  Patient has no known allergies.     CURRENT MEDICATIONS/OBJECTIVE/VS/PE:     Current Medications:     Current Facility-Administered Medications   Medication Dose Route Frequency Provider Last Rate Last Admin   • acetaminophen (TYLENOL) tablet 1,000 mg  1,000 mg Oral Q6H PRN Sudeep Wolf MD   1,000 mg at 01/11/23 1958   • albuterol (PROVENTIL) nebulizer solution 0.083% 2.5 mg/3mL  2.5 mg Nebulization Q4H PRN Will French MD       • apixaban (ELIQUIS) tablet 5 mg  5 mg Oral Q12H Vania Andujar APRN   5 mg at 01/12/23 2004   • bumetanide (BUMEX) 10 mg in sodium chloride 0.9  % 100 mL (0.1 mg/mL) infusion  0.5 mg/hr Intravenous Continuous Trevor Alonso MD 5 mL/hr at 01/13/23 0325 0.5 mg/hr at 01/13/23 0325   • calcium carbonate (TUMS) chewable tablet 500 mg (200 mg elemental)  2 tablet Oral TID PRN Will French MD   2 tablet at 01/05/23 0015   • DOBUTamine (DOBUTREX) 1 mg/mL infusion  2.5 mcg/kg/min Intravenous Continuous Susana Berkowitz, APRALTON 21 mL/hr at 01/13/23 0640 2.5 mcg/kg/min at 01/13/23 0640   • droperidol (INAPSINE) injection 1.25 mg  1.25 mg Intravenous Q6H PRN Sudeep Wolf MD   1.25 mg at 12/22/22 1302   • melatonin tablet 3 mg  3 mg Oral Nightly PRN Behroozi, Saeid, MD   3 mg at 01/05/23 2220   • morphine injection 4 mg  4 mg Intravenous Once Will French MD       • ondansetron (ZOFRAN) injection 4 mg  4 mg Intravenous Q6H PRN Sudeep Wolf MD   4 mg at 01/01/23 0830   • pantoprazole (PROTONIX) EC tablet 40 mg  40 mg Oral Q AM Ayaan Cuba MD   40 mg at 01/13/23 0509   • potassium chloride (MICRO-K) CR capsule 40 mEq  40 mEq Oral PRN Sudeep Wolf MD   40 mEq at 01/13/23 0623    Or   • potassium chloride (KLOR-CON) packet 40 mEq  40 mEq Oral PRN Sudeep Wolf MD        Or   • potassium chloride 10 mEq in 100 mL IVPB  10 mEq Intravenous Q1H PRN Sudeep Wolf MD       • simethicone (MYLICON) chewable tablet 80 mg  80 mg Oral 4x Daily PRN Delonte Caputo MD   80 mg at 12/25/22 0025   • sodium chloride 0.9 % flush 10 mL  10 mL Intravenous Q12H Will French MD   10 mL at 01/09/23 0905   • sodium chloride 0.9 % flush 10 mL  10 mL Intravenous PRN Will French MD   10 mL at 12/30/22 0145   • sodium chloride 0.9 % flush 10 mL  10 mL Intravenous Q12H Ayaan Cuba MD   10 mL at 01/09/23 0904   • sodium chloride 0.9 % flush 10 mL  10 mL Intravenous Q12H Ayaan Cuba MD   10 mL at 01/12/23 2005   • sodium chloride 0.9 % flush 10 mL  10 mL Intravenous PRN Ayaan Cuba MD       • sodium  chloride 0.9 % infusion 40 mL  40 mL Intravenous PRN Will French MD   100 mL at 01/11/23 1405   • spironolactone (ALDACTONE) tablet 25 mg  25 mg Oral Daily Trevor Alonso MD   25 mg at 01/12/23 0950       Objective      Review of Systems:   Review of Systems   Constitutional: Negative for chills, fatigue, fever and unexpected weight change.   HENT: Negative for congestion, ear discharge, hearing loss, nosebleeds and sore throat.    Eyes: Negative for pain, discharge and redness.   Respiratory: Positive for shortness of breath. Negative for cough, chest tightness and wheezing.    Cardiovascular: Negative for chest pain and palpitations.   Gastrointestinal: Negative for abdominal distention, abdominal pain, blood in stool, constipation, diarrhea, nausea and vomiting.   Endocrine: Negative for cold intolerance, polydipsia, polyphagia and polyuria.   Genitourinary: Negative for dysuria, flank pain, frequency, hematuria and urgency.   Musculoskeletal: Negative for arthralgias, back pain, joint swelling and myalgias.   Skin: Negative for color change, pallor and rash.   Neurological: Negative for tremors, seizures, syncope, weakness and headaches.   Hematological: Negative for adenopathy. Does not bruise/bleed easily.   Psychiatric/Behavioral: Negative for behavioral problems, confusion, dysphoric mood, hallucinations and suicidal ideas. The patient is not nervous/anxious.        Physical Exam:   Temp:  [97 °F (36.1 °C)-97.8 °F (36.6 °C)] 97.7 °F (36.5 °C)  Heart Rate:  [] 95  Resp:  [16-20] 18  BP: ()/(55-96) 94/63     Physical Exam:  General Appearance:    Alert, cooperative, in no acute distress   Head:    Normocephalic, without obvious abnormality, atraumatic   Eyes:            Lids and lashes normal, conjunctivae and sclerae normal, no   icterus, no pallor, corneas clear, PERRLA   Ears:    Ears appear intact with no abnormalities noted   Throat:   No oral lesions, no thrush, oral mucosa moist    Neck:   No adenopathy, supple, trachea midline, no thyromegaly, no     carotid bruit, no JVD   Back:     No kyphosis present, no scoliosis present, no skin lesions,       erythema or scars, no tenderness to percussion or                   palpation,   range of motion normal   Lungs:     Clear to auscultation,respirations regular, even and                   unlabored    Heart:    Regular rhythm and normal rate, normal S1 and S2, no            murmur, no gallop, no rub, no click   Breast Exam:    Deferred   Abdomen:     Normal bowel sounds, no masses, no organomegaly, soft        nontender, nondistended, no guarding, no rebound                 tenderness   Genitalia:    Deferred   Extremities:   Moves all extremities well, no edema, no cyanosis, no              redness   Pulses:   Pulses palpable and equal bilaterally   Skin:   No bleeding, bruising or rash   Lymph nodes:   No palpable adenopathy   Neurologic:   Cranial nerves 2 - 12 grossly intact, sensation intact, DTR        present and equal bilaterally      Results Review:     Lab Results (last 24 hours)     Procedure Component Value Units Date/Time    Basic Metabolic Panel [[id number]]  (Abnormal) Collected: 01/13/23 0511    Specimen: Blood Updated: 01/13/23 0609     Glucose 109 mg/dL      BUN 67 mg/dL      Creatinine 2.32 mg/dL      Sodium 130 mmol/L      Potassium 3.5 mmol/L      Chloride 87 mmol/L      CO2 28.0 mmol/L      Calcium 9.6 mg/dL      BUN/Creatinine Ratio 28.9     Anion Gap 15.0 mmol/L      eGFR 33.2 mL/min/1.73      Comment: National Kidney Foundation and American Society of Nephrology (ASN) Task Force recommended calculation based on the Chronic Kidney Disease Epidemiology Collaboration (CKD-EPI) equation refit without adjustment for race.       Narrative:      GFR Normal >60  Chronic Kidney Disease <60  Kidney Failure <15      Magnesium [[id number]]  (Normal) Collected: 01/13/23 0511    Specimen: Blood Updated: 01/13/23 0609     Magnesium 2.2  mg/dL     CBC & Differential [[id number]]  (Abnormal) Collected: 01/13/23 0511    Specimen: Blood Updated: 01/13/23 0558    Narrative:      The following orders were created for panel order CBC & Differential.  Procedure                               Abnormality         Status                     ---------                               -----------         ------                     CBC Auto Differential[[id number]]        Abnormal            Final result               Scan Slide[[id number]]                                                                    Please view results for these tests on the individual orders.    CBC Auto Differential [[id number]]  (Abnormal) Collected: 01/13/23 0511    Specimen: Blood Updated: 01/13/23 0557     WBC 3.47 10*3/mm3      RBC 4.27 10*6/mm3      Hemoglobin 12.5 g/dL      Hematocrit 37.1 %      MCV 86.9 fL      MCH 29.3 pg      MCHC 33.7 g/dL      RDW 17.0 %      RDW-SD 52.6 fl      MPV --     Comment: Unable to calculate        Platelets 81 10*3/mm3      Neutrophil % 53.2 %      Lymphocyte % 28.5 %      Monocyte % 15.6 %      Eosinophil % 1.2 %      Basophil % 0.9 %      Immature Grans % 0.6 %      Neutrophils, Absolute 1.85 10*3/mm3      Lymphocytes, Absolute 0.99 10*3/mm3      Monocytes, Absolute 0.54 10*3/mm3      Eosinophils, Absolute 0.04 10*3/mm3      Basophils, Absolute 0.03 10*3/mm3      Immature Grans, Absolute 0.02 10*3/mm3      nRBC 0.6 /100 WBC            I reviewed the patient's new clinical results.  I reviewed the patient's new imaging results and agree with the interpretation.     ASSESSMENT/PLAN:   ASSESSMENT: 1.  A. fib with rapid ventricular rate, improved.  2.  Elevated liver enzymes, improving.  3.  Abdominal pain, resolved.  4.  Anemia, likely due to fluid overload.  PLAN: 1.  Continue PPI and current supportive care  2.  Follow LFTs closely  3.  Continue no added salt diet and diuresis  The risks, benefits, and alternatives of this procedure have been discussed  "with the patient or the responsible party- the patient understands and agrees to proceed.         Bea Pierce MD  01/13/23  07:46 CST          Electronically signed by Bea Pierce MD at 01/13/23 0748     Inocente Camejo MD at 01/12/23 1340              [facility] Medicine Services  INPATIENT PROGRESS NOTE    Length of Stay: 21  Date of Admission: 12/20/2022  Primary Care Physician: Shonna Ng APRN    Subjective   Chief Complaint: Shortness of breath     HPI: Patient is a 52-year-old male past medical history of chronic systolic congestive heart failure, diabetes, hyperlipidemia, hypertension, and sleep apnea.  Patient was recently discharged from the hospital after being treated for possible non-STEMI and heart failure.  Medications were adjusted, but patient states that he has been taking 2 mg of Bumex instead of 1 mg as prescribed.  He states that on discharge he felt good, but over the last 24 to 48 hours he has been feeling much more short of breath.  He describes orthopnea and paroxysmal nocturnal dyspnea.  He has exertional shortness of breath.  He does state that his dietary compliance with prescribed diet is relatively poor.     Daily Assessment  -1/12/2023 patient status post electrocardioversion yesterday with better rate control of atrial fibrillation today.  Reports that lower extremity swelling decreased with lower extremities feeling \"less tight\".  Denies chest pain or fevers overnight.  -1/11/2023 patient lower extremity swelling decreasing on IV Bumex drip.  Patient scheduled for JESSENIA/electrocardioversion of atrial fibrillation today.  Patient's family present during evaluation in CCU by Dr. Camejo.  Admits to some palpitations this AM.  -1/10/23 He was transferred to the ICU on 1/3/2023 secondary to hypotension and started on dopamine and dobutamine infusions.  His blood pressure has since improved and dopamine infusion has been  discontinued. He " is doing better, less deconditioned, tolerating his diet and voices no new complaints.  He remains on Bumex and dobutamine infusions, has good urinary output and swelling both legs persist but slowly improving.  He voices no new complaints and is maintaining O2 saturation in the upper 90s on room air.    Review of Systems   Constitutional: Positive for activity change and fatigue. Negative for appetite change, chills, diaphoresis and fever.   HENT: Negative for trouble swallowing and voice change.    Eyes: Negative for photophobia and visual disturbance.   Respiratory: Negative for cough, choking, chest tightness, shortness of breath, wheezing and stridor.    Cardiovascular: Positive for leg swelling. Negative for chest pain and palpitations.   Gastrointestinal: Negative for abdominal distention, abdominal pain, blood in stool, constipation, diarrhea, nausea and vomiting.   Endocrine: Negative for cold intolerance, heat intolerance, polydipsia, polyphagia and polyuria.   Genitourinary: Negative for decreased urine volume, difficulty urinating, dysuria, enuresis, flank pain, frequency, hematuria and urgency.   Musculoskeletal: Negative for arthralgias, gait problem, myalgias, neck pain and neck stiffness.   Skin: Negative for pallor, rash and wound.   Neurological: Negative for dizziness, tremors, seizures, syncope, facial asymmetry, speech difficulty, weakness, light-headedness, numbness and headaches.   Hematological: Does not bruise/bleed easily.   Psychiatric/Behavioral: Negative for agitation, behavioral problems and confusion.       Objective    Temp:  [97 °F (36.1 °C)-97.3 °F (36.3 °C)] 97 °F (36.1 °C)  Heart Rate:  [] 93  Resp:  [16-20] 17  BP: ()/(52-98) 109/62   AM-PAC 6 Clicks Score (PT): 21 (01/11/23 2000)     Intake/Output Summary (Last 24 hours) at 1/12/2023 1340  Last data filed at 1/12/2023 1000  Gross per 24 hour   Intake 650 ml   Output 600 ml   Net 50 ml       Physical Exam  Vitals and  nursing note reviewed.   Constitutional:       General: He is not in acute distress.     Appearance: He is well-developed. He is obese. He is ill-appearing. He is not diaphoretic.   HENT:      Head: Normocephalic and atraumatic.   Eyes:      General: No scleral icterus.        Right eye: No discharge.         Left eye: No discharge.      Extraocular Movements: Extraocular movements intact.      Pupils: Pupils are equal, round, and reactive to light.   Neck:      Thyroid: No thyromegaly.      Vascular: No carotid bruit or JVD.   Cardiovascular:      Rate and Rhythm: Tachycardia present. Rhythm irregular.      Heart sounds: Normal heart sounds. No murmur heard.    No friction rub. No gallop.   Pulmonary:      Effort: Pulmonary effort is normal. No respiratory distress.      Breath sounds: Normal breath sounds. No stridor. No wheezing or rales.   Chest:      Chest wall: No tenderness.   Abdominal:      General: Bowel sounds are normal. There is no distension.      Palpations: Abdomen is soft. There is no mass.      Tenderness: There is no abdominal tenderness. There is no right CVA tenderness, left CVA tenderness, guarding or rebound.      Hernia: No hernia is present.   Musculoskeletal:         General: No swelling, tenderness or deformity.      Cervical back: Normal range of motion and neck supple.      Right lower leg: Edema present.      Left lower leg: Edema present.   Skin:     General: Skin is warm and dry.      Coloration: Skin is not jaundiced or pale.      Findings: No bruising, erythema, lesion or rash.   Neurological:      General: No focal deficit present.      Mental Status: He is alert and oriented to person, place, and time.      Cranial Nerves: No cranial nerve deficit.      Sensory: No sensory deficit.      Motor: No weakness or abnormal muscle tone.      Coordination: Coordination normal.      Gait: Gait normal.      Deep Tendon Reflexes: Reflexes normal.   Psychiatric:         Mood and Affect: Mood  normal.         Behavior: Behavior normal.         Thought Content: Thought content normal.         Judgment: Judgment normal.             Results Review:  I have reviewed the labs, radiology results, and diagnostic studies.    Laboratory Data:   Results from last 7 days   Lab Units 01/12/23 0419 01/12/23  0007 01/11/23  1117 01/11/23  0432 01/10/23  0657 01/09/23  0620 01/08/23  0429 01/07/23  0536   SODIUM mmol/L 130*  --   --  131* 130* 129* 129* 130*   POTASSIUM mmol/L 4.1 4.0 2.9* 2.8* 3.6 4.0 4.3 4.6   CHLORIDE mmol/L 87*  --   --  88* 89* 86* 90* 89*   CO2 mmol/L 24.0  --   --  28.0 22.0 27.0 23.0 25.0   BUN mg/dL 64*  --   --  61* 66* 58* 54* 48*   CREATININE mg/dL 2.50*  --   --  1.88* 2.11* 2.14* 1.81* 1.87*   GLUCOSE mg/dL 119*  --   --  116* 132* 122* 144* 124*   CALCIUM mg/dL 9.5  --   --  9.4 9.7 9.7 9.2 9.5   BILIRUBIN mg/dL  --   --   --   --   --  5.1* 4.8* 5.8*   ALK PHOS U/L  --   --   --   --   --  91 89 98   ALT (SGPT) U/L  --   --   --   --   --  84* 96* 119*   AST (SGOT) U/L  --   --   --   --   --  31 35 40   ANION GAP mmol/L 19.0*  --   --  15.0 19.0* 16.0* 16.0* 16.0*     Estimated Creatinine Clearance: 52.4 mL/min (A) (by C-G formula based on SCr of 2.5 mg/dL (H)).  Results from last 7 days   Lab Units 01/12/23  0419 01/10/23  0657 01/07/23  0536   MAGNESIUM mg/dL 2.2 2.0 2.0         Results from last 7 days   Lab Units 01/12/23 0419 01/11/23  0432 01/10/23  0616 01/09/23  0620 01/08/23  0429   WBC 10*3/mm3 4.14 4.12 4.05 5.45 4.95   HEMOGLOBIN g/dL 12.9* 12.8* 13.1 13.4 12.8*   HEMATOCRIT % 39.4 39.0 37.7 39.2 37.1*   PLATELETS 10*3/mm3 89* 89* 88* 91* 91*           Culture Data:   No results found for: BLOODCX  No results found for: URINECX  No results found for: RESPCX  No results found for: WOUNDCX  No results found for: STOOLCX  No components found for: BODYFLD    Radiology Data:   Imaging Results (Last 24 Hours)     ** No results found for the last 24 hours. **          Scheduled  Meds:apixaban, 5 mg, Oral, Q12H  Morphine, 4 mg, Intravenous, Once  pantoprazole, 40 mg, Oral, Q AM  sodium chloride, 10 mL, Intravenous, Q12H  sodium chloride, 10 mL, Intravenous, Q12H  sodium chloride, 10 mL, Intravenous, Q12H  spironolactone, 25 mg, Oral, Daily      Continuous Infusions:bumetanide, 0.5 mg/hr, Last Rate: 0.5 mg/hr (01/12/23 1150)  DOBUTamine, 5 mcg/kg/min, Last Rate: 5 mcg/kg/min (01/12/23 0514)      PRN Meds:.•  acetaminophen  •  albuterol  •  calcium carbonate  •  droperidol  •  melatonin  •  ondansetron  •  potassium chloride **OR** potassium chloride **OR** potassium chloride  •  simethicone  •  sodium chloride  •  sodium chloride  •  sodium chloride    Assessment/Plan     Hospital Problem List:  Principal Problem:    FRANCK (acute kidney injury) (HCC)  Active Problems:    Severe malnutrition (HCC)    Atrial fibrillation, persistent (HCC)    Acute on chronic kidney disease stage III (likely cardiorenal):   - Patient's baseline is reportedly between 1.4-1.7.  Creatinine is 2.14.     Continue Bumex infusion and continue to monitor renal function. Input by nephrologist is appreciated.  -1/11/2023 appreciate nephrology's expert assistance!  Patient may possibly start dialysis within next 48 hours per nephrology recommendations if anasarca does not improve on IV Bumex.  -1/12/2023 IV Bumex rate decreased by nephrology secondary to increase in creatinine over past 24 hours.    History of nonischemic cardiomyopathy/chronic systolic heart failure status post AICD placement (with hypotension):   - Patient appears clinically euvolemic and chest x-ray done 1/9/2023 showed cardiomegaly without pulmonary vascular congestion. Continue Bumex and dobutamine infusions,  with strict I's and O's, daily weights, salt and fluid restriction.  Input by cardiologist is appreciated.  Echocardiogram done 8/19/2022 showed an estimated ejection fraction of 15 to 20%.  Elevated LFTs are reactive, much improved and will be  monitored.       Hypotension secondary to HFrEF  -1/12 still requiring IV dobutamine for heart failure amelioration.  We will wean from vasopressors as tolerated.  Appreciate cardiology consult assistance.    Hyponatremia (likely hypervolemic): Continue diuretics, restrict free water and monitor BMP.    Acute on chronic chronic atrial fibrillation with RVR:  - Patient is in uncontrolled ventricular response.  Coreg is on hold for now secondary to borderline blood pressure.  Continue anticoagulation with Eliquis.   -1/11/2023 electrocardioversion performed with patient better rate control versus yesterday's examination.    Hypokalemia: Resolved.    Possible acute cholecystitis: Patient is less symptomatic and tolerating recommended diet.  HIDA scan was unremarkable.    Obstructive sleep apnea: Continue nightly CPAP.    Deconditioning: Continue PT and OT.    Continue GI and DVT prophylaxis.    Medical Decision Making  Number and Complexity of problems: 4 major, 2 minor  Differential Diagnosis: Atrial fibrillation, acute on chronic kidney disease, hyponatremia, hypokalemia, HFrEF    Conditions and Status:        Condition is improving.     I have utilized all available immediate resources to obtain, update, or review the patient's current medications (including all prescriptions, over-the-counter products, herbals, cannabis/cannabidiol products, and vitamin/mineral/dietary (nutritional) supplements).     [facility] Data  External documents reviewed: Care everywhere, up-to-date  My EKG interpretation: Reviewed patient's telemetry over past 24 hours  My imaging interpretation: See above assessment and plan  Tests considered but not ordered: None     Decision rules/scores evaluated (example [id number], Wells, etc): [id number] 3     Discussed with: Cardiology consultant and nursing staff     Treatment Plan  As above in assessment and plan    Care Planning  Shared decision making: Patient, patient's family, nursing staff,  "nephrology, cardiology, and hospitalist  Code status and discussions: Full code    Disposition  Social Determinants of Health that impact treatment or disposition: History of substance abuse  I expect the patient to be discharged unable to determine given patient's current severity of presentation      I confirmed that the patient's Advance Care Plan is present, code status is documented, or surrogate decision maker is listed in the patient's medical record.     I have utilized all available immediate resources to obtain, update, or review the patient's current medications.    35 minutes of critical care time was spent in the assessment and formulation of treatment plan for this patient exclusive of billable procedures by Dr. Camejo on 1/12/2023        Inocente Camejo MD   01/12/23   13:40 CST          Electronically signed by Inocente Camejo MD at 01/12/23 1403     Dylon Aranda MD at 01/12/23 1227            [facility]   LOS: 21 days   Patient Care Team:  Shonna Ng APRN as PCP - General (Family Medicine)    Chief Complaint   Patient presents with   • Shortness of Breath       Subjective     Interval History:   Patient Denies: chest pain, PND, orthopnea and dizziness  Patient Complaints: shortness of air and edema  History taken from: patient chart RN    S/P successful synchronized CV yesterday. Dobtuamine was stopped yesterday but overnight had to be restarted due to reduced urinary output.  Dobutamine at 5 mcg/kg/min  Bumex at 0.5 mg/hr. LLE the same.     Objective     Vital Sign Min/Max for last 24 hours  Temp  Min: 97 °F (36.1 °C)  Max: 97.3 °F (36.3 °C)   BP  Min: 78/54  Max: 140/61   Pulse  Min: 86  Max: 157   Resp  Min: 16  Max: 20   SpO2  Min: 88 %  Max: 100 %   Flow (L/min)  Min: 2  Max: 2   Weight  Min: 134 kg (295 lb 9.8 oz)  Max: 134 kg (295 lb 9.8 oz)     Flowsheet Rows    Flowsheet Row First Filed Value   Admission Height 193 cm (76\") Documented at 12/20/2022 1700 "   Admission Weight 127 kg (280 lb) Documented at 2022 1700            23  0500 23  1119 23  0300   Weight: 134 kg (294 lb 8.6 oz) 134 kg (295 lb 6.7 oz) 134 kg (295 lb 9.8 oz) (as per Pt.request to be weight)     Physical Exam:  Physical Exam  Vitals and nursing note reviewed.   Constitutional:       Appearance: He is well-groomed. He is not ill-appearing or diaphoretic.      Interventions: Nasal cannula in place.   HENT:      Head: Normocephalic and atraumatic.      Nose: Nose normal. No congestion.      Mouth/Throat:      Mouth: Mucous membranes are moist.      Pharynx: Oropharynx is clear. No oropharyngeal exudate.   Eyes:      General: Lids are normal.         Right eye: No discharge.         Left eye: No discharge.      Conjunctiva/sclera: Conjunctivae normal.   Neck:      Vascular: JVD (mild) present.      Trachea: Trachea normal.   Cardiovascular:      Rate and Rhythm: Normal rate and regular rhythm.      Heart sounds: Normal heart sounds, S1 normal and S2 normal.     No gallop.   Pulmonary:      Effort: Pulmonary effort is normal.      Breath sounds: Normal breath sounds and air entry.   Abdominal:      General: Bowel sounds are normal. There is no distension.      Palpations: Abdomen is soft.   Musculoskeletal:      Right upper leg: Edema present.      Left upper leg: Edema present.      Right lower le+ Edema present.      Left lower le+ Edema present.      Right ankle: Swelling present.      Left ankle: Swelling present.      Right foot: Swelling present.      Left foot: Swelling present.   Skin:     General: Skin is warm and dry.      Capillary Refill: Capillary refill takes less than 2 seconds.   Neurological:      Mental Status: He is alert and oriented to person, place, and time.   Psychiatric:         Attention and Perception: Attention normal.         Mood and Affect: Mood is depressed.         Speech: Speech normal.          Results Reviewed by myself:     Results  from last 7 days   Lab Units 01/12/23 0419 01/12/23  0007 01/11/23  1117 01/11/23 0432 01/10/23  0657 01/09/23 0620 01/08/23 0429 01/07/23  0536   SODIUM mmol/L 130*  --   --  131* 130* 129* 129* 130*   POTASSIUM mmol/L 4.1 4.0 2.9* 2.8* 3.6 4.0 4.3 4.6   CHLORIDE mmol/L 87*  --   --  88* 89* 86* 90* 89*   CO2 mmol/L 24.0  --   --  28.0 22.0 27.0 23.0 25.0   BUN mg/dL 64*  --   --  61* 66* 58* 54* 48*   CREATININE mg/dL 2.50*  --   --  1.88* 2.11* 2.14* 1.81* 1.87*   CALCIUM mg/dL 9.5  --   --  9.4 9.7 9.7 9.2 9.5   BILIRUBIN mg/dL  --   --   --   --   --  5.1* 4.8* 5.8*   ALK PHOS U/L  --   --   --   --   --  91 89 98   ALT (SGPT) U/L  --   --   --   --   --  84* 96* 119*   AST (SGOT) U/L  --   --   --   --   --  31 35 40   GLUCOSE mg/dL 119*  --   --  116* 132* 122* 144* 124*       Estimated Creatinine Clearance: 52.4 mL/min (A) (by C-G formula based on SCr of 2.5 mg/dL (H)).    Results from last 7 days   Lab Units 01/12/23  0419 01/10/23  0657 01/07/23  0536   MAGNESIUM mg/dL 2.2 2.0 2.0             Results from last 7 days   Lab Units 01/12/23  0419 01/11/23  0432 01/10/23  0616 01/09/23 0620 01/08/23 0429   WBC 10*3/mm3 4.14 4.12 4.05 5.45 4.95   HEMOGLOBIN g/dL 12.9* 12.8* 13.1 13.4 12.8*   PLATELETS 10*3/mm3 89* 89* 88* 91* 91*           Lab Results   Component Value Date    PROBNP 5,254.0 (H) 12/20/2022       I/O last 3 completed shifts:  In: 1234 [P.O.:440; I.V.:674; IV Piggyback:120]  Out: 3450 [Urine:3450]    Cardiographics:  ECG/EMG Results (last 24 hours)     Procedure Component Value Units Date/Time    SCANNED EKG [[id number]] Resulted: 12/20/22     Updated: 01/09/23 2205    SCANNED EKG [[id number]] Resulted: 12/20/22     Updated: 01/09/23 2219    SCANNED EKG [[id number]] Resulted: 12/20/22     Updated: 01/09/23 2223    SCANNED EKG [[id number]] Resulted: 12/20/22     Updated: 01/09/23 2228    SCANNED EKG [[id number]] Resulted: 12/20/22     Updated: 01/09/23 2228    SCANNED EKG [[id number]] Resulted:  12/20/22     Updated: 01/09/23 2234    SCANNED EKG [[id number]] Resulted: 12/20/22     Updated: 01/09/23 2234          Results for orders placed during the hospital encounter of 12/20/22    Adult Transthoracic Echo Limited W/ Cont if Necessary Per Protocol    Interpretation Summary  •  Left ventricular systolic function is severely decreased. Estimated left ventricular EF = 10% Left ventricular ejection fraction appears to be less than 20%.  •  The left ventricular cavity is severely dilated.  •  Severely reduced right ventricular systolic function noted.  •  The right ventricular cavity is moderately dilated.  •  Left atrial volume is severely increased.  •  The right atrial cavity is severely  dilated.  •  Moderate mitral valve regurgitation is present.  Severity of mitral regurgitation may be underestimated due to eccentric nature of the jet.  •  Estimated right ventricular systolic pressure from tricuspid regurgitation is moderately elevated (45-55 mmHg).  •  There is no evidence of pericardial effusion.      NM HIDA SCAN WITHOUT PHARMACOLOGICAL INTERVENTION    Result Date: 12/27/2022  Unremarkable hepatobiliary scan. Suboptimal ejection fraction of gallbladder at 9%, with a fatty meal challenge. Electronically signed by:  Pedro Day MD  12/27/2022 1:52 PM CST Workstation: [id number]    NM Lung Ventilation Perfusion    Result Date: 12/23/2022  Conclusion: Low probability for pulmonary embolism. Cardiomegaly. Some elevation right hemidiaphragm. [id number] Electronically signed by:  Mukul Klein MD  12/23/2022 4:17 PM CST Workstation: [id number]    US Guided Vascular Access    Result Date: 1/3/2023  Imaging obtained during vascular access device placement under ultrasound guidance as described above Electronically signed by:  Leeroy Alex MD  1/3/2023 12:55 PM CST Workstation: [id number]    CT Abdomen Pelvis With Contrast    Result Date: 12/21/2022  Severe cardiomegaly. Mild bibasilar pulmonary edema. Hepatomegaly.  Nonspecific gallbladder distention. Consider right upper quadrant ultrasound if there is pain. Electronically signed by:  Charlee Yan MD  12/21/2022 12:29 AM CST Workstation: [id number]    US Gallbladder    Result Date: 12/21/2022  1.  Abnormal appearance of the gallbladder, as above, suspicious for acute cholecystitis. If clinically indeterminate recommend nuclear medicine hepatobiliary scan. 2. Hepatomegaly.  core team activated 9:51 AM for results notification. Electronically signed by:  Rc Wheatley MD  12/21/2022 9:52 AM CST Workstation: [id number]    XR Chest 1 View    Result Date: 1/9/2023  1. Extreme cardiomegaly. Correlate for cardiomyopathy versus pericardial effusion. 2. Clear lungs. ----------------------------------------------------- Electronically signed by:  Augustus Morales MD  1/9/2023 4:22 AM CST Workstation: [id number]    XR Chest 1 View    Result Date: 1/3/2023  CONCLUSION: Left-sided pacemaker remains in place with lead projected over the right ventricle. Stable cardiomegaly. Cannot exclude atelectasis or infiltrate retrocardiac region left lower lobe. Cannot exclude small left pleural effusion. [id number] Electronically signed by:  Mukul Klein MD  1/3/2023 4:35 PM CST Workstation: [id number]    XR Chest 1 View    Result Date: 12/20/2022  CONCLUSION: Linear atelectasis medial right lung base. Left-sided pacemaker remains in place with lead projected over the right ventricle. Stable cardiomegaly. [id number] Electronically signed by:  Mukul Klein MD  12/20/2022 5:42 PM CST Workstation: [id number]    CT Angiogram Chest    Result Date: 12/21/2022  1.  Nondiagnostic assessment of the pulmonary arteries. Minimal enhancement of the pulmonary arteries. 2.  Dilation of the left heart chambers and of the right atrium. 3.  No acute pulmonary infiltrate. Electronically signed by:  India Mayfield MD  12/21/2022 12:39 AM CST Workstation: [id number]      Medication Review:     Current Facility-Administered Medications:   •   acetaminophen (TYLENOL) tablet 1,000 mg, 1,000 mg, Oral, Q6H PRN, Sudeep Wolf MD, 1,000 mg at 01/11/23 1958  •  albuterol (PROVENTIL) nebulizer solution 0.083% 2.5 mg/3mL, 2.5 mg, Nebulization, Q4H PRN, Will French MD  •  apixaban (ELIQUIS) tablet 5 mg, 5 mg, Oral, Q12H, Vania Andujar APRN, 5 mg at 01/12/23 0950  •  bumetanide (BUMEX) 10 mg in sodium chloride 0.9 % 100 mL (0.1 mg/mL) infusion, 0.5 mg/hr, Intravenous, Continuous, Trevor Alonso MD, Last Rate: 5 mL/hr at 01/12/23 1150, 0.5 mg/hr at 01/12/23 1150  •  calcium carbonate (TUMS) chewable tablet 500 mg (200 mg elemental), 2 tablet, Oral, TID PRN, Will French MD, 2 tablet at 01/05/23 0015  •  DOBUTamine (DOBUTREX) 1 mg/mL infusion, 5 mcg/kg/min, Intravenous, Continuous, Dylon Aranda MD, Last Rate: 42 mL/hr at 01/12/23 0545, 5 mcg/kg/min at 01/12/23 0545  •  droperidol (INAPSINE) injection 1.25 mg, 1.25 mg, Intravenous, Q6H PRN, Sudeep Wolf MD, 1.25 mg at 12/22/22 1302  •  melatonin tablet 3 mg, 3 mg, Oral, Nightly PRN, Behroozi, Saeid, MD, 3 mg at 01/05/23 2220  •  morphine injection 4 mg, 4 mg, Intravenous, Once, Will French MD  •  ondansetron (ZOFRAN) injection 4 mg, 4 mg, Intravenous, Q6H PRN, Sudeep Wolf MD, 4 mg at 01/01/23 0830  •  pantoprazole (PROTONIX) EC tablet 40 mg, 40 mg, Oral, Q AM, Ayaan Cuba MD, 40 mg at 01/12/23 0525  •  potassium chloride (MICRO-K) CR capsule 40 mEq, 40 mEq, Oral, PRN **OR** potassium chloride (KLOR-CON) packet 40 mEq, 40 mEq, Oral, PRN **OR** potassium chloride 10 mEq in 100 mL IVPB, 10 mEq, Intravenous, Q1H PRN, Sudeep Wolf MD  •  simethicone (MYLICON) chewable tablet 80 mg, 80 mg, Oral, 4x Daily PRN, Delonte Caputo MD, 80 mg at 12/25/22 0025  •  sodium chloride 0.9 % flush 10 mL, 10 mL, Intravenous, Q12H, Will French MD, 10 mL at 01/09/23 0905  •  sodium chloride 0.9 % flush 10 mL, 10 mL, Intravenous, PRN, Manolo, Will L,  MD, 10 mL at 12/30/22 0145  •  sodium chloride 0.9 % flush 10 mL, 10 mL, Intravenous, Q12H, Ayaan Cuba MD, 10 mL at 01/09/23 0904  •  sodium chloride 0.9 % flush 10 mL, 10 mL, Intravenous, Q12H, Ayaan Cuba MD, 10 mL at 01/12/23 0951  •  sodium chloride 0.9 % flush 10 mL, 10 mL, Intravenous, PRN, Ayaan Cuba MD  •  sodium chloride 0.9 % infusion 40 mL, 40 mL, Intravenous, PRN, Will French MD, 100 mL at 01/11/23 1405  •  spironolactone (ALDACTONE) tablet 25 mg, 25 mg, Oral, Daily, Trevor Alonso MD, 25 mg at 01/12/23 0950    Patient's recent labs and results as included in the subjective data were reviewed by me. Any pertinent outside data will be included.      Assessment & Plan       FRANCK (acute kidney injury) (HCC)    Severe malnutrition (HCC)    Atrial fibrillation, persistent (HCC)    1.  Nonischemic cardiomyopathy. Severely reduced LVEF.     NICM: EF: 10-15%. NYHA Class III, Stage C Patient is currently volume overloaded.  and in a well perfused physiologic state. Hemodynamics are acceptable-atrial fibrillation with variable rate    On dobutamine infusion at 5 mcg/kg/min drip.  On Bumex 0.5 mg/hour drip    BETA-BLOCKER: Hypotensive  ACE/ARB: CKD, hypotensive  ENTRESTO: Indicated, CKD  DIURETIC: Bumex infusion, nephrology managing, we appreciate their assistance  SGL2I: CKD  ALDOSTERONE ANTAGONIST: per Nephro restarting Spironolactone  IMDUR/HYDRALAZINE: Hypotension   DIGOXIN: N/A    Fluid restriction: 1500ml  Sodium restriction:2 grams    Cardiac Rehab: Indicated  ICD: yes . Single lead,   ADHF: Current admission  LVAD: Needs Advanced heart failure care. We discussed this two days ago. He is [facility]. Dr. Harris, Intensivist has accepted, awaiting room assignment.     2.  Hypotension.  S/P CV yesterday and was off Dobutamine gtt. Has to be restarted during the night for urine output. Still requiring inotropic support.     Bumex drip decreased due to increase in  Cr from 1.8 to 2.5    3. Atrial fibrillation with RVR, converted.  S/P CV. In NSR      Continue Eliquis. Joan was also home med due to poor LV function and primary LV thrombus     4. FRANCK on CKD.  Nephrology managing.    Plan for disposition: Recommend higher level of care for heart failure. [facility] Intensivist Dr. Harris accepting physician.       This document has been electronically signed by SHELL Garcia on January 12, 2023 12:27 CST   Electronically signed by SHELL Garcia, 01/12/23, 12:27 PM CST.    I personally saw and examined Matti Bravo after the APRN.  I personally performed a history and physical examination of the patient.  I personally reviewed independent findings and plan of care.  I discussed management with the APRN.  I agree with the APRN's documentation.    No acute overnight events.  Dobutamine gtt was restarted.  Telemetry was reviewed.  He appears to be in sinus rhythm currently.  Urine output appears to have reduced yesterday when compared to previous days.  He has not had any significant chest discomfort today.  His dyspnea remains stable.  He continues to have considerable bilateral lower extremity edema.  He continues to have borderline low BP readings intermittently.    Vitals:    01/12/23 1300 01/12/23 1330 01/12/23 1400 01/12/23 1508   BP: 118/93 (!) 82/63 (!) 84/57 99/56   BP Location:       Patient Position:       Pulse: 87 87 87 88   Resp:       Temp:       TempSrc:       SpO2: 100% 100% 99% 99%   Weight:       Height:         Nonischemic cardiomyopathy: He is s/p ICD placement.  Severe biventricular systolic dysfunction has been noted on multiple prior echocardiograms.  Beta-blocker and ACE-I/ARNI medications have been on hold for the past several days due to FRANCK on CKD/borderline hypotension.  He continues to appear significantly fluid overloaded at this point despite responding reasonably well to bumex gtt and dobutamine gtt for the past several  "days.  We have not been able to wean off dobutamine gtt. Nephrology is following to assist in diuretic management.  At this point, it appears that he would benefit from further evaluation and management at an advanced heart failure center for consideration of advanced heart failure therapies.  He has been accepted for transfer to [facility] for further management.     Hypotension: Improved. Continue dobutamine gtt.     New onset atrial fibrillation with RVR:  Continue oral Eliquis.  He is s/p successful cardioversion to sinus rhythm on 2023 and has maintained sinus rhythm since then.  Serum TSH and free T4 levels were normal in 2022.      FRANCK on CKD: Nephrology following.    Thrombocytopenia: Management per the primary medical team.    Please feel free to call us with any questions.    Electronically signed by Dylon Aranda MD, 23, 4:22 PM CST.          Electronically signed by Dylon Aranda MD at 23 1633     Trevor Alonso MD at 23 1052            [facility]  [address] [place] [address]. [address]  T - [phone]  F - [phone]     Progress Note          PATIENT  DEMOGRAPHICS   PATIENT NAME: Matti Bravo                      PHYSICIAN: Trevor Alonso MD  : 1970  MRN: [id number]   LOS: 21 days    Patient Care Team:  Shonna Ng APRN as PCP - General (Family Medicine)  Subjective    SUBJECTIVE   Patient reports feeling ok. Events reviewed - tachycardia, low bp. Now on low dose dobutamine.        Objective    OBJECTIVE   Vital Signs  Temp:  [97 °F (36.1 °C)-97.3 °F (36.3 °C)] 97 °F (36.1 °C)  Heart Rate:  [] 97  Resp:  [16-20] 17  BP: ()/(52-98) 126/90    Flowsheet Rows    Flowsheet Row First Filed Value   Admission Height 193 cm (76\") Documented at 2022 1700   Admission Weight 127 kg (280 lb) Documented at 2022 1700           I/O last 3 completed shifts:  In: 1234 [P.O.:440; I.V.:674; IV " Piggyback:120]  Out: 3450 [Urine:3450]    PHYSICAL EXAM    Physical Exam  Vitals reviewed.   Constitutional:       Appearance: Normal appearance.   HENT:      Head: Normocephalic and atraumatic.   Cardiovascular:      Rate and Rhythm: Normal rate and regular rhythm.   Pulmonary:      Effort: Pulmonary effort is normal. No respiratory distress.      Breath sounds: Normal breath sounds.   Musculoskeletal:      Right lower leg: Edema present.      Left lower leg: Edema present.   Neurological:      Mental Status: He is alert and oriented to person, place, and time.         RESULTS   Results Review:    Results from last 7 days   Lab Units 01/12/23  0419 01/12/23  0007 01/11/23  1117 01/11/23  0432 01/10/23  0657 01/09/23  0620 01/08/23  0429 01/07/23  0536   SODIUM mmol/L 130*  --   --  131* 130* 129* 129* 130*   POTASSIUM mmol/L 4.1 4.0 2.9* 2.8* 3.6 4.0 4.3 4.6   CHLORIDE mmol/L 87*  --   --  88* 89* 86* 90* 89*   CO2 mmol/L 24.0  --   --  28.0 22.0 27.0 23.0 25.0   BUN mg/dL 64*  --   --  61* 66* 58* 54* 48*   CREATININE mg/dL 2.50*  --   --  1.88* 2.11* 2.14* 1.81* 1.87*   CALCIUM mg/dL 9.5  --   --  9.4 9.7 9.7 9.2 9.5   BILIRUBIN mg/dL  --   --   --   --   --  5.1* 4.8* 5.8*   ALK PHOS U/L  --   --   --   --   --  91 89 98   ALT (SGPT) U/L  --   --   --   --   --  84* 96* 119*   AST (SGOT) U/L  --   --   --   --   --  31 35 40   GLUCOSE mg/dL 119*  --   --  116* 132* 122* 144* 124*       Estimated Creatinine Clearance: 52.4 mL/min (A) (by C-G formula based on SCr of 2.5 mg/dL (H)).    Results from last 7 days   Lab Units 01/12/23  0419 01/10/23  0657 01/07/23  0536   MAGNESIUM mg/dL 2.2 2.0 2.0             Results from last 7 days   Lab Units 01/12/23  0419 01/11/23  0432 01/10/23  0616 01/09/23  0620 01/08/23  0429   WBC 10*3/mm3 4.14 4.12 4.05 5.45 4.95   HEMOGLOBIN g/dL 12.9* 12.8* 13.1 13.4 12.8*   PLATELETS 10*3/mm3 89* 89* 88* 91* 91*               Imaging Results (Last 24 Hours)     ** No results found for  the last 24 hours. **           MEDICATIONS    apixaban, 5 mg, Oral, Q12H  Morphine, 4 mg, Intravenous, Once  pantoprazole, 40 mg, Oral, Q AM  sodium chloride, 10 mL, Intravenous, Q12H  sodium chloride, 10 mL, Intravenous, Q12H  sodium chloride, 10 mL, Intravenous, Q12H  spironolactone, 25 mg, Oral, Daily      bumetanide, 1 mg/hr, Last Rate: 1 mg/hr (01/12/23 0624)  DOBUTamine, 5 mcg/kg/min, Last Rate: 5 mcg/kg/min (01/12/23 0545)        Assessment & Plan   ASSESSMENT / PLAN      FRANCK (acute kidney injury) (HCC)    Severe malnutrition (HCC)    Atrial fibrillation, persistent (HCC)    1.  Acute Kidney Failure on CKD 3: Baseline creatinine 1.4-1.7 mg/dl  - Etiology of FRANCK likely contrast induced.    - Urinalysis showed trace protein and negative blood, 3-5 RBCs, 0-2 WBCs.  Urine sodium less than 20.  Urine protein creatinine ratio 235 mg.  - Creatinine peak at 2.9. currently worse from yesterday 1.8 to 2.5  - Keep fluid restriction of 1500 ml a day.   - Lasix was not helping with diuresis and due to marked anasarca  bumex drip was added. initial good response and now decrease response. keep bumex drip and lower it to 0.5mg / hr because of elevated cr.   Add aldactone    We have talked about UF only dialysis in case if bumex drip didn't help but he has maked UO in 24 hrs    2.  Shortness of breath:  - Underwent CT with contrast which was nondiagnostic     3.  Chronic systolic CHF /AICD/ non ischemic cardiomyopathy EF 15-20%. Cardioversion today      4.  Prediabetes     5.  Hypertension:  - Blood pressure is borderline low. Continue to monitor BP     6.  KHANH:  - Supposed to be using CPAP at home     7. Acute cholecystitis:  - now better after antibiotics     8. New onset Afib:  - On eliquis now         Signature           This document has been electronically signed by Trevor Alonso MD on January 12, 2023 10:52 CST               Electronically signed by Trevor Alonso MD at 01/12/23 3187

## 2023-01-13 NOTE — PLAN OF CARE
Goal Outcome Evaluation:   Patient resting in bed on room air. VSS. Dobutamine gtt. Voids with adequate UOP. Bumex gtt. Potassium replacement started, per protocol.

## 2023-01-13 NOTE — PLAN OF CARE
Goal Outcome Evaluation:      All pt needs met at this time. VSS. Dobutamine infusing. Bumex infusing. Urinal at bedside. No complaints at this time, continuing to monitor.

## 2023-01-13 NOTE — PROGRESS NOTES
"    [facility] Medicine Services  INPATIENT PROGRESS NOTE    Length of Stay: 22  Date of Admission: 12/20/2022  Primary Care Physician: Shonna Ng APRN    Subjective   Chief Complaint: Shortness of breath     HPI: Patient is a 52-year-old male past medical history of chronic systolic congestive heart failure, diabetes, hyperlipidemia, hypertension, and sleep apnea.  Patient was recently discharged from the hospital after being treated for possible non-STEMI and heart failure.  Medications were adjusted, but patient states that he has been taking 2 mg of Bumex instead of 1 mg as prescribed.  He states that on discharge he felt good, but over the last 24 to 48 hours he has been feeling much more short of breath.  He describes orthopnea and paroxysmal nocturnal dyspnea.  He has exertional shortness of breath.  He does state that his dietary compliance with prescribed diet is relatively poor.     Daily Assessment  -1/13/2023 patient in good spirits at time of evaluation by Dr. Camejo in stepdown unit today.  Aware of transfer arrangements to [facility].  Patient agrees with transfer arrangements.  Patient develops hypotension when dobutamine discontinued.  -1/12/2023 patient status post electrocardioversion yesterday with better rate control of atrial fibrillation today.  Reports that lower extremity swelling decreased with lower extremities feeling \"less tight\".  Denies chest pain or fevers overnight.  -1/11/2023 patient lower extremity swelling decreasing on IV Bumex drip.  Patient scheduled for JESSENIA/electrocardioversion of atrial fibrillation today.  Patient's family present during evaluation in CCU by Dr. Camejo.  Admits to some palpitations this AM.  -1/10/23 He was transferred to the ICU on 1/3/2023 secondary to hypotension and started on dopamine and dobutamine infusions.  His blood pressure has since improved and dopamine infusion has been  discontinued. He is " doing better, less deconditioned, tolerating his diet and voices no new complaints.  He remains on Bumex and dobutamine infusions, has good urinary output and swelling both legs persist but slowly improving.  He voices no new complaints and is maintaining O2 saturation in the upper 90s on room air.    Review of Systems   Constitutional: Positive for activity change and fatigue. Negative for appetite change, chills, diaphoresis and fever.   HENT: Negative for trouble swallowing and voice change.    Eyes: Negative for photophobia and visual disturbance.   Respiratory: Negative for cough, choking, chest tightness, shortness of breath, wheezing and stridor.    Cardiovascular: Positive for leg swelling. Negative for chest pain and palpitations.   Gastrointestinal: Negative for abdominal distention, abdominal pain, blood in stool, constipation, diarrhea, nausea and vomiting.   Endocrine: Negative for cold intolerance, heat intolerance, polydipsia, polyphagia and polyuria.   Genitourinary: Negative for decreased urine volume, difficulty urinating, dysuria, enuresis, flank pain, frequency, hematuria and urgency.   Musculoskeletal: Negative for arthralgias, gait problem, myalgias, neck pain and neck stiffness.   Skin: Negative for pallor, rash and wound.   Neurological: Negative for dizziness, tremors, seizures, syncope, facial asymmetry, speech difficulty, weakness, light-headedness, numbness and headaches.   Hematological: Does not bruise/bleed easily.   Psychiatric/Behavioral: Negative for agitation, behavioral problems and confusion.       Objective    Temp:  [97.1 °F (36.2 °C)-98.5 °F (36.9 °C)] 98.5 °F (36.9 °C)  Heart Rate:  [] 95  Resp:  [16-22] 22  BP: ()/(55-96) 95/65   AM-PAC 6 Clicks Score (PT): 22 (01/12/23 2000)     Intake/Output Summary (Last 24 hours) at 1/13/2023 1402  Last data filed at 1/13/2023 1200  Gross per 24 hour   Intake 1035.87 ml   Output 1750 ml   Net -714.13 ml       Physical  Exam  Vitals and nursing note reviewed.   Constitutional:       General: He is not in acute distress.     Appearance: He is well-developed. He is obese. He is ill-appearing. He is not diaphoretic.   HENT:      Head: Normocephalic and atraumatic.   Eyes:      General: No scleral icterus.        Right eye: No discharge.         Left eye: No discharge.      Extraocular Movements: Extraocular movements intact.      Pupils: Pupils are equal, round, and reactive to light.   Neck:      Thyroid: No thyromegaly.      Vascular: No carotid bruit or JVD.   Cardiovascular:      Rate and Rhythm: Tachycardia present. Rhythm irregular.      Heart sounds: Normal heart sounds. No murmur heard.    No friction rub. No gallop.   Pulmonary:      Effort: Pulmonary effort is normal. No respiratory distress.      Breath sounds: Normal breath sounds. No stridor. No wheezing or rales.   Chest:      Chest wall: No tenderness.   Abdominal:      General: Bowel sounds are normal. There is no distension.      Palpations: Abdomen is soft. There is no mass.      Tenderness: There is no abdominal tenderness. There is no right CVA tenderness, left CVA tenderness, guarding or rebound.      Hernia: No hernia is present.   Musculoskeletal:         General: No swelling, tenderness or deformity.      Cervical back: Normal range of motion and neck supple.      Right lower leg: Edema present.      Left lower leg: Edema present.   Skin:     General: Skin is warm and dry.      Coloration: Skin is not jaundiced or pale.      Findings: No bruising, erythema, lesion or rash.   Neurological:      General: No focal deficit present.      Mental Status: He is alert and oriented to person, place, and time.      Cranial Nerves: No cranial nerve deficit.      Sensory: No sensory deficit.      Motor: No weakness or abnormal muscle tone.      Coordination: Coordination normal.      Gait: Gait normal.      Deep Tendon Reflexes: Reflexes normal.   Psychiatric:         Mood  and Affect: Mood normal.         Behavior: Behavior normal.         Thought Content: Thought content normal.         Judgment: Judgment normal.             Results Review:  I have reviewed the labs, radiology results, and diagnostic studies.    Laboratory Data:   Results from last 7 days   Lab Units 01/13/23  0511 01/12/23  0419 01/12/23  0007 01/11/23  1117 01/11/23  0432 01/10/23  0657 01/09/23  0620 01/08/23  0429 01/07/23  0536   SODIUM mmol/L 130* 130*  --   --  131*   < > 129* 129* 130*   POTASSIUM mmol/L 3.5 4.1 4.0   < > 2.8*   < > 4.0 4.3 4.6   CHLORIDE mmol/L 87* 87*  --   --  88*   < > 86* 90* 89*   CO2 mmol/L 28.0 24.0  --   --  28.0   < > 27.0 23.0 25.0   BUN mg/dL 67* 64*  --   --  61*   < > 58* 54* 48*   CREATININE mg/dL 2.32* 2.50*  --   --  1.88*   < > 2.14* 1.81* 1.87*   GLUCOSE mg/dL 109* 119*  --   --  116*   < > 122* 144* 124*   CALCIUM mg/dL 9.6 9.5  --   --  9.4   < > 9.7 9.2 9.5   BILIRUBIN mg/dL  --   --   --   --   --   --  5.1* 4.8* 5.8*   ALK PHOS U/L  --   --   --   --   --   --  91 89 98   ALT (SGPT) U/L  --   --   --   --   --   --  84* 96* 119*   AST (SGOT) U/L  --   --   --   --   --   --  31 35 40   ANION GAP mmol/L 15.0 19.0*  --   --  15.0   < > 16.0* 16.0* 16.0*    < > = values in this interval not displayed.     Estimated Creatinine Clearance: 56.5 mL/min (A) (by C-G formula based on SCr of 2.32 mg/dL (H)).  Results from last 7 days   Lab Units 01/13/23  0511 01/12/23  0419 01/10/23  0657   MAGNESIUM mg/dL 2.2 2.2 2.0         Results from last 7 days   Lab Units 01/13/23  0511 01/12/23  0419 01/11/23  0432 01/10/23  0616 01/09/23  0620   WBC 10*3/mm3 3.47 4.14 4.12 4.05 5.45   HEMOGLOBIN g/dL 12.5* 12.9* 12.8* 13.1 13.4   HEMATOCRIT % 37.1* 39.4 39.0 37.7 39.2   PLATELETS 10*3/mm3 81* 89* 89* 88* 91*           Culture Data:   No results found for: BLOODCX  No results found for: URINECX  No results found for: RESPCX  No results found for: WOUNDCX  No results found for:  STOOLCX  No components found for: BODYFLD    Radiology Data:   Imaging Results (Last 24 Hours)     ** No results found for the last 24 hours. **          Scheduled Meds:apixaban, 5 mg, Oral, Q12H  Morphine, 4 mg, Intravenous, Once  pantoprazole, 40 mg, Oral, Q AM  sodium chloride, 10 mL, Intravenous, Q12H  sodium chloride, 10 mL, Intravenous, Q12H  sodium chloride, 10 mL, Intravenous, Q12H  spironolactone, 25 mg, Oral, Daily      Continuous Infusions:bumetanide, 0.5 mg/hr, Last Rate: 0.5 mg/hr (01/13/23 0325)  DOBUTamine, 2.5 mcg/kg/min, Last Rate: 2.5 mcg/kg/min (01/13/23 0640)      PRN Meds:.•  acetaminophen  •  albuterol  •  calcium carbonate  •  droperidol  •  melatonin  •  ondansetron  •  potassium chloride **OR** potassium chloride **OR** potassium chloride  •  simethicone  •  sodium chloride  •  sodium chloride  •  sodium chloride    Assessment/Plan     Hospital Problem List:  Principal Problem:    FRANCK (acute kidney injury) (HCC)  Active Problems:    Severe malnutrition (HCC)    Atrial fibrillation, persistent (HCC)    Acute on chronic kidney disease stage III (likely cardiorenal):   - Patient's baseline is reportedly between 1.4-1.7.  Creatinine is 2.14.     Continue Bumex infusion and continue to monitor renal function. Input by nephrologist is appreciated.  -1/11/2023 appreciate nephrology's expert assistance!  Patient may possibly start dialysis within next 48 hours per nephrology recommendations if anasarca does not improve on IV Bumex.  -1/12/2023 IV Bumex rate decreased by nephrology secondary to increase in creatinine over past 24 hours.      History of nonischemic cardiomyopathy/chronic systolic heart failure status post AICD placement (with hypotension):   - Patient appears clinically euvolemic and chest x-ray done 1/9/2023 showed cardiomegaly without pulmonary vascular congestion. Continue Bumex and dobutamine infusions,  with strict I's and O's, daily weights, salt and fluid restriction.  Input by  cardiologist is appreciated.  Echocardiogram done 8/19/2022 showed an estimated ejection fraction of 15 to 20%.  Elevated LFTs are reactive, much improved and will be monitored.     Hypotension secondary to HFrEF  -1/12 still requiring IV dobutamine for heart failure amelioration.  We will wean from vasopressors as tolerated.  Appreciate cardiology consult assistance.  - 1/13/2023 unable to wean patient from dobutamine without symptomatic hypotension.  Patient therefore being transferred to  for cardiogenic shock management per cardiology's recommendations.  [facility]    Hyponatremia (likely hypervolemic): Continue diuretics, restrict free water and monitor BMP.    Acute on chronic chronic atrial fibrillation with RVR:  - Patient is in uncontrolled ventricular response.  Coreg is on hold for now secondary to borderline blood pressure.  Continue anticoagulation with Eliquis.   -1/11/2023 electrocardioversion performed with patient better rate control versus yesterday's examination.    Hypokalemia: Resolved.    Ruled out acute cholecystitis: Patient is less symptomatic and tolerating recommended diet.  HIDA scan was unremarkable.    Obstructive sleep apnea: Continue nightly CPAP.    Deconditioning: Continue PT and OT.    Continue GI and DVT prophylaxis.    Medical Decision Making  Number and Complexity of problems: 4 major, 2 minor  Differential Diagnosis: Atrial fibrillation, acute on chronic kidney disease, hyponatremia, hypokalemia, HFrEF    Conditions and Status:        Condition is improving.     I have utilized all available immediate resources to obtain, update, or review the patient's current medications (including all prescriptions, over-the-counter products, herbals, cannabis/cannabidiol products, and vitamin/mineral/dietary (nutritional) supplements).     MDM Data  External documents reviewed: Care everywhere, up-to-date  My EKG interpretation:  Reviewed patient's telemetry over past 24 hours  My imaging interpretation: See above assessment and plan  Tests considered but not ordered: None     Decision rules/scores evaluated (example [id number], Wells, etc): [id number] 3     Discussed with: Cardiology consultant and nursing staff     Treatment Plan  As above in assessment and plan    Care Planning  Shared decision making: Patient, patient's family, nursing staff, nephrology, cardiology, and hospitalist  Code status and discussions: Full code    Disposition  Social Determinants of Health that impact treatment or disposition: History of substance abuse  I expect the patient to be discharged unable to determine given patient's current severity of presentation    Patient currently awaiting transfer to [facility] for LVAD/cardiogenic shock management.  Patient accepted by [facility] Intensivist Dr. Harris.  We will transfer patient ASAP once bed available.    I confirmed that the patient's Advance Care Plan is present, code status is documented, or surrogate decision maker is listed in the patient's medical record.     I have utilized all available immediate resources to obtain, update, or review the patient's current medications.    37 minutes of critical care time was spent in the assessment and formulation of treatment plan for this patient exclusive of billable procedures by Dr. Camejo on 1/13/2023        Inocente Camejo MD   01/13/23   14:02 CST

## 2023-01-13 NOTE — SIGNIFICANT NOTE
"   01/13/23 1520   OTHER   Discipline physical therapy assistant   Rehab Time/Intention   Session Not Performed patient/family declined treatment  (pt states he gets up all the time by himself to go to the br \"I don't need to walk with you\"pt also states r foot is sore from edema)       "

## 2023-01-13 NOTE — PROGRESS NOTES
"  [facility] [place]   LOS: 22 days   Patient Care Team:  Shonna Ng APRN as PCP - General (Family Medicine)    Chief Complaint   Patient presents with   • Shortness of Breath       Subjective     Interval History:   Patient Denies: chest pain, PND, orthopnea and dizziness  Patient Complaints: edema  History taken from: patient chart RN    NSR, no recurrent  AF with RVR. Dobutamine at 2.5mcg/kg/min  Bumex at 0.5 mg/hr. LLE the same. Urine output improved    Objective     Vital Sign Min/Max for last 24 hours  Temp  Min: 97.1 °F (36.2 °C)  Max: 97.8 °F (36.6 °C)   BP  Min: 81/61  Max: 150/91   Pulse  Min: 84  Max: 100   Resp  Min: 16  Max: 20   SpO2  Min: 93 %  Max: 100 %   No data recorded   Weight  Min: 135 kg (296 lb 14.4 oz)  Max: 135 kg (296 lb 14.4 oz)     Flowsheet Rows    Flowsheet Row First Filed Value   Admission Height 193 cm (76\") Documented at 12/20/2022 1700   Admission Weight 127 kg (280 lb) Documented at 12/20/2022 1700            01/11/23  1119 01/12/23  0300 01/13/23  0512   Weight: 134 kg (295 lb 6.7 oz) 134 kg (295 lb 9.8 oz) (as per Pt.request to be weight) 135 kg (296 lb 14.4 oz)     Physical Exam:  Physical Exam  Vitals and nursing note reviewed.   Constitutional:       Appearance: He is well-groomed. He is not ill-appearing or diaphoretic.      Interventions: Nasal cannula in place.   HENT:      Head: Normocephalic and atraumatic.      Nose: Nose normal. No congestion.      Mouth/Throat:      Mouth: Mucous membranes are moist.      Pharynx: Oropharynx is clear. No oropharyngeal exudate.   Eyes:      General: Lids are normal.         Right eye: No discharge.         Left eye: No discharge.      Conjunctiva/sclera: Conjunctivae normal.   Neck:      Vascular: JVD (mild) present.      Trachea: Trachea normal.   Cardiovascular:      Rate and Rhythm: Normal rate and regular rhythm.      Heart sounds: Normal heart sounds, S1 normal and S2 normal.     No gallop.   Pulmonary:      " Effort: Pulmonary effort is normal.      Breath sounds: Normal breath sounds and air entry.   Abdominal:      General: Bowel sounds are normal. There is no distension.      Palpations: Abdomen is soft.   Musculoskeletal:      Right upper leg: Edema present.      Left upper leg: Edema present.      Right lower le+ Edema present.      Left lower le+ Edema present.      Right ankle: Swelling present.      Left ankle: Swelling present.      Right foot: Swelling present.      Left foot: Swelling present.   Skin:     General: Skin is warm and dry.      Capillary Refill: Capillary refill takes less than 2 seconds.   Neurological:      Mental Status: He is alert and oriented to person, place, and time.   Psychiatric:         Attention and Perception: Attention normal.         Mood and Affect: Mood is depressed.         Speech: Speech normal.          Results Reviewed by myself:     Results from last 7 days   Lab Units 23  0511 23  0419 23  0007 23  1117 23  0432 01/10/23  0657 23  0620 23  0429 23  0536   SODIUM mmol/L 130* 130*  --   --  131*   < > 129* 129* 130*   POTASSIUM mmol/L 3.5 4.1 4.0   < > 2.8*   < > 4.0 4.3 4.6   CHLORIDE mmol/L 87* 87*  --   --  88*   < > 86* 90* 89*   CO2 mmol/L 28.0 24.0  --   --  28.0   < > 27.0 23.0 25.0   BUN mg/dL 67* 64*  --   --  61*   < > 58* 54* 48*   CREATININE mg/dL 2.32* 2.50*  --   --  1.88*   < > 2.14* 1.81* 1.87*   CALCIUM mg/dL 9.6 9.5  --   --  9.4   < > 9.7 9.2 9.5   BILIRUBIN mg/dL  --   --   --   --   --   --  5.1* 4.8* 5.8*   ALK PHOS U/L  --   --   --   --   --   --  91 89 98   ALT (SGPT) U/L  --   --   --   --   --   --  84* 96* 119*   AST (SGOT) U/L  --   --   --   --   --   --  31 35 40   GLUCOSE mg/dL 109* 119*  --   --  116*   < > 122* 144* 124*    < > = values in this interval not displayed.       Estimated Creatinine Clearance: 56.5 mL/min (A) (by C-G formula based on SCr of 2.32 mg/dL (H)).    Results from  last 7 days   Lab Units 01/13/23  0511 01/12/23  0419 01/10/23  0657   MAGNESIUM mg/dL 2.2 2.2 2.0             Results from last 7 days   Lab Units 01/13/23  0511 01/12/23  0419 01/11/23  0432 01/10/23  0616 01/09/23  0620   WBC 10*3/mm3 3.47 4.14 4.12 4.05 5.45   HEMOGLOBIN g/dL 12.5* 12.9* 12.8* 13.1 13.4   PLATELETS 10*3/mm3 81* 89* 89* 88* 91*           Lab Results   Component Value Date    PROBNP 5,254.0 (H) 12/20/2022       I/O last 3 completed shifts:  In: 1155.9 [P.O.:540; I.V.:557.8; IV Piggyback:58.1]  Out: 2050 [Urine:2050]    Cardiographics:  ECG/EMG Results (last 24 hours)     Procedure Component Value Units Date/Time    SCANNED EKG [[id number]] Resulted: 12/20/22     Updated: 01/09/23 2205    SCANNED EKG [[id number]] Resulted: 12/20/22     Updated: 01/09/23 2219    SCANNED EKG [[id number]] Resulted: 12/20/22     Updated: 01/09/23 2223    SCANNED EKG [[id number]] Resulted: 12/20/22     Updated: 01/09/23 2228    SCANNED EKG [[id number]] Resulted: 12/20/22     Updated: 01/09/23 2228    SCANNED EKG [[id number]] Resulted: 12/20/22     Updated: 01/09/23 2234    SCANNED EKG [[id number]] Resulted: 12/20/22     Updated: 01/09/23 2234          Results for orders placed during the hospital encounter of 12/20/22    Adult Transthoracic Echo Limited W/ Cont if Necessary Per Protocol    Interpretation Summary  •  Left ventricular systolic function is severely decreased. Estimated left ventricular EF = 10% Left ventricular ejection fraction appears to be less than 20%.  •  The left ventricular cavity is severely dilated.  •  Severely reduced right ventricular systolic function noted.  •  The right ventricular cavity is moderately dilated.  •  Left atrial volume is severely increased.  •  The right atrial cavity is severely  dilated.  •  Moderate mitral valve regurgitation is present.  Severity of mitral regurgitation may be underestimated due to eccentric nature of the jet.  •  Estimated right ventricular systolic pressure  from tricuspid regurgitation is moderately elevated (45-55 mmHg).  •  There is no evidence of pericardial effusion.      NM HIDA SCAN WITHOUT PHARMACOLOGICAL INTERVENTION    Result Date: 12/27/2022  Unremarkable hepatobiliary scan. Suboptimal ejection fraction of gallbladder at 9%, with a fatty meal challenge. Electronically signed by:  Pedro Day MD  12/27/2022 1:52 PM CST Workstation: [id number]    NM Lung Ventilation Perfusion    Result Date: 12/23/2022  Conclusion: Low probability for pulmonary embolism. Cardiomegaly. Some elevation right hemidiaphragm. [id number] Electronically signed by:  Mukul Klein MD  12/23/2022 4:17 PM CST Workstation: [id number]    US Guided Vascular Access    Result Date: 1/3/2023  Imaging obtained during vascular access device placement under ultrasound guidance as described above Electronically signed by:  Leeroy Alex MD  1/3/2023 12:55 PM CST Workstation: [id number]    CT Abdomen Pelvis With Contrast    Result Date: 12/21/2022  Severe cardiomegaly. Mild bibasilar pulmonary edema. Hepatomegaly. Nonspecific gallbladder distention. Consider right upper quadrant ultrasound if there is pain. Electronically signed by:  Charlee Yan MD  12/21/2022 12:29 AM CST Workstation: [id number]    US Gallbladder    Result Date: 12/21/2022  1.  Abnormal appearance of the gallbladder, as above, suspicious for acute cholecystitis. If clinically indeterminate recommend nuclear medicine hepatobiliary scan. 2. Hepatomegaly. RP core team activated 9:51 AM for results notification. Electronically signed by:  Rc Wheatley MD  12/21/2022 9:52 AM CST Workstation: [id number]    XR Chest 1 View    Result Date: 1/9/2023  1. Extreme cardiomegaly. Correlate for cardiomyopathy versus pericardial effusion. 2. Clear lungs. ----------------------------------------------------- Electronically signed by:  Augustus Morales MD  1/9/2023 4:22 AM CST Workstation: [id number]    XR Chest 1 View    Result Date: 1/3/2023  CONCLUSION:  Left-sided pacemaker remains in place with lead projected over the right ventricle. Stable cardiomegaly. Cannot exclude atelectasis or infiltrate retrocardiac region left lower lobe. Cannot exclude small left pleural effusion. [id number] Electronically signed by:  Mukul Klein MD  1/3/2023 4:35 PM CST Workstation: [id number]    XR Chest 1 View    Result Date: 12/20/2022  CONCLUSION: Linear atelectasis medial right lung base. Left-sided pacemaker remains in place with lead projected over the right ventricle. Stable cardiomegaly. [id number] Electronically signed by:  Mukul Klein MD  12/20/2022 5:42 PM CST Workstation: [id number]    CT Angiogram Chest    Result Date: 12/21/2022  1.  Nondiagnostic assessment of the pulmonary arteries. Minimal enhancement of the pulmonary arteries. 2.  Dilation of the left heart chambers and of the right atrium. 3.  No acute pulmonary infiltrate. Electronically signed by:  India Mayfield MD  12/21/2022 12:39 AM CST Workstation: [id number]      Medication Review:     Current Facility-Administered Medications:   •  acetaminophen (TYLENOL) tablet 1,000 mg, 1,000 mg, Oral, Q6H PRN, Sudeep Wolf MD, 1,000 mg at 01/11/23 1958  •  albuterol (PROVENTIL) nebulizer solution 0.083% 2.5 mg/3mL, 2.5 mg, Nebulization, Q4H PRN, Will French MD  •  apixaban (ELIQUIS) tablet 5 mg, 5 mg, Oral, Q12H, Vania Andujar, APRN, 5 mg at 01/13/23 0806  •  bumetanide (BUMEX) 10 mg in sodium chloride 0.9 % 100 mL (0.1 mg/mL) infusion, 0.5 mg/hr, Intravenous, Continuous, Trevor Alonso MD, Last Rate: 5 mL/hr at 01/13/23 0325, 0.5 mg/hr at 01/13/23 0325  •  calcium carbonate (TUMS) chewable tablet 500 mg (200 mg elemental), 2 tablet, Oral, TID PRN, Will French MD, 2 tablet at 01/05/23 0015  •  DOBUTamine (DOBUTREX) 1 mg/mL infusion, 2.5 mcg/kg/min, Intravenous, Continuous, Susana Berkowitz, APRN, Last Rate: 21 mL/hr at 01/13/23 0640, 2.5 mcg/kg/min at 01/13/23 0640  •  droperidol (INAPSINE) injection  1.25 mg, 1.25 mg, Intravenous, Q6H PRN, Sudeep Wolf MD, 1.25 mg at 12/22/22 1302  •  melatonin tablet 3 mg, 3 mg, Oral, Nightly PRN, Behroozi, Saeid, MD, 3 mg at 01/05/23 2220  •  morphine injection 4 mg, 4 mg, Intravenous, Once, Will French MD  •  ondansetron (ZOFRAN) injection 4 mg, 4 mg, Intravenous, Q6H PRN, Sudeep Wolf MD, 4 mg at 01/01/23 0830  •  pantoprazole (PROTONIX) EC tablet 40 mg, 40 mg, Oral, Q AM, Ayaan Cuba MD, 40 mg at 01/13/23 0509  •  potassium chloride (MICRO-K) CR capsule 40 mEq, 40 mEq, Oral, PRN, 40 mEq at 01/13/23 1037 **OR** potassium chloride (KLOR-CON) packet 40 mEq, 40 mEq, Oral, PRN **OR** potassium chloride 10 mEq in 100 mL IVPB, 10 mEq, Intravenous, Q1H PRN, Sudeep Wolf MD  •  simethicone (MYLICON) chewable tablet 80 mg, 80 mg, Oral, 4x Daily PRN, Delonte Caputo MD, 80 mg at 12/25/22 0025  •  sodium chloride 0.9 % flush 10 mL, 10 mL, Intravenous, Q12H, Will French MD, 10 mL at 01/09/23 0905  •  sodium chloride 0.9 % flush 10 mL, 10 mL, Intravenous, PRN, Will French MD, 10 mL at 12/30/22 0145  •  sodium chloride 0.9 % flush 10 mL, 10 mL, Intravenous, Q12H, Ayaan Cuba MD, 10 mL at 01/09/23 0904  •  sodium chloride 0.9 % flush 10 mL, 10 mL, Intravenous, Q12H, Ayaan Cuba MD, 10 mL at 01/13/23 0808  •  sodium chloride 0.9 % flush 10 mL, 10 mL, Intravenous, PRN, Ayaan Cuba MD  •  sodium chloride 0.9 % infusion 40 mL, 40 mL, Intravenous, PRN, Will French MD, 100 mL at 01/11/23 1405  •  spironolactone (ALDACTONE) tablet 25 mg, 25 mg, Oral, Daily, Trevor Alonso MD, 25 mg at 01/13/23 0806    Patient's recent labs and results as included in the subjective data were reviewed by me. Any pertinent outside data will be included.      Assessment & Plan       FRANCK (acute kidney injury) (HCC)    Severe malnutrition (HCC)    Atrial fibrillation, persistent (HCC)    1.  Nonischemic cardiomyopathy. Severely  reduced LVEF.     NICM: EF: 10-15%. NYHA Class III, Stage D Patient is currently volume overloaded.  and in a well perfused physiologic state. Hemodynamics are unacceptable, Inotropic support     On dobutamine infusion at 2.5 mcg/kg/min drip.  On Bumex 0.5 mg/hour drip    BETA-BLOCKER: Hypotensive  ACE/ARB: CKD, hypotensive  ENTRESTO: Indicated, CKD  DIURETIC: Bumex infusion, nephrology managing, we appreciate their assistance  SGL2I: CKD  ALDOSTERONE ANTAGONIST:  Spironolactone 25 mg   IMDUR/HYDRALAZINE: Hypotension   DIGOXIN: N/A    Fluid restriction: 1500ml  Sodium restriction:2 grams    Cardiac Rehab: Indicated  ICD: yes . Single lead,   ADHF: Current admission  LVAD: Needs Advanced heart failure care. [facility]. Dr. Harris, Intensivist has accepted, awaiting room assignment. Currently no bed available    2.  Hypotension.  S/P CV yesterday and was off Dobutamine gtt. Has to be restarted during the night for urine output. B/P till requiring inotropic support.     BB on hold     3. Atrial fibrillation with RVR, converted.  S/P CV. In NSR      Continue Eliquis. Eliquis was also home med due to poor LV function.    4. FRANCK on CKD.  Nephrology managing. Cr 2.3 toay    Plan for disposition: Recommend higher level of care for heart failure. [facility] Intensivist Dr. Harris accepting physician. Awaiting bed assignment      This document has been electronically signed by SHELL Garcia on January 13, 2023 12:04 CST   Electronically signed by SHELL Garcia, 01/13/23, 12:04 PM CST.    I personally saw and examined Matti Bravo after the APRN.  I personally performed a history and physical examination of the patient.  I personally reviewed independent findings and plan of care.  I discussed management with the APRN.  I agree with the APRN's documentation.    No acute overnight events.  He denied any chest discomfort or dyspnea today.  He continues on Bumex and dobutamine gtt.  Telemetry was  reviewed.  He appears to be in sinus rhythm currently.    Vitals:    01/13/23 1300 01/13/23 1400 01/13/23 1500 01/13/23 1600   BP: 95/65 (!) 87/52 90/67 102/70   BP Location:    Left arm   Patient Position:    Lying   Pulse: 95 97 91 96   Resp:    22   Temp:    98.5 °F (36.9 °C)   TempSrc:    Axillary   SpO2: 100% 100% 100% 100%   Weight:       Height:         Nonischemic cardiomyopathy: He is s/p ICD placement.  Severe biventricular systolic dysfunction has been noted on multiple prior echocardiograms.  Beta-blocker and ACE-I/ARNI medications have been on hold for the past several days due to FRANCK on CKD/borderline hypotension.  He continues to appear significantly fluid overloaded at this point despite responding reasonably well to bumex gtt and dobutamine gtt for the past several days.  We have not been able to entirely wean off dobutamine gtt. Nephrology is following to assist in diuretic management.  He is on spironolactone per nephrology.  He has been accepted for transfer to [facility] for advanced heart failure evaluation and is currently awaiting a bed.     Hypotension: Improved. Continue dobutamine gtt.     New onset atrial fibrillation with RVR:  Continue oral Eliquis.  He is s/p successful cardioversion to sinus rhythm on 1/11/2023 and has maintained sinus rhythm since then.  Serum TSH and free T4 levels were normal in December 2022.      CKD: Nephrology following.     Thrombocytopenia: Management per the primary medical team.    Dr. Sarah is covering cardiology service over the weekend.    Electronically signed by Dylon Aranda MD, 01/13/23, 5:02 PM CST.

## 2023-01-13 NOTE — PROGRESS NOTES
SUBJECTIVE:   1/13/2023  Chief Complaint:     Subjective      Patient feels better today.  Chest pain has improved.  Dyspnea is improving.  Denied abdominal pain.  Had a bowel movement.  Tolerating diet.  Heart rate in the 90s.  Blood pressure is well controlled.  Hemoglobin is 12.9.  WBC normal.    History:  Past Medical History:   Diagnosis Date   • Asthma    • Chronic systolic heart failure (HCC)    • Diabetes mellitus (HCC)    • Hyperlipidemia    • Hypertension    • Obesity    • Peripheral vascular disease (HCC)    • Sleep apnea      Past Surgical History:   Procedure Laterality Date   • CARDIAC CATHETERIZATION N/A 12/08/2021   • CARDIAC DEFIBRILLATOR PLACEMENT     • VARICOSE VEIN SURGERY Right 04/05/2019     Family History   Problem Relation Age of Onset   • Diabetes Mother    • Hypertension Father      Social History     Tobacco Use   • Smoking status: Former   • Smokeless tobacco: Never   Vaping Use   • Vaping Use: Never used   Substance Use Topics   • Alcohol use: No   • Drug use: No     Medications Prior to Admission   Medication Sig Dispense Refill Last Dose   • albuterol sulfate  (90 Base) MCG/ACT inhaler Inhale 2 puffs Every 4 (Four) Hours As Needed for Wheezing. 1 inhaler 3    • apixaban (ELIQUIS) 5 MG tablet tablet Take 1 tablet by mouth 2 (Two) Times a Day. 60 tablet 3    • bumetanide (BUMEX) 1 MG tablet Take 1 tablet by mouth 2 (Two) Times a Day. 60 tablet 2    • carvedilol (COREG) 12.5 MG tablet Take 1 tablet by mouth 2 (Two) Times a Day With Meals for 30 days. (Patient taking differently: Take 12.5 mg by mouth 2 (Two) Times a Day With Meals. Pt states he has some meds and is still taking this.) 60 tablet 3    • losartan (COZAAR) 25 MG tablet Take 0.5 tablets by mouth Daily for 30 days. 15 tablet 3    • lovastatin (ALTOPREV) 20 MG 24 hr tablet Take 20 mg by mouth.      • metOLazone (ZAROXOLYN) 2.5 MG tablet Take 1 tablet by mouth 3 (Three) Times a Week. 15 tablet 3    • potassium  chloride 10 MEQ CR tablet Take 2 tablets by mouth Daily. 30 tablet 1    • vitamin D (ERGOCALCIFEROL) 1.25 MG (27488 UT) capsule capsule Take 50,000 Units by mouth 1 (One) Time Per Week.        Allergies:  Patient has no known allergies.     CURRENT MEDICATIONS/OBJECTIVE/VS/PE:     Current Medications:     Current Facility-Administered Medications   Medication Dose Route Frequency Provider Last Rate Last Admin   • acetaminophen (TYLENOL) tablet 1,000 mg  1,000 mg Oral Q6H PRN Sudeep Wolf MD   1,000 mg at 01/11/23 1958   • albuterol (PROVENTIL) nebulizer solution 0.083% 2.5 mg/3mL  2.5 mg Nebulization Q4H PRN Will French MD       • apixaban (ELIQUIS) tablet 5 mg  5 mg Oral Q12H Vania Andujar APRN   5 mg at 01/12/23 2004   • bumetanide (BUMEX) 10 mg in sodium chloride 0.9 % 100 mL (0.1 mg/mL) infusion  0.5 mg/hr Intravenous Continuous Trevor Alonso MD 5 mL/hr at 01/13/23 0325 0.5 mg/hr at 01/13/23 0325   • calcium carbonate (TUMS) chewable tablet 500 mg (200 mg elemental)  2 tablet Oral TID PRN Will French MD   2 tablet at 01/05/23 0015   • DOBUTamine (DOBUTREX) 1 mg/mL infusion  2.5 mcg/kg/min Intravenous Continuous Susana Berkowitz APRN 21 mL/hr at 01/13/23 0640 2.5 mcg/kg/min at 01/13/23 0640   • droperidol (INAPSINE) injection 1.25 mg  1.25 mg Intravenous Q6H PRN Sudeep Wolf MD   1.25 mg at 12/22/22 1302   • melatonin tablet 3 mg  3 mg Oral Nightly PRN Behroozi, Saeid, MD   3 mg at 01/05/23 2220   • morphine injection 4 mg  4 mg Intravenous Once Will French MD       • ondansetron (ZOFRAN) injection 4 mg  4 mg Intravenous Q6H PRN Sudeep Wolf MD   4 mg at 01/01/23 0830   • pantoprazole (PROTONIX) EC tablet 40 mg  40 mg Oral Q AM Ayaan Cuba MD   40 mg at 01/13/23 0509   • potassium chloride (MICRO-K) CR capsule 40 mEq  40 mEq Oral PRN Sudeep Wolf MD   40 mEq at 01/13/23 0623    Or   • potassium chloride (KLOR-CON) packet 40 mEq  40 mEq  Oral PRN Sudeep Wolf MD        Or   • potassium chloride 10 mEq in 100 mL IVPB  10 mEq Intravenous Q1H PRN Sudeep Wolf MD       • simethicone (MYLICON) chewable tablet 80 mg  80 mg Oral 4x Daily PRN Delonte Caputo MD   80 mg at 12/25/22 0025   • sodium chloride 0.9 % flush 10 mL  10 mL Intravenous Q12H Will French MD   10 mL at 01/09/23 0905   • sodium chloride 0.9 % flush 10 mL  10 mL Intravenous PRN Will French MD   10 mL at 12/30/22 0145   • sodium chloride 0.9 % flush 10 mL  10 mL Intravenous Q12H Ayaan Cuba MD   10 mL at 01/09/23 0904   • sodium chloride 0.9 % flush 10 mL  10 mL Intravenous Q12H Ayaan Cuba MD   10 mL at 01/12/23 2005   • sodium chloride 0.9 % flush 10 mL  10 mL Intravenous PRN Ayaan Cuba MD       • sodium chloride 0.9 % infusion 40 mL  40 mL Intravenous PRN Will French MD   100 mL at 01/11/23 1405   • spironolactone (ALDACTONE) tablet 25 mg  25 mg Oral Daily Trevor Alonso MD   25 mg at 01/12/23 0950       Objective     Review of Systems:   Review of Systems   Constitutional: Negative for chills, fatigue, fever and unexpected weight change.   HENT: Negative for congestion, ear discharge, hearing loss, nosebleeds and sore throat.    Eyes: Negative for pain, discharge and redness.   Respiratory: Positive for shortness of breath. Negative for cough, chest tightness and wheezing.    Cardiovascular: Negative for chest pain and palpitations.   Gastrointestinal: Negative for abdominal distention, abdominal pain, blood in stool, constipation, diarrhea, nausea and vomiting.   Endocrine: Negative for cold intolerance, polydipsia, polyphagia and polyuria.   Genitourinary: Negative for dysuria, flank pain, frequency, hematuria and urgency.   Musculoskeletal: Negative for arthralgias, back pain, joint swelling and myalgias.   Skin: Negative for color change, pallor and rash.   Neurological: Negative for tremors, seizures, syncope,  weakness and headaches.   Hematological: Negative for adenopathy. Does not bruise/bleed easily.   Psychiatric/Behavioral: Negative for behavioral problems, confusion, dysphoric mood, hallucinations and suicidal ideas. The patient is not nervous/anxious.        Physical Exam:   Temp:  [97 °F (36.1 °C)-97.8 °F (36.6 °C)] 97.7 °F (36.5 °C)  Heart Rate:  [] 95  Resp:  [16-20] 18  BP: ()/(55-96) 94/63     Physical Exam:  General Appearance:    Alert, cooperative, in no acute distress   Head:    Normocephalic, without obvious abnormality, atraumatic   Eyes:            Lids and lashes normal, conjunctivae and sclerae normal, no   icterus, no pallor, corneas clear, PERRLA   Ears:    Ears appear intact with no abnormalities noted   Throat:   No oral lesions, no thrush, oral mucosa moist   Neck:   No adenopathy, supple, trachea midline, no thyromegaly, no     carotid bruit, no JVD   Back:     No kyphosis present, no scoliosis present, no skin lesions,       erythema or scars, no tenderness to percussion or                   palpation,   range of motion normal   Lungs:     Clear to auscultation,respirations regular, even and                   unlabored    Heart:    Regular rhythm and normal rate, normal S1 and S2, no            murmur, no gallop, no rub, no click   Breast Exam:    Deferred   Abdomen:     Normal bowel sounds, no masses, no organomegaly, soft        nontender, nondistended, no guarding, no rebound                 tenderness   Genitalia:    Deferred   Extremities:   Moves all extremities well, no edema, no cyanosis, no              redness   Pulses:   Pulses palpable and equal bilaterally   Skin:   No bleeding, bruising or rash   Lymph nodes:   No palpable adenopathy   Neurologic:   Cranial nerves 2 - 12 grossly intact, sensation intact, DTR        present and equal bilaterally      Results Review:     Lab Results (last 24 hours)     Procedure Component Value Units Date/Time    Basic Metabolic Panel  [[id number]]  (Abnormal) Collected: 01/13/23 0511    Specimen: Blood Updated: 01/13/23 0609     Glucose 109 mg/dL      BUN 67 mg/dL      Creatinine 2.32 mg/dL      Sodium 130 mmol/L      Potassium 3.5 mmol/L      Chloride 87 mmol/L      CO2 28.0 mmol/L      Calcium 9.6 mg/dL      BUN/Creatinine Ratio 28.9     Anion Gap 15.0 mmol/L      eGFR 33.2 mL/min/1.73      Comment: National Kidney Foundation and American Society of Nephrology (ASN) Task Force recommended calculation based on the Chronic Kidney Disease Epidemiology Collaboration (CKD-EPI) equation refit without adjustment for race.       Narrative:      GFR Normal >60  Chronic Kidney Disease <60  Kidney Failure <15      Magnesium [[id number]]  (Normal) Collected: 01/13/23 0511    Specimen: Blood Updated: 01/13/23 0609     Magnesium 2.2 mg/dL     CBC & Differential [[id number]]  (Abnormal) Collected: 01/13/23 0511    Specimen: Blood Updated: 01/13/23 0558    Narrative:      The following orders were created for panel order CBC & Differential.  Procedure                               Abnormality         Status                     ---------                               -----------         ------                     CBC Auto Differential[[id number]]        Abnormal            Final result               Scan Slide[[id number]]                                                                    Please view results for these tests on the individual orders.    CBC Auto Differential [[id number]]  (Abnormal) Collected: 01/13/23 0511    Specimen: Blood Updated: 01/13/23 0557     WBC 3.47 10*3/mm3      RBC 4.27 10*6/mm3      Hemoglobin 12.5 g/dL      Hematocrit 37.1 %      MCV 86.9 fL      MCH 29.3 pg      MCHC 33.7 g/dL      RDW 17.0 %      RDW-SD 52.6 fl      MPV --     Comment: Unable to calculate        Platelets 81 10*3/mm3      Neutrophil % 53.2 %      Lymphocyte % 28.5 %      Monocyte % 15.6 %      Eosinophil % 1.2 %      Basophil % 0.9 %      Immature Grans % 0.6 %       Neutrophils, Absolute 1.85 10*3/mm3      Lymphocytes, Absolute 0.99 10*3/mm3      Monocytes, Absolute 0.54 10*3/mm3      Eosinophils, Absolute 0.04 10*3/mm3      Basophils, Absolute 0.03 10*3/mm3      Immature Grans, Absolute 0.02 10*3/mm3      nRBC 0.6 /100 WBC            I reviewed the patient's new clinical results.  I reviewed the patient's new imaging results and agree with the interpretation.     ASSESSMENT/PLAN:   ASSESSMENT: 1.  A. fib with rapid ventricular rate, improved.  2.  Elevated liver enzymes, improving.  3.  Abdominal pain, resolved.  4.  Anemia, likely due to fluid overload.  PLAN: 1.  Continue PPI and current supportive care  2.  Follow LFTs closely  3.  Continue no added salt diet and diuresis  The risks, benefits, and alternatives of this procedure have been discussed with the patient or the responsible party- the patient understands and agrees to proceed.         Bea Pierce MD  01/13/23  07:46 CST

## 2023-01-13 NOTE — PROGRESS NOTES
Nutrition Services    Patient Name:  Matti Bravo  YOB: 1970  MRN: [id number]  Admit Date:  12/20/2022    Pt still being managed for Nonischemic Cardiomyopathy/CHF with Severe Reduced LVEF--S/p AICD; Afib RVR--now in NSR; HTN; and possible acute choecystitis, asymptomatic at this time.  He is s/p AICD placement.  He continues on IV bumex and a Dobutamin drip.      Pt reports a decreased appetite due to being on a Cardiac diet.  He is eating but intake is ~50%.  Family does being some foods in.    He said that he needs to be off the cardiac diet.  RD explained to pt the importance of staying on a Low Sodium diet in order to help with fluid status.  He said he needed some salt.  Made Nsg aware of pt's dissatisfaction with currently dietary restrictions.      Pt has been accepted at  for possible LVAD.  Awaiting Bed availability.      Labs:  Gluc 109; Bun 67; Creat 2.32  Meds:  Aldactone; Bumex; Dobutamine    Wt on admit 12/20 280#  #    RD will continue to monitor POC and po inatke.  RD made pt aware of menu suggestions and alternatives.            Electronically signed by:  Maria Guadalupe Hernández RD  01/13/23 13:36 CST

## 2023-01-13 NOTE — SIGNIFICANT NOTE
01/13/23 1106   OTHER   Discipline occupational therapy assistant   Rehab Time/Intention   Session Not Performed patient/family declined, not feeling well  (Pt deferred OT tx this am. Pt stated that he was sleeping and might be transferring to a different facility.)

## 2023-01-14 VITALS
WEIGHT: 297.5 LBS | HEIGHT: 76 IN | DIASTOLIC BLOOD PRESSURE: 62 MMHG | TEMPERATURE: 98.4 F | RESPIRATION RATE: 20 BRPM | OXYGEN SATURATION: 99 % | BODY MASS INDEX: 36.23 KG/M2 | HEART RATE: 92 BPM | SYSTOLIC BLOOD PRESSURE: 87 MMHG

## 2023-01-14 PROBLEM — I50.21 ACUTE SYSTOLIC HEART FAILURE (HCC): Status: ACTIVE | Noted: 2023-01-14

## 2023-01-14 PROBLEM — I50.23 ACUTE ON CHRONIC SYSTOLIC HEART FAILURE: Status: ACTIVE | Noted: 2023-01-14

## 2023-01-14 LAB
ANION GAP SERPL CALCULATED.3IONS-SCNC: 17 MMOL/L (ref 5–15)
BASOPHILS # BLD AUTO: 0.02 10*3/MM3 (ref 0–0.2)
BASOPHILS NFR BLD AUTO: 0.6 % (ref 0–1.5)
BUN SERPL-MCNC: 69 MG/DL (ref 6–20)
BUN/CREAT SERPL: 32.4 (ref 7–25)
CALCIUM SPEC-SCNC: 9.6 MG/DL (ref 8.6–10.5)
CHLORIDE SERPL-SCNC: 87 MMOL/L (ref 98–107)
CO2 SERPL-SCNC: 26 MMOL/L (ref 22–29)
CREAT SERPL-MCNC: 2.13 MG/DL (ref 0.76–1.27)
DEPRECATED RDW RBC AUTO: 52.8 FL (ref 37–54)
EGFRCR SERPLBLD CKD-EPI 2021: 36.8 ML/MIN/1.73
EOSINOPHIL # BLD AUTO: 0.02 10*3/MM3 (ref 0–0.4)
EOSINOPHIL NFR BLD AUTO: 0.6 % (ref 0.3–6.2)
ERYTHROCYTE [DISTWIDTH] IN BLOOD BY AUTOMATED COUNT: 16.8 % (ref 12.3–15.4)
GLUCOSE SERPL-MCNC: 116 MG/DL (ref 65–99)
HCT VFR BLD AUTO: 40 % (ref 37.5–51)
HGB BLD-MCNC: 13.1 G/DL (ref 13–17.7)
IMM GRANULOCYTES # BLD AUTO: 0.01 10*3/MM3 (ref 0–0.05)
IMM GRANULOCYTES NFR BLD AUTO: 0.3 % (ref 0–0.5)
LYMPHOCYTES # BLD AUTO: 1 10*3/MM3 (ref 0.7–3.1)
LYMPHOCYTES NFR BLD AUTO: 30.3 % (ref 19.6–45.3)
MAGNESIUM SERPL-MCNC: 2.2 MG/DL (ref 1.6–2.6)
MCH RBC QN AUTO: 28.7 PG (ref 26.6–33)
MCHC RBC AUTO-ENTMCNC: 32.8 G/DL (ref 31.5–35.7)
MCV RBC AUTO: 87.7 FL (ref 79–97)
MONOCYTES # BLD AUTO: 0.4 10*3/MM3 (ref 0.1–0.9)
MONOCYTES NFR BLD AUTO: 12.1 % (ref 5–12)
NEUTROPHILS NFR BLD AUTO: 1.85 10*3/MM3 (ref 1.7–7)
NEUTROPHILS NFR BLD AUTO: 56.1 % (ref 42.7–76)
NRBC BLD AUTO-RTO: 0.6 /100 WBC (ref 0–0.2)
PLATELET # BLD AUTO: 82 10*3/MM3 (ref 140–450)
PMV BLD AUTO: 13.4 FL (ref 6–12)
POTASSIUM SERPL-SCNC: 4.1 MMOL/L (ref 3.5–5.2)
RBC # BLD AUTO: 4.56 10*6/MM3 (ref 4.14–5.8)
SODIUM SERPL-SCNC: 130 MMOL/L (ref 136–145)
WBC NRBC COR # BLD: 3.3 10*3/MM3 (ref 3.4–10.8)

## 2023-01-14 PROCEDURE — 80048 BASIC METABOLIC PNL TOTAL CA: CPT | Performed by: INTERNAL MEDICINE

## 2023-01-14 PROCEDURE — 83735 ASSAY OF MAGNESIUM: CPT | Performed by: INTERNAL MEDICINE

## 2023-01-14 PROCEDURE — 85025 COMPLETE CBC W/AUTO DIFF WBC: CPT | Performed by: HOSPITALIST

## 2023-01-14 PROCEDURE — 99233 SBSQ HOSP IP/OBS HIGH 50: CPT | Performed by: INTERNAL MEDICINE

## 2023-01-14 PROCEDURE — 25010000002 DOBUTAMINE PER 250 MG: Performed by: NURSE PRACTITIONER

## 2023-01-14 RX ORDER — SPIRONOLACTONE 25 MG/1
25 TABLET ORAL DAILY
Start: 2023-01-15

## 2023-01-14 RX ORDER — DROPERIDOL 2.5 MG/ML
1.25 INJECTION, SOLUTION INTRAMUSCULAR; INTRAVENOUS EVERY 6 HOURS PRN
Start: 2023-01-14

## 2023-01-14 RX ORDER — DOBUTAMINE HYDROCHLORIDE 100 MG/100ML
2.5 INJECTION INTRAVENOUS CONTINUOUS
Start: 2023-01-14

## 2023-01-14 RX ADMIN — SPIRONOLACTONE 25 MG: 25 TABLET ORAL at 08:14

## 2023-01-14 RX ADMIN — APIXABAN 5 MG: 5 TABLET, FILM COATED ORAL at 08:14

## 2023-01-14 RX ADMIN — Medication 10 ML: at 08:21

## 2023-01-14 RX ADMIN — DOBUTAMINE HYDROCHLORIDE 2.5 MCG/KG/MIN: 100 INJECTION INTRAVENOUS at 11:24

## 2023-01-14 RX ADMIN — PANTOPRAZOLE SODIUM 40 MG: 40 TABLET, DELAYED RELEASE ORAL at 06:10

## 2023-01-14 NOTE — PROGRESS NOTES
"    [facility]  INPATIENT PROGRESS NOTE    Length of Stay: 23  Date of Admission: 12/20/2022  Primary Care Physician: Shonna Ng APRN    Subjective   Chief Complaint: Shortness of breath     HPI: Patient is a 52-year-old male past medical history of chronic systolic congestive heart failure, diabetes, hyperlipidemia, hypertension, and sleep apnea.  Patient was recently discharged from the hospital after being treated for possible non-STEMI and heart failure.  Medications were adjusted, but patient states that he has been taking 2 mg of Bumex instead of 1 mg as prescribed.  He states that on discharge he felt good, but over the last 24 to 48 hours he has been feeling much more short of breath.  He describes orthopnea and paroxysmal nocturnal dyspnea.  He has exertional shortness of breath.  He does state that his dietary compliance with prescribed diet is relatively poor.     Daily Assessment  --1/14/2023. Patient is up to side of bed at time of evaluation. He is in good spirits. No bed today per . Still waiting for bed for transfer.  -1/13/2023 patient in good spirits at time of evaluation by Dr. Camejo in stepdown unit today.  Aware of transfer arrangements to [facility].  Patient agrees with transfer arrangements.  Patient develops hypotension when dobutamine discontinued.  -1/12/2023 patient status post electrocardioversion yesterday with better rate control of atrial fibrillation today.  Reports that lower extremity swelling decreased with lower extremities feeling \"less tight\".  Denies chest pain or fevers overnight.  -1/11/2023 patient lower extremity swelling decreasing on IV Bumex drip.  Patient scheduled for JESSENIA/electrocardioversion of atrial fibrillation today.  Patient's family present during evaluation in CCU by Dr. Camejo.  Admits to some palpitations this AM.  -1/10/23 He was transferred to the ICU on 1/3/2023 secondary to " hypotension and started on dopamine and dobutamine infusions.  His blood pressure has since improved and dopamine infusion has been  discontinued. He is doing better, less deconditioned, tolerating his diet and voices no new complaints.  He remains on Bumex and dobutamine infusions, has good urinary output and swelling both legs persist but slowly improving.  He voices no new complaints and is maintaining O2 saturation in the upper 90s on room air.    Review of Systems   Constitutional: Positive for activity change and fatigue. Negative for appetite change and fever.   Cardiovascular: Positive for leg swelling. Negative for chest pain.   Gastrointestinal: Negative for abdominal pain and nausea.   Skin: Negative for color change and rash.   Neurological: Negative for headaches.   Psychiatric/Behavioral: Negative for agitation and confusion.       Objective    Temp:  [97.2 °F (36.2 °C)-98.5 °F (36.9 °C)] 98.4 °F (36.9 °C)  Heart Rate:  [] 103  Resp:  [16-22] 18  BP: ()/(49-78) 107/63   AM-PAC 6 Clicks Score (PT): 22 (01/13/23 2000)     Intake/Output Summary (Last 24 hours) at 1/14/2023 0921  Last data filed at 1/14/2023 0728  Gross per 24 hour   Intake 995 ml   Output 1600 ml   Net -605 ml       Physical Exam  Vitals and nursing note reviewed.   Constitutional:       Appearance: Normal appearance.   HENT:      Head: Normocephalic and atraumatic.      Right Ear: External ear normal.      Left Ear: External ear normal.   Eyes:      Extraocular Movements: Extraocular movements intact.      Conjunctiva/sclera: Conjunctivae normal.      Pupils: Pupils are equal, round, and reactive to light.   Cardiovascular:      Rate and Rhythm: Tachycardia present.      Heart sounds: Normal heart sounds.   Pulmonary:      Effort: Pulmonary effort is normal.      Comments: Diminished bibasilar.  Abdominal:      General: Abdomen is flat.      Palpations: Abdomen is soft.   Musculoskeletal:      Right lower leg: Edema present.       Left lower leg: Edema present.   Skin:     General: Skin is warm.   Neurological:      General: No focal deficit present.      Mental Status: He is alert and oriented to person, place, and time.   Psychiatric:         Mood and Affect: Mood normal.         Behavior: Behavior normal.             Results Review:  I have reviewed the labs, radiology results, and diagnostic studies.    Laboratory Data:   Results from last 7 days   Lab Units 01/14/23  0355 01/13/23  1407 01/13/23  0511 01/12/23  0419 01/10/23  0657 01/09/23  0620 01/08/23  0429   SODIUM mmol/L 130*  --  130* 130*   < > 129* 129*   POTASSIUM mmol/L 4.1 4.2 3.5 4.1   < > 4.0 4.3   CHLORIDE mmol/L 87*  --  87* 87*   < > 86* 90*   CO2 mmol/L 26.0  --  28.0 24.0   < > 27.0 23.0   BUN mg/dL 69*  --  67* 64*   < > 58* 54*   CREATININE mg/dL 2.13*  --  2.32* 2.50*   < > 2.14* 1.81*   GLUCOSE mg/dL 116*  --  109* 119*   < > 122* 144*   CALCIUM mg/dL 9.6  --  9.6 9.5   < > 9.7 9.2   BILIRUBIN mg/dL  --   --   --   --   --  5.1* 4.8*   ALK PHOS U/L  --   --   --   --   --  91 89   ALT (SGPT) U/L  --   --   --   --   --  84* 96*   AST (SGOT) U/L  --   --   --   --   --  31 35   ANION GAP mmol/L 17.0*  --  15.0 19.0*   < > 16.0* 16.0*    < > = values in this interval not displayed.     Estimated Creatinine Clearance: 61.5 mL/min (A) (by C-G formula based on SCr of 2.13 mg/dL (H)).  Results from last 7 days   Lab Units 01/14/23  0355 01/13/23  0511 01/12/23  0419   MAGNESIUM mg/dL 2.2 2.2 2.2         Results from last 7 days   Lab Units 01/14/23  0355 01/13/23  0511 01/12/23  0419 01/11/23  0432 01/10/23  0616   WBC 10*3/mm3 3.30* 3.47 4.14 4.12 4.05   HEMOGLOBIN g/dL 13.1 12.5* 12.9* 12.8* 13.1   HEMATOCRIT % 40.0 37.1* 39.4 39.0 37.7   PLATELETS 10*3/mm3 82* 81* 89* 89* 88*           Culture Data:   No results found for: BLOODCX  No results found for: URINECX  No results found for: RESPCX  No results found for: WOUNDCX  No results found for: STOOLCX  No  components found for: BODYFLD    Radiology Data:   Imaging Results (Last 24 Hours)     ** No results found for the last 24 hours. **          Scheduled Meds:apixaban, 5 mg, Oral, Q12H  Morphine, 4 mg, Intravenous, Once  pantoprazole, 40 mg, Oral, Q AM  sodium chloride, 10 mL, Intravenous, Q12H  sodium chloride, 10 mL, Intravenous, Q12H  sodium chloride, 10 mL, Intravenous, Q12H  spironolactone, 25 mg, Oral, Daily      Continuous Infusions:bumetanide, 0.5 mg/hr, Last Rate: 0.5 mg/hr (01/13/23 2046)  DOBUTamine, 2.5 mcg/kg/min, Last Rate: 2.5 mcg/kg/min (01/13/23 2046)      PRN Meds:.•  acetaminophen  •  albuterol  •  calcium carbonate  •  droperidol  •  melatonin  •  ondansetron  •  potassium chloride **OR** potassium chloride **OR** potassium chloride  •  simethicone  •  sodium chloride  •  sodium chloride  •  sodium chloride    Assessment/Plan     Hospital Problem List:  Principal Problem:    FRANCK (acute kidney injury) (HCC)  Active Problems:    Severe malnutrition (HCC)    Atrial fibrillation, persistent (HCC)    History of nonischemic cardiomyopathy/chronic systolic heart failure status post AICD placement (with hypotension):   Results for orders placed during the hospital encounter of 12/20/22    Adult Transthoracic Echo Limited W/ Cont if Necessary Per Protocol    Interpretation Summary  •  Left ventricular systolic function is severely decreased. Estimated left ventricular EF = 10% Left ventricular ejection fraction appears to be less than 20%.  •  The left ventricular cavity is severely dilated.  •  Severely reduced right ventricular systolic function noted.  •  The right ventricular cavity is moderately dilated.  •  Left atrial volume is severely increased.  •  The right atrial cavity is severely  dilated.  •  Moderate mitral valve regurgitation is present.  Severity of mitral regurgitation may be underestimated due to eccentric nature of the jet.  •  Estimated right ventricular systolic pressure from  tricuspid regurgitation is moderately elevated (45-55 mmHg).  •  There is no evidence of pericardial effusion.  - Cards following. Plan is for transfer to  for higher level of care.     Acute on chronic kidney disease stage III (likely cardiorenal):   Results from last 7 days   Lab Units 01/14/23  0355 01/13/23  0511 01/12/23  0419 01/11/23  0432 01/10/23  0657 01/09/23  0620 01/08/23  0429   CREATININE mg/dL 2.13* 2.32* 2.50* 1.88* 2.11* 2.14* 1.81*   - Patient's baseline is reportedly between 1.4-1.7.  Improving today.    - Continue Bumex infusion and continue to monitor renal function. Input by nephrologist is appreciated.    Hypotension secondary to HFrEF  --1/14. Remains on dobutamine.  --1/13/2023 unable to wean patient from dobutamine without symptomatic hypotension.  Patient therefore being transferred to  for cardiogenic shock management per cardiology's recommendations.  Patient accepted by [facility] Intensivist Dr. Harris once bed available.  -1/12 still requiring IV dobutamine for heart failure amelioration.  We will wean from vasopressors as tolerated.  Appreciate cardiology consult assistance.    Hyponatremia (likely hypervolemic): Continue diuretics, restrict free water and monitor BMP.    Acute on chronic chronic atrial fibrillation with RVR:  - 1/11/2023 electrocardioversion performed with patient better rate control versus yesterday's examination.  - Patient is in uncontrolled ventricular response.  Coreg is on hold for now secondary to borderline blood pressure.  Continue anticoagulation with Eliquis.     Hypokalemia: Resolved.    Ruled out acute cholecystitis: Patient is less symptomatic and tolerating recommended diet.  HIDA scan was unremarkable.    Obstructive sleep apnea: Continue nightly CPAP.    Deconditioning: Continue PT and OT.    GI prophylaxis - Protonix.    DVT prophylaxis - Eliquis.    Medical Decision Making  Number and Complexity of problems: 4 major, 2 minor  Differential  Diagnosis: Atrial fibrillation, acute on chronic kidney disease, hyponatremia, hypokalemia, HFrEF     Conditions and Status:        Condition is improving.     I have utilized all available immediate resources to obtain, update, or review the patient's current medications (including all prescriptions, over-the-counter products, herbals, cannabis/cannabidiol products, and vitamin/mineral/dietary (nutritional) supplements).      MDM Data  External documents reviewed: Care everywhere, up-to-date  My EKG interpretation: Reviewed patient's telemetry over past 24 hours  My imaging interpretation: See above assessment and plan  Tests considered but not ordered: None     Decision rules/scores evaluated (example [id number], Wells, etc): [id number] 3     Discussed with: Cardiology consultant and nursing staff     Treatment Plan  As above in assessment and plan     Care Planning  Shared decision making: Patient, nursing staff, nephrology, cardiology, and hospitalist  Code status and discussions: Full code     Disposition  Social Determinants of Health that impact treatment or disposition: History of substance abuse  I expect the patient to be discharged unable to determine given patient's current severity of presentation     Patient currently awaiting transfer to [facility] for LVAD/cardiogenic shock management.  Patient accepted by [facility] [place] Intensivist Dr. Harris.  We will transfer patient ASAP once bed available.    I confirmed that the patient's Advance Care Plan is present, code status is documented, or surrogate decision maker is listed in the patient's medical record.     I have utilized all available immediate resources to obtain, update, or review the patient's current medications.          This document has been electronically signed by Everardo Canada MD on January 14, 2023 09:26 CST

## 2023-01-14 NOTE — PROGRESS NOTES
"  [facility]  [address] [place] [address]. [address]  T - [phone]  F - [phone]     Progress Note          PATIENT  DEMOGRAPHICS   PATIENT NAME: Matti Bravo                      PHYSICIAN: Trevor Alonso MD  : 1970  MRN: [id number]   LOS: 23 days    Patient Care Team:  Shonna Ng APRN as PCP - General (Family Medicine)  Subjective   SUBJECTIVE   Patient doing ok. No acute events overnight. On dobutamine drip.   Patient has been accepted at  for transfer and awaiting bed          Objective   OBJECTIVE   Vital Signs  Temp:  [97.2 °F (36.2 °C)-98.5 °F (36.9 °C)] 98.4 °F (36.9 °C)  Heart Rate:  [] 103  Resp:  [16-22] 18  BP: ()/(49-78) 107/63    Flowsheet Rows    Flowsheet Row First Filed Value   Admission Height 193 cm (76\") Documented at 2022 1700   Admission Weight 127 kg (280 lb) Documented at 2022 1700           I/O last 3 completed shifts:  In: 1418.9 [P.O.:480; I.V.:759.5; IV Piggyback:179.4]  Out: 2450 [Urine:2450]    PHYSICAL EXAM    Physical Exam  Vitals reviewed.   Constitutional:       General: He is not in acute distress.     Appearance: Normal appearance. He is not ill-appearing.   HENT:      Head: Normocephalic and atraumatic.   Cardiovascular:      Rate and Rhythm: Normal rate and regular rhythm.   Pulmonary:      Effort: Pulmonary effort is normal. No respiratory distress.      Breath sounds: Normal breath sounds.   Musculoskeletal:      Right lower leg: Edema present.      Left lower leg: Edema present.   Neurological:      Mental Status: He is alert and oriented to person, place, and time.         RESULTS   Results Review:    Results from last 7 days   Lab Units 23  0355 23  1407 23  0511 23  0419 01/10/23  0657 23  0620 23  0429   SODIUM mmol/L 130*  --  130* 130*   < > 129* 129*   POTASSIUM mmol/L 4.1 4.2 3.5 4.1   < > 4.0 4.3   CHLORIDE mmol/L 87*  --  87* 87*   < > 86* 90*   CO2 mmol/L " 26.0  --  28.0 24.0   < > 27.0 23.0   BUN mg/dL 69*  --  67* 64*   < > 58* 54*   CREATININE mg/dL 2.13*  --  2.32* 2.50*   < > 2.14* 1.81*   CALCIUM mg/dL 9.6  --  9.6 9.5   < > 9.7 9.2   BILIRUBIN mg/dL  --   --   --   --   --  5.1* 4.8*   ALK PHOS U/L  --   --   --   --   --  91 89   ALT (SGPT) U/L  --   --   --   --   --  84* 96*   AST (SGOT) U/L  --   --   --   --   --  31 35   GLUCOSE mg/dL 116*  --  109* 119*   < > 122* 144*    < > = values in this interval not displayed.       Estimated Creatinine Clearance: 61.5 mL/min (A) (by C-G formula based on SCr of 2.13 mg/dL (H)).    Results from last 7 days   Lab Units 01/14/23  0355 01/13/23  0511 01/12/23  0419   MAGNESIUM mg/dL 2.2 2.2 2.2             Results from last 7 days   Lab Units 01/14/23  0355 01/13/23  0511 01/12/23  0419 01/11/23  0432 01/10/23  0616   WBC 10*3/mm3 3.30* 3.47 4.14 4.12 4.05   HEMOGLOBIN g/dL 13.1 12.5* 12.9* 12.8* 13.1   PLATELETS 10*3/mm3 82* 81* 89* 89* 88*               Imaging Results (Last 24 Hours)     ** No results found for the last 24 hours. **           MEDICATIONS    apixaban, 5 mg, Oral, Q12H  Morphine, 4 mg, Intravenous, Once  pantoprazole, 40 mg, Oral, Q AM  sodium chloride, 10 mL, Intravenous, Q12H  sodium chloride, 10 mL, Intravenous, Q12H  sodium chloride, 10 mL, Intravenous, Q12H  spironolactone, 25 mg, Oral, Daily      bumetanide, 0.5 mg/hr, Last Rate: 0.5 mg/hr (01/13/23 2046)  DOBUTamine, 2.5 mcg/kg/min, Last Rate: 2.5 mcg/kg/min (01/13/23 2046)        Assessment & Plan   ASSESSMENT / PLAN      FRANCK (acute kidney injury) (HCC)    Severe malnutrition (HCC)    Atrial fibrillation, persistent (HCC)    1.  Acute Kidney Failure on CKD 3: Baseline creatinine 1.4-1.7 mg/dl  - Etiology of FRANCK likely contrast induced.    - Urinalysis showed trace protein and negative blood, 3-5 RBCs, 0-2 WBCs.  Urine sodium less than 20.  Urine protein creatinine ratio 235 mg.  - Creatinine peak at 2.9. Some improvement from yesterday from  2.5 to 2.1.  - Keep fluid restriction of 1500 ml a day.   - Lasix was not helping with diuresis and due to marked anasarca  bumex drip was added. initial good response and now decrease response. Continue bumex drip and now on 0.5mg / hr because of elevated cr.   - Continue aldactone  - cr stable and slightly better    We have talked about UF only dialysis in case if bumex drip didn't help but he has marked UO in last 2-3 days. He understands he may need isolated UF at tertiary center     2.  Shortness of breath:  - Underwent CT with contrast which was nondiagnostic     3.  Chronic systolic CHF /AICD/ non ischemic cardiomyopathy EF 15-20%. -   - Cardioversion done on 1/11/23  - Patient accepted at  for further management. Waiting for bed     4.  Prediabetes     5.  Hypertension:  - Blood pressure is borderline low. On dubotamine drip. Continue to monitor BP       6.  KHANH:  - Supposed to be using CPAP at home     7. Acute cholecystitis:  - Now better after antibiotics     8. New onset Afib:  - On eliquis now  - Went cardioversion on 1/11/23           Signature           This document has been electronically signed by Trevor Alonso MD on January 14, 2023 10:24 CST

## 2023-01-14 NOTE — SIGNIFICANT NOTE
This CM working on necessities for transfer. KAYA called [facility] to inquire about transport. Per Sharon, [facility] will not be able to transport to [place] today 1/14/23 or tomorrow 1/15/23. She will call surrounding counties to inquire if they will transport. They gave the option of medical flight but informed them that would be decided by doctor and patient.

## 2023-01-14 NOTE — SIGNIFICANT NOTE
01/14/23 1058   OTHER   Discipline occupational therapy assistant   Rehab Time/Intention   Session Not Performed patient/family declined treatment

## 2023-01-14 NOTE — DISCHARGE PLACEMENT REQUEST
"Ethel Davis (52 y.o. Male)     Date of Birth   1970    Social Security Number       Address   [address] [id number]    Home Phone   [phone]    MRN   [id number]       [facility]    Marital Status                               Admission Date   12/20/22    Admission Type   Emergency    Admitting Provider   Will French MD    Attending Provider   Will French MD    Department, Room/Bed   [facility] CRITICAL CARE STEPDOWN, 20/A       Discharge Date       Discharge Disposition       Discharge Destination                               Attending Provider: Will French MD    Allergies: No Known Allergies    Isolation: None   Infection: None   Code Status: CPR    Ht: 193 cm (76\")   Wt: 135 kg (297 lb 8 oz)    Admission Cmt: None   Principal Problem: FRANCK (acute kidney injury) (HCC) [N17.9]                 Active Insurance as of 12/20/2022     Primary Coverage     Payor Plan Insurance Group Employer/Plan Group    [place] [id number]     Payor Plan Address Payor Plan Phone Number Payor Plan Fax Number Effective Dates    [employer] [address] [address] [phone]  10/8/2021 - None Entered    [facility] [id number]       Subscriber Name Subscriber Birth Date Member ID       ETHEL DAVIS 1970 [id number]                 Emergency Contacts      (Rel.) Home Phone Work Phone Mobile Phone    DavisCourtney (Mother) [phone] -- --    LawrenceMary Anne (Sister) -- -- [phone]              "

## 2023-01-14 NOTE — SIGNIFICANT NOTE
01/14/23 1648   OTHER   Discipline physical therapy assistant   Rehab Time/Intention   Session Not Performed other (see comments)  (pt in proces of transfer to other facility)   Therapy Assessment/Plan (PT)   Criteria for Skilled Interventions Met (PT)   (transferred to another level of care; will d/c PT orders)

## 2023-01-14 NOTE — PLAN OF CARE
Goal Outcome Evaluation:   Patient resting in bed on room air. VSS, dobutamine gtt infusing. Voids with adequate UOP, bumex gtt infusing. BM x2 this shift. No acute changes this shift.

## 2023-01-14 NOTE — SIGNIFICANT NOTE
This RN received word that patient got a bed at [facility] [[email or web address]]. Case management working on paperwork. Report being called.

## 2023-01-14 NOTE — CASE MANAGEMENT/SOCIAL WORK
"Physicians Statement of Medical Necessity for  Ambulance Transportation    GENERAL INFORMATION     Name: Matti Bravo  YOB: 1970  Medicare #: N/A [employer] - [id number]  Transport Date: 1/14/2023 (Valid for round trips this date, or for scheduled repetitive trips for 60 days from the date signed below.)  Origin: [facility] Room CCU 20 Destination: [facility] Room 142,   [address], [place]  Is the Patient's stay covered under Medicare Part A (PPS/DRG?)No   Closest appropriate facility? Yes  If this a hosp-hosp transfer? Yes, describe services needed at 2nd facility not available at 1st facility Cardiac Advanced Heart Failure Care Intensivist  Is this a hospice patient? No    MEDICAL NECESSITY QUESTIONAIRE    Ambulance Transportation is medically necessary only if other means of transportation are contraindicated or would be potentially harmful to the patient.  To meet this requirement, the patient must be either \"bed confined\" or suffer from a condition such that transport by means other than an ambulance is contraindicated by the patient's condition.  The following questions must be answered by the healthcare professional signing below for this form to be valid:     1) Describe the MEDICAL CONDITION (physical and/or mental) of this patient AT THE TIME OF AMBULANCE TRANSPORT that requires the patient to be transported in an ambulance, and why transport by other means is contraindicated by the patient's condition:     Patient with LVAD, A-fib with RVR,and nonischemic cardiomyopathy : Needs Advanced heart failure care not available at this facility.  Patient is currently on dobutamine gtt.  [facility]. Dr. Harris, Intensivist has accepted    Past Medical History:   Diagnosis Date   • Asthma    • Chronic systolic heart failure (HCC)    • Diabetes mellitus (HCC)    • Hyperlipidemia    • Hypertension    • Obesity    • Peripheral vascular disease (HCC)    • " "Sleep apnea       Past Surgical History:   Procedure Laterality Date   • CARDIAC CATHETERIZATION N/A 12/08/2021   • CARDIAC DEFIBRILLATOR PLACEMENT     • VARICOSE VEIN SURGERY Right 04/05/2019      2) Is this patient \"bed confined\" as defined below?Yes   To be \"bed confined\" the patient must satisfy all three of the following criteria:  (1) unable to get up from bed without assistance; AND (2) unable to ambulate;  AND (3) unable to sit in a chair or wheelchair.  3) Can this patient safely be transported by car or wheelchair van (I.e., may safely sit during transport, without an attendant or monitoring?)No   4. In addition to completing questions 1-3 above, please check any of the following conditions that apply*:          *Note: supporting documentation for any boxes checked must be maintained in the patient's medical records IV meds/fluids required, Medical attendant required and Cardiac monitoring required en route      SIGNATURE OF PHYSICIAN OR OTHER AUTHORIZED HEALTHCARE PROFESSIONAL    I certify that the above information is true and correct based on my evaluation of this patient, and represent that the patient requires transport by ambulance and that other forms of transport are contraindicated.  I understand that this information will be used by the Centers for Medicare and Medicaid Services (CMS) to support the determiniation of medical necessity for ambulance services, and I represent that I have personal knowledge of the patient's condition at the time of transport.       If this box is checked, I also certify that the patient is physically or mentally incapable of signing the ambulance service's claim form and that the institution with which I am affiliated has furnished care, services or assistance to the patient.  My signature below is made on behalf of the patient pursuant to 42 .36(b)(4). In accordance with 42 .37, the specific reason(s) that the patient is physically or mentally " incapable of signing the claim for is as follows:     Signature of Physician or Healthcare Professional     AMOL Zee Date/Time:     1/14/202  2:14PM     (For Scheduled repetitive transport, this form is not valid for transports performed more than 60 days after this date).                                                                                                                                            --------------------------------------------------------------------------------------------  Printed Name and Credentials of Physician or Authorized Healthcare Professional     Form must be signed by patient's attending physician for scheduled, repetitive transports,.  For non-repetitive ambulance transports, if unable to obtain the signature of the attending physician, any of the following may sign (please select below):     Physician  Clinical Nurse Specialist  Registered Nurse     Physician Assistant X Discharge Planner  Licensed Practical Nurse     Nurse Practitioner X

## 2023-01-14 NOTE — PROGRESS NOTES
"  [facility] Card[facility] Progress Note   LOS: 23 days   Patient Care Team:  Shonna Ng APRN as PCP - General (Family Medicine)    Chief Complaint   Patient presents with   • Shortness of Breath       Subjective     Interval History:   Patient Denies: chest pain, PND, orthopnea and dizziness  Patient Complaints: edema  History taken from: patient chart RN    NSR, no recurrent  AF with RVR. Dobutamine at 2.5mcg/kg/min  Bumex at 0.5 mg/hr. LLE the same. Urine output improved    Objective     Vital Sign Min/Max for last 24 hours  Temp  Min: 97.2 °F (36.2 °C)  Max: 98.5 °F (36.9 °C)   BP  Min: 87/52  Max: 125/68   Pulse  Min: 82  Max: 100   Resp  Min: 16  Max: 22   SpO2  Min: 90 %  Max: 100 %   No data recorded   Weight  Min: 135 kg (297 lb 8 oz)  Max: 135 kg (297 lb 8 oz)     Flowsheet Rows    Flowsheet Row First Filed Value   Admission Height 193 cm (76\") Documented at 12/20/2022 1700   Admission Weight 127 kg (280 lb) Documented at 12/20/2022 1700            01/12/23  0300 01/13/23  0512 01/14/23  0600   Weight: 134 kg (295 lb 9.8 oz) (as per Pt.request to be weight) 135 kg (296 lb 14.4 oz) 135 kg (297 lb 8 oz)     Physical Exam:  Physical Exam  Vitals and nursing note reviewed.   Constitutional:       Appearance: He is well-groomed. He is not ill-appearing or diaphoretic.      Interventions: Nasal cannula in place.   HENT:      Head: Normocephalic and atraumatic.      Nose: Nose normal. No congestion.      Mouth/Throat:      Mouth: Mucous membranes are moist.      Pharynx: Oropharynx is clear. No oropharyngeal exudate.   Eyes:      General: Lids are normal.         Right eye: No discharge.         Left eye: No discharge.      Conjunctiva/sclera: Conjunctivae normal.   Neck:      Vascular: JVD (mild) present.      Trachea: Trachea normal.   Cardiovascular:      Rate and Rhythm: Normal rate and regular rhythm.      Heart sounds: Normal heart sounds, S1 normal and S2 normal.     No gallop.   Pulmonary:      Effort: " Pulmonary effort is normal.      Breath sounds: Normal breath sounds and air entry.   Abdominal:      General: Bowel sounds are normal. There is no distension.      Palpations: Abdomen is soft.   Musculoskeletal:      Right upper leg: Edema present.      Left upper leg: Edema present.      Right lower le+ Edema present.      Left lower le+ Edema present.      Right ankle: Swelling present.      Left ankle: Swelling present.      Right foot: Swelling present.      Left foot: Swelling present.   Skin:     General: Skin is warm and dry.      Capillary Refill: Capillary refill takes less than 2 seconds.   Neurological:      Mental Status: He is alert and oriented to person, place, and time.   Psychiatric:         Attention and Perception: Attention normal.         Mood and Affect: Mood is depressed.         Speech: Speech normal.          Results Reviewed by myself:     Results from last 7 days   Lab Units 23  0355 23  1407 23  0511 23  0419 01/10/23  0657 23  0620 23  0429   SODIUM mmol/L 130*  --  130* 130*   < > 129* 129*   POTASSIUM mmol/L 4.1 4.2 3.5 4.1   < > 4.0 4.3   CHLORIDE mmol/L 87*  --  87* 87*   < > 86* 90*   CO2 mmol/L 26.0  --  28.0 24.0   < > 27.0 23.0   BUN mg/dL 69*  --  67* 64*   < > 58* 54*   CREATININE mg/dL 2.13*  --  2.32* 2.50*   < > 2.14* 1.81*   CALCIUM mg/dL 9.6  --  9.6 9.5   < > 9.7 9.2   BILIRUBIN mg/dL  --   --   --   --   --  5.1* 4.8*   ALK PHOS U/L  --   --   --   --   --  91 89   ALT (SGPT) U/L  --   --   --   --   --  84* 96*   AST (SGOT) U/L  --   --   --   --   --  31 35   GLUCOSE mg/dL 116*  --  109* 119*   < > 122* 144*    < > = values in this interval not displayed.       Estimated Creatinine Clearance: 61.5 mL/min (A) (by C-G formula based on SCr of 2.13 mg/dL (H)).    Results from last 7 days   Lab Units 23  0355 23  0511 23  0419   MAGNESIUM mg/dL 2.2 2.2 2.2             Results from last 7 days   Lab Units  01/14/23  0355 01/13/23  0511 01/12/23  0419 01/11/23  0432 01/10/23  0616   WBC 10*3/mm3 3.30* 3.47 4.14 4.12 4.05   HEMOGLOBIN g/dL 13.1 12.5* 12.9* 12.8* 13.1   PLATELETS 10*3/mm3 82* 81* 89* 89* 88*           Lab Results   Component Value Date    PROBNP 5,254.0 (H) 12/20/2022       I/O last 3 completed shifts:  In: 1418.9 [P.O.:480; I.V.:759.5; IV Piggyback:179.4]  Out: 2450 [Urine:2450]    Cardiographics:  ECG/EMG Results (last 24 hours)     Procedure Component Value Units Date/Time    SCANNED EKG [[id number]] Resulted: 12/20/22     Updated: 01/09/23 2205    SCANNED EKG [[id number]] Resulted: 12/20/22     Updated: 01/09/23 2219    SCANNED EKG [[id number]] Resulted: 12/20/22     Updated: 01/09/23 2223    SCANNED EKG [[id number]] Resulted: 12/20/22     Updated: 01/09/23 2228    SCANNED EKG [[id number]] Resulted: 12/20/22     Updated: 01/09/23 2228    SCANNED EKG [[id number]] Resulted: 12/20/22     Updated: 01/09/23 2234    SCANNED EKG [[id number]] Resulted: 12/20/22     Updated: 01/09/23 2234          Results for orders placed during the hospital encounter of 12/20/22    Adult Transthoracic Echo Limited W/ Cont if Necessary Per Protocol    Interpretation Summary  •  Left ventricular systolic function is severely decreased. Estimated left ventricular EF = 10% Left ventricular ejection fraction appears to be less than 20%.  •  The left ventricular cavity is severely dilated.  •  Severely reduced right ventricular systolic function noted.  •  The right ventricular cavity is moderately dilated.  •  Left atrial volume is severely increased.  •  The right atrial cavity is severely  dilated.  •  Moderate mitral valve regurgitation is present.  Severity of mitral regurgitation may be underestimated due to eccentric nature of the jet.  •  Estimated right ventricular systolic pressure from tricuspid regurgitation is moderately elevated (45-55 mmHg).  •  There is no evidence of pericardial effusion.      NM HIDA SCAN WITHOUT  PHARMACOLOGICAL INTERVENTION    Result Date: 12/27/2022  Unremarkable hepatobiliary scan. Suboptimal ejection fraction of gallbladder at 9%, with a fatty meal challenge. Electronically signed by:  Pedro Day MD  12/27/2022 1:52 PM CST Workstation: [id number]    NM Lung Ventilation Perfusion    Result Date: 12/23/2022  Conclusion: Low probability for pulmonary embolism. Cardiomegaly. Some elevation right hemidiaphragm. [id number] Electronically signed by:  Mukul Klein MD  12/23/2022 4:17 PM CST Workstation: [id number]    US Guided Vascular Access    Result Date: 1/3/2023  Imaging obtained during vascular access device placement under ultrasound guidance as described above Electronically signed by:  Leeroy Alex MD  1/3/2023 12:55 PM CST Workstation: [id number]    CT Abdomen Pelvis With Contrast    Result Date: 12/21/2022  Severe cardiomegaly. Mild bibasilar pulmonary edema. Hepatomegaly. Nonspecific gallbladder distention. Consider right upper quadrant ultrasound if there is pain. Electronically signed by:  Charlee Yan MD  12/21/2022 12:29 AM CST Workstation: [id number]    US Gallbladder    Result Date: 12/21/2022  1.  Abnormal appearance of the gallbladder, as above, suspicious for acute cholecystitis. If clinically indeterminate recommend nuclear medicine hepatobiliary scan. 2. Hepatomegaly. RP core team activated 9:51 AM for results notification. Electronically signed by:  Rc Wheatley MD  12/21/2022 9:52 AM CST Workstation: [id number]    XR Chest 1 View    Result Date: 1/9/2023  1. Extreme cardiomegaly. Correlate for cardiomyopathy versus pericardial effusion. 2. Clear lungs. ----------------------------------------------------- Electronically signed by:  Augustus Morales MD  1/9/2023 4:22 AM CST Workstation: [id number]    XR Chest 1 View    Result Date: 1/3/2023  CONCLUSION: Left-sided pacemaker remains in place with lead projected over the right ventricle. Stable cardiomegaly. Cannot exclude atelectasis or infiltrate  retrocardiac region left lower lobe. Cannot exclude small left pleural effusion. [id number] Electronically signed by:  Mukul Klein MD  1/3/2023 4:35 PM CST Workstation: [id number]    XR Chest 1 View    Result Date: 12/20/2022  CONCLUSION: Linear atelectasis medial right lung base. Left-sided pacemaker remains in place with lead projected over the right ventricle. Stable cardiomegaly. [id number] Electronically signed by:  Mukul Klein MD  12/20/2022 5:42 PM CST Workstation: [id number]    CT Angiogram Chest    Result Date: 12/21/2022  1.  Nondiagnostic assessment of the pulmonary arteries. Minimal enhancement of the pulmonary arteries. 2.  Dilation of the left heart chambers and of the right atrium. 3.  No acute pulmonary infiltrate. Electronically signed by:  India Mayfield MD  12/21/2022 12:39 AM CST Workstation: [id number]      Medication Review:     Current Facility-Administered Medications:   •  acetaminophen (TYLENOL) tablet 1,000 mg, 1,000 mg, Oral, Q6H PRN, Sudeep Wolf MD, 1,000 mg at 01/11/23 1958  •  albuterol (PROVENTIL) nebulizer solution 0.083% 2.5 mg/3mL, 2.5 mg, Nebulization, Q4H PRN, Will French MD  •  apixaban (ELIQUIS) tablet 5 mg, 5 mg, Oral, Q12H, Vania Andujar APRN, 5 mg at 01/14/23 0814  •  bumetanide (BUMEX) 10 mg in sodium chloride 0.9 % 100 mL (0.1 mg/mL) infusion, 0.5 mg/hr, Intravenous, Continuous, Trevor Alonso MD, Last Rate: 5 mL/hr at 01/13/23 2046, 0.5 mg/hr at 01/13/23 2046  •  calcium carbonate (TUMS) chewable tablet 500 mg (200 mg elemental), 2 tablet, Oral, TID PRN, Will French MD, 2 tablet at 01/05/23 0015  •  DOBUTamine (DOBUTREX) 1 mg/mL infusion, 2.5 mcg/kg/min, Intravenous, Continuous, Susana Berkowitz APRALTON, Last Rate: 21 mL/hr at 01/13/23 2046, 2.5 mcg/kg/min at 01/13/23 2046  •  droperidol (INAPSINE) injection 1.25 mg, 1.25 mg, Intravenous, Q6H PRN, Sudeep Wolf MD, 1.25 mg at 12/22/22 1302  •  melatonin tablet 3 mg, 3 mg, Oral, Nightly PRN,  Behroozi, Saeid, MD, 3 mg at 01/05/23 2220  •  morphine injection 4 mg, 4 mg, Intravenous, Once, Will French MD  •  ondansetron (ZOFRAN) injection 4 mg, 4 mg, Intravenous, Q6H PRN, Sudeep Wolf MD, 4 mg at 01/01/23 0830  •  pantoprazole (PROTONIX) EC tablet 40 mg, 40 mg, Oral, Q AM, Ayaan Cuba MD, 40 mg at 01/14/23 0610  •  potassium chloride (MICRO-K) CR capsule 40 mEq, 40 mEq, Oral, PRN, 40 mEq at 01/13/23 1037 **OR** potassium chloride (KLOR-CON) packet 40 mEq, 40 mEq, Oral, PRN **OR** potassium chloride 10 mEq in 100 mL IVPB, 10 mEq, Intravenous, Q1H PRN, Sudeep Wolf MD  •  simethicone (MYLICON) chewable tablet 80 mg, 80 mg, Oral, 4x Daily PRN, Delonte Caputo MD, 80 mg at 12/25/22 0025  •  sodium chloride 0.9 % flush 10 mL, 10 mL, Intravenous, Q12H, Will French MD, 10 mL at 01/09/23 0905  •  sodium chloride 0.9 % flush 10 mL, 10 mL, Intravenous, PRN, Will French MD, 10 mL at 12/30/22 0145  •  sodium chloride 0.9 % flush 10 mL, 10 mL, Intravenous, Q12H, Ayaan Cuba MD, 10 mL at 01/09/23 0904  •  sodium chloride 0.9 % flush 10 mL, 10 mL, Intravenous, Q12H, Ayaan Cuba MD, 10 mL at 01/14/23 0821  •  sodium chloride 0.9 % flush 10 mL, 10 mL, Intravenous, PRN, Ayaan Cuba MD  •  sodium chloride 0.9 % infusion 40 mL, 40 mL, Intravenous, PRN, Will French MD, 100 mL at 01/11/23 1405  •  spironolactone (ALDACTONE) tablet 25 mg, 25 mg, Oral, Daily, Trevor Alonso MD, 25 mg at 01/14/23 0814    Patient's recent labs and results as included in the subjective data were reviewed by me. Any pertinent outside data will be included.      Assessment & Plan       FRANCK (acute kidney injury) (HCC)    Severe malnutrition (HCC)    Atrial fibrillation, persistent (HCC)    1.  Nonischemic cardiomyopathy. Severely reduced LVEF.     NICM: EF: 10-15%. NYHA Class III, Stage D Patient is currently volume overloaded.  and in a well perfused physiologic state.  Hemodynamics are unacceptable, Inotropic support     On dobutamine infusion at 2.5 mcg/kg/min drip.  On Bumex 0.5 mg/hour drip    BETA-BLOCKER: Hypotensive  ACE/ARB: CKD, hypotensive  ENTRESTO: Indicated, CKD  DIURETIC: Bumex infusion, nephrology managing, we appreciate their assistance  SGL2I: CKD  ALDOSTERONE ANTAGONIST:  Spironolactone 25 mg   IMDUR/HYDRALAZINE: Hypotension   DIGOXIN: N/A    Fluid restriction: 1500ml  Sodium restriction:2 grams    Cardiac Rehab: Indicated  ICD: yes . Single lead,   ADHF: Current admission  LVAD: Needs Advanced heart failure care. [facility]. Dr. Harris, Intensivist has accepted, awaiting room assignment. Currently no bed available    2.  Hypotension.  S/P CV yesterday and was off Dobutamine gtt. Has to be restarted during the night for urine output. B/P till requiring inotropic support.     BB on hold     3. Atrial fibrillation with RVR, converted.  S/P CV. In NSR      Continue Eliquis. Eliquis was also home med due to poor LV function.    4. FRANCK on CKD.  Nephrology managing. Cr 2.3 toay    Plan for disposition: Recommend higher level of care for heart failure. [facility] Intensivist Dr. Harris accepting physician. Awaiting bed assignment      This document has been electronically signed by Franky Sarah MD on January 14, 2023 08:31 CST   Electronically signed by SHELL Garcia, 01/13/23, 12:04 PM CST.    I personally saw and examined Matti Bravo after the APRN.  I personally performed a history and physical examination of the patient.  I personally reviewed independent findings and plan of care.  I discussed management with the APRN.  I agree with the APRN's documentation.    No acute overnight events.  He denied any chest discomfort or dyspnea today.  He continues on Bumex and dobutamine gtt.  Telemetry was reviewed.  He appears to be in sinus rhythm currently.    Vitals:    01/14/23 0500 01/14/23 0600 01/14/23 0728 01/14/23 0800   BP: 108/70 96/69   "110/59   BP Location:    Left arm   Patient Position:    Lying   Pulse: 97 96 96 89   Resp:    18   Temp:   98.4 °F (36.9 °C)    TempSrc:   Oral    SpO2: 96% 94% 98% 96%   Weight:  135 kg (297 lb 8 oz)     Height:  193 cm (76\")       Nonischemic cardiomyopathy: He is s/p ICD placement.  Severe biventricular systolic dysfunction has been noted on multiple prior echocardiograms.  Beta-blocker and ACE-I/ARNI medications have been on hold for the past several days due to FRANCK on CKD/borderline hypotension.  He continues to appear significantly fluid overloaded at this point despite responding reasonably well to bumex gtt and dobutamine gtt for the past several days.  We have not been able to entirely wean off dobutamine gtt. Nephrology is following to assist in diuretic management.  He is on spironolactone per nephrology.  He has been accepted for transfer to [facility] for advanced heart failure evaluation and is currently awaiting a bed.     Hypotension: Improved. Continue dobutamine gtt.     New onset atrial fibrillation with RVR:  Continue oral Eliquis.  He is s/p successful cardioversion to sinus rhythm on 1/11/2023 and has maintained sinus rhythm since then.  Serum TSH and free T4 levels were normal in December 2022.      CKD: Nephrology following.     Thrombocytopenia: Management per the primary medical team.    Franky Sarah MD        "

## 2023-01-14 NOTE — DISCHARGE SUMMARY
DISCHARGE SUMMARY    NAME: Matti Bravo   PHYSICIAN: Everardo Canada MD  : 1970  MRN: [id number]    ADMITTED: 2022   DISCHARGED: 23    ADMISSION DIAGNOSES:  Present on Admission:  • FRANCK (acute kidney injury) (HCC)  • Severe malnutrition (HCC)    DISCHARGE DIAGNOSES:   • Nonischemic cardiomyopathy  • Acute on Chronic HFrEF  • Afib with RVR  • FRANCK (acute kidney injury) (HCC)  • Severe malnutrition (HCC)      SERVICE: Medicine. Attending Everardo Canada MD    CONSULTS:   Consult Orders (all) (From admission, onward)     Start     Ordered    23 0936  Inpatient Cardiology Consult  Once        Specialty:  Cardiology  Provider:  Dylon Aranda MD    23 0936    23 1320  Inpatient Nutrition Consult  Once        Provider:  (Not yet assigned)    23 1320    22 1429  Inpatient Gastroenterology Consult  Once        Specialty:  Gastroenterology  Provider:  Bea Pierce MD    22 1429    22 1602  Inpatient General Surgery Consult  Once        Specialty:  General Surgery  Provider:  Femi Hill MD    22 1601    22 0119  Inpatient Nutrition Consult  Once        Comments: 80 lb unintentional weight loss since Oct 2021   Provider:  (Not yet assigned)    22 0119    22 1042  Inpatient Nephrology Consult  Once        Specialty:  Nephrology  Provider:  Trevor Alonso MD    22 1041    22 193  Hospitalist (on-call MD unless specified)  Once        Specialty:  Hospitalist  Provider:  Will French MD    22                PROCEDURES:   Imaging Results (Last 7 Days)     Procedure Component Value Units Date/Time    XR Chest 1 View [[id number]] Collected: 23 0336     Updated: 23    Narrative:      EXAM: Single view chest    COMPARISON:  Chest Xray from  January 3, 2023    HISTORY: Short of breath     FINDINGS: Lungs are clear with no lobar consolidation, failure,  large effusion or significant  atelectasis.  There is extreme  cardiomegaly again seen. Pacemaker is stable.  No acute osseous  or soft tissue abnormalities.  -----------------------------------------------------    Impression:      1. Extreme cardiomegaly. Correlate for cardiomyopathy versus  pericardial effusion.  2. Clear lungs.  -----------------------------------------------------    Electronically signed by:  Augustus Morales MD  1/9/2023 4:22 AM  [employer] Workstation: [id number]          HISTORY OF PRESENT ILLNESS: *Copied from Will French MD's H&P*  Patient is a 52-year-old male past medical history of chronic systolic congestive heart failure, diabetes, hyperlipidemia, hypertension, and sleep apnea.  Patient was recently discharged from the hospital after being treated for possible non-STEMI and heart failure.  Medications were adjusted, but patient states that he has been taking 2 mg of Bumex instead of 1 mg as prescribed.  He states that on discharge he felt good, but over the last 24 to 48 hours he has been feeling much more short of breath.  He describes orthopnea and paroxysmal nocturnal dyspnea.  He has exertional shortness of breath.  He does state that his dietary compliance with prescribed diet is relatively poor.    DIAGNOSTIC DATA:   Lab Results (last 7 days)     Procedure Component Value Units Date/Time    Basic Metabolic Panel [[id number]]  (Abnormal) Collected: 01/14/23 0355    Specimen: Blood Updated: 01/14/23 0501     Glucose 116 mg/dL      BUN 69 mg/dL      Creatinine 2.13 mg/dL      Sodium 130 mmol/L      Potassium 4.1 mmol/L      Chloride 87 mmol/L      CO2 26.0 mmol/L      Calcium 9.6 mg/dL      BUN/Creatinine Ratio 32.4     Anion Gap 17.0 mmol/L      eGFR 36.8 mL/min/1.73      Comment: National Kidney Foundation and American Society of Nephrology (ASN) Task Force recommended calculation based on the Chronic Kidney Disease Epidemiology Collaboration (CKD-EPI) equation refit without adjustment for race.       Narrative:       GFR Normal >60  Chronic Kidney Disease <60  Kidney Failure <15      Magnesium [[id number]]  (Normal) Collected: 01/14/23 0355    Specimen: Blood Updated: 01/14/23 0500     Magnesium 2.2 mg/dL     CBC & Differential [[id number]]  (Abnormal) Collected: 01/14/23 0355    Specimen: Blood Updated: 01/14/23 0443    Narrative:      The following orders were created for panel order CBC & Differential.  Procedure                               Abnormality         Status                     ---------                               -----------         ------                     CBC Auto Differential[[id number]]        Abnormal            Final result               Scan Slide[[id number]]                                                                    Please view results for these tests on the individual orders.    CBC Auto Differential [[id number]]  (Abnormal) Collected: 01/14/23 0355    Specimen: Blood Updated: 01/14/23 0443     WBC 3.30 10*3/mm3      RBC 4.56 10*6/mm3      Hemoglobin 13.1 g/dL      Hematocrit 40.0 %      MCV 87.7 fL      MCH 28.7 pg      MCHC 32.8 g/dL      RDW 16.8 %      RDW-SD 52.8 fl      MPV 13.4 fL      Platelets 82 10*3/mm3      Neutrophil % 56.1 %      Lymphocyte % 30.3 %      Monocyte % 12.1 %      Eosinophil % 0.6 %      Basophil % 0.6 %      Immature Grans % 0.3 %      Neutrophils, Absolute 1.85 10*3/mm3      Lymphocytes, Absolute 1.00 10*3/mm3      Monocytes, Absolute 0.40 10*3/mm3      Eosinophils, Absolute 0.02 10*3/mm3      Basophils, Absolute 0.02 10*3/mm3      Immature Grans, Absolute 0.01 10*3/mm3      nRBC 0.6 /100 WBC     Potassium [[id number]]  (Normal) Collected: 01/13/23 1407    Specimen: Blood Updated: 01/13/23 1428     Potassium 4.2 mmol/L     Basic Metabolic Panel [[id number]]  (Abnormal) Collected: 01/13/23 0511    Specimen: Blood Updated: 01/13/23 0609     Glucose 109 mg/dL      BUN 67 mg/dL      Creatinine 2.32 mg/dL      Sodium 130 mmol/L      Potassium 3.5 mmol/L      Chloride 87  mmol/L      CO2 28.0 mmol/L      Calcium 9.6 mg/dL      BUN/Creatinine Ratio 28.9     Anion Gap 15.0 mmol/L      eGFR 33.2 mL/min/1.73      Comment: National Kidney Foundation and American Society of Nephrology (ASN) Task Force recommended calculation based on the Chronic Kidney Disease Epidemiology Collaboration (CKD-EPI) equation refit without adjustment for race.       Narrative:      GFR Normal >60  Chronic Kidney Disease <60  Kidney Failure <15      Magnesium [[id number]]  (Normal) Collected: 01/13/23 0511    Specimen: Blood Updated: 01/13/23 0609     Magnesium 2.2 mg/dL     CBC & Differential [[id number]]  (Abnormal) Collected: 01/13/23 0511    Specimen: Blood Updated: 01/13/23 0558    Narrative:      The following orders were created for panel order CBC & Differential.  Procedure                               Abnormality         Status                     ---------                               -----------         ------                     CBC Auto Differential[[id number]]        Abnormal            Final result               Scan Slide[[id number]]                                                                    Please view results for these tests on the individual orders.    CBC Auto Differential [[id number]]  (Abnormal) Collected: 01/13/23 0511    Specimen: Blood Updated: 01/13/23 0557     WBC 3.47 10*3/mm3      RBC 4.27 10*6/mm3      Hemoglobin 12.5 g/dL      Hematocrit 37.1 %      MCV 86.9 fL      MCH 29.3 pg      MCHC 33.7 g/dL      RDW 17.0 %      RDW-SD 52.6 fl      MPV --     Comment: Unable to calculate        Platelets 81 10*3/mm3      Neutrophil % 53.2 %      Lymphocyte % 28.5 %      Monocyte % 15.6 %      Eosinophil % 1.2 %      Basophil % 0.9 %      Immature Grans % 0.6 %      Neutrophils, Absolute 1.85 10*3/mm3      Lymphocytes, Absolute 0.99 10*3/mm3      Monocytes, Absolute 0.54 10*3/mm3      Eosinophils, Absolute 0.04 10*3/mm3      Basophils, Absolute 0.03 10*3/mm3      Immature Grans, Absolute  0.02 10*3/mm3      nRBC 0.6 /100 WBC     CBC & Differential [[id number]]  (Abnormal) Collected: 01/12/23 0419    Specimen: Blood Updated: 01/12/23 0729    Narrative:      The following orders were created for panel order CBC & Differential.  Procedure                               Abnormality         Status                     ---------                               -----------         ------                     CBC Auto Differential[[id number]]        Abnormal            Final result               Scan Slide[[id number]]                                       Final result                 Please view results for these tests on the individual orders.    Scan Slide [[id number]] Collected: 01/12/23 0419    Specimen: Blood Updated: 01/12/23 0729     Anisocytosis Slight/1+     Hypochromia --     Comment: Rare hypochromic cell observed        Ovalocytes Slight/1+     Target Cells --     Comment: Rare target cell observed        WBC Morphology Normal     Platelet Estimate Decreased    CBC Auto Differential [[id number]]  (Abnormal) Collected: 01/12/23 0419    Specimen: Blood Updated: 01/12/23 0501     WBC 4.14 10*3/mm3      RBC 4.42 10*6/mm3      Hemoglobin 12.9 g/dL      Hematocrit 39.4 %      MCV 89.1 fL      MCH 29.2 pg      MCHC 32.7 g/dL      RDW 17.0 %      RDW-SD 54.6 fl      MPV 13.5 fL      Platelets 89 10*3/mm3      Neutrophil % 60.6 %      Lymphocyte % 23.9 %      Monocyte % 13.8 %      Eosinophil % 0.5 %      Basophil % 0.5 %      Immature Grans % 0.7 %      Neutrophils, Absolute 2.51 10*3/mm3      Lymphocytes, Absolute 0.99 10*3/mm3      Monocytes, Absolute 0.57 10*3/mm3      Eosinophils, Absolute 0.02 10*3/mm3      Basophils, Absolute 0.02 10*3/mm3      Immature Grans, Absolute 0.03 10*3/mm3      nRBC 0.0 /100 WBC     Basic Metabolic Panel [[id number]]  (Abnormal) Collected: 01/12/23 0419    Specimen: Blood Updated: 01/12/23 0501     Glucose 119 mg/dL      BUN 64 mg/dL      Creatinine 2.50 mg/dL      Sodium 130 mmol/L       Potassium 4.1 mmol/L      Chloride 87 mmol/L      CO2 24.0 mmol/L      Calcium 9.5 mg/dL      BUN/Creatinine Ratio 25.6     Anion Gap 19.0 mmol/L      eGFR 30.3 mL/min/1.73      Comment: National Kidney Foundation and American Society of Nephrology (ASN) Task Force recommended calculation based on the Chronic Kidney Disease Epidemiology Collaboration (CKD-EPI) equation refit without adjustment for race.       Narrative:      GFR Normal >60  Chronic Kidney Disease <60  Kidney Failure <15      Magnesium [[id number]]  (Normal) Collected: 01/12/23 0419    Specimen: Blood Updated: 01/12/23 0459     Magnesium 2.2 mg/dL     Potassium [[id number]]  (Normal) Collected: 01/12/23 0007    Specimen: Blood Updated: 01/12/23 0031     Potassium 4.0 mmol/L     Potassium [[id number]]  (Abnormal) Collected: 01/11/23 1117    Specimen: Blood Updated: 01/11/23 1153     Potassium 2.9 mmol/L     Basic Metabolic Panel [[id number]]  (Abnormal) Collected: 01/11/23 0432    Specimen: Blood Updated: 01/11/23 0532     Glucose 116 mg/dL      BUN 61 mg/dL      Creatinine 1.88 mg/dL      Sodium 131 mmol/L      Potassium 2.8 mmol/L      Chloride 88 mmol/L      CO2 28.0 mmol/L      Calcium 9.4 mg/dL      BUN/Creatinine Ratio 32.4     Anion Gap 15.0 mmol/L      eGFR 42.7 mL/min/1.73      Comment: National Kidney Foundation and American Society of Nephrology (ASN) Task Force recommended calculation based on the Chronic Kidney Disease Epidemiology Collaboration (CKD-EPI) equation refit without adjustment for race.       Narrative:      GFR Normal >60  Chronic Kidney Disease <60  Kidney Failure <15      CBC & Differential [[id number]]  (Abnormal) Collected: 01/11/23 0432    Specimen: Blood Updated: 01/11/23 0504    Narrative:      The following orders were created for panel order CBC & Differential.  Procedure                               Abnormality         Status                     ---------                               -----------         ------                      CBC Auto Differential[[id number]]        Abnormal            Final result               Scan Slide[[id number]]                                                                    Please view results for these tests on the individual orders.    CBC Auto Differential [[id number]]  (Abnormal) Collected: 01/11/23 0432    Specimen: Blood Updated: 01/11/23 0504     WBC 4.12 10*3/mm3      RBC 4.46 10*6/mm3      Hemoglobin 12.8 g/dL      Hematocrit 39.0 %      MCV 87.4 fL      MCH 28.7 pg      MCHC 32.8 g/dL      RDW 16.8 %      RDW-SD 51.4 fl      MPV 14.0 fL      Platelets 89 10*3/mm3      Neutrophil % 63.5 %      Lymphocyte % 24.0 %      Monocyte % 10.9 %      Eosinophil % 1.2 %      Basophil % 0.2 %      Immature Grans % 0.2 %      Neutrophils, Absolute 2.61 10*3/mm3      Lymphocytes, Absolute 0.99 10*3/mm3      Monocytes, Absolute 0.45 10*3/mm3      Eosinophils, Absolute 0.05 10*3/mm3      Basophils, Absolute 0.01 10*3/mm3      Immature Grans, Absolute 0.01 10*3/mm3      nRBC 0.0 /100 WBC     Basic Metabolic Panel [[id number]]  (Abnormal) Collected: 01/10/23 0657    Specimen: Blood Updated: 01/10/23 1204     Glucose 132 mg/dL      BUN 66 mg/dL      Creatinine 2.11 mg/dL      Sodium 130 mmol/L      Potassium 3.6 mmol/L      Chloride 89 mmol/L      CO2 22.0 mmol/L      Calcium 9.7 mg/dL      BUN/Creatinine Ratio 31.3     Anion Gap 19.0 mmol/L      eGFR 37.2 mL/min/1.73      Comment: National Kidney Foundation and American Society of Nephrology (ASN) Task Force recommended calculation based on the Chronic Kidney Disease Epidemiology Collaboration (CKD-EPI) equation refit without adjustment for race.       Narrative:      GFR Normal >60  Chronic Kidney Disease <60  Kidney Failure <15      Magnesium [[id number]]  (Normal) Collected: 01/10/23 0657    Specimen: Blood Updated: 01/10/23 0750     Magnesium 2.0 mg/dL     CBC & Differential [[id number]]  (Abnormal) Collected: 01/10/23 0616    Specimen: Blood Updated:  01/10/23 0642    Narrative:      The following orders were created for panel order CBC & Differential.  Procedure                               Abnormality         Status                     ---------                               -----------         ------                     CBC Auto Differential[[id number]]        Abnormal            Final result               Scan Slide[[id number]]                                                                    Please view results for these tests on the individual orders.    CBC Auto Differential [[id number]]  (Abnormal) Collected: 01/10/23 0616    Specimen: Blood Updated: 01/10/23 0642     WBC 4.05 10*3/mm3      RBC 4.41 10*6/mm3      Hemoglobin 13.1 g/dL      Hematocrit 37.7 %      MCV 85.5 fL      MCH 29.7 pg      MCHC 34.7 g/dL      RDW 16.8 %      RDW-SD 50.7 fl      MPV 13.4 fL      Platelets 88 10*3/mm3      Neutrophil % 54.8 %      Lymphocyte % 31.6 %      Monocyte % 11.4 %      Eosinophil % 1.0 %      Basophil % 0.7 %      Immature Grans % 0.5 %      Neutrophils, Absolute 2.22 10*3/mm3      Lymphocytes, Absolute 1.28 10*3/mm3      Monocytes, Absolute 0.46 10*3/mm3      Eosinophils, Absolute 0.04 10*3/mm3      Basophils, Absolute 0.03 10*3/mm3      Immature Grans, Absolute 0.02 10*3/mm3      nRBC 0.0 /100 WBC     Comprehensive Metabolic Panel [[id number]]  (Abnormal) Collected: 01/09/23 0620    Specimen: Blood Updated: 01/09/23 0655     Glucose 122 mg/dL      BUN 58 mg/dL      Creatinine 2.14 mg/dL      Sodium 129 mmol/L      Potassium 4.0 mmol/L      Chloride 86 mmol/L      CO2 27.0 mmol/L      Calcium 9.7 mg/dL      Total Protein 7.4 g/dL      Albumin 4.2 g/dL      ALT (SGPT) 84 U/L      AST (SGOT) 31 U/L      Alkaline Phosphatase 91 U/L      Total Bilirubin 5.1 mg/dL      Globulin 3.2 gm/dL      A/G Ratio 1.3 g/dL      BUN/Creatinine Ratio 27.1     Anion Gap 16.0 mmol/L      eGFR 36.6 mL/min/1.73      Comment: National Kidney Foundation and American Society of  Nephrology (ASN) Task Force recommended calculation based on the Chronic Kidney Disease Epidemiology Collaboration (CKD-EPI) equation refit without adjustment for race.       Narrative:      GFR Normal >60  Chronic Kidney Disease <60  Kidney Failure <15      CBC & Differential [[id number]]  (Abnormal) Collected: 01/09/23 0620    Specimen: Blood Updated: 01/09/23 0644    Narrative:      The following orders were created for panel order CBC & Differential.  Procedure                               Abnormality         Status                     ---------                               -----------         ------                     CBC Auto Differential[[id number]]        Abnormal            Final result               Scan Slide[[id number]]                                                                    Please view results for these tests on the individual orders.    CBC Auto Differential [[id number]]  (Abnormal) Collected: 01/09/23 0620    Specimen: Blood Updated: 01/09/23 0644     WBC 5.45 10*3/mm3      RBC 4.53 10*6/mm3      Hemoglobin 13.4 g/dL      Hematocrit 39.2 %      MCV 86.5 fL      MCH 29.6 pg      MCHC 34.2 g/dL      RDW 16.9 %      RDW-SD 51.9 fl      MPV 13.1 fL      Platelets 91 10*3/mm3      Neutrophil % 61.6 %      Lymphocyte % 26.6 %      Monocyte % 10.1 %      Eosinophil % 0.7 %      Basophil % 0.6 %      Immature Grans % 0.4 %      Neutrophils, Absolute 3.36 10*3/mm3      Lymphocytes, Absolute 1.45 10*3/mm3      Monocytes, Absolute 0.55 10*3/mm3      Eosinophils, Absolute 0.04 10*3/mm3      Basophils, Absolute 0.03 10*3/mm3      Immature Grans, Absolute 0.02 10*3/mm3      nRBC 0.4 /100 WBC     CBC & Differential [[id number]]  (Abnormal) Collected: 01/08/23 0429    Specimen: Blood Updated: 01/08/23 0558    Narrative:      The following orders were created for panel order CBC & Differential.  Procedure                               Abnormality         Status                     ---------                                -----------         ------                     CBC Auto Differential[[id number]]        Abnormal            Final result               Scan Slide[[id number]]                                       Final result                 Please view results for these tests on the individual orders.    Scan Slide [[id number]] Collected: 01/08/23 0429    Specimen: Blood Updated: 01/08/23 0558     Target Cells Slight/1+     WBC Morphology Normal     Platelet Estimate Adequate     Large Platelets --     Comment: Few large platelets observed.       Comprehensive Metabolic Panel [[id number]]  (Abnormal) Collected: 01/08/23 0429    Specimen: Blood Updated: 01/08/23 0511     Glucose 144 mg/dL      BUN 54 mg/dL      Creatinine 1.81 mg/dL      Sodium 129 mmol/L      Potassium 4.3 mmol/L      Chloride 90 mmol/L      CO2 23.0 mmol/L      Calcium 9.2 mg/dL      Total Protein 7.1 g/dL      Albumin 3.9 g/dL      ALT (SGPT) 96 U/L      AST (SGOT) 35 U/L      Alkaline Phosphatase 89 U/L      Total Bilirubin 4.8 mg/dL      Globulin 3.2 gm/dL      A/G Ratio 1.2 g/dL      BUN/Creatinine Ratio 29.8     Anion Gap 16.0 mmol/L      eGFR 44.7 mL/min/1.73      Comment: National Kidney Foundation and American Society of Nephrology (ASN) Task Force recommended calculation based on the Chronic Kidney Disease Epidemiology Collaboration (CKD-EPI) equation refit without adjustment for race.       Narrative:      GFR Normal >60  Chronic Kidney Disease <60  Kidney Failure <15      CBC Auto Differential [[id number]]  (Abnormal) Collected: 01/08/23 0429    Specimen: Blood Updated: 01/08/23 0501     WBC 4.95 10*3/mm3      RBC 4.34 10*6/mm3      Hemoglobin 12.8 g/dL      Hematocrit 37.1 %      MCV 85.5 fL      MCH 29.5 pg      MCHC 34.5 g/dL      RDW 16.5 %      RDW-SD 50.5 fl      MPV 12.7 fL      Platelets 91 10*3/mm3      Neutrophil % 59.8 %      Lymphocyte % 27.9 %      Monocyte % 10.9 %      Eosinophil % 0.6 %      Basophil % 0.4 %      Immature Grans %  0.4 %      Neutrophils, Absolute 2.96 10*3/mm3      Lymphocytes, Absolute 1.38 10*3/mm3      Monocytes, Absolute 0.54 10*3/mm3      Eosinophils, Absolute 0.03 10*3/mm3      Basophils, Absolute 0.02 10*3/mm3      Immature Grans, Absolute 0.02 10*3/mm3      nRBC 0.4 /100 WBC           HOSPITAL COURSE:  Active Hospital Problems    Diagnosis  POA   • **FRANCK (acute kidney injury) ([facility]) [N17.9]  Yes   • Acute systolic heart failure (CMS/HCC) [I50.21]  Unknown   • Acute on chronic systolic heart failure (CMS/HCC) [I50.23]  Unknown   • Atrial fibrillation, persistent ([facility]) [I48.19]  Unknown   • Severe malnutrition ([facility]) [E43]  Yes     The patient was admitted initially with shortness of air. He had FRANCK initially with nephrology consulted. There was some consideration of acute jessee on admission but HIDA was negative and no surgery planned per General Surgery. He developed hypotension and cardiology was consulted given his HFrEF. He was also transferred to the ICU on 1/3. He was started on bumex and inotropes. He also developed Afib with RVR but converted back to NSR. Optimization was attempted but per cards he was recommended for higher level of care and transfer to  was started. He was accepted but they had no bed until Sunday 1/14. Ambulance service was unavailable for at least 24-48 hours or longer, so fixed wing flight was arranged and patient was transferred on 1/14 to .    DISCHARGE CONDITION:   Stable but guarded    DISPOSITION:  Transfer to Another Facility    DISCHARGE MEDICATIONS     Discharge Medications      New Medications      Instructions Start Date   bumetanide 10 mg in sodium chloride 0.9 % 60 mL   0.5 mg/hr (0.5 mg/hr), Intravenous, Continuous      DOBUTamine 1 mg/mL infusion  Commonly known as: DOBUTREX   2.5 mcg/kg/min (350 mcg/min), Intravenous, Continuous      droperidol 2.5 MG/ML injection  Commonly known as: INAPSINE   1.25 mg, Intravenous, Every 6 Hours PRN      spironolactone 25 MG  tablet  Commonly known as: ALDACTONE   25 mg, Oral, Daily   Start Date: January 15, 2023        Continue These Medications      Instructions Start Date   albuterol sulfate  (90 Base) MCG/ACT inhaler  Commonly known as: PROVENTIL HFA;VENTOLIN HFA;PROAIR HFA   2 puffs, Inhalation, Every 4 Hours PRN      apixaban 5 MG tablet tablet  Commonly known as: ELIQUIS   5 mg, Oral, 2 Times Daily         Stop These Medications    bumetanide 1 MG tablet  Commonly known as: BUMEX     carvedilol 12.5 MG tablet  Commonly known as: COREG     losartan 25 MG tablet  Commonly known as: COZAAR     lovastatin 20 MG 24 hr tablet  Commonly known as: ALTOPREV     metOLazone 2.5 MG tablet  Commonly known as: ZAROXOLYN     potassium chloride 10 MEQ CR tablet     vitamin D 1.25 MG (84076 UT) capsule capsule  Commonly known as: ERGOCALCIFEROL            INSTRUCTIONS:  Activity:   Activity Instructions     Activity as Tolerated            Diet:   Diet Instructions     Advance Diet As Tolerated            Special instructions: Patient instructed to call MD or return to ED with worsening shortness of breath, chest pain, fever greater than 100.4 degrees F or any other medical concerns..    FOLLOW UP: Other follow ups to be scheduled from  on discharge from their facility.   Follow-up Information     Shonna Ng APRN .    Specialty: Family Medicine  Contact information:  [address] [place], [place] [id number] [phone]                         PENDING TEST RESULTS AT DISCHARGE      Time: Discharge 35 min          This document has been electronically signed by Everardo Canada MD on January 14, 2023 15:12 CST

## 2023-01-15 LAB
QT INTERVAL: 412 MS
QTC INTERVAL: 504 MS

## 2023-01-16 NOTE — PAYOR COMM NOTE
"Geraldine Garcia  [facility]  Case Management Extender  [phone] phone  [phone] fax      Auth# [id number]    Ethel Davis (52 y.o. Male)     Date of Birth   1970    Social Security Number       Address   [address] [facility] [address]    Home Phone   [phone]    MRN   [id number]       [employer]   [facility]    Marital Status                               Admission Date   22    Admission Type   Emergency    Admitting Provider   Will French MD    Attending Provider       Department, Room/Bed   [facility] CRITICAL CARE STEPDOWN,        Discharge Date   2023    Discharge Disposition   Transfer to Another Facility    Discharge Destination                               Attending Provider: (none)   Allergies: No Known Allergies    Isolation: None   Infection: None   Code Status: Prior    Ht: 193 cm (76\")   Wt: 135 kg (297 lb 8 oz)    Admission Cmt: None   Principal Problem: FRANCK (acute kidney injury) (HCC) [N17.9]                 Active Insurance as of 2022     Primary Coverage     Payor Plan Insurance Group Employer/Plan Group    [place] [id number]     Payor Plan Address Payor Plan Phone Number Payor Plan Fax Number Effective Dates    [employer] [address] [address] [phone]  10/8/2021 - None Entered    [facility] [address]       Subscriber Name Subscriber Birth Date Member ID       ETHEL DAVIS 1970 [id number]                 Emergency Contacts      (Rel.) Home Phone Work Phone Mobile Phone    DavisCourtney sahu (Mother) [phone] -- --    LawrenceMary Anne (Sister) -- -- [phone]               Discharge Summary      Everardo Canada MD at 23 1512          DISCHARGE SUMMARY    NAME: Ethel Davis   PHYSICIAN: Everardo Canada MD  : 1970  MRN: [id number]    ADMITTED: 2022   DISCHARGED: 23    ADMISSION " DIAGNOSES:  Present on Admission:  • FRANCK (acute kidney injury) (HCC)  • Severe malnutrition (HCC)    DISCHARGE DIAGNOSES:   • Nonischemic cardiomyopathy  • Acute on Chronic HFrEF  • Afib with RVR  • FRANCK (acute kidney injury) (HCC)  • Severe malnutrition (HCC)      SERVICE: Medicine. Attending Everardo Canada MD    CONSULTS:   Consult Orders (all) (From admission, onward)     Start     Ordered    01/03/23 0936  Inpatient Cardiology Consult  Once        Specialty:  Cardiology  Provider:  Dylon Aranda MD    01/03/23 0936    01/01/23 1320  Inpatient Nutrition Consult  Once        Provider:  (Not yet assigned)    01/01/23 1320    12/28/22 1429  Inpatient Gastroenterology Consult  Once        Specialty:  Gastroenterology  Provider:  Bea Pierce MD    12/28/22 1429    12/27/22 1602  Inpatient General Surgery Consult  Once        Specialty:  General Surgery  Provider:  Femi Hill MD    12/27/22 1601    12/22/22 0119  Inpatient Nutrition Consult  Once        Comments: 80 lb unintentional weight loss since Oct 2021   Provider:  (Not yet assigned)    12/22/22 0119    12/21/22 1042  Inpatient Nephrology Consult  Once        Specialty:  Nephrology  Provider:  Trevor Alonso MD    12/21/22 1041    12/20/22 1931  Hospitalist (on-call MD unless specified)  Once        Specialty:  Hospitalist  Provider:  Will French MD    12/20/22 1931                PROCEDURES:   Imaging Results (Last 7 Days)     Procedure Component Value Units Date/Time    XR Chest 1 View [[id number]] Collected: 01/09/23 0336     Updated: 01/09/23 0424    Narrative:      EXAM: Single view chest    COMPARISON:  Chest Xray from  January 3, 2023    HISTORY: Short of breath     FINDINGS: Lungs are clear with no lobar consolidation, failure,  large effusion or significant atelectasis.  There is extreme  cardiomegaly again seen. Pacemaker is stable.  No acute osseous  or soft tissue  abnormalities.  -----------------------------------------------------    Impression:      1. Extreme cardiomegaly. Correlate for cardiomyopathy versus  pericardial effusion.  2. Clear lungs.  -----------------------------------------------------    Electronically signed by:  Augustus Morales MD  1/9/2023 4:22 AM  [facility] Workstation: [id number]          HISTORY OF PRESENT ILLNESS: *Copied from Will French MD's H&P*  Patient is a 52-year-old male past medical history of chronic systolic congestive heart failure, diabetes, hyperlipidemia, hypertension, and sleep apnea.  Patient was recently discharged from the hospital after being treated for possible non-STEMI and heart failure.  Medications were adjusted, but patient states that he has been taking 2 mg of Bumex instead of 1 mg as prescribed.  He states that on discharge he felt good, but over the last 24 to 48 hours he has been feeling much more short of breath.  He describes orthopnea and paroxysmal nocturnal dyspnea.  He has exertional shortness of breath.  He does state that his dietary compliance with prescribed diet is relatively poor.    DIAGNOSTIC DATA:   Lab Results (last 7 days)     Procedure Component Value Units Date/Time    Basic Metabolic Panel [[id number]]  (Abnormal) Collected: 01/14/23 0355    Specimen: Blood Updated: 01/14/23 0501     Glucose 116 mg/dL      BUN 69 mg/dL      Creatinine 2.13 mg/dL      Sodium 130 mmol/L      Potassium 4.1 mmol/L      Chloride 87 mmol/L      CO2 26.0 mmol/L      Calcium 9.6 mg/dL      BUN/Creatinine Ratio 32.4     Anion Gap 17.0 mmol/L      eGFR 36.8 mL/min/1.73      Comment: National Kidney Foundation and American Society of Nephrology (ASN) Task Force recommended calculation based on the Chronic Kidney Disease Epidemiology Collaboration (CKD-EPI) equation refit without adjustment for race.       Narrative:      GFR Normal >60  Chronic Kidney Disease <60  Kidney Failure <15      Magnesium [[id number]]  (Normal)  Collected: 01/14/23 0355    Specimen: Blood Updated: 01/14/23 0500     Magnesium 2.2 mg/dL     CBC & Differential [[id number]]  (Abnormal) Collected: 01/14/23 0355    Specimen: Blood Updated: 01/14/23 0443    Narrative:      The following orders were created for panel order CBC & Differential.  Procedure                               Abnormality         Status                     ---------                               -----------         ------                     CBC Auto Differential[[id number]]        Abnormal            Final result               Scan Slide[[id number]]                                                                    Please view results for these tests on the individual orders.    CBC Auto Differential [[id number]]  (Abnormal) Collected: 01/14/23 0355    Specimen: Blood Updated: 01/14/23 0443     WBC 3.30 10*3/mm3      RBC 4.56 10*6/mm3      Hemoglobin 13.1 g/dL      Hematocrit 40.0 %      MCV 87.7 fL      MCH 28.7 pg      MCHC 32.8 g/dL      RDW 16.8 %      RDW-SD 52.8 fl      MPV 13.4 fL      Platelets 82 10*3/mm3      Neutrophil % 56.1 %      Lymphocyte % 30.3 %      Monocyte % 12.1 %      Eosinophil % 0.6 %      Basophil % 0.6 %      Immature Grans % 0.3 %      Neutrophils, Absolute 1.85 10*3/mm3      Lymphocytes, Absolute 1.00 10*3/mm3      Monocytes, Absolute 0.40 10*3/mm3      Eosinophils, Absolute 0.02 10*3/mm3      Basophils, Absolute 0.02 10*3/mm3      Immature Grans, Absolute 0.01 10*3/mm3      nRBC 0.6 /100 WBC     Potassium [[id number]]  (Normal) Collected: 01/13/23 1407    Specimen: Blood Updated: 01/13/23 1428     Potassium 4.2 mmol/L     Basic Metabolic Panel [[id number]]  (Abnormal) Collected: 01/13/23 0511    Specimen: Blood Updated: 01/13/23 0609     Glucose 109 mg/dL      BUN 67 mg/dL      Creatinine 2.32 mg/dL      Sodium 130 mmol/L      Potassium 3.5 mmol/L      Chloride 87 mmol/L      CO2 28.0 mmol/L      Calcium 9.6 mg/dL      BUN/Creatinine Ratio 28.9     Anion Gap 15.0  mmol/L      eGFR 33.2 mL/min/1.73      Comment: National Kidney Foundation and American Society of Nephrology (ASN) Task Force recommended calculation based on the Chronic Kidney Disease Epidemiology Collaboration (CKD-EPI) equation refit without adjustment for race.       Narrative:      GFR Normal >60  Chronic Kidney Disease <60  Kidney Failure <15      Magnesium [[id number]]  (Normal) Collected: 01/13/23 0511    Specimen: Blood Updated: 01/13/23 0609     Magnesium 2.2 mg/dL     CBC & Differential [[id number]]  (Abnormal) Collected: 01/13/23 0511    Specimen: Blood Updated: 01/13/23 0558    Narrative:      The following orders were created for panel order CBC & Differential.  Procedure                               Abnormality         Status                     ---------                               -----------         ------                     CBC Auto Differential[[id number]]        Abnormal            Final result               Scan Slide[[id number]]                                                                    Please view results for these tests on the individual orders.    CBC Auto Differential [[id number]]  (Abnormal) Collected: 01/13/23 0511    Specimen: Blood Updated: 01/13/23 0557     WBC 3.47 10*3/mm3      RBC 4.27 10*6/mm3      Hemoglobin 12.5 g/dL      Hematocrit 37.1 %      MCV 86.9 fL      MCH 29.3 pg      MCHC 33.7 g/dL      RDW 17.0 %      RDW-SD 52.6 fl      MPV --     Comment: Unable to calculate        Platelets 81 10*3/mm3      Neutrophil % 53.2 %      Lymphocyte % 28.5 %      Monocyte % 15.6 %      Eosinophil % 1.2 %      Basophil % 0.9 %      Immature Grans % 0.6 %      Neutrophils, Absolute 1.85 10*3/mm3      Lymphocytes, Absolute 0.99 10*3/mm3      Monocytes, Absolute 0.54 10*3/mm3      Eosinophils, Absolute 0.04 10*3/mm3      Basophils, Absolute 0.03 10*3/mm3      Immature Grans, Absolute 0.02 10*3/mm3      nRBC 0.6 /100 WBC     CBC & Differential [[id number]]  (Abnormal) Collected:  01/12/23 0419    Specimen: Blood Updated: 01/12/23 0729    Narrative:      The following orders were created for panel order CBC & Differential.  Procedure                               Abnormality         Status                     ---------                               -----------         ------                     CBC Auto Differential[[id number]]        Abnormal            Final result               Scan Slide[[id number]]                                       Final result                 Please view results for these tests on the individual orders.    Scan Slide [[id number]] Collected: 01/12/23 0419    Specimen: Blood Updated: 01/12/23 0729     Anisocytosis Slight/1+     Hypochromia --     Comment: Rare hypochromic cell observed        Ovalocytes Slight/1+     Target Cells --     Comment: Rare target cell observed        WBC Morphology Normal     Platelet Estimate Decreased    CBC Auto Differential [[id number]]  (Abnormal) Collected: 01/12/23 0419    Specimen: Blood Updated: 01/12/23 0501     WBC 4.14 10*3/mm3      RBC 4.42 10*6/mm3      Hemoglobin 12.9 g/dL      Hematocrit 39.4 %      MCV 89.1 fL      MCH 29.2 pg      MCHC 32.7 g/dL      RDW 17.0 %      RDW-SD 54.6 fl      MPV 13.5 fL      Platelets 89 10*3/mm3      Neutrophil % 60.6 %      Lymphocyte % 23.9 %      Monocyte % 13.8 %      Eosinophil % 0.5 %      Basophil % 0.5 %      Immature Grans % 0.7 %      Neutrophils, Absolute 2.51 10*3/mm3      Lymphocytes, Absolute 0.99 10*3/mm3      Monocytes, Absolute 0.57 10*3/mm3      Eosinophils, Absolute 0.02 10*3/mm3      Basophils, Absolute 0.02 10*3/mm3      Immature Grans, Absolute 0.03 10*3/mm3      nRBC 0.0 /100 WBC     Basic Metabolic Panel [[id number]]  (Abnormal) Collected: 01/12/23 0419    Specimen: Blood Updated: 01/12/23 0501     Glucose 119 mg/dL      BUN 64 mg/dL      Creatinine 2.50 mg/dL      Sodium 130 mmol/L      Potassium 4.1 mmol/L      Chloride 87 mmol/L      CO2 24.0 mmol/L      Calcium 9.5 mg/dL       BUN/Creatinine Ratio 25.6     Anion Gap 19.0 mmol/L      eGFR 30.3 mL/min/1.73      Comment: National Kidney Foundation and American Society of Nephrology (ASN) Task Force recommended calculation based on the Chronic Kidney Disease Epidemiology Collaboration (CKD-EPI) equation refit without adjustment for race.       Narrative:      GFR Normal >60  Chronic Kidney Disease <60  Kidney Failure <15      Magnesium [[id number]]  (Normal) Collected: 01/12/23 0419    Specimen: Blood Updated: 01/12/23 0459     Magnesium 2.2 mg/dL     Potassium [[id number]]  (Normal) Collected: 01/12/23 0007    Specimen: Blood Updated: 01/12/23 0031     Potassium 4.0 mmol/L     Potassium [[id number]]  (Abnormal) Collected: 01/11/23 1117    Specimen: Blood Updated: 01/11/23 1153     Potassium 2.9 mmol/L     Basic Metabolic Panel [[id number]]  (Abnormal) Collected: 01/11/23 0432    Specimen: Blood Updated: 01/11/23 0532     Glucose 116 mg/dL      BUN 61 mg/dL      Creatinine 1.88 mg/dL      Sodium 131 mmol/L      Potassium 2.8 mmol/L      Chloride 88 mmol/L      CO2 28.0 mmol/L      Calcium 9.4 mg/dL      BUN/Creatinine Ratio 32.4     Anion Gap 15.0 mmol/L      eGFR 42.7 mL/min/1.73      Comment: National Kidney Foundation and American Society of Nephrology (ASN) Task Force recommended calculation based on the Chronic Kidney Disease Epidemiology Collaboration (CKD-EPI) equation refit without adjustment for race.       Narrative:      GFR Normal >60  Chronic Kidney Disease <60  Kidney Failure <15      CBC & Differential [[id number]]  (Abnormal) Collected: 01/11/23 0432    Specimen: Blood Updated: 01/11/23 0504    Narrative:      The following orders were created for panel order CBC & Differential.  Procedure                               Abnormality         Status                     ---------                               -----------         ------                     CBC Auto Differential[[id number]]        Abnormal            Final result                Scan Slide[[id number]]                                                                    Please view results for these tests on the individual orders.    CBC Auto Differential [[id number]]  (Abnormal) Collected: 01/11/23 0432    Specimen: Blood Updated: 01/11/23 0504     WBC 4.12 10*3/mm3      RBC 4.46 10*6/mm3      Hemoglobin 12.8 g/dL      Hematocrit 39.0 %      MCV 87.4 fL      MCH 28.7 pg      MCHC 32.8 g/dL      RDW 16.8 %      RDW-SD 51.4 fl      MPV 14.0 fL      Platelets 89 10*3/mm3      Neutrophil % 63.5 %      Lymphocyte % 24.0 %      Monocyte % 10.9 %      Eosinophil % 1.2 %      Basophil % 0.2 %      Immature Grans % 0.2 %      Neutrophils, Absolute 2.61 10*3/mm3      Lymphocytes, Absolute 0.99 10*3/mm3      Monocytes, Absolute 0.45 10*3/mm3      Eosinophils, Absolute 0.05 10*3/mm3      Basophils, Absolute 0.01 10*3/mm3      Immature Grans, Absolute 0.01 10*3/mm3      nRBC 0.0 /100 WBC     Basic Metabolic Panel [[id number]]  (Abnormal) Collected: 01/10/23 0657    Specimen: Blood Updated: 01/10/23 1204     Glucose 132 mg/dL      BUN 66 mg/dL      Creatinine 2.11 mg/dL      Sodium 130 mmol/L      Potassium 3.6 mmol/L      Chloride 89 mmol/L      CO2 22.0 mmol/L      Calcium 9.7 mg/dL      BUN/Creatinine Ratio 31.3     Anion Gap 19.0 mmol/L      eGFR 37.2 mL/min/1.73      Comment: National Kidney Foundation and American Society of Nephrology (ASN) Task Force recommended calculation based on the Chronic Kidney Disease Epidemiology Collaboration (CKD-EPI) equation refit without adjustment for race.       Narrative:      GFR Normal >60  Chronic Kidney Disease <60  Kidney Failure <15      Magnesium [[id number]]  (Normal) Collected: 01/10/23 0657    Specimen: Blood Updated: 01/10/23 0750     Magnesium 2.0 mg/dL     CBC & Differential [[id number]]  (Abnormal) Collected: 01/10/23 0616    Specimen: Blood Updated: 01/10/23 0642    Narrative:      The following orders were created for panel order CBC &  Differential.  Procedure                               Abnormality         Status                     ---------                               -----------         ------                     CBC Auto Differential[[id number]]        Abnormal            Final result               Scan Slide[[id number]]                                                                    Please view results for these tests on the individual orders.    CBC Auto Differential [[id number]]  (Abnormal) Collected: 01/10/23 0616    Specimen: Blood Updated: 01/10/23 0642     WBC 4.05 10*3/mm3      RBC 4.41 10*6/mm3      Hemoglobin 13.1 g/dL      Hematocrit 37.7 %      MCV 85.5 fL      MCH 29.7 pg      MCHC 34.7 g/dL      RDW 16.8 %      RDW-SD 50.7 fl      MPV 13.4 fL      Platelets 88 10*3/mm3      Neutrophil % 54.8 %      Lymphocyte % 31.6 %      Monocyte % 11.4 %      Eosinophil % 1.0 %      Basophil % 0.7 %      Immature Grans % 0.5 %      Neutrophils, Absolute 2.22 10*3/mm3      Lymphocytes, Absolute 1.28 10*3/mm3      Monocytes, Absolute 0.46 10*3/mm3      Eosinophils, Absolute 0.04 10*3/mm3      Basophils, Absolute 0.03 10*3/mm3      Immature Grans, Absolute 0.02 10*3/mm3      nRBC 0.0 /100 WBC     Comprehensive Metabolic Panel [[id number]]  (Abnormal) Collected: 01/09/23 0620    Specimen: Blood Updated: 01/09/23 0655     Glucose 122 mg/dL      BUN 58 mg/dL      Creatinine 2.14 mg/dL      Sodium 129 mmol/L      Potassium 4.0 mmol/L      Chloride 86 mmol/L      CO2 27.0 mmol/L      Calcium 9.7 mg/dL      Total Protein 7.4 g/dL      Albumin 4.2 g/dL      ALT (SGPT) 84 U/L      AST (SGOT) 31 U/L      Alkaline Phosphatase 91 U/L      Total Bilirubin 5.1 mg/dL      Globulin 3.2 gm/dL      A/G Ratio 1.3 g/dL      BUN/Creatinine Ratio 27.1     Anion Gap 16.0 mmol/L      eGFR 36.6 mL/min/1.73      Comment: National Kidney Foundation and American Society of Nephrology (ASN) Task Force recommended calculation based on the Chronic Kidney Disease  Epidemiology Collaboration (CKD-EPI) equation refit without adjustment for race.       Narrative:      GFR Normal >60  Chronic Kidney Disease <60  Kidney Failure <15      CBC & Differential [[id number]]  (Abnormal) Collected: 01/09/23 0620    Specimen: Blood Updated: 01/09/23 0644    Narrative:      The following orders were created for panel order CBC & Differential.  Procedure                               Abnormality         Status                     ---------                               -----------         ------                     CBC Auto Differential[[id number]]        Abnormal            Final result               Scan Slide[[id number]]                                                                    Please view results for these tests on the individual orders.    CBC Auto Differential [[id number]]  (Abnormal) Collected: 01/09/23 0620    Specimen: Blood Updated: 01/09/23 0644     WBC 5.45 10*3/mm3      RBC 4.53 10*6/mm3      Hemoglobin 13.4 g/dL      Hematocrit 39.2 %      MCV 86.5 fL      MCH 29.6 pg      MCHC 34.2 g/dL      RDW 16.9 %      RDW-SD 51.9 fl      MPV 13.1 fL      Platelets 91 10*3/mm3      Neutrophil % 61.6 %      Lymphocyte % 26.6 %      Monocyte % 10.1 %      Eosinophil % 0.7 %      Basophil % 0.6 %      Immature Grans % 0.4 %      Neutrophils, Absolute 3.36 10*3/mm3      Lymphocytes, Absolute 1.45 10*3/mm3      Monocytes, Absolute 0.55 10*3/mm3      Eosinophils, Absolute 0.04 10*3/mm3      Basophils, Absolute 0.03 10*3/mm3      Immature Grans, Absolute 0.02 10*3/mm3      nRBC 0.4 /100 WBC     CBC & Differential [[id number]]  (Abnormal) Collected: 01/08/23 0429    Specimen: Blood Updated: 01/08/23 0558    Narrative:      The following orders were created for panel order CBC & Differential.  Procedure                               Abnormality         Status                     ---------                               -----------         ------                     CBC Auto  Differential[[id number]]        Abnormal            Final result               Scan Slide[[id number]]                                       Final result                 Please view results for these tests on the individual orders.    Scan Slide [[id number]] Collected: 01/08/23 0429    Specimen: Blood Updated: 01/08/23 0558     Target Cells Slight/1+     WBC Morphology Normal     Platelet Estimate Adequate     Large Platelets --     Comment: Few large platelets observed.       Comprehensive Metabolic Panel [[id number]]  (Abnormal) Collected: 01/08/23 0429    Specimen: Blood Updated: 01/08/23 0511     Glucose 144 mg/dL      BUN 54 mg/dL      Creatinine 1.81 mg/dL      Sodium 129 mmol/L      Potassium 4.3 mmol/L      Chloride 90 mmol/L      CO2 23.0 mmol/L      Calcium 9.2 mg/dL      Total Protein 7.1 g/dL      Albumin 3.9 g/dL      ALT (SGPT) 96 U/L      AST (SGOT) 35 U/L      Alkaline Phosphatase 89 U/L      Total Bilirubin 4.8 mg/dL      Globulin 3.2 gm/dL      A/G Ratio 1.2 g/dL      BUN/Creatinine Ratio 29.8     Anion Gap 16.0 mmol/L      eGFR 44.7 mL/min/1.73      Comment: National Kidney Foundation and American Society of Nephrology (ASN) Task Force recommended calculation based on the Chronic Kidney Disease Epidemiology Collaboration (CKD-EPI) equation refit without adjustment for race.       Narrative:      GFR Normal >60  Chronic Kidney Disease <60  Kidney Failure <15      CBC Auto Differential [[id number]]  (Abnormal) Collected: 01/08/23 0429    Specimen: Blood Updated: 01/08/23 0501     WBC 4.95 10*3/mm3      RBC 4.34 10*6/mm3      Hemoglobin 12.8 g/dL      Hematocrit 37.1 %      MCV 85.5 fL      MCH 29.5 pg      MCHC 34.5 g/dL      RDW 16.5 %      RDW-SD 50.5 fl      MPV 12.7 fL      Platelets 91 10*3/mm3      Neutrophil % 59.8 %      Lymphocyte % 27.9 %      Monocyte % 10.9 %      Eosinophil % 0.6 %      Basophil % 0.4 %      Immature Grans % 0.4 %      Neutrophils, Absolute 2.96 10*3/mm3      Lymphocytes,  Absolute 1.38 10*3/mm3      Monocytes, Absolute 0.54 10*3/mm3      Eosinophils, Absolute 0.03 10*3/mm3      Basophils, Absolute 0.02 10*3/mm3      Immature Grans, Absolute 0.02 10*3/mm3      nRBC 0.4 /100 WBC           HOSPITAL COURSE:  Active Hospital Problems    Diagnosis  POA   • **FRANCK (acute kidney injury) ([facility]) [N17.9]  Yes   • Acute systolic heart failure (CMS/HCC) [I50.21]  Unknown   • Acute on chronic systolic heart failure (CMS/HCC) [I50.23]  Unknown   • Atrial fibrillation, persistent ([facility]) [I48.19]  Unknown   • Severe malnutrition ([facility]) [E43]  Yes     The patient was admitted initially with shortness of air. He had FRANCK initially with nephrology consulted. There was some consideration of acute jessee on admission but HIDA was negative and no surgery planned per General Surgery. He developed hypotension and cardiology was consulted given his HFrEF. He was also transferred to the ICU on 1/3. He was started on bumex and inotropes. He also developed Afib with RVR but converted back to NSR. Optimization was attempted but per cards he was recommended for higher level of care and transfer to  was started. He was accepted but they had no bed until Sunday 1/14. Ambulance service was unavailable for at least 24-48 hours or longer, so fixed wing flight was arranged and patient was transferred on 1/14 to .    DISCHARGE CONDITION:   Stable but guarded    DISPOSITION:  Transfer to Another Facility    DISCHARGE MEDICATIONS     Discharge Medications      New Medications      Instructions Start Date   bumetanide 10 mg in sodium chloride 0.9 % 60 mL   0.5 mg/hr (0.5 mg/hr), Intravenous, Continuous      DOBUTamine 1 mg/mL infusion  Commonly known as: DOBUTREX   2.5 mcg/kg/min (350 mcg/min), Intravenous, Continuous      droperidol 2.5 MG/ML injection  Commonly known as: INAPSINE   1.25 mg, Intravenous, Every 6 Hours PRN      spironolactone 25 MG tablet  Commonly known as: ALDACTONE   25 mg, Oral, Daily   Start Date:  January 15, 2023        Continue These Medications      Instructions Start Date   albuterol sulfate  (90 Base) MCG/ACT inhaler  Commonly known as: PROVENTIL HFA;VENTOLIN HFA;PROAIR HFA   2 puffs, Inhalation, Every 4 Hours PRN      apixaban 5 MG tablet tablet  Commonly known as: ELIQUIS   5 mg, Oral, 2 Times Daily         Stop These Medications    bumetanide 1 MG tablet  Commonly known as: BUMEX     carvedilol 12.5 MG tablet  Commonly known as: COREG     losartan 25 MG tablet  Commonly known as: COZAAR     lovastatin 20 MG 24 hr tablet  Commonly known as: ALTOPREV     metOLazone 2.5 MG tablet  Commonly known as: ZAROXOLYN     potassium chloride 10 MEQ CR tablet     vitamin D 1.25 MG (12598 UT) capsule capsule  Commonly known as: ERGOCALCIFEROL            INSTRUCTIONS:  Activity:   Activity Instructions     Activity as Tolerated            Diet:   Diet Instructions     Advance Diet As Tolerated            Special instructions: Patient instructed to call MD or return to ED with worsening shortness of breath, chest pain, fever greater than 100.4 degrees F or any other medical concerns..    FOLLOW UP: Other follow ups to be scheduled from  on discharge from their facility.   Follow-up Information     Shonna Ng APRN .    Specialty: Family Medicine  Contact information:  [address] [place], [place] [id number] [phone]                         PENDING TEST RESULTS AT DISCHARGE      Time: Discharge 35 min          This document has been electronically signed by Everardo Canada MD on January 14, 2023 15:12 CST     Electronically signed by Everardo Canada MD at 01/14/23 1705       Discharge Order (From admission, onward)     Start     Ordered    01/14/23 1508  Discharge patient  Once        Expected Discharge Date: 01/14/23    Discharge Disposition: Transfer to Another Facility    Physician of Record for Attribution - Please select from Treatment Team: EVERARDO CANADA [[id number]]    Review needed by CMO to  determine Physician of Record: No       Question Answer Comment   Physician of Record for Attribution - Please select from Treatment Team JACQUELINE OLEA    Review needed by CMO to determine Physician of Record No        01/14/23 9934

## 2023-01-23 ENCOUNTER — HOME HEALTH ADMISSION (OUTPATIENT)
Dept: HOME HEALTH SERVICES | Facility: HOME HEALTHCARE | Age: 53
End: 2023-01-23
Payer: MEDICAID

## 2023-06-06 DIAGNOSIS — I47.29 PAROXYSMAL VT: Primary | ICD-10-CM

## 2023-06-06 RX ORDER — AMIODARONE HYDROCHLORIDE 200 MG/1
400 TABLET ORAL 2 TIMES DAILY
Qty: 56 TABLET | Refills: 0 | Status: SHIPPED | OUTPATIENT
Start: 2023-06-06 | End: 2023-06-20

## (undated) DEVICE — ADHS LIQ MASTISOL 2/3ML

## (undated) DEVICE — SUT PROLN 2/0 SH 36IN 8523H

## (undated) DEVICE — BNDG ELAS ELITE V/CLOSE 6IN 5YD LF STRL

## (undated) DEVICE — SUT PROLN 2/0 8685H

## (undated) DEVICE — SUT VICRYL 4/0 SUTUPAK 18 J109T

## (undated) DEVICE — BANDAGE,GAUZE,BULKEE II,4.5"X4.1YD,STRL: Brand: MEDLINE

## (undated) DEVICE — CATH DIAG EXPO .052 PIG145 6F 110CM

## (undated) DEVICE — PK EXTRM LF 60

## (undated) DEVICE — TOWEL,OR,DSP,ST,BLUE,DLX,4/PK,20PK/CS: Brand: MEDLINE

## (undated) DEVICE — ELECTRODE,RT,STRESS,FOAM,50PK: Brand: MEDLINE

## (undated) DEVICE — Device

## (undated) DEVICE — ST CVR PROB PULLUP ULTRASND 5X48IN

## (undated) DEVICE — GLIDESHEATH SLENDER STAINLESS STEEL KIT: Brand: GLIDESHEATH SLENDER

## (undated) DEVICE — PAD,ABDOMINAL,8"X10",ST,LF: Brand: MEDLINE

## (undated) DEVICE — INTENDED TO BE USED TO OCCLUDE, RETRACT AND IDENTIFY ARTERIES, VEINS, TENDONS AND NERVES IN SURGICAL PROCEDURES: Brand: STERION®  VESSEL LOOP

## (undated) DEVICE — PK CATH LAB 60

## (undated) DEVICE — SUT MONOCRYL 4/0 PS2 27IN Y426H ETY426H

## (undated) DEVICE — GOWN,AURORA,NOREINF,RAGLAN,XL,STERILE: Brand: MEDLINE

## (undated) DEVICE — SUT SILK 1 LBYRNTH TIES 30IN A307H

## (undated) DEVICE — LIGHT HANDLE: Brand: DEVON

## (undated) DEVICE — BNDG ELAS ELITE V/CLOSE 4IN 5YD LF STRL

## (undated) DEVICE — STERILE POLYISOPRENE POWDER-FREE SURGICAL GLOVES: Brand: PROTEXIS

## (undated) DEVICE — DRAPE,SPLIT,BILATERAL,STERILE: Brand: MEDLINE

## (undated) DEVICE — TR BAND RADIAL ARTERY COMPRESSION DEVICE: Brand: TR BAND

## (undated) DEVICE — ELECTRD BLD EZ CLN MOD 2.5IN

## (undated) DEVICE — COVER,MAYO STAND,STERILE: Brand: MEDLINE

## (undated) DEVICE — SPNG LAP 18X18IN LF STRL PK/5

## (undated) DEVICE — SYS SKIN CLS DERMABOND PRINEO W/22CM MESH TP

## (undated) DEVICE — SUT VIC 3/0 SH 27IN J416H

## (undated) DEVICE — GLV SURG SENSICARE POLYISPRN W/ALOE PF LF 6 GRN STRL

## (undated) DEVICE — SOL HYDROGEN PEROX 3PCT 4OZ

## (undated) DEVICE — RADIFOCUS OPTITORQUE ANGIOGRAPHIC CATHETER: Brand: OPTITORQUE

## (undated) DEVICE — CATH DIAG EXPO .056 FR4 6F 100CM

## (undated) DEVICE — SUT PDS 3/0 SH 27IN DYED Z316H

## (undated) DEVICE — GW PERIPH GUIDERIGHT STD/EXCHNG/J/TIP SS 0.038IN 5X260CM

## (undated) DEVICE — GLV SURG TRIUMPH LT PF LTX 7 STRL

## (undated) DEVICE — 3M™ STERI-STRIP™ REINFORCED ADHESIVE SKIN CLOSURES, R1547, 1/2 IN X 4 IN (12 MM X 100 MM), 6 STRIPS/ENVELOPE: Brand: 3M™ STERI-STRIP™

## (undated) DEVICE — I-KNIFE CUTTING INSTRUMENT 15 DEGREE: Brand: I-KNIFE

## (undated) DEVICE — 3M™ IOBAN™ 2 ANTIMICROBIAL INCISE DRAPE 6651EZ: Brand: IOBAN™ 2

## (undated) DEVICE — GAUZE,SPONGE,4"X4",16PLY,XRAY,STRL,LF: Brand: MEDLINE

## (undated) DEVICE — MODEL BT2000 P/N 700287-012KIT CONTENTS: MANIFOLD WITH SALINE AND CONTRAST PORTS, SALINE TUBING WITH SPIKE AND HAND SYRINGE, TRANSDUCER: Brand: BT2000 AUTOMATED MANIFOLD KIT

## (undated) DEVICE — INTENDED FOR TISSUE SEPARATION, AND OTHER PROCEDURES THAT REQUIRE A SHARP SURGICAL BLADE TO PUNCTURE OR CUT.: Brand: BARD-PARKER ® CARBON RIB-BACK BLADES

## (undated) DEVICE — STERILE POLYISOPRENE POWDER-FREE SURGICAL GLOVES WITH EMOLLIENT COATING: Brand: PROTEXIS

## (undated) DEVICE — SUT PROLENE CARDIO C1D 6/0 24IN 8726H